# Patient Record
Sex: MALE | Race: WHITE | NOT HISPANIC OR LATINO | Employment: OTHER | URBAN - METROPOLITAN AREA
[De-identification: names, ages, dates, MRNs, and addresses within clinical notes are randomized per-mention and may not be internally consistent; named-entity substitution may affect disease eponyms.]

---

## 2017-01-04 ENCOUNTER — OFFICE VISIT (OUTPATIENT)
Dept: FAMILY MEDICINE | Facility: CLINIC | Age: 72
End: 2017-01-04
Payer: COMMERCIAL

## 2017-01-04 VITALS
OXYGEN SATURATION: 96 % | SYSTOLIC BLOOD PRESSURE: 150 MMHG | HEIGHT: 70 IN | WEIGHT: 232 LBS | HEART RATE: 96 BPM | BODY MASS INDEX: 33.21 KG/M2 | DIASTOLIC BLOOD PRESSURE: 70 MMHG | TEMPERATURE: 97.2 F

## 2017-01-04 DIAGNOSIS — Z12.11 SCREEN FOR COLON CANCER: ICD-10-CM

## 2017-01-04 DIAGNOSIS — Z00.01 ENCOUNTER FOR ROUTINE ADULT HEALTH EXAMINATION WITH ABNORMAL FINDINGS: Primary | ICD-10-CM

## 2017-01-04 DIAGNOSIS — D62 ANEMIA DUE TO BLOOD LOSS, ACUTE: ICD-10-CM

## 2017-01-04 DIAGNOSIS — C61 ADENOCARCINOMA OF PROSTATE (H): ICD-10-CM

## 2017-01-04 DIAGNOSIS — I10 HYPERTENSION GOAL BP (BLOOD PRESSURE) < 140/90: ICD-10-CM

## 2017-01-04 DIAGNOSIS — E78.5 HYPERLIPIDEMIA LDL GOAL <130: ICD-10-CM

## 2017-01-04 DIAGNOSIS — E66.09 OBESITY DUE TO EXCESS CALORIES, UNSPECIFIED OBESITY SEVERITY: ICD-10-CM

## 2017-01-04 LAB — HGB BLD-MCNC: 12.4 G/DL (ref 13.3–17.7)

## 2017-01-04 PROCEDURE — 82043 UR ALBUMIN QUANTITATIVE: CPT | Performed by: FAMILY MEDICINE

## 2017-01-04 PROCEDURE — 82728 ASSAY OF FERRITIN: CPT | Performed by: FAMILY MEDICINE

## 2017-01-04 PROCEDURE — 84153 ASSAY OF PSA TOTAL: CPT | Performed by: FAMILY MEDICINE

## 2017-01-04 PROCEDURE — 80061 LIPID PANEL: CPT | Performed by: FAMILY MEDICINE

## 2017-01-04 PROCEDURE — 85018 HEMOGLOBIN: CPT | Performed by: FAMILY MEDICINE

## 2017-01-04 PROCEDURE — 80048 BASIC METABOLIC PNL TOTAL CA: CPT | Performed by: FAMILY MEDICINE

## 2017-01-04 PROCEDURE — 99397 PER PM REEVAL EST PAT 65+ YR: CPT | Performed by: FAMILY MEDICINE

## 2017-01-04 PROCEDURE — 36415 COLL VENOUS BLD VENIPUNCTURE: CPT | Performed by: FAMILY MEDICINE

## 2017-01-04 RX ORDER — HYDROCHLOROTHIAZIDE 25 MG/1
25 TABLET ORAL EVERY MORNING
Qty: 90 TABLET | Status: SHIPPED | OUTPATIENT
Start: 2017-01-04 | End: 2018-01-30

## 2017-01-04 RX ORDER — LISINOPRIL 40 MG/1
40 TABLET ORAL DAILY
Qty: 90 TABLET | Status: SHIPPED | OUTPATIENT
Start: 2017-01-04 | End: 2018-01-30

## 2017-01-04 RX ORDER — SIMVASTATIN 20 MG
20 TABLET ORAL AT BEDTIME
Qty: 90 TABLET | Status: SHIPPED | OUTPATIENT
Start: 2017-01-04 | End: 2018-01-30

## 2017-01-04 NOTE — PROGRESS NOTES
SUBJECTIVE:                                                            Genaro Ortiz is a 71 year old male who presents for Preventive Visit.      Are you in the first 12 months of your Medicare Part B coverage?  No    Healthy Habits:    Do you get at least three servings of calcium containing foods daily (dairy, green leafy vegetables, etc.)? yes    Amount of exercise or daily activities, outside of work: 1 day(s) per week    Problems taking medications regularly No    Medication side effects: No    Have you had an eye exam in the past two years? yes    Do you see a dentist twice per year? yes    Do you have sleep apnea, excessive snoring or daytime drowsiness?no    COGNITIVE SCREEN  1) Repeat 3 items (Banana, Sunrise, Chair)    2) Clock draw: NORMAL  3) 3 item recall: Recalls 2 objects   Results: NORMAL clock, 1-2 items recalled: COGNITIVE IMPAIRMENT LESS LIKELY    Mini-CogTM Copyright S Micheal. Licensed by the author for use in [facility]; reprinted with permission (soob@[email or web address]). All rights reserved.            Discuss weight gain issues     All Histories reviewed and updated in EPIC as appropriate.  Social History   Substance Use Topics     Smoking status: Never Smoker      Smokeless tobacco: Never Used     Alcohol Use: Yes      Comment: occasional       The patient does not drink >3 drinks per day nor >7 drinks per week.    Today's PHQ-2 Score:   PHQ-2 ( 1999 [employer]) 5/6/2014 10/29/2013   Q1: Little interest or pleasure in doing things 0 0   Q2: Feeling down, depressed or hopeless 0 0   PHQ-2 Score 0 0       Do you feel safe in your environment - Yes    Do you have a Health Care Directive?: Yes: Advance Directive has been received and scanned.    Current providers sharing in care for this patient include:   Patient Care Team:  Berhane Oquendo MD as PCP - General (Family Practice)      Hearing impairment: No    Ability to successfully perform activities of daily living: Yes, no assistance  needed     Fall risk:  Fallen 2 or more times in the past year?: No  Any fall with injury in the past year?: No    Home safety:  none identified      The following health maintenance items are reviewed in Epic and correct as of today:  Health Maintenance   Topic Date Due     AORTIC ANEURYSM SCREENING (SYSTEM ASSIGNED)  06/29/2012     COLONOSCOPY Q10 YEARS NO INBASKET MESSAGE  04/19/2016     INFLUENZA VACCINE (SYSTEM ASSIGNED)  09/01/2016     FALL RISK ASSESSMENT  11/17/2016     TETANUS IMMUNIZATION (SYSTEM ASSIGNED)  05/27/2018     LIPID SCREEN Q5 YR MALE (SYSTEM ASSIGNED)  11/17/2020     ADVANCE DIRECTIVE PLANNING Q5 YRS (NO INBASKET)  03/04/2021     PNEUMOCOCCAL  Completed     HEPATITIS C SCREENING  Completed              ROS: He is thinking about retiring completely, works 4 days a week right now and doesn't have much energy for exercise. He is on his feet the whole time working at [employer]. We talked about his prostate cancer and has been 10 years so if the PSA keeps being undetectable it's likely a cure. He is due for colonoscopy. Blood pressure initially a little high, came down a bit, but he checks it out of the world and he always gets much better readings so I think it's adequate. Meds were refilled.  C: NEGATIVE for fever, chills, change in weight  I: NEGATIVE for worrisome rashes, moles or lesions  E: NEGATIVE for vision changes or irritation  E/M: NEGATIVE for ear, mouth and throat problems  R: NEGATIVE for significant cough or SOB  B: NEGATIVE for masses, tenderness or discharge  CV: NEGATIVE for chest pain, palpitations or peripheral edema  GI: NEGATIVE for nausea, abdominal pain, heartburn, or change in bowel habits  : NEGATIVE for frequency, dysuria, or hematuria  M: NEGATIVE for significant arthralgias or myalgia  N: NEGATIVE for weakness, dizziness or paresthesias  E: NEGATIVE for temperature intolerance, skin/hair changes  H: NEGATIVE for bleeding problems  P: NEGATIVE for changes in mood or  "affect    Problem list, Medication list, Allergies, and Medical/Social/Surgical histories reviewed in [facility] and updated as appropriate.  OBJECTIVE:                                                            There were no vitals taken for this visit. Estimated body mass index is 31.85 kg/(m^2) as calculated from the following:    Height as of 11/17/15: 5' 10\" (1.778 m).    Weight as of 11/17/15: 222 lb (100.699 kg).  EXAM:   GENERAL: healthy, alert and no distress  EYES: Eyes grossly normal to inspection, PERRL and conjunctivae and sclerae normal  HENT: ear canals and TM's normal, nose and mouth without ulcers or lesions  NECK: no adenopathy, no asymmetry, masses, or scars and thyroid normal to palpation  RESP: lungs clear to auscultation - no rales, rhonchi or wheezes  CV: regular rate and rhythm, normal S1 S2, no S3 or S4, no murmur, click or rub, no peripheral edema and peripheral pulses strong  ABDOMEN: soft, nontender, no hepatosplenomegaly, no masses and bowel sounds normal   (male): normal male genitalia without lesions or urethral discharge, no hernia  RECTAL: normal sphincter tone, no rectal masses, prostate normal size, smooth, nontender without nodules or masses  MS: no gross musculoskeletal defects noted, no edema  SKIN: no suspicious lesions or rashes  NEURO: Normal strength and tone, mentation intact and speech normal  PSYCH: mentation appears normal, affect normal/bright    ASSESSMENT / PLAN:                                                            1. Encounter for routine adult health examination with abnormal findings      2. Screen for colon cancer    - GASTROENTEROLOGY ADULT REFERRAL +/- PROCEDURE    3. Hyperlipidemia LDL goal <130    - Lipid Profile with reflex to direct LDL  - simvastatin (ZOCOR) 20 MG tablet; Take 1 tablet (20 mg) by mouth At Bedtime at bedtime.  Dispense: 90 tablet; Refill: prn    4. Hypertension goal BP (blood pressure) < 140/90    - Basic metabolic panel  - Albumin Random " "Urine Quantitative  - lisinopril (PRINIVIL/ZESTRIL) 40 MG tablet; Take 1 tablet (40 mg) by mouth daily  Dispense: 90 tablet; Refill: prn  - hydrochlorothiazide (HYDRODIURIL) 25 MG tablet; Take 1 tablet (25 mg) by mouth every morning  Dispense: 90 tablet; Refill: prn    5. Adenocarcinoma of prostate (H)    - PSA, tumor marker    6. Anemia due to blood loss, acute    - Hemoglobin  - Ferritin    7. Obesity due to excess calories, unspecified obesity severity        End of Life Planning:  Patient currently has an advanced directive: No.  I have verified the patient's ablity to prepare an advanced directive/make health care decisions.  Literature was provided to assist patient in preparing an advanced directive.    COUNSELING:  Reviewed preventive health counseling, as reflected in patient instructions       Regular exercise       Healthy diet/nutrition        Estimated body mass index is 31.85 kg/(m^2) as calculated from the following:    Height as of 11/17/15: 5' 10\" (1.778 m).    Weight as of 11/17/15: 222 lb (100.699 kg).  Weight management plan: Discussed healthy diet and exercise guidelines and patient will follow up in 12 months in clinic to re-evaluate.   reports that he has never smoked. He has never used smokeless tobacco.      Appropriate preventive services were discussed with this patient, including applicable screening as appropriate for cardiovascular disease, diabetes, osteopenia/osteoporosis, and glaucoma.  As appropriate for age/gender, discussed screening for colorectal cancer, prostate cancer, breast cancer, and cervical cancer. Checklist reviewing preventive services available has been given to the patient.    Reviewed patients plan of care and provided an AVS. The Basic Care Plan (routine screening as documented in Health Maintenance) for Genaro meets the Care Plan requirement. This Care Plan has been established and reviewed with the Patient.    Counseling Resources:  ATP IV Guidelines  Pooled " Cohorts Equation Calculator  Breast Cancer Risk Calculator  FRAX Risk Assessment  ICSI Preventive Guidelines  Dietary Guidelines for Americans, 2010  [employer]'s MyPlate  ASA Prophylaxis  Lung CA Screening    Berhane Oquendo MD  [facility]

## 2017-01-04 NOTE — NURSING NOTE
"Chief Complaint   Patient presents with     Physical     fasting     /92 mmHg  Pulse 96  Temp(Src) 97.2  F (36.2  C) (Oral)  Ht 5' 10\" (1.778 m)  Wt 232 lb (105.235 kg)  BMI 33.29 kg/m2  SpO2 96% Estimated body mass index is 33.29 kg/(m^2) as calculated from the following:    Height as of this encounter: 5' 10\" (1.778 m).    Weight as of this encounter: 232 lb (105.235 kg).  BP completed using cuff size: large       Health Maintenance due pending provider review:  Colonoscopy/FIT and BP was high, used pink card, recheck manually    COLON--Gave pt phone number, and pended order for Mammo and/or Colonoscopy      Cecily Cárdenas CMA  "

## 2017-01-05 LAB
ANION GAP SERPL CALCULATED.3IONS-SCNC: 9 MMOL/L (ref 3–14)
BUN SERPL-MCNC: 18 MG/DL (ref 7–30)
CALCIUM SERPL-MCNC: 9.4 MG/DL (ref 8.5–10.1)
CHLORIDE SERPL-SCNC: 108 MMOL/L (ref 94–109)
CHOLEST SERPL-MCNC: 182 MG/DL
CO2 SERPL-SCNC: 22 MMOL/L (ref 20–32)
CREAT SERPL-MCNC: 0.92 MG/DL (ref 0.66–1.25)
FERRITIN SERPL-MCNC: 244 NG/ML (ref 26–388)
GFR SERPL CREATININE-BSD FRML MDRD: 81 ML/MIN/1.7M2
GLUCOSE SERPL-MCNC: 105 MG/DL (ref 70–99)
HDLC SERPL-MCNC: 46 MG/DL
LDLC SERPL CALC-MCNC: 109 MG/DL
NONHDLC SERPL-MCNC: 136 MG/DL
POTASSIUM SERPL-SCNC: 3.7 MMOL/L (ref 3.4–5.3)
PSA SERPL-MCNC: NORMAL UG/L (ref 0–4)
SODIUM SERPL-SCNC: 139 MMOL/L (ref 133–144)
TRIGL SERPL-MCNC: 135 MG/DL

## 2017-01-06 LAB
CREAT UR-MCNC: 115 MG/DL
MICROALBUMIN UR-MCNC: 15 MG/L
MICROALBUMIN/CREAT UR: 13.3 MG/G CR (ref 0–17)

## 2017-01-06 NOTE — PROGRESS NOTES
Quick Note:    All in all good. No detectible PSA. The hemoglobin stays ust slightly low but not for lack of iron. I suspect it is normal for you,but let's check annually  ______

## 2017-01-24 ENCOUNTER — OFFICE VISIT (OUTPATIENT)
Dept: OPHTHALMOLOGY | Facility: CLINIC | Age: 72
End: 2017-01-24
Attending: OPHTHALMOLOGY
Payer: COMMERCIAL

## 2017-01-24 DIAGNOSIS — H43.813 POSTERIOR VITREOUS DETACHMENT OF BOTH EYES: ICD-10-CM

## 2017-01-24 DIAGNOSIS — H35.371 ERM OD (EPIRETINAL MEMBRANE, RIGHT EYE): Primary | ICD-10-CM

## 2017-01-24 DIAGNOSIS — Z96.1 PSEUDOPHAKIA OF BOTH EYES: ICD-10-CM

## 2017-01-24 PROCEDURE — 92015 DETERMINE REFRACTIVE STATE: CPT | Mod: ZF

## 2017-01-24 PROCEDURE — 99214 OFFICE O/P EST MOD 30 MIN: CPT | Mod: 25,ZF

## 2017-01-24 ASSESSMENT — REFRACTION_MANIFEST
OD_ADD: +2.50
OS_ADD: +2.50
OS_AXIS: 100
OS_SPHERE: -1.25
OD_CYLINDER: +0.50
OD_SPHERE: -0.50
OD_AXIS: 095
OS_CYLINDER: +0.75

## 2017-01-24 ASSESSMENT — REFRACTION_WEARINGRX
OD_ADD: +2.50
OS_CYLINDER: +1.00
OS_SPHERE: -1.00
OD_CYLINDER: +0.75
SPECS_TYPE: PAL
OD_SPHERE: PLANO
OD_AXIS: 101
OS_ADD: +2.50
OS_AXIS: 114

## 2017-01-24 ASSESSMENT — TONOMETRY
OS_IOP_MMHG: 12
IOP_METHOD: TONOPEN
OD_IOP_MMHG: 12

## 2017-01-24 ASSESSMENT — CONF VISUAL FIELD
OD_NORMAL: 1
OS_NORMAL: 1

## 2017-01-24 ASSESSMENT — CUP TO DISC RATIO
OS_RATIO: 0.1
OD_RATIO: 0.1

## 2017-01-24 ASSESSMENT — VISUAL ACUITY
OD_CC: 20/30
CORRECTION_TYPE: GLASSES
METHOD: SNELLEN - LINEAR
OS_CC+: -2
OS_CC: J1+
OS_CC: 20/20
OD_CC: J1+

## 2017-01-24 ASSESSMENT — SLIT LAMP EXAM - LIDS
COMMENTS: NORMAL
COMMENTS: NORMAL

## 2017-01-24 NOTE — PROGRESS NOTES
CC: Comprehensive Eye Exam    HPI: Genaro Ortiz is a 70 year old male who presents for comprehensive eye exam. Reports vision is quite good. Denies any eye irritation.     POHx:  CE/ IOL, Both Eyes (2/22/12, 3/28/12); left eye complicated by retained lens fragment requiring return to OR    Current Medications:   None    Assessment & Plan   Genaro Ortiz is a 71 year old male with the following diagnoses:     1. Posterior Vitreous Detachment, complete left eye, complete right eye   2. Epiretinal membrane, right eye    - mild Vitreomacular traction right eye, asymptomatic, monitor   - with vitreous syneresis and history of high myopia   - counseled on signs of retinal detachment and when to seek evaluation   - monitor     3. Pseudophakia, Both Eyes   - PCO left > right   - consider YAG capsulotomy in the future    4. Dry Eye Syndrome   - continue artificial tears as needed    Berhane Webster MD  Ophthalmology, PGY-2     Attending Physician Attestation:  I have seen and examined this patient.  I have confirmed and edited as necessary the chief complaint(s), history of present illness, review of systems, relevant history, and examination findings as documented by others.  I have personally reviewed the relevant tests, images, and reports as documented above.  I have confirmed and edited as necessary the assessment and plan and agree with this note.  - Eric Abel MD 11:17 AM 1/24/2017

## 2017-01-24 NOTE — MR AVS SNAPSHOT
After Visit Summary   1/24/2017    Genaro Ortiz    MRN: [id number]           Patient Information     Date Of Birth          1945        Visit Information        Provider Department      1/24/2017 10:15 AM Eric Abel MD [facility]        Today's Diagnoses     ERM OD (epiretinal membrane, right eye)    -  1     Posterior vitreous detachment of both eyes         Pseudophakia of both eyes            Follow-ups after your visit        Your next 10 appointments already scheduled     Jan 31, 2017   Procedure with Pete Siu MD   [facility] ([facility])    [address]  [place] [address]   [phone]           [facility] is located at [address] [place]            Jan 23, 2018 10:15 AM   RETURN GENERAL with Eric Abel MD   [facility] Clinic ([facility])    [address]  [address] [facility] [address] [facility]  [address]  [facility] [address] [phone]              Who to contact     Please call your clinic at [phone] to:    Ask questions about your health    Make or cancel appointments    Discuss your medicines    Learn about your test results    Speak to your doctor   If you have compliments or concerns about an experience at your clinic, or if you wish to file a complaint, please contact [facility] Patient Relations at [phone] or email us at [email or web address]         Additional Information About Your Visit        MyChart Information     [email or web address] gives you secure access to your electronic health record. If you see a primary care provider, you can also send messages to your care team and make appointments. If you have questions, please call your primary care clinic.  If you do not have a primary care provider, please call [phone] and they will assist you.      Kaymu is an electronic gateway that provides easy, online access to your medical records. With  Christian, you can request a clinic appointment, read your test results, renew a prescription or communicate with your care team.     To access your existing account, please contact your [facility] or call [phone] for assistance.        Care EveryWhere ID     This is your Care EveryWhere ID. This could be used by other organizations to access your [place] medical records  [id number]         Blood Pressure from Last 3 Encounters:   01/04/17 150/70   11/17/15 130/80   11/11/14 137/86    Weight from Last 3 Encounters:   01/04/17 105.235 kg (232 lb)   11/17/15 100.699 kg (222 lb)   11/11/14 100.109 kg (220 lb 11.2 oz)              Today, you had the following     No orders found for display       Primary Care Provider Office Phone # Fax #    Berhane Oquendo [phone]       [facility] [address] [address]        Thank you!     Thank you for choosing EYE CLINIC  for your care. Our goal is always to provide you with excellent care. Hearing back from our patients is one way we can continue to improve our services. Please take a few minutes to complete the written survey that you may receive in the mail after your visit with us. Thank you!             Your Updated Medication List - Protect others around you: Learn how to safely use, store and throw away your medicines at www.disposemymeds.org.          This list is accurate as of: 1/24/17 11:16 AM.  Always use your most recent med list.                   Brand Name Dispense Instructions for use    aspirin 81 MG tablet      1 TABLET DAILY       cholecalciferol 1000 UNIT tablet    vitamin D    100 tablet    Take 1 tablet by mouth daily.       fish oil-omega-3 fatty acids 1000 MG capsule     120 capsule    Take 2 capsules by mouth daily.       hydrochlorothiazide 25 MG tablet    HYDRODIURIL    90 tablet    Take 1 tablet (25 mg) by mouth every morning       lisinopril 40 MG tablet     PRINIVIL/ZESTRIL    90 tablet    Take 1 tablet (40 mg) by mouth daily       simvastatin 20 MG tablet    ZOCOR    90 tablet    Take 1 tablet (20 mg) by mouth At Bedtime at bedtime.

## 2017-01-24 NOTE — NURSING NOTE
Chief Complaints and History of Present Illnesses   Patient presents with     COMPREHENSIVE EYE EXAM     ERM OD (epiretinal membrane, right eye)      HPI    Affected eye(s):  Both   Symptoms:     No blurred vision   No decreased vision   No floaters   No flashes   No halos   No starbursts   No Dryness   No itching      Duration:  12 months   Frequency:  Constant       Do you have eye pain now?:  No      Comments:  States va is the same since last visit.   David Greenwood  9:59 AM January 24, 2017

## 2017-01-31 ENCOUNTER — HOSPITAL ENCOUNTER (OUTPATIENT)
Facility: CLINIC | Age: 72
Discharge: HOME | End: 2017-01-31
Attending: SPECIALIST | Admitting: SPECIALIST
Payer: COMMERCIAL

## 2017-01-31 ENCOUNTER — SURGERY (OUTPATIENT)
Age: 72
End: 2017-01-31

## 2017-01-31 VITALS
OXYGEN SATURATION: 96 % | RESPIRATION RATE: 18 BRPM | DIASTOLIC BLOOD PRESSURE: 95 MMHG | SYSTOLIC BLOOD PRESSURE: 132 MMHG

## 2017-01-31 LAB — COLONOSCOPY: NORMAL

## 2017-01-31 PROCEDURE — 25000125 ZZHC RX 250: Performed by: SPECIALIST

## 2017-01-31 PROCEDURE — G0500 MOD SEDAT ENDO SERVICE >5YRS: HCPCS | Performed by: SPECIALIST

## 2017-01-31 PROCEDURE — 45385 COLONOSCOPY W/LESION REMOVAL: CPT | Performed by: SPECIALIST

## 2017-01-31 PROCEDURE — 25000128 H RX IP 250 OP 636: Performed by: SPECIALIST

## 2017-01-31 PROCEDURE — 88305 TISSUE EXAM BY PATHOLOGIST: CPT | Performed by: SPECIALIST

## 2017-01-31 PROCEDURE — 88305 TISSUE EXAM BY PATHOLOGIST: CPT | Mod: 26 | Performed by: SPECIALIST

## 2017-01-31 PROCEDURE — 40000257 ZZH STATISTIC CONSULT NO CHARGE VASC ACCESS

## 2017-01-31 RX ORDER — FENTANYL CITRATE 50 UG/ML
INJECTION, SOLUTION INTRAMUSCULAR; INTRAVENOUS PRN
Status: DISCONTINUED | OUTPATIENT
Start: 2017-01-31 | End: 2017-01-31 | Stop reason: HOSPADM

## 2017-01-31 RX ORDER — LIDOCAINE 40 MG/G
CREAM TOPICAL
Status: DISCONTINUED | OUTPATIENT
Start: 2017-01-31 | End: 2017-01-31 | Stop reason: HOSPADM

## 2017-01-31 RX ORDER — ONDANSETRON 2 MG/ML
4 INJECTION INTRAMUSCULAR; INTRAVENOUS
Status: DISCONTINUED | OUTPATIENT
Start: 2017-01-31 | End: 2017-01-31 | Stop reason: HOSPADM

## 2017-01-31 RX ADMIN — FENTANYL CITRATE 50 MCG: 50 INJECTION, SOLUTION INTRAMUSCULAR; INTRAVENOUS at 11:27

## 2017-01-31 RX ADMIN — FENTANYL CITRATE 25 MCG: 50 INJECTION, SOLUTION INTRAMUSCULAR; INTRAVENOUS at 11:38

## 2017-01-31 RX ADMIN — MIDAZOLAM HYDROCHLORIDE 0.5 MG: 1 INJECTION, SOLUTION INTRAMUSCULAR; INTRAVENOUS at 11:32

## 2017-01-31 RX ADMIN — FENTANYL CITRATE 25 MCG: 50 INJECTION, SOLUTION INTRAMUSCULAR; INTRAVENOUS at 11:32

## 2017-01-31 RX ADMIN — MIDAZOLAM HYDROCHLORIDE 0.5 MG: 1 INJECTION, SOLUTION INTRAMUSCULAR; INTRAVENOUS at 11:38

## 2017-01-31 RX ADMIN — MIDAZOLAM HYDROCHLORIDE 1 MG: 1 INJECTION, SOLUTION INTRAMUSCULAR; INTRAVENOUS at 11:28

## 2017-01-31 NOTE — BRIEF OP NOTE
[facility] Brief Operative Note    Pre-operative diagnosis: SCREENING   Post-operative diagnosis small ascending colon polyp, diverticulosis     Procedure: Procedure(s):  COLONOSCOPY - Wound Class: II-Clean Contaminated   Surgeon(s): Surgeon(s) and Role:     * Pete Siu MD - Primary   Estimated blood loss: * No values recorded between 1/31/2017 12:00 AM and 1/31/2017 11:57 AM *    Specimens:   ID Type Source Tests Collected by Time Destination   A :  Polyp Large Intestine, Left/Descending SURGICAL PATHOLOGY EXAM Pete Siu MD 1/31/2017 11:46 AM       Findings: Please see ProVation procedure note in Chart Review

## 2017-01-31 NOTE — H&P
Pre-Endoscopy History and Physical     Genaro Ortiz MRN# [id number]   YOB: 1945 Age: 71 year old     Date of Procedure: 1/31/2017  Primary care provider: Berhane Oquendo  Type of Endoscopy: Colonoscopy with possible biopsy, possible polypectomy  Reason for Procedure: screening  Type of Anesthesia Anticipated: Conscious Sedation    HPI:    Genaro is a 71 year old male who will be undergoing the above procedure.      A history and physical has been performed. The patient's medications and allergies have been reviewed. The risks and benefits of the procedure and the sedation options and risks were discussed with the patient.  All questions were answered and informed consent was obtained.      He denies a personal or family history of anesthesia complications or bleeding disorders.     Patient Active Problem List   Diagnosis     Adenocarcinoma of prostate (H)     HYPERLIPIDEMIA LDL GOAL <130     Hypertension goal BP (blood pressure) < 140/90     Advance Care Planning     Anemia due to blood loss, acute     Health Care Home     Obesity due to excess calories, unspecified obesity severity        Past Medical History   Diagnosis Date     Unspecified essential hypertension      Pure hypercholesterolemia      Esophageal reflux      Adenocarcinoma of prostate (H) 5/27/2008        Past Surgical History   Procedure Laterality Date     Appendectomy  1952     Prostate surgery  2007     Herniorrhaphy inguinal  5/15/2014     Procedure: HERNIORRHAPHY INGUINAL;  Surgeon: Evens Jefferson MD;  Location: [facility] OR     Cataract iol, rt/lt Bilateral 03/12       Social History   Substance Use Topics     Smoking status: Never Smoker      Smokeless tobacco: Never Used     Alcohol Use: 0.0 oz/week     0 Standard drinks or equivalent per week      Comment: occasional       Family History   Problem Relation Age of Onset     DIABETES Mother      GASTROINTESTINAL DISEASE Mother      pancreas removed     Glaucoma Other       "Macular Degeneration No family hx of        Prior to Admission medications    Medication Sig Start Date End Date Taking? Authorizing Provider   simvastatin (ZOCOR) 20 MG tablet Take 1 tablet (20 mg) by mouth At Bedtime at bedtime. 1/4/17  Yes Berhane Oquendo MD   lisinopril (PRINIVIL/ZESTRIL) 40 MG tablet Take 1 tablet (40 mg) by mouth daily 1/4/17  Yes Berhane Oquendo MD   hydrochlorothiazide (HYDRODIURIL) 25 MG tablet Take 1 tablet (25 mg) by mouth every morning 1/4/17  Yes Berhane Oquendo MD   fish oil-omega-3 fatty acids (FISH OIL) 1000 MG capsule Take 2 capsules by mouth daily. 8/21/12  Yes Berhane Oquendo MD   cholecalciferol (VITAMIN D) 1000 UNIT tablet Take 1 tablet by mouth daily. 8/21/12  Yes Berhane Oquendo MD   ASPIRIN 81 MG OR TABS 1 TABLET DAILY    Reported, Patient       Allergies   Allergen Reactions     Penicillins Other (See Comments)     blotches        REVIEW OF SYSTEMS:   5 point ROS negative except as noted above in HPI, including Gen., Resp., CV, GI &  system review.    PHYSICAL EXAM:   /85 mmHg  SpO2 100% Estimated body mass index is 33.29 kg/(m^2) as calculated from the following:    Height as of 1/4/17: 1.778 m (5' 10\").    Weight as of 1/4/17: 105.235 kg (232 lb).   GENERAL APPEARANCE: alert, and oriented  MENTAL STATUS: alert  AIRWAY EXAM: Mallampatti Class II (visualization of the soft palate, fauces, and uvula)  RESP: lungs clear to auscultation - no rales, rhonchi or wheezes  CV: regular rates and rhythm  DIAGNOSTICS:    Not indicated    IMPRESSION   ASA Class 2 - Mild systemic disease    PLAN:   Plan for Colonoscopy with possible biopsy, possible polypectomy. We discussed the risks, benefits and alternatives and the patient wished to proceed.    The above has been forwarded to the consulting provider.      Signed Electronically by: Pete Siu  January 31, 2017            "

## 2017-02-01 LAB — COPATH REPORT: NORMAL

## 2017-09-26 ENCOUNTER — TRANSFERRED RECORDS (OUTPATIENT)
Dept: HEALTH INFORMATION MANAGEMENT | Facility: CLINIC | Age: 72
End: 2017-09-26

## 2018-01-23 ENCOUNTER — OFFICE VISIT (OUTPATIENT)
Dept: OPHTHALMOLOGY | Facility: CLINIC | Age: 73
End: 2018-01-23
Attending: OPHTHALMOLOGY
Payer: COMMERCIAL

## 2018-01-23 DIAGNOSIS — H43.813 POSTERIOR VITREOUS DETACHMENT OF BOTH EYES: ICD-10-CM

## 2018-01-23 DIAGNOSIS — H35.371 EPIRETINAL MEMBRANE (ERM) OF RIGHT EYE: Primary | ICD-10-CM

## 2018-01-23 DIAGNOSIS — H04.123 DRY EYES, BILATERAL: ICD-10-CM

## 2018-01-23 DIAGNOSIS — Z96.1 PSEUDOPHAKIA OF BOTH EYES: ICD-10-CM

## 2018-01-23 PROCEDURE — 92015 DETERMINE REFRACTIVE STATE: CPT | Mod: ZF

## 2018-01-23 PROCEDURE — G0463 HOSPITAL OUTPT CLINIC VISIT: HCPCS | Mod: 25,ZF

## 2018-01-23 ASSESSMENT — REFRACTION_WEARINGRX
OS_SPHERE: -1.00
OD_AXIS: 101
OS_CYLINDER: +1.00
OS_AXIS: 114
SPECS_TYPE: PAL
OS_SPHERE: -1.00
SPECS_TYPE: PAL
OD_SPHERE: PLANO
OD_CYLINDER: +0.75
OD_ADD: +2.50
OS_CYLINDER: +1.00
OD_CYLINDER: +0.75
OS_ADD: +2.50
OS_AXIS: 114
OD_AXIS: 101
OS_ADD: +2.50
OD_SPHERE: PLANO
OD_ADD: +2.50

## 2018-01-23 ASSESSMENT — CONF VISUAL FIELD
OS_NORMAL: 1
OD_NORMAL: 1
METHOD: COUNTING FINGERS

## 2018-01-23 ASSESSMENT — VISUAL ACUITY
OS_CC: 20/20
OD_PH_CC: 20/20-1
METHOD: SNELLEN - LINEAR
OD_CC+: -3
OD_CC: 20/25

## 2018-01-23 ASSESSMENT — TONOMETRY
OD_IOP_MMHG: 19
IOP_METHOD: TONOPEN
OS_IOP_MMHG: 18

## 2018-01-23 ASSESSMENT — REFRACTION_MANIFEST
OD_AXIS: 095
OS_SPHERE: -1.25
OS_CYLINDER: +0.25
OD_CYLINDER: +0.50
OD_ADD: +2.50
OS_ADD: +2.50
OS_AXIS: 160
OD_SPHERE: -0.75

## 2018-01-23 ASSESSMENT — CUP TO DISC RATIO
OS_RATIO: 0.1
OD_RATIO: 0.1

## 2018-01-23 ASSESSMENT — SLIT LAMP EXAM - LIDS
COMMENTS: NORMAL
COMMENTS: NORMAL

## 2018-01-23 NOTE — NURSING NOTE
Chief Complaints and History of Present Illnesses   Patient presents with     Follow Up For     ERM OD (epiretinal membrane, right eye)       HPI    Last Eye Exam:  1/24/17   Affected eye(s):  Right   Symptoms:        Unknown duration    Frequency:  Constant       Do you have eye pain now?:  No      Comments:  Genaro is here today for a one year follow up of ERM OD (epiretinal membrane, right eye)   He says he had cataract surgery several years ago and he says his vision is very good.   He says his right is fine, and he denies flashes but he does see floaters, and has for several years.    Beni Leyva COT 10:19 AM January 23, 2018

## 2018-01-23 NOTE — PROGRESS NOTES
Genaro Ortiz is a 72 year old male who presents today with the following diagnosis:    Assessment & Plan      Genaro Ortiz is a 72 year old male with the following diagnoses:   1. Epiretinal membrane (ERM) of right eye    2. Posterior vitreous detachment of both eyes    3. Pseudophakia of both eyes    4. Dry eyes, bilateral       EPIRETINAL MEMBRANE not visually significant, refracts to 20/20, can continue to monitor  Posterior capsular opacity (PCO) left eye > right eye, can consider YAG in the future if visually significant    Continue artificial tears as needed  Refracted today and new prescription dispensed, option to fill    Patient disposition:   Return in about 1 year (around 1/23/2019) for Annual Visit.        Abhinav Puga MD  PGY-2 Ophthalmology  [phone]    Attending Physician Attestation:  Complete documentation of historical and exam elements from today's encounter can be found in the full encounter summary report (not reduplicated in this progress note).  I personally obtained the chief complaint(s) and history of present illness.  I confirmed and edited as necessary the review of systems, past medical/surgical history, family history, social history, and examination findings as documented by others; and I examined the patient myself.  I personally reviewed the relevant tests, images, and reports as documented above.  I formulated and edited as necessary the assessment and plan and discussed the findings and management plan with the patient and family. . - Eric Abel MD

## 2018-01-23 NOTE — MR AVS SNAPSHOT
After Visit Summary   1/23/2018    Genaro Ortiz    MRN: [id number]           Patient Information     Date Of Birth          1945        Visit Information        Provider Department      1/23/2018 10:15 AM [facility] MD [facility]        Today's Diagnoses     Epiretinal membrane (ERM) of right eye    -  1    Posterior vitreous detachment of both eyes        Pseudophakia of both eyes        Dry eyes, bilateral           Follow-ups after your visit        Follow-up notes from your care team     Return in about 1 year (around 1/23/2019) for Annual Visit.      Your next 10 appointments already scheduled     Jan 30, 2018 10:00 AM CST   PHYSICAL with Francisco Sneed MD   [facility] ([facility])    [address] [address] [phone]              Who to contact     Please call your clinic at [phone] to:    Ask questions about your health    Make or cancel appointments    Discuss your medicines    Learn about your test results    Speak to your doctor   If you have compliments or concerns about an experience at your clinic, or if you wish to file a complaint, please contact [facility] Patient Relations at [phone] or email us at [email or web address]         Additional Information About Your Visit        MyChart Information     [employer] gives you secure access to your electronic health record. If you see a primary care provider, you can also send messages to your care team and make appointments. If you have questions, please call your primary care clinic.  If you do not have a primary care provider, please call [phone] and they will assist you.      [employer] is an electronic gateway that provides easy, online access to your medical records. With [employer], you can request a clinic appointment, read your test results, renew a prescription or communicate with your care team.     To  access your existing account, please contact your [facility] or call [phone] for assistance.        Care EveryWhere ID     This is your Care EveryWhere ID. This could be used by other organizations to access your [place] medical records  [id number]         Blood Pressure from Last 3 Encounters:   01/31/17 (!) 132/95   01/04/17 150/70   11/17/15 130/80    Weight from Last 3 Encounters:   01/04/17 105.2 kg (232 lb)   11/17/15 100.7 kg (222 lb)   11/11/14 100.1 kg (220 lb 11.2 oz)              Today, you had the following     No orders found for display       Primary Care Provider Office Phone # Fax #    Berhane Oquendo [phone] [address] [address]        Equal Access to Services     VISH [facility] : Hadii nabeel davis hadasho Soporfirio, waaxda luqadaha, qaybta kaalmada adepatriciayada, jose payne . So [facility] [phone].    ATENCIÓN: Si habla español, tiene a richards disposición servicios gratuitos de asistencia lingüística. Llame al [phone].    We comply with applicable federal civil rights laws and [place] laws. We do not discriminate on the basis of race, color, national origin, age, disability, sex, sexual orientation, or gender identity.            Thank you!     Thank you for choosing E[facility] CLINIC  for your care. Our goal is always to provide you with excellent care. Hearing back from our patients is one way we can continue to improve our services. Please take a few minutes to complete the written survey that you may receive in the mail after your visit with us. Thank you!             Your Updated Medication List - Protect others around you: Learn how to safely use, store and throw away your medicines at www.disposemymeds.org.          This list is accurate as of: 1/23/18 11:51 AM.  Always use your most recent med list.                   Brand Name Dispense Instructions for use Diagnosis    aspirin 81 MG  tablet      1 TABLET DAILY    Routine medical exam       cholecalciferol 1000 UNIT tablet    vitamin D3    100 tablet    Take 1 tablet by mouth daily.    Routine general medical examination at a health care facility       fish oil-omega-3 fatty acids 1000 MG capsule     120 capsule    Take 2 capsules by mouth daily.    Hyperlipidemia LDL goal <130       hydrochlorothiazide 25 MG tablet    HYDRODIURIL    90 tablet    Take 1 tablet (25 mg) by mouth every morning    Hypertension goal BP (blood pressure) < 140/90       lisinopril 40 MG tablet    PRINIVIL/ZESTRIL    90 tablet    Take 1 tablet (40 mg) by mouth daily    Hypertension goal BP (blood pressure) < 140/90       simvastatin 20 MG tablet    ZOCOR    90 tablet    Take 1 tablet (20 mg) by mouth At Bedtime at bedtime.    Hyperlipidemia LDL goal <130

## 2018-01-28 ASSESSMENT — ACTIVITIES OF DAILY LIVING (ADL)
CURRENT_FUNCTION: NO ASSISTANCE NEEDED
I_NEED_ASSISTANCE_FOR_THE_FOLLOWING_DAILY_ACTIVITIES:: NO ASSISTANCE IS NEEDED

## 2018-01-30 ENCOUNTER — RADIANT APPOINTMENT (OUTPATIENT)
Dept: GENERAL RADIOLOGY | Facility: CLINIC | Age: 73
End: 2018-01-30
Attending: FAMILY MEDICINE
Payer: COMMERCIAL

## 2018-01-30 ENCOUNTER — OFFICE VISIT (OUTPATIENT)
Dept: FAMILY MEDICINE | Facility: CLINIC | Age: 73
End: 2018-01-30
Payer: COMMERCIAL

## 2018-01-30 VITALS
SYSTOLIC BLOOD PRESSURE: 130 MMHG | RESPIRATION RATE: 14 BRPM | DIASTOLIC BLOOD PRESSURE: 80 MMHG | HEIGHT: 70 IN | BODY MASS INDEX: 33.07 KG/M2 | HEART RATE: 90 BPM | TEMPERATURE: 96.1 F | WEIGHT: 231 LBS | OXYGEN SATURATION: 98 %

## 2018-01-30 DIAGNOSIS — Z00.00 ROUTINE HISTORY AND PHYSICAL EXAMINATION OF ADULT: Primary | ICD-10-CM

## 2018-01-30 DIAGNOSIS — M79.652 PAIN OF LEFT THIGH: ICD-10-CM

## 2018-01-30 DIAGNOSIS — C61 ADENOCARCINOMA OF PROSTATE (H): ICD-10-CM

## 2018-01-30 DIAGNOSIS — E78.5 HYPERLIPIDEMIA LDL GOAL <130: ICD-10-CM

## 2018-01-30 DIAGNOSIS — I10 HYPERTENSION GOAL BP (BLOOD PRESSURE) < 140/90: ICD-10-CM

## 2018-01-30 DIAGNOSIS — M16.10 ARTHRITIS OF HIP: ICD-10-CM

## 2018-01-30 LAB
BASOPHILS # BLD AUTO: 0 10E9/L (ref 0–0.2)
BASOPHILS NFR BLD AUTO: 0.4 %
DIFFERENTIAL METHOD BLD: ABNORMAL
EOSINOPHIL # BLD AUTO: 0.1 10E9/L (ref 0–0.7)
EOSINOPHIL NFR BLD AUTO: 0.7 %
ERYTHROCYTE [DISTWIDTH] IN BLOOD BY AUTOMATED COUNT: 12.9 % (ref 10–15)
HCT VFR BLD AUTO: 38.2 % (ref 40–53)
HGB BLD-MCNC: 13.2 G/DL (ref 13.3–17.7)
LYMPHOCYTES # BLD AUTO: 1.8 10E9/L (ref 0.8–5.3)
LYMPHOCYTES NFR BLD AUTO: 24.4 %
MCH RBC QN AUTO: 31 PG (ref 26.5–33)
MCHC RBC AUTO-ENTMCNC: 34.6 G/DL (ref 31.5–36.5)
MCV RBC AUTO: 90 FL (ref 78–100)
MONOCYTES # BLD AUTO: 0.6 10E9/L (ref 0–1.3)
MONOCYTES NFR BLD AUTO: 7.7 %
NEUTROPHILS # BLD AUTO: 4.9 10E9/L (ref 1.6–8.3)
NEUTROPHILS NFR BLD AUTO: 66.8 %
PLATELET # BLD AUTO: 236 10E9/L (ref 150–450)
RBC # BLD AUTO: 4.26 10E12/L (ref 4.4–5.9)
WBC # BLD AUTO: 7.4 10E9/L (ref 4–11)

## 2018-01-30 PROCEDURE — 36415 COLL VENOUS BLD VENIPUNCTURE: CPT | Performed by: FAMILY MEDICINE

## 2018-01-30 PROCEDURE — 73502 X-RAY EXAM HIP UNI 2-3 VIEWS: CPT | Mod: FY

## 2018-01-30 PROCEDURE — G0438 PPPS, INITIAL VISIT: HCPCS | Performed by: FAMILY MEDICINE

## 2018-01-30 PROCEDURE — 99213 OFFICE O/P EST LOW 20 MIN: CPT | Mod: 25 | Performed by: FAMILY MEDICINE

## 2018-01-30 PROCEDURE — 84153 ASSAY OF PSA TOTAL: CPT | Performed by: FAMILY MEDICINE

## 2018-01-30 PROCEDURE — 80053 COMPREHEN METABOLIC PANEL: CPT | Performed by: FAMILY MEDICINE

## 2018-01-30 PROCEDURE — 85025 COMPLETE CBC W/AUTO DIFF WBC: CPT | Performed by: FAMILY MEDICINE

## 2018-01-30 RX ORDER — SIMVASTATIN 20 MG
20 TABLET ORAL AT BEDTIME
Qty: 90 TABLET | Status: SHIPPED | OUTPATIENT
Start: 2018-01-30 | End: 2019-02-07

## 2018-01-30 RX ORDER — LISINOPRIL 40 MG/1
40 TABLET ORAL DAILY
Qty: 90 TABLET | Status: SHIPPED | OUTPATIENT
Start: 2018-01-30 | End: 2019-02-07

## 2018-01-30 RX ORDER — HYDROCHLOROTHIAZIDE 25 MG/1
25 TABLET ORAL EVERY MORNING
Qty: 90 TABLET | Status: SHIPPED | OUTPATIENT
Start: 2018-01-30 | End: 2019-02-07

## 2018-01-30 NOTE — PROGRESS NOTES
SUBJECTIVE:   Genaro Ortiz is a 72 year old male who presents for Preventive Visit.      Are you in the first 12 months of your Medicare Part B coverage?      Healthy Habits:    Do you get at least three servings of calcium containing foods daily (dairy, green leafy vegetables, etc.)? yes    Amount of exercise or daily activities, outside of work: 2-3 but very active job day(s) per week    Problems taking medications regularly No    Medication side effects: No    Have you had an eye exam in the past two years? yes    Do you see a dentist twice per year? no    Do you have sleep apnea, excessive snoring or daytime drowsiness?no      Ability to successfully perform activities of daily living: Yes, no assistance needed    Home safety:  none identified     Hearing impairment: Yes, thinks it's less than it used to be. Worse in right ear    Fall risk:  Fallen 2 or more times in the past year?: No  Any fall with injury in the past year?: No        COGNITIVE SCREEN  1) Repeat 3 items (Banana, Sunrise, Chair)    2) Clock draw: NORMAL  3) 3 item recall: Recalls 3 objects  Results: 3 items recalled: COGNITIVE IMPAIRMENT LESS LIKELY    Mini-CogTM Copyright S Micheal. Licensed by the author for use in [facility]; reprinted with permission ([email or web address]). All rights reserved.          in addition to health maintanance patient would like to discuss the following problem:    Arthritis and pains in legs in left groin area, worse on left side, radiates down leg, comes and goes  Mechanism of injury: increased activity, walking, working etc  .  Immediate symptoms: insidious onset of pain.  Symptoms have been intermittent since that time.  Prior history of related problems: no prior problems with this area in the past.  Treatment: rest helps    Work related: exacerbates    Problem pertinent review of systems  Skin: no rash or infection  Neuro: no significant weakness, numbness, tingling.    Pertinent EXAM  Normal R & L  lower extremity joints for ROM symmetry & tone/ no tenderness/ no effusions/ no crepitus or deformities, except:  Hip exam: Pain is localized to the groin area with radiation down to the left knee  Decreased range of motion of the left leg and pain with internal rotation  Neurological exam reveals normal without focal findings. DTR's, motor strength and sensation normal.    X-ray: shows DJD changes, likely chronic    A/Plan:   Symptoms suggest that his pain is likely due to hip osteoarthritis  His dad had a history of osteoarthritis and ended up needing a hip replacement    This is not bothering him enough yet to decide to seek orthopedic consultation but he will consider it if it gets worse  We reviewed use of over-the-counter medications        Reviewed and updated as needed this visit by clinical staff         Reviewed and updated as needed this visit by Provider        Social History   Substance Use Topics     Smoking status: Never Smoker     Smokeless tobacco: Never Used     Alcohol use 0.0 oz/week     0 Standard drinks or equivalent per week      Comment: occasional       If you drink alcohol do you typically have >3 drinks per day or >7 drinks per week? No                        Today's PHQ-2 Score:   PHQ-2 ( 1999 Pfizer) 1/28/2018 1/4/2017   Q1: Little interest or pleasure in doing things 0 0   Q2: Feeling down, depressed or hopeless 0 0   PHQ-2 Score 0 0   Q1: Little interest or pleasure in doing things Not at all -   Q2: Feeling down, depressed or hopeless Not at all -   PHQ-2 Score 0 -       Do you feel safe in your environment - Yes    Do you have a Health Care Directive?: Yes: Advance Directive has been received and scanned.    Current providers sharing in care for this patient include:   Patient Care Team:  Berhane Oquendo MD as PCP - General (Family Practice)    The following health maintenance items are reviewed in Epic and correct as of today:  Health Maintenance   Topic Date Due     AORTIC  ANEURYSM SCREENING (SYSTEM ASSIGNED)  06/29/2012     INFLUENZA VACCINE (SYSTEM ASSIGNED)  09/01/2017     TETANUS IMMUNIZATION (SYSTEM ASSIGNED)  05/27/2018     FALL RISK ASSESSMENT  01/23/2019     ADVANCE DIRECTIVE PLANNING Q5 YRS  03/04/2021     LIPID SCREEN Q5 YR MALE (SYSTEM ASSIGNED)  01/04/2022     COLONOSCOPY Q5 YR  01/31/2022     PNEUMOCOCCAL  Completed     HEPATITIS C SCREENING  Completed     Labs reviewed in EPIC  BP Readings from Last 3 Encounters:   01/30/18 130/80   01/31/17 (!) 132/95   01/04/17 150/70    Wt Readings from Last 3 Encounters:   01/30/18 231 lb (104.8 kg)   01/04/17 232 lb (105.2 kg)   11/17/15 222 lb (100.7 kg)                  Patient Active Problem List   Diagnosis     Adenocarcinoma of prostate (H)     HYPERLIPIDEMIA LDL GOAL <130     Hypertension goal BP (blood pressure) < 140/90     Advance Care Planning     Anemia due to blood loss, acute     Health Care Home     Obesity due to excess calories, unspecified obesity severity     Past Surgical History:   Procedure Laterality Date     APPENDECTOMY  1952     CATARACT IOL, RT/LT Bilateral 03/12     HERNIORRHAPHY INGUINAL  5/15/2014    Procedure: HERNIORRHAPHY INGUINAL;  Surgeon: Evens Jefferson MD;  Location: [facility] OR     PROSTATE SURGERY  2007       Social History   Substance Use Topics     Smoking status: Never Smoker     Smokeless tobacco: Never Used     Alcohol use 0.0 oz/week     0 Standard drinks or equivalent per week      Comment: occasional     Family History   Problem Relation Age of Onset     Glaucoma Other      DIABETES Mother      GASTROINTESTINAL DISEASE Mother      pancreas removed     Macular Degeneration No family hx of          Current Outpatient Prescriptions   Medication Sig Dispense Refill     simvastatin (ZOCOR) 20 MG tablet Take 1 tablet (20 mg) by mouth At Bedtime at bedtime. 90 tablet prn     lisinopril (PRINIVIL/ZESTRIL) 40 MG tablet Take 1 tablet (40 mg) by mouth daily 90 tablet prn     hydrochlorothiazide  (HYDRODIURIL) 25 MG tablet Take 1 tablet (25 mg) by mouth every morning 90 tablet prn     fish oil-omega-3 fatty acids (FISH OIL) 1000 MG capsule Take 2 capsules by mouth daily. 120 capsule 11     cholecalciferol (VITAMIN D) 1000 UNIT tablet Take 1 tablet by mouth daily. 100 tablet 12     ASPIRIN 81 MG OR TABS 1 TABLET DAILY       [DISCONTINUED] simvastatin (ZOCOR) 20 MG tablet Take 1 tablet (20 mg) by mouth At Bedtime at bedtime. 90 tablet prn     [DISCONTINUED] lisinopril (PRINIVIL/ZESTRIL) 40 MG tablet Take 1 tablet (40 mg) by mouth daily 90 tablet prn     [DISCONTINUED] hydrochlorothiazide (HYDRODIURIL) 25 MG tablet Take 1 tablet (25 mg) by mouth every morning 90 tablet prn     Allergies   Allergen Reactions     Penicillins Other (See Comments)     blotches       Pneumonia Vaccine:  Immunization History   Administered Date(s) Administered     Influenza (H1N1) 01/07/2010     Influenza (High Dose) 3 valent vaccine 10/29/2013, 08/31/2016, 11/01/2017     Influenza (IIV3) PF 12/02/2003, 10/15/2007, 10/31/2008, 10/02/2009, 09/23/2010, 10/07/2011, 10/02/2012     Pneumo Conj 13-V (2010&after) 11/17/2015     Pneumococcal 23 valent 08/21/2012     TDAP Vaccine (Adacel) 05/27/2008     Zoster vaccine, live 08/21/2012       ROS:    Constitutional: no recent illness, no fevers/sweats/chills  Eyes: No vision changes or eye irritation  Ears/Nose/Throat: No runny nose, sore throat or ear pain  CV: no palpitations, no chest pain, no lower extremity swelling.  Resp: no shortness of breath, wheezing, or cough.  GI: no nausea/vomiting/diarrhea, no black or bloody stools  : no burning or urgency with urination  Skin: no rash  Musculoskeletal: no joint pain, muscle pain, weakness  Psych: no depression, no concerns about anxiety  Neuro: no new headaches, dizziness, numbness, or tingling      OBJECTIVE:   There were no vitals taken for this visit. Estimated body mass index is 33.29 kg/(m^2) as calculated from the following:    Height  "as of 1/4/17: 5' 10\" (1.778 m).    Weight as of 1/4/17: 232 lb (105.2 kg).  EXAM:     General Appearance: Pleasant, alert, in no acute respiratory distress.  Head Exam: Normal. Normocephalic, atraumatic. No sinus tenderness.  Eye Exam: Normal external eye, conjunctiva, lids. GERALDINE.  Ear Exam: Normal auditory canals and external ears. Non-tender.  Left TM-normal. Right TM-normal.  OroPharynx Exam: Dental hygiene adequate. Normal buccal mucosa. Normal pharynx.  Neck Exam: Supple, no masses or enlarged, tender nodes.  Thyroid Exam: No nodules or enlargement or tenderness.  Chest/Respiratory Exam: Normal, comfortable, easy respirations. Chest wall normal. Lungs are clear to auscultation. No wheezing, crackles, or rhonchi.  Cardiovascular Exam: Regular rate and rhythm. No murmur, gallop, or rubs. No pedal edema.  Gastrointestinal Exam: Soft, non-tender, no masses or organomegaly.  Musculoskeletal Exam: Back is non-tender, full ROM of upper and lower extremities.  Except as noted above  Skin: no rash, warm and dry.  Neurologic Exam: Nonfocal, no tremor. Normal gait.  Psychiatric Exam: Alert - appropriate, normal affect      ASSESSMENT / PLAN:       ICD-10-CM    1. Routine history and physical examination of adult Z00.00    2. Hyperlipidemia LDL goal <130 E78.5 simvastatin (ZOCOR) 20 MG tablet     Comprehensive metabolic panel     CBC with platelets differential   3. Hypertension goal BP (blood pressure) < 140/90 I10 lisinopril (PRINIVIL/ZESTRIL) 40 MG tablet     hydrochlorothiazide (HYDRODIURIL) 25 MG tablet     Comprehensive metabolic panel     CBC with platelets differential   4. Pain of left thigh M79.652 XR Pelvis w Hip Left 1 View   5. Adenocarcinoma of prostate (H) C61 Comprehensive metabolic panel     PSA, tumor marker     CBC with platelets differential   6. Arthritis of hip M16.10        End of Life Planning:  Patient currently has an advanced directive: Yes.  Practitioner is supportive of " "decision.    COUNSELING:  Reviewed preventive health counseling, as reflected in patient instructions       Consider AAA screening for ages 65-75 and smoking history       Regular exercise       Healthy diet/nutrition       Aspirin Prophylaxsis       Colon cancer screening          History of prostate cancer rechecking PSA        Estimated body mass index is 33.29 kg/(m^2) as calculated from the following:    Height as of 1/4/17: 5' 10\" (1.778 m).    Weight as of 1/4/17: 232 lb (105.2 kg).  Weight management plan: Discussed healthy diet and exercise guidelines and patient will follow up in 12 months in clinic to re-evaluate.     reports that he has never smoked. He has never used smokeless tobacco.      Appropriate preventive services were discussed with this patient, including applicable screening as appropriate for cardiovascular disease, diabetes, osteopenia/osteoporosis, and glaucoma.  As appropriate for age/gender, discussed screening for colorectal cancer, prostate cancer, breast cancer, and cervical cancer. Checklist reviewing preventive services available has been given to the patient.    Reviewed patients plan of care and provided an AVS. The Intermediate Care Plan ( asthma action plan, low back pain action plan, and migraine action plan) for Genaro meets the Care Plan requirement. This Care Plan has been established and reviewed with the Patient.    Counseling Resources:  ATP IV Guidelines  Pooled Cohorts Equation Calculator  Breast Cancer Risk Calculator  FRAX Risk Assessment  ICSI Preventive Guidelines  Dietary Guidelines for Americans, 2010  USDA's MyPlate  ASA Prophylaxis  Lung CA Screening    Francisco Sneed MD  [facility]  "

## 2018-01-30 NOTE — MR AVS SNAPSHOT
After Visit Summary   1/30/2018    Genaro Ortiz    MRN: [id number]           Patient Information     Date Of Birth          1945        Visit Information        Provider Department      1/30/2018 10:00 AM Francisco Sneed MD [facility]        Today's Diagnoses     Routine history and physical examination of adult    -  1    Hyperlipidemia LDL goal <130        Hypertension goal BP (blood pressure) < 140/90        Pain of left thigh        Adenocarcinoma of prostate (H)        Arthritis of hip          Care Instructions      Preventive Health Recommendations:       Male Ages 65 and over    Yearly exam:             See your health care provider every year in order to  o   Review health changes.   o   Discuss preventive care.    o   Review your medicines if your doctor has prescribed any.    Talk with your health care provider about whether you should have a test to screen for prostate cancer (PSA).    Every 3 years, have a diabetes test (fasting glucose). If you are at risk for diabetes, you should have this test more often.    Every 5 years, have a cholesterol test. Have this test more often if you are at risk for high cholesterol or heart disease.     Every 10 years, have a colonoscopy. Or, have a yearly FIT test (stool test). These exams will check for colon cancer.    Talk to with your health care provider about screening for Abdominal Aortic Aneurysm if you have a family history of AAA or have a history of smoking.  Shots:     Get a flu shot each year.     Get a tetanus shot every 10 years.     Talk to your doctor about your pneumonia vaccines. There are now two you should receive - Pneumovax (PPSV 23) and Prevnar (PCV 13).    Talk to your doctor about a shingles vaccine.     Talk to your doctor about the hepatitis B vaccine.  Nutrition:     Eat at least 5 servings of fruits and vegetables each day.     Eat whole-grain bread, whole-wheat pasta and brown rice instead of  white grains and rice.     Talk to your doctor about Calcium and Vitamin D.   Lifestyle    Exercise for at least 150 minutes a week (30 minutes a day, 5 days a week). This will help you control your weight and prevent disease.     Limit alcohol to one drink per day.     No smoking.     Wear sunscreen to prevent skin cancer.     See your dentist every six months for an exam and cleaning.     See your eye doctor every 1 to 2 years to screen for conditions such as glaucoma, macular degeneration and cataracts.          Follow-ups after your visit        Your next 10 appointments already scheduled     Jan 24, 2019  9:15 AM CST   RETURN GENERAL with Eric Abel MD   Eye Clinic ([facility])    [address]  [address]  [address]  [place] [address] [phone]              Who to contact     If you have questions or need follow up information about today's clinic visit or your schedule please contact [facility] directly at [phone].  Normal or non-critical lab and imaging results will be communicated to you by [employer], letter or phone within 4 business days after the clinic has received the results. If you do not hear from us within 7 days, please contact the clinic through [employer] or phone. If you have a critical or abnormal lab result, we will notify you by phone as soon as possible.  Submit refill requests through [employer] or call your pharmacy and they will forward the refill request to us. Please allow 3 business days for your refill to be completed.          Additional Information About Your Visit        [employer] Information     [employer] gives you secure access to your electronic health record. If you see a primary care provider, you can also send messages to your care team and make appointments. If you have questions, please call your primary care clinic.  If you do not have a primary care provider, please call [phone] and they will assist you.       "  Care EveryWhere ID     This is your Care EveryWhere ID. This could be used by other organizations to access your [place] medical records  [id number]        Your Vitals Were     Pulse Temperature Respirations Height Pulse Oximetry BMI (Body Mass Index)    90 96.1  F (35.6  C) (Oral) 14 5' 10\" (1.778 m) 98% 33.15 kg/m2       Blood Pressure from Last 3 Encounters:   01/30/18 130/80   01/31/17 (!) 132/95   01/04/17 150/70    Weight from Last 3 Encounters:   01/30/18 231 lb (104.8 kg)   01/04/17 232 lb (105.2 kg)   11/17/15 222 lb (100.7 kg)              We Performed the Following     CBC with platelets differential     Comprehensive metabolic panel     OFFICE/OUTPT VISIT,EST,LEVL III     PSA, tumor marker          Where to get your medicines      These medications were sent to [place] [address] [place] [address] [address] [facility]  [address] [address]     Phone:  [phone]     hydrochlorothiazide 25 MG tablet    lisinopril 40 MG tablet    simvastatin 20 MG tablet          Primary Care Provider Office Phone # Fax #    Berhane Oquendo [phone] [address] [address]        Equal Access to Services     VISH MONTERROSO : Hadii nabeel ku hadasho Soomaali, waaxda luqadaha, qaybta kaalmada adeegyada, waxgeorgia valdez haygiulia marrero. So [facility] [phone].    ATENCIÓN: Si habla español, tiene a richards disposición servicios gratuitos de asistencia lingüística. Llame al [phone].    We comply with applicable federal civil rights laws and [place] laws. We do not discriminate on the basis of race, color, national origin, age, disability, sex, sexual orientation, or gender identity.            Thank you!     Thank you for choosing [facility]  for your care. Our goal is always to provide you with excellent care. Hearing back from our patients is one way we can continue to improve our services. Please take a few minutes to complete the " written survey that you may receive in the mail after your visit with us. Thank you!             Your Updated Medication List - Protect others around you: Learn how to safely use, store and throw away your medicines at www.disposemymeds.org.          This list is accurate as of 1/30/18  3:09 PM.  Always use your most recent med list.                   Brand Name Dispense Instructions for use Diagnosis    aspirin 81 MG tablet      1 TABLET DAILY    Routine medical exam       cholecalciferol 1000 UNIT tablet    vitamin D3    100 tablet    Take 1 tablet by mouth daily.    Routine general medical examination at a health care facility       fish oil-omega-3 fatty acids 1000 MG capsule     120 capsule    Take 2 capsules by mouth daily.    Hyperlipidemia LDL goal <130       hydrochlorothiazide 25 MG tablet    HYDRODIURIL    90 tablet    Take 1 tablet (25 mg) by mouth every morning    Hypertension goal BP (blood pressure) < 140/90       lisinopril 40 MG tablet    PRINIVIL/ZESTRIL    90 tablet    Take 1 tablet (40 mg) by mouth daily    Hypertension goal BP (blood pressure) < 140/90       simvastatin 20 MG tablet    ZOCOR    90 tablet    Take 1 tablet (20 mg) by mouth At Bedtime at bedtime.    Hyperlipidemia LDL goal <130

## 2018-01-31 LAB
ALBUMIN SERPL-MCNC: 4 G/DL (ref 3.4–5)
ALP SERPL-CCNC: 97 U/L (ref 40–150)
ALT SERPL W P-5'-P-CCNC: 31 U/L (ref 0–70)
ANION GAP SERPL CALCULATED.3IONS-SCNC: 9 MMOL/L (ref 3–14)
AST SERPL W P-5'-P-CCNC: 23 U/L (ref 0–45)
BILIRUB SERPL-MCNC: 0.5 MG/DL (ref 0.2–1.3)
BUN SERPL-MCNC: 25 MG/DL (ref 7–30)
CALCIUM SERPL-MCNC: 9.3 MG/DL (ref 8.5–10.1)
CHLORIDE SERPL-SCNC: 104 MMOL/L (ref 94–109)
CO2 SERPL-SCNC: 24 MMOL/L (ref 20–32)
CREAT SERPL-MCNC: 1.03 MG/DL (ref 0.66–1.25)
GFR SERPL CREATININE-BSD FRML MDRD: 71 ML/MIN/1.7M2
GLUCOSE SERPL-MCNC: 99 MG/DL (ref 70–99)
POTASSIUM SERPL-SCNC: 3.9 MMOL/L (ref 3.4–5.3)
PROT SERPL-MCNC: 8.3 G/DL (ref 6.8–8.8)
PSA SERPL-MCNC: <0.01 UG/L (ref 0–4)
SODIUM SERPL-SCNC: 137 MMOL/L (ref 133–144)

## 2018-03-17 DIAGNOSIS — E78.5 HYPERLIPIDEMIA LDL GOAL <130: ICD-10-CM

## 2018-03-17 DIAGNOSIS — I10 HYPERTENSION GOAL BP (BLOOD PRESSURE) < 140/90: ICD-10-CM

## 2018-03-19 RX ORDER — LISINOPRIL 40 MG/1
TABLET ORAL
Start: 2018-03-19

## 2018-03-19 RX ORDER — SIMVASTATIN 20 MG
TABLET ORAL
Start: 2018-03-19

## 2018-03-19 RX ORDER — HYDROCHLOROTHIAZIDE 25 MG/1
TABLET ORAL
Start: 2018-03-19

## 2018-03-19 NOTE — TELEPHONE ENCOUNTER
"Too soon.  Rx sent for all three 1/30/18 for #90 with prn refills.  Sara Caro RN    Requested Prescriptions   Refused Prescriptions Disp Refills     lisinopril (PRINIVIL/ZESTRIL) 40 MG tablet [Pharmacy Med Name: LISINOPRIL 40 MG TABLET]       Sig: TAKE ONE TABLET BY MOUTH ONE TIME DAILY    ACE Inhibitors (Including Combos) Protocol Passed    3/17/2018  1:35 AM       Passed - Blood pressure under 140/90 in past 12 months    BP Readings from Last 3 Encounters:   01/30/18 130/80   01/31/17 (!) 132/95   01/04/17 150/70                Passed - Recent (12 mo) or future (30 days) visit within the authorizing provider's specialty    Patient had office visit in the last 12 months or has a visit in the next 30 days with authorizing provider or within the authorizing provider's specialty.  See \"Patient Info\" tab in inbasket, or \"Choose Columns\" in Meds & Orders section of the refill encounter.           Passed - Patient is age 18 or older       Passed - Normal serum creatinine on file in past 12 months    Recent Labs   Lab Test  01/30/18   1109   CR  1.03            Passed - Normal serum potassium on file in past 12 months    Recent Labs   Lab Test  01/30/18   1109   POTASSIUM  3.9             simvastatin (ZOCOR) 20 MG tablet [Pharmacy Med Name: SIMVASTATIN 20 MG TABLET]       Sig: TAKE 1 TABLET BY MOUTH AT BEDTIME.    Statins Protocol Failed    3/17/2018  1:35 AM       Failed - LDL on file in past 12 months    Recent Labs   Lab Test  01/04/17   1102   LDL  109*            Passed - No abnormal creatine kinase in past 12 months    No lab results found.            Passed - Recent (12 mo) or future (30 days) visit within the authorizing provider's specialty    Patient had office visit in the last 12 months or has a visit in the next 30 days with authorizing provider or within the authorizing provider's specialty.  See \"Patient Info\" tab in inbasket, or \"Choose Columns\" in Meds & Orders section of the refill encounter.        " "   Passed - Patient is age 18 or older        hydrochlorothiazide (HYDRODIURIL) 25 MG tablet [Pharmacy Med Name: HYDROCHLOROTHIAZIDE 25 MG TAB]       Sig: TAKE ONE TABLET BY MOUTH EVERY MORNING    Diuretics (Including Combos) Protocol Passed    3/17/2018  1:35 AM       Passed - Blood pressure under 140/90 in past 12 months    BP Readings from Last 3 Encounters:   01/30/18 130/80   01/31/17 (!) 132/95   01/04/17 150/70                Passed - Recent (12 mo) or future (30 days) visit within the authorizing provider's specialty    Patient had office visit in the last 12 months or has a visit in the next 30 days with authorizing provider or within the authorizing provider's specialty.  See \"Patient Info\" tab in inbasket, or \"Choose Columns\" in Meds & Orders section of the refill encounter.           Passed - Patient is age 18 or older       Passed - Normal serum creatinine on file in past 12 months    Recent Labs   Lab Test  01/30/18   1109   CR  1.03             Passed - Normal serum potassium on file in past 12 months    Recent Labs   Lab Test  01/30/18   1109   POTASSIUM  3.9                   Passed - Normal serum sodium on file in past 12 months    Recent Labs   Lab Test  01/30/18   1109   NA  137                "

## 2018-09-21 ENCOUNTER — ALLIED HEALTH/NURSE VISIT (OUTPATIENT)
Dept: NURSING | Facility: CLINIC | Age: 73
End: 2018-09-21
Payer: COMMERCIAL

## 2018-09-21 DIAGNOSIS — Z23 NEED FOR PROPHYLACTIC VACCINATION AND INOCULATION AGAINST INFLUENZA: Primary | ICD-10-CM

## 2018-09-21 PROCEDURE — G0008 ADMIN INFLUENZA VIRUS VAC: HCPCS

## 2018-09-21 PROCEDURE — 99207 ZZC NO CHARGE NURSE ONLY: CPT

## 2018-09-21 PROCEDURE — 90662 IIV NO PRSV INCREASED AG IM: CPT

## 2018-09-21 NOTE — MR AVS SNAPSHOT
After Visit Summary   9/21/2018    Genaro Ortiz    MRN: [id number]           Patient Information     Date Of Birth          1945        Visit Information        Provider Department      9/21/2018 1:00 PM [facility] NURSE [facility]        Today's Diagnoses     Need for prophylactic vaccination and inoculation against influenza    -  1       Follow-ups after your visit        Your next 10 appointments already scheduled     Jan 24, 2019  9:15 AM CST   RETURN GENERAL with Eric Abel MD   Eye Clinic ([facility])    [place] [address]  [address] [address] [phone]              Who to contact     If you have questions or need follow up information about today's clinic visit or your schedule please contact [facility] directly at [phone].  Normal or non-critical lab and imaging results will be communicated to you by MyChart, letter or phone within 4 business days after the clinic has received the results. If you do not hear from us within 7 days, please contact the clinic through MyChart or phone. If you have a critical or abnormal lab result, we will notify you by phone as soon as possible.  Submit refill requests through [employer] or call your pharmacy and they will forward the refill request to us. Please allow 3 business days for your refill to be completed.          Additional Information About Your Visit        MyChart Information     [employer] gives you secure access to your electronic health record. If you see a primary care provider, you can also send messages to your care team and make appointments. If you have questions, please call your primary care clinic.  If you do not have a primary care provider, please call [phone] and they will assist you.        Care EveryWhere ID     This is your Care EveryWhere ID. This could be used by other organizations to access your [facility]  records  [id number]         Blood Pressure from Last 3 Encounters:   01/30/18 130/80   01/31/17 (!) 132/95   01/04/17 150/70    Weight from Last 3 Encounters:   01/30/18 231 lb (104.8 kg)   01/04/17 232 lb (105.2 kg)   11/17/15 222 lb (100.7 kg)              We Performed the Following     FLU VACCINE, INCREASED ANTIGEN, PRESV FREE, AGE 65+ [69880]     Vaccine Administration, Initial [97135]        Primary Care Provider Office Phone # Fax #    Francisco Joseph Sneed [phone] [address] [facility] [facility] [address]        Equal Access to Services     VISH MONTERROSO : Hadii nabeel roseo Darlene, waaxda luqadaha, qaybta kaalmada sierra, jose payne . So [facility] [phone].    ATENCIÓN: Si habla español, tiene a richards disposición servicios gratuitos de asistencia lingüística. Llame al [phone].    We comply with applicable federal civil rights laws and [place] laws. We do not discriminate on the basis of race, color, national origin, age, disability, sex, sexual orientation, or gender identity.            Thank you!     Thank you for choosing [facility] NURSE  for your care. Our goal is always to provide you with excellent care. Hearing back from our patients is one way we can continue to improve our services. Please take a few minutes to complete the written survey that you may receive in the mail after your visit with us. Thank you!             Your Updated Medication List - Protect others around you: Learn how to safely use, store and throw away your medicines at www.disposemymeds.org.          This list is accurate as of 9/21/18  2:06 PM.  Always use your most recent med list.                   Brand Name Dispense Instructions for use Diagnosis    aspirin 81 MG tablet      1 TABLET DAILY    Routine medical exam       cholecalciferol 1000 UNIT tablet    vitamin D3    100 tablet    Take 1 tablet by mouth daily.    Routine general medical examination  at a health care facility       fish oil-omega-3 fatty acids 1000 MG capsule     120 capsule    Take 2 capsules by mouth daily.    Hyperlipidemia LDL goal <130       hydrochlorothiazide 25 MG tablet    HYDRODIURIL    90 tablet    Take 1 tablet (25 mg) by mouth every morning    Hypertension goal BP (blood pressure) < 140/90       lisinopril 40 MG tablet    PRINIVIL/ZESTRIL    90 tablet    Take 1 tablet (40 mg) by mouth daily    Hypertension goal BP (blood pressure) < 140/90       simvastatin 20 MG tablet    ZOCOR    90 tablet    Take 1 tablet (20 mg) by mouth At Bedtime at bedtime.    Hyperlipidemia LDL goal <130

## 2019-01-29 ENCOUNTER — OFFICE VISIT (OUTPATIENT)
Dept: OPHTHALMOLOGY | Facility: CLINIC | Age: 74
End: 2019-01-29
Attending: OPHTHALMOLOGY
Payer: COMMERCIAL

## 2019-01-29 DIAGNOSIS — H44.23 PATHOLOGIC MYOPIA, BILATERAL: Primary | ICD-10-CM

## 2019-01-29 DIAGNOSIS — H43.813 POSTERIOR VITREOUS DETACHMENT OF BOTH EYES: ICD-10-CM

## 2019-01-29 DIAGNOSIS — H35.371 EPIRETINAL MEMBRANE (ERM) OF RIGHT EYE: ICD-10-CM

## 2019-01-29 DIAGNOSIS — Z96.1 PSEUDOPHAKIA OF BOTH EYES: ICD-10-CM

## 2019-01-29 PROCEDURE — G0463 HOSPITAL OUTPT CLINIC VISIT: HCPCS | Mod: ZF

## 2019-01-29 PROCEDURE — 92015 DETERMINE REFRACTIVE STATE: CPT | Mod: ZF

## 2019-01-29 ASSESSMENT — REFRACTION_MANIFEST
OD_ADD: +2.50
OS_SPHERE: -1.50
OD_CYLINDER: +0.75
OS_CYLINDER: +1.00
OS_AXIS: 105
OD_SPHERE: -0.25
OS_ADD: +2.50
OD_AXIS: 075

## 2019-01-29 ASSESSMENT — REFRACTION_WEARINGRX
OD_CYLINDER: +0.75
OD_ADD: +2.50
OS_ADD: +2.50
OD_AXIS: 101
SPECS_TYPE: PAL
OS_SPHERE: -1.00
OS_CYLINDER: +1.00
OS_AXIS: 114
OD_SPHERE: PLANO

## 2019-01-29 ASSESSMENT — VISUAL ACUITY
OD_CC+: +2
OD_PH_CC+: -2
OD_CC: 20/30
OD_PH_CC: 20/25
OS_CC+: -3
OS_CC: 20/20
METHOD: SNELLEN - LINEAR
CORRECTION_TYPE: GLASSES

## 2019-01-29 ASSESSMENT — TONOMETRY
OD_IOP_MMHG: 16
OS_IOP_MMHG: 15
IOP_METHOD: TONOPEN

## 2019-01-29 ASSESSMENT — CONF VISUAL FIELD
OS_NORMAL: 1
OD_NORMAL: 1
METHOD: COUNTING FINGERS

## 2019-01-29 ASSESSMENT — CUP TO DISC RATIO
OS_RATIO: 0.1
OD_RATIO: 0.1

## 2019-01-29 ASSESSMENT — SLIT LAMP EXAM - LIDS
COMMENTS: NORMAL
COMMENTS: NORMAL

## 2019-01-29 NOTE — PROGRESS NOTES
Chief Complaint(s) and History of Present Illness(es)     Follow Up     Associated symptoms: Negative for floaters, flashes, redness and eye pain      Comments: 1 year follow up Epiretinal membrane (ERM) of right eye               Comments     Pt states vision is the same as last visit. Dryness in both eyes, pt not   using any drops.    Shirlene Thornton ELISHA January 29, 2019 12:26 PM              Review of systems for the eyes was negative other than the pertinent positives/negatives listed in the HPI.      Assessment & Plan      Genaro Ortiz is a 73 year old male with the following diagnoses:   1. Pathologic myopia, bilateral    2. Posterior vitreous detachment of both eyes    3. Epiretinal membrane (ERM) of right eye    4. Pseudophakia of both eyes         Stable dilated fundus exam  Epiretinal membrane minimally visually significant, monitor  Amsler grid, OCT next year  Refractive options reviewed  Refraction given  Discussed symptoms of retinal tear/detachment and the need to be seen urgently should they occur        Patient disposition:   Return in about 1 year (around 1/29/2020) for DFE, OCT Macula (ok for optom or gen clinic).          Attending Physician Attestation:  Complete documentation of historical and exam elements from today's encounter can be found in the full encounter summary report (not reduplicated in this progress note).  I personally obtained the chief complaint(s) and history of present illness.  I confirmed and edited as necessary the review of systems, past medical/surgical history, family history, social history, and examination findings as documented by others; and I examined the patient myself.  I personally reviewed the relevant tests, images, and reports as documented above.  I formulated and edited as necessary the assessment and plan and discussed the findings and management plan with the patient and family. . - Eric Abel MD

## 2019-01-29 NOTE — NURSING NOTE
Chief Complaints and History of Present Illnesses   Patient presents with     Follow Up     1 year follow up Epiretinal membrane (ERM) of right eye      Chief Complaint(s) and History of Present Illness(es)     Follow Up     Associated symptoms: Negative for floaters, flashes, redness and eye pain    Comments: 1 year follow up Epiretinal membrane (ERM) of right eye               Comments     Pt states vision is the same as last visit. Dryness in both eyes, pt not using any drops.    Shirlene TELLO January 29, 2019 12:26 PM

## 2019-02-07 DIAGNOSIS — E78.5 HYPERLIPIDEMIA LDL GOAL <130: ICD-10-CM

## 2019-02-07 DIAGNOSIS — I10 HYPERTENSION GOAL BP (BLOOD PRESSURE) < 140/90: ICD-10-CM

## 2019-02-07 NOTE — LETTER
[facility]  [address]  [facility] [address]  Phone: [phone]  February 8, 2019      Genaro Ortiz  [address]   [facility] [address]      Dear Genaro,    We care about your health and have reviewed your health plan including your medical conditions, medications, and lab results.  Based on this review we have noticed it has been over a year since you were last seen and are due for a   -Wellness (Physical) Visit     We recommend you take the following action(s):  -schedule a WELLNESS (Physical) APPOINTMENT.  We will perform the following labs: A1c, Lipids (fasting cholesterol - nothing to eat except water and/or meds for 8-10 hours), CMP(complete metabolic panel) and CBC (complete blood cell counts).     Please call us at [phone] (or use [employer]) to address the above recommendations.     Thank you for trusting [facility] and we appreciate the opportunity to serve you.  We look forward to supporting your healthcare needs in the future.    Healthy Regards,    Your Health Care Team  [facility]

## 2019-02-07 NOTE — TELEPHONE ENCOUNTER
"Requested Prescriptions   Pending Prescriptions Disp Refills     lisinopril (PRINIVIL/ZESTRIL) 40 MG tablet [Pharmacy Med Name: LISINOPRIL 40 MG TABLET] 90 tablet 3     Sig: TAKE 1 TABLET BY MOUTH EVERY DAY    ACE Inhibitors (Including Combos) Protocol Failed - 2/7/2019  1:56 AM       Failed - Blood pressure under 140/90 in past 12 months    BP Readings from Last 3 Encounters:   01/30/18 130/80   01/31/17 (!) 132/95   01/04/17 150/70                Failed - Recent (12 mo) or future (30 days) visit within the authorizing provider's specialty    Patient had office visit in the last 12 months or has a visit in the next 30 days with authorizing provider or within the authorizing provider's specialty.  See \"Patient Info\" tab in inbasket, or \"Choose Columns\" in Meds & Orders section of the refill encounter.             Failed - Normal serum creatinine on file in past 12 months    Recent Labs   Lab Test 01/30/18  1109   CR 1.03            Failed - Normal serum potassium on file in past 12 months    Recent Labs   Lab Test 01/30/18  1109   POTASSIUM 3.9            Passed - Medication is active on med list       Passed - Patient is age 18 or older        simvastatin (ZOCOR) 20 MG tablet [Pharmacy Med Name: SIMVASTATIN 20 MG TABLET] 90 tablet 3     Sig: TAKE 1 TABLET BY MOUTH AT BEDTIME    Statins Protocol Failed - 2/7/2019  1:56 AM       Failed - LDL on file in past 12 months    Recent Labs   Lab Test 01/04/17  1102   *            Failed - Recent (12 mo) or future (30 days) visit within the authorizing provider's specialty    Patient had office visit in the last 12 months or has a visit in the next 30 days with authorizing provider or within the authorizing provider's specialty.  See \"Patient Info\" tab in inbasket, or \"Choose Columns\" in Meds & Orders section of the refill encounter.             Passed - No abnormal creatine kinase in past 12 months    No lab results found.            Passed - Medication is active on " "med list       Passed - Patient is age 18 or older        hydrochlorothiazide (HYDRODIURIL) 25 MG tablet [Pharmacy Med Name: HYDROCHLOROTHIAZIDE 25 MG TAB] 90 tablet 3     Sig: TAKE 1 TABLET BY MOUTH IN THE MORNING    Diuretics (Including Combos) Protocol Failed - 2/7/2019  1:56 AM       Failed - Blood pressure under 140/90 in past 12 months    BP Readings from Last 3 Encounters:   01/30/18 130/80   01/31/17 (!) 132/95   01/04/17 150/70                Failed - Recent (12 mo) or future (30 days) visit within the authorizing provider's specialty    Patient had office visit in the last 12 months or has a visit in the next 30 days with authorizing provider or within the authorizing provider's specialty.  See \"Patient Info\" tab in inbasket, or \"Choose Columns\" in Meds & Orders section of the refill encounter.             Failed - Normal serum creatinine on file in past 12 months    Recent Labs   Lab Test 01/30/18  1109   CR 1.03             Failed - Normal serum potassium on file in past 12 months    Recent Labs   Lab Test 01/30/18  1109   POTASSIUM 3.9                   Failed - Normal serum sodium on file in past 12 months    Recent Labs   Lab Test 01/30/18  1109                Passed - Medication is active on med list       Passed - Patient is age 18 or older        "

## 2019-02-08 RX ORDER — SIMVASTATIN 20 MG
TABLET ORAL
Qty: 30 TABLET | Refills: 0 | Status: SHIPPED | OUTPATIENT
Start: 2019-02-08 | End: 2019-02-26

## 2019-02-08 RX ORDER — HYDROCHLOROTHIAZIDE 25 MG/1
TABLET ORAL
Qty: 30 TABLET | Refills: 0 | Status: SHIPPED | OUTPATIENT
Start: 2019-02-08 | End: 2019-02-26

## 2019-02-08 RX ORDER — LISINOPRIL 40 MG/1
TABLET ORAL
Qty: 30 TABLET | Refills: 0 | Status: SHIPPED | OUTPATIENT
Start: 2019-02-08 | End: 2019-02-26

## 2019-02-26 ENCOUNTER — OFFICE VISIT (OUTPATIENT)
Dept: FAMILY MEDICINE | Facility: CLINIC | Age: 74
End: 2019-02-26
Payer: COMMERCIAL

## 2019-02-26 VITALS
WEIGHT: 234.8 LBS | HEIGHT: 70 IN | DIASTOLIC BLOOD PRESSURE: 88 MMHG | HEART RATE: 100 BPM | TEMPERATURE: 97.4 F | SYSTOLIC BLOOD PRESSURE: 134 MMHG | BODY MASS INDEX: 33.61 KG/M2 | OXYGEN SATURATION: 96 %

## 2019-02-26 DIAGNOSIS — E78.5 HYPERLIPIDEMIA LDL GOAL <130: ICD-10-CM

## 2019-02-26 DIAGNOSIS — C61 ADENOCARCINOMA OF PROSTATE (H): ICD-10-CM

## 2019-02-26 DIAGNOSIS — Z00.00 ROUTINE HISTORY AND PHYSICAL EXAMINATION OF ADULT: Primary | ICD-10-CM

## 2019-02-26 DIAGNOSIS — I10 HYPERTENSION GOAL BP (BLOOD PRESSURE) < 140/90: ICD-10-CM

## 2019-02-26 DIAGNOSIS — Z23 NEED FOR TDAP VACCINATION: ICD-10-CM

## 2019-02-26 LAB
BASOPHILS # BLD AUTO: 0 10E9/L (ref 0–0.2)
BASOPHILS NFR BLD AUTO: 0.3 %
DIFFERENTIAL METHOD BLD: ABNORMAL
EOSINOPHIL # BLD AUTO: 0.1 10E9/L (ref 0–0.7)
EOSINOPHIL NFR BLD AUTO: 1 %
ERYTHROCYTE [DISTWIDTH] IN BLOOD BY AUTOMATED COUNT: 13.3 % (ref 10–15)
HCT VFR BLD AUTO: 38.6 % (ref 40–53)
HGB BLD-MCNC: 13.3 G/DL (ref 13.3–17.7)
LYMPHOCYTES # BLD AUTO: 2 10E9/L (ref 0.8–5.3)
LYMPHOCYTES NFR BLD AUTO: 32.9 %
MCH RBC QN AUTO: 31.1 PG (ref 26.5–33)
MCHC RBC AUTO-ENTMCNC: 34.5 G/DL (ref 31.5–36.5)
MCV RBC AUTO: 90 FL (ref 78–100)
MONOCYTES # BLD AUTO: 0.5 10E9/L (ref 0–1.3)
MONOCYTES NFR BLD AUTO: 9.1 %
NEUTROPHILS # BLD AUTO: 3.4 10E9/L (ref 1.6–8.3)
NEUTROPHILS NFR BLD AUTO: 56.7 %
PLATELET # BLD AUTO: 234 10E9/L (ref 150–450)
RBC # BLD AUTO: 4.28 10E12/L (ref 4.4–5.9)
WBC # BLD AUTO: 5.9 10E9/L (ref 4–11)

## 2019-02-26 PROCEDURE — 80048 BASIC METABOLIC PNL TOTAL CA: CPT | Performed by: FAMILY MEDICINE

## 2019-02-26 PROCEDURE — 84153 ASSAY OF PSA TOTAL: CPT | Performed by: FAMILY MEDICINE

## 2019-02-26 PROCEDURE — 85025 COMPLETE CBC W/AUTO DIFF WBC: CPT | Performed by: FAMILY MEDICINE

## 2019-02-26 PROCEDURE — 80061 LIPID PANEL: CPT | Performed by: FAMILY MEDICINE

## 2019-02-26 PROCEDURE — 36415 COLL VENOUS BLD VENIPUNCTURE: CPT | Performed by: FAMILY MEDICINE

## 2019-02-26 PROCEDURE — 90471 IMMUNIZATION ADMIN: CPT | Performed by: FAMILY MEDICINE

## 2019-02-26 PROCEDURE — 90715 TDAP VACCINE 7 YRS/> IM: CPT | Performed by: FAMILY MEDICINE

## 2019-02-26 PROCEDURE — G0439 PPPS, SUBSEQ VISIT: HCPCS | Performed by: FAMILY MEDICINE

## 2019-02-26 RX ORDER — HYDROCHLOROTHIAZIDE 25 MG/1
25 TABLET ORAL DAILY
Qty: 90 TABLET | Refills: 3 | Status: SHIPPED | OUTPATIENT
Start: 2019-02-26 | End: 2020-04-13

## 2019-02-26 RX ORDER — LISINOPRIL 40 MG/1
40 TABLET ORAL DAILY
Qty: 90 TABLET | Refills: 3 | Status: SHIPPED | OUTPATIENT
Start: 2019-02-26 | End: 2020-04-13

## 2019-02-26 RX ORDER — SIMVASTATIN 20 MG
20 TABLET ORAL AT BEDTIME
Qty: 90 TABLET | Refills: 3 | Status: SHIPPED | OUTPATIENT
Start: 2019-02-26 | End: 2020-04-13

## 2019-02-26 ASSESSMENT — ACTIVITIES OF DAILY LIVING (ADL): CURRENT_FUNCTION: NO ASSISTANCE NEEDED

## 2019-02-26 ASSESSMENT — MIFFLIN-ST. JEOR: SCORE: 1826.3

## 2019-02-26 NOTE — PROGRESS NOTES
"SUBJECTIVE:   Genaro Ortiz is a 73 year old male who presents for Preventive Visit.    Are you in the first 12 months of your Medicare coverage?  No    Annual Wellness Visit     In general, how would you rate your overall health?  Good    Frequency of exercise:  2-3 days/week    Do you usually eat at least 4 servings of fruit and vegetables a day, include whole grains    & fiber and avoid regularly eating high fat or \"junk\" foods?  Yes    Taking medications regularly:  Yes    Ability to successfully perform activities of daily living:  No assistance needed    Home Safety:  No safety concerns identified    Hearing Impairment:  Find that men's voices are easier to understand than woman's    In the past 6 months, have you been bothered by leaking of urine?  No    In general, how would you rate your overall mental or emotional health?  Excellent    PHQ-2 Total Score: 0    Additional concerns today:  No    Do you feel safe in your environment? Yes    Do you have a Health Care Directive? Yes: Advance Directive has been received and scanned.      Fall risk  Fallen 2 or more times in the past year?: No  Any fall with injury in the past year?: No    Cognitive Screening   1) Repeat 3 items (Leader, Season, Table)    2) Clock draw: NORMAL  3) 3 item recall: Recalls 3 objects  Results: 3 items recalled: COGNITIVE IMPAIRMENT LESS LIKELY    Mini-CogTM Copyright S Micheal. Licensed by the author for use in [facility]; reprinted with permission ([email or web address]@.[email or web address]). All rights reserved.      Do you have sleep apnea, excessive snoring or daytime drowsiness?: no    Reviewed and updated as needed this visit by clinical staff         Reviewed and updated as needed this visit by Provider        Social History     Tobacco Use     Smoking status: Never Smoker     Smokeless tobacco: Never Used   Substance Use Topics     Alcohol use: Yes     Alcohol/week: 0.0 oz     Comment: occasional       Alcohol Use 2/26/2019   If you drink " alcohol do you typically have greater than 3 drinks per day OR greater than 7 drinks per week? No   No flowsheet data found.          Patient has stable chronic conditions as noted in A/P including  Hyperlipidemia LDL goal <130, Hypertension goal BP (blood pressure) < 140/90, Adenocarcinoma of prostate (H),      These diagnoses were each reviewed for stability and medications were reviewed and refilled, labs ordered and updated.    Current providers sharing in care for this patient include:   Patient Care Team:  Francisco Sneed MD as PCP - General (Family Practice)  Francisco Sneed MD as PCP - Assigned PCP    The following health maintenance items are reviewed in Epic and correct as of today:  Health Maintenance   Topic Date Due     AORTIC ANEURYSM SCREENING (SYSTEM ASSIGNED)  06/29/2012     ZOSTER IMMUNIZATION (2 of 3) 10/16/2012     DTAP/TDAP/TD IMMUNIZATION (2 - Td) 05/27/2018     PHQ-2 Q1 YR  01/30/2019     MEDICARE ANNUAL WELLNESS VISIT  01/30/2019     FALL RISK ASSESSMENT  01/29/2020     ADVANCE DIRECTIVE PLANNING Q5 YRS  03/04/2021     LIPID SCREEN Q5 YR MALE (SYSTEM ASSIGNED)  01/04/2022     COLONOSCOPY Q5 YR  01/31/2022     INFLUENZA VACCINE  Completed     HEPATITIS C SCREENING  Completed     IPV IMMUNIZATION  Aged Out     MENINGITIS IMMUNIZATION  Aged Out     Labs reviewed in EPIC  BP Readings from Last 3 Encounters:   02/26/19 134/88   01/30/18 130/80   01/31/17 (!) 132/95    Wt Readings from Last 3 Encounters:   02/26/19 106.5 kg (234 lb 12.8 oz)   01/30/18 104.8 kg (231 lb)   01/04/17 105.2 kg (232 lb)                  Patient Active Problem List   Diagnosis     Adenocarcinoma of prostate (H)     HYPERLIPIDEMIA LDL GOAL <130     Hypertension goal BP (blood pressure) < 140/90     Advance Care Planning     Anemia due to blood loss, acute     Health Care Home     Obesity due to excess calories, unspecified obesity severity     Past Surgical History:   Procedure Laterality Date      APPENDECTOMY  1952     CATARACT IOL, RT/LT Bilateral 03/12     HERNIORRHAPHY INGUINAL  5/15/2014    Procedure: HERNIORRHAPHY INGUINAL;  Surgeon: Evens Jefferson MD;  Location: [facility] OR     PROSTATE SURGERY  2007       Social History     Tobacco Use     Smoking status: Never Smoker     Smokeless tobacco: Never Used   Substance Use Topics     Alcohol use: Yes     Alcohol/week: 0.0 oz     Comment: occasional     Family History   Problem Relation Age of Onset     Glaucoma Other      Diabetes Mother      Gastrointestinal Disease Mother         pancreas removed     Macular Degeneration No family hx of          Current Outpatient Medications   Medication Sig Dispense Refill     ASPIRIN 81 MG OR TABS 1 TABLET DAILY       cholecalciferol (VITAMIN D) 1000 UNIT tablet Take 1 tablet by mouth daily. 100 tablet 12     fish oil-omega-3 fatty acids (FISH OIL) 1000 MG capsule Take 2,000 mg by mouth daily 120 capsule 11     hydrochlorothiazide (HYDRODIURIL) 25 MG tablet Take 1 tablet (25 mg) by mouth daily 90 tablet 3     lisinopril (PRINIVIL/ZESTRIL) 40 MG tablet Take 1 tablet (40 mg) by mouth daily 90 tablet 3     simvastatin (ZOCOR) 20 MG tablet Take 1 tablet (20 mg) by mouth At Bedtime 90 tablet 3     Allergies   Allergen Reactions     Penicillins Other (See Comments)     blotches     Immunization History   Administered Date(s) Administered     Influenza (H1N1) 01/07/2010     Influenza (High Dose) 3 valent vaccine 10/29/2013, 08/31/2016, 11/01/2017, 09/21/2018     Influenza (IIV3) PF 12/02/2003, 10/15/2007, 10/31/2008, 10/02/2009, 09/23/2010, 10/07/2011, 10/02/2012     Pneumo Conj 13-V (2010&after) 11/17/2015     Pneumococcal 23 valent 08/21/2012     TDAP Vaccine (Adacel) 05/27/2008, 02/26/2019     Zoster vaccine, live 08/21/2012         Review of Systems    Constitutional: no recent illness, no fevers/sweats/chills  Eyes: No vision changes or eye irritation  Ears/Nose/Throat: No runny nose, sore throat or ear pain  CV: no  "palpitations, no chest pain, no lower extremity swelling.  Resp: no shortness of breath, wheezing, or cough.  GI: no nausea/vomiting/diarrhea, no black or bloody stools  : no burning or urgency with urination  Skin: no rash  Musculoskeletal: no joint pain, muscle pain, weakness  Psych: no depression, no concerns about anxiety  Neuro: no new headaches, dizziness, numbness, or tingling      OBJECTIVE:   There were no vitals taken for this visit. Estimated body mass index is 33.15 kg/m  as calculated from the following:    Height as of 1/30/18: 1.778 m (5' 10\").    Weight as of 1/30/18: 104.8 kg (231 lb).  Physical Exam    General Appearance: Pleasant, alert, in no acute respiratory distress.  Head Exam: Normal. Normocephalic, atraumatic. No sinus tenderness.  Eye Exam: Normal external eye, conjunctiva, lids. GERALDINE.  Ear Exam: Normal auditory canals and external ears. Non-tender.  Left TM-normal. Right TM-normal.  OroPharynx Exam: Dental hygiene adequate. Normal buccal mucosa. Normal pharynx.  Neck Exam: Supple, no masses or enlarged, tender nodes.  Thyroid Exam: No nodules or enlargement or tenderness.  Chest/Respiratory Exam: Normal, comfortable, easy respirations. Chest wall normal. Lungs are clear to auscultation. No wheezing, crackles, or rhonchi.  Cardiovascular Exam: Regular rate and rhythm. No murmur, gallop, or rubs. No pedal edema.  Gastrointestinal Exam: Soft, non-tender, no masses or organomegaly.  Musculoskeletal Exam: Back is non-tender, full ROM of upper and lower extremities.  Skin: no rash, warm and dry.    Neurologic Exam: Nonfocal, no tremor. Normal gait.  Psychiatric Exam: Alert - appropriate, normal affect      ASSESSMENT / PLAN:       ICD-10-CM    1. Routine history and physical examination of adult Z00.00    2. Hyperlipidemia LDL goal <130 E78.5 PAF COMPLETED     simvastatin (ZOCOR) 20 MG tablet     hydrochlorothiazide (HYDRODIURIL) 25 MG tablet     Lipid panel reflex to direct LDL Fasting   3. " "Hypertension goal BP (blood pressure) < 140/90 I10 PAF COMPLETED     hydrochlorothiazide (HYDRODIURIL) 25 MG tablet     lisinopril (PRINIVIL/ZESTRIL) 40 MG tablet     Basic metabolic panel     CBC with platelets differential   4. Adenocarcinoma of prostate (H) C61 PAF COMPLETED     PSA, tumor marker   5. Need for Tdap vaccination Z23 TDAP VACCINE (ADACEL)     ADMIN 1st VACCINE       End of Life Planning:  Patient currently has an advanced directive: Yes.  Practitioner is supportive of decision.    COUNSELING:  Reviewed preventive health counseling, as reflected in patient instructions       Regular exercise       Healthy diet/nutrition       Vision screening       Hearing screening       Dental care       Bladder control       Colon cancer screening    BP Readings from Last 1 Encounters:   01/30/18 130/80     Estimated body mass index is 33.15 kg/m  as calculated from the following:    Height as of 1/30/18: 1.778 m (5' 10\").    Weight as of 1/30/18: 104.8 kg (231 lb).      Weight management plan: Discussed healthy diet and exercise guidelines     reports that  has never smoked. he has never used smokeless tobacco.      Appropriate preventive services were discussed with this patient, including applicable screening as appropriate for cardiovascular disease, diabetes, osteopenia/osteoporosis, and glaucoma.  As appropriate for age/gender, discussed screening for colorectal cancer, prostate cancer, breast cancer, and cervical cancer. Checklist reviewing preventive services available has been given to the patient.    Reviewed patients plan of care and provided an AVS. The Basic Care Plan (routine screening as documented in Health Maintenance) for Genaro meets the Care Plan requirement. This Care Plan has been established and reviewed with the Patient.    Counseling Resources:  ATP IV Guidelines  Pooled Cohorts Equation Calculator  Breast Cancer Risk Calculator  FRAX Risk Assessment  ICSI Preventive Guidelines  Dietary " Guidelines for Americans, 2010  USDA's MyPlate  ASA Prophylaxis  Lung CA Screening    Francisco Joseph Sneed MD  [facility]

## 2019-02-26 NOTE — PATIENT INSTRUCTIONS
Preventive Health Recommendations:     See your health care provider every year to    Review health changes.     Discuss preventive care.      Review your medicines if your doctor has prescribed any.      Talk with your health care provider about whether you should have a test to screen for prostate cancer (PSA).    Every 3 years, have a diabetes test (fasting glucose). If you are at risk for diabetes, you should have this test more often.    Every 5 years, have a cholesterol test. Have this test more often if you are at risk for high cholesterol or heart disease.     Every 10 years, have a colonoscopy. Or, have a yearly FIT test (stool test). These exams will check for colon cancer.    Talk to with your health care provider about screening for Abdominal Aortic Aneurysm if you have a family history of AAA or have a history of smoking.    Shots:     Get a flu shot each year.     Get a tetanus shot every 10 years.     Talk to your doctor about your pneumonia vaccines. There are now two you should receive - Pneumovax (PPSV 23) and Prevnar (PCV 13).     Talk to your pharmacist about a shingles vaccine.     Talk to your doctor about the hepatitis B vaccine.  Nutrition:     Eat at least 5 servings of fruits and vegetables each day.     Eat whole-grain bread, whole-wheat pasta and brown rice instead of white grains and rice.     Get adequate Calcium and Vitamin D.   Lifestyle    Exercise for at least 150 minutes a week (30 minutes a day, 5 days a week). This will help you control your weight and prevent disease.     Limit alcohol to one drink per day.     No smoking.     Wear sunscreen to prevent skin cancer.    See your dentist every six months for an exam and cleaning.    See your eye doctor every 1 to 2 years to screen for conditions such as glaucoma, macular degeneration, cataracts, etc.    Personalized Prevention Plan  You are due for the preventive services outlined below.  Your care team is available to assist you  in scheduling these services.  If you have already completed any of these items, please share that information with your care team to update in your medical record.  Health Maintenance Due   Topic Date Due     AORTIC ANEURYSM SCREENING (SYSTEM ASSIGNED)  06/29/2012     Zoster (Shingles) Vaccine (2 of 3) 10/16/2012     Diptheria Tetanus Pertussis (DTAP/TDAP/TD) Vaccine (2 - Td) 05/27/2018     Depression Assessment 2 - yearly  01/30/2019     Annual Wellness Visit  01/30/2019

## 2019-02-26 NOTE — NURSING NOTE
"Chief Complaint   Patient presents with     Physical     /88   Pulse 100   Temp 97.4  F (36.3  C) (Oral)   Ht 1.778 m (5' 10\")   Wt 106.5 kg (234 lb 12.8 oz)   SpO2 96%   BMI 33.69 kg/m   Estimated body mass index is 33.69 kg/m  as calculated from the following:    Height as of this encounter: 1.778 m (5' 10\").    Weight as of this encounter: 106.5 kg (234 lb 12.8 oz).  Medication Reconciliation: complete      Health Maintenance that is potentially due pending provider review:  NONE    n/a    TAYE Renae  "

## 2019-02-27 LAB
ANION GAP SERPL CALCULATED.3IONS-SCNC: 11 MMOL/L (ref 3–14)
BUN SERPL-MCNC: 38 MG/DL (ref 7–30)
CALCIUM SERPL-MCNC: 9.3 MG/DL (ref 8.5–10.1)
CHLORIDE SERPL-SCNC: 103 MMOL/L (ref 94–109)
CHOLEST SERPL-MCNC: 203 MG/DL
CO2 SERPL-SCNC: 21 MMOL/L (ref 20–32)
CREAT SERPL-MCNC: 1.11 MG/DL (ref 0.66–1.25)
GFR SERPL CREATININE-BSD FRML MDRD: 66 ML/MIN/{1.73_M2}
GLUCOSE SERPL-MCNC: 111 MG/DL (ref 70–99)
HDLC SERPL-MCNC: 36 MG/DL
LDLC SERPL CALC-MCNC: 122 MG/DL
NONHDLC SERPL-MCNC: 167 MG/DL
POTASSIUM SERPL-SCNC: 3.7 MMOL/L (ref 3.4–5.3)
PSA SERPL-MCNC: <0.01 UG/L (ref 0–4)
SODIUM SERPL-SCNC: 135 MMOL/L (ref 133–144)
TRIGL SERPL-MCNC: 226 MG/DL

## 2019-11-05 ENCOUNTER — HEALTH MAINTENANCE LETTER (OUTPATIENT)
Age: 74
End: 2019-11-05

## 2019-11-19 ENCOUNTER — TELEPHONE (OUTPATIENT)
Dept: OPHTHALMOLOGY | Facility: CLINIC | Age: 74
End: 2019-11-19

## 2020-03-03 ENCOUNTER — OFFICE VISIT (OUTPATIENT)
Dept: OPHTHALMOLOGY | Facility: CLINIC | Age: 75
End: 2020-03-03
Attending: OPHTHALMOLOGY
Payer: COMMERCIAL

## 2020-03-03 DIAGNOSIS — H35.81 RETINAL EDEMA: Primary | ICD-10-CM

## 2020-03-03 DIAGNOSIS — H35.051 CHOROID NEOVASCULARIZATION, RIGHT: ICD-10-CM

## 2020-03-03 DIAGNOSIS — H26.491 PCO (POSTERIOR CAPSULAR OPACIFICATION), RIGHT: ICD-10-CM

## 2020-03-03 DIAGNOSIS — H35.371 EPIRETINAL MEMBRANE (ERM) OF RIGHT EYE: ICD-10-CM

## 2020-03-03 DIAGNOSIS — H35.371 EPIRETINAL MEMBRANE (ERM) OF RIGHT EYE: Primary | ICD-10-CM

## 2020-03-03 PROCEDURE — 92250 FUNDUS PHOTOGRAPHY W/I&R: CPT | Mod: 59 | Performed by: OPHTHALMOLOGY

## 2020-03-03 PROCEDURE — 92134 CPTRZ OPH DX IMG PST SGM RTA: CPT | Mod: ZF | Performed by: OPHTHALMOLOGY

## 2020-03-03 PROCEDURE — G0463 HOSPITAL OUTPT CLINIC VISIT: HCPCS | Mod: ZF

## 2020-03-03 ASSESSMENT — VISUAL ACUITY
CORRECTION_TYPE: GLASSES
OS_CC: 20/20
OD_CC: 20/20
METHOD: SNELLEN - LINEAR
OD_CC+: -3
OS_CC+: -1

## 2020-03-03 ASSESSMENT — REFRACTION_WEARINGRX
OD_SPHERE: PLANO
OS_AXIS: 114
OD_CYLINDER: +0.75
OD_AXIS: 101
OD_ADD: +2.50
OS_SPHERE: -1.00
OS_CYLINDER: +1.00
OS_ADD: +2.50
SPECS_TYPE: PAL

## 2020-03-03 ASSESSMENT — CONF VISUAL FIELD
OD_NORMAL: 1
METHOD: COUNTING FINGERS
OS_NORMAL: 1

## 2020-03-03 ASSESSMENT — SLIT LAMP EXAM - LIDS
COMMENTS: NORMAL
COMMENTS: NORMAL

## 2020-03-03 ASSESSMENT — CUP TO DISC RATIO
OD_RATIO: 0.1
OS_RATIO: 0.1

## 2020-03-03 ASSESSMENT — TONOMETRY
OS_IOP_MMHG: 14
OD_IOP_MMHG: 17
IOP_METHOD: TONOPEN

## 2020-03-03 NOTE — NURSING NOTE
Chief Complaints and History of Present Illnesses   Patient presents with     Follow Up     Pathologic myopia, bilateral      Chief Complaint(s) and History of Present Illness(es)     Follow Up     Laterality: both eyes    Course: stable    Associated symptoms: Negative for dryness, eye pain, redness and tearing    Pain scale: 0/10    Comments: Pathologic myopia, bilateral               Comments     He states that his vision has seemed stable in both eyes, since his last eye exam.  He has been very pleased with his vision post cataract surgery, 8 years ago.  Patient denies having any eye discomfort.     Odalis Earl, COT 9:37 AM  March 3, 2020

## 2020-03-03 NOTE — PATIENT INSTRUCTIONS
Patient Education     Using the Amsler Grid  If you are at risk for vision loss, you may be told to check your eyesight regularly using the Amsler grid. Below is the grid and instructions for using it.  How to use the Amsler grid  1. Use the grid in a well-lighted area.  2. Wear glasses or contact lenses if you often wear them. If you are wearing bifocals, look through the part of the lens for near vision.  3. Hold the grid at your normal reading distance (about 16 inches).  4. Cover your left eye.  5. With your right eye, look at the dot in the middle of the grid.  6. While looking at the dot, notice if:  ? Any lines look wavy  ? Any lines disappear  ? The boxes change shape  7. Keep a log with the dates you used the grid. Write down any vision changes from the last time you used it.  8. Now do this again. This time cover your right eye.  9. Call your healthcare provider right away if you notice any vision changes.    How often should I check my vision?  Use the Amsler grid as often as your eye care provider advises. Keep the grid and the log where you ll remember to use them. Call your eye care provider right away if you notice any changes with your eyesight. This includes if your vision gets better.  Date Last Reviewed: 6/1/2016 2000-2019 [employer]. [address] [place] [address], [place] [id number]. All rights reserved. This information is not intended as a substitute for professional medical care. Always follow your healthcare professional's instructions.    AMSLER GRID  1. While wearing your reading glasses, hold the grid at a normal reading distance.  2. With one eye covered, look at the black dot in the center of grid.  3. Note any lines that are wavy, broken or missing.  4. Repeat for the other eye and report any changes to your doctor.

## 2020-03-31 ENCOUNTER — OFFICE VISIT (OUTPATIENT)
Dept: OPHTHALMOLOGY | Facility: CLINIC | Age: 75
End: 2020-03-31
Attending: OPHTHALMOLOGY
Payer: COMMERCIAL

## 2020-03-31 DIAGNOSIS — H26.491 PCO (POSTERIOR CAPSULAR OPACIFICATION), RIGHT: ICD-10-CM

## 2020-03-31 DIAGNOSIS — H35.051 CHOROIDAL NEOVASCULAR MEMBRANE, RIGHT: Primary | ICD-10-CM

## 2020-03-31 DIAGNOSIS — H44.2A1 RIGHT EYE AFFECTED BY DEGENERATIVE MYOPIA WITH CHOROIDAL NEOVASCULARIZATION: ICD-10-CM

## 2020-03-31 PROCEDURE — G0463 HOSPITAL OUTPT CLINIC VISIT: HCPCS | Mod: ZF

## 2020-03-31 PROCEDURE — 92134 CPTRZ OPH DX IMG PST SGM RTA: CPT | Mod: ZF | Performed by: OPHTHALMOLOGY

## 2020-03-31 ASSESSMENT — REFRACTION_WEARINGRX
OS_AXIS: 114
OD_CYLINDER: +0.75
OD_AXIS: 101
OS_SPHERE: -1.00
OS_ADD: +2.50
OD_SPHERE: PLANO
SPECS_TYPE: PAL
OD_ADD: +2.50
OS_CYLINDER: +1.00

## 2020-03-31 ASSESSMENT — CONF VISUAL FIELD
OS_NORMAL: 1
METHOD: COUNTING FINGERS
OD_NORMAL: 1

## 2020-03-31 ASSESSMENT — SLIT LAMP EXAM - LIDS
COMMENTS: NORMAL
COMMENTS: NORMAL

## 2020-03-31 ASSESSMENT — TONOMETRY
IOP_METHOD: TONOPEN
OD_IOP_MMHG: 15
OS_IOP_MMHG: 15

## 2020-03-31 ASSESSMENT — VISUAL ACUITY
OS_CC: 20/20
OD_CC: 20/25
OD_CC+: -2
METHOD: SNELLEN - LINEAR

## 2020-03-31 NOTE — PROGRESS NOTES
Chief Complaint(s) and History of Present Illness(es)     Follow Up     Laterality: right eye    Associated symptoms: Negative for eye pain, floaters, flashes, itching   and discharge    Pain scale: 0/10              Comments     Genaro is here for a follow up of Epiretinal membrane (ERM) of right eye.    He says vision is about the same as last visit, and he sees no change   while looking at his Amsler grid at home.     Beni Leyva COT 12:39 PM March 31, 2020                     Review of systems for the eyes was negative other than the pertinent positives/negatives listed in the HPI.      Assessment & Plan      Genaro Ortiz is a 74 year old male with the following diagnoses:   1. Choroidal neovascular membrane, right    2. 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 PCO (posterior capsular opacification), right         New choroidal neovascular membrane with peripapillary IRF identified at last visit  Vision and Amsler stable per patient  OCT mac today shows interval improvement  Discussed, monitor closely for now    Continue Amsler grid  Return precautions reviewed     PCO in right eye > left eye   Will hold off treatment while monitoring the CNV in right eye  VISUALLY SIGNIFICANT and will benefit from treatment soon        Patient disposition:   Return in about 3 months (around 6/30/2020) for DFE, OCT Macula.  YAG cap if stable, right eye            Attending Physician Attestation:  Complete documentation of historical and exam elements from today's encounter can be found in the full encounter summary report (not reduplicated in this progress note).  I personally obtained the chief complaint(s) and history of present illness.  I confirmed and edited as necessary the review of systems, past medical/surgical history, family history, social history, and examination findings as documented by others; and I examined the patient myself.  I personally reviewed the relevant tests,  images, and reports as documented above.  I formulated and edited as necessary the assessment and plan and discussed the findings and management plan with the patient and family. . - Eric Abel MD

## 2020-03-31 NOTE — NURSING NOTE
Chief Complaints and History of Present Illnesses   Patient presents with     Follow Up     Chief Complaint(s) and History of Present Illness(es)     Follow Up     Laterality: right eye    Associated symptoms: Negative for eye pain, floaters, flashes, itching and discharge    Pain scale: 0/10              Comments     Genaro is here for a follow up of Epiretinal membrane (ERM) of right eye.  He says vision is about the same as last visit, and he sees no change while looking at his Amsler grid at home.     Beni Leyva COT 12:39 PM March 31, 2020

## 2020-04-11 DIAGNOSIS — I10 HYPERTENSION GOAL BP (BLOOD PRESSURE) < 140/90: ICD-10-CM

## 2020-04-11 DIAGNOSIS — E78.5 HYPERLIPIDEMIA LDL GOAL <130: ICD-10-CM

## 2020-04-13 RX ORDER — SIMVASTATIN 20 MG
TABLET ORAL
Qty: 90 TABLET | Refills: 0 | Status: SHIPPED | OUTPATIENT
Start: 2020-04-13 | End: 2020-06-18

## 2020-04-13 RX ORDER — HYDROCHLOROTHIAZIDE 25 MG/1
TABLET ORAL
Qty: 90 TABLET | Refills: 0 | Status: SHIPPED | OUTPATIENT
Start: 2020-04-13 | End: 2020-06-18

## 2020-04-13 RX ORDER — LISINOPRIL 40 MG/1
TABLET ORAL
Qty: 90 TABLET | Refills: 0 | Status: SHIPPED | OUTPATIENT
Start: 2020-04-13 | End: 2020-06-18

## 2020-04-13 NOTE — TELEPHONE ENCOUNTER
"Last Written Prescription Date:  2/26/2019 - all 3  Last Fill Quantity: 90,  # refills: 3   Last office visit: 2/26/2019 with prescribing provider:  yes   Future Office Visit:      Medication is being filled for 1 time refill only due to:  Patient needs to be seen because it has been more than one year since last visit.due for physical. Last 2/26/19    Sara Caro RN    Requested Prescriptions   Signed Prescriptions Disp Refills    simvastatin (ZOCOR) 20 MG tablet 90 tablet 0     Sig: TAKE 1 TABLET BY MOUTH EVERYDAY AT BEDTIME       Statins Protocol Failed - 4/11/2020 12:18 AM        Failed - LDL on file in past 12 months     Recent Labs   Lab Test 02/26/19  1028   *             Failed - Recent (12 mo) or future (30 days) visit within the authorizing provider's specialty     Patient has had an office visit with the authorizing provider or a provider within the authorizing providers department within the previous 12 mos or has a future within next 30 days. See \"Patient Info\" tab in [email or web address], or \"Choose Columns\" in Meds & Orders section of the refill encounter.              Passed - No abnormal creatine kinase in past 12 months     No lab results found.             Passed - Medication is active on med list        Passed - Patient is age 18 or older          lisinopril (ZESTRIL) 40 MG tablet 90 tablet 0     Sig: TAKE 1 TABLET BY MOUTH EVERY DAY       ACE Inhibitors (Including Combos) Protocol Failed - 4/11/2020 12:18 AM        Failed - Blood pressure under 140/90 in past 12 months     BP Readings from Last 3 Encounters:   02/26/19 134/88   01/30/18 130/80   01/31/17 (!) 132/95                 Failed - Recent (12 mo) or future (30 days) visit within the authorizing provider's specialty     Patient has had an office visit with the authorizing provider or a provider within the authorizing providers department within the previous 12 mos or has a future within next 30 days. See \"Patient Info\" tab in inbasket, or " "\"Choose Columns\" in Meds & Orders section of the refill encounter.              Failed - Normal serum creatinine on file in past 12 months     Recent Labs   Lab Test 02/26/19  1028   CR 1.11       Ok to refill medication if creatinine is low          Failed - Normal serum potassium on file in past 12 months     Recent Labs   Lab Test 02/26/19  1028   POTASSIUM 3.7             Passed - Medication is active on med list        Passed - Patient is age 18 or older          hydrochlorothiazide (HYDRODIURIL) 25 MG tablet 90 tablet 0     Sig: TAKE 1 TABLET BY MOUTH EVERY DAY       Diuretics (Including Combos) Protocol Failed - 4/11/2020 12:18 AM        Failed - Blood pressure under 140/90 in past 12 months     BP Readings from Last 3 Encounters:   02/26/19 134/88   01/30/18 130/80   01/31/17 (!) 132/95                 Failed - Recent (12 mo) or future (30 days) visit within the authorizing provider's specialty     Patient has had an office visit with the authorizing provider or a provider within the authorizing providers department within the previous 12 mos or has a future within next 30 days. See \"Patient Info\" tab in inbasket, or \"Choose Columns\" in Meds & Orders section of the refill encounter.              Failed - Normal serum creatinine on file in past 12 months     Recent Labs   Lab Test 02/26/19  1028   CR 1.11              Failed - Normal serum potassium on file in past 12 months     Recent Labs   Lab Test 02/26/19  1028   POTASSIUM 3.7                    Failed - Normal serum sodium on file in past 12 months     Recent Labs   Lab Test 02/26/19  1028                 Passed - Medication is active on med list        Passed - Patient is age 18 or older             "

## 2020-06-18 ENCOUNTER — VIRTUAL VISIT (OUTPATIENT)
Dept: FAMILY MEDICINE | Facility: CLINIC | Age: 75
End: 2020-06-18
Payer: COMMERCIAL

## 2020-06-18 DIAGNOSIS — E78.5 HYPERLIPIDEMIA LDL GOAL <130: ICD-10-CM

## 2020-06-18 DIAGNOSIS — I10 HYPERTENSION GOAL BP (BLOOD PRESSURE) < 140/90: ICD-10-CM

## 2020-06-18 PROCEDURE — 99214 OFFICE O/P EST MOD 30 MIN: CPT | Mod: 95 | Performed by: FAMILY MEDICINE

## 2020-06-18 RX ORDER — SIMVASTATIN 20 MG
20 TABLET ORAL AT BEDTIME
Qty: 90 TABLET | Refills: 3 | Status: SHIPPED | OUTPATIENT
Start: 2020-06-18 | End: 2021-06-04

## 2020-06-18 RX ORDER — HYDROCHLOROTHIAZIDE 25 MG/1
25 TABLET ORAL DAILY
Qty: 90 TABLET | Refills: 3 | Status: SHIPPED | OUTPATIENT
Start: 2020-06-18 | End: 2021-06-04

## 2020-06-18 RX ORDER — LISINOPRIL 40 MG/1
40 TABLET ORAL DAILY
Qty: 90 TABLET | Refills: 3 | Status: SHIPPED | OUTPATIENT
Start: 2020-06-18 | End: 2021-06-04

## 2020-06-18 NOTE — PROGRESS NOTES
"Genaro Ortiz is a 74 year old male who is being evaluated via a billable video visit.      The patient has been notified of following:     \"This video visit will be conducted via a call between you and your physician/provider. We have found that certain health care needs can be provided without the need for an in-person physical exam.  This service lets us provide the care you need with a video conversation.  If a prescription is necessary we can send it directly to your pharmacy.  If lab work is needed we can place an order for that and you can then stop by our lab to have the test done at a later time.    Video visits are billed at different rates depending on your insurance coverage.  Please reach out to your insurance provider with any questions.    If during the course of the call the physician/provider feels a video visit is not appropriate, you will not be charged for this service.\"    Patient has given verbal consent for Video visit? Yes    Will anyone else be joining your video visit? No    Subjective     Genaro Ortiz is a 74 year old male who presents today via video visit for the following health issues:    HPI  Hyperlipidemia Follow-Up      Are you regularly taking any medication or supplement to lower your cholesterol?   Yes- simvastatin    Are you having muscle aches or other side effects that you think could be caused by your cholesterol lowering medication?  Yes- had more aches in legs as of recently     Hypertension Follow-up       Do you check your blood pressure regularly outside of the clinic? Yes     Are you following a low salt diet? Yes    Are your blood pressures ever more than 140 on the top number (systolic) OR more   than 90 on the bottom number (diastolic), for example 140/90? No       How many servings of fruits and vegetables do you eat daily?  2-3    On average, how many sweetened beverages do you drink each day (Examples: soda, juice, sweet tea, etc.  Do NOT count diet or " artificially sweetened beverages)?   0    How many days per week do you exercise enough to make your heart beat faster? 3 or less    How many minutes a day do you exercise enough to make your heart beat faster? 9 or less    How many days per week do you miss taking your medication? 0       More ache in legs recently  particularly lower      Video Start Time: 2:35 PM        Patient Active Problem List   Diagnosis     Adenocarcinoma of prostate (H)     HYPERLIPIDEMIA LDL GOAL <130     Hypertension goal BP (blood pressure) < 140/90     Advance Care Planning     Anemia due to blood loss, acute     Health Care Home     Obesity due to excess calories, unspecified obesity severity     Past Surgical History:   Procedure Laterality Date     APPENDECTOMY  1952     CATARACT IOL, RT/LT Bilateral 03/12     HERNIORRHAPHY INGUINAL  5/15/2014    Procedure: HERNIORRHAPHY INGUINAL;  Surgeon: Evens Jefferson MD;  Location: [facility]     PROSTATE SURGERY  2007       Social History     Tobacco Use     Smoking status: Never Smoker     Smokeless tobacco: Never Used   Substance Use Topics     Alcohol use: Yes     Alcohol/week: 0.0 standard drinks     Comment: occasional     Family History   Problem Relation Age of Onset     Glaucoma Other      Diabetes Mother      Gastrointestinal Disease Mother         pancreas removed     Macular Degeneration No family hx of          Current Outpatient Medications   Medication Sig Dispense Refill     ASPIRIN 81 MG OR TABS 1 TABLET DAILY       cholecalciferol (VITAMIN D) 1000 UNIT tablet Take 1 tablet by mouth daily. 100 tablet 12     fish oil-omega-3 fatty acids (FISH OIL) 1000 MG capsule Take 2,000 mg by mouth daily 120 capsule 11     hydrochlorothiazide (HYDRODIURIL) 25 MG tablet Take 1 tablet (25 mg) by mouth daily 90 tablet 3     lisinopril (ZESTRIL) 40 MG tablet Take 1 tablet (40 mg) by mouth daily 90 tablet 3     simvastatin (ZOCOR) 20 MG tablet Take 1 tablet (20 mg) by mouth At Bedtime 90  "tablet 3     Allergies   Allergen Reactions     Penicillins Other (See Comments)     blotches       Reviewed and updated as needed this visit by Provider         Review of Systems   CONSTITUTIONAL: NEGATIVE for fever, chills, change in weight  ENT/MOUTH: NEGATIVE for ear, mouth and throat problems  RESP: NEGATIVE for significant cough or SOB  CV: NEGATIVE for chest pain, palpitations or peripheral edema      Objective    There were no vitals taken for this visit.  Estimated body mass index is 33.69 kg/m  as calculated from the following:    Height as of 2/26/19: 1.778 m (5' 10\").    Weight as of 2/26/19: 106.5 kg (234 lb 12.8 oz).  Physical Exam     GENERAL: Healthy, alert and no distress  EYES: Eyes grossly normal to inspection.  No discharge or erythema, or obvious scleral/conjunctival abnormalities.  RESP: No audible wheeze, cough, or visible cyanosis.  No visible retractions or increased work of breathing.    SKIN: Visible skin clear. No significant rash, abnormal pigmentation or lesions.  NEURO: Cranial nerves grossly intact.  Mentation and speech appropriate for age.  PSYCH: Mentation appears normal, affect normal/bright, judgement and insight intact, normal speech and appearance well-groomed.      Diagnostic Test Results:  Labs reviewed in Epic        Assessment & Plan       ICD-10-CM    1. Hypertension goal BP (blood pressure) < 140/90  I10 hydrochlorothiazide (HYDRODIURIL) 25 MG tablet     lisinopril (ZESTRIL) 40 MG tablet     Basic metabolic panel     Lipid panel reflex to direct LDL Fasting     CBC with platelets and differential     **Prostate spec antigen screen FUTURE anytime   2. Hyperlipidemia LDL goal <130  E78.5 hydrochlorothiazide (HYDRODIURIL) 25 MG tablet     simvastatin (ZOCOR) 20 MG tablet     Basic metabolic panel     Lipid panel reflex to direct LDL Fasting     CBC with platelets and differential     **Prostate spec antigen screen FUTURE anytime          MEDICATIONS:  Continue current " medications without change  FURTHER TESTING:       - Check labs soon when fasting  FUTURE LABS:       - Schedule a fasting blood draw     No follow-ups on file.    Francisco Sneed MD  [facility]      Video-Visit Details    Type of service:  Video Visit    Video End Time:2:47 PM    Originating Location (pt. Location): Home    Distant Location (provider location):  [facility]     Platform used for Video Visit: Doximity    No follow-ups on file.       Francisco Sneed MD

## 2020-06-23 DIAGNOSIS — I10 HYPERTENSION GOAL BP (BLOOD PRESSURE) < 140/90: ICD-10-CM

## 2020-06-23 DIAGNOSIS — E78.5 HYPERLIPIDEMIA LDL GOAL <130: ICD-10-CM

## 2020-06-23 LAB
ANION GAP SERPL CALCULATED.3IONS-SCNC: 7 MMOL/L (ref 3–14)
BASOPHILS # BLD AUTO: 0 10E9/L (ref 0–0.2)
BASOPHILS NFR BLD AUTO: 0.3 %
BUN SERPL-MCNC: 22 MG/DL (ref 7–30)
CALCIUM SERPL-MCNC: 9.1 MG/DL (ref 8.5–10.1)
CHLORIDE SERPL-SCNC: 106 MMOL/L (ref 94–109)
CHOLEST SERPL-MCNC: 141 MG/DL
CO2 SERPL-SCNC: 22 MMOL/L (ref 20–32)
CREAT SERPL-MCNC: 1.05 MG/DL (ref 0.66–1.25)
DIFFERENTIAL METHOD BLD: ABNORMAL
EOSINOPHIL # BLD AUTO: 0.1 10E9/L (ref 0–0.7)
EOSINOPHIL NFR BLD AUTO: 0.9 %
ERYTHROCYTE [DISTWIDTH] IN BLOOD BY AUTOMATED COUNT: 13.2 % (ref 10–15)
GFR SERPL CREATININE-BSD FRML MDRD: 70 ML/MIN/{1.73_M2}
GLUCOSE SERPL-MCNC: 99 MG/DL (ref 70–99)
HCT VFR BLD AUTO: 38.1 % (ref 40–53)
HDLC SERPL-MCNC: 37 MG/DL
HGB BLD-MCNC: 13.4 G/DL (ref 13.3–17.7)
LDLC SERPL CALC-MCNC: 72 MG/DL
LYMPHOCYTES # BLD AUTO: 2.6 10E9/L (ref 0.8–5.3)
LYMPHOCYTES NFR BLD AUTO: 39.3 %
MCH RBC QN AUTO: 31.5 PG (ref 26.5–33)
MCHC RBC AUTO-ENTMCNC: 35.2 G/DL (ref 31.5–36.5)
MCV RBC AUTO: 89 FL (ref 78–100)
MONOCYTES # BLD AUTO: 0.5 10E9/L (ref 0–1.3)
MONOCYTES NFR BLD AUTO: 8.1 %
NEUTROPHILS # BLD AUTO: 3.4 10E9/L (ref 1.6–8.3)
NEUTROPHILS NFR BLD AUTO: 51.4 %
NONHDLC SERPL-MCNC: 104 MG/DL
PLATELET # BLD AUTO: 206 10E9/L (ref 150–450)
POTASSIUM SERPL-SCNC: 3.9 MMOL/L (ref 3.4–5.3)
PSA SERPL-ACNC: <0.01 UG/L (ref 0–4)
RBC # BLD AUTO: 4.26 10E12/L (ref 4.4–5.9)
SODIUM SERPL-SCNC: 135 MMOL/L (ref 133–144)
TRIGL SERPL-MCNC: 159 MG/DL
WBC # BLD AUTO: 6.6 10E9/L (ref 4–11)

## 2020-06-23 PROCEDURE — G0103 PSA SCREENING: HCPCS | Performed by: FAMILY MEDICINE

## 2020-06-23 PROCEDURE — 80061 LIPID PANEL: CPT | Performed by: FAMILY MEDICINE

## 2020-06-23 PROCEDURE — 85025 COMPLETE CBC W/AUTO DIFF WBC: CPT | Performed by: FAMILY MEDICINE

## 2020-06-23 PROCEDURE — 36415 COLL VENOUS BLD VENIPUNCTURE: CPT | Performed by: FAMILY MEDICINE

## 2020-06-23 PROCEDURE — 80048 BASIC METABOLIC PNL TOTAL CA: CPT | Performed by: FAMILY MEDICINE

## 2020-07-16 ENCOUNTER — OFFICE VISIT (OUTPATIENT)
Dept: OPHTHALMOLOGY | Facility: CLINIC | Age: 75
End: 2020-07-16
Attending: OPHTHALMOLOGY
Payer: COMMERCIAL

## 2020-07-16 DIAGNOSIS — H26.491 PCO (POSTERIOR CAPSULAR OPACIFICATION), RIGHT: ICD-10-CM

## 2020-07-16 DIAGNOSIS — H35.051 CHOROIDAL NEOVASCULAR MEMBRANE, RIGHT: Primary | ICD-10-CM

## 2020-07-16 DIAGNOSIS — H44.2A1 RIGHT EYE AFFECTED BY DEGENERATIVE MYOPIA WITH CHOROIDAL NEOVASCULARIZATION: ICD-10-CM

## 2020-07-16 DIAGNOSIS — H35.371 EPIRETINAL MEMBRANE (ERM) OF RIGHT EYE: ICD-10-CM

## 2020-07-16 DIAGNOSIS — Z96.1 PSEUDOPHAKIA OF BOTH EYES: ICD-10-CM

## 2020-07-16 PROCEDURE — G0463 HOSPITAL OUTPT CLINIC VISIT: HCPCS | Mod: ZF

## 2020-07-16 PROCEDURE — 66821 AFTER CATARACT LASER SURGERY: CPT | Mod: RT,ZF | Performed by: OPHTHALMOLOGY

## 2020-07-16 PROCEDURE — 92134 CPTRZ OPH DX IMG PST SGM RTA: CPT | Mod: ZF | Performed by: OPHTHALMOLOGY

## 2020-07-16 ASSESSMENT — TONOMETRY
OS_IOP_MMHG: 17
OD_IOP_MMHG: 17
IOP_METHOD: TONOPEN

## 2020-07-16 ASSESSMENT — VISUAL ACUITY
CORRECTION_TYPE: GLASSES
OD_CC+: -1
METHOD: SNELLEN - LINEAR
OS_CC: 20/20
OD_CC: 20/30

## 2020-07-16 ASSESSMENT — CONF VISUAL FIELD
OD_NORMAL: 1
OS_NORMAL: 1
METHOD: COUNTING FINGERS

## 2020-07-16 ASSESSMENT — REFRACTION_WEARINGRX
OD_AXIS: 101
OS_AXIS: 114
OS_SPHERE: -1.00
OD_SPHERE: PLANO
OS_ADD: +2.50
OS_CYLINDER: +1.00
OD_CYLINDER: +0.75
SPECS_TYPE: PAL
OD_ADD: +2.50

## 2020-07-16 ASSESSMENT — CUP TO DISC RATIO
OS_RATIO: 0.1
OD_RATIO: 0.1

## 2020-07-16 ASSESSMENT — SLIT LAMP EXAM - LIDS
COMMENTS: NORMAL
COMMENTS: NORMAL

## 2020-07-16 NOTE — NURSING NOTE
Chief Complaints and History of Present Illnesses   Patient presents with     Choroidal Neovascular Membrane Follow Up     3.5 month follow up CNVM, right eye     Chief Complaint(s) and History of Present Illness(es)     Choroidal Neovascular Membrane Follow Up     Laterality: both eyes    Course: stable    Associated symptoms: Negative for eye pain, dryness, tearing, discharge, flashes and floaters    Treatments tried: no treatments    Pain scale: 0/10    Comments: 3.5 month follow up CNVM, right eye              Comments     Pt denies any significant vision changes in either eye since last visit.  Complains of RE feeling sore yesterday - no pain today.  Denies any flashes, floaters, pressure, irritation, discharge, and tearing.  Ocular Meds: None    Grace Henriquez OT 9:27 AM July 16, 2020

## 2020-07-16 NOTE — PROGRESS NOTES
Chief Complaint(s) and History of Present Illness(es)     Choroidal Neovascular Membrane Follow Up     Laterality: both eyes    Course: stable    Associated symptoms: Negative for eye pain, dryness, tearing, discharge,   flashes and floaters    Treatments tried: no treatments    Pain scale: 0/10    Comments: 3.5 month follow up CNVM, right eye              Comments     Pt denies any significant vision changes in either eye since last visit.  Complains of RE feeling sore yesterday - no pain today.  Denies any flashes, floaters, pressure, irritation, discharge, and   tearing.  Ocular Meds: None    Gracemarcellus Henriquez OT 9:27 AM July 16, 2020              Review of systems for the eyes was negative other than the pertinent positives/negatives listed in the HPI.      Assessment & Plan      Genaro Ortiz is a 75 year old male with the following diagnoses:   1. Choroidal neovascular membrane, right    2. Epiretinal membrane (ERM) of right eye    3. PCO (posterior capsular opacification), right    4. 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 Pseudophakia of both eyes         Stable choroidal neovascular membrane right eye.  not visually significant   Continue amsler monitoring     posterior capsular opacity (PCO) visually significant right eye   RBA to YAG capsulotomy discussed, consent obtained, will proceed today.   Return precautions reviewed     Patient disposition:   Return in about 6 weeks (around 8/27/2020) for VT only, OCT Macula.    Attending Physician Attestation:  Complete documentation of historical and exam elements from today's encounter can be found in the full encounter summary report (not reduplicated in this progress note).  I personally obtained the chief complaint(s) and history of present illness.  I confirmed and edited as necessary the review of systems, past medical/surgical history, family history, social history, and examination findings as documented by  others; and I examined the patient myself.  I personally reviewed the relevant tests, images, and reports as documented above.  I formulated and edited as necessary the assessment and plan and discussed the findings and management plan with the patient and family. . - Eric Abel MD

## 2020-09-01 ENCOUNTER — OFFICE VISIT (OUTPATIENT)
Dept: OPHTHALMOLOGY | Facility: CLINIC | Age: 75
End: 2020-09-01
Attending: OPHTHALMOLOGY
Payer: COMMERCIAL

## 2020-09-01 DIAGNOSIS — H35.051 CHOROID NEOVASCULARIZATION, RIGHT: ICD-10-CM

## 2020-09-01 DIAGNOSIS — H35.371 EPIRETINAL MEMBRANE (ERM) OF RIGHT EYE: Primary | ICD-10-CM

## 2020-09-01 PROCEDURE — 92134 CPTRZ OPH DX IMG PST SGM RTA: CPT | Mod: ZF | Performed by: OPHTHALMOLOGY

## 2020-09-01 PROCEDURE — G0463 HOSPITAL OUTPT CLINIC VISIT: HCPCS | Mod: ZF

## 2020-09-01 PROCEDURE — 92015 DETERMINE REFRACTIVE STATE: CPT | Mod: ZF

## 2020-09-01 ASSESSMENT — REFRACTION_MANIFEST
OS_SPHERE: -1.50
OD_AXIS: 085
OD_CYLINDER: +0.50
OS_ADD: +2.25
OD_ADD: +2.50
OS_AXIS: 110
OD_SPHERE: -0.50
OS_CYLINDER: +1.00

## 2020-09-01 ASSESSMENT — VISUAL ACUITY
OD_CC: 20/30
OS_CC+: -1
CORRECTION_TYPE: GLASSES
OD_CC: J1 -
OD_CC: 20/25
OD_CC+: +2
OS_CC+: -1
METHOD: SNELLEN - LINEAR
CORRECTION_TYPE: GLASSES
OS_CC: J1+
METHOD: SNELLEN - LINEAR
OS_CC: 20/20
OD_CC+: -1+1
OS_CC: 20/20

## 2020-09-01 ASSESSMENT — REFRACTION_WEARINGRX
OD_ADD: +2.50
SPECS_TYPE: PAL
OS_CYLINDER: +1.00
OS_ADD: +2.50
OD_AXIS: 085
OD_SPHERE: -0.50
OS_SPHERE: -1.50
OD_CYLINDER: +0.50
OS_AXIS: 110

## 2020-09-01 ASSESSMENT — TONOMETRY
IOP_METHOD: TONOPEN
OS_IOP_MMHG: 14
OD_IOP_MMHG: 17

## 2020-09-01 ASSESSMENT — CONF VISUAL FIELD
OS_NORMAL: 1
OD_NORMAL: 1
METHOD: COUNTING FINGERS

## 2020-09-01 ASSESSMENT — SLIT LAMP EXAM - LIDS
COMMENTS: NORMAL
COMMENTS: NORMAL

## 2020-09-01 NOTE — NURSING NOTE
Chief Complaints and History of Present Illnesses   Patient presents with     Choroidal Neovascular Membrane Follow Up     7 week follow up Choroidal neovascular membrane, right eye     Chief Complaint(s) and History of Present Illness(es)     Choroidal Neovascular Membrane Follow Up     Course: stable    Associated symptoms: Negative for eye pain, flashes, floaters, dryness, tearing and discharge    Pain scale: 0/10    Comments: 7 week follow up Choroidal neovascular membrane, right eye              Comments     Pt denies any significant vision changes in either eye since last visit.  Denies any flashes, floaters, pain, pressure, irritation, discharge, and tearing.  Ocular Meds: none    Grace Henriquez OT 9:40 AM September 1, 2020

## 2020-09-01 NOTE — PROGRESS NOTES
Chief Complaint(s) and History of Present Illness(es)     Choroidal Neovascular Membrane Follow Up     Course: stable    Associated symptoms: Negative for eye pain, flashes, floaters, dryness,   tearing and discharge    Pain scale: 0/10    Comments: 7 week follow up Choroidal neovascular membrane, right eye              Comments     Pt denies any significant vision changes in either eye since last visit.  Denies any flashes, floaters, pain, pressure, irritation, discharge, and   tearing.  Ocular Meds: none    Grace Henriquez OT 9:40 AM September 1, 2020               Review of systems for the eyes was negative other than the pertinent positives/negatives listed in the HPI.      Assessment & Plan      Genaro Ortiz is a 75 year old male with the following diagnoses:   1. Epiretinal membrane (ERM) of right eye    2. Choroid neovascularization, right         Doing well, no change in vision or Amsler since last exam  PC open with clear posterior view today   Will get refraction to see if best corrected visual acuity can be improved  Return precautions reviewed     Genaro used to work with [employer] and is related to Elio's family (tristen from Psychiatric)      Patient disposition:   Return in about 6 months (around 3/1/2021) for VT only, OCT Macula.           Attending Physician Attestation:  Complete documentation of historical and exam elements from today's encounter can be found in the full encounter summary report (not reduplicated in this progress note).  I personally obtained the chief complaint(s) and history of present illness.  I confirmed and edited as necessary the review of systems, past medical/surgical history, family history, social history, and examination findings as documented by others; and I examined the patient myself.  I personally reviewed the relevant tests, images, and reports as documented above.  I formulated and edited as necessary the assessment and plan and discussed the findings  and management plan with the patient and family. . - Eric Abel MD

## 2020-10-30 ENCOUNTER — OFFICE VISIT (OUTPATIENT)
Dept: FAMILY MEDICINE | Facility: CLINIC | Age: 75
End: 2020-10-30
Payer: COMMERCIAL

## 2020-10-30 VITALS
HEIGHT: 70 IN | DIASTOLIC BLOOD PRESSURE: 87 MMHG | HEART RATE: 94 BPM | BODY MASS INDEX: 31.92 KG/M2 | TEMPERATURE: 98.8 F | SYSTOLIC BLOOD PRESSURE: 136 MMHG | OXYGEN SATURATION: 99 % | WEIGHT: 223 LBS | RESPIRATION RATE: 16 BRPM

## 2020-10-30 DIAGNOSIS — C61 ADENOCARCINOMA OF PROSTATE (H): ICD-10-CM

## 2020-10-30 DIAGNOSIS — M62.81 PROXIMAL LIMB MUSCLE WEAKNESS: Primary | ICD-10-CM

## 2020-10-30 LAB
BASOPHILS # BLD AUTO: 0 10E9/L (ref 0–0.2)
BASOPHILS NFR BLD AUTO: 0.4 %
CRP SERPL-MCNC: <2.9 MG/L (ref 0–8)
DIFFERENTIAL METHOD BLD: ABNORMAL
EOSINOPHIL # BLD AUTO: 0.1 10E9/L (ref 0–0.7)
EOSINOPHIL NFR BLD AUTO: 0.9 %
ERYTHROCYTE [DISTWIDTH] IN BLOOD BY AUTOMATED COUNT: 12.8 % (ref 10–15)
ERYTHROCYTE [SEDIMENTATION RATE] IN BLOOD BY WESTERGREN METHOD: 21 MM/H (ref 0–20)
HCT VFR BLD AUTO: 39.3 % (ref 40–53)
HGB BLD-MCNC: 13.6 G/DL (ref 13.3–17.7)
LYMPHOCYTES # BLD AUTO: 2.8 10E9/L (ref 0.8–5.3)
LYMPHOCYTES NFR BLD AUTO: 35.6 %
MCH RBC QN AUTO: 31.2 PG (ref 26.5–33)
MCHC RBC AUTO-ENTMCNC: 34.6 G/DL (ref 31.5–36.5)
MCV RBC AUTO: 90 FL (ref 78–100)
MONOCYTES # BLD AUTO: 0.6 10E9/L (ref 0–1.3)
MONOCYTES NFR BLD AUTO: 7.5 %
NEUTROPHILS # BLD AUTO: 4.4 10E9/L (ref 1.6–8.3)
NEUTROPHILS NFR BLD AUTO: 55.6 %
PLATELET # BLD AUTO: 246 10E9/L (ref 150–450)
RBC # BLD AUTO: 4.36 10E12/L (ref 4.4–5.9)
WBC # BLD AUTO: 7.9 10E9/L (ref 4–11)

## 2020-10-30 PROCEDURE — 85652 RBC SED RATE AUTOMATED: CPT | Performed by: FAMILY MEDICINE

## 2020-10-30 PROCEDURE — 85025 COMPLETE CBC W/AUTO DIFF WBC: CPT | Performed by: FAMILY MEDICINE

## 2020-10-30 PROCEDURE — 86431 RHEUMATOID FACTOR QUANT: CPT | Performed by: FAMILY MEDICINE

## 2020-10-30 PROCEDURE — 36415 COLL VENOUS BLD VENIPUNCTURE: CPT | Performed by: FAMILY MEDICINE

## 2020-10-30 PROCEDURE — 86140 C-REACTIVE PROTEIN: CPT | Performed by: FAMILY MEDICINE

## 2020-10-30 PROCEDURE — 99214 OFFICE O/P EST MOD 30 MIN: CPT | Performed by: FAMILY MEDICINE

## 2020-10-30 ASSESSMENT — MIFFLIN-ST. JEOR: SCORE: 1762.77

## 2020-10-30 NOTE — PROGRESS NOTES
"Subjective     Genaro Ortiz is a 75 year old male who presents to clinic today for the following health issues:    HPI           Joint pain \"pretty much all the time\"  Hard to get up out of a chair  Hard to put on socks and undderware  Was going to the gym through march but unable to do that anymore  Has been more sedentary  Hurts in knees and thighs, somewhat worse on the R  Laying on back at night ok, but twisting hurts  Shoulders are fine fortunately    Current Outpatient Medications   Medication     ASPIRIN 81 MG OR TABS     cholecalciferol (VITAMIN D) 1000 UNIT tablet     fish oil-omega-3 fatty acids (FISH OIL) 1000 MG capsule     hydrochlorothiazide (HYDRODIURIL) 25 MG tablet     lisinopril (ZESTRIL) 40 MG tablet     simvastatin (ZOCOR) 20 MG tablet     Current Facility-Administered Medications   Medication     bevacizumab (AVASTIN) intravitreal inj 1.25 mg     I have reviewed the patient's medical history; there are no changes to the history as noted in EpicCare.  Social History     Socioeconomic History     Marital status: Single     Spouse name: None     Number of children: None     Years of education: None     Highest education level: None   Occupational History     None   Social Needs     Financial resource strain: None     Food insecurity     Worry: None     Inability: None     Transportation needs     Medical: None     Non-medical: None   Tobacco Use     Smoking status: Never Smoker     Smokeless tobacco: Never Used   Substance and Sexual Activity     Alcohol use: Yes     Alcohol/week: 0.0 standard drinks     Comment: occasional     Drug use: No     Sexual activity: Never   Lifestyle     Physical activity     Days per week: None     Minutes per session: None     Stress: None   Relationships     Social connections     Talks on phone: None     Gets together: None     Attends [employer] service: None     Active member of club or organization: None     Attends meetings of clubs or organizations: None     " Relationship status: None     Intimate partner violence     Fear of current or ex partner: None     Emotionally abused: None     Physically abused: None     Forced sexual activity: None   Other Topics Concern     Parent/sibling w/ CABG, MI or angioplasty before 65F 55M? Not Asked   Social History Narrative    Social Documentation:        Balanced Diet: YES    Calcium intake: 1-2 per day    Caffeine: 2 per day    Exercise:  type of activity gym and work is physical;  2 times per week    Sunscreen: Yes, when needed    Seatbelts:  Yes    Self Breast Exam:  na    Self Testicular Exam: Yes    Physical/Emotional/Sexual Abuse: No    Do you feel safe in your environment? Yes        Cholesterol screen up to date:  6/5/2009                                                                                                             Latest Ref Rng                              CHOLESTEROL                                                 0 - 200 mg/dL                177                     TRIGLYCERIDES                                               0 - 150 mg/dL                177 (H)                 HDL                                                         40 - 110 mg/dL               39 (L)                  LDL CHOLESTEROL CALCULATED                                  0 - 129 mg/dL                102                     VLDL-CHOLESTEROL                                            0 - 30 mg/dL                 35 (H)                  CHOLESTEROL/HDL RATIO                                       0.0 - 5.0                    4.5                         Eye Exam up to date: Yes    Dental Exam up to date: Yes    Pap smear up to date: Does Not Apply    Mammogram up to date: Does Not Apply    Dexa Scan up to date: Does Not Apply    Colonoscopy up to date: Yes    Immunizations up to date: Yes    Glucose screen if over 40:  Component                        Latest Ref Rng               10/31/2008              6/5/2009                GLUCOSE     "                      60 - 99 mg/dL                106 (H)                 111 (H)                                                        ROS: As per HPI.  Skin: no changing lesions, no other concerns  Heme: no abnormal bruising or bleeding problems  Neuro: no significant weakness, numbness, tingling.    EXAM  /87 (BP Location: Left arm, Cuff Size: Adult Large)   Pulse 94   Temp 98.8  F (37.1  C) (Tympanic)   Resp 16   Ht 1.778 m (5' 10\")   Wt 101.2 kg (223 lb)   SpO2 99%   BMI 32.00 kg/m    Gen: Healthy appearing male in no apparent distress  CV: Peripheral pulses are palpable.  GI: No abdominal tenderness, mass or organomegaly.  Neurological exam reveals weakness of the rle > l, proximal > distal        ASSESSMENT/PLAN:    ICD-10-CM    1. Proximal limb muscle weakness  M62.89 CBC with platelets and differential     ESR: Erythrocyte sedimentation rate     CRP, inflammation     Rheumatoid factor     Proximal weakness vs stiffness/ d dx inc polymyalgia rheumatica, on another inflammatory condition  Lumbar radiculopathy, or spinal stenosis (though no back pain)    Start with these labs, will need additional workup/imaging      Francisco Sneed MD, MPH...................         "

## 2020-10-30 NOTE — NURSING NOTE
"Chief Complaint   Patient presents with     Arthritis     initial /87 (BP Location: Left arm, Cuff Size: Adult Large)   Pulse 94   Temp 98.8  F (37.1  C) (Tympanic)   Resp 16   Ht 1.778 m (5' 10\")   Wt 101.2 kg (223 lb)   SpO2 99%   BMI 32.00 kg/m   Estimated body mass index is 32 kg/m  as calculated from the following:    Height as of this encounter: 1.778 m (5' 10\").    Weight as of this encounter: 101.2 kg (223 lb).  BP completed using cuff size: large.  L  arm      Health Maintenance that is potentially due pending provider review:  NONE    n/a    Gabe Bose ma  "

## 2020-11-02 LAB — RHEUMATOID FACT SER NEPH-ACNC: 14 IU/ML (ref 0–20)

## 2020-11-12 ENCOUNTER — HOSPITAL ENCOUNTER (OUTPATIENT)
Dept: MRI IMAGING | Facility: CLINIC | Age: 75
Discharge: HOME | End: 2020-11-12
Attending: FAMILY MEDICINE | Admitting: FAMILY MEDICINE
Payer: COMMERCIAL

## 2020-11-12 DIAGNOSIS — M62.81 PROXIMAL LIMB MUSCLE WEAKNESS: ICD-10-CM

## 2020-11-12 PROCEDURE — 72148 MRI LUMBAR SPINE W/O DYE: CPT

## 2020-11-18 ENCOUNTER — TRANSFERRED RECORDS (OUTPATIENT)
Dept: HEALTH INFORMATION MANAGEMENT | Facility: CLINIC | Age: 75
End: 2020-11-18

## 2020-11-19 ENCOUNTER — OFFICE VISIT (OUTPATIENT)
Dept: FAMILY MEDICINE | Facility: CLINIC | Age: 75
End: 2020-11-19
Payer: COMMERCIAL

## 2020-11-19 VITALS
SYSTOLIC BLOOD PRESSURE: 125 MMHG | DIASTOLIC BLOOD PRESSURE: 78 MMHG | HEIGHT: 70 IN | HEART RATE: 80 BPM | OXYGEN SATURATION: 97 % | TEMPERATURE: 98.3 F | BODY MASS INDEX: 32.33 KG/M2 | WEIGHT: 225.8 LBS

## 2020-11-19 DIAGNOSIS — M62.81 PROXIMAL LIMB MUSCLE WEAKNESS: Primary | ICD-10-CM

## 2020-11-19 PROCEDURE — 82550 ASSAY OF CK (CPK): CPT | Performed by: FAMILY MEDICINE

## 2020-11-19 PROCEDURE — 99213 OFFICE O/P EST LOW 20 MIN: CPT | Performed by: FAMILY MEDICINE

## 2020-11-19 PROCEDURE — 36415 COLL VENOUS BLD VENIPUNCTURE: CPT | Performed by: FAMILY MEDICINE

## 2020-11-19 ASSESSMENT — MIFFLIN-ST. JEOR: SCORE: 1775.47

## 2020-11-19 NOTE — PROGRESS NOTES
"Subjective     Genaro Ortiz is a 75 year old male who presents to clinic today for the following health issues:    HPI         Hyperlipidemia Follow-Up      Are you regularly taking any medication or supplement to lower your cholesterol?   Yes- simvasttin    Are you having muscle aches or other side effects that you think could be caused by your cholesterol lowering medication?  Yes- 6 months of leg pain      How many servings of fruits and vegetables do you eat daily?  2-3    On average, how many sweetened beverages do you drink each day (Examples: soda, juice, sweet tea, etc.  Do NOT count diet or artificially sweetened beverages)?   0    How many days per week do you exercise enough to make your heart beat faster? 3 or less    How many minutes a day do you exercise enough to make your heart beat faster? 9 or less    How many days per week do you miss taking your medication? 0    Joint pain \"pretty much all the time\"  Hard to get up out of a chair  Hard to put on socks and undderware  Was going to the gym through march but unable to do that anymore  Has been more sedentary  Hurts in knees and thighs, somewhat worse on the R  Laying on back at night ok, but twisting hurts  Shoulders are fine fortunately    MRI showed some DJD but no spinal stenosis  Saw ortho and they suggested checking CK, possible statin myopathy?      Review of Systems   Constitutional, HEENT, cardiovascular, pulmonary, GI, , musculoskeletal, neuro, skin, endocrine and psych systems are negative, except as otherwise noted.      Objective    There were no vitals taken for this visit.  There is no height or weight on file to calculate BMI.  Physical Exam   GENERAL: healthy, alert and no distress  MS/neuro: profound proximal hip weakness, difficulty with getting out of a chair and with ambulation          Assessment & Plan     Proximal limb muscle weakness    Checking CK but I doubt this is the problem    rec seeing neurology, I expected to " find spinal stenosis but not present  Strange weakness    - CK total  - NEUROLOGY ADULT REFERRAL           Counselled on medication choice, use, side effects of medications, nonprescription adjunct treatment and appropriate follow up. Couns time: 15 minutes of 15 minutes total face to face time.      No follow-ups on file.    Francisco Sneed MD  [facility]

## 2020-11-20 LAB — CK SERPL-CCNC: 124 U/L (ref 30–300)

## 2020-11-22 ENCOUNTER — HEALTH MAINTENANCE LETTER (OUTPATIENT)
Age: 75
End: 2020-11-22

## 2020-11-22 ENCOUNTER — MYC MEDICAL ADVICE (OUTPATIENT)
Dept: FAMILY MEDICINE | Facility: CLINIC | Age: 75
End: 2020-11-22

## 2020-11-24 ENCOUNTER — TRANSFERRED RECORDS (OUTPATIENT)
Dept: HEALTH INFORMATION MANAGEMENT | Facility: CLINIC | Age: 75
End: 2020-11-24

## 2020-11-27 ENCOUNTER — PRE VISIT (OUTPATIENT)
Dept: NEUROLOGY | Facility: CLINIC | Age: 75
End: 2020-11-27

## 2020-11-27 NOTE — TELEPHONE ENCOUNTER
FUTURE VISIT INFORMATION      FUTURE VISIT INFORMATION:    Date: 11/30/2020    Time: 9am    Location: [facility] [place]  REFERRAL INFORMATION:    Referring provider:  Dr. Sneed     Referring providers clinic:  [facility]     Reason for visit/diagnosis  Proximal Muscle Weakness     RECORDS REQUESTED FROM:       Clinic name Comments Records Status Imaging Status   Internal Dr. Fernandez-11/19/2020, 10/30/2020, 6/18/2020    MR Lumbar Spine -11/12/2020 Epic PACS

## 2020-11-30 ENCOUNTER — OFFICE VISIT (OUTPATIENT)
Dept: NEUROLOGY | Facility: CLINIC | Age: 75
End: 2020-11-30
Attending: FAMILY MEDICINE
Payer: COMMERCIAL

## 2020-11-30 VITALS
OXYGEN SATURATION: 97 % | DIASTOLIC BLOOD PRESSURE: 90 MMHG | RESPIRATION RATE: 16 BRPM | HEART RATE: 87 BPM | SYSTOLIC BLOOD PRESSURE: 131 MMHG

## 2020-11-30 DIAGNOSIS — G62.9 NEUROPATHY: Primary | ICD-10-CM

## 2020-11-30 DIAGNOSIS — G62.9 NEUROPATHY: ICD-10-CM

## 2020-11-30 DIAGNOSIS — M62.81 GENERALIZED MUSCLE WEAKNESS: ICD-10-CM

## 2020-11-30 LAB
DEPRECATED CALCIDIOL+CALCIFEROL SERPL-MC: 33 UG/L (ref 20–75)
HBA1C MFR BLD: 5.8 % (ref 0–5.6)

## 2020-11-30 PROCEDURE — 83036 HEMOGLOBIN GLYCOSYLATED A1C: CPT | Performed by: PATHOLOGY

## 2020-11-30 PROCEDURE — 36415 COLL VENOUS BLD VENIPUNCTURE: CPT | Performed by: PATHOLOGY

## 2020-11-30 PROCEDURE — 84443 ASSAY THYROID STIM HORMONE: CPT | Performed by: PATHOLOGY

## 2020-11-30 PROCEDURE — 86255 FLUORESCENT ANTIBODY SCREEN: CPT | Mod: 90 | Performed by: PATHOLOGY

## 2020-11-30 PROCEDURE — 84446 ASSAY OF VITAMIN E: CPT | Mod: 90 | Performed by: PATHOLOGY

## 2020-11-30 PROCEDURE — 84165 PROTEIN E-PHORESIS SERUM: CPT | Mod: 90 | Performed by: PATHOLOGY

## 2020-11-30 PROCEDURE — 82607 VITAMIN B-12: CPT | Performed by: PATHOLOGY

## 2020-11-30 PROCEDURE — 82784 ASSAY IGA/IGD/IGG/IGM EACH: CPT | Mod: 90 | Performed by: PATHOLOGY

## 2020-11-30 PROCEDURE — 82306 VITAMIN D 25 HYDROXY: CPT | Mod: 90 | Performed by: PATHOLOGY

## 2020-11-30 PROCEDURE — 86038 ANTINUCLEAR ANTIBODIES: CPT | Mod: 90 | Performed by: PATHOLOGY

## 2020-11-30 PROCEDURE — 82525 ASSAY OF COPPER: CPT | Mod: 90 | Performed by: PATHOLOGY

## 2020-11-30 PROCEDURE — 84630 ASSAY OF ZINC: CPT | Mod: 90 | Performed by: PATHOLOGY

## 2020-11-30 PROCEDURE — 86334 IMMUNOFIX E-PHORESIS SERUM: CPT | Mod: 90 | Performed by: PATHOLOGY

## 2020-11-30 PROCEDURE — 99205 OFFICE O/P NEW HI 60 MIN: CPT | Performed by: PSYCHIATRY & NEUROLOGY

## 2020-11-30 ASSESSMENT — PAIN SCALES - GENERAL: PAINLEVEL: NO PAIN (0)

## 2020-11-30 NOTE — PATIENT INSTRUCTIONS
Serum testing for neuropathy (numbness in toes, decreased sensation in toes, possibly related to weakness in thighs and hips).  - To be done today.      EMG of the legs: to be done in the future.  Will look for where there may be an issues in the nerves or muscles.

## 2020-11-30 NOTE — LETTER
11/30/2020       RE: Genrao Ortiz  [address]  [facility] [address]     Dear Colleague,    Thank you for referring your patient, Genaro Ortiz, to the [facility] [place] at [facility]. Please see a copy of my visit note below.    [facility] Neurology Consultation    Genaro Ortiz MRN# [id number]   Age: 75 year old YOB: 1945     Requesting physician: Francisco Swanson     Reason for Consultation: proximal weakness      History of Presenting Symptoms:   Genaro Ortiz is a 75 year old male who presents today for evaluation of proximal muscle weakness.  The patient reported some difficulty with certain tasks (getting out of chair, putting socks on) to his PCP on 10/30/2020 visit with other symptoms of ongoing joint pain, fatigue with sedentary life-style, and pain in his back as well as knees.  MRI was done of the lumbar spine, and showed degenerative disc and facet disease most advanced at L3-L4 where there is mild to moderate central canal and mild bilateral neural foraminal stenosis, as well as moderate right-sided foraminal stenosis at L2-L3.  CK was normal.  He was referred to neurology for proximal weakness in a subacute setting.    A few years ago, the patient reported having some arthritic type pain in his legs while working at [employer].  This pain would come on 3 hours after his shift started.  This pain would be in his knees.  This went away after he stopped working as a .    This spring, 03/2020, the patient stopped going to the gym and had decreased physical exercise.  Around 05/2020, the patient started to notice an ache in his legs and some difficulty getting up and out of a chair.  The ache was in his proximal thighs (anterior) which would be waxing/waning in severity daily.  Tylenol was helpful to relieve some of this pain/ache.  The pain didn't radiate from his back into  his legs, or have associated weakness that was noticeable at first.  He has noticed that he can trigger some of the ache with knee extension, and when he has to sit in a car for a prolonged period of time.      The patient has noticed that he has to walk slowly, and with his legs widened.  He has had falls, most notably while trying to increase speed during ambulation with his friend.  While in the shower, the patient does fear falling and needs to hold onto something for balance.      The patient also notices some stiffness with his legs, leading to limitations in elevating his foot/thigh, or adducting his legs.  This stiffness doesn't worsen or improve with any position,anddoesn't go away with rubbing of legs or massage.  Activity doesn't necessarily help or hinder his stiffness.  He notices the stiffness more after long periods of sitting.  There is no atypical movement of the legs or tremor in the arms or legs.  In the AM when waking, he notices some numbness in his toes/soles.  This goes away with motion (walking).    The patient has no major issues with his upper body (no  weakness, no upper arm weakness, no shoulder or neck pain, no pain in the hands).  There are no speech, swallowing, or breathing difficulties.  No diplopia is noted.   There are no reports of atypical rashes.  He has no reported tick bites, or major infections recently.         Past Medical History:     Patient Active Problem List   Diagnosis     Adenocarcinoma of prostate (H)     HYPERLIPIDEMIA LDL GOAL <130     Hypertension goal BP (blood pressure) < 140/90     Advance Care Planning     Anemia due to blood loss, acute     Health Care Home     Obesity due to excess calories, unspecified obesity severity     Past Medical History:   Diagnosis Date     Adenocarcinoma of prostate (H) 5/27/2008     Arthritis      Esophageal reflux      Pure hypercholesterolemia      Unspecified essential hypertension       Past Surgical History:     Past  Surgical History:   Procedure Laterality Date     ABDOMEN SURGERY  1952    Appendectomy     APPENDECTOMY  1952     CATARACT IOL, RT/LT Bilateral 03/12     COLONOSCOPY  2017     GENITOURINARY SURGERY       HERNIORRHAPHY INGUINAL  5/15/2014    Procedure: HERNIORRHAPHY INGUINAL;  Surgeon: Evens Jefferson MD;  Location: [facility] OR     PROSTATE SURGERY  2007      Social History:   Worked as manager at [employer], 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  No major drinking history reported. No smoking history reported.  Tobacco Use     Smoking status: Never Smoker     Smokeless tobacco: Never Used   Substance Use Topics     Alcohol use: Not Currently     Alcohol/week: 0.0 standard drinks     Comment: occasional     Drug use: No      Family History:     Family History   Problem Relation Age of Onset     Glaucoma Other      Diabetes Mother      Gastrointestinal Disease Mother         pancreas removed     Breast Cancer Mother      Osteoporosis Father      Obesity Father      Macular Degeneration No family hx of    Mother- dementia (unspecified, likely vascular)  Maternal sister - dementia, early onset  Maternal - multiple cousins with early onset Alzehimer's     Medications:     Current Outpatient Medications   Medication Sig     ASPIRIN 81 MG OR TABS 1 TABLET DAILY     cholecalciferol (VITAMIN D) 1000 UNIT tablet Take 1 tablet by mouth daily.     fish oil-omega-3 fatty acids (FISH OIL) 1000 MG capsule Take 2,000 mg by mouth daily     hydrochlorothiazide (HYDRODIURIL) 25 MG tablet Take 1 tablet (25 mg) by mouth daily     lisinopril (ZESTRIL) 40 MG tablet Take 1 tablet (40 mg) by mouth daily     simvastatin (ZOCOR) 20 MG tablet Take 1 tablet (20 mg) by mouth At Bedtime      Allergies:     Allergies   Allergen Reactions     Penicillins Other (See Comments)     blotches      Review of Systems:   A comprehensive 10 point review of systems (constitutional, ENT, cardiac, peripheral vascular, lymphatic, respiratory, GI, ,  Musculoskeletal, skin, Neurological) was performed and found to be negative except as described in this note.      Physical Exam:   Vitals: BP (!) 131/90   Pulse 87   Resp 16   SpO2 97%    General: Seated comfortably in no acute distress.  HEENT: Neck supple with normal range of motion. No paracervical muscle tenderness or tightness.    Heart: Regular rate  Lungs: breathing comfortably  GI: Non tender  Extremities: no edema  Skin: No rashes  Neurologic:     Mental Status: Fully alert, attentive and oriented. Speech clear and fluent, no paraphasic errors. MINICOG 5/5.     Cranial Nerves: Visual fields intact. PERRL. EOMI with normal smooth pursuit. Facial sensation intact/symmetric. Facial movements symmetric. Hearing not formally tested but intact to conversation. Palate elevation symmetric, uvula midline. No dysarthria. Shoulder shrug strong bilaterally. Tongue protrusion midline.     Motor: No tremors or other abnormal movements observed. Muscle tone increased through the thighs, hips, and adductors. No pronator drift. Normal/symmetric rapid finger tapping. Strength 5/5 throughout upper extremities. HF b/l 3/5 with limited motion by himself (I raised the patients leg for hip flexion and he was able to hold/continue the body position), HE 4-/5 b/l, Thigh adduction 3/5 b/l, thigh abduction 4+/5 b/l, no major deficits with KE/KF/DF/PF/F-inversion/eversion/Toe flexion.     Deep Tendon Reflexes: 2+/symmetric throughout upper extremities, but 1+ b/l patellar, absent achilles b/l. No clonus. Toes downgoing bilaterally.     Sensory: Intact/symmetric to light touch, pinprick, temperature in arms and legs b/l.  Vibration was absent at toes and ankles, but 5-7 seconds at mid-leg and knee, 12+ seconds at hips b/l. Proprioceptive defects with great toe movement, but not ankle. Positive Romberg.     Coordination: Finger-nose-finger without dysmetria. Rapid alternating movements intact/symmetric with normal speed and  rhythm.     Gait: Wide base. Rocking motion needed for stride b/l as leg elevation is minimal, no specific toe scraping during stride. Shortened strides. Unable to put legs together without help for narrowed stance and heel toe walking. Heel toe walking incomplete (steps to varying directions, stumbles when trying to stand narrowly).         Data: Pertinent prior to visit   Imaging:  MRI lumbar spine: 11/12/2020  IMPRESSION: Multilevel degenerative disc and facet disease most advanced at L3-L4 where there is mild to moderate central canal and mild bilateral neural foraminal stenosis. There is also moderate right-sided foraminal stenosis at L2-L3.    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  Given the lack of elevated CK, I do not suspect primary myopathy for the patient's weakness in hip flexors, but consideration towards IBO could be made pending EMG.  Other neuropathic etiologies that could link both neuropathies mentioned above are more common for the patient's age, such as DMII, MGUS or a gammopathy, rheumatological/inflammatory, and certain vitamin deficiencies.  It would be difficult to indicate that multifocal compressive neuropathies or radiculopathies would lead to both conditions (exam findings), but a b/l neural-foraminal stenosis may lead to his proximal related stiffness with hip flexion/adduction weakness.  Overall, EMG will be helpful to look for both suspected b/l symmetric distal polyneuropathy (sensory predominant) as well as a proximal neuropathy versus lumbosacral  radiculopathy versus atypical myositis (unlikely given CK).  I will order this test, as well as serum testing for common causes of neuropathy in this patient's age range.  Given the patient's exam today, I do feel he will likely need PT for gait and imbalance, and pending results I will likely order this in the near future/follow up.      Plan:  EMG lower extremities b/l  SPEP, Immunofixation,B12, Copper, Zinc, Vitamin D, Vitamin E, TSH w/T4, A1c, FARHAN, ANCA    Follow up in Neurology clinic in 4-8 weeks or earlier as needed should new concerns arise.    CATY Quesada D.O.   of Neurology    Greater than 50% of the total time (70 min) in this patient encounter was spent on counseling and/or coordination of care. We reviewed diagnostic results, impressions, and discussed other possible tests if symptoms do not improve. We discussed the implications of the diagnosis, as well as risks and benefits of management options. We reviewed treatment instructions and our scheduled follow-up as specified in the discharge plan. We also discussed the importance of compliance with the chosen course of treatment. The patient is in agreement with this plan and has no further questions.

## 2020-11-30 NOTE — PROGRESS NOTES
[facility] Neurology Consultation    Genaro Ortiz MRN# [id number]   Age: 75 year old YOB: 1945     Requesting physician: Francisco Swanson     Reason for Consultation: proximal weakness      History of Presenting Symptoms:   Genaro Ortiz is a 75 year old male who presents today for evaluation of proximal muscle weakness.  The patient reported some difficulty with certain tasks (getting out of chair, putting socks on) to his PCP on 10/30/2020 visit with other symptoms of ongoing joint pain, fatigue with sedentary life-style, and pain in his back as well as knees.  MRI was done of the lumbar spine, and showed degenerative disc and facet disease most advanced at L3-L4 where there is mild to moderate central canal and mild bilateral neural foraminal stenosis, as well as moderate right-sided foraminal stenosis at L2-L3.  CK was normal.  He was referred to neurology for proximal weakness in a subacute setting.    A few years ago, the patient reported having some arthritic type pain in his legs while working at [employer].  This pain would come on 3 hours after his shift started.  This pain would be in his knees.  This went away after he stopped working as a .    This spring, 03/2020, the patient stopped going to the gym and had decreased physical exercise.  Around 05/2020, the patient started to notice an ache in his legs and some difficulty getting up and out of a chair.  The ache was in his proximal thighs (anterior) which would be waxing/waning in severity daily.  Tylenol was helpful to relieve some of this pain/ache.  The pain didn't radiate from his back into his legs, or have associated weakness that was noticeable at first.  He has noticed that he can trigger some of the ache with knee extension, and when he has to sit in a car for a prolonged period of time.      The patient has noticed that he has to walk slowly, and with his legs widened.  He has had falls, most  notably while trying to increase speed during ambulation with his friend.  While in the shower, the patient does fear falling and needs to hold onto something for balance.      The patient also notices some stiffness with his legs, leading to limitations in elevating his foot/thigh, or adducting his legs.  This stiffness doesn't worsen or improve with any position,anddoesn't go away with rubbing of legs or massage.  Activity doesn't necessarily help or hinder his stiffness.  He notices the stiffness more after long periods of sitting.  There is no atypical movement of the legs or tremor in the arms or legs.  In the AM when waking, he notices some numbness in his toes/soles.  This goes away with motion (walking).    The patient has no major issues with his upper body (no  weakness, no upper arm weakness, no shoulder or neck pain, no pain in the hands).  There are no speech, swallowing, or breathing difficulties.  No diplopia is noted.   There are no reports of atypical rashes.  He has no reported tick bites, or major infections recently.         Past Medical History:     Patient Active Problem List   Diagnosis     Adenocarcinoma of prostate (H)     HYPERLIPIDEMIA LDL GOAL <130     Hypertension goal BP (blood pressure) < 140/90     Advance Care Planning     Anemia due to blood loss, acute     Health Care Home     Obesity due to excess calories, unspecified obesity severity     Past Medical History:   Diagnosis Date     Adenocarcinoma of prostate (H) 5/27/2008     Arthritis      Esophageal reflux      Pure hypercholesterolemia      Unspecified essential hypertension       Past Surgical History:     Past Surgical History:   Procedure Laterality Date     ABDOMEN SURGERY  1952    Appendectomy     APPENDECTOMY  1952     CATARACT IOL, RT/LT Bilateral 03/12     COLONOSCOPY  2017     GENITOURINARY SURGERY       HERNIORRHAPHY INGUINAL  5/15/2014    Procedure: HERNIORRHAPHY INGUINAL;  Surgeon: Evens Jefferson MD;   Location: [facility] OR     PROSTATE SURGERY  2007      Social History:   Worked as manager at [employer], 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  No major drinking history reported. No smoking history reported.  Tobacco Use     Smoking status: Never Smoker     Smokeless tobacco: Never Used   Substance Use Topics     Alcohol use: Not Currently     Alcohol/week: 0.0 standard drinks     Comment: occasional     Drug use: No      Family History:     Family History   Problem Relation Age of Onset     Glaucoma Other      Diabetes Mother      Gastrointestinal Disease Mother         pancreas removed     Breast Cancer Mother      Osteoporosis Father      Obesity Father      Macular Degeneration No family hx of    Mother- dementia (unspecified, likely vascular)  Maternal sister - dementia, early onset  Maternal - multiple cousins with early onset Alzehimer's     Medications:     Current Outpatient Medications   Medication Sig     ASPIRIN 81 MG OR TABS 1 TABLET DAILY     cholecalciferol (VITAMIN D) 1000 UNIT tablet Take 1 tablet by mouth daily.     fish oil-omega-3 fatty acids (FISH OIL) 1000 MG capsule Take 2,000 mg by mouth daily     hydrochlorothiazide (HYDRODIURIL) 25 MG tablet Take 1 tablet (25 mg) by mouth daily     lisinopril (ZESTRIL) 40 MG tablet Take 1 tablet (40 mg) by mouth daily     simvastatin (ZOCOR) 20 MG tablet Take 1 tablet (20 mg) by mouth At Bedtime      Allergies:     Allergies   Allergen Reactions     Penicillins Other (See Comments)     blotches      Review of Systems:   A comprehensive 10 point review of systems (constitutional, ENT, cardiac, peripheral vascular, lymphatic, respiratory, GI, , Musculoskeletal, skin, Neurological) was performed and found to be negative except as described in this note.      Physical Exam:   Vitals: BP (!) 131/90   Pulse 87   Resp 16   SpO2 97%    General: Seated comfortably in no acute distress.  HEENT: Neck supple with normal range of motion. No paracervical muscle tenderness  or tightness.    Heart: Regular rate  Lungs: breathing comfortably  GI: Non tender  Extremities: no edema  Skin: No rashes  Neurologic:     Mental Status: Fully alert, attentive and oriented. Speech clear and fluent, no paraphasic errors. MINICOG 5/5.     Cranial Nerves: Visual fields intact. PERRL. EOMI with normal smooth pursuit. Facial sensation intact/symmetric. Facial movements symmetric. Hearing not formally tested but intact to conversation. Palate elevation symmetric, uvula midline. No dysarthria. Shoulder shrug strong bilaterally. Tongue protrusion midline.     Motor: No tremors or other abnormal movements observed. Muscle tone increased through the thighs, hips, and adductors. No pronator drift. Normal/symmetric rapid finger tapping. Strength 5/5 throughout upper extremities. HF b/l 3/5 with limited motion by himself (I raised the patients leg for hip flexion and he was able to hold/continue the body position), HE 4-/5 b/l, Thigh adduction 3/5 b/l, thigh abduction 4+/5 b/l, no major deficits with KE/KF/DF/PF/F-inversion/eversion/Toe flexion.     Deep Tendon Reflexes: 2+/symmetric throughout upper extremities, but 1+ b/l patellar, absent achilles b/l. No clonus. Toes downgoing bilaterally.     Sensory: Intact/symmetric to light touch, pinprick, temperature in arms and legs b/l.  Vibration was absent at toes and ankles, but 5-7 seconds at mid-leg and knee, 12+ seconds at hips b/l. Proprioceptive defects with great toe movement, but not ankle. Positive Romberg.     Coordination: Finger-nose-finger without dysmetria. Rapid alternating movements intact/symmetric with normal speed and rhythm.     Gait: Wide base. Rocking motion needed for stride b/l as leg elevation is minimal, no specific toe scraping during stride. Shortened strides. Unable to put legs together without help for narrowed stance and heel toe walking. Heel toe walking incomplete (steps to varying directions, stumbles when trying to stand  narrowly).         Data: Pertinent prior to visit   Imaging:  MRI lumbar spine: 11/12/2020  IMPRESSION: Multilevel degenerative disc and facet disease most advanced at L3-L4 where there is mild to moderate central canal and mild bilateral neural foraminal stenosis. There is also moderate right-sided foraminal stenosis at L2-L3.    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  Given the lack of elevated CK, I do not suspect primary myopathy for the patient's weakness in hip flexors, but consideration towards IBO could be made pending EMG.  Other neuropathic etiologies that could link both neuropathies mentioned above are more common for the patient's age, such as DMII, MGUS or a gammopathy, rheumatological/inflammatory, and certain vitamin deficiencies.  It would be difficult to indicate that multifocal compressive neuropathies or radiculopathies would lead to both conditions (exam findings), but a b/l neural-foraminal stenosis may lead to his proximal related stiffness with hip flexion/adduction weakness.  Overall, EMG will be helpful to look for both suspected b/l symmetric distal polyneuropathy (sensory predominant) as well as a proximal neuropathy versus lumbosacral radiculopathy versus atypical myositis (unlikely given CK).  I will order this test, as well as serum testing for common causes of neuropathy in this patient's age range.  Given the patient's exam today, I do feel he will likely need PT for gait and imbalance, and pending results I will likely order this in the near future/follow  up.      Plan:  EMG lower extremities b/l  SPEP, Immunofixation,B12, Copper, Zinc, Vitamin D, Vitamin E, TSH w/T4, A1c, FARHAN, ANCA    Follow up in Neurology clinic in 4-8 weeks or earlier as needed should new concerns arise.    CATY Quesada D.O.   of Neurology      Greater than 50% of the total time (70 min) in this patient encounter was spent on counseling and/or coordination of care. We reviewed diagnostic results, impressions, and discussed other possible tests if symptoms do not improve. We discussed the implications of the diagnosis, as well as risks and benefits of management options. We reviewed treatment instructions and our scheduled follow-up as specified in the discharge plan. We also discussed the importance of compliance with the chosen course of treatment. The patient is in agreement with this plan and has no further questions.

## 2020-12-01 LAB
ALBUMIN SERPL ELPH-MCNC: 4.7 G/DL (ref 3.7–5.1)
ALPHA1 GLOB SERPL ELPH-MCNC: 0.3 G/DL (ref 0.2–0.4)
ALPHA2 GLOB SERPL ELPH-MCNC: 0.9 G/DL (ref 0.5–0.9)
ANA SER QL IF: NEGATIVE
ANCA AB PATTERN SER IF-IMP: NORMAL
B-GLOBULIN SERPL ELPH-MCNC: 0.8 G/DL (ref 0.6–1)
C-ANCA TITR SER IF: NORMAL {TITER}
GAMMA GLOB SERPL ELPH-MCNC: 1.6 G/DL (ref 0.7–1.6)
IGA SERPL-MCNC: 38 MG/DL (ref 84–499)
IGG SERPL-MCNC: 1854 MG/DL (ref 610–1616)
IGM SERPL-MCNC: 83 MG/DL (ref 35–242)
M PROTEIN SERPL ELPH-MCNC: 1 G/DL
PROT PATTERN SERPL ELPH-IMP: ABNORMAL
PROT PATTERN SERPL IFE-IMP: ABNORMAL
TSH SERPL DL<=0.005 MIU/L-ACNC: 1.33 MU/L (ref 0.4–4)
VIT B12 SERPL-MCNC: 150 PG/ML (ref 193–986)

## 2020-12-02 LAB
A-TOCOPHEROL VIT E SERPL-MCNC: 10.4 MG/L (ref 5.5–18)
BETA+GAMMA TOCOPHEROL SERPL-MCNC: 2 MG/L (ref 0–6)
COPPER SERPL-MCNC: 110.5 UG/DL (ref 70–140)
ZINC SERPL-MCNC: 56.5 UG/DL (ref 60–120)

## 2020-12-10 ENCOUNTER — OFFICE VISIT (OUTPATIENT)
Dept: NEUROLOGY | Facility: CLINIC | Age: 75
End: 2020-12-10
Attending: PSYCHIATRY & NEUROLOGY
Payer: COMMERCIAL

## 2020-12-10 DIAGNOSIS — R94.131 MYOPATHIC CHANGES PRESENT ON ELECTROMYOGRAM (EMG): Primary | ICD-10-CM

## 2020-12-10 DIAGNOSIS — M62.81 GENERALIZED MUSCLE WEAKNESS: ICD-10-CM

## 2020-12-10 DIAGNOSIS — G62.9 NEUROPATHY: ICD-10-CM

## 2020-12-10 PROCEDURE — 95886 MUSC TEST DONE W/N TEST COMP: CPT | Performed by: PSYCHIATRY & NEUROLOGY

## 2020-12-10 PROCEDURE — 95887 MUSC TST DONE W/N TST NONEXT: CPT | Performed by: PSYCHIATRY & NEUROLOGY

## 2020-12-10 PROCEDURE — 95910 NRV CNDJ TEST 7-8 STUDIES: CPT | Mod: GC | Performed by: PSYCHIATRY & NEUROLOGY

## 2020-12-10 NOTE — PROGRESS NOTES
[facility]  Electrodiagnostic Laboratory    Nerve Conduction & EMG Report    Patient:       Genaro Ortiz  Patient ID:    [id number]  Gender:        Male  YOB: 1945  Age:           75 Years 5 Months      Referring provider: Dr. Quesada    History & Examination:    Mr. Ortiz is a 75-year-old man who is presenting with proximal weakness in his lower limbs since 05/2020 described as difficulty getting up from a seated position.  No symptoms appreciated in the upper limbs.  EMG/NCS was requested to evaluate for myopathy, lumbosacral radiculopathy, or other neuromuscular cause of proximal leg weakness.     Techniques: Sensory and motor conduction studies were done with surface recording electrodes. EMG was done with a concentric needle electrode.     Results:    Right radial antidromic sensory nerve conduction study was normal. Right sural antidromic sensory nerve conduction study was normal.  Right superficial peroneal and left sural antidromic sensory nerve conduction studies were NR.  Bilateral peroneal motor nerve conduction studies recorded from EDB showed low normal CMAP amplitudes, normal distal latencies, and normal conduction velocities on the right. Bilateral tibial motor nerve conduction studies recorded from AH were normal.  Right tibial F wave study was normal.  EMG was performed in proximal and distal muscles of the right lower limb along with thoracic paraspinal muscle.  There was no evidence of abnormal insertional or spontaneous activity.  MUAPs were of normal amplitude, duration, morphology, and recruitment patterns, except for the right gluteus medius which showed mildly decreased amplitude, duration, and increased polyphasia with normal recruitment patterns.    Interpretation:    This is an abnormal study. 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 The cause of the asymmetry noted in the sural sensory responses is  not clear. Mild non-irritative myopathic changes were noted in only one muscle, namely the right gluteus medius. This finding is insufficient to diagnose a generalized myopathy and requires clinical correlation.     EMG Physician:     Brandon Hughes MD  Neuromuscular Fellow    ATTENDING ADDENDUM: I was present during the entire study and directly supervised Fellow Dr Hughes, who performed it. I agree with the analysis of results and interpretation as above, which I personally reviewed and edited. Abraham Banuelos MD      Sensory NCS      Nerve / Sites Rec. Site Onset Peak NP Amp Ref. PP Amp Dist Nahum Ref. Temp     ms ms  V  V  V cm m/s m/s  C   R RADIAL - Snuff      Forearm Snuff 1.77 2.34 17.2 15.0 10.3 10 56.5 48.0 32.4   R SURAL      Calf Ankle 3.13 4.22 5.2 5.0 6.4 14 44.8 38.0 31.1   L SURAL - Lat Mall 60      Calf Ankle NR NR NR 5.0 NR 14 NR 38.0 31   R SUP PERONEAL      Lat Leg Cope NR NR NR  NR 12.5 NR 38.0 31       Motor NCS      Nerve / Sites Rec. Site Lat Ref. Amp Ref. Rel Amp Dist Nahum Ref. Dur. Area Temp.     ms ms mV mV % cm m/s m/s ms %  C   R DEEP PERONEAL - EDB 60      Ankle EDB 4.43 6.00 2.0 2.0 100 8   7.03 100 31      FibHead EDB 12.19  1.8  90.5 30.5 39.3 38.0 8.70 92.4 31      Pop Fos EDB 14.38  1.1  56 9 41.1 38.0 7.03 49.7 31   L DEEP PERONEAL - EDB 60      Ankle EDB 5.31 6.00 2.3 2.0 100 8   5.47 100 31   R TIBIAL - AH     Ankle AH 3.02 6.00 7.1 4.0 100 8   7.03 100 31      Pop Fos AH 13.91  1.2  17 42 38.6 38.0 9.27 21.1 31   L TIBIAL - AH      Ankle AH 4.64 6.00 4.8 4.0 100 8   5.16 100 31       F  Wave      Nerve Min F Lat Max F Lat Mean FLat Temp.    ms ms ms  C   R TIBIAL 49.84 55.94 52.28 31       EMG Summary Table     Spontaneous Recruitment MUAP    IA Fib PSW Fasc H.F. Pattern Amp Dur. PPP   R. TIB ANTERIOR N None None None None N N N N   R. GASTROCN (MED) N None None None None N N N N   R. VAST LATERALIS N None None None None N N N N   R. GLUTEUS MED  None None None None N 1-  1- 2+   R. ILIOPSOAS N None None None None N N N N   R. THOR PSP (M) N None None None None N N N N

## 2020-12-10 NOTE — LETTER
12/10/2020       RE: Genaro Ortiz  [address]  [facility] [address]     Dear Colleague,    Thank you for referring your patient, Genaro Ortiz, to the [facility] EMG CLINIC [place] at [facility]. Please see a copy of my visit note below.        [facility]  Electrodiagnostic Laboratory    Nerve Conduction & EMG Report    Patient:       Genaro Ortiz  Patient ID:    [id number]  Gender:        Male  YOB: 1945  Age:           75 Years 5 Months      Referring provider: Dr. Quesada    History & Examination:    Mr. Ortiz is a 75-year-old man who is presenting with proximal weakness in his lower limbs since 05/2020 described as difficulty getting up from a seated position.  No symptoms appreciated in the upper limbs.  EMG/NCS was requested to evaluate for myopathy, lumbosacral radiculopathy, or other neuromuscular cause of proximal leg weakness.     Techniques: Sensory and motor conduction studies were done with surface recording electrodes. EMG was done with a concentric needle electrode.     Results:    Right radial antidromic sensory nerve conduction study was normal. Right sural antidromic sensory nerve conduction study was normal.  Right superficial peroneal and left sural antidromic sensory nerve conduction studies were NR.  Bilateral peroneal motor nerve conduction studies recorded from EDB showed low normal CMAP amplitudes, normal distal latencies, and normal conduction velocities on the right. Bilateral tibial motor nerve conduction studies recorded from AH were normal.  Right tibial F wave study was normal.  EMG was performed in proximal and distal muscles of the right lower limb along with thoracic paraspinal muscle.  There was no evidence of abnormal insertional or spontaneous activity.  MUAPs were of normal amplitude, duration, morphology, and recruitment patterns, except for the right gluteus medius which  showed mildly decreased amplitude, duration, and increased polyphasia with normal recruitment patterns.    Interpretation:    This is an abnormal study. 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 The cause of the asymmetry noted in the sural sensory responses is not clear. Mild non-irritative myopathic changes were noted in only one muscle, namely the right gluteus medius. This finding is insufficient to diagnose a generalized myopathy and requires clinical correlation.     EMG Physician:     Brandon Hughes MD  Neuromuscular Fellow    ATTENDING ADDENDUM: I was present during the entire study and directly supervised Fellow Dr Hughes, who performed it. I agree with the analysis of results and interpretation as above, which I personally reviewed and edited. Abraham Banuelos MD      Sensory NCS      Nerve / Sites Rec. Site Onset Peak NP Amp Ref. PP Amp Dist Nahum Ref. Temp     ms ms  V  V  V cm m/s m/s  C   R RADIAL - Snuff      Forearm Snuff 1.77 2.34 17.2 15.0 10.3 10 56.5 48.0 32.4   R SURAL      Calf Ankle 3.13 4.22 5.2 5.0 6.4 14 44.8 38.0 31.1   L SURAL - Lat Mall 60      Calf Ankle NR NR NR 5.0 NR 14 NR 38.0 31   R SUP PERONEAL      Lat Leg Cope NR NR NR  NR 12.5 NR 38.0 31       Motor NCS      Nerve / Sites Rec. Site Lat Ref. Amp Ref. Rel Amp Dist Nahum Ref. Dur. Area Temp.     ms ms mV mV % cm m/s m/s ms %  C   R DEEP PERONEAL - EDB 60      Ankle EDB 4.43 6.00 2.0 2.0 100 8   7.03 100 31      FibHead EDB 12.19  1.8  90.5 30.5 39.3 38.0 8.70 92.4 31      Pop Fos EDB 14.38  1.1  56 9 41.1 38.0 7.03 49.7 31   L DEEP PERONEAL - EDB 60      Ankle EDB 5.31 6.00 2.3 2.0 100 8   5.47 100 31   R TIBIAL - AH     Ankle AH 3.02 6.00 7.1 4.0 100 8   7.03 100 31      Pop Fos AH 13.91  1.2  17 42 38.6 38.0 9.27 21.1 31   L TIBIAL - AH      Ankle AH 4.64 6.00 4.8 4.0 100 8   5.16 100 31       F  Wave      Nerve Min F Lat Max F Lat Mean FLat Temp.    ms ms ms  C   R TIBIAL  49.84 55.94 52.28 31       EMG Summary Table     Spontaneous Recruitment MUAP    IA Fib PSW Fasc H.F. Pattern Amp Dur. PPP   R. TIB ANTERIOR N None None None None N N N N   R. GASTROCN (MED) N None None None None N N N N   R. VAST LATERALIS N None None None None N N N N   R. GLUTEUS MED  None None None None N 1- 1- 2+   R. ILIOPSOAS N None None None None N N N N   R. THOR PSP (M) N None None None None N N N N                          Again, thank you for allowing me to participate in the care of your patient.      Sincerely,    Abraham Banuelos MD

## 2020-12-11 DIAGNOSIS — G62.9 NEUROPATHY: Primary | ICD-10-CM

## 2020-12-15 ENCOUNTER — TELEPHONE (OUTPATIENT)
Dept: NEUROLOGY | Facility: CLINIC | Age: 75
End: 2020-12-15

## 2020-12-15 DIAGNOSIS — E53.8 VITAMIN B12 DEFICIENCY (NON ANEMIC): ICD-10-CM

## 2020-12-15 DIAGNOSIS — G62.9 NEUROPATHY: Primary | ICD-10-CM

## 2020-12-15 DIAGNOSIS — D47.2 MONOCLONAL GAMMOPATHY: ICD-10-CM

## 2020-12-15 RX ORDER — CYANOCOBALAMIN 1000 UG/ML
1 INJECTION, SOLUTION INTRAMUSCULAR; SUBCUTANEOUS
Qty: 1 ML | Refills: 0 | Status: SHIPPED | OUTPATIENT
Start: 2020-12-15 | End: 2022-04-14

## 2020-12-15 NOTE — TELEPHONE ENCOUNTER
I spoke with Genaro about his low B12 and monoclonal gammopathy possibility.  He is understanding that both of these results could cause neuropathy and both need further intervention.  I will write for B12 injection, with plans for his PCP to follow up with repeat testing in 6 months.  I will plan to refer to oncology while having the patient come in for further serum and urine analysis to differentiate his SPEP and immunofixation findings.    - Hematology/oncolgy referral  - B12 injection, follow up with PCP  - light chains, urine fixation    CATY Quesada D.O.  [facility] Neurology

## 2020-12-16 ENCOUNTER — PRE VISIT (OUTPATIENT)
Dept: ONCOLOGY | Facility: CLINIC | Age: 75
End: 2020-12-16

## 2020-12-16 NOTE — TELEPHONE ENCOUNTER
ONCOLOGY INTAKE: Records Information      APPT INFORMATION:  Referring provider:  Do Hahn  Referring provider s clinic:  [facility]  Reason for visit/diagnosis:  Monoclonal gammopathy  Has patient been notified of appointment date and time?: yes    RECORDS INFORMATION:  Were the records received with the referral (via [employer])? no    Has patient been seen for any external appt for this diagnosis? no    If yes, where? n/a    Has patient had any imaging or procedures outside of [facility] for this condition? no      If Yes, where? n/a    ADDITIONAL INFORMATION:  none

## 2020-12-17 NOTE — TELEPHONE ENCOUNTER
RECORDS STATUS - ALL OTHER DIAGNOSIS      RECORDS RECEIVED FROM: [facility] - Internal Referral   DATE RECEIVED: 12/17/20   NOTES STATUS DETAILS   OFFICE NOTE from referring provider Valdez Beal DO in [facility] [place] [place] NEUROLOGY: 11/30/20   OFFICE NOTE from medical oncologist     DISCHARGE SUMMARY from hospital     DISCHARGE REPORT from the ER     OPERATIVE REPORT     MEDICATION LIST [facility] 12/15/20   CLINICAL TRIAL TREATMENTS TO DATE     LABS     PATHOLOGY REPORTS     ANYTHING RELATED TO DIAGNOSIS Epic 12/15/20   GENONOMIC TESTING     TYPE:     IMAGING (NEED IMAGES & REPORT)     CT SCANS     MRI PACS 11/12/20: [facility]   MAMMO     ULTRASOUND     PET

## 2020-12-23 ENCOUNTER — HOSPITAL ENCOUNTER (OUTPATIENT)
Dept: PHYSICAL THERAPY | Facility: CLINIC | Age: 75
Setting detail: THERAPIES SERIES
End: 2020-12-23
Attending: PSYCHIATRY & NEUROLOGY
Payer: COMMERCIAL

## 2020-12-23 DIAGNOSIS — G62.9 NEUROPATHY: ICD-10-CM

## 2020-12-23 PROCEDURE — 97110 THERAPEUTIC EXERCISES: CPT | Mod: GP | Performed by: PHYSICAL THERAPIST

## 2020-12-23 PROCEDURE — 97162 PT EVAL MOD COMPLEX 30 MIN: CPT | Mod: GP | Performed by: PHYSICAL THERAPIST

## 2020-12-23 ASSESSMENT — 6 MINUTE WALK TEST (6MWT): TOTAL DISTANCE WALKED (FT): 740

## 2020-12-23 NOTE — PROGRESS NOTES
12/23/20 0900   Quick Adds   Type of Visit Initial OP PT Evaluation   General Information   Start of Care Date 12/23/20   Referring Physician Valdez Quesada, DO    Orders Evaluate and Treat as Indicated   Order Date 12/11/20   Medical Diagnosis Neuropathy   Onset of illness/injury or Date of Surgery 12/11/20   Surgical/Medical history reviewed Yes   Pertinent history of current problem (include personal factors and/or comorbidities that impact the POC) Genaro presents with LE weakness, instability and falls. Was exercising until March of 2020 but now has been rather sedentary. Reports more difficulty with walking at faster speeds, completing transfers, and gait in the community. Genaro reports generalized stiffness and aching throughout LEs. MRI was done of the lumbar spine, and showed degenerative disc and facet disease most advanced at L3-L4 where there is mild to moderate central canal and mild bilateral neural foraminal stenosis, as well as moderate right-sided foraminal stenosis at L2-L3. Reports that he has a little N/T in feet but this improves throughout the day. Also reports a lot of anterior leg pain and slight numbness in the morning. Has had 1 fall while walking outside. Reports that he had an EMG that was negative.    Prior level of function comment Used to go to the gym to lift weights and use ellyptical    Patient role/Employment history Retired   Living environment Apartment/condo   Home/Community Accessibility Comments elevator access   Patient/Family Goals Statement Improve balance, walking and ability to stand from a chair   Fall Risk Screen   Fall screen completed by PT   Have you fallen 2 or more times in the past year? No   Have you fallen and had an injury in the past year? No   Timed Up and Go score (seconds) 35   Is patient a fall risk? Yes;Department fall risk interventions implemented   Pain   Pain comments Has some hip and LE pain with MMT   Cognitive Status Examination  "  Orientation orientation to person, place and time   Integumentary   Integumentary No deficits were identified   Posture   Posture Forward head position   Range of Motion (ROM)   ROM Comment Tightness noted in knee flexion/ extension, hip flexion/extension, ankle dorsiflexion, and through trunk   Strength   Strength Comments Hip flexion 4-/5, knee extension 4+/5, dorsiflexion 4+/5, L hip abduction 4/5, R hip abduction 4/5   Bed Mobility   Bed Mobility Comments slow, pt noting increased pain, LE stiffness   Transfer Skills   Transfer Comments Requires significant use of UEs to push to stand, very slow, needs to stand and straighten knees completely prior to walking   Gait   Gait Comments Ambulates with very wide GENNY, minor heel strike, arms in low guard position, looks to ground, limited hip/knee flexion/extension   Gait Special Tests   Gait Special Tests DYNAMIC GAIT INDEX;SIX MINUTE WALK TEST   Gait Special Tests Dynamic Gait Index   Score out of 24 11   Gait Special Tests Six Minute Walk Test   Feet 740 Feet   Balance Special Tests   Balance Special Tests Modified CTSIB Conditions   Balance Special Tests Modified CTSIB Conditions   Modified CTSIB Comments Unable to get feet closer than 3\" apart. At 3\" apart able to hold for 30 seconds   Sensory Examination   Sensory Perception Comments mild N/T in feet in morning, and then occasionally through anterior thigh   Coordination   Coordination Comments able to complete rapid toe taps, L foot may have slight delay   Muscle Tone   Muscle Tone Comments Increased muscle stiffness with flexion/extension of knees, hips   Planned Therapy Interventions   Planned Therapy Interventions balance training;gait training;neuromuscular re-education;ROM;strengthening;stretching;transfer training;manual therapy;bed mobility training   Clinical Impression   Criteria for Skilled Therapeutic Interventions Met yes, treatment indicated   PT Diagnosis Impaired mobility   Influenced by the " following impairments weakness, pain, decreased ROM and flexibility, neuropathy   Functional limitations due to impairments difficulty with bed mobility, transfers, gait in home and community, increased fall risk   Clinical Presentation Evolving/Changing   Clinical Presentation Rationale worsening balance, strength and flexibility   Clinical Decision Making (Complexity) Moderate complexity   Therapy Frequency 1 time/week   Predicted Duration of Therapy Intervention (days/wks) 60 days   Risk & Benefits of therapy have been explained Yes   Patient, Family & other staff in agreement with plan of care Yes   GOALS   PT Eval Goals 1;2;3;4   Goal 1   Goal Identifier DGI   Goal Description The pt will improve score on DGI to >15/24 due to improvmeents in dynamic balance   Target Date 02/21/21   Goal 2   Goal Identifier Romberg   Goal Description The pt will achieve NBOS standing and be able to maintain stability for 30 seconds with his eyes closed, reducing fall risk   Target Date 02/21/21   Goal 3   Goal Identifier 6MWT   Goal Description The pt will increase gait distance on 6MWT to >900' due to improvements in walking tolerance and gait pattern, improving ability to ambulate in the community   Target Date 02/21/21   Goal 4   Goal Identifier TUG   Goal Description The pt will improve TUG time to <25 seconds due to improvements in balance and ability to stand from a chair   Target Date 02/21/21   Total Evaluation Time   PT Eval, Moderate Complexity Minutes (27756) 35

## 2020-12-24 ENCOUNTER — TELEPHONE (OUTPATIENT)
Dept: FAMILY MEDICINE | Facility: CLINIC | Age: 75
End: 2020-12-24

## 2020-12-24 DIAGNOSIS — E53.8 VITAMIN B12 DEFICIENCY (NON ANEMIC): Primary | ICD-10-CM

## 2020-12-24 RX ORDER — CYANOCOBALAMIN 1000 UG/ML
1000 INJECTION, SOLUTION INTRAMUSCULAR; SUBCUTANEOUS
Status: DISCONTINUED | OUTPATIENT
Start: 2020-12-28 | End: 2023-09-20

## 2020-12-24 NOTE — TELEPHONE ENCOUNTER
JOSE  Pt is coming in on Monday 12/28 for B12 injection and teaching.  Neuro ordered this and referred him back to PCP for education  Looks like there is no order in the MAR for the B12.  Would you be able to put that order in or needs to be neuro?    Thank you,  Chreyl Mccormick, RN

## 2020-12-28 ENCOUNTER — ALLIED HEALTH/NURSE VISIT (OUTPATIENT)
Dept: NURSING | Facility: CLINIC | Age: 75
End: 2020-12-28
Payer: COMMERCIAL

## 2020-12-28 DIAGNOSIS — D62 ANEMIA DUE TO BLOOD LOSS, ACUTE: Primary | ICD-10-CM

## 2020-12-28 PROCEDURE — 99207 PR NO CHARGE NURSE ONLY: CPT

## 2020-12-28 PROCEDURE — 96372 THER/PROPH/DIAG INJ SC/IM: CPT

## 2020-12-28 RX ADMIN — CYANOCOBALAMIN 1000 MCG: 1000 INJECTION, SOLUTION INTRAMUSCULAR; SUBCUTANEOUS at 10:14

## 2020-12-28 NOTE — TELEPHONE ENCOUNTER
Patient came in for B12 teaching  Brought supply from pharmacy   Told pt we cannot use his supply (cannot confirm not tampered with, etc)   Told him we'd use clinic supply instead  Pt states he's confused also on why he has to give himself the shot  Doesn't have anyone to give it to him   Prefers to come in monthly for B12 injection so nurse can just give  Had MA give injection   Told pt we'd go with that plan  If wants to be taught in the future to let us know  Ramandeep ROSS RN

## 2020-12-31 ENCOUNTER — HOSPITAL ENCOUNTER (OUTPATIENT)
Dept: PHYSICAL THERAPY | Facility: CLINIC | Age: 75
Setting detail: THERAPIES SERIES
End: 2020-12-31
Attending: PSYCHIATRY & NEUROLOGY
Payer: COMMERCIAL

## 2020-12-31 PROCEDURE — 97110 THERAPEUTIC EXERCISES: CPT | Mod: GP | Performed by: PHYSICAL THERAPIST

## 2021-01-07 ENCOUNTER — HOSPITAL ENCOUNTER (OUTPATIENT)
Dept: PHYSICAL THERAPY | Facility: CLINIC | Age: 76
Setting detail: THERAPIES SERIES
End: 2021-01-07
Attending: PSYCHIATRY & NEUROLOGY
Payer: COMMERCIAL

## 2021-01-07 PROCEDURE — 97110 THERAPEUTIC EXERCISES: CPT | Mod: GP | Performed by: PHYSICAL THERAPIST

## 2021-01-14 ENCOUNTER — HOSPITAL ENCOUNTER (OUTPATIENT)
Dept: PHYSICAL THERAPY | Facility: CLINIC | Age: 76
Setting detail: THERAPIES SERIES
End: 2021-01-14
Attending: PSYCHIATRY & NEUROLOGY
Payer: COMMERCIAL

## 2021-01-14 PROCEDURE — 97110 THERAPEUTIC EXERCISES: CPT | Mod: GP | Performed by: PHYSICAL THERAPIST

## 2021-01-14 PROCEDURE — 97116 GAIT TRAINING THERAPY: CPT | Mod: GP | Performed by: PHYSICAL THERAPIST

## 2021-01-21 ENCOUNTER — HOSPITAL ENCOUNTER (OUTPATIENT)
Dept: PHYSICAL THERAPY | Facility: CLINIC | Age: 76
Setting detail: THERAPIES SERIES
End: 2021-01-21
Attending: PSYCHIATRY & NEUROLOGY
Payer: COMMERCIAL

## 2021-01-21 PROCEDURE — 97110 THERAPEUTIC EXERCISES: CPT | Mod: GP | Performed by: PHYSICAL THERAPIST

## 2021-01-25 ENCOUNTER — VIRTUAL VISIT (OUTPATIENT)
Dept: NEUROLOGY | Facility: CLINIC | Age: 76
End: 2021-01-25
Payer: COMMERCIAL

## 2021-01-25 DIAGNOSIS — E53.8 VITAMIN B12 DEFICIENCY (NON ANEMIC): ICD-10-CM

## 2021-01-25 DIAGNOSIS — G62.9 NEUROPATHY: Primary | ICD-10-CM

## 2021-01-25 DIAGNOSIS — D47.2 MONOCLONAL GAMMOPATHY: ICD-10-CM

## 2021-01-25 PROCEDURE — 99442 PR PHYSICIAN TELEPHONE EVALUATION 11-20 MIN: CPT | Mod: 95 | Performed by: PSYCHIATRY & NEUROLOGY

## 2021-01-25 NOTE — LETTER
1/25/2021       RE: Genaro Ortiz  [address]  [place] [address]     Dear Colleague,    Thank you for referring your patient, Genaro Ortiz, to the [facility] NEUROLOGY CLINIC [place] at [facility]. Please see a copy of my visit note below.    [facility] Neurology Follow Up Visit    Genaro Ortiz MRN# [id number]   Age: 75 year old YOB: 1945     Brief history of symptoms: The patient was initially seen in neurologic consultation on 11/30/2020 for evaluation of proximal leg weakness. Please see the comprehensive neurologic consultation note from that date in the Epic records for details. 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  The patient had no elevated CK, exam was fluctuating (b/l hip weakness at varying intervals) as well as vibratory loss in tos b/l.  Gait was short strides with wide base, and tandem walking led to imbalance and falls.  The patient was to have an EMG, and obtain multiple serum studies for peripheral neuropathy and w/up regarding weakness.    Interval history: Labs for SPEP, Immunofixation,B12, Copper, Zinc, Vitamin D, Vitamin E, TSH w/T4, A1c, FARHAN, ANCA were revealing for monoclonal peak in the gamma fraction, and it was discussed with the patient to obtain follow up testing with kappa and lambda light chains as well as immunofixation with urine (12/15/2020).  He also had lowered B12 levels, and was to take injections for replacement with planned follow up with his PCP.  I did refer the patient to hematology/oncology with plans he would obtain follow up testing prior.  EMG 12/10/2020 did show length dependent sensory predominant polyneuropathy, but findings were insufficient to diagnose a generalized myopathy.    Today, the patient reports feeling better over the holidays.  He is feeling like physical therapy has been extremely  beneficial, and he has less pain and increased strength in his legs.  He still feels that standing up from a chair is difficult, but improving.  Compared to last summer, he is greatly improved.  His B12 shot was also started recently, and he feels that this has also improved some of his neuropathy. There is no new symptoms to report all together.        Family History:     Family History   Problem Relation Age of Onset     Glaucoma Other      Diabetes Mother      Gastrointestinal Disease Mother         pancreas removed     Breast Cancer Mother      Osteoporosis Father      Obesity Father      Macular Degeneration No family hx of       Medications:     Current Outpatient Medications   Medication Sig     ASPIRIN 81 MG OR TABS 1 TABLET DAILY     cholecalciferol (VITAMIN D) 1000 UNIT tablet Take 1 tablet by mouth daily.     cyanocobalamin (CYANOCOBALAMIN) 1000 MCG/ML injection Inject 1 mL (1,000 mcg) into the muscle every 30 days     fish oil-omega-3 fatty acids (FISH OIL) 1000 MG capsule Take 2,000 mg by mouth daily     hydrochlorothiazide (HYDRODIURIL) 25 MG tablet Take 1 tablet (25 mg) by mouth daily     lisinopril (ZESTRIL) 40 MG tablet Take 1 tablet (40 mg) by mouth daily     simvastatin (ZOCOR) 20 MG tablet Take 1 tablet (20 mg) by mouth At Bedtime      Review of Systems:   A comprehensive 10 point review of systems (constitutional, ENT, cardiac, peripheral vascular, lymphatic, respiratory, GI, , Musculoskeletal, skin, Neurological) was performed and found to be negative except as described in this note.      Physical Exam:   Telephone         Assessment and Plan:   Assessment:  Length dependent sensorimotor neuropathy  Deconditioning  Hypovitaminosis B12  Gammopathy, undifferentiated     The patient has made improvements in strength as well as pain management with PT, as well as B12 injections.  It is unlikely he had some proximal myopathy present given his EMG, but because of his SPEP and Immunofixation the  patient would benefit from hematology/oncology visit (scheduled for next week) given his EMG findings.  I asked that Genaro try to get his recommended labs from 12/15/2020 done prior to his visit with hematology/oncology and he indicated he would do them today or tomorrow.  Otherwise, I recommended that the patient continue with PT and B12 injections.     Plan:  Kappa and lambda light chain  Protein immunofixation urine    Follow up in Neurology clinic in 6-8 weeks or earlier as needed should new concerns arise.    CATY Quesada D.O.   of Neurology      Total time (15 min) in this patient encounter was spent on  Pre-charting, counseling and/or coordination of care.       Genaro is a 75 year old who is being evaluated via a billable telephone visit.      What phone number would you like to be contacted at? [phone]  How would you like to obtain your AVS? [facility]  Phone call duration: 15 minutes    Yancy Charles, EMT        Again, thank you for allowing me to participate in the care of your patient.      Sincerely,    Valdez Quesada, DO

## 2021-01-25 NOTE — PROGRESS NOTES
[facility] Neurology Follow Up Visit    Genaro Ortiz MRN# [id number]   Age: 75 year old YOB: 1945     Brief history of symptoms: The patient was initially seen in neurologic consultation on 11/30/2020 for evaluation of proximal leg weakness. Please see the comprehensive neurologic consultation note from that date in the Epic records for details. 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  The patient had no elevated CK, exam was fluctuating (b/l hip weakness at varying intervals) as well as vibratory loss in tos b/l.  Gait was short strides with wide base, and tandem walking led to imbalance and falls.  The patient was to have an EMG, and obtain multiple serum studies for peripheral neuropathy and w/up regarding weakness.    Interval history: Labs for SPEP, Immunofixation,B12, Copper, Zinc, Vitamin D, Vitamin E, TSH w/T4, A1c, FARHAN, ANCA were revealing for monoclonal peak in the gamma fraction, and it was discussed with the patient to obtain follow up testing with kappa and lambda light chains as well as immunofixation with urine (12/15/2020).  He also had lowered B12 levels, and was to take injections for replacement with planned follow up with his PCP.  I did refer the patient to hematology/oncology with plans he would obtain follow up testing prior.  EMG 12/10/2020 did show length dependent sensory predominant polyneuropathy, but findings were insufficient to diagnose a generalized myopathy.    Today, the patient reports feeling better over the holidays.  He is feeling like physical therapy has been extremely beneficial, and he has less pain and increased strength in his legs.  He still feels that standing up from a chair is difficult, but improving.  Compared to last summer, he is greatly improved.  His B12 shot was also started recently, and he feels that this has also improved some of his neuropathy. There is no new symptoms  to report all together.        Family History:     Family History   Problem Relation Age of Onset     Glaucoma Other      Diabetes Mother      Gastrointestinal Disease Mother         pancreas removed     Breast Cancer Mother      Osteoporosis Father      Obesity Father      Macular Degeneration No family hx of       Medications:     Current Outpatient Medications   Medication Sig     ASPIRIN 81 MG OR TABS 1 TABLET DAILY     cholecalciferol (VITAMIN D) 1000 UNIT tablet Take 1 tablet by mouth daily.     cyanocobalamin (CYANOCOBALAMIN) 1000 MCG/ML injection Inject 1 mL (1,000 mcg) into the muscle every 30 days     fish oil-omega-3 fatty acids (FISH OIL) 1000 MG capsule Take 2,000 mg by mouth daily     hydrochlorothiazide (HYDRODIURIL) 25 MG tablet Take 1 tablet (25 mg) by mouth daily     lisinopril (ZESTRIL) 40 MG tablet Take 1 tablet (40 mg) by mouth daily     simvastatin (ZOCOR) 20 MG tablet Take 1 tablet (20 mg) by mouth At Bedtime      Review of Systems:   A comprehensive 10 point review of systems (constitutional, ENT, cardiac, peripheral vascular, lymphatic, respiratory, GI, , Musculoskeletal, skin, Neurological) was performed and found to be negative except as described in this note.      Physical Exam:   Telephone         Assessment and Plan:   Assessment:  Length dependent sensorimotor neuropathy  Deconditioning  Hypovitaminosis B12  Gammopathy, undifferentiated     The patient has made improvements in strength as well as pain management with PT, as well as B12 injections.  It is unlikely he had some proximal myopathy present given his EMG, but because of his SPEP and Immunofixation the patient would benefit from hematology/oncology visit (scheduled for next week) given his EMG findings.  I asked that Genaro try to get his recommended labs from 12/15/2020 done prior to his visit with hematology/oncology and he indicated he would do them today or tomorrow.  Otherwise, I recommended that the patient continue  with PT and B12 injections.     Plan:  Kappa and lambda light chain  Protein immunofixation urine    Follow up in Neurology clinic in 6-8 weeks or earlier as needed should new concerns arise.    CATY Quesada D.O.   of Neurology      Total time (15 min) in this patient encounter was spent on  Pre-charting, counseling and/or coordination of care.

## 2021-01-25 NOTE — PROGRESS NOTES
Quinton is a 75 year old who is being evaluated via a billable telephone visit.      What phone number would you like to be contacted at? [phone]  How would you like to obtain your AVS? Mychart  Phone call duration: 15 minutes    Yancy Charles, EMT

## 2021-01-27 ENCOUNTER — ALLIED HEALTH/NURSE VISIT (OUTPATIENT)
Dept: NURSING | Facility: CLINIC | Age: 76
End: 2021-01-27
Payer: COMMERCIAL

## 2021-01-27 VITALS — DIASTOLIC BLOOD PRESSURE: 76 MMHG | TEMPERATURE: 97.6 F | SYSTOLIC BLOOD PRESSURE: 120 MMHG

## 2021-01-27 DIAGNOSIS — G62.9 NEUROPATHY: ICD-10-CM

## 2021-01-27 DIAGNOSIS — D47.2 MONOCLONAL GAMMOPATHY: ICD-10-CM

## 2021-01-27 DIAGNOSIS — D62 ANEMIA DUE TO BLOOD LOSS, ACUTE: Primary | ICD-10-CM

## 2021-01-27 PROCEDURE — 83883 ASSAY NEPHELOMETRY NOT SPEC: CPT | Performed by: PSYCHIATRY & NEUROLOGY

## 2021-01-27 PROCEDURE — 96372 THER/PROPH/DIAG INJ SC/IM: CPT

## 2021-01-27 PROCEDURE — 36415 COLL VENOUS BLD VENIPUNCTURE: CPT | Performed by: PSYCHIATRY & NEUROLOGY

## 2021-01-27 PROCEDURE — 99207 PR NO CHARGE NURSE ONLY: CPT

## 2021-01-27 RX ADMIN — CYANOCOBALAMIN 1000 MCG: 1000 INJECTION, SOLUTION INTRAMUSCULAR; SUBCUTANEOUS at 11:24

## 2021-01-27 NOTE — PROGRESS NOTES
Clinic Administered Medication Documentation      Injectable Medication Documentation    Patient was given Cyanocobalamin (B-12). Prior to medication administration, verified patients identity using patient s name and date of birth. Please see MAR and medication order for additional information. Patient instructed to remain in clinic for 15 minutes.      Was entire vial of medication used? Yes  Vial/Syringe: Single dose vial  Expiration Date:  08/1/2021  Was this medication supplied by the patient? No     Luna Urbina CNP (Lori)

## 2021-01-28 ENCOUNTER — HOSPITAL ENCOUNTER (OUTPATIENT)
Dept: PHYSICAL THERAPY | Facility: CLINIC | Age: 76
Setting detail: THERAPIES SERIES
End: 2021-01-28
Attending: PSYCHIATRY & NEUROLOGY
Payer: COMMERCIAL

## 2021-01-28 LAB
KAPPA LC UR-MCNC: 1.46 MG/DL (ref 0.33–1.94)
KAPPA LC/LAMBDA SER: 0.89 {RATIO} (ref 0.26–1.65)
LAMBDA LC SERPL-MCNC: 1.64 MG/DL (ref 0.57–2.63)

## 2021-01-28 PROCEDURE — 97110 THERAPEUTIC EXERCISES: CPT | Mod: GP | Performed by: PHYSICAL THERAPIST

## 2021-01-28 PROCEDURE — 86335 IMMUNFIX E-PHORSIS/URINE/CSF: CPT | Performed by: PATHOLOGY

## 2021-01-29 DIAGNOSIS — D47.2 MONOCLONAL GAMMOPATHY: ICD-10-CM

## 2021-01-29 DIAGNOSIS — G62.9 NEUROPATHY: ICD-10-CM

## 2021-02-01 LAB — PROT ELPH PNL UR ELPH: NORMAL

## 2021-02-02 ENCOUNTER — OFFICE VISIT (OUTPATIENT)
Dept: OPHTHALMOLOGY | Facility: CLINIC | Age: 76
End: 2021-02-02
Attending: OPHTHALMOLOGY
Payer: COMMERCIAL

## 2021-02-02 DIAGNOSIS — C61 ADENOCARCINOMA OF PROSTATE (H): ICD-10-CM

## 2021-02-02 DIAGNOSIS — H35.371 EPIRETINAL MEMBRANE (ERM) OF RIGHT EYE: Primary | ICD-10-CM

## 2021-02-02 DIAGNOSIS — Z96.1 PSEUDOPHAKIA OF BOTH EYES: ICD-10-CM

## 2021-02-02 DIAGNOSIS — H35.051 CHOROID NEOVASCULARIZATION, RIGHT: ICD-10-CM

## 2021-02-02 DIAGNOSIS — G60.9 IDIOPATHIC NEUROPATHY: ICD-10-CM

## 2021-02-02 PROCEDURE — 92134 CPTRZ OPH DX IMG PST SGM RTA: CPT | Performed by: OPHTHALMOLOGY

## 2021-02-02 PROCEDURE — G0463 HOSPITAL OUTPT CLINIC VISIT: HCPCS

## 2021-02-02 PROCEDURE — 99213 OFFICE O/P EST LOW 20 MIN: CPT | Performed by: OPHTHALMOLOGY

## 2021-02-02 ASSESSMENT — TONOMETRY
OD_IOP_MMHG: 16
OS_IOP_MMHG: 15
IOP_METHOD: TONOPEN

## 2021-02-02 ASSESSMENT — REFRACTION_WEARINGRX
OS_AXIS: 110
OS_CYLINDER: +1.00
OS_ADD: +2.50
OD_SPHERE: -0.50
OD_ADD: +2.50
OD_CYLINDER: +0.50
OD_AXIS: 085
SPECS_TYPE: PAL
OS_SPHERE: -1.50

## 2021-02-02 ASSESSMENT — VISUAL ACUITY
OS_CC: 20/20
OD_CC: 20/25
CORRECTION_TYPE: GLASSES
METHOD: SNELLEN - LINEAR

## 2021-02-02 ASSESSMENT — SLIT LAMP EXAM - LIDS
COMMENTS: NORMAL
COMMENTS: NORMAL

## 2021-02-02 ASSESSMENT — CONF VISUAL FIELD
OS_NORMAL: 1
METHOD: COUNTING FINGERS
OD_NORMAL: 1

## 2021-02-02 ASSESSMENT — CUP TO DISC RATIO
OD_RATIO: 0.1
OS_RATIO: 0.1

## 2021-02-02 NOTE — PROGRESS NOTES
Chief Complaint(s) and History of Present Illness(es)     Follow Up     Laterality: right eye    Course: stable    Associated symptoms: Negative for dryness, eye pain, redness and tearing    Treatments tried: no treatments    Pain scale: 0/10    Comments: Epiretinal membrane (ERM) of right eye              Comments     He states that his vision has seemed stable in both eyes, since his last   eye exam.  Patient denies having any eye discomfort.    Odalis Earl, COT 7:57 AM  February 2, 2021               Review of systems for the eyes was negative other than the pertinent positives/negatives listed in the HPI.      Assessment & Plan      Genaro Ortiz is a 75 year old male with the following diagnoses:   1. Epiretinal membrane (ERM) of right eye    2. Choroid neovascularization, right    3. Pseudophakia of both eyes    4. Adenocarcinoma of prostate (H)    5. Idiopathic neuropathy       Returns for recheck of Epiretinal membrane with associated peripapillary choroidal neovascular membrane right eye   Recently bothered by upper LE neuropathy and weakness  Reviewed neurology notes.  Planning to see oncology to assess if related to paraneoplasm  Ordered OCT today    Stable Epiretinal membrane with mild Intraretinal fluid right eye   Vision stable  Doing well, no change in vision or Amsler since last exam  Return precautions reviewed       Patient disposition:   Return in about 6 months (around 8/2/2021) for DFE, OCT Macula.           Attending Physician Attestation:  Complete documentation of historical and exam elements from today's encounter can be found in the full encounter summary report (not reduplicated in this progress note).  I personally obtained the chief complaint(s) and history of present illness.  I confirmed and edited as necessary the review of systems, past medical/surgical history, family history, social history, and examination findings as documented by others; and I examined the patient myself.  I  personally reviewed the relevant tests, images, and reports as documented above.  I formulated and edited as necessary the assessment and plan and discussed the findings and management plan with the patient and family. . - Eric Abel MD

## 2021-02-02 NOTE — NURSING NOTE
Chief Complaints and History of Present Illnesses   Patient presents with     Follow Up     Epiretinal membrane (ERM) of right eye     Chief Complaint(s) and History of Present Illness(es)     Follow Up     Laterality: right eye    Course: stable    Associated symptoms: Negative for dryness, eye pain, redness and tearing    Treatments tried: no treatments    Pain scale: 0/10    Comments: Epiretinal membrane (ERM) of right eye              Comments     He states that his vision has seemed stable in both eyes, since his last eye exam.  Patient denies having any eye discomfort.    TARAN Red 7:57 AM  February 2, 2021

## 2021-02-03 ENCOUNTER — VIRTUAL VISIT (OUTPATIENT)
Dept: ONCOLOGY | Facility: CLINIC | Age: 76
End: 2021-02-03
Attending: PSYCHIATRY & NEUROLOGY
Payer: COMMERCIAL

## 2021-02-03 DIAGNOSIS — G62.9 NEUROPATHY: ICD-10-CM

## 2021-02-03 DIAGNOSIS — D47.2 MONOCLONAL GAMMOPATHY: ICD-10-CM

## 2021-02-03 PROCEDURE — 999N001193 HC VIDEO/TELEPHONE VISIT; NO CHARGE

## 2021-02-03 PROCEDURE — 99204 OFFICE O/P NEW MOD 45 MIN: CPT | Mod: 95 | Performed by: INTERNAL MEDICINE

## 2021-02-03 NOTE — PROGRESS NOTES
"Quinton is a 75 year old who is being evaluated via a billable video visit.      How would you like to obtain your AVS? MyChart  If the video visit is dropped, the invitation should be resent by: Text to cell phone: [phone]  Will anyone else be joining your video visit? No      I have reviewed and updated the patient's allergies and medication list.    Concerns: No new concerns.   Refills: None needed.        Vitals - Patient Reported  Weight (Patient Reported): 97.5 kg (215 lb)  Height (Patient Reported): 175.3 cm (5' 9\")  BMI (Based on Pt Reported Ht/Wt): 31.75  Pain Score: No Pain (0)    Ilana Goddard CMA      Video Start Time: 10:12 am  Video-Visit Details    Type of service:  Video Visit    Video End Time:10:33 am    Originating Location (pt. Location): Home    Distant Location (provider location):  [facility]     Platform used for Video Visit: [facility]     Hematology Clinic consult note    Reasons for Consult: -MGUS IgG lambda    History of Present Illness: Mr. Ortiz is a 75-year-old man who was evaluated by neurology on 11/30/2020 for leg pain and weakness that has been going on for about 6 months.  He says he was feeling a bit wobbly with his walking.  As part of that evaluation, he had serum protein electrophoresis and immunofixation done and was found to have a 1.0 g/dL IgG lambda monoclonal protein, and a smaller IgG lambda monoclonal protein.  Kappa and lambda light chains were normal.  Urine immunofixation showed a small amount of IgG, with no lambda.  He was also found to be B12 deficient.  He has started on B12 injections, and has received 2 injections, and says his legs are beginning to feel much better. 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    Past Medical History:  Hypertension  Hypercholesterolemia  History of prostate cancer 2007, status post resection.  Neuropathy  GERD    Medications:  -Reviewed    Family History: " mother- of breast cancer, father- at a young age, probably from ruptured appendix.  No siblings-she says that an aunt and uncle have had pernicious anemia.    Social History: smoking-never, alcohol-no, retired    Review of systems:  As in HPI.  The rest of the > 10 point review of systems was negative.      Physical exam:    General appearance:  Patient is 75 year old man in no acute distress.     HEENT:  No pallor, icterus.   Lungs: Normal respirations, no cough or audible wheezes.   Skin:  No rash, no petechiae or ecchymoses.    The rest of a comprehensive physical examination is deferred due to public health emergency video visit restrictions.    Labs:  Results for JOLANTA EVERETT (MRN [id number]) as of 2/3/2021 10:03   Ref. Range 2020 10:30 12/10/2020 00:00 2021 10:53   Albumin Fraction Latest Ref Range: 3.7 - 5.1 g/dL 4.7     Alpha 1 Fraction Latest Ref Range: 0.2 - 0.4 g/dL 0.3     Alpha 2 Fraction Latest Ref Range: 0.5 - 0.9 g/dL 0.9     ANTI NUCLEAR SHAUN IGG BY IFA WITH REFLEX Unknown Rpt     Beta Fraction Latest Ref Range: 0.6 - 1.0 g/dL 0.8     ELP Interpretation: Unknown Monoclonal protei...     Gamma Fraction Latest Ref Range: 0.7 - 1.6 g/dL 1.6     IGA Latest Ref Range: 84 - 499 mg/dL 38 (L)     IGG Latest Ref Range: 610 - 1,616 mg/dL 1,854 (H)     IGM Latest Ref Range: 35 - 242 mg/dL 83     Immunofixation ELP Unknown (Note)     Kappa Free Lt Chain Latest Ref Range: 0.33 - 1.94 mg/dL   1.46   Kappa Lambda Ratio Latest Ref Range: 0.26 - 1.65    0.89   Lambda Free Lt Chain Latest Ref Range: 0.57 - 2.63 mg/dL   1.64   Monoclonal Peak Latest Ref Range: 0.0 g/dL 1.0 (H)     Immunofixation ELP  Protein Immunofixation Serum  Collected: 20 1030   Result status: Final   Resulting lab: [facility]   Value: (Note)   Comment: One large and one very small monoclonal IgG immunoglobulin of lambda   light chain type. Pathological significance requires clinical    correlation.   PARTH Ramirez M.D., Ph.D.([phone])      Immunofix ELP Urine  Protein immunofixation urine  Collected: 01/28/21 0500   Result status: Final   Resulting lab: [facility]   Value: (Note)   Comment: Small monoclonal IgG immunoglobulin with no visible associated light   chain. Pathological significance requires clinical correlation. PARTH Ramirez M.D., Ph.D.([phone])      Results for JOLANTA EVERETT (MRN [id number]) as of 2/3/2021 10:03   Ref. Range 11/19/2020 14:20 11/30/2020 10:30   CK Total Latest Ref Range: 30 - 300 U/L 124    Copper Latest Ref Range: 70.0 - 140.0 ug/dL  110.5   TSH Latest Ref Range: 0.40 - 4.00 mU/L  1.33   Vitamin B12 Latest Ref Range: 193 - 986 pg/mL  150 (L)   Vitamin D Deficiency screening Latest Ref Range: 20 - 75 ug/L  33   Vitamin E Latest Ref Range: 5.5 - 18.0 mg/L  10.4   Vitamin E Gamma Latest Ref Range: 0.0 - 6.0 mg/L  2.0   Zinc Latest Ref Range: 60.0 - 120.0 ug/dL  56.5 (L)     Imaging:  MR LUMBAR SPINE WITHOUT CONTRAST November 12, 2020 9:30 AM      HISTORY: Radiculopathy greater than six weeks, conservative therapy,  persistent symptoms. Back pain, risk factors (osteoporosis or chronic  steroid use or elderly). Proximal limb muscle weakness.     TECHNIQUE: Multiplanar multisequence images were obtained through the  cervical spine without contrast.     COMPARISON: None.     FINDINGS: Five lumbar type vertebral bodies are presumed. Posterior  alignment is normal. Vertebral body heights are maintained. Bone  marrow signal intensity is normal. The conus medullaris is normal in  appearance with its tip at the L2 level.     T12-L1: Normal.     L1-L2: Minimal disc bulge and facet hypertrophy. There is some  borderline mild left-sided foraminal stenosis but no central canal  stenosis.     L2-L3: Broad-based disc bulging and mild-to-moderate facet and  ligamentum flavum hypertrophy is present causing mild central canal  stenosis,  moderate right-sided foraminal stenosis and mild left-sided  foraminal stenosis.     L3-L4: Broad-based disc bulging and moderate facet and ligamentum  flavum hypertrophy is present causing mild to moderate central canal  stenosis and mild bilateral neural foraminal stenosis.     L4-L5: Broad-based disc bulging and moderate left greater than right  facet hypertrophy is present which is resulting in some mild central  canal stenosis and mild bilateral neural foraminal stenosis.     L5-S1: Minimal disc bulge and facet hypertrophy is present. There is  some mild left-sided foraminal stenosis but no central canal stenosis.                                                                      IMPRESSION: Multilevel degenerative disc and facet disease most  advanced at L3-L4 where there is mild to moderate central canal and  mild bilateral neural foraminal stenosis. There is also moderate  right-sided foraminal stenosis at L2-L3.     ASUNCION SANCHEZ MD    Assessment and Recommendation: -This is a 75-year-old man with neuropathy, B12 deficiency, and IgG lambda biclonal gammopathy.  I believe that the neuropathy is due to the B12 deficiency and the monoclonal gammopathy is an incidental finding.      1.  B12 deficiency-he is receiving B12 injections-the should continue at least monthly for the next 6 months.  After that if he is improved, consideration could be given to switching to B12 1000 mcg oral daily.    2.  Monoclonal gammopathy-I suspect this is an MGUS that will be of no clinical consequence.  The fact that he has a normal CBC, creatinine, calcium and that it is a biclonal, IgG, less than 1 g/dL, and normal kappa and lambda light chains indicates that this is a low risk MGUS.  He has had a lumbar spine MRI that did not show any lytic bone lesions or abnormal bone marrow signal. I reassured him that my suspicion for multiple myeloma is low.  However, it is still worth getting a skeletal survey looking for lytic bone  lesions.  I will be in touch with him regarding these results.  If there are no lytic bone lesions, then I recommend that he have repeat protein electrophoresis, kappa lambda light chains in 6 months and a follow-up with me.  If there are lytic bone lesions, then he would need a bone marrow biopsy and aspirate.    I will be in touch regarding these results  Labs in 6 months, with a video visit with me a week later.  I spent 50 minutes reviewing the chart, having the video visit, and arranging tests and follow-up.    Smiley Lyn MD  Hematology

## 2021-02-03 NOTE — LETTER
"    2/3/2021         RE: Genaro Ortiz  [address]  [facility] [address]        Dear Colleague,    Thank you for referring your patient, Genaro Ortiz, to the [facility]. Please see a copy of my visit note below.    Genaro is a 75 year old who is being evaluated via a billable video visit.      How would you like to obtain your AVS? MyChart  If the video visit is dropped, the invitation should be resent by: Text to cell phone: [phone]  Will anyone else be joining your video visit? No      I have reviewed and updated the patient's allergies and medication list.    Concerns: No new concerns.   Refills: None needed.        Vitals - Patient Reported  Weight (Patient Reported): 97.5 kg (215 lb)  Height (Patient Reported): 175.3 cm (5' 9\")  BMI (Based on Pt Reported Ht/Wt): 31.75  Pain Score: No Pain (0)    Ilana Goddard CMA      Video Start Time: 10:12 am  Video-Visit Details    Type of service:  Video Visit    Video End Time:10:33 am    Originating Location (pt. Location): Home    Distant Location (provider location):  [facility]     Platform used for Video Visit: [facility]     Hematology Clinic consult note    Reasons for Consult: -MGUS IgG lambda    History of Present Illness: Mr. Ortiz is a 75-year-old man who was evaluated by neurology on 11/30/2020 for leg pain and weakness that has been going on for about 6 months.  He says he was feeling a bit wobbly with his walking.  As part of that evaluation, he had serum protein electrophoresis and immunofixation done and was found to have a 1.0 g/dL IgG lambda monoclonal protein, and a smaller IgG lambda monoclonal protein.  Kappa and lambda light chains were normal.  Urine immunofixation showed a small amount of IgG, with no lambda.  He was also found to be B12 deficient.  He has started on B12 injections, and has received 2 injections, and says his legs are beginning to feel much better. 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    Past Medical History:  Hypertension  Hypercholesterolemia  History of prostate cancer , status post resection.  Neuropathy  GERD    Medications:  -Reviewed    Family History: mother- of breast cancer, father- at a young age, probably from ruptured appendix.  No siblings-she says that an aunt and uncle have had pernicious anemia.    Social History: smoking-never, alcohol-no, retired    Review of systems:  As in HPI.  The rest of the > 10 point review of systems was negative.      Physical exam:    General appearance:  Patient is 75 year old man in no acute distress.     HEENT:  No pallor, icterus.   Lungs: Normal respirations, no cough or audible wheezes.   Skin:  No rash, no petechiae or ecchymoses.    The rest of a comprehensive physical examination is deferred due to public health emergency video visit restrictions.    Labs:  Results for JOLANTA EVERETT (MRN [id number]) as of 2/3/2021 10:03   Ref. Range 2020 10:30 12/10/2020 00:00 2021 10:53   Albumin Fraction Latest Ref Range: 3.7 - 5.1 g/dL 4.7     Alpha 1 Fraction Latest Ref Range: 0.2 - 0.4 g/dL 0.3     Alpha 2 Fraction Latest Ref Range: 0.5 - 0.9 g/dL 0.9     ANTI NUCLEAR SHAUN IGG BY IFA WITH REFLEX Unknown Rpt     Beta Fraction Latest Ref Range: 0.6 - 1.0 g/dL 0.8     ELP Interpretation: Unknown Monoclonal protei...     Gamma Fraction Latest Ref Range: 0.7 - 1.6 g/dL 1.6     IGA Latest Ref Range: 84 - 499 mg/dL 38 (L)     IGG Latest Ref Range: 610 - 1,616 mg/dL 1,854 (H)     IGM Latest Ref Range: 35 - 242 mg/dL 83     Immunofixation ELP Unknown (Note)     Kappa Free Lt Chain Latest Ref Range: 0.33 - 1.94 mg/dL   1.46   Kappa Lambda Ratio Latest Ref Range: 0.26 - 1.65    0.89   Lambda Free Lt Chain Latest Ref Range: 0.57 - 2.63 mg/dL   1.64   Monoclonal Peak Latest Ref Range: 0.0 g/dL 1.0 (H)     Immunofixation ELP  Protein Immunofixation Serum  Collected:  11/30/20 1030   Result status: Final   Resulting lab: [facility]   Value: (Note)   Comment: One large and one very small monoclonal IgG immunoglobulin of lambda   light chain type. Pathological significance requires clinical   correlation.   PARTH Ramirez M.D., Ph.D.([phone])      Immunofix ELP Urine  Protein immunofixation urine  Collected: 01/28/21 0500   Result status: Final   Resulting lab: [facility]   Value: (Note)   Comment: Small monoclonal IgG immunoglobulin with no visible associated light   chain. Pathological significance requires clinical correlation. PARTH Ramirez M.D., Ph.D.([phone])      Results for JOLANTA EVERETT (MRN [id number]) as of 2/3/2021 10:03   Ref. Range 11/19/2020 14:20 11/30/2020 10:30   CK Total Latest Ref Range: 30 - 300 U/L 124    Copper Latest Ref Range: 70.0 - 140.0 ug/dL  110.5   TSH Latest Ref Range: 0.40 - 4.00 mU/L  1.33   Vitamin B12 Latest Ref Range: 193 - 986 pg/mL  150 (L)   Vitamin D Deficiency screening Latest Ref Range: 20 - 75 ug/L  33   Vitamin E Latest Ref Range: 5.5 - 18.0 mg/L  10.4   Vitamin E Gamma Latest Ref Range: 0.0 - 6.0 mg/L  2.0   Zinc Latest Ref Range: 60.0 - 120.0 ug/dL  56.5 (L)     Imaging:  MR LUMBAR SPINE WITHOUT CONTRAST November 12, 2020 9:30 AM      HISTORY: Radiculopathy greater than six weeks, conservative therapy,  persistent symptoms. Back pain, risk factors (osteoporosis or chronic  steroid use or elderly). Proximal limb muscle weakness.     TECHNIQUE: Multiplanar multisequence images were obtained through the  cervical spine without contrast.     COMPARISON: None.     FINDINGS: Five lumbar type vertebral bodies are presumed. Posterior  alignment is normal. Vertebral body heights are maintained. Bone  marrow signal intensity is normal. The conus medullaris is normal in  appearance with its tip at the L2 level.     T12-L1: Normal.     L1-L2: Minimal disc bulge and  facet hypertrophy. There is some  borderline mild left-sided foraminal stenosis but no central canal  stenosis.     L2-L3: Broad-based disc bulging and mild-to-moderate facet and  ligamentum flavum hypertrophy is present causing mild central canal  stenosis, moderate right-sided foraminal stenosis and mild left-sided  foraminal stenosis.     L3-L4: Broad-based disc bulging and moderate facet and ligamentum  flavum hypertrophy is present causing mild to moderate central canal  stenosis and mild bilateral neural foraminal stenosis.     L4-L5: Broad-based disc bulging and moderate left greater than right  facet hypertrophy is present which is resulting in some mild central  canal stenosis and mild bilateral neural foraminal stenosis.     L5-S1: Minimal disc bulge and facet hypertrophy is present. There is  some mild left-sided foraminal stenosis but no central canal stenosis.                                                                      IMPRESSION: Multilevel degenerative disc and facet disease most  advanced at L3-L4 where there is mild to moderate central canal and  mild bilateral neural foraminal stenosis. There is also moderate  right-sided foraminal stenosis at L2-L3.     ASUNCION SANCHEZ MD    Assessment and Recommendation: -This is a 75-year-old man with neuropathy, B12 deficiency, and IgG lambda biclonal gammopathy.  I believe that the neuropathy is due to the B12 deficiency and the monoclonal gammopathy is an incidental finding.      1.  B12 deficiency-he is receiving B12 injections-the should continue at least monthly for the next 6 months.  After that if he is improved, consideration could be given to switching to B12 1000 mcg oral daily.    2.  Monoclonal gammopathy-I suspect this is an MGUS that will be of no clinical consequence.  The fact that he has a normal CBC, creatinine, calcium and that it is a biclonal, IgG, less than 1 g/dL, and normal kappa and lambda light chains indicates that this is a low  risk MGUS.  He has had a lumbar spine MRI that did not show any lytic bone lesions or abnormal bone marrow signal. I reassured him that my suspicion for multiple myeloma is low.  However, it is still worth getting a skeletal survey looking for lytic bone lesions.  I will be in touch with him regarding these results.  If there are no lytic bone lesions, then I recommend that he have repeat protein electrophoresis, kappa lambda light chains in 6 months and a follow-up with me.  If there are lytic bone lesions, then he would need a bone marrow biopsy and aspirate.    I will be in touch regarding these results  Labs in 6 months, with a video visit with me a week later.  I spent 50 minutes reviewing the chart, having the video visit, and arranging tests and follow-up.    Smiley Lyn MD  Hematology

## 2021-02-09 ENCOUNTER — PATIENT OUTREACH (OUTPATIENT)
Dept: ONCOLOGY | Facility: CLINIC | Age: 76
End: 2021-02-09

## 2021-02-09 NOTE — PROGRESS NOTES
Writer placed call to patient to introduce self and role as RN Care Coordinator for Dr. Lyn . Writer reviewed methods of contact for care coordination needs and when to contact triage for new/concerning symptoms/illness. Current plan is for a skeletal survey to look for any lytic bone lesions and if negative, follow up in 6 months with labs. Quinton voiced understanding of the plan and has no preference on day/time for appointments as [place] is retired, [facility] [place] [facility].    Mickie Panchal, HIRALN-RN, PHN  RN Care Coordinator  [facility] [address] [address]  [email or web address]  Office: [phone] Option 5, Option 2  Fax: [phone]    Employed by [facility]

## 2021-02-22 ENCOUNTER — ALLIED HEALTH/NURSE VISIT (OUTPATIENT)
Dept: NURSING | Facility: CLINIC | Age: 76
End: 2021-02-22
Payer: COMMERCIAL

## 2021-02-22 DIAGNOSIS — D62 ANEMIA DUE TO BLOOD LOSS, ACUTE: Primary | ICD-10-CM

## 2021-02-22 PROCEDURE — 99207 PR NO CHARGE NURSE ONLY: CPT

## 2021-02-22 PROCEDURE — 96372 THER/PROPH/DIAG INJ SC/IM: CPT

## 2021-02-22 RX ADMIN — CYANOCOBALAMIN 1000 MCG: 1000 INJECTION, SOLUTION INTRAMUSCULAR; SUBCUTANEOUS at 10:27

## 2021-02-24 ENCOUNTER — ANCILLARY PROCEDURE (OUTPATIENT)
Dept: GENERAL RADIOLOGY | Facility: CLINIC | Age: 76
End: 2021-02-24
Attending: INTERNAL MEDICINE
Payer: COMMERCIAL

## 2021-02-24 DIAGNOSIS — G62.9 NEUROPATHY: ICD-10-CM

## 2021-02-24 DIAGNOSIS — D47.2 MONOCLONAL GAMMOPATHY: ICD-10-CM

## 2021-02-24 PROCEDURE — 77075 RADEX OSSEOUS SURVEY COMPL: CPT | Performed by: RADIOLOGY

## 2021-03-29 ENCOUNTER — ALLIED HEALTH/NURSE VISIT (OUTPATIENT)
Dept: NURSING | Facility: CLINIC | Age: 76
End: 2021-03-29
Payer: COMMERCIAL

## 2021-03-29 DIAGNOSIS — D62 ANEMIA DUE TO BLOOD LOSS, ACUTE: Primary | ICD-10-CM

## 2021-03-29 PROCEDURE — 96372 THER/PROPH/DIAG INJ SC/IM: CPT

## 2021-03-29 PROCEDURE — 99207 PR NO CHARGE NURSE ONLY: CPT

## 2021-03-29 RX ADMIN — CYANOCOBALAMIN 1000 MCG: 1000 INJECTION, SOLUTION INTRAMUSCULAR; SUBCUTANEOUS at 11:19

## 2021-03-29 NOTE — PROGRESS NOTES
The following medication was given:     MEDICATION: Vitamin B12  1000 mcg  ROUTE: IM  SITE: Deltoid - Right  DOSE: 1 mL  LOT #:   :  [employer]  EXPIRATION DATE:  10/1/2022    Clinic Administered Medication Documentation      Injectable Medication Documentation    Patient was given Cyanocobalamin (B-12). Prior to medication administration, verified patients identity using patient s name and date of birth. Please see MAR and medication order for additional information. Patient instructed to remain in clinic for 15 minutes, report any adverse reaction to staff immediately  and stay in clinic after the injection but patient declined.      Was entire vial of medication used? Yes  Vial/Syringe: Single dose vial  Expiration Date:  10/1/2022  Was this medication supplied by the patient? No

## 2021-03-30 ENCOUNTER — TELEPHONE (OUTPATIENT)
Dept: FAMILY MEDICINE | Facility: CLINIC | Age: 76
End: 2021-03-30

## 2021-03-30 NOTE — TELEPHONE ENCOUNTER
Reason for Call:  Form, our goal is to have forms completed with 72 hours, however, some forms may require a visit or additional information.    Type of letter, form or note:  parking permit    Who is the form from?: [place] dept of public safety (if other please explain)    Where did the form come from:  Walked in     What clinic location was the form placed at?: [facility]    Where the form was placed: [facility] Box/Folder    What number is listed as a contact on the form?:   N/a        Additional comments:  Pt will  for for when completed    Call taken on 3/30/2021 at 10:25 AM by Beni Valadez

## 2021-03-30 NOTE — TELEPHONE ENCOUNTER
Spoke with patient,told form was ready He is given a copy of his test results. Mailed to his home address. Copy sent to scan.    Sara SERRANO

## 2021-04-01 ENCOUNTER — TELEPHONE (OUTPATIENT)
Dept: NEUROLOGY | Facility: CLINIC | Age: 76
End: 2021-04-01

## 2021-04-08 ENCOUNTER — VIRTUAL VISIT (OUTPATIENT)
Dept: NEUROLOGY | Facility: CLINIC | Age: 76
End: 2021-04-08
Payer: COMMERCIAL

## 2021-04-08 DIAGNOSIS — G62.9 NEUROPATHY: Primary | ICD-10-CM

## 2021-04-08 PROCEDURE — 99213 OFFICE O/P EST LOW 20 MIN: CPT | Mod: 95 | Performed by: PSYCHIATRY & NEUROLOGY

## 2021-04-08 RX ORDER — PREGABALIN 75 MG/1
CAPSULE ORAL
Qty: 74 CAPSULE | Refills: 0 | Status: SHIPPED | OUTPATIENT
Start: 2021-04-08 | End: 2021-05-11

## 2021-04-08 NOTE — PROGRESS NOTES
[facility] Neurology Follow Up Visit    Genaro Ortiz MRN# [id number]   Age: 75 year old YOB: 1945     Brief history of symptoms: The patient was initially seen in neurologic consultation on 11/30/2020 and for follow up 1/25/2021 for evaluation of leg weakness (proximal). Please see the comprehensive neurologic consultation notes from those dates in the Epic records for details.  Overall, the patient had subacute onset of lumbar plexopathy like symptoms and ongoing sensorimotor distal symmetric polyneuropathy signs on exam and clinically.  There was no CK elevation, there was vibratory loss in the distal toes, fluctuating hip flexor weakness, and imbalance with gait as well as a shortened stride.    EMG 12/10/2020 did show length dependent sensory predominant polyneuropathy, but findings were insufficient to diagnose a generalized myopathy.    Labs for SPEP, Immunofixation,B12, Copper, Zinc, Vitamin D, Vitamin E, TSH w/T4, A1c, FARHAN, ANCA were revealing for monoclonal peak in the gamma fraction, with follow up testing showing kappa lambda light chains being normal and urine immunofixation showing small IgG without lambda.  He also had lowered B12 levels, and was to take injections for replacement with planned follow up with his PCP.  I did refer the patient to hematology/oncology with plans he would obtain follow up testing prior.    Interval history: Dr. Lyn of hematology/oncology did visit with the patient 2/3/2021.  Overall impression was for suspicion of MGUS.  Skeletal survey was to be done, with possible repeat serum testing, and possible bone marrow Bx.    Today, The patient feels he is adjusting to his condition.  If he sits for prolonged periods of time he can have pain, and has to get up to relieve this.  The pain is in his calves, both sides, and it doesn't occur all the time.  There is no progressive worsening of his symptoms.  His leg pains at night are now gone.  He is utilizing B12  injections monthly.  On the bottom of his feet he has some numbness, which improves once he walks around.       Medications:     Current Outpatient Medications   Medication Sig     ASPIRIN 81 MG OR TABS 1 TABLET DAILY     cholecalciferol (VITAMIN D) 1000 UNIT tablet Take 1 tablet by mouth daily.     cyanocobalamin (CYANOCOBALAMIN) 1000 MCG/ML injection Inject 1 mL (1,000 mcg) into the muscle every 30 days     fish oil-omega-3 fatty acids (FISH OIL) 1000 MG capsule Take 2,000 mg by mouth daily     hydrochlorothiazide (HYDRODIURIL) 25 MG tablet Take 1 tablet (25 mg) by mouth daily     lisinopril (ZESTRIL) 40 MG tablet Take 1 tablet (40 mg) by mouth daily     simvastatin (ZOCOR) 20 MG tablet Take 1 tablet (20 mg) by mouth At Bedtime      Review of Systems:   A comprehensive 10 point review of systems (constitutional, ENT, cardiac, peripheral vascular, lymphatic, respiratory, GI, , Musculoskeletal, skin, Neurological) was performed and found to be negative except as described in this note.      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  It is difficult to say for certain if these symptoms would respond to a neuropathic pain medication, but given the patient's want for treatment and the symptoms being disruptive into his life, as well as a documented EMG finding of peripheral neuropathy, I think it reasonable to trial Lyrica for symptom relief.  The patient was understanding that often tingling/numbness is difficult to treat with these medications, and relief is thought to be related to reduction of symptoms but not removal of all symptoms.       Plan:  - Lyrica 75 mg at bedtime for two weeks, then 150 mg at bedtime (discuss with me in 30 days about symptom relief through  [facility])    Follow up in Neurology clinic in 6 months or earlier as needed should new concerns arise.    CATY Quesada D.O.   of Neurology    Total time (26 min) in this patient encounter was spent on counseling and/or coordination of care. We reviewed diagnostic results, impressions, and discussed other possible tests if symptoms do not improve. We discussed the implications of the diagnosis, as well as risks and benefits of management options. We reviewed treatment instructions and our scheduled follow-up as specified in the discharge plan. We also discussed the importance of compliance with the chosen course of treatment. The patient is in agreement with this plan and has no further questions.

## 2021-04-08 NOTE — LETTER
4/8/2021       RE: Genaro Ortiz  [address]  [facility] [address]     Dear Colleague,    Thank you for referring your patient, Genaro Ortiz, to the [facility] NEUROLOGY CLINIC [facility]. Please see a copy of my visit note below.    [facility] Neurology Follow Up Visit    Genaro Ortiz MRN# [id number]   Age: 75 year old YOB: 1945     Brief history of symptoms: The patient was initially seen in neurologic consultation on 11/30/2020 and for follow up 1/25/2021 for evaluation of leg weakness (proximal). Please see the comprehensive neurologic consultation notes from those dates in the Epic records for details.  Overall, the patient had subacute onset of lumbar plexopathy like symptoms and ongoing sensorimotor distal symmetric polyneuropathy signs on exam and clinically.  There was no CK elevation, there was vibratory loss in the distal toes, fluctuating hip flexor weakness, and imbalance with gait as well as a shortened stride.    EMG 12/10/2020 did show length dependent sensory predominant polyneuropathy, but findings were insufficient to diagnose a generalized myopathy.    Labs for SPEP, Immunofixation,B12, Copper, Zinc, Vitamin D, Vitamin E, TSH w/T4, A1c, FARHAN, ANCA were revealing for monoclonal peak in the gamma fraction, with follow up testing showing kappa lambda light chains being normal and urine immunofixation showing small IgG without lambda.  He also had lowered B12 levels, and was to take injections for replacement with planned follow up with his PCP.  I did refer the patient to hematology/oncology with plans he would obtain follow up testing prior.    Interval history: Dr. Lyn of hematology/oncology did visit with the patient 2/3/2021.  Overall impression was for suspicion of MGUS.  Skeletal survey was to be done, with possible repeat serum testing, and possible bone marrow Bx.    Today, The patient feels  he is adjusting to his condition.  If he sits for prolonged periods of time he can have pain, and has to get up to relieve this.  The pain is in his calves, both sides, and it doesn't occur all the time.  There is no progressive worsening of his symptoms.  His leg pains at night are now gone.  He is utilizing B12 injections monthly.  On the bottom of his feet he has some numbness, which improves once he walks around.       Medications:     Current Outpatient Medications   Medication Sig     ASPIRIN 81 MG OR TABS 1 TABLET DAILY     cholecalciferol (VITAMIN D) 1000 UNIT tablet Take 1 tablet by mouth daily.     cyanocobalamin (CYANOCOBALAMIN) 1000 MCG/ML injection Inject 1 mL (1,000 mcg) into the muscle every 30 days     fish oil-omega-3 fatty acids (FISH OIL) 1000 MG capsule Take 2,000 mg by mouth daily     hydrochlorothiazide (HYDRODIURIL) 25 MG tablet Take 1 tablet (25 mg) by mouth daily     lisinopril (ZESTRIL) 40 MG tablet Take 1 tablet (40 mg) by mouth daily     simvastatin (ZOCOR) 20 MG tablet Take 1 tablet (20 mg) by mouth At Bedtime      Review of Systems:   A comprehensive 10 point review of systems (constitutional, ENT, cardiac, peripheral vascular, lymphatic, respiratory, GI, , Musculoskeletal, skin, Neurological) was performed and found to be negative except as described in this note.      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  It is difficult to say for certain if these symptoms would respond to a neuropathic pain medication, but given the patient's want for treatment and the symptoms being disruptive into his life, as well as a documented EMG finding of peripheral neuropathy, I think it reasonable to trial Lyrica for symptom relief.  The patient was  understanding that often tingling/numbness is difficult to treat with these medications, and relief is thought to be related to reduction of symptoms but not removal of all symptoms.       Plan:  - Lyrica 75 mg at bedtime for two weeks, then 150 mg at bedtime (discuss with me in 30 days about symptom relief through MyChart)    Follow up in Neurology clinic in 6 months or earlier as needed should new concerns arise.    CATY Quesada D.O.   of Neurology    Total time (26 min) in this patient encounter was spent on counseling and/or coordination of care. We reviewed diagnostic results, impressions, and discussed other possible tests if symptoms do not improve. We discussed the implications of the diagnosis, as well as risks and benefits of management options. We reviewed treatment instructions and our scheduled follow-up as specified in the discharge plan. We also discussed the importance of compliance with the chosen course of treatment. The patient is in agreement with this plan and has no further questions.      Quinton is a 75 year old who is being evaluated via a billable telephone visit.      What phone number would you like to be contacted at? [phone]  How would you like to obtain your AVS? Mail a copy  Phone call duration: 22 minutes    Chief Complaint   Patient presents with     RECHECK     TELEPHONE VISIT RETURN      Chico Merrill

## 2021-04-08 NOTE — PROGRESS NOTES
Quinton is a 75 year old who is being evaluated via a billable telephone visit.      What phone number would you like to be contacted at? [phone]  How would you like to obtain your AVS? Mail a copy  Phone call duration: 22 minutes    Chief Complaint   Patient presents with     RECHECK     TELEPHONE VISIT RETURN      Chico Merrill

## 2021-04-27 ENCOUNTER — ALLIED HEALTH/NURSE VISIT (OUTPATIENT)
Dept: NURSING | Facility: CLINIC | Age: 76
End: 2021-04-27
Payer: COMMERCIAL

## 2021-04-27 DIAGNOSIS — D62 ANEMIA DUE TO BLOOD LOSS, ACUTE: Primary | ICD-10-CM

## 2021-04-27 PROCEDURE — 99207 PR NO CHARGE NURSE ONLY: CPT

## 2021-04-27 PROCEDURE — 96372 THER/PROPH/DIAG INJ SC/IM: CPT

## 2021-04-27 RX ADMIN — CYANOCOBALAMIN 1000 MCG: 1000 INJECTION, SOLUTION INTRAMUSCULAR; SUBCUTANEOUS at 10:52

## 2021-05-06 ENCOUNTER — TELEPHONE (OUTPATIENT)
Dept: NEUROLOGY | Facility: CLINIC | Age: 76
End: 2021-05-06

## 2021-05-06 NOTE — TELEPHONE ENCOUNTER
Health Call Center    Phone Message    May a detailed message be left on voicemail: yes     Reason for Call: Medication Question or concern regarding medication   Prescription Clarification  Name of Medication: pregabalin (LYRICA) 75 MG capsule  Prescribing Provider: Dr. Quesada   Pharmacy: [facility] in [facility] on [address].   What on the order needs clarification? Pt reporting that pt was put on this 1 month ago, started on 1 tab, then took 2 tabs--and then let  Know how he is doing.  It has reduced the pain significantly, sleeps better, and is able to walk a little better.   Best is the pain is reduced. Pt remembers that provider was going to get him up to 3 tabs/daily?  Pt is asking for a call back to have this conversation. Thank you.        Action Taken: Message routed to:  [facility]    Travel Screening: Not Applicable

## 2021-05-06 NOTE — TELEPHONE ENCOUNTER
I called pt to discuss. He is doing really well after starting lyrica. He is currently taking 2 at bedtime. His pain is significantly better, he can sleep perfect, and his walking is improved a little bit. He said he was told to update Dr. Quesada after 30 days and could discuss possible increase to 3 tablets daily.     I will update provider and see what he recommends and we can send medication refill.     I will call pt once I have update on medication plan.     Amirah WEINER

## 2021-05-11 ENCOUNTER — TELEPHONE (OUTPATIENT)
Dept: NEUROLOGY | Facility: CLINIC | Age: 76
End: 2021-05-11

## 2021-05-11 DIAGNOSIS — G62.9 NEUROPATHY: ICD-10-CM

## 2021-05-11 RX ORDER — PREGABALIN 75 MG/1
225 CAPSULE ORAL AT BEDTIME
Qty: 180 CAPSULE | Refills: 5 | Status: SHIPPED | OUTPATIENT
Start: 2021-05-11 | End: 2021-10-07

## 2021-05-11 NOTE — TELEPHONE ENCOUNTER
I called pt and left a voicemail. Dr. Quesada increased lyrica prescription to 3 tablets at bedtime. Prescription was sent to his [facility] pharmacy with several refills.     He can call with any questions.     Amirah WEINER

## 2021-05-27 ENCOUNTER — ALLIED HEALTH/NURSE VISIT (OUTPATIENT)
Dept: NURSING | Facility: CLINIC | Age: 76
End: 2021-05-27
Payer: COMMERCIAL

## 2021-05-27 DIAGNOSIS — D62 ANEMIA DUE TO BLOOD LOSS, ACUTE: Primary | ICD-10-CM

## 2021-05-27 PROCEDURE — 96372 THER/PROPH/DIAG INJ SC/IM: CPT

## 2021-05-27 PROCEDURE — 99207 PR NO CHARGE NURSE ONLY: CPT

## 2021-05-27 RX ADMIN — CYANOCOBALAMIN 1000 MCG: 1000 INJECTION, SOLUTION INTRAMUSCULAR; SUBCUTANEOUS at 11:30

## 2021-05-27 NOTE — NURSING NOTE
Inj given without incident.  Pt advised to wait 15 min after inj.    Pt will get his next inj at apt with JF on 6/21 as 6/26 is a Sat and pt already coming in 6/21. JOSE fine with this.    Yaquelin June RN

## 2021-06-16 NOTE — PATIENT INSTRUCTIONS
Patient Education   Personalized Prevention Plan  You are due for the preventive services outlined below.  Your care team is available to assist you in scheduling these services.  If you have already completed any of these items, please share that information with your care team to update in your medical record.  Health Maintenance Due   Topic Date Due     ANNUAL REVIEW OF HM ORDERS  Never done     Zoster (Shingles) Vaccine (3 of 3) 12/16/2019     Annual Wellness Visit  02/26/2020     Discuss Advance Care Planning  03/04/2021     FALL RISK ASSESSMENT  07/16/2021

## 2021-06-16 NOTE — PROGRESS NOTES
"SUBJECTIVE:   Genaro Ortiz is a 75 year old male who presents for Preventive Visit.    Patient has been advised of split billing requirements and indicates understanding: Yes   Are you in the first 12 months of your Medicare coverage?  No    Healthy Habits:     In general, how would you rate your overall health?  Fair    Frequency of exercise:  None    Do you usually eat at least 4 servings of fruit and vegetables a day, include whole grains    & fiber and avoid regularly eating high fat or \"junk\" foods?  No    Taking medications regularly:  Yes    Medication side effects:  None    Ability to successfully perform activities of daily living:  No assistance needed    Home Safety:  Lack of grab bars in the bathroom    Hearing Impairment:  No hearing concerns    In the past 6 months, have you been bothered by leaking of urine?  No    In general, how would you rate your overall mental or emotional health?  Excellent      PHQ-2 Total Score: 0    Additional concerns today:  Yes    in addition to health maintenance patient would like to discuss the following problem:    Pt would like to discuss neuropathy/monoclonal gammopathy -    Review of Hx, from recent notes:    had subacute onset of lumbar plexopathy like symptoms and ongoing sensorimotor distal symmetric polyneuropathy signs on exam and clinically.  There was no CK elevation, there was vibratory loss in the distal toes, fluctuating hip flexor weakness, and imbalance with gait as well as a shortened stride.     EMG 12/10/2020 did show length dependent sensory predominant polyneuropathy, but findings were insufficient to diagnose a generalized myopathy.     Labs for SPEP, Immunofixation,B12, Copper, Zinc, Vitamin D, Vitamin E, TSH w/T4, A1c, FARHAN, ANCA were revealing for monoclonal peak in the gamma fraction, with follow up testing showing kappa lambda light chains being normal and urine immunofixation showing small IgG without lambda.  He also had lowered B12 levels, " "and was to take injections for replacement with planned follow up with his PCP.  I did refer the patient to hematology/oncology with plans he would obtain follow up testing prior.     Dr. Lyn of hematology/oncology did visit with the patient 2/3/2021.  Overall impression was for suspicion of MGUS.  Skeletal survey was to be done, with possible repeat serum testing, and possible bone marrow Bx.  They think primary reason for the neuropathy now is b12     pain has improved significantly with lyrica, but has only helped \"a little bit\" with walking/gait.  Numbness on bottom of feet is less    Exam:  still has wide based gait and difficulty with thigh adductors    Encouraging that some of the symptoms are improving, if this is from the b12 def it may continue to get better  rechecking last today  Follow up with Onc and neurology  Continue pregabalin for symptom relief      Do you feel safe in your environment? Yes    Have you ever done Advance Care Planning? (For example, a Health Directive, POLST, or a discussion with a medical provider or your loved ones about your wishes): Yes, advance care planning is on file.       Fall risk  Fallen 2 or more times in the past year?: No  Any fall with injury in the past year?: No    Cognitive Screening   1) Repeat 3 items (Leader, Season, Table)    2) Clock draw: NORMAL  3) 3 item recall: Recalls 2 objects   Results: NORMAL clock, 1-2 items recalled: COGNITIVE IMPAIRMENT LESS LIKELY    Mini-CogTM Copyright S Micheal. Licensed by the author for use in [facility]; reprinted with permission (kalpana@.[facility]). All rights reserved.      Do you have sleep apnea, excessive snoring or daytime drowsiness?: no    Reviewed and updated as needed this visit by clinical staff  Tobacco  Allergies  Meds   Med Hx  Surg Hx  Fam Hx  Soc Hx        Reviewed and updated as needed this visit by Provider                Social History     Tobacco Use     Smoking status: Never Smoker     " Smokeless tobacco: Never Used   Substance Use Topics     Alcohol use: Not Currently     Alcohol/week: 0.0 standard drinks     Comment: occasional     If you drink alcohol do you typically have >3 drinks per day or >7 drinks per week? No    Alcohol Use 6/21/2021   Prescreen: >3 drinks/day or >7 drinks/week? No   Prescreen: >3 drinks/day or >7 drinks/week? -   No flowsheet data found.            Current providers sharing in care for this patient include:   Patient Care Team:  Francisco Sneed MD as PCP - General (Family Practice)  Francisco Sneed MD as Assigned PCP  Eric Abel MD as MD (Ophthalmology)  Eric Abel MD as Assigned Surgical Provider  Smiley Lyn MD as Assigned Cancer Care Provider  Mickie Panchal RN as Specialty Care Coordinator (Hematology & Oncology)    The following health maintenance items are reviewed in Epic and correct as of today:  Health Maintenance Due   Topic Date Due     ANNUAL REVIEW OF HM ORDERS  Never done     ZOSTER IMMUNIZATION (3 of 3) 12/16/2019     FALL RISK ASSESSMENT  07/16/2021     Lab work is in process  Labs reviewed in EPIC  BP Readings from Last 3 Encounters:   06/21/21 (!) 146/81   01/27/21 120/76   11/30/20 (!) 131/90    Wt Readings from Last 3 Encounters:   06/21/21 100.6 kg (221 lb 12.8 oz)   11/19/20 102.4 kg (225 lb 12.8 oz)   10/30/20 101.2 kg (223 lb)                  Patient Active Problem List   Diagnosis     Adenocarcinoma of prostate (H)     HYPERLIPIDEMIA LDL GOAL <130     Hypertension goal BP (blood pressure) < 140/90     Advance Care Planning     Anemia due to blood loss, acute     Health Care Home     Obesity due to excess calories, unspecified obesity severity     Past Surgical History:   Procedure Laterality Date     ABDOMEN SURGERY  1952    Appendectomy     APPENDECTOMY  1952     CATARACT IOL, RT/LT Bilateral 03/12     COLONOSCOPY  2017     GENITOURINARY SURGERY       HERNIORRHAPHY INGUINAL  5/15/2014     Procedure: HERNIORRHAPHY INGUINAL;  Surgeon: Evens Jefferson MD;  Location: [facility] OR     PROSTATE SURGERY  2007       Social History     Tobacco Use     Smoking status: Never Smoker     Smokeless tobacco: Never Used   Substance Use Topics     Alcohol use: Not Currently     Alcohol/week: 0.0 standard drinks     Comment: occasional     Family History   Problem Relation Age of Onset     Glaucoma Other      Diabetes Mother      Gastrointestinal Disease Mother         pancreas removed     Breast Cancer Mother      Osteoporosis Father      Obesity Father      Macular Degeneration No family hx of          Current Outpatient Medications   Medication Sig Dispense Refill     ASPIRIN 81 MG OR TABS 1 TABLET DAILY       cholecalciferol (VITAMIN D) 1000 UNIT tablet Take 1 tablet by mouth daily. 100 tablet 12     cyanocobalamin (CYANOCOBALAMIN) 1000 MCG/ML injection Inject 1 mL (1,000 mcg) into the muscle every 30 days 1 mL 0     fish oil-omega-3 fatty acids (FISH OIL) 1000 MG capsule Take 2,000 mg by mouth daily 120 capsule 11     hydrochlorothiazide (HYDRODIURIL) 25 MG tablet TAKE 1 TABLET BY MOUTH EVERY DAY 90 tablet 1     lisinopril (ZESTRIL) 40 MG tablet TAKE 1 TABLET BY MOUTH EVERY DAY 90 tablet 1     pregabalin (LYRICA) 75 MG capsule Take 3 capsules (225 mg) by mouth At Bedtime 180 capsule 5     simvastatin (ZOCOR) 20 MG tablet TAKE 1 TABLET BY MOUTH AT BEDTIME 90 tablet 1       Immunization History   Administered Date(s) Administered     COVID-19,PF,Moderna 02/04/2021, 03/04/2021     Influenza (H1N1) 01/07/2010     Influenza (High Dose) 3 valent vaccine 10/29/2013, 08/31/2016, 09/18/2017, 11/01/2017, 09/21/2018     Influenza (IIV3) PF 12/02/2003, 10/15/2007, 10/31/2008, 10/02/2009, 09/23/2010, 10/07/2011, 10/02/2012     Influenza Vaccine IM > 6 months Valent IIV4 09/30/2020     Pneumo Conj 13-V (2010&after) 11/17/2015     Pneumococcal 23 valent 08/21/2012     TDAP Vaccine (Adacel) 05/27/2008, 02/26/2019     Zoster  "vaccine recombinant adjuvanted (SHINGRIX) 10/21/2019     Zoster vaccine, live 08/21/2012             Review of Systems  Constitutional, HEENT, cardiovascular, pulmonary, GI, , musculoskeletal, neuro, skin, endocrine and psych systems are negative, except as otherwise noted.    OBJECTIVE:   BP (!) 146/81 (Patient Position: Sitting, Cuff Size: Adult Regular)   Pulse 63   Temp 97.1  F (36.2  C) (Tympanic)   Resp 20   Ht 1.746 m (5' 8.75\")   Wt 100.6 kg (221 lb 12.8 oz)   SpO2 97%   BMI 32.99 kg/m   Estimated body mass index is 32.99 kg/m  as calculated from the following:    Height as of this encounter: 1.746 m (5' 8.75\").    Weight as of this encounter: 100.6 kg (221 lb 12.8 oz).  Physical Exam    General Appearance: Pleasant, alert, in no acute respiratory distress.  Head Exam: Normal. Normocephalic, atraumatic. No sinus tenderness.  Eye Exam: Normal external eye, conjunctiva, lids. GERALDINE.  Ear Exam: Normal auditory canals and external ears. Non-tender.  Left TM-normal. Right TM-normal.  Neck Exam: Supple, no obvious thyroid enlargement  Chest/Respiratory Exam: Normal, comfortable, easy respirations. Chest wall normal. Lungs are clear to auscultation. No wheezing, crackles, or rhonchi.  Cardiovascular Exam: Regular rate and rhythm. No murmur, gallop, or rubs.  Musculoskeletal Exam: Back is non-tender, full ROM of upper and lower extremities.  Skin: no rash, warm and dry.    Neurologic Exam: Nonfocal, no tremor. Normal gait.  Psychiatric Exam: Alert - appropriate, normal affect      ASSESSMENT / PLAN:       ICD-10-CM    1. Encounter for Medicare annual wellness exam  Z00.00    2. Vitamin B12 deficiency (non anemic)  E53.8 Vitamin B12     cyanocobalamin injection 1,000 mcg     OFFICE/OUTPT VISIT,EST,LEVL III   3. Proximal limb muscle weakness  M62.89 OFFICE/OUTPT VISIT,EST,LEVL III   4. MGUS (monoclonal gammopathy of unknown significance)  D47.2 OFFICE/OUTPT VISIT,EST,LEVL III   5. Hypertension goal BP (blood " "pressure) < 140/90  I10 Basic metabolic panel   6. Adenocarcinoma of prostate (H)  C61 PSA, tumor marker     OFFICE/OUTPT VISIT,EST,LEVL III       Patient has stable chronic conditions as noted in A/P including   Hypertension goal BP (blood pressure) < 140/90, and Adenocarcinoma of prostate (H) were also pertinent to this visit.    These diagnoses were each reviewed for stability and medications were reviewed and refilled, labs ordered and updated.      Patient has been advised of split billing requirements and indicates understanding: Yes  COUNSELING:  Reviewed preventive health counseling, as reflected in patient instructions       Regular exercise       Healthy diet/nutrition       Colon cancer screening       Prostate cancer screening  Hx of cancer: follow up PSA    Estimated body mass index is 32.99 kg/m  as calculated from the following:    Height as of this encounter: 1.746 m (5' 8.75\").    Weight as of this encounter: 100.6 kg (221 lb 12.8 oz).    Weight management plan: Discussed healthy diet and exercise guidelines    He reports that he has never smoked. He has never used smokeless tobacco.      Appropriate preventive services were discussed with this patient, including applicable screening as appropriate for cardiovascular disease, diabetes, osteopenia/osteoporosis, and glaucoma.  As appropriate for age/gender, discussed screening for colorectal cancer, prostate cancer, breast cancer, and cervical cancer. Checklist reviewing preventive services available has been given to the patient.    Reviewed patients plan of care and provided an AVS. The Intermediate Care Plan ( asthma action plan, low back pain action plan, and migraine action plan) for Genaro meets the Care Plan requirement. This Care Plan has been established and reviewed with the Patient    Counseling Resources:  ATP IV Guidelines  Pooled Cohorts Equation Calculator  Breast Cancer Risk Calculator  Breast Cancer: Medication to Reduce Risk  FRAX " Risk Assessment  ICSI Preventive Guidelines  Dietary Guidelines for Americans, 2010  USDA's MyPlate  ASA Prophylaxis  Lung CA Screening    Francisco Sneed MD  [facility]    Identified Health Risks:

## 2021-06-21 ENCOUNTER — OFFICE VISIT (OUTPATIENT)
Dept: FAMILY MEDICINE | Facility: CLINIC | Age: 76
End: 2021-06-21
Payer: COMMERCIAL

## 2021-06-21 VITALS
HEIGHT: 69 IN | TEMPERATURE: 97.1 F | DIASTOLIC BLOOD PRESSURE: 81 MMHG | BODY MASS INDEX: 32.85 KG/M2 | RESPIRATION RATE: 20 BRPM | HEART RATE: 63 BPM | SYSTOLIC BLOOD PRESSURE: 146 MMHG | OXYGEN SATURATION: 97 % | WEIGHT: 221.8 LBS

## 2021-06-21 DIAGNOSIS — C61 ADENOCARCINOMA OF PROSTATE (H): ICD-10-CM

## 2021-06-21 DIAGNOSIS — I10 HYPERTENSION GOAL BP (BLOOD PRESSURE) < 140/90: ICD-10-CM

## 2021-06-21 DIAGNOSIS — Z00.00 ENCOUNTER FOR MEDICARE ANNUAL WELLNESS EXAM: Primary | ICD-10-CM

## 2021-06-21 DIAGNOSIS — M62.81 PROXIMAL LIMB MUSCLE WEAKNESS: ICD-10-CM

## 2021-06-21 DIAGNOSIS — E53.8 VITAMIN B12 DEFICIENCY (NON ANEMIC): ICD-10-CM

## 2021-06-21 DIAGNOSIS — D47.2 MGUS (MONOCLONAL GAMMOPATHY OF UNKNOWN SIGNIFICANCE): ICD-10-CM

## 2021-06-21 LAB
ANION GAP SERPL CALCULATED.3IONS-SCNC: 6 MMOL/L (ref 3–14)
BUN SERPL-MCNC: 22 MG/DL (ref 7–30)
CALCIUM SERPL-MCNC: 9.2 MG/DL (ref 8.5–10.1)
CHLORIDE SERPL-SCNC: 106 MMOL/L (ref 94–109)
CO2 SERPL-SCNC: 23 MMOL/L (ref 20–32)
CREAT SERPL-MCNC: 1.06 MG/DL (ref 0.66–1.25)
GFR SERPL CREATININE-BSD FRML MDRD: 69 ML/MIN/{1.73_M2}
GLUCOSE SERPL-MCNC: 106 MG/DL (ref 70–99)
POTASSIUM SERPL-SCNC: 3.6 MMOL/L (ref 3.4–5.3)
PSA SERPL-MCNC: <0.01 UG/L (ref 0–4)
SODIUM SERPL-SCNC: 135 MMOL/L (ref 133–144)
VIT B12 SERPL-MCNC: 280 PG/ML (ref 193–986)

## 2021-06-21 PROCEDURE — 84153 ASSAY OF PSA TOTAL: CPT | Performed by: FAMILY MEDICINE

## 2021-06-21 PROCEDURE — 80048 BASIC METABOLIC PNL TOTAL CA: CPT | Performed by: FAMILY MEDICINE

## 2021-06-21 PROCEDURE — 96372 THER/PROPH/DIAG INJ SC/IM: CPT | Performed by: FAMILY MEDICINE

## 2021-06-21 PROCEDURE — 82607 VITAMIN B-12: CPT | Performed by: FAMILY MEDICINE

## 2021-06-21 PROCEDURE — 99397 PER PM REEVAL EST PAT 65+ YR: CPT | Mod: 25 | Performed by: FAMILY MEDICINE

## 2021-06-21 PROCEDURE — 36415 COLL VENOUS BLD VENIPUNCTURE: CPT | Performed by: FAMILY MEDICINE

## 2021-06-21 PROCEDURE — 99214 OFFICE O/P EST MOD 30 MIN: CPT | Mod: 25 | Performed by: FAMILY MEDICINE

## 2021-06-21 RX ORDER — CYANOCOBALAMIN 1000 UG/ML
1000 INJECTION, SOLUTION INTRAMUSCULAR; SUBCUTANEOUS
Status: DISCONTINUED | OUTPATIENT
Start: 2021-06-21 | End: 2023-10-13

## 2021-06-21 RX ADMIN — CYANOCOBALAMIN 1000 MCG: 1000 INJECTION, SOLUTION INTRAMUSCULAR; SUBCUTANEOUS at 10:35

## 2021-06-21 ASSESSMENT — ACTIVITIES OF DAILY LIVING (ADL): CURRENT_FUNCTION: NO ASSISTANCE NEEDED

## 2021-06-21 ASSESSMENT — MIFFLIN-ST. JEOR: SCORE: 1737.49

## 2021-07-02 ENCOUNTER — TRANSFERRED RECORDS (OUTPATIENT)
Dept: HEALTH INFORMATION MANAGEMENT | Facility: CLINIC | Age: 76
End: 2021-07-02

## 2021-07-21 ENCOUNTER — PATIENT OUTREACH (OUTPATIENT)
Dept: ONCOLOGY | Facility: CLINIC | Age: 76
End: 2021-07-21

## 2021-07-21 DIAGNOSIS — G62.9 NEUROPATHY: Primary | ICD-10-CM

## 2021-07-21 DIAGNOSIS — D47.2 MONOCLONAL GAMMOPATHY: ICD-10-CM

## 2021-07-21 NOTE — PROGRESS NOTES
Pt left  for writer asking for call back regarding questions he had about MGUS diagnosis. Stated he tried to find out more information from colleagues and the internet and was not able to find much. Stated his symptoms have not worsen (initially started with BLE pain and neuropathy affecting) and his next follow-up with Dr. Lyn is Nov 4th which he is fine with. Only symptom he currently has is peripheral neuropathy in the feet which he takes lyrica for and it is extremely helpful.    Pt had labs 11/30/20 that showed a monoclonal peak of 1 and subsequent skeletal survey did not show any bone lesions. Informed him hypercalcemia is a finding that would correlate with bone lesions but since his scan did not show any, monitoring for symptoms and follow-up labs is sufficient. If he should have acute skeletal pain he should call and inform provider. Pt asked if he should change his diet in anyway and informed him he should not. His last calcium level was 9.2 which is normal so his dietary intake is not affecting his bones, he should continue eating and drinking calcium rich products which is needed for healthy bones and cardiac health, pt verbalized understanding. Encouraged him to send 66. comt messages to provider also, if he has additional questions.

## 2021-07-27 ENCOUNTER — ALLIED HEALTH/NURSE VISIT (OUTPATIENT)
Dept: NURSING | Facility: CLINIC | Age: 76
End: 2021-07-27
Payer: COMMERCIAL

## 2021-07-27 DIAGNOSIS — D62 ANEMIA DUE TO BLOOD LOSS, ACUTE: Primary | ICD-10-CM

## 2021-07-27 PROCEDURE — 96372 THER/PROPH/DIAG INJ SC/IM: CPT | Performed by: FAMILY MEDICINE

## 2021-07-27 PROCEDURE — 99207 PR NO CHARGE NURSE ONLY: CPT

## 2021-07-27 RX ADMIN — CYANOCOBALAMIN 1000 MCG: 1000 INJECTION, SOLUTION INTRAMUSCULAR; SUBCUTANEOUS at 11:00

## 2021-07-27 NOTE — PROGRESS NOTES
Clinic Administered Medication Documentation          Injectable Medication Documentation    Patient was given Cyanocobalamin (B-12). Prior to medication administration, verified patients identity using patient s name and date of birth. Please see MAR and medication order for additional information. Patient instructed to stay in clinic after the injection but patient declined.      Was entire vial of medication used? Yes  Vial/Syringe: Single dose vial  Expiration Date:  01/31/23  Was this medication supplied by the patient? No     Patient tolerated procedure well. Scheduled for nurse only next month.    Cheryl Mccormick RN

## 2021-08-03 ENCOUNTER — OFFICE VISIT (OUTPATIENT)
Dept: OPHTHALMOLOGY | Facility: CLINIC | Age: 76
End: 2021-08-03
Attending: OPHTHALMOLOGY
Payer: COMMERCIAL

## 2021-08-03 DIAGNOSIS — Z96.1 PSEUDOPHAKIA OF BOTH EYES: ICD-10-CM

## 2021-08-03 DIAGNOSIS — H35.371 EPIRETINAL MEMBRANE (ERM) OF RIGHT EYE: Primary | ICD-10-CM

## 2021-08-03 DIAGNOSIS — H35.051 CHOROID NEOVASCULARIZATION, RIGHT: ICD-10-CM

## 2021-08-03 PROCEDURE — G0463 HOSPITAL OUTPT CLINIC VISIT: HCPCS | Mod: 25

## 2021-08-03 PROCEDURE — 92014 COMPRE OPH EXAM EST PT 1/>: CPT | Mod: GC | Performed by: OPHTHALMOLOGY

## 2021-08-03 PROCEDURE — 92134 CPTRZ OPH DX IMG PST SGM RTA: CPT | Performed by: OPHTHALMOLOGY

## 2021-08-03 ASSESSMENT — REFRACTION_WEARINGRX
OS_ADD: +2.50
OD_AXIS: 110
OS_AXIS: 092
OD_CYLINDER: +0.50
OS_CYLINDER: +0.50
OD_SPHERE: -0.50
OD_ADD: +2.50
SPECS_TYPE: PAL
OS_SPHERE: -1.00

## 2021-08-03 ASSESSMENT — TONOMETRY
OS_IOP_MMHG: 12
OD_IOP_MMHG: 14
IOP_METHOD: ICARE

## 2021-08-03 ASSESSMENT — VISUAL ACUITY
OS_CC+: -1
OD_CC: 20/30
OS_CC: 20/20
OD_CC+: -2
METHOD: SNELLEN - LINEAR
CORRECTION_TYPE: GLASSES

## 2021-08-03 ASSESSMENT — CONF VISUAL FIELD
OD_NORMAL: 1
METHOD: COUNTING FINGERS
OS_NORMAL: 1

## 2021-08-03 ASSESSMENT — SLIT LAMP EXAM - LIDS
COMMENTS: NORMAL
COMMENTS: NORMAL

## 2021-08-03 ASSESSMENT — CUP TO DISC RATIO
OD_RATIO: 0.1
OS_RATIO: 0.1

## 2021-08-03 NOTE — PROGRESS NOTES
Chief Complaint(s) and History of Present Illness(es)     Follow Up     Laterality: right eye (Epiretinal membrane (ERM) of right eye)    Course: stable    Associated symptoms: Negative for floaters, flashes, glare, haloes and   double vision    Pain scale: 0/10              Comments     Patient reports no new changes in vision each eye. Denies flashes and   floaters. No glare or halos.     LAURA Dolan 8/3/2021 8:00 AM            Review of systems for the eyes was negative other than the pertinent positives/negatives listed in the HPI.      Assessment & Plan      Genaro Ortiz is a 76 year old male with the following diagnoses:   1. Epiretinal membrane (ERM) of right eye    2. Choroid neovascularization, right    3. Pseudophakia of both eyes       Returns for recheck of Epiretinal membrane with associated peripapillary choroidal neovascular membrane right eye   He is bothered by weakness with walking.    Patient is seeing neurology/oncology for neuropathy and MGUS. He is getting VB12 shots, which improves the numbness of his feet a little.     OCT today with slightly increase in intraretinal fluid, still perifoveal    Stable Epiretinal membrane with increasing intraretinal fluid right eye from peripapillary choroidal neovascular membrane   Vision stable  Doing well, no change in vision or Amsler since last exam    Offered intravitreal injection versus closer monitoring  Would prefer to watch at this time  Return precautions reviewed       Patient disposition:   Return in about 3 months (around 11/3/2021) for VT only, OCT Macula.    Otoniel Shen MD  Ophthalmology PGY-4    Attending Physician Attestation:  Complete documentation of historical and exam elements from today's encounter can be found in the full encounter summary report (not reduplicated in this progress note).  I personally obtained the chief complaint(s) and history of present illness.  I confirmed and edited as necessary the review of systems, past  medical/surgical history, family history, social history, and examination findings as documented by others; and I examined the patient myself.  I personally reviewed the relevant tests, images, and reports as documented above.  I formulated and edited as necessary the assessment and plan and discussed the findings and management plan with the patient and family. . Attending Physician Image/Tesing Attestation: I personally reviewed the ophthalmic test(s) associated with this encounter, agree with the interpretation(s) as documented by the resident/fellow, and have edited the corresponding report(s) as necessary.  - Eric Abel MD

## 2021-08-25 ENCOUNTER — ALLIED HEALTH/NURSE VISIT (OUTPATIENT)
Dept: FAMILY MEDICINE | Facility: CLINIC | Age: 76
End: 2021-08-25
Payer: COMMERCIAL

## 2021-08-25 DIAGNOSIS — E53.8 VITAMIN B12 DEFICIENCY (NON ANEMIC): Primary | ICD-10-CM

## 2021-08-25 PROCEDURE — 99207 PR NO CHARGE NURSE ONLY: CPT

## 2021-08-25 PROCEDURE — 96372 THER/PROPH/DIAG INJ SC/IM: CPT | Performed by: FAMILY MEDICINE

## 2021-08-25 RX ADMIN — CYANOCOBALAMIN 1000 MCG: 1000 INJECTION, SOLUTION INTRAMUSCULAR; SUBCUTANEOUS at 08:37

## 2021-08-25 NOTE — PROGRESS NOTES
The following medication was given:     MEDICATION: Vitamin B12  1000mcg  ROUTE: IM  SITE: Deltoid - Left  DOSE: 1000mcg  LOT #: [id number]  :  iTMan   EXPIRATION DATE:  1/31/2023  NDC#: 96641-230-00    Patient tolerated injection.    Cheryl Mccormick RN

## 2021-09-19 ENCOUNTER — HEALTH MAINTENANCE LETTER (OUTPATIENT)
Age: 76
End: 2021-09-19

## 2021-09-27 ENCOUNTER — ALLIED HEALTH/NURSE VISIT (OUTPATIENT)
Dept: FAMILY MEDICINE | Facility: CLINIC | Age: 76
End: 2021-09-27
Payer: COMMERCIAL

## 2021-09-27 DIAGNOSIS — E53.8 VITAMIN B12 DEFICIENCY (NON ANEMIC): Primary | ICD-10-CM

## 2021-09-27 PROCEDURE — 96372 THER/PROPH/DIAG INJ SC/IM: CPT | Performed by: FAMILY MEDICINE

## 2021-09-27 PROCEDURE — 99207 PR NO CHARGE NURSE ONLY: CPT

## 2021-09-27 RX ADMIN — CYANOCOBALAMIN 1000 MCG: 1000 INJECTION, SOLUTION INTRAMUSCULAR; SUBCUTANEOUS at 12:56

## 2021-10-07 ENCOUNTER — VIRTUAL VISIT (OUTPATIENT)
Dept: NEUROLOGY | Facility: CLINIC | Age: 76
End: 2021-10-07
Payer: COMMERCIAL

## 2021-10-07 DIAGNOSIS — G62.9 NEUROPATHY: Primary | ICD-10-CM

## 2021-10-07 PROCEDURE — 99443 PR PHYSICIAN TELEPHONE EVALUATION 21-30 MIN: CPT | Mod: 95 | Performed by: PSYCHIATRY & NEUROLOGY

## 2021-10-07 RX ORDER — PREGABALIN 75 MG/1
225 CAPSULE ORAL AT BEDTIME
Qty: 180 CAPSULE | Refills: 5 | Status: SHIPPED | OUTPATIENT
Start: 2021-10-07 | End: 2022-07-20

## 2021-10-07 RX ORDER — TRIAMCINOLONE ACETONIDE 1 MG/G
1 OINTMENT TOPICAL 2 TIMES DAILY
COMMUNITY
Start: 2021-09-29 | End: 2022-04-14

## 2021-10-07 RX ORDER — FLUOROURACIL 50 MG/G
1 CREAM TOPICAL 2 TIMES DAILY
COMMUNITY
Start: 2021-07-02 | End: 2022-04-14

## 2021-10-07 NOTE — LETTER
10/7/2021       RE: Genaro Ortiz  [address]  [place] [address]     Dear Colleague,    Thank you for referring your patient, Genaro Ortiz, to the [facility]. Please see a copy of my visit note below.    [facility] Neurology Follow Up Visit    Genaro Ortiz MRN# [id number]   Age: 76 year old YOB: 1945     Brief history of symptoms: The patient was initially seen in neurologic consultation on 11/30/2020 and most recently 4/8/2021 for evaluation of proximal leg weakness in the setting of neuropathy. Please see the comprehensive neurologic consultation notes from those dates in the Epic records for details.     EMG 12/10/2020 did show length dependent sensory predominant polyneuropathy, but findings were insufficient to diagnose a generalized myopathy.    Labs for SPEP, Immunofixation,B12, Copper, Zinc, Vitamin D, Vitamin E, TSH w/T4, A1c, FARHAN, ANCA were revealing for monoclonal peak in the gamma fraction, with follow up testing showing kappa lambda light chains being normal and urine immunofixation showing small IgG without lambda.  He also had lowered B12 levels, and was to take injections for replacement with planned follow up with his PCP. The patient followed with Hematology (2/3/2021) for further evaluation of MGUS.    At our last visit, he was to trial Lyrica (titrate to 150 mg at bedtime, then contact me to increase dose if needed) for his ongoing distal sensory paresthesias an dysesthesias.    Interval history: The patient is reporting a great deal of help with his sleep while taking Lyrica, and it is helping reduce his body pain by 90%.  He isn't having any major side-effects with Lyrica.  He feels refreshed in the AM.   The patient hasn't had an improvement in his walking, and he still feels unstable.  He has attended physical therapy for his gait, but this isn't necessarily  helping.           Assessment and Plan:   Assessment:  Distal symmetric polyneuropathy with gait impairment    The patient has had improved dysesthesias with use a Lyrica and should continue on this medication at the current dose. Otherwise he may benefit from continued PT for his sensorimotor ataxia, especially to reduce fall risk.       Plan:  OT for cane or walker evaluation  Lyrica 225 mg at bedtime  Continue with B12 supplementation through PCP    Follow up in Neurology clinic in one year or earlier as needed should new concerns arise.    CATY Quesada D.O.   of Neurology    Total time today (22min) in this patient encounter was spent on pre-charting, counseling and/or coordination of care.         Again, thank you for allowing me to participate in the care of your patient.      Sincerely,    Valdez Quesada, DO

## 2021-10-07 NOTE — PROGRESS NOTES
[facility] Neurology Follow Up Visit    Genaro Ortiz MRN# [id number]   Age: 76 year old YOB: 1945     Brief history of symptoms: The patient was initially seen in neurologic consultation on 11/30/2020 and most recently 4/8/2021 for evaluation of proximal leg weakness in the setting of neuropathy. Please see the comprehensive neurologic consultation notes from those dates in the Epic records for details.     EMG 12/10/2020 did show length dependent sensory predominant polyneuropathy, but findings were insufficient to diagnose a generalized myopathy.    Labs for SPEP, Immunofixation,B12, Copper, Zinc, Vitamin D, Vitamin E, TSH w/T4, A1c, FARHAN, ANCA were revealing for monoclonal peak in the gamma fraction, with follow up testing showing kappa lambda light chains being normal and urine immunofixation showing small IgG without lambda.  He also had lowered B12 levels, and was to take injections for replacement with planned follow up with his PCP. The patient followed with Hematology (2/3/2021) for further evaluation of MGUS.    At our last visit, he was to trial Lyrica (titrate to 150 mg at bedtime, then contact me to increase dose if needed) for his ongoing distal sensory paresthesias an dysesthesias.    Interval history: The patient is reporting a great deal of help with his sleep while taking Lyrica, and it is helping reduce his body pain by 90%.  He isn't having any major side-effects with Lyrica.  He feels refreshed in the AM.   The patient hasn't had an improvement in his walking, and he still feels unstable.  He has attended physical therapy for his gait, but this isn't necessarily helping.           Assessment and Plan:   Assessment:  Distal symmetric polyneuropathy with gait impairment    The patient has had improved dysesthesias with use a Lyrica and should continue on this medication at the current dose. Otherwise he may benefit from continued PT for his sensorimotor ataxia, especially to reduce fall  risk.       Plan:  OT for cane or walker evaluation  Lyrica 225 mg at bedtime  Continue with B12 supplementation through PCP    Follow up in Neurology clinic in one year or earlier as needed should new concerns arise.    CATY Quesada D.O.   of Neurology    Total time today (22min) in this patient encounter was spent on pre-charting, counseling and/or coordination of care.

## 2021-10-07 NOTE — PROGRESS NOTES
Quinton is a 76 year old who is being evaluated via a billable telephone visit.      What phone number would you like to be contacted at? [phone]  How would you like to obtain your AVS? Deniz  Phone call duration: 22 minutes

## 2021-10-08 NOTE — PROGRESS NOTES
[facility]        OUTPATIENT PHYSICAL THERAPY FUNCTIONAL EVALUATION  PLAN OF TREATMENT FOR OUTPATIENT REHABILITATION  (COMPLETE FOR INITIAL CLAIMS ONLY)  Patient's Last Name, First Name, M.I.  YOB: 1945  Genaro Ortiz     Provider's Name   [facility]   Medical Record No.  [id number]     Start of Care Date:  12/23/20   Onset Date:  12/11/20   Type:     _X__PT   ____OT  ____SLP Medical Diagnosis:  Neuropathy     PT Diagnosis:  Impaired mobility Visits from SOC:  1                              __________________________________________________________________________________  Plan of Treatment/Functional Goals:  balance training, gait training, neuromuscular re-education, ROM, strengthening, stretching, transfer training, manual therapy, bed mobility training           GOALS  DGI  The pt will improve score on DGI to >15/24 due to improvmeents in dynamic balance  02/21/21    Romberg  The pt will achieve NBOS standing and be able to maintain stability for 30 seconds with his eyes closed, reducing fall risk  02/21/21    6MWT  The pt will increase gait distance on 6MWT to >900' due to improvements in walking tolerance and gait pattern, improving ability to ambulate in the community  02/21/21    TUG  The pt will improve TUG time to <25 seconds due to improvements in balance and ability to stand from a chair  02/21/21                                                Therapy Frequency:  1 time/week   Predicted Duration of Therapy Intervention:  60 days    Maricruz Cortez, PT                                    I CERTIFY THE NEED FOR THESE SERVICES FURNISHED UNDER        THIS PLAN OF TREATMENT AND WHILE UNDER MY CARE     (Physician co-signature of this document indicates review and certification of the therapy plan).                Certification Date From:  12/23/20    Certification Date To:  02/21/21    Referring Provider:  Valdez Quesada, DO     Initial Assessment  See Epic Evaluation- Start of Care Date: 12/23/20

## 2021-10-14 ENCOUNTER — MYC MEDICAL ADVICE (OUTPATIENT)
Dept: FAMILY MEDICINE | Facility: CLINIC | Age: 76
End: 2021-10-14

## 2021-10-27 ENCOUNTER — ALLIED HEALTH/NURSE VISIT (OUTPATIENT)
Dept: FAMILY MEDICINE | Facility: CLINIC | Age: 76
End: 2021-10-27
Payer: COMMERCIAL

## 2021-10-27 DIAGNOSIS — E53.8 VITAMIN B12 DEFICIENCY (NON ANEMIC): Primary | ICD-10-CM

## 2021-10-27 PROCEDURE — 96372 THER/PROPH/DIAG INJ SC/IM: CPT | Performed by: FAMILY MEDICINE

## 2021-10-27 PROCEDURE — 99207 PR NO CHARGE NURSE ONLY: CPT

## 2021-10-27 RX ADMIN — CYANOCOBALAMIN 1000 MCG: 1000 INJECTION, SOLUTION INTRAMUSCULAR; SUBCUTANEOUS at 13:13

## 2021-11-03 ENCOUNTER — LAB (OUTPATIENT)
Dept: LAB | Facility: CLINIC | Age: 76
End: 2021-11-03
Attending: INTERNAL MEDICINE
Payer: COMMERCIAL

## 2021-11-03 DIAGNOSIS — D47.2 MONOCLONAL GAMMOPATHY: ICD-10-CM

## 2021-11-03 DIAGNOSIS — G62.9 NEUROPATHY: ICD-10-CM

## 2021-11-03 LAB
ANION GAP SERPL CALCULATED.3IONS-SCNC: 5 MMOL/L (ref 3–14)
BASOPHILS # BLD AUTO: 0.1 10E3/UL (ref 0–0.2)
BASOPHILS NFR BLD AUTO: 1 %
BUN SERPL-MCNC: 23 MG/DL (ref 7–30)
CALCIUM SERPL-MCNC: 9.4 MG/DL (ref 8.5–10.1)
CHLORIDE BLD-SCNC: 107 MMOL/L (ref 94–109)
CO2 SERPL-SCNC: 26 MMOL/L (ref 20–32)
CREAT SERPL-MCNC: 0.91 MG/DL (ref 0.66–1.25)
EOSINOPHIL # BLD AUTO: 0.1 10E3/UL (ref 0–0.7)
EOSINOPHIL NFR BLD AUTO: 2 %
ERYTHROCYTE [DISTWIDTH] IN BLOOD BY AUTOMATED COUNT: 13.5 % (ref 10–15)
GFR SERPL CREATININE-BSD FRML MDRD: 83 ML/MIN/1.73M2
GLUCOSE BLD-MCNC: 108 MG/DL (ref 70–99)
HCT VFR BLD AUTO: 40.4 % (ref 40–53)
HGB BLD-MCNC: 13.7 G/DL (ref 13.3–17.7)
IMM GRANULOCYTES # BLD: 0 10E3/UL
IMM GRANULOCYTES NFR BLD: 0 %
LYMPHOCYTES # BLD AUTO: 2.4 10E3/UL (ref 0.8–5.3)
LYMPHOCYTES NFR BLD AUTO: 38 %
MCH RBC QN AUTO: 30.3 PG (ref 26.5–33)
MCHC RBC AUTO-ENTMCNC: 33.9 G/DL (ref 31.5–36.5)
MCV RBC AUTO: 89 FL (ref 78–100)
MONOCYTES # BLD AUTO: 0.5 10E3/UL (ref 0–1.3)
MONOCYTES NFR BLD AUTO: 8 %
NEUTROPHILS # BLD AUTO: 3.2 10E3/UL (ref 1.6–8.3)
NEUTROPHILS NFR BLD AUTO: 51 %
NRBC # BLD AUTO: 0 10E3/UL
NRBC BLD AUTO-RTO: 0 /100
PLATELET # BLD AUTO: 231 10E3/UL (ref 150–450)
POTASSIUM BLD-SCNC: 3.6 MMOL/L (ref 3.4–5.3)
RBC # BLD AUTO: 4.52 10E6/UL (ref 4.4–5.9)
SODIUM SERPL-SCNC: 138 MMOL/L (ref 133–144)
TOTAL PROTEIN SERUM FOR ELP: 8.2 G/DL (ref 6.8–8.8)
WBC # BLD AUTO: 6.2 10E3/UL (ref 4–11)

## 2021-11-03 PROCEDURE — 83883 ASSAY NEPHELOMETRY NOT SPEC: CPT | Mod: 90 | Performed by: PATHOLOGY

## 2021-11-03 PROCEDURE — 36415 COLL VENOUS BLD VENIPUNCTURE: CPT | Performed by: PATHOLOGY

## 2021-11-03 PROCEDURE — 84155 ASSAY OF PROTEIN SERUM: CPT | Mod: 90 | Performed by: PATHOLOGY

## 2021-11-03 PROCEDURE — 84165 PROTEIN E-PHORESIS SERUM: CPT | Mod: 26 | Performed by: PATHOLOGY

## 2021-11-03 PROCEDURE — 80048 BASIC METABOLIC PNL TOTAL CA: CPT | Performed by: PATHOLOGY

## 2021-11-03 PROCEDURE — 99000 SPECIMEN HANDLING OFFICE-LAB: CPT | Performed by: PATHOLOGY

## 2021-11-03 PROCEDURE — 85025 COMPLETE CBC W/AUTO DIFF WBC: CPT | Performed by: PATHOLOGY

## 2021-11-04 ENCOUNTER — VIRTUAL VISIT (OUTPATIENT)
Dept: ONCOLOGY | Facility: CLINIC | Age: 76
End: 2021-11-04
Attending: INTERNAL MEDICINE
Payer: COMMERCIAL

## 2021-11-04 DIAGNOSIS — D47.2 MONOCLONAL GAMMOPATHY: Primary | ICD-10-CM

## 2021-11-04 LAB
ALBUMIN SERPL ELPH-MCNC: 4.5 G/DL (ref 3.7–5.1)
ALPHA1 GLOB SERPL ELPH-MCNC: 0.4 G/DL (ref 0.2–0.4)
ALPHA2 GLOB SERPL ELPH-MCNC: 0.9 G/DL (ref 0.5–0.9)
B-GLOBULIN SERPL ELPH-MCNC: 0.8 G/DL (ref 0.6–1)
GAMMA GLOB SERPL ELPH-MCNC: 1.6 G/DL (ref 0.7–1.6)
KAPPA LC FREE SER-MCNC: 1.58 MG/DL (ref 0.33–1.94)
KAPPA LC FREE/LAMBDA FREE SER NEPH: 0.84 {RATIO} (ref 0.26–1.65)
LAMBDA LC FREE SERPL-MCNC: 1.88 MG/DL (ref 0.57–2.63)
M PROTEIN SERPL ELPH-MCNC: 1.1 G/DL
PROT PATTERN SERPL ELPH-IMP: ABNORMAL

## 2021-11-04 PROCEDURE — 999N001193 HC VIDEO/TELEPHONE VISIT; NO CHARGE

## 2021-11-04 PROCEDURE — 99442 PR PHYSICIAN TELEPHONE EVALUATION 11-20 MIN: CPT | Mod: 95 | Performed by: INTERNAL MEDICINE

## 2021-11-04 NOTE — PROGRESS NOTES
Hematology Clinic consult note    Reasons for Consult: -MGUS IgG lambda    History of Present Illness: Mr. Ortiz is a 75-year-old man who was evaluated by neurology on 11/30/2020 for leg pain and weakness that has been going on for about 6 months.  He says he was feeling a bit wobbly with his walking.  As part of that evaluation, he had serum protein electrophoresis and immunofixation done and was found to have a 1.0 g/dL IgG lambda monoclonal protein, and a smaller IgG lambda monoclonal protein.  Kappa and lambda light chains were normal.  Urine immunofixation showed a small amount of IgG, with no lambda.  He was also found to be B12 deficient.  He has started on B12 injections, and has received 2 injections, and says his legs are beginning to feel much better. 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  Skeletal survey in 2/2021 showed a circumscribed lucency in the humeral lateral condyle left but no definitive lytic lesions.    Interval history  Mr. Ortiz was interviewed via telephone visit today.  He reports overall doing okay.  He reports that the neuropathy has been doing much better since he started on pregabalin.  He no longer has any numbness tingling or pain and his sleep is much better.  His main issue however is arthritis in his back and the resultant weakness in his legs from that, and gait instability.  He specifically does not have pain in his back but he has been told that the muscle weakness is from his arthritis.  He has worked with physical therapy and learned exercises which he does every day 0, however he has to be very cautious and aware while he is walking.  He has not had any falls.  He denies any new bone pains.  He denies fevers or chills.  He denies nausea or vomiting.  He denies any changes in his appetite or bowel habits..    Past Medical History:  Hypertension  Hypercholesterolemia  History of prostate cancer 2007, status post  resection.  Neuropathy  GERD    Medications:  -Reviewed    Family History: mother- of breast cancer, father- at a young age, probably from ruptured appendix.  No siblings-she says that an aunt and uncle have had pernicious anemia.    Social History: smoking-never, alcohol-no, retired    Review of systems:  As in HPI.  The rest of the > 10 point review of systems was negative.      Physical exam:  Physical exam not done as this was a telephone visit    Labs:Results for JOLANTA EVERETT (MRN [id number]) as of 2021 12:20   Ref. Range 11/3/2021 10:02   WBC Latest Ref Range: 4.0 - 11.0 10e3/uL 6.2   Hemoglobin Latest Ref Range: 13.3 - 17.7 g/dL 13.7   Hematocrit Latest Ref Range: 40.0 - 53.0 % 40.4   Platelet Count Latest Ref Range: 150 - 450 10e3/uL 231   Results for JOLANTA EVERETT (MRN [id number]) as of 2021 12:20   Ref. Range 11/3/2021 10:02   Sodium Latest Ref Range: 133 - 144 mmol/L 138   Potassium Latest Ref Range: 3.4 - 5.3 mmol/L 3.6   Chloride Latest Ref Range: 94 - 109 mmol/L 107   Carbon Dioxide Latest Ref Range: 20 - 32 mmol/L 26   Urea Nitrogen Latest Ref Range: 7 - 30 mg/dL 23   Creatinine Latest Ref Range: 0.66 - 1.25 mg/dL 0.91   GFR Estimate Latest Ref Range: >60 mL/min/1.73m2 83   Calcium Latest Ref Range: 8.5 - 10.1 mg/dL 9.4   Anion Gap Latest Ref Range: 3 - 14 mmol/L 5     B12 280 2021    XR Skeletal Survey  Impression:   Bone survey has limited sensitivity.  1. Multilevel moderate compression deformity of thoracic vertebral  bodies.  2. Circumscribed lucency in the left distal humeral lateral condyle,  nonspecific.     Assessment and Recommendation: -This is a 76-year-old man with neuropathy, B12 deficiency, and IgG lambda biclonal gammopathy.  I believe that the neuropathy is due to the B12 deficiency and the monoclonal gammopathy is an incidental finding.      1.  B12 deficiency-he is receiving monthly B12 injections and has received this for about 11 months now.  We  discussed with him that if he wished, he has the option to switch over to a daily pill (1000 mcg) of B12 a day instead of the monthly shot.  He can discuss with his PCP.    2.  Monoclonal gammopathy, IgG Lambda-  His CBC, creatinine, calcium are normal from yesterday.  His skeletal survey earlier this year did not identify any definitive lytic lesions.  We do not have his SPEP and free light chains back from yesterday but previously they had shown a biclonal, IgG, less than 1 g/dL, and normal kappa and lambda light chains.  All this indicates that this is a low risk MGUS.    We will call him regarding the results of the pending lab tests.  We will decide on follow-up once we have those results.    Patient interviewed and discussed with Dr. Eboni Michel MD  PGY6 Fellow. Hematology/Oncology/Transplantation    Attending Note:  I have reviewed the patient chart, and interviewed and examined the patient.   Repeat labs including SPEP and kappa/lambda light chains so no significant change.  Therefore he has low risk MGUS with risk of progression to myeloma at only 5% over the next 20 years.  There is no need to routinely check SPEP or kappa lambda.  He should follow-up with his primary care physician and can be referred back if there is any indication of progression to myeloma, such as unexplained renal failure, hypercalcemia, unexplained anemia or lytic bone lesions.    He does not need routine follow-up in hematology clinic.    Telephone visit: Total telephone time 15 minutes.      Smiley Lyn MD  Hematology

## 2021-11-04 NOTE — PROGRESS NOTES
Quinton is a 76 year old who is being evaluated via a billable video visit.      How would you like to obtain your AVS? [employer]  If the video visit is dropped, the invitation should be resent by: Text to cell phone: [phone]  Will anyone else be joining your video visit? No      Video Start Time:   Video-Visit Details    Type of service:  Telephone visit-switch due to technical difficulties with video.

## 2021-11-04 NOTE — LETTER
11/4/2021         RE: Genaro Ortiz  [address]  [facility] [address]        Dear Colleague,    Thank you for referring your patient, Genaro Ortiz, to the [facility]. Please see a copy of my visit note below.    Hematology Clinic consult note    Reasons for Consult: -MGUS IgG lambda    History of Present Illness: Mr. Ortiz is a 75-year-old man who was evaluated by neurology on 11/30/2020 for leg pain and weakness that has been going on for about 6 months.  He says he was feeling a bit wobbly with his walking.  As part of that evaluation, he had serum protein electrophoresis and immunofixation done and was found to have a 1.0 g/dL IgG lambda monoclonal protein, and a smaller IgG lambda monoclonal protein.  Kappa and lambda light chains were normal.  Urine immunofixation showed a small amount of IgG, with no lambda.  He was also found to be B12 deficient.  He has started on B12 injections, and has received 2 injections, and says his legs are beginning to feel much better. 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  Skeletal survey in 2/2021 showed a circumscribed lucency in the humeral lateral condyle left but no definitive lytic lesions.    Interval history  Mr. Ortiz was interviewed via telephone visit today.  He reports overall doing okay.  He reports that the neuropathy has been doing much better since he started on pregabalin.  He no longer has any numbness tingling or pain and his sleep is much better.  His main issue however is arthritis in his back and the resultant weakness in his legs from that, and gait instability.  He specifically does not have pain in his back but he has been told that the muscle weakness is from his arthritis.  He has worked with physical therapy and learned exercises which he does every day 0, however he has to be very cautious and aware while he is walking.  He has not had any falls.  He  denies any new bone pains.  He denies fevers or chills.  He denies nausea or vomiting.  He denies any changes in his appetite or bowel habits..    Past Medical History:  Hypertension  Hypercholesterolemia  History of prostate cancer , status post resection.  Neuropathy  GERD    Medications:  -Reviewed    Family History: mother- of breast cancer, father- at a young age, probably from ruptured appendix.  No siblings-she says that an aunt and uncle have had pernicious anemia.    Social History: smoking-never, alcohol-no, retired    Review of systems:  As in HPI.  The rest of the > 10 point review of systems was negative.      Physical exam:  Physical exam not done as this was a telephone visit    Labs:Results for JOLANTA EVERETT (MRN [id number]) as of 2021 12:20   Ref. Range 11/3/2021 10:02   WBC Latest Ref Range: 4.0 - 11.0 10e3/uL 6.2   Hemoglobin Latest Ref Range: 13.3 - 17.7 g/dL 13.7   Hematocrit Latest Ref Range: 40.0 - 53.0 % 40.4   Platelet Count Latest Ref Range: 150 - 450 10e3/uL 231   Results for JOLANTA EVERETT (MRN [id number]) as of 2021 12:20   Ref. Range 11/3/2021 10:02   Sodium Latest Ref Range: 133 - 144 mmol/L 138   Potassium Latest Ref Range: 3.4 - 5.3 mmol/L 3.6   Chloride Latest Ref Range: 94 - 109 mmol/L 107   Carbon Dioxide Latest Ref Range: 20 - 32 mmol/L 26   Urea Nitrogen Latest Ref Range: 7 - 30 mg/dL 23   Creatinine Latest Ref Range: 0.66 - 1.25 mg/dL 0.91   GFR Estimate Latest Ref Range: >60 mL/min/1.73m2 83   Calcium Latest Ref Range: 8.5 - 10.1 mg/dL 9.4   Anion Gap Latest Ref Range: 3 - 14 mmol/L 5     B12 280 2021    XR Skeletal Survey  Impression:   Bone survey has limited sensitivity.  1. Multilevel moderate compression deformity of thoracic vertebral  bodies.  2. Circumscribed lucency in the left distal humeral lateral condyle,  nonspecific.     Assessment and Recommendation: -This is a 76-year-old man with neuropathy, B12 deficiency, and IgG lambda  biclonal gammopathy.  I believe that the neuropathy is due to the B12 deficiency and the monoclonal gammopathy is an incidental finding.      1.  B12 deficiency-he is receiving monthly B12 injections and has received this for about 11 months now.  We discussed with him that if he wished, he has the option to switch over to a daily pill (1000 mcg) of B12 a day instead of the monthly shot.  He can discuss with his PCP.    2.  Monoclonal gammopathy, IgG Lambda-  His CBC, creatinine, calcium are normal from yesterday.  His skeletal survey earlier this year did not identify any definitive lytic lesions.  We do not have his SPEP and free light chains back from yesterday but previously they had shown a biclonal, IgG, less than 1 g/dL, and normal kappa and lambda light chains.  All this indicates that this is a low risk MGUS.    We will call him regarding the results of the pending lab tests.  We will decide on follow-up once we have those results.    Patient interviewed and discussed with Dr. Eboni Michel MD  PGY6 Fellow. Hematology/Oncology/Transplantation    Attending Note:  I have reviewed the patient chart, and interviewed and examined the patient.   Repeat labs including SPEP and kappa/lambda light chains so no significant change.  Therefore he has low risk MGUS with risk of progression to myeloma at only 5% over the next 20 years.  There is no need to routinely check SPEP or kappa lambda.  He should follow-up with his primary care physician and can be referred back if there is any indication of progression to myeloma, such as unexplained renal failure, hypercalcemia, unexplained anemia or lytic bone lesions.    He does not need routine follow-up in hematology clinic.    Telephone visit: Total telephone time 15 minutes.      Smiley Lyn MD  Hematology

## 2021-11-09 ENCOUNTER — OFFICE VISIT (OUTPATIENT)
Dept: OPHTHALMOLOGY | Facility: CLINIC | Age: 76
End: 2021-11-09
Attending: OPHTHALMOLOGY
Payer: COMMERCIAL

## 2021-11-09 ENCOUNTER — MYC MEDICAL ADVICE (OUTPATIENT)
Dept: FAMILY MEDICINE | Facility: CLINIC | Age: 76
End: 2021-11-09

## 2021-11-09 DIAGNOSIS — H35.371 EPIRETINAL MEMBRANE (ERM) OF RIGHT EYE: Primary | ICD-10-CM

## 2021-11-09 DIAGNOSIS — Z96.1 PSEUDOPHAKIA OF BOTH EYES: ICD-10-CM

## 2021-11-09 DIAGNOSIS — H35.051 CHOROID NEOVASCULARIZATION, RIGHT: ICD-10-CM

## 2021-11-09 PROCEDURE — G0463 HOSPITAL OUTPT CLINIC VISIT: HCPCS

## 2021-11-09 PROCEDURE — 92134 CPTRZ OPH DX IMG PST SGM RTA: CPT | Performed by: OPHTHALMOLOGY

## 2021-11-09 PROCEDURE — 99213 OFFICE O/P EST LOW 20 MIN: CPT | Mod: GC | Performed by: OPHTHALMOLOGY

## 2021-11-09 ASSESSMENT — REFRACTION_WEARINGRX
OS_AXIS: 092
OS_ADD: +2.50
OD_AXIS: 110
OD_CYLINDER: +0.50
OD_ADD: +2.50
OD_SPHERE: -0.50
OS_CYLINDER: +0.50
SPECS_TYPE: PAL
OS_SPHERE: -1.00

## 2021-11-09 ASSESSMENT — TONOMETRY
OS_IOP_MMHG: 10
OD_IOP_MMHG: 10
IOP_METHOD: ICARE

## 2021-11-09 ASSESSMENT — VISUAL ACUITY
CORRECTION_TYPE: GLASSES
OD_CC: 20/25
OS_CC: 20/25
OS_CC+: +2
METHOD: SNELLEN - LINEAR
OD_CC+: -2

## 2021-11-09 ASSESSMENT — CONF VISUAL FIELD
OS_NORMAL: 1
OD_NORMAL: 1
METHOD: COUNTING FINGERS

## 2021-11-09 ASSESSMENT — SLIT LAMP EXAM - LIDS
COMMENTS: NORMAL
COMMENTS: NORMAL

## 2021-11-09 NOTE — PROGRESS NOTES
Chief Complaint(s) and History of Present Illness(es)     Follow Up     Laterality: right eye    Onset: gradual    Course: stable    Associated symptoms: floaters.  Negative for eye pain, dryness, tearing,   flashes and photophobia    Treatments tried: no treatments    Pain scale: 0/10    Comments: Epiretinal membrane (ERM) of right eye               Comments     Pt states vision is stable since last visit.  No change to floaters.  No ocular medications.    TARAN Bhatt November 9, 2021 9:35 AM                Review of systems for the eyes was negative other than the pertinent positives/negatives listed in the HPI.      Assessment & Plan      Genaro Ortiz is a 76 year old male with the following diagnoses:   1. Epiretinal membrane (ERM) of right eye    2. Choroid neovascularization, right    3. Pseudophakia of both eyes         Returns for recheck of Epiretinal membrane with associated peripapillary choroidal neovascular membrane right eye     Stable Epiretinal membrane with slightly improved intraretinal fluid right eye from peripapillary choroidal neovascular membrane     Vision stable  Doing well, no change in vision or Amsler since last exam  Continue to monitor   Return precautions reviewed     Patient disposition:   Return in about 5 months (around 4/9/2022) for VT only, OCT Macula.    Fabio Andre MD  Ophthalmology Resident, PGY-4       Attending Physician Attestation:  Complete documentation of historical and exam elements from today's encounter can be found in the full encounter summary report (not reduplicated in this progress note).  I personally obtained the chief complaint(s) and history of present illness.  I confirmed and edited as necessary the review of systems, past medical/surgical history, family history, social history, and examination findings as documented by others; and I examined the patient myself.  I personally reviewed the relevant tests, images, and reports as documented above.   I formulated and edited as necessary the assessment and plan and discussed the findings and management plan with the patient and family. . - Attending Physician Image/Tesing Attestation: I personally reviewed the ophthalmic test(s) associated with this encounter, agree with the interpretation(s) as documented by the resident/fellow, and have edited the corresponding report(s) as necessary.  Eric Abel MD

## 2021-11-09 NOTE — NURSING NOTE
Chief Complaints and History of Present Illnesses   Patient presents with     Follow Up     Epiretinal membrane (ERM) of right eye      Chief Complaint(s) and History of Present Illness(es)     Follow Up     Laterality: right eye    Onset: gradual    Course: stable    Associated symptoms: floaters.  Negative for eye pain, dryness, tearing, flashes and photophobia    Treatments tried: no treatments    Pain scale: 0/10    Comments: Epiretinal membrane (ERM) of right eye               Comments     Pt states vision is stable since last visit.  No change to floaters.  No ocular medications.    TARAN Bhatt November 9, 2021 9:35 AM

## 2021-12-09 ENCOUNTER — TRANSFERRED RECORDS (OUTPATIENT)
Dept: HEALTH INFORMATION MANAGEMENT | Facility: CLINIC | Age: 76
End: 2021-12-09
Payer: COMMERCIAL

## 2022-03-02 ENCOUNTER — HOSPITAL ENCOUNTER (OUTPATIENT)
Dept: OCCUPATIONAL THERAPY | Facility: CLINIC | Age: 77
End: 2022-03-02
Attending: FAMILY MEDICINE
Payer: COMMERCIAL

## 2022-03-02 DIAGNOSIS — D47.2 MGUS (MONOCLONAL GAMMOPATHY OF UNKNOWN SIGNIFICANCE): Primary | ICD-10-CM

## 2022-03-02 DIAGNOSIS — M62.81 PROXIMAL LIMB MUSCLE WEAKNESS: ICD-10-CM

## 2022-03-02 DIAGNOSIS — Z78.9 DECREASED ACTIVITIES OF DAILY LIVING (ADL): ICD-10-CM

## 2022-03-02 PROCEDURE — 97535 SELF CARE MNGMENT TRAINING: CPT | Mod: GO | Performed by: OCCUPATIONAL THERAPIST

## 2022-03-02 PROCEDURE — 97166 OT EVAL MOD COMPLEX 45 MIN: CPT | Mod: GO | Performed by: OCCUPATIONAL THERAPIST

## 2022-03-02 ASSESSMENT — ACTIVITIES OF DAILY LIVING (ADL): IADL_QUICK_ADDS: COMMUNITY MOBILITY

## 2022-03-03 NOTE — PROGRESS NOTES
[facility]          OUTPATIENT OCCUPATIONAL THERAPY  EVALUATION  PLAN OF TREATMENT FOR OUTPATIENT REHABILITATION  (COMPLETE FOR INITIAL CLAIMS ONLY)  Patient's Last Name, First Name, M.I.  YOB: 1945  Genaro Ortiz                        Provider's Name  [facility] Medical Record No.  [id number]                               Onset Date:     02/08/22 (order date)   Start of Care Date:     03/02/22   Type:     ___PT   _X_OT   ___SLP Medical Diagnosis:     Proximal limb muscle weakness, MGUS (monoclonal gammopathy of unkonwn significance)                          OT Diagnosis:     decreased safety with ADL/IADL Visits from SOC:  1   _________________________________________________________________________________  Plan of Treatment/Functional Goals:  ADL training, Community/work reintegration, Self care/Home management, Therapeutic activities, Strengthening        Goals  Goal Identifier: 1-Tub/shower transfer  Goal Description: Patient will complete tub/shower transfer with modified independence using DME as needed to improve ability to complete safely at home.  Target Date: 05/01/22     Goal Identifier: 2-LE dressing  Goal Description: Patient will complete LE dressing with modified independence using AE and adapted strategies.  Target Date: 05/01/22     Goal Identifier: 3-HEP  Goal Description: Patient will demonstrate independence with UE HEP to maintain strength for functional transfers while dealing with LE weakness.  Target Date: 05/01/22     Goal Identifier: 4-Functional reaching  Goal Description: Patient will demonstrate safety and modified independence with carrying/transporting items with no LOB to resume independence and safety to for meal prep, laundry, IADL tasks, etc.  Target Date: 05/01/22         Therapy Frequency: 1x/week      Predicted Duration of  Therapy Intervention (days/wks): 4  Megan Snider OTR/L          I CERTIFY THE NEED FOR THESE SERVICES FURNISHED UNDER        THIS PLAN OF TREATMENT AND WHILE UNDER MY CARE     (Physician co-signature of this document indicates review and certification of the therapy plan).                Certification date from: 03/02/22, Certification date to: 05/01/22               Referring Physician: Francisco Sneed MD     Initial Assessment        See Epic Evaluation      Start Of Care Date: 03/02/22

## 2022-03-03 NOTE — PROGRESS NOTES
03/02/22 1400   Quick Adds   Quick Adds Certification   Type of Visit Initial Outpatient Occupational Therapy Evaluation   General Information   Start Of Care Date 03/02/22   Referring Physician Francisco Sneed MD   Orders Evaluate and treat as indicated   Orders Date 02/08/22   Medical Diagnosis Proximal limb muscle weakness, MGUS (monoclonal gammopathy of unkonwn significance)   Onset of Illness/Injury or Date of Surgery 02/08/22  (order date)   Precautions/Limitations Fall precautions   Special Instructions Mobility related issues   Surgical/Medical History Reviewed Yes   Additional Occupational Profile Info/Pertinent History of Current Problem Per chart: Mr. Ortiz is a 75-year-old man who was evaluated by neurology on 11/30/2020 for leg pain and weakness that has been going on for about 6 months.  He says he was feeling a bit wobbly with his walking.  Multiple tests over the past year and was diagnosed with MGUS (Monoclonal gammopathy of undetermined significance) and proximal limb muscle weakness.  He has worked with physical therapy and learned exercises which he does not feel are helping much.   He has arthritis affecting ROM through hips/LE's, walks with very wide GENNY.  He purchased a 4WW on his own a few months ago.   Role/Living Environment   Patient role/Employment history Retired   Community/Avocational Activities Takes a [place] language class and reading, goes to [employer], Tuesday programs at [employer] ([place]), used to walk and go to the gym    Current Living Environment Apartment/condo  (alone)   Number of Stairs to Enter Home 0   Number of Stairs Within Home 0   Primary Bathroom Set Up/Equipment Raised toilet seat;Tub/Shower combo  (uses vanity to push up from toilet)   Home/Community Accessibility Comments Laundry on each floor-carries laundry.  He uses 4WW only in the community.     Prior Level - Transfers Independent   Prior Level - Ambulation Assistive equipment   Prior Level - ADLS  Independent   Prior Responsibilities - IADL Meal Preparation;Housekeeping;Laundry;Shopping;Medication management;Finances;Driving   Prior Level Comments Looking in to a cleaning lady.     Current Assistive Devices - Mobility   (4WW, recently bought over [place])   Current Assistive Devices - ADL Sock aide   Patient/family Goals Statement To decrease risk for falls, improve independence   Pain   Patient currently in pain No   Fall Risk Screen   Have you fallen 2 or more times in the past year? Yes   Have you fallen and had an injury in the past year? No   Fall screen comments Patient fell in his shower a few months ago, denies hitting his head.  Recommend PT but patient declining as he doesn't feel how they will help him.    Abuse Screen (yes response referral indicated)   Feels Unsafe at Home or Work/School no   Feels Threatened by Someone no   Does Anyone Try to Keep You From Having Contact with Others or Doing Things Outside Your Home? no   Physical Signs of Abuse Present no   Cognitive Status Examination   Orientation Orientation to person, place and time   Level of Consciousness Alert   Follows Commands and Answers Questions 100% of the time;Able to follow multistep instructions   Personal Safety and Judgment Intact   Memory Intact   Visual Perception   Visual Perception No deficits were identified   Sensation   Sensation Comments Neuropathy in LE's, denies any numbness or tingling in his hands.    Range of Motion (ROM)   ROM Quick Adds No deficits identified   Strength   Strength No deficits were identified   Strength Comments 5/5 MMT throughout shoulders and elbows.   Hand Strength   Hand Dominance Right   Hand Strength Comments WNLs   Coordination   Upper Extremity Coordination No deficits were identified   Balance   Balance Comments Recommend PT.   Functional Mobility   Functional Mobility Comments Very wide GENNY when walking, reports very fearful with stepping up onto a curb.   Mobility   Bed Mobility  "Comments Not assessed this date   Transfer Skills   Transfer Comments Increased effort for sit to stand, using mostly arms to stand due to LE weakness.  Had difficulty getting up from low surfaces.   Toilet Transfer   Toilet Transfer Comments Reports no issues and the raised toilet seat has helped significantly.   Tub/Shower Transfer   Tub/Shower Transfer Comments Recently started sponge bathing due to recent fall in his shower.     Bathing   Level of Crisp - Bathing independent   Bathing Comments sponge bathes   Upper Body Dressing   Level of Crisp: Dress Upper Body independent   Lower Body Dressing   Level of Crisp: Dress Lower Body Independent (but increased effort and reports struggling)   Assistive Device sock-aid   Lower Body Dressing Comments slip on shoes (threw all shoes away with ties), lays in bed and rolls around to get clothes over his feet    Toileting   Level of Crisp: Toilet independent   Grooming   Level of Crisp: Grooming independent   Eating/Self-Feeding   Level of Crisp: Eating independent   Instrumental Activities of Daily Living Assessment   IADL Assessment/Observations Patient is independent with all IADLs.  He has modified his kitchen layout with moving frequently used items within easy reach.  Patient folds and lifts his walker in/out of the backseat of his car without issues.  \"I have adapted in many ways.\"   IADL Quick Adds Community Mobility   Community Mobility Drives daily, small 2 door sedan, reports no difficulty moving RLE to gas/brake pedal.  Has handicapped parking.   Planned Therapy Interventions   Planned Therapy Interventions ADL training;Community/work reintegration;Self care/Home management;Therapeutic activities;Strengthening   Adult OT Eval Goals   OT Eval Goals (Adult) 1;2;3;4    OT Goal 1   Goal Identifier 1-Tub/shower transfer   Goal Description Patient will complete tub/shower transfer with modified independence using DME as " needed to improve ability to complete safely at home.   Target Date 05/01/22    OT Goal 2   Goal Identifier 2-LE dressing   Goal Description Patient will complete LE dressing with modified independence using AE and adapted strategies.   Target Date 05/01/22    OT Goal 3   Goal Identifier 3-HEP   Goal Description Patient will demonstrate independence with UE HEP to maintain strength for functional transfers while dealing with LE weakness.   Target Date 05/01/22   OT Goal 4   Goal Identifier 4-Functional reaching   Goal Description Patient will demonstrate safety and modified independence with carrying/transporting items with no LOB to resume independence and safety to for meal prep, laundry, IADL tasks, etc.   Target Date 05/01/22   Clinical Impression   Criteria for Skilled Therapeutic Interventions Met Yes, treatment indicated   OT Diagnosis decreased safety with ADL/IADL   Influenced by the following impairments decreased functional mobility, decreased LE ROM and weakness   Assessment of Occupational Performance 3-5 Performance Deficits   Identified Performance Deficits dressing, bathing, functional mobility, community mobility   Clinical Decision Making (Complexity) Moderate complexity   Therapy Frequency 1x/week    Predicted Duration of Therapy Intervention (days/wks) 4   Risks and Benefits of Treatment have been explained. Yes   Patient, Family & other staff in agreement with plan of care Yes   Education Assessment   Barriers To Learning Physical   Preferred Learning Style Listening;Reading;Demonstration;Pictures/video   Therapy Certification   Certification date from 03/02/22   Certification date to 05/01/22   Total Evaluation Time   OT Eval, Moderate Complexity Minutes ([id number]) 30

## 2022-03-09 ENCOUNTER — HOSPITAL ENCOUNTER (OUTPATIENT)
Dept: OCCUPATIONAL THERAPY | Facility: CLINIC | Age: 77
End: 2022-03-09
Payer: COMMERCIAL

## 2022-03-09 DIAGNOSIS — Z78.9 DECREASED ACTIVITIES OF DAILY LIVING (ADL): ICD-10-CM

## 2022-03-09 DIAGNOSIS — D47.2 MGUS (MONOCLONAL GAMMOPATHY OF UNKNOWN SIGNIFICANCE): Primary | ICD-10-CM

## 2022-03-09 PROCEDURE — 97535 SELF CARE MNGMENT TRAINING: CPT | Mod: GO | Performed by: OCCUPATIONAL THERAPIST

## 2022-03-14 ENCOUNTER — HOSPITAL ENCOUNTER (OUTPATIENT)
Dept: OCCUPATIONAL THERAPY | Facility: CLINIC | Age: 77
Discharge: HOME | End: 2022-03-14
Payer: COMMERCIAL

## 2022-03-14 DIAGNOSIS — M62.81 PROXIMAL LIMB MUSCLE WEAKNESS: ICD-10-CM

## 2022-03-14 DIAGNOSIS — D47.2 MGUS (MONOCLONAL GAMMOPATHY OF UNKNOWN SIGNIFICANCE): Primary | ICD-10-CM

## 2022-03-14 DIAGNOSIS — Z78.9 DECREASED ACTIVITIES OF DAILY LIVING (ADL): ICD-10-CM

## 2022-03-14 PROCEDURE — 97110 THERAPEUTIC EXERCISES: CPT | Mod: GO | Performed by: OCCUPATIONAL THERAPIST

## 2022-03-14 PROCEDURE — 97535 SELF CARE MNGMENT TRAINING: CPT | Mod: GO | Performed by: OCCUPATIONAL THERAPIST

## 2022-03-24 ENCOUNTER — HOSPITAL ENCOUNTER (OUTPATIENT)
Dept: OCCUPATIONAL THERAPY | Facility: CLINIC | Age: 77
Discharge: HOME | End: 2022-03-24
Payer: COMMERCIAL

## 2022-03-24 DIAGNOSIS — M62.81 PROXIMAL LIMB MUSCLE WEAKNESS: ICD-10-CM

## 2022-03-24 DIAGNOSIS — D47.2 MGUS (MONOCLONAL GAMMOPATHY OF UNKNOWN SIGNIFICANCE): Primary | ICD-10-CM

## 2022-03-24 DIAGNOSIS — Z78.9 DECREASED ACTIVITIES OF DAILY LIVING (ADL): ICD-10-CM

## 2022-03-24 PROCEDURE — 97535 SELF CARE MNGMENT TRAINING: CPT | Mod: GO | Performed by: OCCUPATIONAL THERAPIST

## 2022-03-30 ENCOUNTER — HOSPITAL ENCOUNTER (OUTPATIENT)
Dept: OCCUPATIONAL THERAPY | Facility: CLINIC | Age: 77
Discharge: HOME | End: 2022-03-30
Payer: COMMERCIAL

## 2022-03-30 DIAGNOSIS — D47.2 MGUS (MONOCLONAL GAMMOPATHY OF UNKNOWN SIGNIFICANCE): Primary | ICD-10-CM

## 2022-03-30 DIAGNOSIS — Z78.9 DECREASED ACTIVITIES OF DAILY LIVING (ADL): ICD-10-CM

## 2022-03-30 PROCEDURE — 97535 SELF CARE MNGMENT TRAINING: CPT | Mod: GO | Performed by: OCCUPATIONAL THERAPIST

## 2022-03-30 PROCEDURE — 97110 THERAPEUTIC EXERCISES: CPT | Mod: GO | Performed by: OCCUPATIONAL THERAPIST

## 2022-04-04 NOTE — PROGRESS NOTES
[facility]    Outpatient Occupational Therapy Discharge Note  Patient: Genaro Ortiz  : 1945    Beginning/End Dates of Reporting Period:  3/2/2022 to 3/30/2022    Referring Provider: Francisco Sneed MD    Therapy Diagnosis: Proximal limb muscle weakness, MGUS (monoclonal gammopathy of unkonwn significance)    Client Self Report: Patient bought an extended tub bench and put it together and also had maintence installed the hand held shower head.  He is using the reacher, long sponge and raised toilet seat and reports that they are helping significantly.     Goals:     Goal Identifier 1-Tub/shower transfer   Goal Description Patient will complete tub/shower transfer with modified independence using DME as needed to improve ability to complete safely at home.   Target Date 22   Date Met  22   Progress (detail required for progress note): Patient able to complete tub/shower transfer using extended tub bench with modified independence.  Patient purchased one for home and had apartment put in an hand held shower head.     Goal Identifier 2-LE dressing   Goal Description Patient will complete LE dressing with modified independence using AE and adapted strategies.   Target Date 22   Date Met  22   Progress (detail required for progress note): Goal met.  Patient educated on compensatory techniques Using the reacher and dressing stick and reports modified independence.     Goal Identifier 3-HEP   Goal Description Patient will demonstrate independence with UE HEP to maintain strength for functional transfers while dealing with LE weakness.   Target Date 22   Date Met      Progress (detail required for progress note): Partially met.  Patient instructed in theraband HEP and needing min cues to complete correctly following pictures.     Goal Identifier 4-Functional reaching    Goal Description Patient will demonstrate safety and modified independence with carrying/transporting items with no LOB to resume independence and safety to for meal prep, laundry, IADL tasks, etc.   Target Date 05/01/22   Date Met  03/30/22   Progress (detail required for progress note):       Plan:  Discharge from therapy. Patient has been seen for 5 OP OT sessions including initial evaluation for education and practice using DME/AE to increase safety and independence with tub/shower transfer, LE dressing and ADLs/functional reaching.  Patient purchased an extended tub bench, hand held shower head, non-skid bath mat, reacher and dressing stick.  He continues to use his 4WW, walks with very wide GENNY, and demonstrating difficulty getting in/out of his sedan car.  Discussed using a handy bar as well as alternate transportation options.      Discharge:    Reason for Discharge: Patient has met all goals.    Discharge Plan: Patient to continue home program.

## 2022-04-13 ENCOUNTER — OFFICE VISIT (OUTPATIENT)
Dept: FAMILY MEDICINE | Facility: CLINIC | Age: 77
End: 2022-04-13
Payer: COMMERCIAL

## 2022-04-13 VITALS
HEART RATE: 64 BPM | OXYGEN SATURATION: 98 % | DIASTOLIC BLOOD PRESSURE: 67 MMHG | TEMPERATURE: 97.1 F | SYSTOLIC BLOOD PRESSURE: 111 MMHG

## 2022-04-13 DIAGNOSIS — L60.0 INGROWN TOENAIL: Primary | ICD-10-CM

## 2022-04-13 PROCEDURE — 99214 OFFICE O/P EST MOD 30 MIN: CPT | Performed by: FAMILY MEDICINE

## 2022-04-13 RX ORDER — MUPIROCIN 20 MG/G
OINTMENT TOPICAL 3 TIMES DAILY
Qty: 30 G | Refills: 0 | Status: SHIPPED | OUTPATIENT
Start: 2022-04-13 | End: 2023-05-04

## 2022-04-13 NOTE — PROGRESS NOTES
"  Assessment & Plan     Ingrown toenail  We discussed applying topical bactroban up to three times a day , can soak in lukewarm water with betadine in it , if no improvement , will schedule with podiatry for the ingrown nail removal   If the infection gets worse , he will let me know and I can send a Rx for oral Abx   - Orthopedic  Referral; Future  - mupirocin (BACTROBAN) 2 % external ointment; Apply topically 3 times daily Apply topically three x a day on the toe         BMI:   Estimated body mass index is 32.99 kg/m  as calculated from the following:    Height as of 6/21/21: 1.746 m (5' 8.75\").    Weight as of 6/21/21: 100.6 kg (221 lb 12.8 oz).       RTC if no improving or worsening.  Pt is aware  and comfortable with the current plan.      Sherron Munoz MD  [facility] is a 76 year old who presents for the following health issues     History of Present Illness       Reason for visit:  Sore toe ,large toe on right  Symptom onset:  1-2 weeks ago  Symptoms include:  Sore right toe  Symptom intensity:  Mild  Symptom progression:  Staying the same  Had these symptoms before:  Yes  Has tried/received treatment for these symptoms:  No  What makes it worse:  Not exactly  What makes it better:  Maybe wearing socks    He eats 2-3 servings of fruits and vegetables daily.He consumes 1 sweetened beverage(s) daily.He exercises with enough effort to increase his heart rate 20 to 29 minutes per day.  He exercises with enough effort to increase his heart rate 4 days per week.   He is taking medications regularly.     Trouble walking and bumped this toe a couple of yrs ago but has had pain in that toe on and off for two yrs   Now recently got more sore and today he went to get his toenails clipped and was told to have the toe checked and he walked in here today         Review of Systems   Constitutional, HEENT, cardiovascular, pulmonary, GI, , musculoskeletal, neuro, skin, " endocrine and psych systems are negative, except as otherwise noted.      Objective    There were no vitals taken for this visit.  There is no height or weight on file to calculate BMI.  Physical Exam   GENERAL: healthy, alert and no distress  EYES: Eyes grossly normal to inspection, PERRL and conjunctivae and sclerae normal  NECK: no adenopathy, no asymmetry, masses, or scars and thyroid normal to palpation  RESP: lungs clear to auscultation - no rales, rhonchi or wheezes  MS: no gross musculoskeletal defects noted, no edema EXCEPT there is ingrown toenail -right big toen medially with some drainage there but no erythema , some tenderness with deep palpation on the top of the nail     No results found for any visits on 04/13/22.

## 2022-04-19 ENCOUNTER — OFFICE VISIT (OUTPATIENT)
Dept: OPHTHALMOLOGY | Facility: CLINIC | Age: 77
End: 2022-04-19
Attending: OPHTHALMOLOGY
Payer: COMMERCIAL

## 2022-04-19 DIAGNOSIS — G60.9 IDIOPATHIC NEUROPATHY: ICD-10-CM

## 2022-04-19 DIAGNOSIS — Z96.1 PSEUDOPHAKIA OF BOTH EYES: ICD-10-CM

## 2022-04-19 DIAGNOSIS — H35.371 EPIRETINAL MEMBRANE (ERM) OF RIGHT EYE: Primary | ICD-10-CM

## 2022-04-19 DIAGNOSIS — H35.051 CHOROID NEOVASCULARIZATION, RIGHT: ICD-10-CM

## 2022-04-19 PROCEDURE — G0463 HOSPITAL OUTPT CLINIC VISIT: HCPCS

## 2022-04-19 PROCEDURE — 92014 COMPRE OPH EXAM EST PT 1/>: CPT | Performed by: OPHTHALMOLOGY

## 2022-04-19 PROCEDURE — 92134 CPTRZ OPH DX IMG PST SGM RTA: CPT | Performed by: OPHTHALMOLOGY

## 2022-04-19 ASSESSMENT — VISUAL ACUITY
METHOD: SNELLEN - LINEAR
OS_CC: 20/20
CORRECTION_TYPE: GLASSES
OD_CC: 20/20
OD_CC+: -3

## 2022-04-19 ASSESSMENT — CONF VISUAL FIELD
OS_NORMAL: 1
OD_NORMAL: 1
METHOD: COUNTING FINGERS

## 2022-04-19 ASSESSMENT — TONOMETRY
IOP_METHOD: TONOPEN
OD_IOP_MMHG: 16
OS_IOP_MMHG: 14

## 2022-04-19 ASSESSMENT — CUP TO DISC RATIO
OS_RATIO: 0.1
OD_RATIO: 0.1

## 2022-04-19 ASSESSMENT — SLIT LAMP EXAM - LIDS
COMMENTS: NORMAL
COMMENTS: NORMAL

## 2022-04-19 NOTE — NURSING NOTE
Chief Complaints and History of Present Illnesses   Patient presents with     Follow Up     Chief Complaint(s) and History of Present Illness(es)     Follow Up     Laterality: right eye    Frequency: constantly    Timing: throughout the day    Associated symptoms: Negative for eye pain, flashes and floaters    Pain scale: 0/10              Comments     Follow up for ERM of RE / Pt states VA seems unchanged and states BE comfortable and has not needed art tears. Denies new floaters/flashes.  Giuliana Mishra, COT COT 9:00 AM 04/19/2022

## 2022-04-19 NOTE — PROGRESS NOTES
Chief Complaint(s) and History of Present Illness(es)     Follow Up     Laterality: right eye    Frequency: constantly    Timing: throughout the day    Associated symptoms: Negative for eye pain, flashes and floaters    Pain scale: 0/10              Comments     Follow up for ERM of RE / Pt states VA seems unchanged and states BE   comfortable and has not needed art tears. Denies new floaters/flashes.  Giuliana Mishra, COT COT 9:00 AM 04/19/2022                Review of systems for the eyes was negative other than the pertinent positives/negatives listed in the HPI.      Assessment & Plan      Genaro Ortiz is a 76 year old male with the following diagnoses:   1. Epiretinal membrane (ERM) of right eye    2. Choroid neovascularization, right    3. Pseudophakia of both eyes    4. Idiopathic neuropathy         Dilated fundus exam today.  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         Attending Physician Attestation:  Complete documentation of historical and exam elements from today's encounter can be found in the full encounter summary report (not reduplicated in this progress note).  I personally obtained the chief complaint(s) and history of present illness.  I confirmed and edited as necessary the review of systems, past medical/surgical history, family history, social history, and examination findings as documented by others; and I examined the patient myself.  I personally reviewed the relevant tests, images, and reports as documented above.  I formulated and edited as necessary the assessment and plan and discussed the findings and management plan with the patient and family. . - Eric Abel MD

## 2022-05-13 ENCOUNTER — OFFICE VISIT (OUTPATIENT)
Dept: PODIATRY | Facility: CLINIC | Age: 77
End: 2022-05-13
Attending: FAMILY MEDICINE
Payer: COMMERCIAL

## 2022-05-13 VITALS — SYSTOLIC BLOOD PRESSURE: 112 MMHG | HEIGHT: 69 IN | DIASTOLIC BLOOD PRESSURE: 72 MMHG | BODY MASS INDEX: 32.99 KG/M2

## 2022-05-13 DIAGNOSIS — L60.0 ONYCHOCRYPTOSIS: Primary | ICD-10-CM

## 2022-05-13 PROCEDURE — 99202 OFFICE O/P NEW SF 15 MIN: CPT | Performed by: PODIATRIST

## 2022-05-13 NOTE — PATIENT INSTRUCTIONS
PATIENT INSTRUCTIONS - Podiatry / Foot & Ankle Surgery    Continue Bactroban and nail trimming by nurses

## 2022-05-13 NOTE — PROGRESS NOTES
Assessment:      ICD-10-CM    1. Onychocryptosis  L60.0           Plan:  No orders of the defined types were placed in this encounter.      Discussed the etiology and treatment of the condition with the patient.  Discussed treatment options.    Due to trouble reaching feet, don't recommend removal    Continue foot care nurses      Return:  No follow-ups on file.    Rae Wiseman DPM                Chief Complaint:     No chief complaint on file.     right ingrown toenail    HPI:  Genaro Ortiz is a 76 year old year old male who presents for evaluation of ingrown toenail.    Hard time reaching feet  Walker    Past Medical & Surgical History:  Past Medical History:   Diagnosis Date     Adenocarcinoma of prostate (H) 5/27/2008     Arthritis      Esophageal reflux      Neuropathy      Pure hypercholesterolemia      Unspecified essential hypertension       Past Surgical History:   Procedure Laterality Date     ABDOMEN SURGERY  1952    Appendectomy     APPENDECTOMY  1952     CATARACT IOL, RT/LT Bilateral 03/12     COLONOSCOPY  2017     GENITOURINARY SURGERY       HERNIORRHAPHY INGUINAL  5/15/2014    Procedure: HERNIORRHAPHY INGUINAL;  Surgeon: Evens Jefferson MD;  Location: [facility] OR     PROSTATE SURGERY  2007      Family History   Problem Relation Age of Onset     Glaucoma Other      Diabetes Mother      Gastrointestinal Disease Mother         pancreas removed     Breast Cancer Mother      Osteoporosis Father      Obesity Father      Macular Degeneration No family hx of      Hypertension No family hx of         Social History:  ?  History   Smoking Status     Never Smoker   Smokeless Tobacco     Never Used     History   Drug Use No     Social History    Substance and Sexual Activity      Alcohol use: Not Currently        Alcohol/week: 0.0 standard drinks        Comment: occasional      Allergies:  ?   Allergies   Allergen Reactions     Penicillins Other (See Comments)     blotches        Medications:     Current Outpatient Medications   Medication     ASPIRIN 81 MG OR TABS     cholecalciferol (VITAMIN D) 1000 UNIT tablet     fish oil-omega-3 fatty acids (FISH OIL) 1000 MG capsule     hydrochlorothiazide (HYDRODIURIL) 25 MG tablet     lisinopril (ZESTRIL) 40 MG tablet     mupirocin (BACTROBAN) 2 % external ointment     pregabalin (LYRICA) 75 MG capsule     simvastatin (ZOCOR) 20 MG tablet     Current Facility-Administered Medications   Medication     bevacizumab (AVASTIN) intravitreal inj 1.25 mg     cyanocobalamin injection 1,000 mcg     cyanocobalamin injection 1,000 mcg       Physical Exam:  ?  Vitals:  There were no vitals taken for this visit.   General:  WD/WN, in NAD.  A&O x3.  Dermatologic:    Onychocryptosis present b/l border(s) of hallux right.  Paronychia present.  Skin texture, turgor is normal.  Vascular:  Generalized edema- 1+ bilateral.  Focal edema- none to affected digit(s).  Neurologic:    Gross sensation normal.  Gait and balance abnormal, walker.  Musculoskeletal:  Maximal pain to palpation of affected nail border(s).  Gross deformity absent.

## 2022-05-22 DIAGNOSIS — I10 HYPERTENSION GOAL BP (BLOOD PRESSURE) < 140/90: ICD-10-CM

## 2022-05-22 DIAGNOSIS — E78.5 HYPERLIPIDEMIA LDL GOAL <130: ICD-10-CM

## 2022-05-23 RX ORDER — LISINOPRIL 40 MG/1
TABLET ORAL
Qty: 90 TABLET | Refills: 0 | Status: SHIPPED | OUTPATIENT
Start: 2022-05-23 | End: 2022-08-22

## 2022-05-23 RX ORDER — SIMVASTATIN 20 MG
TABLET ORAL
Qty: 90 TABLET | Refills: 0 | Status: SHIPPED | OUTPATIENT
Start: 2022-05-23 | End: 2022-08-22

## 2022-05-23 RX ORDER — HYDROCHLOROTHIAZIDE 25 MG/1
25 TABLET ORAL DAILY
Qty: 90 TABLET | Refills: 0 | Status: SHIPPED | OUTPATIENT
Start: 2022-05-23 | End: 2022-08-22

## 2022-05-23 NOTE — TELEPHONE ENCOUNTER
Routing refill request to provider for review/approval because:  Labs not current:    LDL Cholesterol Calculated   Date Value Ref Range Status   06/23/2020 72 <100 mg/dL Final     Comment:     Desirable:       <100 mg/dl     Poonam Abel RN, BSN, PHN  [facility]: [place]

## 2022-07-05 NOTE — PROGRESS NOTES
The following medication was given:     MEDICATION: Vitamin B12  1000 mcg  ROUTE: IM  SITE: Arm - Right  DOSE: 1 mL  LOT #: [id number]  :  [employer] [place]  EXPIRATION DATE:  8/1/2021  NDC: 7955-0590-98    Clinic Administered Medication Documentation      Injectable Medication Documentation    Patient was given Cyanocobalamin (B-12). Prior to medication administration, verified patients identity using patient s name and date of birth. Please see MAR and medication order for additional information. Patient instructed to remain in clinic for 15 minutes, report any adverse reaction to staff immediately  and stay in clinic after the injection but patient declined.      Was entire vial of medication used? Yes  Vial/Syringe: Single dose vial  Expiration Date:  8/1/2021  Was this medication supplied by the patient? No      
(V4) confused

## 2022-07-20 ENCOUNTER — LAB (OUTPATIENT)
Dept: LAB | Facility: CLINIC | Age: 77
End: 2022-07-20
Payer: COMMERCIAL

## 2022-07-20 ENCOUNTER — OFFICE VISIT (OUTPATIENT)
Dept: NEUROLOGY | Facility: CLINIC | Age: 77
End: 2022-07-20
Payer: COMMERCIAL

## 2022-07-20 VITALS — HEART RATE: 67 BPM | SYSTOLIC BLOOD PRESSURE: 118 MMHG | OXYGEN SATURATION: 96 % | DIASTOLIC BLOOD PRESSURE: 75 MMHG

## 2022-07-20 DIAGNOSIS — E53.8 VITAMIN B12 DEFICIENCY (NON ANEMIC): ICD-10-CM

## 2022-07-20 DIAGNOSIS — R74.8 ABNORMAL LEVELS OF OTHER SERUM ENZYMES: ICD-10-CM

## 2022-07-20 DIAGNOSIS — R63.30 FEEDING DIFFICULTIES, UNSPECIFIED: ICD-10-CM

## 2022-07-20 DIAGNOSIS — G62.9 NEUROPATHY: ICD-10-CM

## 2022-07-20 DIAGNOSIS — D47.2 MONOCLONAL GAMMOPATHY: Primary | ICD-10-CM

## 2022-07-20 DIAGNOSIS — D47.2 MONOCLONAL GAMMOPATHY: ICD-10-CM

## 2022-07-20 LAB
HCYS SERPL-SCNC: 15.5 UMOL/L (ref 4–12)
VIT B12 SERPL-MCNC: 894 PG/ML (ref 193–986)

## 2022-07-20 PROCEDURE — 84630 ASSAY OF ZINC: CPT | Mod: 90 | Performed by: PATHOLOGY

## 2022-07-20 PROCEDURE — 82607 VITAMIN B-12: CPT | Performed by: PATHOLOGY

## 2022-07-20 PROCEDURE — 82525 ASSAY OF COPPER: CPT | Mod: 90 | Performed by: PATHOLOGY

## 2022-07-20 PROCEDURE — 99000 SPECIMEN HANDLING OFFICE-LAB: CPT | Performed by: PATHOLOGY

## 2022-07-20 PROCEDURE — 86341 ISLET CELL ANTIBODY: CPT | Mod: 90 | Performed by: PATHOLOGY

## 2022-07-20 PROCEDURE — 36415 COLL VENOUS BLD VENIPUNCTURE: CPT | Performed by: PATHOLOGY

## 2022-07-20 PROCEDURE — 83921 ORGANIC ACID SINGLE QUANT: CPT | Mod: 90 | Performed by: PATHOLOGY

## 2022-07-20 PROCEDURE — 99215 OFFICE O/P EST HI 40 MIN: CPT | Performed by: PSYCHIATRY & NEUROLOGY

## 2022-07-20 PROCEDURE — 83090 ASSAY OF HOMOCYSTEINE: CPT | Mod: 90 | Performed by: PATHOLOGY

## 2022-07-20 RX ORDER — PREGABALIN 75 MG/1
225 CAPSULE ORAL 2 TIMES DAILY
Qty: 180 CAPSULE | Refills: 5 | Status: SHIPPED | OUTPATIENT
Start: 2022-07-20 | End: 2022-08-30

## 2022-07-20 NOTE — LETTER
7/20/2022       RE: Genaro Ortiz  [address]  [facility] [address]     Dear Colleague,    Thank you for referring your patient, Genaro Ortiz, to the [facility]. Please see a copy of my visit note below.    [facility] Neurology Follow Up Visit    Genaro Ortiz MRN# [id number]   Age: 77 year old YOB: 1945     Brief history of symptoms: The patient was initially seen in neurologic consultation on 11/30/2020 and most recently 10/7/2021 for evaluation of neuropathy. Please see the comprehensive neurologic consultation notes from those dates in the Epic records for details.     Prior EMG in 2020 showed length dependent polyneuropathy, but not a myopathy. He had lower B12 levels, and did have findings on serum/urine testing concerning for IgG w/out lambda spike for which he was seen with hematology 2/3/2021 for.   Lyrica was quite helpful for his symptoms of body pain and poor sleep. He was to continue on Lyrca 225 mg at bedtime, and use a walker/cane for balance concerns.     Interval history: The patient has noted that in the later afternoon he has neuropathic pain, often with getting up and down in his joints.  He is using a walker for his instability.       Physical Exam:   Vitals: /75   Pulse 67   SpO2 96%    General: Seated comfortably in no acute distress.  HEENT: Neck supple with normal range of motion.   Skin: No rashes  Neurologic:     Mental Status: Fully alert, attentive and oriented. Speech clear and fluent, no paraphasic errors.     Cranial Nerves: EOMI with normal smooth pursuit. Facial movements symmetric. Hearing not formally tested but intact to conversation.  No dysarthria.     Motor: No tremors or other abnormal movements observed. Severe tightness in legs, specifically the adductors of the thighs and hip flexors.  Not necessarily spastic or rigid, and can be overcome  with slow external pressure (cannot do it himself).  Motions are restricted with hip flexion and extension but with limited motion (20-30 degrees) he can give full resistance.  Can passively move hips to a 90 degree flexed position, and he can be antigravity b/l but not resist external force (4-).  Thighs adduction and abduction are of a similar nature. No specific weakness with knee flexion/extension or PF/DF noted.     DTR: Upper extremities with 2+ and symmetric findings throughout.  Patellar on right 2+, Left is 1+. Absent achilles b/l. No clonus, toes are mute.       Sensory: cannot test Romberg due to restrictions in leg movement.  Loss of vibration and proprioception in toes and ankles.  Loss of vibration sensation up to knees, and mid-legs.        Coordination: Finger-nose-finger without dysmetria.     Gait: severely abnormal.  The patient's stance is extremely wide (over 2.5 feet from heel to heel if not a bit further). He has          Assessment and Plan:   Assessment:  Severe sensorimotor distal symmetric polyneuropathy    The patient's exam is extremely different regarding gait and muscle tone/weakness compared to 2020, and I am unsure if this is in response musculoskeletally to his proprioceptive deficits from his neuropathy or from some other process.  The tightness in the legs and gait could be compensatory, and this is my highest suspicion given the lack of other findings suggestive for myelopathy and lack of symptoms of the upper body.  However, his severe neuropathy could also hide some myelopathic findings.  I think starting his work up with repeat serum studies and EMG is a reasonable first step, followed by possible imaging of the thoracic or lumbar spine pending results.       Plan:  - MITESH, B12, MMA, Homocysteine, Copper, Zinc  - EMG b/l lower    Lyrica:   - Titrate to 225 mg BID by adding on AM dosing at 4 day intervals (increasing by 75 mg)    Follow up in Neurology clinic in 2-3 months (with  me, in-person) or earlier as needed should new concerns arise.      CATY Quesada D.O.   of Neurology    Total time today (46 min) in this patient encounter was spent on pre-charting, counseling and/or coordination of care.

## 2022-07-20 NOTE — PATIENT INSTRUCTIONS
I still think you have a neuropathy leading to your loss of sensation in the legs, which is severe. However, the way you have compensated with walking is concerning that another process could be ongoing.  I want you to recheck your leg weakness and neuropathy with an EMG, as well as serum studies.  I will use the information to determine if repeat MRI thoracic spine and lumbar spine need imaging.      For symptom management:  Lyrica: 75 mg tablets:  - Take 1 tab in the morning and 3 at night for 4 days, then   - take 2 tabs in the morning and 3 at night for 4 days, then   - take 3 tabs twice daily.

## 2022-07-20 NOTE — PROGRESS NOTES
[facility] Neurology Follow Up Visit    Genaro Ortiz MRN# [id number]   Age: 77 year old YOB: 1945     Brief history of symptoms: The patient was initially seen in neurologic consultation on 11/30/2020 and most recently 10/7/2021 for evaluation of neuropathy. Please see the comprehensive neurologic consultation notes from those dates in the Epic records for details.     Prior EMG in 2020 showed length dependent polyneuropathy, but not a myopathy. He had lower B12 levels, and did have findings on serum/urine testing concerning for IgG w/out lambda spike for which he was seen with hematology 2/3/2021 for.   Lyrica was quite helpful for his symptoms of body pain and poor sleep. He was to continue on Lyrca 225 mg at bedtime, and use a walker/cane for balance concerns.     Interval history: The patient has noted that in the later afternoon he has neuropathic pain, often with getting up and down in his joints.  He is using a walker for his instability.       Physical Exam:   Vitals: /75   Pulse 67   SpO2 96%    General: Seated comfortably in no acute distress.  HEENT: Neck supple with normal range of motion.   Skin: No rashes  Neurologic:     Mental Status: Fully alert, attentive and oriented. Speech clear and fluent, no paraphasic errors.     Cranial Nerves: EOMI with normal smooth pursuit. Facial movements symmetric. Hearing not formally tested but intact to conversation.  No dysarthria.     Motor: No tremors or other abnormal movements observed. Severe tightness in legs, specifically the adductors of the thighs and hip flexors.  Not necessarily spastic or rigid, and can be overcome with slow external pressure (cannot do it himself).  Motions are restricted with hip flexion and extension but with limited motion (20-30 degrees) he can give full resistance.  Can passively move hips to a 90 degree flexed position, and he can be antigravity b/l but not resist external force (4-).  Thighs adduction and  abduction are of a similar nature. No specific weakness with knee flexion/extension or PF/DF noted.     DTR: Upper extremities with 2+ and symmetric findings throughout.  Patellar on right 2+, Left is 1+. Absent achilles b/l. No clonus, toes are mute.       Sensory: cannot test Romberg due to restrictions in leg movement.  Loss of vibration and proprioception in toes and ankles.  Loss of vibration sensation up to knees, and mid-legs.        Coordination: Finger-nose-finger without dysmetria.     Gait: severely abnormal.  The patient's stance is extremely wide (over 2.5 feet from heel to heel if not a bit further). He has          Assessment and Plan:   Assessment:  Severe sensorimotor distal symmetric polyneuropathy    The patient's exam is extremely different regarding gait and muscle tone/weakness compared to 2020, and I am unsure if this is in response musculoskeletally to his proprioceptive deficits from his neuropathy or from some other process.  The tightness in the legs and gait could be compensatory, and this is my highest suspicion given the lack of other findings suggestive for myelopathy and lack of symptoms of the upper body.  However, his severe neuropathy could also hide some myelopathic findings.  I think starting his work up with repeat serum studies and EMG is a reasonable first step, followed by possible imaging of the thoracic or lumbar spine pending results.       Plan:  - MITESH, B12, MMA, Homocysteine, Copper, Zinc  - EMG b/l lower    Lyrica:   - Titrate to 225 mg BID by adding on AM dosing at 4 day intervals (increasing by 75 mg)    Follow up in Neurology clinic in 2-3 months (with me, in-person) or earlier as needed should new concerns arise.    CATY Quesada D.O.   of Neurology    Total time today (46 min) in this patient encounter was spent on pre-charting, counseling and/or coordination of care.

## 2022-07-24 LAB
COPPER SERPL-MCNC: 121 UG/DL
ZINC SERPL-MCNC: 79.5 UG/DL

## 2022-07-26 LAB
GAD65 AB SER IA-ACNC: <5 IU/ML
METHYLMALONATE SERPL-SCNC: 0.17 UMOL/L (ref 0–0.4)

## 2022-08-19 DIAGNOSIS — I10 HYPERTENSION GOAL BP (BLOOD PRESSURE) < 140/90: ICD-10-CM

## 2022-08-19 DIAGNOSIS — E78.5 HYPERLIPIDEMIA LDL GOAL <130: ICD-10-CM

## 2022-08-20 ENCOUNTER — HEALTH MAINTENANCE LETTER (OUTPATIENT)
Age: 77
End: 2022-08-20

## 2022-08-22 RX ORDER — LISINOPRIL 40 MG/1
TABLET ORAL
Qty: 90 TABLET | Refills: 0 | Status: SHIPPED | OUTPATIENT
Start: 2022-08-22 | End: 2022-08-30

## 2022-08-22 RX ORDER — SIMVASTATIN 20 MG
TABLET ORAL
Qty: 90 TABLET | Refills: 0 | Status: SHIPPED | OUTPATIENT
Start: 2022-08-22 | End: 2022-08-30

## 2022-08-22 RX ORDER — HYDROCHLOROTHIAZIDE 25 MG/1
TABLET ORAL
Qty: 90 TABLET | Refills: 0 | Status: SHIPPED | OUTPATIENT
Start: 2022-08-22 | End: 2022-08-30

## 2022-08-22 NOTE — TELEPHONE ENCOUNTER
Refill,  Former Dr. Sneed patient.   Never seen Dr. Wegener.  Will need Nedra to address refill until care established with Wegener on 8/30/22.  Jessica DUARTE RN

## 2022-08-22 NOTE — TELEPHONE ENCOUNTER
Routing refill request to provider for review/approval because:  Failed protocol.      Megan Garrett RN

## 2022-08-23 ASSESSMENT — ACTIVITIES OF DAILY LIVING (ADL)
CURRENT_FUNCTION: PREPARING MEALS REQUIRES ASSISTANCE
CURRENT_FUNCTION: TRANSPORTATION REQUIRES ASSISTANCE

## 2022-08-23 ASSESSMENT — ENCOUNTER SYMPTOMS
ABDOMINAL PAIN: 0
DIZZINESS: 0
SORE THROAT: 0
HEMATURIA: 0
FEVER: 0
NAUSEA: 0
SHORTNESS OF BREATH: 0
CONSTIPATION: 0
JOINT SWELLING: 0
COUGH: 0
NERVOUS/ANXIOUS: 0
EYE PAIN: 0
DIARRHEA: 0
HEMATOCHEZIA: 0
HEARTBURN: 0
MYALGIAS: 1
CHILLS: 0
FREQUENCY: 0
WEAKNESS: 1
DYSURIA: 0
HEADACHES: 0
ARTHRALGIAS: 1
PALPITATIONS: 0
PARESTHESIAS: 0

## 2022-08-30 ENCOUNTER — OFFICE VISIT (OUTPATIENT)
Dept: FAMILY MEDICINE | Facility: CLINIC | Age: 77
End: 2022-08-30
Payer: COMMERCIAL

## 2022-08-30 VITALS
TEMPERATURE: 96.3 F | OXYGEN SATURATION: 98 % | SYSTOLIC BLOOD PRESSURE: 138 MMHG | DIASTOLIC BLOOD PRESSURE: 78 MMHG | HEART RATE: 63 BPM | RESPIRATION RATE: 16 BRPM

## 2022-08-30 DIAGNOSIS — G62.9 NEUROPATHY: ICD-10-CM

## 2022-08-30 DIAGNOSIS — I10 HYPERTENSION GOAL BP (BLOOD PRESSURE) < 140/90: ICD-10-CM

## 2022-08-30 DIAGNOSIS — Z12.5 SCREENING FOR PROSTATE CANCER: ICD-10-CM

## 2022-08-30 DIAGNOSIS — Z12.11 SCREEN FOR COLON CANCER: ICD-10-CM

## 2022-08-30 DIAGNOSIS — Z85.46 HISTORY OF PROSTATE CANCER: ICD-10-CM

## 2022-08-30 DIAGNOSIS — Z51.81 ENCOUNTER FOR MONITORING OF CHRONIC ASPIRIN THERAPY: ICD-10-CM

## 2022-08-30 DIAGNOSIS — E78.5 HYPERLIPIDEMIA LDL GOAL <130: ICD-10-CM

## 2022-08-30 DIAGNOSIS — E53.8 VITAMIN B12 DEFICIENCY (NON ANEMIC): ICD-10-CM

## 2022-08-30 DIAGNOSIS — Z79.82 ENCOUNTER FOR MONITORING OF CHRONIC ASPIRIN THERAPY: ICD-10-CM

## 2022-08-30 DIAGNOSIS — M62.81 PROXIMAL LIMB MUSCLE WEAKNESS: ICD-10-CM

## 2022-08-30 DIAGNOSIS — C61 ADENOCARCINOMA OF PROSTATE (H): ICD-10-CM

## 2022-08-30 DIAGNOSIS — Z00.00 ENCOUNTER FOR MEDICARE ANNUAL WELLNESS EXAM: Primary | ICD-10-CM

## 2022-08-30 DIAGNOSIS — Z90.79 HISTORY OF PROSTATECTOMY: ICD-10-CM

## 2022-08-30 DIAGNOSIS — E55.9 HYPOVITAMINOSIS D: ICD-10-CM

## 2022-08-30 DIAGNOSIS — D47.2 MGUS (MONOCLONAL GAMMOPATHY OF UNKNOWN SIGNIFICANCE): ICD-10-CM

## 2022-08-30 LAB
DEPRECATED CALCIDIOL+CALCIFEROL SERPL-MC: 39 UG/L (ref 20–75)
ERYTHROCYTE [DISTWIDTH] IN BLOOD BY AUTOMATED COUNT: 13.7 % (ref 10–15)
FOLATE SERPL-MCNC: 12.9 NG/ML (ref 4.6–34.8)
HCT VFR BLD AUTO: 41.9 % (ref 40–53)
HGB BLD-MCNC: 13.9 G/DL (ref 13.3–17.7)
MCH RBC QN AUTO: 31 PG (ref 26.5–33)
MCHC RBC AUTO-ENTMCNC: 33.2 G/DL (ref 31.5–36.5)
MCV RBC AUTO: 94 FL (ref 78–100)
PLATELET # BLD AUTO: 168 10E3/UL (ref 150–450)
RBC # BLD AUTO: 4.48 10E6/UL (ref 4.4–5.9)
VIT B12 SERPL-MCNC: 916 PG/ML (ref 232–1245)
WBC # BLD AUTO: 4.7 10E3/UL (ref 4–11)

## 2022-08-30 PROCEDURE — 82607 VITAMIN B-12: CPT | Performed by: FAMILY MEDICINE

## 2022-08-30 PROCEDURE — 82306 VITAMIN D 25 HYDROXY: CPT | Performed by: FAMILY MEDICINE

## 2022-08-30 PROCEDURE — 85027 COMPLETE CBC AUTOMATED: CPT | Performed by: FAMILY MEDICINE

## 2022-08-30 PROCEDURE — 80053 COMPREHEN METABOLIC PANEL: CPT | Performed by: FAMILY MEDICINE

## 2022-08-30 PROCEDURE — 84153 ASSAY OF PSA TOTAL: CPT | Performed by: FAMILY MEDICINE

## 2022-08-30 PROCEDURE — 80061 LIPID PANEL: CPT | Performed by: FAMILY MEDICINE

## 2022-08-30 PROCEDURE — 82746 ASSAY OF FOLIC ACID SERUM: CPT | Performed by: FAMILY MEDICINE

## 2022-08-30 PROCEDURE — G0439 PPPS, SUBSEQ VISIT: HCPCS | Performed by: FAMILY MEDICINE

## 2022-08-30 PROCEDURE — 36415 COLL VENOUS BLD VENIPUNCTURE: CPT | Performed by: FAMILY MEDICINE

## 2022-08-30 RX ORDER — PREGABALIN 75 MG/1
225 CAPSULE ORAL 2 TIMES DAILY
Qty: 180 CAPSULE | Refills: 5 | Status: SHIPPED | OUTPATIENT
Start: 2022-08-30 | End: 2023-06-07

## 2022-08-30 RX ORDER — HYDROCHLOROTHIAZIDE 25 MG/1
TABLET ORAL
Qty: 90 TABLET | Refills: 3 | Status: ON HOLD | OUTPATIENT
Start: 2022-08-30 | End: 2023-07-18

## 2022-08-30 RX ORDER — SIMVASTATIN 20 MG
TABLET ORAL
Qty: 90 TABLET | Refills: 3 | Status: ON HOLD | OUTPATIENT
Start: 2022-08-30 | End: 2023-10-03

## 2022-08-30 RX ORDER — LISINOPRIL 40 MG/1
40 TABLET ORAL DAILY
Qty: 90 TABLET | Refills: 3 | Status: ON HOLD | OUTPATIENT
Start: 2022-08-30 | End: 2023-07-18

## 2022-08-30 ASSESSMENT — ENCOUNTER SYMPTOMS
HEADACHES: 0
NERVOUS/ANXIOUS: 0
WEAKNESS: 1
COUGH: 0
DYSURIA: 0
NAUSEA: 0
HEARTBURN: 0
PARESTHESIAS: 0
FEVER: 0
SORE THROAT: 0
CONSTIPATION: 0
DIARRHEA: 0
HEMATOCHEZIA: 0
EYE PAIN: 0
ARTHRALGIAS: 1
DIZZINESS: 0
HEMATURIA: 0
PALPITATIONS: 0
MYALGIAS: 1
CHILLS: 0
ABDOMINAL PAIN: 0
SHORTNESS OF BREATH: 0
FREQUENCY: 0
JOINT SWELLING: 0

## 2022-08-30 ASSESSMENT — ACTIVITIES OF DAILY LIVING (ADL)
CURRENT_FUNCTION: TRANSPORTATION REQUIRES ASSISTANCE
CURRENT_FUNCTION: PREPARING MEALS REQUIRES ASSISTANCE

## 2022-08-30 ASSESSMENT — PAIN SCALES - GENERAL: PAINLEVEL: NO PAIN (0)

## 2022-08-30 NOTE — PROGRESS NOTES
The patient was provided with suggestions to help him develop a healthy physical lifestyle.  The patient reports that he has difficulty with activities of daily living. I have asked that the patient make a follow up appointment in *** weeks where this issue will be further evaluated and addressed.  The patient was provided with written information regarding signs of hearing loss.  The patient was provided with suggestions to help him develop a healthy emotional lifestyle.

## 2022-08-30 NOTE — PROGRESS NOTES
"SUBJECTIVE:   Genaro Ortiz is a 77 year old male who presents for Preventive Visit.      Patient has been advised of split billing requirements and indicates understanding: Yes  Are you in the first 12 months of your Medicare coverage?  No    Healthy Habits:     In general, how would you rate your overall health?  Fair    Frequency of exercise:  2-3 days/week    Duration of exercise:  15-30 minutes    Do you usually eat at least 4 servings of fruit and vegetables a day, include whole grains    & fiber and avoid regularly eating high fat or \"junk\" foods?  Yes    Taking medications regularly:  Yes    Ability to successfully perform activities of daily living:  Transportation requires assistance and preparing meals requires assistance    Home Safety:  No safety concerns identified    Hearing Impairment:  Find that men's voices are easier to understand than woman's and difficulty understanding speech on the telephone    In the past 6 months, have you been bothered by leaking of urine?  No    In general, how would you rate your overall mental or emotional health?  Fair      PHQ-2 Total Score: 0    Additional concerns today:  Yes    Do you feel safe in your environment? Yes    Have you ever done Advance Care Planning? (For example, a Health Directive, POLST, or a discussion with a medical provider or your loved ones about your wishes): Yes, patient states has an Advance Care Planning document and will bring a copy to the clinic.       Fall risk  Fallen 2 or more times in the past year?: No  Any fall with injury in the past year?: No    Cognitive Screening   1) Repeat 3 items (Leader, Season, Table)    2) Clock draw: NORMAL  3) 3 item recall: Recalls 3 objects  Results: 3 items recalled: COGNITIVE IMPAIRMENT LESS LIKELY    Mini-CogTM Copyright CHAVEZ Burton. Licensed by the author for use in [facility]; reprinted with permission (kalpana@.[facility]). All rights reserved.      Do you have sleep apnea, excessive snoring " or daytime drowsiness?: no    Reviewed and updated as needed this visit by clinical staff   Tobacco  Allergies  Meds   Med Hx  Surg Hx  Fam Hx  Soc Hx          Reviewed and updated as needed this visit by Provider                   Social History     Tobacco Use     Smoking status: Never Smoker     Smokeless tobacco: Never Used     Tobacco comment: Never smoked.   Substance Use Topics     Alcohol use: Not Currently     Comment: occasional     If you drink alcohol do you typically have >3 drinks per day or >7 drinks per week? Not applicable    No flowsheet data found.        PROBLEMS TO ADD ON...  -------------------------------------  Overall physically and emotionally well except tragically had sudden on-set severe  Neuropathy thought secondary to mgus.     Working with neurology team.  Connected with pt/ot, home safety.  Still driving and living independently but concerned about future.  [place].     Very connected to [employer] as support.     Current providers sharing in care for this patient include:   Patient Care Team:  Wegener, Joel Daniel Irwin, MD as PCP - General (Family Medicine)  Francisco Sneed MD as Assigned PCP  Eric Abel MD as MD (Ophthalmology)  Eric Abel MD as Assigned Surgical Provider  Smiley Lyn MD as Assigned Cancer Care Provider    The following health maintenance items are reviewed in Epic and correct as of today:  Health Maintenance Due   Topic Date Due     ZOSTER IMMUNIZATION (3 of 3) 12/16/2019     COLORECTAL CANCER SCREENING  01/31/2022     ANNUAL REVIEW OF HM ORDERS  06/21/2022     INFLUENZA VACCINE (1) 09/01/2022               Review of Systems   Constitutional: Negative for chills and fever.   HENT: Negative for congestion, ear pain, hearing loss and sore throat.    Eyes: Negative for pain and visual disturbance.   Respiratory: Negative for cough and shortness of breath.    Cardiovascular: Negative for  "chest pain, palpitations and peripheral edema.   Gastrointestinal: Negative for abdominal pain, constipation, diarrhea, heartburn, hematochezia and nausea.   Genitourinary: Positive for impotence. Negative for dysuria, frequency, genital sores, hematuria, penile discharge and urgency.   Musculoskeletal: Positive for arthralgias and myalgias. Negative for joint swelling.   Skin: Negative for rash.   Neurological: Positive for weakness. Negative for dizziness, headaches and paresthesias.   Psychiatric/Behavioral: Negative for mood changes. The patient is not nervous/anxious.          OBJECTIVE:   BP (!) 146/77   Pulse 63   Temp (!) 96.3  F (35.7  C)   Resp 16   SpO2 98%  Estimated body mass index is 32.99 kg/m  as calculated from the following:    Height as of 5/13/22: 1.746 m (5' 8.75\").    Weight as of 6/21/21: 100.6 kg (221 lb 12.8 oz).  Physical Exam  GENERAL: healthy, alert and no distress  EYES: Eyes grossly normal to inspection, PERRL and conjunctivae and sclerae normal  HENT: ear canals and TM's normal, nose and mouth without ulcers or lesions  NECK: no adenopathy, no asymmetry, masses, or scars and thyroid normal to palpation  RESP: lungs clear to auscultation - no rales, rhonchi or wheezes  CV: regular rate and rhythm, normal S1 S2, no S3 or S4, no murmur, click or rub, no peripheral edema and peripheral pulses strong  ABDOMEN: soft, nontender, no hepatosplenomegaly, no masses and bowel sounds normal  MS: wide based gait, unable to get up on exam table.   SKIN: no suspicious lesions or rashes  NEURO: Normal strength and tone, mentation intact and speech normal  PSYCH: mentation appears normal, affect normal/bright        ASSESSMENT / PLAN:   ASSESSMENT AND PLAN  1. Encounter for Medicare annual wellness exam  Follow up one year. Recommend shingrix (shingles vaccine) from pharmacy to complete series.     Schedule colonoscopy (see scheduling info).     Continue to follow up with neurology team after next " "emg as planned.     Follow up with hematology team in fall (nov/dec) as recommended for monitoring.     I will refill medications for one year no changes.     Discussed aspirin.  Since no h/o bleeding problems or easy bruising will continue for now particularly since MGUS condition can increase risk of clotting to some degree.     Labs as below.     2. Hyperlipidemia LDL goal <130    - Lipid panel reflex to direct LDL Fasting; Future    3. Hypertension goal BP (blood pressure) < 140/90    - Comprehensive metabolic panel; Future    4. Vitamin B12 deficiency (non anemic)      5. Proximal limb muscle weakness      6. MGUS (monoclonal gammopathy of unknown significance)      7. Hypovitaminosis D    - 25- OH-Vitamin D; Future    8. History of prostate cancer    - PSA, screen; Future    9. History of prostatectomy    - PSA, screen; Future    10. Screen for colon cancer  Due for 5 year repeat.   - Colonoscopy Screening  Referral; Future    11. Screening for prostate cancer    - PSA, screen; Future    12. Encounter for monitoring of chronic aspirin therapy    - REVIEW OF HEALTH MAINTENANCE PROTOCOL ORDERS  - CBC with platelets; Future    13. Adenocarcinoma of prostate (H)          MYCHART SIGN UP: [email or web address] , [phone]    E-VISITS CAN BE DONE FOR CARE/PRESCRIPTIONS WHICH MAY NOT NEED AN IN-PERSON ASSESSMENT - click \"on-line care, then request e-visit\".      ONCARE VISIT/PRESCRIPTIONS: [email or web address]  - we treat nearly 50 common conditions with one hour response time     RADIOLOGY SCHEDULING  [facility]:  [phone]   [facility]: [phone]  [facility]: [phone]    Mammogram and Colonoscopy [phone]    Smoking Cessation: [email or web address], [phone] ([phone])    [facility] Scheduling:  [phone].    CONSUMER PRICE LINE for estimates of test costs:  [phone]       Patient Education   Personalized Prevention Plan  You are " due for the preventive services outlined below.  Your care team is available to assist you in scheduling these services.  If you have already completed any of these items, please share that information with your care team to update in your medical record.  Health Maintenance Due   Topic Date Due     Zoster (Shingles) Vaccine (3 of 3) 12/16/2019     Colorectal Cancer Screening  01/31/2022     ANNUAL REVIEW OF HM ORDERS  06/21/2022     Flu Vaccine (1) 09/01/2022     Your Health Risk Assessment indicates you feel you are not in good health    A healthy lifestyle helps keep the body fit and the mind alert. It helps protect you from disease, helps you fight disease, and helps prevent chronic disease (disease that doesn't go away) from getting worse. This is important as you get older and begin to notice twinges in muscles and joints and a decline in the strength and stamina you once took for granted. A healthy lifestyle includes good healthcare, good nutrition, weight control, recreation, and regular exercise. Avoid harmful substances and do what you can to keep safe. Another part of a healthy lifestyle is stay mentally active and socially involved.    Good healthcare     Have a wellness visit every year.     If you have new symptoms, let us know right away. Don't wait until the next checkup.     Take medicines exactly as prescribed and keep your medicines in a safe place. Tell us if your medicine causes problems.   Healthy diet and weight control     Eat 3 or 4 small, nutritious, low-fat, high-fiber meals a day. Include a variety of fruits, vegetables, and whole-grain foods.     Make sure you get enough calcium in your diet. Calcium, vitamin D, and exercise help prevent osteoporosis (bone thinning).     If you live alone, try eating with others when you can. That way you get a good meal and have company while you eat it.     Try to keep a healthy weight. If you eat more calories than your body uses for energy, it will  be stored as fat and you will gain weight.     Recreation   Recreation is not limited to sports and team events. It includes any activity that provides relaxation, interest, enjoyment, and exercise. Recreation provides an outlet for physical, mental, and social energy. It can give a sense of worth and achievement. It can help you stay healthy.    Mental Exercise and Social Involvement  Mental and emotional health is as important as physical health. Keep in touch with friends and family. Stay as active as possible. Continue to learn and challenge yourself.   Things you can do to stay mentally active are:    Learn something new, like a foreign language or musical instrument.     Play SCRABBLE or do crossword puzzles. If you cannot find people to play these games with you at home, you can play them with others on your computer through the Internet.     Join a games club--anything from card games to chess or checkers or lawn bowling.     Start a new hobby.     Go back to school.     Volunteer.     Read.   Keep up with world events.  Activities of Daily Living    Your Health Risk Assessment indicates you have difficulties with activities of daily living such as housework, bathing, preparing meals, taking medication, etc. Please make a follow up appointment for us to address this issue in more detail.    Signs of Hearing Loss      Hearing much better with one ear can be a sign of hearing loss.   Hearing loss is a problem shared by many people. In fact, it is one of the most common health problems, particularly as people age. Most people age 65 and older have some hearing loss. By age 80, almost everyone does. Hearing loss often occurs slowly over the years. So you may not realize your hearing has gotten worse.  Have your hearing checked  Call your healthcare provider if you:    Have to strain to hear normal conversation    Have to watch other people s faces very carefully to follow what they re saying    Need to ask  people to repeat what they ve said    Often misunderstand what people are saying    Turn the volume of the television or radio up so high that others complain    Feel that people are mumbling when they re talking to you    Find that the effort to hear leaves you feeling tired and irritated    Notice, when using the phone, that you hear better with one ear than the other  [employer] last reviewed this educational content on 1/1/2020 2000-2021 [employer]. All rights reserved. This information is not intended as a substitute for professional medical care. Always follow your healthcare professional's instructions.        Your Health Risk Assessment indicates you feel you are not in good emotional health.    Recreation   Recreation is not limited to sports and team events. It includes any activity that provides relaxation, interest, enjoyment, and exercise. Recreation provides an outlet for physical, mental, and social energy. It can give a sense of worth and achievement. It can help you stay healthy.    Mental Exercise and Social Involvement  Mental and emotional health is as important as physical health. Keep in touch with friends and family. Stay as active as possible. Continue to learn and challenge yourself.   Things you can do to stay mentally active are:    Learn something new, like a foreign language or musical instrument.     Play SCRABBLE or do crossword puzzles. If you cannot find people to play these games with you at home, you can play them with others on your computer through the Internet.     Join a games club--anything from card games to chess or checkers or lawn bowling.     Start a new hobby.     Go back to school.     Volunteer.     Read.   Keep up with world events.       Patient has been advised of split billing requirements and indicates understanding: Yes    COUNSELING:  Reviewed preventive health counseling, as reflected in patient instructions       Regular exercise       Healthy  "diet/nutrition       Colon cancer screening       Prostate cancer screening    Estimated body mass index is 32.99 kg/m  as calculated from the following:    Height as of 5/13/22: 1.746 m (5' 8.75\").    Weight as of 6/21/21: 100.6 kg (221 lb 12.8 oz).    Weight management plan: Discussed healthy diet and exercise guidelines    He reports that he has never smoked. He has never used smokeless tobacco.      Appropriate preventive services were discussed with this patient, including applicable screening as appropriate for cardiovascular disease, diabetes, osteopenia/osteoporosis, and glaucoma.  As appropriate for age/gender, discussed screening for colorectal cancer, prostate cancer, breast cancer, and cervical cancer. Checklist reviewing preventive services available has been given to the patient.    Reviewed patients plan of care and provided an AVS. The Basic Care Plan (routine screening as documented in Health Maintenance) for Genaro meets the Care Plan requirement. This Care Plan has been established and reviewed with the Patient.    Counseling Resources:  ATP IV Guidelines  Pooled Cohorts Equation Calculator  Breast Cancer Risk Calculator  Breast Cancer: Medication to Reduce Risk  FRAX Risk Assessment  ICSI Preventive Guidelines  Dietary Guidelines for Americans, 2010  [employer]'s MyPlate  ASA Prophylaxis  Lung CA Screening    Joel Daniel Wegener, MD  [facility]    Identified Health Risks:  "

## 2022-08-30 NOTE — PATIENT INSTRUCTIONS
"ASSESSMENT AND PLAN  1. Encounter for Medicare annual wellness exam  Follow up one year. Recommend shingrix (shingles vaccine) from pharmacy to complete series.     Schedule colonoscopy (see scheduling info).     Continue to follow up with neurology team after next emg as planned.     Follow up with hematology team in fall (nov/dec) as recommended for monitoring.     I will refill medications for one year no changes.     Discussed aspirin.  Since no h/o bleeding problems or easy bruising will continue for now particularly since MGUS condition can increase risk of clotting to some degree.     Labs as below.     2. Hyperlipidemia LDL goal <130    - Lipid panel reflex to direct LDL Fasting; Future    3. Hypertension goal BP (blood pressure) < 140/90    - Comprehensive metabolic panel; Future    4. Vitamin B12 deficiency (non anemic)      5. Proximal limb muscle weakness      6. MGUS (monoclonal gammopathy of unknown significance)      7. Hypovitaminosis D    - 25- OH-Vitamin D; Future    8. History of prostate cancer    - PSA, screen; Future    9. History of prostatectomy    - PSA, screen; Future    10. Screen for colon cancer  Due for 5 year repeat.   - Colonoscopy Screening  Referral; Future    11. Screening for prostate cancer    - PSA, screen; Future    12. Encounter for monitoring of chronic aspirin therapy    - REVIEW OF HEALTH MAINTENANCE PROTOCOL ORDERS  - CBC with platelets; Future    13. Adenocarcinoma of prostate (H)          MYCHART SIGN UP: [email or web address] , [phone]    E-VISITS CAN BE DONE FOR CARE/PRESCRIPTIONS WHICH MAY NOT NEED AN IN-PERSON ASSESSMENT - click \"on-line care, then request e-visit\".      ONCARE VISIT/PRESCRIPTIONS: [email or web address]  - we treat nearly 50 common conditions with one hour response time     RADIOLOGY SCHEDULING  [facility]:  [phone]   [facility]: [phone]  [facility]: [phone]    Mammogram and Colonoscopy Scheduling:  " [phone]    Smoking Cessation: [email or web address], [phone] ([phone])    [facility] Scheduling:  [phone].    CONSUMER PRICE LINE for estimates of test costs:  [phone]       Patient Education   Personalized Prevention Plan  You are due for the preventive services outlined below.  Your care team is available to assist you in scheduling these services.  If you have already completed any of these items, please share that information with your care team to update in your medical record.  Health Maintenance Due   Topic Date Due    Zoster (Shingles) Vaccine (3 of 3) 12/16/2019    Colorectal Cancer Screening  01/31/2022    ANNUAL REVIEW OF HM ORDERS  06/21/2022    Flu Vaccine (1) 09/01/2022     Your Health Risk Assessment indicates you feel you are not in good health    A healthy lifestyle helps keep the body fit and the mind alert. It helps protect you from disease, helps you fight disease, and helps prevent chronic disease (disease that doesn't go away) from getting worse. This is important as you get older and begin to notice twinges in muscles and joints and a decline in the strength and stamina you once took for granted. A healthy lifestyle includes good healthcare, good nutrition, weight control, recreation, and regular exercise. Avoid harmful substances and do what you can to keep safe. Another part of a healthy lifestyle is stay mentally active and socially involved.    Good healthcare   Have a wellness visit every year.   If you have new symptoms, let us know right away. Don't wait until the next checkup.   Take medicines exactly as prescribed and keep your medicines in a safe place. Tell us if your medicine causes problems.   Healthy diet and weight control   Eat 3 or 4 small, nutritious, low-fat, high-fiber meals a day. Include a variety of fruits, vegetables, and whole-grain foods.   Make sure you get enough calcium in your diet. Calcium, vitamin D, and exercise help prevent  osteoporosis (bone thinning).   If you live alone, try eating with others when you can. That way you get a good meal and have company while you eat it.   Try to keep a healthy weight. If you eat more calories than your body uses for energy, it will be stored as fat and you will gain weight.     Recreation   Recreation is not limited to sports and team events. It includes any activity that provides relaxation, interest, enjoyment, and exercise. Recreation provides an outlet for physical, mental, and social energy. It can give a sense of worth and achievement. It can help you stay healthy.    Mental Exercise and Social Involvement  Mental and emotional health is as important as physical health. Keep in touch with friends and family. Stay as active as possible. Continue to learn and challenge yourself.   Things you can do to stay mentally active are:  Learn something new, like a foreign language or musical instrument.   Play SCRABBLE or do crossword puzzles. If you cannot find people to play these games with you at home, you can play them with others on your computer through the Internet.   Join a games club--anything from card games to chess or checkers or lawn bowling.   Start a new hobby.   Go back to school.   Volunteer.   Read.   Keep up with world events.  Activities of Daily Living    Your Health Risk Assessment indicates you have difficulties with activities of daily living such as housework, bathing, preparing meals, taking medication, etc. Please make a follow up appointment for us to address this issue in more detail.    Signs of Hearing Loss      Hearing much better with one ear can be a sign of hearing loss.   Hearing loss is a problem shared by many people. In fact, it is one of the most common health problems, particularly as people age. Most people age 65 and older have some hearing loss. By age 80, almost everyone does. Hearing loss often occurs slowly over the years. So you may not realize your  hearing has gotten worse.  Have your hearing checked  Call your healthcare provider if you:  Have to strain to hear normal conversation  Have to watch other people s faces very carefully to follow what they re saying  Need to ask people to repeat what they ve said  Often misunderstand what people are saying  Turn the volume of the television or radio up so high that others complain  Feel that people are mumbling when they re talking to you  Find that the effort to hear leaves you feeling tired and irritated  Notice, when using the phone, that you hear better with one ear than the other  [employer] last reviewed this educational content on 1/1/2020 2000-2021 [employer]. All rights reserved. This information is not intended as a substitute for professional medical care. Always follow your healthcare professional's instructions.        Your Health Risk Assessment indicates you feel you are not in good emotional health.    Recreation   Recreation is not limited to sports and team events. It includes any activity that provides relaxation, interest, enjoyment, and exercise. Recreation provides an outlet for physical, mental, and social energy. It can give a sense of worth and achievement. It can help you stay healthy.    Mental Exercise and Social Involvement  Mental and emotional health is as important as physical health. Keep in touch with friends and family. Stay as active as possible. Continue to learn and challenge yourself.   Things you can do to stay mentally active are:  Learn something new, like a foreign language or musical instrument.   Play SCRABBLE or do crossword puzzles. If you cannot find people to play these games with you at home, you can play them with others on your computer through the Internet.   Join a games club--anything from card games to chess or checkers or lawn bowling.   Start a new hobby.   Go back to school.   Volunteer.   Read.   Keep up with world events.

## 2022-08-31 LAB
ALBUMIN SERPL-MCNC: 3.9 G/DL (ref 3.4–5)
ALP SERPL-CCNC: 84 U/L (ref 40–150)
ALT SERPL W P-5'-P-CCNC: 23 U/L (ref 0–70)
ANION GAP SERPL CALCULATED.3IONS-SCNC: 13 MMOL/L (ref 3–14)
AST SERPL W P-5'-P-CCNC: 24 U/L (ref 0–45)
BILIRUB SERPL-MCNC: 0.6 MG/DL (ref 0.2–1.3)
BUN SERPL-MCNC: 22 MG/DL (ref 7–30)
CALCIUM SERPL-MCNC: 9.3 MG/DL (ref 8.5–10.1)
CHLORIDE BLD-SCNC: 107 MMOL/L (ref 94–109)
CHOLEST SERPL-MCNC: 154 MG/DL
CO2 SERPL-SCNC: 18 MMOL/L (ref 20–32)
CREAT SERPL-MCNC: 0.86 MG/DL (ref 0.66–1.25)
FASTING STATUS PATIENT QL REPORTED: YES
GFR SERPL CREATININE-BSD FRML MDRD: 90 ML/MIN/1.73M2
GLUCOSE BLD-MCNC: 96 MG/DL (ref 70–99)
HDLC SERPL-MCNC: 36 MG/DL
LDLC SERPL CALC-MCNC: 83 MG/DL
NONHDLC SERPL-MCNC: 118 MG/DL
POTASSIUM BLD-SCNC: 3.9 MMOL/L (ref 3.4–5.3)
PROT SERPL-MCNC: 8.1 G/DL (ref 6.8–8.8)
PSA SERPL-MCNC: <0.01 UG/L (ref 0–4)
SODIUM SERPL-SCNC: 138 MMOL/L (ref 133–144)
TRIGL SERPL-MCNC: 175 MG/DL

## 2022-09-02 ENCOUNTER — HOSPITAL ENCOUNTER (EMERGENCY)
Facility: CLINIC | Age: 77
Discharge: HOME | End: 2022-09-02
Attending: EMERGENCY MEDICINE | Admitting: EMERGENCY MEDICINE
Payer: COMMERCIAL

## 2022-09-02 VITALS
TEMPERATURE: 98.4 F | RESPIRATION RATE: 18 BRPM | DIASTOLIC BLOOD PRESSURE: 61 MMHG | HEART RATE: 86 BPM | OXYGEN SATURATION: 98 % | SYSTOLIC BLOOD PRESSURE: 100 MMHG

## 2022-09-02 DIAGNOSIS — G62.9 NEUROPATHY: ICD-10-CM

## 2022-09-02 PROCEDURE — 99282 EMERGENCY DEPT VISIT SF MDM: CPT

## 2022-09-02 PROCEDURE — 99282 EMERGENCY DEPT VISIT SF MDM: CPT | Performed by: EMERGENCY MEDICINE

## 2022-09-02 ASSESSMENT — ACTIVITIES OF DAILY LIVING (ADL): ADLS_ACUITY_SCORE: 35

## 2022-09-02 NOTE — ED TRIAGE NOTES
Pt reports having neuropathy for 2 years that has caused abnormal gait and difficulty ambulating. Pt follows with neuro and has appointment Wednesday. Pt reports this morning having a more difficulty with ambulating and needing to shuffle more no falling. Pt alert/oriented. BLE weakness otherwise neuros intact.      Triage Assessment     Row Name 09/02/22 0816       Triage Assessment (Adult)    Airway WDL WDL       Respiratory WDL    Respiratory WDL WDL       Skin Circulation/Temperature WDL    Skin Circulation/Temperature WDL WDL       Cardiac WDL    Cardiac WDL WDL       Peripheral/Neurovascular WDL    Peripheral Neurovascular WDL neurovascular assessment lower       LLE Neurovascular Assessment    Sensation LLE numbness present;tingling present;tenderness present       RLE Neurovascular Assessment    Sensation RLE numbness present;tenderness present;tingling present       Cognitive/Neuro/Behavioral WDL    Cognitive/Neuro/Behavioral WDL motor response       Motor Response    LLE Motor Response other (see comments)  weakness for 2 years    RLE Motor Response other (see comments)  weakness for 2 years

## 2022-09-02 NOTE — DISCHARGE INSTRUCTIONS
Please follow-up with Dr. Quesada of neurology this coming week.  You can always message him through [employer].  Dr. Zeng of neurology will let him know that you presented to the emergency department today for

## 2022-09-02 NOTE — ED PROVIDER NOTES
ED Provider Note  [facility]      History     Chief Complaint   Patient presents with     NEUROPATHY     HPI  Genaro Ortiz is a 77 year old male with a history of neuropathy who presents with worsening neuropathy today.  Apparently this morning he got out of bed and felt like he could not walk at all.  He had to shuffle.  He did make it downstairs and then just sat.  He tells me his walking is terrible at baseline due to his history of neuropathy.  The neuropathy started about 2 years ago.  He is followed by Dr. Quesada of neurology at the [facility].  He has an EMG to this Wednesday.  He generally walks with a walker.  He denies recent trauma, or fall.  He denies fevers, chest pain, shortness of breath, abdominal pain, nausea or vomiting.  He had a physical examination this week that he states was fine apart from his walking.    Past Medical History  Past Medical History:   Diagnosis Date     Adenocarcinoma of prostate (H) 5/27/2008     Arthritis      Esophageal reflux      Neuropathy      Pure hypercholesterolemia      Unspecified essential hypertension      Past Surgical History:   Procedure Laterality Date     ABDOMEN SURGERY  1952    Appendectomy     APPENDECTOMY  1952     CATARACT IOL, RT/LT Bilateral 03/12     COLONOSCOPY  2017     GENITOURINARY SURGERY       HERNIORRHAPHY INGUINAL  5/15/2014    Procedure: HERNIORRHAPHY INGUINAL;  Surgeon: Evens Jefferson MD;  Location: [facility] OR     PROSTATE SURGERY  2007     ASPIRIN 81 MG OR TABS  cholecalciferol (VITAMIN D) 1000 UNIT tablet  hydrochlorothiazide (HYDRODIURIL) 25 MG tablet  lisinopril (ZESTRIL) 40 MG tablet  pregabalin (LYRICA) 75 MG capsule  simvastatin (ZOCOR) 20 MG tablet  fish oil-omega-3 fatty acids 1000 MG capsule  mupirocin (BACTROBAN) 2 % external ointment      Allergies   Allergen Reactions     Penicillins Other (See Comments)     blotches     Family History  Family History   Problem Relation Age of Onset      Glaucoma Other      Diabetes Mother      Gastrointestinal Disease Mother         pancreas removed     Breast Cancer Mother      Osteoporosis Father      Obesity Father      Macular Degeneration No family hx of      Hypertension No family hx of      Social History   Social History     Tobacco Use     Smoking status: Never Smoker     Smokeless tobacco: Never Used     Tobacco comment: Never smoked.   Substance Use Topics     Alcohol use: Not Currently     Comment: occasional     Drug use: No      Past medical history, past surgical history, medications, allergies, family history, and social history were reviewed with the patient. No additional pertinent items.       Review of Systems  A complete review of systems was performed with pertinent positives and negatives noted in the HPI, and all other systems negative.    Physical Exam   BP: 112/74  Pulse: 102  Temp: 98.4  F (36.9  C)  Resp: 18  SpO2: 97 %  Physical Exam  Physical Exam   Constitutional:   well nourished, well developed, resting comfortably   HENT:   Head: Normocephalic and atraumatic.   Eyes: Conjunctivae are normal. Pupils are equal, round, and reactive to light.   Cardiovascular: regular rate and rhythm without murmurs or gallops  Pulmonary/Chest: Clear to auscultation bilaterally, with no wheezes or retractions. No respiratory distress.  GI: Soft with good bowel sounds.  Non-tender, non-distended  Musculoskeletal:  no edema  Skin: Skin is warm and dry. No rash noted.   Neurological: alert and oriented to person, place, and time. Nonfocal exam, walks with an antalgic gait using a walker  Psychiatric:  normal mood and affect.   ED Course      Procedures       The medical record was reviewed and interpreted.                Latest Reference Range & Units 08/30/22 08:32   Sodium 133 - 144 mmol/L 138   Potassium 3.4 - 5.3 mmol/L 3.9   Chloride 94 - 109 mmol/L 107   Carbon Dioxide 20 - 32 mmol/L 18 (L)   Urea Nitrogen 7 - 30 mg/dL 22   Creatinine 0.66 - 1.25  mg/dL 0.86   GFR Estimate >60 mL/min/1.73m2 90   Calcium 8.5 - 10.1 mg/dL 9.3   Anion Gap 3 - 14 mmol/L 13   Albumin 3.4 - 5.0 g/dL 3.9   Protein Total 6.8 - 8.8 g/dL 8.1   Alkaline Phosphatase 40 - 150 U/L 84   ALT 0 - 70 U/L 23   AST 0 - 45 U/L 24   Bilirubin Total 0.2 - 1.3 mg/dL 0.6   Cholesterol <200 mg/dL 154   Patient Fasting > 8hrs?  Yes   Folate 4.6 - 34.8 ng/mL 12.9   HDL Cholesterol >=40 mg/dL 36 (L)   LDL Cholesterol Calculated <=100 mg/dL 83   Non HDL Cholesterol <130 mg/dL 118   PSA 0.00 - 4.00 ug/L <0.01   Triglycerides <150 mg/dL 175 (H)   Vitamin B12 232 - 1,245 pg/mL 916   Vitamin D Deficiency screening 20 - 75 ug/L 39   Glucose 70 - 99 mg/dL 96   WBC 4.0 - 11.0 10e3/uL 4.7   Hemoglobin 13.3 - 17.7 g/dL 13.9   Hematocrit 40.0 - 53.0 % 41.9   Platelet Count 150 - 450 10e3/uL 168   RBC Count 4.40 - 5.90 10e6/uL 4.48   MCV 78 - 100 fL 94   MCH 26.5 - 33.0 pg 31.0   MCHC 31.5 - 36.5 g/dL 33.2   RDW 10.0 - 15.0 % 13.7   (L): Data is abnormally low  (H): Data is abnormally high  No results found for any visits on 09/02/22.  Medications - No data to display     Assessments & Plan (with Medical Decision Making)       I have reviewed the nursing notes.  Emergency Department course:  The patient was seen and examined at 0837 am in [place] [address].  Chart review shows that the patient had an office visit with Dr. Quesada on July 20,2022.  The assessment at that time showed   Assessment:  Severe sensorimotor distal symmetric polyneuropathy    The patient's exam is extremely different regarding gait and muscle tone/weakness compared to 2020, and I am unsure if this is in response musculoskeletally to his proprioceptive deficits from his neuropathy or from some other process.  The tightness in the legs and gait could be compensatory, and this is my highest suspicion given the lack of other findings suggestive for myelopathy and lack of symptoms of the upper body.  However, his severe neuropathy could also hide  some myelopathic findings.  I think starting his work up with repeat serum studies and EMG is a reasonable first step, followed by possible imaging of the thoracic or lumbar spine pending results.      Laboratory studies done on 8/30/2022, 3 days ago are noted above.  They are essentially unremarkable apart from a low HDL cholesterol,an elevated triglyceride level and a low carbon dioxide of 18.  I also note that the patient is on Lyrica.  He receives cyanocobalamin injections and Avastin injections at the clinic.    I consulted neurology and spoke with Dr. Zeng.  She will send Dr. Quesada a message that the patient came to the ED today with worsening symptoms.  There is no particular treatment from an emergency department standpoint.    Genaro Ortiz is a 77 year old male with a history of neuropathy who presents with an episode where he was having increasing difficulty walking after getting out of bed today.  He now feels back to baseline.  He has had no recent trauma or falls and no recent illness.  He has a history of neuropathy and is due for an EMG study this coming Wednesday.  He will be discharged from the emergency department.  I recommend he message Dr. Quesada through [employer] for questions.  He should keep his EMG study appointment.  .     I have reviewed the findings, diagnosis, plan and need for follow up with the patient.    New Prescriptions    No medications on file       Final diagnoses:   Neuropathy       --This note was created in part by the use of Dragon voice recognition dictation system. Inadvertent grammatical errors and typographical errors may still exist.  MD Conchis Ferugson  [facility] EMERGENCY DEPARTMENT  9/2/2022     Conchis Batista MD  09/02/22 1116

## 2022-09-07 ENCOUNTER — OFFICE VISIT (OUTPATIENT)
Dept: NEUROLOGY | Facility: CLINIC | Age: 77
End: 2022-09-07
Payer: COMMERCIAL

## 2022-09-07 DIAGNOSIS — G62.9 NEUROPATHY: ICD-10-CM

## 2022-09-07 PROCEDURE — 95885 MUSC TST DONE W/NERV TST LIM: CPT | Performed by: PSYCHIATRY & NEUROLOGY

## 2022-09-07 PROCEDURE — 95912 NRV CNDJ TEST 11-12 STUDIES: CPT | Performed by: PSYCHIATRY & NEUROLOGY

## 2022-09-07 NOTE — PROGRESS NOTES
[facility]  Electrodiagnostic Laboratory                 Department of Neurology                                                                                                         Test Date:  2022    Patient: Genaro Ortiz : 1945 Physician: Jc Marshall MD   Sex: Male AGE: 77 year Ref Phys:    ID#: [id number]   Technician: Eddie Rey     History and Examination:  Genaro Ortiz is a 77 year old man with a suspected sensory polyneuropathy and gait disturbance. A prior electrodiagnostic study demonstrated mild and asymmetric abnormalities; he is referred for a followup study.    Techniques:  Motor conduction studies were done with surface recording electrodes. Sensory conduction studies were performed with surface electrodes, unless indicated otherwise by (n), designating the use of subdermal recording electrodes. Temperature was monitored and recorded throughout the study. Upper extremities were maintained at a temperature of 32 degrees Centigrade or higher.  EMG was done with a concentric needle electrode.     Results:  Sural sensory nerve action potentials were absent bilaterally. Right median, ulnar, and radial sensory conduction studies were normal. Bilateral fibular and tibial compound muscle action potentials were moderately attenuated. Right median and ulnar motor conduction studies were normal. Screening electromyography of the right lower limb was normal.     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 There has been moderate progression of abnormalities since the prior study of December 10, 2020.    _____________________________  cJ Marshall MD  Board Certified in Clinical Neurophysiology and Neuromuscular Medicine        Nerve Conduction Studies  Motor Sites      Latency Amplitude Neg. Amp Diff Segment Distance Velocity Neg. Dur Neg Area Diff  Temperature Comment   Site (ms) Norm (mV) Norm %  cm m/s Norm ms %  C    Left Fibular (EDB) Motor   Ankle 5.2  < 6.0 0.90  > 2.0  Ankle-EDB 8   4.4  30.7    Bel Fib Head 12.8 - 0.98 - 8.9 Bel Fib Head-Ankle 34.4 45  > 38 5.3 19.0 30.5    Pop Fossa 14.2 - 0.93 - -5.1 Pop Fossa-Bel Fib Head 6 43  > 38 5.3 -4.0 30.4    Right Fibular (EDB) Motor   Ankle 3.9  < 6.0 0.96  > 2.0  Ankle-EDB 8   5.0  31.8    Bel Fib Head 11.4 - 0.73 - -24.0 Bel Fib Head-Ankle 33.3 44  > 38 5.8 0 31.9    Pop Fossa 13.1 - 0.69 - -5.5 Pop Fossa-Bel Fib Head 7.2 42  > 38 6.0 -3.7 32    Right Median (APB) Motor   Wrist 3.7  < 4.4 5.1  > 5.0  Wrist-APB 8   5.2  32.4    Elbow 8.3 - 4.4 - -13.7 Elbow-Wrist 23.1 50  > 48 5.8 1.72 32.2    Left Tibial (AHB) Motor   Ankle 4.1  < 6.5 2.6  > 4.4  Ankle-AHB 8   5.3  31.9    Right Tibial (AHB) Motor   Ankle 3.8  < 6.5 3.0  > 4.4  Ankle-AHB 8   5.5  31    Knee 13.8 - 2.5 - -16.7 Knee-Ankle 41 41  > 38 6.4 -17.6 27.4    Right Ulnar (ADM) Motor   Wrist 3.0  < 3.5 6.8  > 5.0  Wrist-ADM 8   5.8  32.6    Bel Elbow 7.0 - 5.8 - -14.7 Bel Elbow-Wrist 19.7 49  > 48 6.2 -5.0 32.4    Abv Elbow 9.1 - 5.5 - -5.2 Abv Elbow-Bel Elbow 10.8 51  > 48 6.7 -1.58 32.3    Right Ulnar (FDI) Motor   Wrist 4.1 - 7.6 -      4.3  32.2    Bel Elbow 8.2 - 6.9 - -9.2 Bel Elbow-Wrist 20.3 50  > 48 4.9 -0.61 32.2    Abv Elbow 10.4 - 6.3 - -8.7 Abv Elbow-Bel Elbow 10.8 49  > 48 5.0 -3.1 32.1      Sensory Sites      Onset Lat Peak Lat Amp (O-P) Amp (P-P) Segment Distance Velocity Temperature   Site ms ms  V Norm  V  cm m/s Norm  C   Right Median Sensory   Wrist-Dig II 2.4 3.1 15  > 10 17 Wrist-Dig II 14 58  > 48 34.9   Right Radial Sensory   Forearm-Wrist 1.85 2.4 16  > 15 9 Forearm-Wrist 10 54 - 32   Left Sural Sensory   Calf-Lat Mall NR NR NR  > 5 NR Calf-Lat Mall 14 NR  > 38 31.7   Right Sural Sensory   Calf-Lat Mall NR NR NR  > 5 NR Calf-Lat Mall 14 NR  > 38 32.6   Right Ulnar Sensory   Wrist-Dig V 2.6 3.3 9  > 8 8 Wrist-Dig V 12.5 48  >  48 32.3       Electromyography     Side Muscle Ins Act Fibs/PSW Fasc HF Amp Dur Poly Recrt Int Pat   Right Tib Anterior Nml None Nml 0 Nml Nml 0 Nml Nml   Right Gastroc MH Nml None Nml 0 Nml Nml 0 Nml Nml   Right Vastus Lat Nml None Nml 0 Nml Nml 0 Nml Nml         NCS Waveforms:    Motor                         Sensory

## 2022-09-07 NOTE — LETTER
2022     RE: Genaro Ortiz  [address]  [place] [address]     Dear Colleague,    Thank you for referring your patient, Genaro Ortiz, to the [facility] EMG CLINIC [place] at [facility]. Please see a copy of my visit note below.                        [facility]  Electrodiagnostic Laboratory                 Department of Neurology                                                                                                         Test Date:  2022    Patient: Genaro Ortiz : 1945 Physician: Jc Marshall MD   Sex: Male AGE: 77 year Ref Phys:    ID#: [id number]   Technician: Eddie Rey     History and Examination:  Genaro Ortiz is a 77 year old man with a suspected sensory polyneuropathy and gait disturbance. A prior electrodiagnostic study demonstrated mild and asymmetric abnormalities; he is referred for a followup study.    Techniques:  Motor conduction studies were done with surface recording electrodes. Sensory conduction studies were performed with surface electrodes, unless indicated otherwise by (n), designating the use of subdermal recording electrodes. Temperature was monitored and recorded throughout the study. Upper extremities were maintained at a temperature of 32 degrees Centigrade or higher.  EMG was done with a concentric needle electrode.     Results:  Sural sensory nerve action potentials were absent bilaterally. Right median, ulnar, and radial sensory conduction studies were normal. Bilateral fibular and tibial compound muscle action potentials were moderately attenuated. Right median and ulnar motor conduction studies were normal. Screening electromyography of the right lower limb was normal.     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 There has been moderate  progression of abnormalities since the prior study of December 10, 2020.    _____________________________  Jc Marshall MD  Board Certified in Clinical Neurophysiology and Neuromuscular Medicine        Nerve Conduction Studies  Motor Sites      Latency Amplitude Neg. Amp Diff Segment Distance Velocity Neg. Dur Neg Area Diff Temperature Comment   Site (ms) Norm (mV) Norm %  cm m/s Norm ms %  C    Left Fibular (EDB) Motor   Ankle 5.2  < 6.0 0.90  > 2.0  Ankle-EDB 8   4.4  30.7    Bel Fib Head 12.8 - 0.98 - 8.9 Bel Fib Head-Ankle 34.4 45  > 38 5.3 19.0 30.5    Pop Fossa 14.2 - 0.93 - -5.1 Pop Fossa-Bel Fib Head 6 43  > 38 5.3 -4.0 30.4    Right Fibular (EDB) Motor   Ankle 3.9  < 6.0 0.96  > 2.0  Ankle-EDB 8   5.0  31.8    Bel Fib Head 11.4 - 0.73 - -24.0 Bel Fib Head-Ankle 33.3 44  > 38 5.8 0 31.9    Pop Fossa 13.1 - 0.69 - -5.5 Pop Fossa-Bel Fib Head 7.2 42  > 38 6.0 -3.7 32    Right Median (APB) Motor   Wrist 3.7  < 4.4 5.1  > 5.0  Wrist-APB 8   5.2  32.4    Elbow 8.3 - 4.4 - -13.7 Elbow-Wrist 23.1 50  > 48 5.8 1.72 32.2    Left Tibial (AHB) Motor   Ankle 4.1  < 6.5 2.6  > 4.4  Ankle-AHB 8   5.3  31.9    Right Tibial (AHB) Motor   Ankle 3.8  < 6.5 3.0  > 4.4  Ankle-AHB 8   5.5  31    Knee 13.8 - 2.5 - -16.7 Knee-Ankle 41 41  > 38 6.4 -17.6 27.4    Right Ulnar (ADM) Motor   Wrist 3.0  < 3.5 6.8  > 5.0  Wrist-ADM 8   5.8  32.6    Bel Elbow 7.0 - 5.8 - -14.7 Bel Elbow-Wrist 19.7 49  > 48 6.2 -5.0 32.4    Abv Elbow 9.1 - 5.5 - -5.2 Abv Elbow-Bel Elbow 10.8 51  > 48 6.7 -1.58 32.3    Right Ulnar (FDI) Motor   Wrist 4.1 - 7.6 -      4.3  32.2    Bel Elbow 8.2 - 6.9 - -9.2 Bel Elbow-Wrist 20.3 50  > 48 4.9 -0.61 32.2    Abv Elbow 10.4 - 6.3 - -8.7 Abv Elbow-Bel Elbow 10.8 49  > 48 5.0 -3.1 32.1      Sensory Sites      Onset Lat Peak Lat Amp (O-P) Amp (P-P) Segment Distance Velocity Temperature   Site ms ms  V Norm  V  cm m/s Norm  C   Right Median Sensory   Wrist-Dig II 2.4 3.1 15  > 10 17 Wrist-Dig II 14 58  > 48 34.9    Right Radial Sensory   Forearm-Wrist 1.85 2.4 16  > 15 9 Forearm-Wrist 10 54 - 32   Left Sural Sensory   Calf-Lat Mall NR NR NR  > 5 NR Calf-Lat Mall 14 NR  > 38 31.7   Right Sural Sensory   Calf-Lat Mall NR NR NR  > 5 NR Calf-Lat Mall 14 NR  > 38 32.6   Right Ulnar Sensory   Wrist-Dig V 2.6 3.3 9  > 8 8 Wrist-Dig V 12.5 48  > 48 32.3       Electromyography     Side Muscle Ins Act Fibs/PSW Fasc HF Amp Dur Poly Recrt Int Pat   Right Tib Anterior Nml None Nml 0 Nml Nml 0 Nml Nml   Right Gastroc MH Nml None Nml 0 Nml Nml 0 Nml Nml   Right Vastus Lat Nml None Nml 0 Nml Nml 0 Nml Nml         NCS Waveforms:    Motor                         Sensory                  Again, thank you for allowing me to participate in the care of your patient.      Sincerely,    Jc Marshall MD

## 2022-09-15 ENCOUNTER — TELEPHONE (OUTPATIENT)
Dept: NEUROLOGY | Facility: CLINIC | Age: 77
End: 2022-09-15

## 2022-09-15 NOTE — TELEPHONE ENCOUNTER
[facility] Call Center    Phone Message    May a detailed message be left on voicemail: yes     Reason for Call: Other: Pt is calling because he would like to see Dr. Quesada. Pt says his walking is continuously deteriorating. He says he sent a BONESUPPORTt message to Dr. Quesada and he agreed pt should follow up with him. Pt scheduled an appt but it isn't until 01/20/23. Pt feels because his walkiing isn't getting any better, he's wondering if he can get in to see Dr. Quesada sooner. Please advise.     Action Taken: Message routed to:  [facility]OLOGY    Travel Screening: Not Applicable

## 2022-09-15 NOTE — TELEPHONE ENCOUNTER
I called pt to discuss sooner appointment availability.     We could see him on 9/20 at 11am for a virtual visit (phone or video) with Dr. Quesada to review his questions.     Soonest in person visit open at this time is on 12/12 at 9am with Dr. Quesada at the [facility]. I have held both of these visit times. I asked pt to call back if these days work and we can add him to the schedule.     Amirah WEINER

## 2022-09-27 ENCOUNTER — MYC MEDICAL ADVICE (OUTPATIENT)
Dept: FAMILY MEDICINE | Facility: CLINIC | Age: 77
End: 2022-09-27

## 2022-10-20 ENCOUNTER — OFFICE VISIT (OUTPATIENT)
Dept: OPHTHALMOLOGY | Facility: CLINIC | Age: 77
End: 2022-10-20
Attending: OPHTHALMOLOGY
Payer: COMMERCIAL

## 2022-10-20 DIAGNOSIS — H35.051 CHOROID NEOVASCULARIZATION, RIGHT: Primary | ICD-10-CM

## 2022-10-20 DIAGNOSIS — H35.371 EPIRETINAL MEMBRANE (ERM) OF RIGHT EYE: ICD-10-CM

## 2022-10-20 PROCEDURE — 99213 OFFICE O/P EST LOW 20 MIN: CPT | Performed by: OPHTHALMOLOGY

## 2022-10-20 PROCEDURE — 92015 DETERMINE REFRACTIVE STATE: CPT

## 2022-10-20 PROCEDURE — G0463 HOSPITAL OUTPT CLINIC VISIT: HCPCS | Mod: 25

## 2022-10-20 PROCEDURE — 92134 CPTRZ OPH DX IMG PST SGM RTA: CPT | Performed by: OPHTHALMOLOGY

## 2022-10-20 ASSESSMENT — REFRACTION_WEARINGRX
OS_SPHERE: -1.00
OD_CYLINDER: +0.50
OD_SPHERE: -0.50
OS_CYLINDER: +0.50
OD_AXIS: 110
OS_AXIS: 092
SPECS_TYPE: PAL
OS_ADD: +2.50
OD_ADD: +2.50

## 2022-10-20 ASSESSMENT — VISUAL ACUITY
OS_CC+: +2
OD_PH_CC+: +2
OD_PH_CC: 20/30
OD_CC+: +2
CORRECTION_TYPE: GLASSES
OD_CC: 20/50
OS_PH_CC: 20/25
OS_CC: 20/30
OS_PH_CC+: +2
METHOD: SNELLEN - LINEAR

## 2022-10-20 ASSESSMENT — CONF VISUAL FIELD
METHOD: COUNTING FINGERS
OS_SUPERIOR_TEMPORAL_RESTRICTION: 0
OS_SUPERIOR_NASAL_RESTRICTION: 0
OD_SUPERIOR_TEMPORAL_RESTRICTION: 0
OD_NORMAL: 1
OD_SUPERIOR_NASAL_RESTRICTION: 0
OS_INFERIOR_TEMPORAL_RESTRICTION: 0
OS_INFERIOR_NASAL_RESTRICTION: 0
OD_INFERIOR_TEMPORAL_RESTRICTION: 0
OD_INFERIOR_NASAL_RESTRICTION: 0
OS_NORMAL: 1

## 2022-10-20 ASSESSMENT — REFRACTION_MANIFEST
OD_ADD: +2.50
OS_ADD: +2.50
OD_AXIS: 087
OD_CYLINDER: +0.75
OS_CYLINDER: +1.00
OD_SPHERE: PLANO
OS_AXIS: 110
OS_SPHERE: -1.00

## 2022-10-20 ASSESSMENT — SLIT LAMP EXAM - LIDS
COMMENTS: NORMAL
COMMENTS: NORMAL

## 2022-10-20 ASSESSMENT — CUP TO DISC RATIO
OD_RATIO: 0.1
OS_RATIO: 0.1

## 2022-10-20 ASSESSMENT — TONOMETRY
IOP_METHOD: TONOPEN
OD_IOP_MMHG: 14
OS_IOP_MMHG: 13

## 2022-10-20 NOTE — PROGRESS NOTES
Chief Complaint(s) and History of Present Illness(es)     Epiretinal Membrane Follow Up            Laterality: right eye    Comments: Epiretinal membrane (ERM) of right eye          Comments    Vision has been stabel since last visit.   Pt would like a refraction done today if possible.  No flashes or floaters in either eye.  No pain in either eye.  No red, dry or teary eyes.  Pt does not use drops in his eyes.    GEOVANNA QUEVEDO October 20, 2022 9:24 AM                  Review of systems for the eyes was negative other than the pertinent positives/negatives listed in the HPI.      Assessment & Plan      Genaro Ortiz is a 77 year old male with the following diagnoses:   1. Choroid neovascularization, right    2. 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           Attending Physician Attestation:  Complete documentation of historical and exam elements from today's encounter can be found in the full encounter summary report (not reduplicated in this progress note).  I personally obtained the chief complaint(s) and history of present illness.  I confirmed and edited as necessary the review of systems, past medical/surgical history, family history, social history, and examination findings as documented by others; and I examined the patient myself.  I personally reviewed the relevant tests, images, and reports as documented above.  I formulated and edited as necessary the assessment and plan and discussed the findings and management plan with the patient and family. . Graham REILLY  MD [place]

## 2022-10-20 NOTE — NURSING NOTE
Chief Complaints and History of Present Illnesses   Patient presents with     Epiretinal Membrane Follow Up     Epiretinal membrane (ERM) of right eye     Chief Complaint(s) and History of Present Illness(es)     Epiretinal Membrane Follow Up            Laterality: right eye    Comments: Epiretinal membrane (ERM) of right eye          Comments    Vision has been stabel since last visit.   Pt would like a refraction done today if possible.  No flashes or floaters in either eye.  No pain in either eye.  No red, dry or teary eyes.  Pt does not use drops in his eyes.    GEOVANNA BRAGA COA October 20, 2022 9:24 AM

## 2022-11-14 ENCOUNTER — TELEPHONE (OUTPATIENT)
Dept: NEUROLOGY | Facility: CLINIC | Age: 77
End: 2022-11-14

## 2022-11-14 NOTE — TELEPHONE ENCOUNTER
[facility] Call Center    Phone Message    May a detailed message be left on voicemail: yes     Reason for Call: Other: Genaro calling to return Maira's missed call. Genaro stated that he was out picking up his prescriptions and is available for a call back. Writer unsure how to get a hold of Maira, called priority line and reached a voicemail.    Action Taken: Message routed to:  [facility]    Travel Screening: Not Applicable

## 2022-11-14 NOTE — TELEPHONE ENCOUNTER
[facility] Call Center    Phone Message    May a detailed message be left on voicemail: yes     Reason for Call: Symptoms or Concerns     If patient has red-flag symptoms, warm transfer to triage line    Current symptom or concern:  Patient trouble walking has become very difficult.  Pain has increased.  Patient said trouble walking is getting gradually worse.  He has to use a walker and still having trouble walking.  Nigh time pain is getting worse.  Sometime he cannot walk at all.  Please call patient to discuss symptoms.    Patient will be home afternoon.  Symptoms have been present for:  1 month(s)    Has patient previously been seen for this? Yes    By Sangita:     Date: ASAP    Are there any new or worsening symptoms?Yes getting worse.    Action Taken: Message routed to:  [facility]    Travel Screening: Not Applicable

## 2022-11-14 NOTE — TELEPHONE ENCOUNTER
I agree with your comments.  I would want to further discuss his issues (either video or in-person is fine), and am happy to see him as scheduled in 01/2023.     CATY Quesada D.O.   [facility] Neurology

## 2022-11-14 NOTE — TELEPHONE ENCOUNTER
Called pt and he stated his legs throb; however, taking pregabalin 225mg BID relieves the pain and he is able to sleep well at night. Has to use his walker all of the time for stability and no recent falls. He stated certain movements cause pain especially getting in and out of a car. Saturday did too much walking and legs were throbbing and felt like he did a 20 mile hike. He stated pain/throbbing in legs can vary and his gait is wide can't bring his legs together. Pt has several questions regarding EMG result as he stated he never fully understood the results, wanted to know if his symptoms will improve or will they get worse.  Informed pt great questions and he should write them down to go over at a clinic visit. Offered pt virtual visit, and he stated he wants an in person clinic visit only. Informed pt we will put him on the wait list and will call him if an in person appt becomes available and he verbally understood.

## 2022-11-14 NOTE — TELEPHONE ENCOUNTER
[facility] Call Center    Phone Message    May a detailed message be left on voicemail: yes     Reason for Call: Symptoms or Concerns     If patient has red-flag symptoms, warm transfer to triage line    Current symptom or concern: Patient trouble walking has become very difficult.  Pain has increased.  Patient said trouble walking is getting gradually worse.  He has to use a walker and still having trouble walking.  Nigh time pain is getting worse.  Sometime he cannot walk at all.  Please call patient to discuss symptoms.    Patient will be home afternoon. Maira the Rn has reached out and Pt missed the call, Please call Pt back [phone]    Symptoms have been present for:  1 month(s)    Has patient previously been seen for this? Yes    By :Dr. Quesada      Are there any new or worsening symptoms? Yes:       Action Taken: Message routed to:  [facility] Neurology    Travel Screening: Not Applicable

## 2022-11-15 ENCOUNTER — TELEPHONE (OUTPATIENT)
Dept: NEUROLOGY | Facility: CLINIC | Age: 77
End: 2022-11-15

## 2022-11-15 NOTE — TELEPHONE ENCOUNTER
Called pt back and left message informing pt I will send him a my chart message and asked that he respond back to me ASAP.

## 2022-11-15 NOTE — TELEPHONE ENCOUNTER
Called pt and Left message I am trying to reach him regarding a sooner appt with Dr. Quesada.  I will try him again this afternoon or he can call me back. Clinic # given.

## 2022-11-18 ENCOUNTER — OFFICE VISIT (OUTPATIENT)
Dept: NEUROLOGY | Facility: CLINIC | Age: 77
End: 2022-11-18
Payer: COMMERCIAL

## 2022-11-18 VITALS
RESPIRATION RATE: 16 BRPM | DIASTOLIC BLOOD PRESSURE: 83 MMHG | SYSTOLIC BLOOD PRESSURE: 126 MMHG | HEART RATE: 64 BPM | OXYGEN SATURATION: 98 %

## 2022-11-18 DIAGNOSIS — R26.9 ABNORMAL GAIT: Primary | ICD-10-CM

## 2022-11-18 PROCEDURE — 99215 OFFICE O/P EST HI 40 MIN: CPT | Performed by: PSYCHIATRY & NEUROLOGY

## 2022-11-18 ASSESSMENT — PAIN SCALES - GENERAL: PAINLEVEL: SEVERE PAIN (7)

## 2022-11-18 NOTE — NURSING NOTE
Chief Complaint   Patient presents with     RECHECK     RETURN     Christiano Rodriguez PT. Reports balance issues when walking.

## 2022-11-18 NOTE — PATIENT INSTRUCTIONS
Your gait is extremely abnormal, and I am concerned that perhaps the same issue that likely caused your neuropathy (B12 deficiency) also led to spinal cord injury. I would recommend imaging at this time to help define other reasons beyond neuropathy as a cause for you gait changes.    MRI brain w/wout contrast  MRI cervical spine w/wout contrast  MRI thoracic spine w/wout contrast

## 2022-11-18 NOTE — LETTER
11/18/2022       RE: Genaro Ortiz  [address]  [facility] [address]     Dear Colleague,    Thank you for referring your patient, Genaro Ortiz, to the [facility] NEUROLOGY [facility]. Please see a copy of my visit note below.    [facility] Neurology Follow Up Visit    Genaro Ortiz MRN# [id number]   Age: 77 year old YOB: 1945     Brief history of symptoms: The patient was initially seen in neurologic consultation on 11/30/2020 and most recently 7/20/2022 for evaluation of neuropathy. Please see the comprehensive neurologic consultation notes from those dates in the Epic records for details.     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  Pregabalin at a dose of 225 mg BID was helping reduce his symptoms.    Today, the patient feels like his neuropathy symptoms continue to progress.  When walking for short distances his legs can become stiff and fatigued afterwards.  He feels off balanced when walking, as if he could topple over when standing or walking.  He has been using a walker for balance, and utilizing OT recommendations for balance improvement.     Physical Exam:   Vitals: /83   Pulse 64   Resp 16   SpO2 98%   General: Seated comfortably in no acute distress.  HEENT: Neck supple with normal range of motion.   Skin: No rashes  Neurologic:     Mental Status: Fully alert, attentive and oriented. Speech clear and fluent, no paraphasic errors.     Cranial Nerves: EOMI with normal smooth pursuit. Facial movements symmetric. Hearing not formally tested but intact to conversation.  No dysarthria.     Motor: No tremors or other abnormal movements observed. Thighs and hips are stiff, but hip abductor are rigid b/l. B/l hip flexion, hip abduction and adduction weakness (4-/5)     Sensory: vibration absent in toes  and at ankles.  Positive Romberg.     DTR: 2+ symmetric in upper extremities, but 1+ b/l patellar and absent at achilles.     Gait: again, extremely wide based stance and gait.  Short shuffeled steps. Feet on lateral boarders when stadning with severe inversion.           Assessment and Plan:   Assessment:  Severe gait deficit related to sensory loss that is distal and symmetric, as well as rigid musculature of the hip abductors    The patient does have a neuropathy, but I do not think it explains his severe gait abnormality and ongoing weakness and stiffness w/in the proximal lower extremities.  I think expanding his imaging to his brain and spine is needed to look for issues like myelopathy of the thoracic or cervical cord, or possibly dorsal column tract injury.      Plan:  MRI brain w/wout contrast  MRI cervical spine w/wout contrast  MRI thoracic spine w/wout contrast    Follow up in Neurology clinic in 2-3 months (video), or earlier as needed should new concerns arise.      CATY Quesada D.O.   of Neurology    Total time today (48 min) in this patient encounter was spent on pre-charting, counseling and/or coordination of care.

## 2022-11-18 NOTE — PROGRESS NOTES
[facility] Neurology Follow Up Visit    Genaro Ortiz MRN# [id number]   Age: 77 year old YOB: 1945     Brief history of symptoms: The patient was initially seen in neurologic consultation on 11/30/2020 and most recently 7/20/2022 for evaluation of neuropathy. Please see the comprehensive neurologic consultation notes from those dates in the Epic records for details.     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  Pregabalin at a dose of 225 mg BID was helping reduce his symptoms.    Today, the patient feels like his neuropathy symptoms continue to progress.  When walking for short distances his legs can become stiff and fatigued afterwards.  He feels off balanced when walking, as if he could topple over when standing or walking.  He has been using a walker for balance, and utilizing OT recommendations for balance improvement.     Physical Exam:   Vitals: /83   Pulse 64   Resp 16   SpO2 98%   General: Seated comfortably in no acute distress.  HEENT: Neck supple with normal range of motion.   Skin: No rashes  Neurologic:     Mental Status: Fully alert, attentive and oriented. Speech clear and fluent, no paraphasic errors.     Cranial Nerves: EOMI with normal smooth pursuit. Facial movements symmetric. Hearing not formally tested but intact to conversation.  No dysarthria.     Motor: No tremors or other abnormal movements observed. Thighs and hips are stiff, but hip abductor are rigid b/l. B/l hip flexion, hip abduction and adduction weakness (4-/5)     Sensory: vibration absent in toes and at ankles.  Positive Romberg.     DTR: 2+ symmetric in upper extremities, but 1+ b/l patellar and absent at achilles.     Gait: again, extremely wide based stance and gait.  Short shuffeled steps. Feet on lateral boarders when stadning with severe inversion.           Assessment and Plan:   Assessment:  Severe gait deficit  related to sensory loss that is distal and symmetric, as well as rigid musculature of the hip abductors    The patient does have a neuropathy, but I do not think it explains his severe gait abnormality and ongoing weakness and stiffness w/in the proximal lower extremities.  I think expanding his imaging to his brain and spine is needed to look for issues like myelopathy of the thoracic or cervical cord, or possibly dorsal column tract injury.      Plan:  MRI brain w/wout contrast  MRI cervical spine w/wout contrast  MRI thoracic spine w/wout contrast    Follow up in Neurology clinic in 2-3 months (video), or earlier as needed should new concerns arise.    CATY Quesada D.O.   of Neurology    Total time today (48 min) in this patient encounter was spent on pre-charting, counseling and/or coordination of care.

## 2022-12-09 ENCOUNTER — TRANSFERRED RECORDS (OUTPATIENT)
Dept: HEALTH INFORMATION MANAGEMENT | Facility: CLINIC | Age: 77
End: 2022-12-09

## 2023-01-29 ENCOUNTER — ANCILLARY PROCEDURE (OUTPATIENT)
Dept: MRI IMAGING | Facility: CLINIC | Age: 78
End: 2023-01-29
Attending: PSYCHIATRY & NEUROLOGY
Payer: COMMERCIAL

## 2023-01-29 DIAGNOSIS — R26.9 ABNORMAL GAIT: ICD-10-CM

## 2023-01-29 PROCEDURE — 72156 MRI NECK SPINE W/O & W/DYE: CPT | Mod: GC | Performed by: RADIOLOGY

## 2023-01-29 PROCEDURE — 70553 MRI BRAIN STEM W/O & W/DYE: CPT | Mod: GC | Performed by: RADIOLOGY

## 2023-01-29 PROCEDURE — 72157 MRI CHEST SPINE W/O & W/DYE: CPT | Mod: GC | Performed by: RADIOLOGY

## 2023-01-29 PROCEDURE — A9585 GADOBUTROL INJECTION: HCPCS | Performed by: RADIOLOGY

## 2023-01-29 RX ORDER — GADOBUTROL 604.72 MG/ML
10 INJECTION INTRAVENOUS ONCE
Status: COMPLETED | OUTPATIENT
Start: 2023-01-29 | End: 2023-01-29

## 2023-01-29 RX ADMIN — GADOBUTROL 10 ML: 604.72 INJECTION INTRAVENOUS at 10:04

## 2023-01-29 NOTE — DISCHARGE INSTRUCTIONS
MRI Contrast Discharge Instructions    The IV contrast you received today will pass out of your body in your  urine. This will happen in the next 24 hours. You will not feel this process.  Your urine will not change color.    Drink at least 4 extra glasses of water or juice today (unless your doctor  has restricted your fluids). This reduces the stress on your kidneys.  You may take your regular medicines.    If you are on dialysis: It is best to have dialysis today.    If you have a reaction: Most reactions happen right away. If you have  any new symptoms after leaving the hospital (such as hives or swelling),  call your hospital at the correct number below. Or call your family doctor.  If you have breathing distress or wheezing, call 911.    Special instructions: ***    I have read and understand the above information.    Signature:______________________________________ Date:___________    Staff:__________________________________________ Date:___________     Time:__________    [place] Radiology Departments:    ___[place]: [phone]  ___[facility]: [phone]  ___[place]: [phone] ___[facility]: [phone]  ___[facility]: [phone]  ___[facility]: [phone]  ___[phone]  ___[facility]: [phone]  ___[phone]

## 2023-01-29 NOTE — DISCHARGE INSTRUCTIONS
MRI Contrast Discharge Instructions    The IV contrast you received today will pass out of your body in your  urine. This will happen in the next 24 hours. You will not feel this process.  Your urine will not change color.    Drink at least 4 extra glasses of water or juice today (unless your doctor  has restricted your fluids). This reduces the stress on your kidneys.  You may take your regular medicines.    If you are on dialysis: It is best to have dialysis today.    If you have a reaction: Most reactions happen right away. If you have  any new symptoms after leaving the hospital (such as hives or swelling),  call your hospital at the correct number below. Or call your family doctor.  If you have breathing distress or wheezing, call 911.    Special instructions: ***    I have read and understand the above information.    Signature:______________________________________ Date:___________    Staff:__________________________________________ Date:___________     Time:__________    [place] Radiology Departments:    ___[place]: [phone]  ___[facility]: [phone]  ___[place]: [phone] ___[facility]: [phone]  ___[facility]: [phone]  ___[facility]: [phone]  ___[phone]  ___[facility]: [phone]  ___[phone]

## 2023-02-09 DIAGNOSIS — R26.9 ABNORMAL GAIT: Primary | ICD-10-CM

## 2023-02-10 ENCOUNTER — TELEPHONE (OUTPATIENT)
Dept: NEUROLOGY | Facility: CLINIC | Age: 78
End: 2023-02-10

## 2023-02-10 DIAGNOSIS — R26.9 ABNORMAL GAIT: Primary | ICD-10-CM

## 2023-02-10 NOTE — TELEPHONE ENCOUNTER
[facility] Call Center    Phone Message    May a detailed message be left on voicemail: yes     Reason for Call: Appointment Intake    Referring Provider Name: Dr. Quesada  Diagnosis and/or Symptoms: extreme gait abnormality with proximal weakness of the lower extremities and mild distal symmetric neuropathy    Patient is confused. He thinks this should be a physical therapy appointment.    Please call Genaro to discuss.    Action Taken: Message routed to:  [facility] Neurology    Travel Screening: Not Applicable

## 2023-02-10 NOTE — TELEPHONE ENCOUNTER
Called pt and informed him I see that he has a PT appt, then an appt with a movement provider and then he comes back in June to see Dr. Quesada. Informed pt to follow through with appts  and pt verbally understood.

## 2023-02-13 NOTE — TELEPHONE ENCOUNTER
Health Call Center    Phone Message    May a detailed message be left on voicemail: yes     Reason for Call: Other: Pt is calling stating he received a message form Dr. Quesada stating he actually has a neuromuscular diagnosis, not a movement disorder. Pt wants to know if he needs to change his appt on 3/9 to a different provider/to be seen for a different diagnosis. Please call back to discuss.    Action Taken: Message routed to:  [facility] Neurology    Travel Screening: Not Applicable

## 2023-02-13 NOTE — TELEPHONE ENCOUNTER
RECORDS RECEIVED FROM: internal   REASON FOR VISIT: new neuromuscular for extreme gait abnormality with proximal weakness of the lower extremities and mild distal symmetric neuropathy   Date of Appt: 4/10/23   NOTES (FOR ALL VISITS) STATUS DETAILS   OFFICE NOTE from referring provider Internal Dr Quesada @ [facility] Neurology:  2/10/23 mychart encounter  2/8/23 mychart encounter  11/18/22  7/20/22  10/7/21  (additional encounters)   DISCHARGE REPORT from the ER Internal [facility]:  9/2/22   MEDICATION LIST Internal    IMAGING  (FOR ALL VISITS)     EMG Internal [facility]:  9/7/22  12/10/20   MRI (HEAD, NECK, SPINE) Internal [facility]:  MRI Thoracic Spine 1/29/23  MRI Cervical Spine 1/29/23  MRI Brain 1/29/23  MRI Lumbar Spine 11/12/20

## 2023-03-02 ENCOUNTER — TELEPHONE (OUTPATIENT)
Dept: NEUROLOGY | Facility: CLINIC | Age: 78
End: 2023-03-02

## 2023-03-02 ENCOUNTER — TELEPHONE (OUTPATIENT)
Dept: GASTROENTEROLOGY | Facility: CLINIC | Age: 78
End: 2023-03-02

## 2023-03-02 NOTE — TELEPHONE ENCOUNTER
Health Call Center    Phone Message    May a detailed message be left on voicemail: yes     Reason for Call: Other: Patient is requesting a call back to see if he should keep the appt with Dr. Quesada on 6/7/23. Patient is scheduled with Dr. Banuelos on 8/7/23 and is wondering if the appt on 6/7 is necessary.     Please call pt back at # [phone]    Action Taken: Message routed to:  [facility] neurology     Travel Screening: Not Applicable

## 2023-03-02 NOTE — TELEPHONE ENCOUNTER
Screening Questions  BLUE  KIND OF PREP RED  LOCATION [review exclusion criteria] GREEN  SEDATION TYPE        Y Are you active on mychart?       Wegener, Joel Daniel Irwin Ordering/Referring Provider?        [facility]/MEDICARE What type of coverage do you have?      N Have you had a positive covid test in the last 14 days?     33.0 1. BMI  [BMI 40+ - review exclusion criteria]    Y  2. Are you able to give consent for your medical care? [IF NO,RN REVIEW]          N  3. Are you taking any prescription pain medications on a routine schedule   (ex narcotics: oxycodone, roxicodone, oxycontin,  and percocet)? [RN Review]        N  3a. EXTENDED PREP What kind of prescription?     N 4. Do you have any chemical dependencies such as alcohol, street drugs, or methadone?        **If yes 3- 5 , please schedule with MAC sedation.**          IF YES TO ANY 6 - 10 - HOSPITAL SETTING ONLY.     Y USES A WALKER 6.   Do you need assistance transferring?     N 7.   Have you had a heart or lung transplant?    N 8.   Are you currently on dialysis?   N 9.   Do you use daily home oxygen?   N 10. Do you take nitroglycerin?   10a. N If yes, how often?     11. [FEMALES]  N Are you currently pregnant?    11a. N If yes, how many weeks? [ Greater than 12 weeks, OR NEEDED]    N 12. Do you have Pulmonary Hypertension? *NEED PAC APPT AT [facility] w/ MAC*     N 13. [review exclusion criteria]  Do you have any implantable devices in your body (pacemaker, defib, LVAD)?    N 14. In the past 6 months, have you had any heart related issues including cardiomyopathy or heart attack?     14a.  If yes, did it require cardiac stenting if so when?     N 15. Have you had a stroke or Transient ischemic attack (TIA - aka  mini stroke ) within 6 months?      N 16. Do you have mod to severe Obstructive Sleep Apnea?  [Hospital only]    N 17. Do you have SEVERE AND UNCONTROLLED asthma? *NEED PAC APPT AT [facility] w/MAC*     18. Are you currently taking any blood thinners?     " 18a. No. Continue to 19.   18b. Yes/no Blood Thinner: No [CONTINUE TO #19]    N19. Do you take the medication Phentermine?    19a. If yes, \"Hold for 7 days before procedure.  Please consult your prescribing provider if you have questions about holding this medication.\"     N  20. Do you have chronic kidney disease?      N  21. Do you have a diagnosis of diabetes?     N  22. On a regular basis do you go 3-5 days between bowel movements?     N 23. Preferred LOCAL Pharmacy for Pre Prescription    [ LIST ONLY ONE PHARMACY]     [facility] [id number] [place] [facility] [place], [place] - [address]        - CLOSING REMINDERS -    Informed patient they will need an adult    Cannot take any type of public or medical transportation alone    Conscious Sedation- Needs  for 6 hours after the procedure       MAC/General-Needs  for 24 hours after procedure    Pre-Procedure Covid test to be completed [[facility] PCR Testing Required]    Confirmed Nurse will call to complete assessment       - SCHEDULING DETAILS -  Y Hospital Setting Required? If yes, what is the exclusion?: NEEDS HELP TRANSFERRING   [facility]  Surgeon    5/18/23  Date of Procedure  Lower Endoscopy [Colonoscopy]  Type of Procedure Scheduled  [facility]   STANDARD GOLYTELY-If you answer yes to questions #8, #20, #21Which Colonoscopy Prep was Sent?     MODERATE Sedation Type     N PAC / Pre-op Required                 "

## 2023-03-03 NOTE — TELEPHONE ENCOUNTER
Informed pt Per Dr. Quesada,  He would like to see him to see if there is any reason to obtain a physiatry consultation.   I misspoke and told pt psychiatry and then recanted and told him I meant physiatry- rehab medicine. Physiatry is a branch of medicine that is dedicated to the dx , prevention, and treatment of all types of disabilities related to the brain, nerves, bones and muscles. Apologized to pt that I misspoke and again physiatry and confirmed pt is to follow up in person as scheduled and he verbally understood.

## 2023-03-27 ENCOUNTER — HOSPITAL ENCOUNTER (OUTPATIENT)
Dept: PHYSICAL THERAPY | Facility: CLINIC | Age: 78
Discharge: HOME | End: 2023-03-27
Attending: PSYCHIATRY & NEUROLOGY
Payer: COMMERCIAL

## 2023-03-27 DIAGNOSIS — Z74.09 IMPAIRED MOBILITY: ICD-10-CM

## 2023-03-27 DIAGNOSIS — R26.9 ABNORMAL GAIT: Primary | ICD-10-CM

## 2023-03-27 PROCEDURE — 97162 PT EVAL MOD COMPLEX 30 MIN: CPT | Mod: GP

## 2023-03-27 NOTE — PROGRESS NOTES
Outpatient Physical Therapy Evaluation  PLAN OF TREATMENT FOR OUTPATIENT REHABILITATION  (COMPLETE FOR INITIAL CLAIMS ONLY)  Patient's Last Name, First Name, M.I.  YOB: 1945  Genaro Ortiz                        Provider's Name  Emma Lima, PT Medical Record No.  [id number]                               Onset Date:  2/9/23 (order date)   Start of Care Date: 3/27/23     Type: Physical Therapy Medical Diagnosis: Abnormal gait                        Therapy Diagnosis:  Force production deficit   Visits from [facility]:  1   _________________________________________________________________________________  Plan of Treatment:      Frequency/Duration: 2x/week, up to 13 weeks decreasing frequency prn    Goals:   Goal 1   Goal Identifier Gait   Goal Description Pt will ambulate with reduced Joleen at a speed of at least 0.5 m/s w/ 4ww in order to improve independence with community ambulation   Goal Progress eval:0.35 m/s w/ 4ww w/ Joleen wider than walker   Target Date 06/24/23   Goal 2   Goal Identifier TUG   Goal Description Pt will perform TUG at least 1.88 sec faster w/ 4ww than initial performance in order to improve safety of mobility at home   Goal Progress eval: not tested   Target Date 06/24/23   Goal 3   Goal Identifier Standing balance   Goal Description Pt will be able to stand w/ reduced Joleen for at least 60 sec w/o UE support in order to improve safety and independence with ADL's   Goal Progress eval: unable to narrow Joleen without compensation of inversion of ankles   Target Date 06/24/23   Goal 4   Goal Identifier Transfers   Goal Description Pt will be able to perform STS with reduced Joleen and w/o UE from a 24 inch height in order to improve independence with transfers at home   Goal Progress eval: unable to stand from EOM w/o UE assist no matter the height of mat   Target Date 06/24/23   Goal 5   Goal Identifier HEP    Goal Description Pt will be indpendent with HEP in order to improve self management of impairments   Target Date 06/24/23       _________________________________________________________________________________    I CERTIFY THE NEED FOR THESE SERVICES FURNISHED UNDER        THIS PLAN OF TREATMENT AND WHILE UNDER MY CARE     (Physician co-signature of this document indicates review and certification of the therapy plan).                Certification date from: 3/27/23  Certification date to: 6/24/23    Referring Provider: MARIELY HERNANDEZ

## 2023-03-27 NOTE — PROGRESS NOTES
03/27/23 1000   Quick Adds   Quick Adds Certification   Type of Visit Initial OP PT Evaluation   General Information   Start of Care Date 03/27/23   Referring Physician Valdez Quesada, DO   Orders Evaluate and Treat as Indicated   Order Date 02/09/23   Medical Diagnosis Abnormal gait   balance issues.   Onset of illness/injury or Date of Surgery 02/09/23  (order date)   Precautions/Limitations fall precautions   Special Instructions needs help with stretching his legs (extremely wide with walking, leading to other issues).   Pertinent history of current problem (include personal factors and/or comorbidities that impact the POC) Pt is 78 y/o male referred to OP PT d/t worsening of neurological symptoms of unknown cause affecting his gait. Pt states this all started ~3 years ago all of a sudden when he started experiencing pain and occasional N-T in legs as well as weakness in B LE. Pt has been working with neurologist but has yet to find a cause of his impairments. He has been found to have cervical and thoracic stenosis on MRI but no hyperintensities of the spinal cord or brain. Pt did work with PT at this clinic previously in 2020 and states he had some improvements however not a lot. He has worked with OT to address improving independence from toilet as well as dressing - he now has raised toilet seat which is helpful but states dressing is still hard cause he has to  squeeze  his legs into his pants. Pt states he has always  not walked well  and even before all these symptoms arose his L leg was  weird . Pt showed picture of himself to PT from 2018 and could see a slightly widened Joleen as well as valgus of the L knee. Pt does he feel he is worse now from when he was last seen in PT. Pts largest complaints today are his ability to get up and down from a chair w/o use of UE, getting in and out of a car, walking with extremely wide Joleen, rolling onto side in bed. Even in sitting or laying down cannot  move his legs closer together as it is very painful. Pain in legs described as sharp and shooting all over and rated 6-7/10 pain.   Prior level of function comment Still doing SLR, LTR. Before this loved to go to the gym - bike, weights   Current Community Support Meals on wheels;Transportation service;Housekeeping service   Patient role/Employment history Retired   Living environment Apartment/condo   Home/Community Accessibility Comments elevator access   Current Assistive Devices Four Wheeled Walker   ADL Devices Raised Toilet Seat  with Arms   Patient/Family Goals Statement walking, in and out of car, STS, putting on clothes, driving the car   Fall Risk Screen   Fall screen completed by PT   Have you fallen 2 or more times in the past year? No   Have you fallen and had an injury in the past year? No   Timed Up and Go score (seconds) not tested   Is patient a fall risk? Yes;Department fall risk interventions implemented   Fall screen comments per observational gait analysis   Pain   Pain comments pt states that the pain in his legs in sharp and shooting t/o his whole legs rated 6-7/10 when performing STS, trying to adduct legs, or rolling onto side   Cognitive Status Examination   Orientation orientation to person, place and time   Level of Consciousness alert   Follows Commands and Answers Questions 100% of the time   Personal Safety and Judgment intact   Memory intact   Integumentary   Integumentary Comments noted very dry skin on LE, flaky skin. Per reports, LE fall asleep easliy and become cold   Posture   Posture Forward head position;Protracted shoulders;Kyphosis   Posture Comments extreme kyphosis or thoracic and flat back of lumbar spine   Range of Motion (ROM)   ROM Comment limited in LE especially into adduction   Strength   Strength Comments unable to lift hip flex against gravity also painful against resistance (all over), quads 5/5 on both sides (lacking ~10 deg from ext), hamstring 5/5 on both sides  (slightly weaker on L), Df 3+/5 on R, 4/5 on L. unable to adduct   Transfer Skills   Transfer Comments unable to stand w/o UE support - stands with full support from UE and very wide Joleen   Gait   Gait Comments ambulates with extremely wide Joleen with waddling, shuffling gait, moving 4ww side to side in weaving pattern, very slow   Gait Special Tests   Gait Special Tests 25 FOOT TIMED WALK   Gait Special Tests 25 Foot Timed Walk   Comments 10mwt: 17.28 sec = 0.35 m/s w/ 4ww   Balance   Balance Comments unable to  narrowed Joleen without noted extremem inversion of B feet occurring and more pain   Balance Special Tests   Balance Special Tests Timed up and go;Sit to stand reps   Balance Special Tests Timed Up and Go   Comments not tested d/t time   Balance Special Tests Sit to Stand Reps in 30 Seconds   Comments 5xSTS: 0   Sensory Examination   Sensory Perception Comments pain in legs, off and on N-T, feet fall asleep easily   Muscle Tone   Muscle Tone Comments likely contracted hip abductors - but unable to further assess today d/t time   Planned Therapy Interventions   Planned Therapy Interventions ADL retraining;bed mobility training;balance training;gait training;neuromuscular re-education;orthotic fitting/training;ROM;strengthening;stretching;transfer training;manual therapy   Clinical Impression   Criteria for Skilled Therapeutic Interventions Met yes, treatment indicated   PT Diagnosis Force production deficit   Influenced by the following impairments 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, pain in B LE, cervical and thoracic stenosis, poor posture, wide Joleen in standing and ambulating positions, functional weakness, limited ROM, impaired balance   Functional limitations due to impairments impaired bed mobility, impaired transfers, impaired gait, impaired ADL's   Clinical Presentation Evolving/Changing   Clinical Presentation Rationale idiopathic cause theat appears progressive in nature, has previously experienced  some improvement with PT   Clinical Decision Making (Complexity) Moderate complexity   Therapy Frequency 2 times/Week   Predicted Duration of Therapy Intervention (days/wks) 13 weeks   Risk & Benefits of therapy have been explained Yes   Patient, Family & other staff in agreement with plan of care Yes   Clinical Impression Comments Pt presents with complicated 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 Pt presetns with severe limitations in bed mobility, transfers, and ambulation d/t his functional weakness in B LE and extremem wide Joleen in standing and seated positons. Pt has benefited from PT in the past and would beenfit from skilled PT again to attempt to improve impairments in order to improve indpeendence.   Education Assessment   Preferred Learning Style Listening   Barriers to Learning No barriers   GOALS   PT Eval Goals 1;2;3;4;5   Goal 1   Goal Identifier Gait   Goal Description Pt will ambulate with reduced Joleen at a speed of at least 0.5 m/s w/ 4ww in order to improve independence with community ambulation   Goal Progress eval:0.35 m/s w/ 4ww w/ Joleen wider than walker   Target Date 06/24/23   Goal 2   Goal Identifier TUG   Goal Description Pt will perform TUG at least 1.88 sec faster w/ 4ww than initial performance in order to improve safety of mobility at home   Goal Progress eval: not tested   Target Date 06/24/23   Goal 3   Goal Identifier Standing balance   Goal Description Pt will be able to stand w/ reduced Joleen for at least 60 sec w/o UE support in order to improve safety and independence with ADL's   Goal Progress eval: unable to narrow Joleen without compensation of inversion of ankles   Target Date 06/24/23   Goal 4   Goal Identifier Transfers   Goal Description Pt will be able to perform STS with reduced Joleen and w/o UE from a 24 inch height in order to  improve independence with transfers at home   Goal Progress eval: unable to stand from EOM w/o UE assist no matter the height of mat   Target Date 06/24/23   Goal 5   Goal Identifier HEP   Goal Description Pt will be indpendent with HEP in order to improve self management of impairments   Target Date 06/24/23   Total Evaluation Time   PT Eval, Moderate Complexity Minutes (73298) 45   Therapy Certification   Certification date from 03/27/23   Certification date to 06/24/23   Medical Diagnosis Abnormal gait   Certification I certify the need for these services furnished under this plan of treatment and while under my care.  (Physician co-signature of this document indicates review and certification of the therapy plan).     Bekah Lima, PT, DPT    Physical Therapist  [email or web address]

## 2023-04-03 ENCOUNTER — HOSPITAL ENCOUNTER (OUTPATIENT)
Dept: PHYSICAL THERAPY | Facility: CLINIC | Age: 78
Discharge: HOME | End: 2023-04-03
Attending: PSYCHIATRY & NEUROLOGY
Payer: COMMERCIAL

## 2023-04-03 DIAGNOSIS — R26.9 ABNORMAL GAIT: Primary | ICD-10-CM

## 2023-04-03 DIAGNOSIS — Z74.09 IMPAIRED MOBILITY: ICD-10-CM

## 2023-04-03 PROCEDURE — 97110 THERAPEUTIC EXERCISES: CPT | Mod: GP

## 2023-04-10 ENCOUNTER — PRE VISIT (OUTPATIENT)
Dept: NEUROLOGY | Facility: CLINIC | Age: 78
End: 2023-04-10

## 2023-04-12 ENCOUNTER — HOSPITAL ENCOUNTER (OUTPATIENT)
Dept: PHYSICAL THERAPY | Facility: CLINIC | Age: 78
Discharge: HOME | End: 2023-04-12
Attending: PSYCHIATRY & NEUROLOGY
Payer: COMMERCIAL

## 2023-04-12 DIAGNOSIS — Z74.09 IMPAIRED MOBILITY: ICD-10-CM

## 2023-04-12 DIAGNOSIS — R26.9 ABNORMAL GAIT: Primary | ICD-10-CM

## 2023-04-12 PROCEDURE — 97110 THERAPEUTIC EXERCISES: CPT | Mod: GP

## 2023-04-12 NOTE — PROGRESS NOTES
I left a message with Quinton indicating I would like him to have an X-ray of his hips to see if there is some extreme signs of arthritis or anatomical deficit leading to his gait issues.      - XR hips    CATY Quesada D.O.  [facility]

## 2023-04-12 NOTE — PROGRESS NOTES
[facility] Call Center    Phone Message    May a detailed message be left on voicemail: yes     Reason for Call: Other: Patient is calling to inform provider that he agrees to do imaging for hips.     Action Taken: Message routed to:  [facility] Nuer[facility]    Travel Screening: Not Applicable

## 2023-04-17 ENCOUNTER — HOSPITAL ENCOUNTER (OUTPATIENT)
Dept: PHYSICAL THERAPY | Facility: CLINIC | Age: 78
Discharge: HOME | End: 2023-04-17
Payer: COMMERCIAL

## 2023-04-17 DIAGNOSIS — R26.9 ABNORMAL GAIT: Primary | ICD-10-CM

## 2023-04-17 DIAGNOSIS — M62.81 GENERALIZED MUSCLE WEAKNESS: ICD-10-CM

## 2023-04-17 PROCEDURE — 97110 THERAPEUTIC EXERCISES: CPT | Mod: GP | Performed by: PHYSICAL THERAPIST

## 2023-04-19 ENCOUNTER — HOSPITAL ENCOUNTER (OUTPATIENT)
Dept: PHYSICAL THERAPY | Facility: CLINIC | Age: 78
Discharge: HOME | End: 2023-04-19
Payer: COMMERCIAL

## 2023-04-19 DIAGNOSIS — R26.9 ABNORMAL GAIT: Primary | ICD-10-CM

## 2023-04-19 DIAGNOSIS — M62.81 GENERALIZED MUSCLE WEAKNESS: ICD-10-CM

## 2023-04-19 PROCEDURE — 97110 THERAPEUTIC EXERCISES: CPT | Mod: GP

## 2023-04-20 ENCOUNTER — ANCILLARY PROCEDURE (OUTPATIENT)
Dept: GENERAL RADIOLOGY | Facility: CLINIC | Age: 78
End: 2023-04-20
Attending: PSYCHIATRY & NEUROLOGY
Payer: COMMERCIAL

## 2023-04-20 DIAGNOSIS — R26.9 ABNORMAL GAIT: Primary | ICD-10-CM

## 2023-04-20 DIAGNOSIS — M16.0 OSTEOARTHRITIS OF BOTH HIPS, UNSPECIFIED OSTEOARTHRITIS TYPE: ICD-10-CM

## 2023-04-20 DIAGNOSIS — R26.9 ABNORMAL GAIT: ICD-10-CM

## 2023-04-20 PROCEDURE — 73522 X-RAY EXAM HIPS BI 3-4 VIEWS: CPT | Mod: GC | Performed by: RADIOLOGY

## 2023-04-21 NOTE — TELEPHONE ENCOUNTER
DIAGNOSIS:   Abnormal gait [R26.9]  - Primary       Osteoarthritis of both hips, unspecified osteoarthritis type [M16.0]          APPOINTMENT DATE: 05/01/2023   NOTES STATUS DETAILS   OFFICE NOTE from referring provider Internal 11/18/2022 - Valdez Quesada DO - [facility]   OFFICE NOTE from other specialist Internal [facility] Physical Therapy    09/07/2022- Jc Marshall MD - [facility]   DISCHARGE REPORT from the ER Internal 09/02/2022 - [facility] ED   MEDICATION LIST Internal    EMG (for Spine) Internal 09/07/2022   LABS     MRI PACS Internal:  11/12/2020 - L Spine   XRAYS (IMAGES & REPORTS) PACS Internal:  04/20/2023 - Bilateral Hip  02/24/2021 - Bone Survey  01/30/2018 - Hip/Pelvis

## 2023-04-24 ENCOUNTER — HOSPITAL ENCOUNTER (OUTPATIENT)
Dept: PHYSICAL THERAPY | Facility: CLINIC | Age: 78
Discharge: HOME | End: 2023-04-24
Payer: COMMERCIAL

## 2023-04-24 DIAGNOSIS — M62.81 GENERALIZED MUSCLE WEAKNESS: ICD-10-CM

## 2023-04-24 DIAGNOSIS — R26.9 ABNORMAL GAIT: Primary | ICD-10-CM

## 2023-04-24 PROCEDURE — 97110 THERAPEUTIC EXERCISES: CPT | Mod: GP

## 2023-04-26 ENCOUNTER — HOSPITAL ENCOUNTER (OUTPATIENT)
Dept: PHYSICAL THERAPY | Facility: CLINIC | Age: 78
Discharge: HOME | End: 2023-04-26
Payer: COMMERCIAL

## 2023-04-26 DIAGNOSIS — R26.9 ABNORMAL GAIT: Primary | ICD-10-CM

## 2023-04-26 DIAGNOSIS — M62.81 GENERALIZED MUSCLE WEAKNESS: ICD-10-CM

## 2023-04-26 PROCEDURE — 97110 THERAPEUTIC EXERCISES: CPT | Mod: GP

## 2023-05-01 ENCOUNTER — OFFICE VISIT (OUTPATIENT)
Dept: ORTHOPEDICS | Facility: CLINIC | Age: 78
End: 2023-05-01
Payer: COMMERCIAL

## 2023-05-01 ENCOUNTER — ANCILLARY PROCEDURE (OUTPATIENT)
Dept: CT IMAGING | Facility: CLINIC | Age: 78
End: 2023-05-01
Attending: ORTHOPAEDIC SURGERY
Payer: COMMERCIAL

## 2023-05-01 ENCOUNTER — PRE VISIT (OUTPATIENT)
Dept: ORTHOPEDICS | Facility: CLINIC | Age: 78
End: 2023-05-01

## 2023-05-01 ENCOUNTER — LAB (OUTPATIENT)
Dept: LAB | Facility: CLINIC | Age: 78
End: 2023-05-01
Payer: COMMERCIAL

## 2023-05-01 VITALS — BODY MASS INDEX: 33.33 KG/M2 | WEIGHT: 225 LBS | HEIGHT: 69 IN

## 2023-05-01 DIAGNOSIS — M16.0 OSTEOARTHRITIS OF BOTH HIPS, UNSPECIFIED OSTEOARTHRITIS TYPE: ICD-10-CM

## 2023-05-01 DIAGNOSIS — R26.9 ABNORMAL GAIT: ICD-10-CM

## 2023-05-01 PROCEDURE — 99000 SPECIMEN HANDLING OFFICE-LAB: CPT | Performed by: PATHOLOGY

## 2023-05-01 PROCEDURE — 36415 COLL VENOUS BLD VENIPUNCTURE: CPT | Performed by: PATHOLOGY

## 2023-05-01 PROCEDURE — 86431 RHEUMATOID FACTOR QUANT: CPT | Mod: 90 | Performed by: PATHOLOGY

## 2023-05-01 PROCEDURE — 72192 CT PELVIS W/O DYE: CPT | Performed by: RADIOLOGY

## 2023-05-01 PROCEDURE — 99204 OFFICE O/P NEW MOD 45 MIN: CPT | Performed by: ORTHOPAEDIC SURGERY

## 2023-05-01 PROCEDURE — 81374 HLA I TYPING 1 ANTIGEN LR: CPT | Performed by: PATHOLOGY

## 2023-05-01 PROCEDURE — 86038 ANTINUCLEAR ANTIBODIES: CPT | Mod: 90 | Performed by: PATHOLOGY

## 2023-05-01 ASSESSMENT — ENCOUNTER SYMPTOMS
PARALYSIS: 0
STIFFNESS: 1
DISTURBANCES IN COORDINATION: 1
ARTHRALGIAS: 1
SPEECH CHANGE: 0
NUMBNESS: 1
JOINT SWELLING: 0
MUSCLE CRAMPS: 1
WEAKNESS: 1
DIZZINESS: 1
NECK PAIN: 0
LOSS OF CONSCIOUSNESS: 0
SEIZURES: 0
TREMORS: 0
HEADACHES: 0
TINGLING: 0
BACK PAIN: 0
MUSCLE WEAKNESS: 1
MEMORY LOSS: 0
MYALGIAS: 1

## 2023-05-01 ASSESSMENT — HOOS S4: HOW SEVERE IS YOUR HIP JOINT STIFFNESS AFTER FIRST WAKENING IN THE MORNING?: MODERATE

## 2023-05-01 ASSESSMENT — ACTIVITIES OF DAILY LIVING (ADL)
ADL_SUBSCALE_SCORE: 30.88
ADL_SUM: 47
ADL_MEAN: 2.76

## 2023-05-01 NOTE — NURSING NOTE
Pre-Op Teaching was done in person at the clinic.    Teaching Flowsheet   Relevant Diagnosis: Pre-Op Teaching  Teaching Topic: bilateral JOHNNY in staged manner 1 week apart beginning with right JOHNNY followed by left JOHNNY with hospitalization or TCU stay in the interim.      Person(s) involved in teaching:   Patient     Motivation Level:  Asks Questions: Yes  Eager to Learn: Yes  Cooperative: Yes  Receptive (willing/able to accept information): Yes  Any cultural factors/Mu-ism beliefs that may influence understanding or compliance? No     Patient demonstrates understanding of the following:  Reason for the appointment, diagnosis and treatment plan: Yes  Knowledge of proper use of medications and conditions for which they are ordered (with special attention to potential side effects or drug interactions): Yes  Which situations necessitate calling provider and whom to contact: Yes- discussed the stoplight tool to help assist with this.      Teaching Concerns Addressed:      Proper use of surgical scrub explain: Yes    Nutritional needs and diet plan: Yes  Pain management techniques: Yes  Wound Care: Yes  How and/when to access community resources: Yes     Instructional Materials Used/Given:  2 bottle of chlorhexidine, a surgery packet and a joint booklet given to patient in clinic.     - Important contact info/ phone numbers: emphasizing clinic number and after hours number  - Map/ location of surgery  - Medications to avoid  - Showering instructions  - Stop light tool  - Your Guide to Joint Replacement Booklet   - Antibiotic post op before every dentist appointment, procedure, or surgery for the rest of their life.     Additionally the following was discussed with patient:  - Friend, Fred, will be driving the patient to surgery and able to pick the patient up after discharge.  - Need to schedule a dentist appointment and get done any recommended dental work prior to surgery.   - Online joint replacement call and the  use of [facility] [employer] to send educational messages. Asked patient to sign up for join class during visit and patient declined to sign up at this time and stated will sign up later. Sheet with sign up instruction given to patient.   - Told patient to check in with insurance to check about coverage.     Discussed that TCU placement post-operatively cannot be guaranteed. We cannot arrange placement before. Eligibly is evaluated post-operatively. Bed availability is limited. I told patient if they are eligible and if you have insurance coverage, finding a bed/placement is not guaranteed. The social working at the hospital to work with TCUs to determine bed availability at time of discharge.      Dr. Escamilla is aware of patient's social situation and would still like to proceed with surgery.     -Next step: Schedule a surgery date and schedule a Pre-Op appointment with PCP     Time spent with patient: 15 minutes.

## 2023-05-01 NOTE — PROGRESS NOTES
[facility]  Orthopaedic Surgery, Joint Replacement Consultation  by David Escamilla M.D.    Genaro Ortiz MRN# [id number]    YOB: 1945     Requesting physician: Valdez Quesada DO, [facility] [[email or web address]] neurology  Wegener, Joel Daniel Irwin            Assessment and Plan:   Assessment:  1. 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  Etiology likely degenerative, however, will need to rule out ankylosing spondylitis or other connective tissue diseases.  He has limited thoracic excursion but no other radiographic or clinical evidence of ankylosing spondylitis or autoimmune disease.  No evidence of heterotopic bone formation or soft tissue ossification based on current imaging.    2. 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 CT pelvis  2. HLA B-27, RF, FARHAN  3. Anticipate performing bilateral JOHNNY in staged manner 1 week apart beginning with right JOHNNY followed by left JOHNNY with hospitalization or TCU stay in the interim.  He will likely need TCU stay afterwards as [facility] [place].  Bilateral hip replacements will result in markedly improved range of motion as well as functional ability for him and this should increase his quality of life significantly.  4. Discontinue physical therapy as his rigid arthrosis will prevent any improvement in range of motion until joint arthroplasty is completed.  5. He will need preparation including dental evaluation, preoperative H&P or  PACS clinic visit, joint placement class the patient education.    Addendum 5/2/2023, 10am: I was notified by radiologist re the CT findings of diverticulitis and I have sent communication to him via [employer] as he was unavailable via phone today.    For additional information and frequently asked questions regarding joint replacements, scan the QR code image below on your phone camera:     or go to:  "  [email or web address]    MD Chetan Pelayo Family Professor  Oncology and Adult Reconstructive Surgery  Dept Orthopaedic Surgery, [facility]  [phone] office, [phone] pager  [email or web address]            History of Present Illness:   77 year old male  chief complaint    This patient has a history of polyneuropathy bilaterally and has had difficulty walking with a wide-based gait.  He underwent evaluation for central causes with MRI examination of his head and entire spine without any significant findings to explain the difficulty with his gait.  Radiographs of the hips were obtained demonstrating severe arthrosis.  He is seen today for evaluation.    Patient reports that [facility] independently [facility] [facility].  He is retired.  He lives alone.  He is able to shop at [employer] and drive and get in and out of his car.  There are no stairs in his building.  He obtains his meals through assistive program \"Meals on Wheels\".  He would like to continue to be more independent and walk better.  He is using a walker and hobbles around.  He has some assistive devices in the bathroom including elevated toilet seat and a sliding chair for his bathtub.    Current symptoms:  Problem: Abnormal gait and osteoarthritis of both hips.  Onset and duration: Patient states they have had a problem with both their hips for 3 years, but it has been getting worse.  Awakens from sleep due to sx's:  No  Precipitating Injury:  No    Other joints or sites painful:  No           Physical Exam:     EXAMINATION pertinent findings:   PSYCH: Pleasant, healthy-appearing, alert, oriented x3, cooperative. Normal mood and affect.  VITAL SIGNS: There were no vitals taken for this visit..  Reviewed nursing intake notes.   There is no height or weight on file to calculate BMI.  RESP: non labored breathing .  Limited thoracic excursion approximately 1 cm.  ABD: benign, soft, " non-tender, no acute peritoneal findings  SKIN: grossly normal   LYMPHATIC: grossly normal, no adenopathy, no extremity edema  NEURO: grossly normal , no motor deficits  VASCULAR: satisfactory perfusion of all extremities   MUSCULOSKELETAL:     Gait is markedly abnormal with bilateral severe abduction contractures of both hip joints.    Range of motion of both hips:  Right hip fixed 30 degrees of abduction, no rotation, no flexion or extension.  Left hip similarly fixed in 30 degrees of abduction, no rotation and no flexion or extension.  Both knees have full extension with further flexion to approximately 80 degrees.  Further testing not possible due to his rigid hip arthrosis.    Upper extremities unremarkable.                  Data:   All laboratory data reviewed  All imaging studies reviewed by me           MRI 10/2020:  IMPRESSION: Multilevel degenerative disc and facet disease most  advanced at L3-L4 where there is mild to moderate central canal and  mild bilateral neural foraminal stenosis. There is also moderate  right-sided foraminal stenosis at L2-L3.        DATA for DOCUMENTATION:         Past Medical History:     Patient Active Problem List   Diagnosis     HYPERLIPIDEMIA LDL GOAL <130     Hypertension goal BP (blood pressure) < 140/90     Advance Care Planning     Anemia due to blood loss, acute     Health Care Home     Obesity due to excess calories, unspecified obesity severity     MGUS (monoclonal gammopathy of unknown significance)     Proximal limb muscle weakness     Vitamin B12 deficiency (non anemic)     Screen for colon cancer     Encounter for Medicare annual wellness exam     Hypovitaminosis D     History of prostate cancer     Encounter for monitoring of chronic aspirin therapy     Screening for prostate cancer     History of prostatectomy     Past Medical History:   Diagnosis Date     Adenocarcinoma of prostate (H) 5/27/2008     Arthritis      Esophageal reflux      Neuropathy      Pure  hypercholesterolemia      Unspecified essential hypertension        Also see scanned health assessment forms.       Past Surgical History:     Past Surgical History:   Procedure Laterality Date     ABDOMEN SURGERY  1952    Appendectomy     APPENDECTOMY  1952     CATARACT IOL, RT/LT Bilateral 03/12     COLONOSCOPY  2017     GENITOURINARY SURGERY       HERNIORRHAPHY INGUINAL  5/15/2014    Procedure: HERNIORRHAPHY INGUINAL;  Surgeon: Evens Jefferson MD;  Location: [facility] OR     PROSTATE SURGERY  2007            Social History:     Social History     Socioeconomic History     Marital status: Single     Spouse name: Not on file     Number of children: Not on file     Years of education: Not on file     Highest education level: Not on file   Occupational History     Not on file   Tobacco Use     Smoking status: Never     Smokeless tobacco: Never     Tobacco comments:     Never smoked.   Vaping Use     Vaping status: Not on file   Substance and Sexual Activity     Alcohol use: Not Currently     Comment: occasional     Drug use: No     Sexual activity: Never   Other Topics Concern     Parent/sibling w/ CABG, MI or angioplasty before 65F 55M? No   Social History Narrative    Social Documentation:        Balanced Diet: YES    Calcium intake: 1-2 per day    Caffeine: 2 per day    Exercise:  type of activity gym and work is physical;  2 times per week    Sunscreen: Yes, when needed    Seatbelts:  Yes    Self Breast Exam:  na    Self Testicular Exam: Yes    Physical/Emotional/Sexual Abuse: No    Do you feel safe in your environment? Yes        Cholesterol screen up to date:  6/5/2009                                                                                                             Latest Ref Rng                              CHOLESTEROL                                                 0 - 200 mg/dL                177                     TRIGLYCERIDES                                               0 - 150 mg/dL                 177 (H)                 HDL                                                         40 - 110 mg/dL               39 (L)                  LDL CHOLESTEROL CALCULATED                                  0 - 129 mg/dL                102                     VLDL-CHOLESTEROL                                            0 - 30 mg/dL                 35 (H)                  CHOLESTEROL/HDL RATIO                                       0.0 - 5.0                    4.5                         Eye Exam up to date: Yes    Dental Exam up to date: Yes    Pap smear up to date: Does Not Apply    Mammogram up to date: Does Not Apply    Dexa Scan up to date: Does Not Apply    Colonoscopy up to date: Yes    Immunizations up to date: Yes    Glucose screen if over 40:  Component                        Latest Ref Rng               10/31/2008              6/5/2009                GLUCOSE                          60 - 99 mg/dL                106 (H)                 111 (H)                                                      Social Determinants of Health     Financial Resource Strain: Not on file   Food Insecurity: Not on file   Transportation Needs: Not on file   Physical Activity: Not on file   Stress: Not on file   Social Connections: Not on file   Intimate Partner Violence: Not on file   Housing Stability: Not on file            Family History:       Family History   Problem Relation Age of Onset     Glaucoma Other      Diabetes Mother      Gastrointestinal Disease Mother         pancreas removed     Breast Cancer Mother      Osteoporosis Father      Obesity Father      Macular Degeneration No family hx of      Hypertension No family hx of             Medications:     Current Outpatient Medications   Medication Sig     ASPIRIN 81 MG OR TABS 1 TABLET DAILY     cholecalciferol (VITAMIN D) 1000 UNIT tablet Take 1 tablet by mouth daily.     Cyanocobalamin (B-12) 1000 MCG TBCR      fish oil-omega-3 fatty acids 1000 MG capsule Take 2,000 mg by  mouth daily     hydrochlorothiazide (HYDRODIURIL) 25 MG tablet TAKE 1 TABLET BY MOUTH DAILY     lisinopril (ZESTRIL) 40 MG tablet Take 1 tablet (40 mg) by mouth daily     mupirocin (BACTROBAN) 2 % external ointment Apply topically 3 times daily Apply topically three x a day on the toe     pregabalin (LYRICA) 75 MG capsule Take 3 capsules (225 mg) by mouth 2 times daily     simvastatin (ZOCOR) 20 MG tablet TAKE 1 TABLET BY MOUTH EVERYDAY AT BEDTIME     Current Facility-Administered Medications   Medication     bevacizumab (AVASTIN) intravitreal inj 1.25 mg     cyanocobalamin injection 1,000 mcg     cyanocobalamin injection 1,000 mcg              Review of Systems:   A comprehensive 10 point review of systems (constitutional, ENT, cardiac, peripheral vascular, lymphatic, respiratory, GI, , Musculoskeletal, skin, Neurological) was performed and found to be negative except as described in this note.       HOOS Hip Dysfunction & Osteoarthritis Outcome Questionnaire         View : No data to display.                       [unfilled]    KOOS Knee Survey Assessment         View : No data to display.                       Promis 10 Assessment         View : No data to display.                       Ortho Oxford Knee Questionnaire         View : No data to display.                         See intake form completed by patient    Answers for HPI/ROS submitted by the patient on 5/1/2023  General Symptoms: No  Skin Symptoms: No  HENT Symptoms: No  EYE SYMPTOMS: No  HEART SYMPTOMS: No  LUNG SYMPTOMS: No  INTESTINAL SYMPTOMS: No  URINARY SYMPTOMS: No  REPRODUCTIVE SYMPTOMS: No  SKELETAL SYMPTOMS: Yes  BLOOD SYMPTOMS: No  NERVOUS SYSTEM SYMPTOMS: Yes  MENTAL HEALTH SYMPTOMS: No  Back pain: No  Muscle aches: Yes  Neck pain: No  Swollen joints: No  Joint pain: Yes  Bone pain: Yes  Muscle cramps: Yes  Muscle weakness: Yes  Joint stiffness: Yes  Bone fracture: No  Trouble with coordination: Yes  Dizziness or trouble with balance:  Yes  Fainting or black-out spells: No  Memory loss: No  Headache: No  Seizures: No  Speech problems: No  Tingling: No  Tremor: No  Weakness: Yes  Difficulty walking: Yes  Paralysis: No  Numbness: Yes

## 2023-05-01 NOTE — NURSING NOTE
"Chief Complaint   Patient presents with     Consult     Consult for abnormal gait. Osteoarthritis of both hips.        77 year old  1945    Ht 1.753 m (5' 9\")   Wt 102.1 kg (225 lb)   BMI 33.23 kg/m      Past Surgical History:   Procedure Laterality Date     ABDOMEN SURGERY  1952    Appendectomy     APPENDECTOMY  1952     CATARACT IOL, RT/LT Bilateral 03/12     COLONOSCOPY  2017     GENITOURINARY SURGERY       HERNIORRHAPHY INGUINAL  5/15/2014    Procedure: HERNIORRHAPHY INGUINAL;  Surgeon: Evens Jefferson MD;  Location: [facility] OR     PROSTATE SURGERY  2007           Pain Assessment  Patient Currently in Pain: Denies                           [id number] [employer] - [place], [place] - [facility] [address]        Allergies   Allergen Reactions     Penicillins Other (See Comments)     blotches           Current Outpatient Medications   Medication     ASPIRIN 81 MG OR TABS     cholecalciferol (VITAMIN D) 1000 UNIT tablet     Cyanocobalamin (B-12) 1000 MCG TBCR     fish oil-omega-3 fatty acids 1000 MG capsule     hydrochlorothiazide (HYDRODIURIL) 25 MG tablet     lisinopril (ZESTRIL) 40 MG tablet     pregabalin (LYRICA) 75 MG capsule     simvastatin (ZOCOR) 20 MG tablet     mupirocin (BACTROBAN) 2 % external ointment     Current Facility-Administered Medications   Medication     bevacizumab (AVASTIN) intravitreal inj 1.25 mg     cyanocobalamin injection 1,000 mcg     cyanocobalamin injection 1,000 mcg             Questionnaires:    HOOS Hip Dysfunction & Osteoarthritis Outcome Questionnaire         View : No data to display.                       KOOS Knee Survey Assessment         View : No data to display.                       Promis 10 Assessment         View : No data to display.                       Ortho Oxford Knee Questionnaire         View : No data to display.                             "

## 2023-05-01 NOTE — LETTER
5/1/2023         RE: Genaro Ortiz  [address]  [place] [facility] [address]        Dear Colleague,    Thank you for referring your patient, Genaro Ortiz, to the [facility] [place]. Please see a copy of my visit note below.        [facility]  Orthopaedic Surgery, Joint Replacement Consultation  by David Escamilla M.D.    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  Etiology likely degenerative, however, will need to rule out ankylosing spondylitis or other connective tissue diseases.  He has limited thoracic excursion but no other radiographic or clinical evidence of ankylosing spondylitis or autoimmune disease.  No evidence of heterotopic bone formation or soft tissue ossification based on current imaging.    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  He will likely need TCU stay afterwards as he lives alone independently in a [facility] in [facility].  Bilateral hip replacements will result in markedly improved range of motion as well as functional ability for him and this should increase his quality of life significantly.  Discontinue physical therapy as his rigid arthrosis will prevent any improvement in range of motion until joint arthroplasty is completed.  He will need preparation including dental evaluation, preoperative H&P or  PACS clinic visit, joint placement class the patient education.      For additional information and frequently asked questions regarding joint replacements, scan the QR code image  "below on your phone camera:     or go to:   [email or web address]    MD Chetan Pelayo Family Professor  Oncology and Adult Reconstructive Surgery  Dept Orthopaedic Surgery, [facility]  [phone] office, [phone] pager  [email or web address]            History of Present Illness:   77 year old male  chief complaint    This patient has a history of polyneuropathy bilaterally and has had difficulty walking with a wide-based gait.  He underwent evaluation for central causes with MRI examination of his head and entire spine without any significant findings to explain the difficulty with his gait.  Radiographs of the hips were obtained demonstrating severe arthrosis.  He is seen today for evaluation.    Patient reports that he lives independently in a [facility] in [place].  He is retired.  He lives alone.  He is able to shop at [employer] and drive and get in and out of his car.  There are no stairs in his building.  He obtains his meals through assistive program \"Meals on Wheels\".  He would like to continue to be more independent and walk better.  He is using a walker and hobbles around.  He has some assistive devices in the bathroom including elevated toilet seat and a sliding chair for his bathtub.    Current symptoms:  Problem: Abnormal gait and osteoarthritis of both hips.  Onset and duration: Patient states they have had a problem with both their hips for 3 years, but it has been getting worse.  Awakens from sleep due to sx's:  No  Precipitating Injury:  No    Other joints or sites painful:  No           Physical Exam:     EXAMINATION pertinent findings:   PSYCH: Pleasant, healthy-appearing, alert, oriented x3, cooperative. Normal mood and affect.  VITAL SIGNS: There were no vitals taken for this visit..  Reviewed nursing intake notes.   There is no height or weight on file to calculate BMI.  RESP: non labored breathing .  Limited thoracic excursion " approximately 1 cm.  ABD: benign, soft, non-tender, no acute peritoneal findings  SKIN: grossly normal   LYMPHATIC: grossly normal, no adenopathy, no extremity edema  NEURO: grossly normal , no motor deficits  VASCULAR: satisfactory perfusion of all extremities   MUSCULOSKELETAL:     Gait is markedly abnormal with bilateral severe abduction contractures of both hip joints.    Range of motion of both hips:  Right hip fixed 30 degrees of abduction, no rotation, no flexion or extension.  Left hip similarly fixed in 30 degrees of abduction, no rotation and no flexion or extension.  Both knees have full extension with further flexion to approximately 80 degrees.  Further testing not possible due to his rigid hip arthrosis.    Upper extremities unremarkable.                  Data:   All laboratory data reviewed  All imaging studies reviewed by me           MRI 10/2020:  IMPRESSION: Multilevel degenerative disc and facet disease most  advanced at L3-L4 where there is mild to moderate central canal and  mild bilateral neural foraminal stenosis. There is also moderate  right-sided foraminal stenosis at L2-L3.        DATA for DOCUMENTATION:         Past Medical History:     Patient Active Problem List   Diagnosis    HYPERLIPIDEMIA LDL GOAL <130    Hypertension goal BP (blood pressure) < 140/90    Advance Care Planning    Anemia due to blood loss, acute    Health Care Home    Obesity due to excess calories, unspecified obesity severity    MGUS (monoclonal gammopathy of unknown significance)    Proximal limb muscle weakness    Vitamin B12 deficiency (non anemic)    Screen for colon cancer    Encounter for Medicare annual wellness exam    Hypovitaminosis D    History of prostate cancer    Encounter for monitoring of chronic aspirin therapy    Screening for prostate cancer    History of prostatectomy     Past Medical History:   Diagnosis Date    Adenocarcinoma of prostate (H) 5/27/2008    Arthritis     Esophageal reflux      Neuropathy     Pure hypercholesterolemia     Unspecified essential hypertension        Also see scanned health assessment forms.       Past Surgical History:     Past Surgical History:   Procedure Laterality Date    ABDOMEN SURGERY  1952    Appendectomy    APPENDECTOMY  1952    CATARACT IOL, RT/LT Bilateral 03/12    COLONOSCOPY  2017    GENITOURINARY SURGERY      HERNIORRHAPHY INGUINAL  5/15/2014    Procedure: HERNIORRHAPHY INGUINAL;  Surgeon: Evens Jefferson MD;  Location: [facility] OR    PROSTATE SURGERY  2007            Social History:     Social History     Socioeconomic History    Marital status: Single     Spouse name: Not on file    Number of children: Not on file    Years of education: Not on file    Highest education level: Not on file   Occupational History    Not on file   Tobacco Use    Smoking status: Never    Smokeless tobacco: Never    Tobacco comments:     Never smoked.   Vaping Use    Vaping status: Not on file   Substance and Sexual Activity    Alcohol use: Not Currently     Comment: occasional    Drug use: No    Sexual activity: Never   Other Topics Concern    Parent/sibling w/ CABG, MI or angioplasty before 65F 55M? No   Social History Narrative    Social Documentation:        Balanced Diet: YES    Calcium intake: 1-2 per day    Caffeine: 2 per day    Exercise:  type of activity gym and work is physical;  2 times per week    Sunscreen: Yes, when needed    Seatbelts:  Yes    Self Breast Exam:  na    Self Testicular Exam: Yes    Physical/Emotional/Sexual Abuse: No    Do you feel safe in your environment? Yes        Cholesterol screen up to date:  6/5/2009                                                                                                             Latest Ref Rng                              CHOLESTEROL                                                 0 - 200 mg/dL                177                     TRIGLYCERIDES                                               0 - 150 mg/dL                 177 (H)                 HDL                                                         40 - 110 mg/dL               39 (L)                  LDL CHOLESTEROL CALCULATED                                  0 - 129 mg/dL                102                     VLDL-CHOLESTEROL                                            0 - 30 mg/dL                 35 (H)                  CHOLESTEROL/HDL RATIO                                       0.0 - 5.0                    4.5                         Eye Exam up to date: Yes    Dental Exam up to date: Yes    Pap smear up to date: Does Not Apply    Mammogram up to date: Does Not Apply    Dexa Scan up to date: Does Not Apply    Colonoscopy up to date: Yes    Immunizations up to date: Yes    Glucose screen if over 40:  Component                        Latest Ref Rng               10/31/2008              6/5/2009                GLUCOSE                          60 - 99 mg/dL                106 (H)                 111 (H)                                                      Social Determinants of Health     Financial Resource Strain: Not on file   Food Insecurity: Not on file   Transportation Needs: Not on file   Physical Activity: Not on file   Stress: Not on file   Social Connections: Not on file   Intimate Partner Violence: Not on file   Housing Stability: Not on file            Family History:       Family History   Problem Relation Age of Onset    Glaucoma Other     Diabetes Mother     Gastrointestinal Disease Mother         pancreas removed    Breast Cancer Mother     Osteoporosis Father     Obesity Father     Macular Degeneration No family hx of     Hypertension No family hx of             Medications:     Current Outpatient Medications   Medication Sig    ASPIRIN 81 MG OR TABS 1 TABLET DAILY    cholecalciferol (VITAMIN D) 1000 UNIT tablet Take 1 tablet by mouth daily.    Cyanocobalamin (B-12) 1000 MCG TBCR     fish oil-omega-3 fatty acids 1000 MG capsule Take 2,000 mg by mouth daily     hydrochlorothiazide (HYDRODIURIL) 25 MG tablet TAKE 1 TABLET BY MOUTH DAILY    lisinopril (ZESTRIL) 40 MG tablet Take 1 tablet (40 mg) by mouth daily    mupirocin (BACTROBAN) 2 % external ointment Apply topically 3 times daily Apply topically three x a day on the toe    pregabalin (LYRICA) 75 MG capsule Take 3 capsules (225 mg) by mouth 2 times daily    simvastatin (ZOCOR) 20 MG tablet TAKE 1 TABLET BY MOUTH EVERYDAY AT BEDTIME     Current Facility-Administered Medications   Medication    bevacizumab (AVASTIN) intravitreal inj 1.25 mg    cyanocobalamin injection 1,000 mcg    cyanocobalamin injection 1,000 mcg              Review of Systems:   A comprehensive 10 point review of systems (constitutional, ENT, cardiac, peripheral vascular, lymphatic, respiratory, GI, , Musculoskeletal, skin, Neurological) was performed and found to be negative except as described in this note.       HOOS Hip Dysfunction & Osteoarthritis Outcome Questionnaire         View : No data to display.                       [unfilled]    KOOS Knee Survey Assessment         View : No data to display.                       Promis 10 Assessment         View : No data to display.                       Ortho Oxford Knee Questionnaire         View : No data to display.                         See intake form completed by patient    Answers for HPI/ROS submitted by the patient on 5/1/2023  General Symptoms: No  Skin Symptoms: No  HENT Symptoms: No  EYE SYMPTOMS: No  HEART SYMPTOMS: No  LUNG SYMPTOMS: No  INTESTINAL SYMPTOMS: No  URINARY SYMPTOMS: No  REPRODUCTIVE SYMPTOMS: No  SKELETAL SYMPTOMS: Yes  BLOOD SYMPTOMS: No  NERVOUS SYSTEM SYMPTOMS: Yes  MENTAL HEALTH SYMPTOMS: No  Back pain: No  Muscle aches: Yes  Neck pain: No  Swollen joints: No  Joint pain: Yes  Bone pain: Yes  Muscle cramps: Yes  Muscle weakness: Yes  Joint stiffness: Yes  Bone fracture: No  Trouble with coordination: Yes  Dizziness or trouble with balance: Yes  Fainting or black-out  spells: No  Memory loss: No  Headache: No  Seizures: No  Speech problems: No  Tingling: No  Tremor: No  Weakness: Yes  Difficulty walking: Yes  Paralysis: No  Numbness: Yes

## 2023-05-02 ENCOUNTER — PREP FOR PROCEDURE (OUTPATIENT)
Dept: ORTHOPEDICS | Facility: CLINIC | Age: 78
End: 2023-05-02

## 2023-05-02 DIAGNOSIS — M16.0 OSTEOARTHRITIS OF BOTH HIPS, UNSPECIFIED OSTEOARTHRITIS TYPE: Primary | ICD-10-CM

## 2023-05-02 LAB
ANA SER QL IF: NEGATIVE
RADIOLOGIST FLAGS: ABNORMAL
RHEUMATOID FACT SER NEPH-ACNC: 18 IU/ML

## 2023-05-02 RX ORDER — CEFAZOLIN SODIUM 2 G/50ML
2 SOLUTION INTRAVENOUS
Status: CANCELLED | OUTPATIENT
Start: 2023-05-02

## 2023-05-02 RX ORDER — ACETAMINOPHEN 325 MG/1
975 TABLET ORAL ONCE
Status: CANCELLED | OUTPATIENT
Start: 2023-05-02 | End: 2023-05-02

## 2023-05-02 RX ORDER — CELECOXIB 100 MG/1
400 CAPSULE ORAL ONCE
Status: CANCELLED | OUTPATIENT
Start: 2023-05-02 | End: 2023-05-02

## 2023-05-02 RX ORDER — CEFAZOLIN SODIUM 2 G/50ML
2 SOLUTION INTRAVENOUS SEE ADMIN INSTRUCTIONS
Status: CANCELLED | OUTPATIENT
Start: 2023-05-02

## 2023-05-02 RX ORDER — TRANEXAMIC ACID 650 MG/1
1950 TABLET ORAL ONCE
Status: CANCELLED | OUTPATIENT
Start: 2023-05-02 | End: 2023-05-02

## 2023-05-02 NOTE — RESULT ENCOUNTER NOTE
Dear Genaro Ortiz:    I tried to phone you about your test results this AM.  The CT confirms the arthritis of your hips, however, also noted were some changes concerning for diverticulitis.  I would advise that you consult with your primary physician about this finding and its significance.  If you have any gastrointestinal symptoms of abdominal pain, cramping, fever, diarrhea, constipation, etc, your should see your doctor sooner, ie ASAP.   It would be important to evaluate for this condition before any hip replacement is performed as well.     Best regards,    MD Chetan Pelayo Family Professor  Oncology and Adult Reconstructive Surgery  Dept Orthopaedic Surgery, [facility]  [phone] office  [email or web address]

## 2023-05-02 NOTE — PROGRESS NOTES
SURGERY PLAN (PRE-OP PLAN)     Patient Position (indicated by x):  X  Lateral decubitus, Wixson hip positioner   x  Regular OR table     Manzo catheter   X  Revision JOHNNY drape with plastic side bags for leg   X  Blue U drape x2   Blue and white stockinet   Coban   Ioban      JOHNNY Requests (indicated by x):    Echo BiMetric stems   x  Biomet TAPERLOC ingrowth stem      Biomet Integral cemented stem     Biomet RB cup, 32 heads   x  G7 cup      Biomet Bipolar cup      Specimens and cultures (indicated by x):      Tissue cultures, aerobic and anaerobic without gram stain      Frozen section      pathology specimens - fresh      pathology specimens - formalin      Jody Acosta MD  Adult Reconstructive Surgery Fellow  Department of Orthopaedic Surgery, [facility]

## 2023-05-04 ENCOUNTER — OFFICE VISIT (OUTPATIENT)
Dept: OPHTHALMOLOGY | Facility: CLINIC | Age: 78
End: 2023-05-04
Attending: OPHTHALMOLOGY
Payer: COMMERCIAL

## 2023-05-04 DIAGNOSIS — Z96.1 PSEUDOPHAKIA OF BOTH EYES: ICD-10-CM

## 2023-05-04 DIAGNOSIS — H35.051 CHOROID NEOVASCULARIZATION, RIGHT: Primary | ICD-10-CM

## 2023-05-04 DIAGNOSIS — H35.371 EPIRETINAL MEMBRANE (ERM) OF RIGHT EYE: ICD-10-CM

## 2023-05-04 PROCEDURE — 92134 CPTRZ OPH DX IMG PST SGM RTA: CPT | Performed by: OPHTHALMOLOGY

## 2023-05-04 PROCEDURE — G0463 HOSPITAL OUTPT CLINIC VISIT: HCPCS | Mod: 25 | Performed by: OPHTHALMOLOGY

## 2023-05-04 PROCEDURE — 92014 COMPRE OPH EXAM EST PT 1/>: CPT | Performed by: OPHTHALMOLOGY

## 2023-05-04 ASSESSMENT — SLIT LAMP EXAM - LIDS
COMMENTS: NORMAL
COMMENTS: NORMAL

## 2023-05-04 ASSESSMENT — VISUAL ACUITY
CORRECTION_TYPE: GLASSES
OD_CC: 20/25
METHOD: SNELLEN - LINEAR
OS_CC: 20/20
OS_CC+: -1

## 2023-05-04 ASSESSMENT — TONOMETRY
OD_IOP_MMHG: 19
OS_IOP_MMHG: 16
IOP_METHOD: TONOPEN

## 2023-05-04 ASSESSMENT — CUP TO DISC RATIO
OD_RATIO: 0.1
OS_RATIO: 0.1

## 2023-05-04 ASSESSMENT — CONF VISUAL FIELD
OS_NORMAL: 1
OS_SUPERIOR_TEMPORAL_RESTRICTION: 0
OS_SUPERIOR_NASAL_RESTRICTION: 0
OD_INFERIOR_NASAL_RESTRICTION: 0
OD_SUPERIOR_TEMPORAL_RESTRICTION: 0
OD_NORMAL: 1
OS_INFERIOR_NASAL_RESTRICTION: 0
METHOD: COUNTING FINGERS
OS_INFERIOR_TEMPORAL_RESTRICTION: 0
OD_INFERIOR_TEMPORAL_RESTRICTION: 0
OD_SUPERIOR_NASAL_RESTRICTION: 0

## 2023-05-04 ASSESSMENT — REFRACTION_WEARINGRX
OD_AXIS: 084
OS_AXIS: 112
OS_CYLINDER: +1.00
OS_ADD: +2.50
OD_ADD: +2.50
OS_SPHERE: -0.75
OD_SPHERE: +0.25
SPECS_TYPE: PAL
OD_CYLINDER: +0.75

## 2023-05-04 NOTE — NURSING NOTE
Chief Complaints and History of Present Illnesses   Patient presents with     Follow Up     Chief Complaint(s) and History of Present Illness(es)     Follow Up            Laterality: both eyes    Course: stable    Associated symptoms: Negative for eye pain, flashes and floaters    Treatments tried: no treatments          Comments    Here for follow up. Vision is the same - new glasses are working well. No eye pain. No flashes or floaters.     Neil Barragan COT 9:00 AM May 4, 2023

## 2023-05-04 NOTE — PROGRESS NOTES
Chief Complaint(s) and History of Present Illness(es)     Follow Up            Laterality: both eyes    Course: stable    Associated symptoms: Negative for eye pain, flashes and floaters    Treatments tried: no treatments          Comments    Here for follow up. Vision is the same - new glasses are working well. No   eye pain. No flashes or floaters.     Neil Barragan COT 9:00 AM May 4, 2023           Review of systems for the eyes was negative other than the pertinent positives/negatives listed in the HPI.      Assessment & Plan      Genaro Ortiz is a 77 year old male with the following diagnoses:   1. Choroid neovascularization, right    2. Epiretinal membrane (ERM) of right eye    3. 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           Attending Physician Attestation:  Complete documentation of historical and exam elements from today's encounter can be found in the full encounter summary report (not reduplicated in this progress note).  I personally obtained the chief complaint(s) and history of present illness.  I confirmed and edited as necessary the review of systems, past medical/surgical history, family history, social history, and examination findings as documented by others; and I examined the patient myself.  I personally reviewed the relevant tests, images, and reports as documented above.  I formulated and edited as necessary the assessment and plan and discussed the findings and management plan with the patient and family. . - Eric Abel MD

## 2023-05-05 LAB
B LOCUS: NORMAL
B27TEST METHOD: NORMAL

## 2023-05-11 ENCOUNTER — TELEPHONE (OUTPATIENT)
Dept: ORTHOPEDICS | Facility: CLINIC | Age: 78
End: 2023-05-11

## 2023-05-11 RX ORDER — BISACODYL 5 MG/1
TABLET, DELAYED RELEASE ORAL
Qty: 4 TABLET | Refills: 0 | Status: SHIPPED | OUTPATIENT
Start: 2023-05-11 | End: 2023-07-12

## 2023-05-11 NOTE — TELEPHONE ENCOUNTER
Phoned patient to schedule his surgeries with Dr Escamilla. I left him my direct number to call back when he is able. [phone]

## 2023-05-12 ENCOUNTER — TELEPHONE (OUTPATIENT)
Dept: ORTHOPEDICS | Facility: CLINIC | Age: 78
End: 2023-05-12

## 2023-05-12 ENCOUNTER — HOSPITAL ENCOUNTER (OUTPATIENT)
Facility: CLINIC | Age: 78
End: 2023-05-12
Attending: ORTHOPAEDIC SURGERY | Admitting: ORTHOPAEDIC SURGERY
Payer: COMMERCIAL

## 2023-05-12 NOTE — TELEPHONE ENCOUNTER
Patient is scheduled for surgery with Dr. Escamilla    Spoke with: Patient    Date of Surgery: Right hip: 7/14/23                              Left hip: 7/21/23    Location: [place]    Post op: 4 weeks    Pre op with Provider: Complete    H&P: Scheduled with PCP    Additional imaging/appointments: N/A    Surgery packet: Received in clinic     Additional comments: N/A        Bridget Church MA on 5/12/2023 at 12:18 PM

## 2023-05-18 ENCOUNTER — HOSPITAL ENCOUNTER (OUTPATIENT)
Facility: CLINIC | Age: 78
Discharge: HOME | End: 2023-05-18
Attending: COLON & RECTAL SURGERY | Admitting: COLON & RECTAL SURGERY
Payer: COMMERCIAL

## 2023-05-18 VITALS
RESPIRATION RATE: 14 BRPM | OXYGEN SATURATION: 97 % | DIASTOLIC BLOOD PRESSURE: 61 MMHG | SYSTOLIC BLOOD PRESSURE: 97 MMHG | HEART RATE: 72 BPM

## 2023-05-18 DIAGNOSIS — Z12.11 SPECIAL SCREENING FOR MALIGNANT NEOPLASMS, COLON: Primary | ICD-10-CM

## 2023-05-18 LAB — COLONOSCOPY: NORMAL

## 2023-05-18 PROCEDURE — G0121 COLON CA SCRN NOT HI RSK IND: HCPCS | Performed by: COLON & RECTAL SURGERY

## 2023-05-18 PROCEDURE — 45378 DIAGNOSTIC COLONOSCOPY: CPT | Performed by: COLON & RECTAL SURGERY

## 2023-05-18 PROCEDURE — 250N000011 HC RX IP 250 OP 636: Performed by: COLON & RECTAL SURGERY

## 2023-05-18 PROCEDURE — 99153 MOD SED SAME PHYS/QHP EA: CPT | Performed by: COLON & RECTAL SURGERY

## 2023-05-18 PROCEDURE — G0500 MOD SEDAT ENDO SERVICE >5YRS: HCPCS | Performed by: COLON & RECTAL SURGERY

## 2023-05-18 RX ORDER — FENTANYL CITRATE 50 UG/ML
INJECTION, SOLUTION INTRAMUSCULAR; INTRAVENOUS PRN
Status: DISCONTINUED | OUTPATIENT
Start: 2023-05-18 | End: 2023-05-18 | Stop reason: HOSPADM

## 2023-05-18 RX ORDER — LIDOCAINE 40 MG/G
CREAM TOPICAL
Status: DISCONTINUED | OUTPATIENT
Start: 2023-05-18 | End: 2023-05-18 | Stop reason: HOSPADM

## 2023-05-18 RX ORDER — ONDANSETRON 2 MG/ML
4 INJECTION INTRAMUSCULAR; INTRAVENOUS
Status: DISCONTINUED | OUTPATIENT
Start: 2023-05-18 | End: 2023-05-18 | Stop reason: HOSPADM

## 2023-05-18 ASSESSMENT — ACTIVITIES OF DAILY LIVING (ADL): ADLS_ACUITY_SCORE: 35

## 2023-05-18 NOTE — H&P
Colon & Rectal Surgery History and Physical  Pre-Endoscopy Procedure Note    History of Present Illness   I have been asked by Dr. Wegener to evaluate this 77 year old male for colorectal polyp surveillance. He currently denies any abdominal pain, weight loss, bleeding per rectum, or recent change in bowel habits.    Past Medical History  Diagnosis Date     Adenocarcinoma of prostate 5/27/2008     Arthritis      Esophageal reflux      Neuropathy      Pure hypercholesterolemia      Unspecified essential hypertension        Past Surgical History  Procedure Laterality Date     APPENDECTOMY  1952     CATARACT IOL, RT/LT Bilateral 03/12     GENITOURINARY SURGERY       HERNIORRHAPHY INGUINAL  5/15/2014    Procedure: HERNIORRHAPHY INGUINAL;  Surgeon: Evens Jefferson MD;  Location: [facility] OR     PROSTATE SURGERY  2007        Medications  Medication Sig     ASPIRIN 81 MG OR TABS 1 TABLET DAILY     cholecalciferol (VITAMIN D) 1000 UNIT tablet Take 1 tablet by mouth daily.     Cyanocobalamin (B-12) 1000 MCG TBCR      fish oil-omega-3 fatty acids 1000 MG capsule Take 2,000 mg by mouth daily     hydrochlorothiazide (HYDRODIURIL) 25 MG tablet TAKE 1 TABLET BY MOUTH DAILY     lisinopril (ZESTRIL) 40 MG tablet Take 1 tablet (40 mg) by mouth daily     pregabalin (LYRICA) 75 MG capsule Take 3 capsules (225 mg) by mouth 2 times daily     simvastatin (ZOCOR) 20 MG tablet TAKE 1 TABLET BY MOUTH EVERYDAY AT BEDTIME       Allergies  Allergen Reactions     Penicillins      rash        Family History   Family history includes Breast Cancer in his mother; Diabetes in his mother; Gastrointestinal Disease in his mother; Glaucoma in an other family member; Obesity in his father; Osteoporosis in his father.     Social History   He reports that he has never smoked. He has never used smokeless tobacco. He reports that he does not currently use alcohol. He reports that he does not use drugs.    Review of Systems   Constitutional:  No fever,  weight change or fatigue.    Eyes:     No dry eyes or vision changes.   Ears/Nose/Throat/Neck:  No oral ulcers, sore throat or voice change.    Cardiovascular:   No palpitations, syncope, angina or edema.   Respiratory:    No chest pain, excessive sleepiness, shortness of breath or hemoptysis.    Gastrointestinal:   No abdominal pain, nausea, vomiting, diarrhea or heartburn.    Genitourinary:   No dysuria, hematuria, urinary retention or urinary frequency.   Musculoskeletal:  No joint swelling or arthralgias.    Dermatologic:  No skin rash or other skin changes.   Neurologic:    No focal weakness or numbness. No neuropathy.   Psychiatric:    No depression, anxiety, suicidal ideation, or paranoid ideation.   Endocrine:   No cold or heat intolerance, polydipsia, hirsutism, change in libido, or flushing.   Hematology/Lymphatic:  No bleeding or lymphadenopathy.    Allergy/Immunology:  No rhinitis or hives.     Physical Exam   Vitals:  Blood pressure 121/81, pulse 99, resp. rate 14, SpO2 97 %.    General:  Alert and oriented to person, place and time   Airway: Normal oropharyngeal airway and neck mobility   Lungs:  Clear bilaterally   Heart:  Regular rate and rhythm   Abdomen: Soft, NT, ND, no masses   Extremities: Warm, good capillary refill    ASA Grade: II (mild systemic disease)    Impression: Cleared for use of conscious sedation for colorectal polyp surveillance    Plan: Proceed with colonoscopy     Ayse Sears MD  [facility]  [phone]

## 2023-05-23 ENCOUNTER — ANCILLARY PROCEDURE (OUTPATIENT)
Dept: GENERAL RADIOLOGY | Facility: CLINIC | Age: 78
End: 2023-05-23
Attending: FAMILY MEDICINE
Payer: COMMERCIAL

## 2023-05-23 ENCOUNTER — OFFICE VISIT (OUTPATIENT)
Dept: FAMILY MEDICINE | Facility: CLINIC | Age: 78
End: 2023-05-23
Payer: COMMERCIAL

## 2023-05-23 VITALS
RESPIRATION RATE: 16 BRPM | TEMPERATURE: 97.2 F | DIASTOLIC BLOOD PRESSURE: 64 MMHG | HEART RATE: 68 BPM | SYSTOLIC BLOOD PRESSURE: 110 MMHG | OXYGEN SATURATION: 96 %

## 2023-05-23 DIAGNOSIS — K59.00 CONSTIPATION, UNSPECIFIED CONSTIPATION TYPE: ICD-10-CM

## 2023-05-23 DIAGNOSIS — K59.00 CONSTIPATION, UNSPECIFIED CONSTIPATION TYPE: Primary | ICD-10-CM

## 2023-05-23 PROCEDURE — 74019 RADEX ABDOMEN 2 VIEWS: CPT | Mod: TC | Performed by: RADIOLOGY

## 2023-05-23 PROCEDURE — 99214 OFFICE O/P EST MOD 30 MIN: CPT | Performed by: FAMILY MEDICINE

## 2023-05-23 ASSESSMENT — PAIN SCALES - GENERAL: PAINLEVEL: NO PAIN (0)

## 2023-05-23 NOTE — PROGRESS NOTES
ASSESSMENT AND PLAN  1. Constipation, unspecified constipation type  And/or ileus  after colonoscopy. No signs/symptoms of bowel obstruction.     Recommend miralax three times daily and senna twice daily for 2-3 days which should produce bowel movement and get back onto  normal cycle.    Discussed signs and symptoms of diverticulitis to watch for.   - XR Abdomen 2 Views; Future      Subjective   Quinton is a 77 year old, presenting for the following health issues:  Constipation        5/23/2023    10:17 AM   Additional Questions   Roomed by Clarisa BREWER     Constipation  Onset/Duration: a while with dx 3 years  Description:  Frequency of bowel movements: has not had BM for 5 days   Consistency of stool: soft   Progression of Symptoms: worsening after colonoscopy   Accompanying signs and symptoms:    Abdominal pain: No   Rectal pain: No   Blood in stool: No   Nausea/Vomiting: No   Weight loss or gain: YES  History:   Similar problems in past: YES  History of abdominal surgery: No  Chronic laxative use: No  New medications: No  Precipitating or alleviating factors: none  Therapies tried and outcome: laxative senoket did not help yesterday     No abdominal pain, fevers, bloating, nausea.  Just concerned no bm for 5 days since colonoscopy.       Review of Systems         Objective    /64 (BP Location: Left arm, Patient Position: Sitting, Cuff Size: Adult Regular)   Pulse 68   Temp 97.2  F (36.2  C) (Temporal)   Resp 16   SpO2 96%   There is no height or weight on file to calculate BMI.  Physical Exam   Appears well.   Abdomen not distended, tense and no pain to palpation. Bowel sounds present.     abd xray without signs of bowel obstruction, normal gas pattern but does have stool present.

## 2023-05-23 NOTE — PATIENT INSTRUCTIONS
ASSESSMENT AND PLAN  1. Constipation, unspecified constipation type  And/or ileus  after colonoscopy. No signs/symptoms of bowel obstruction.     Recommend miralax three times daily and senna twice daily for 2-3 days which should produce bowel movement and get back onto  normal cycle.    Discussed signs and symptoms of diverticulitis to watch for.   - XR Abdomen 2 Views; Future      [facility] Scheduling:  [phone].    CONSUMER PRICE LINE for estimates of test costs:  [phone]

## 2023-06-07 ENCOUNTER — OFFICE VISIT (OUTPATIENT)
Dept: NEUROLOGY | Facility: CLINIC | Age: 78
End: 2023-06-07
Payer: COMMERCIAL

## 2023-06-07 VITALS — SYSTOLIC BLOOD PRESSURE: 111 MMHG | HEART RATE: 60 BPM | DIASTOLIC BLOOD PRESSURE: 74 MMHG | OXYGEN SATURATION: 97 %

## 2023-06-07 DIAGNOSIS — G62.9 NEUROPATHY: ICD-10-CM

## 2023-06-07 PROCEDURE — 99214 OFFICE O/P EST MOD 30 MIN: CPT | Performed by: PSYCHIATRY & NEUROLOGY

## 2023-06-07 RX ORDER — PREGABALIN 75 MG/1
225 CAPSULE ORAL 2 TIMES DAILY
Qty: 180 CAPSULE | Refills: 5 | Status: ON HOLD | OUTPATIENT
Start: 2023-06-07 | End: 2023-07-25

## 2023-06-07 ASSESSMENT — PAIN SCALES - GENERAL: PAINLEVEL: NO PAIN (0)

## 2023-06-07 NOTE — PROGRESS NOTES
[facility] Neurology Follow Up Visit    Genaro Ortiz MRN# [id number]   Age: 77 year old YOB: 1945     Brief history of symptoms: The patient was initially seen in neurologic consultation on 11/30/2020 and most recently 11/18/2022 for evaluation of neuropathy and gait impairment. Please see the comprehensive neurologic consultation notes from those dates in the Epic records for details.     The patient's neuropathy was not thought to be the primary etiology for his extreme gait impairment leading to dramatic hip abduction and knee extension with stance and walking.  Imaging was unrevealing for a neurological etiology (MRI brain, cervical spine, thoracic spine 1/29/2023).  After working with PT, it was thought his gait was more-so related to severe b/l hip degenerative changes which was confirmed with XR Hip and CT pelvis.    He feels the Lyrica is helpful for reducing his neuropathic pain, especially at night. He does feel it makes him slightly drowsy in the daytime, but he manages it due to the pain reducing he gets.       Physical Exam:   Vitals: /74 (BP Location: Right arm, Patient Position: Sitting, Cuff Size: Adult Regular)   Pulse 60   SpO2 97%    General: Seated comfortably in no acute distress.  Neurologic:     Mental Status: Fully alert, attentive and oriented. Speech clear and fluent, no paraphasic errors.     Cranial Nerves: EOMI with normal smooth pursuit. Facial movements symmetric. Hearing not formally tested but intact to conversation.  No dysarthria.     Motor: No tremors or other abnormal movements observed.      Gait: stands with wide base. Walks in waddled fashion with wide base and inches long stride while knees are extended and hips abducted.         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  We talked about having a follow up some months after his surgery to get an idea of how much his gait improves.      We spent some time discussing his relatively normal imaging and that while he does have a neuropathy, he doesn't have any findings suggestive for a central deficit leading to his gait impairment.  The patient was reassured from these findings.    Plan:  Lyrica 225 mg BID  Agree with hip surgery as scheduled    Follow up in Neurology clinic in 6 months, or earlier as needed should new concerns arise.    CATY Quesada D.O.   of Neurology    Total time today (30 min) in this patient encounter was spent on pre-charting, counseling and/or coordination of care.

## 2023-06-07 NOTE — LETTER
6/7/2023       RE: Genaro Ortiz  [address]  [facility] [address]     Dear Colleague,    Thank you for referring your patient, Genaro Ortiz, to the [facility] NEUROLOGY [facility]. Please see a copy of my visit note below.    [facility] Neurology Follow Up Visit    Genaro Ortiz MRN# [id number]   Age: 77 year old YOB: 1945     Brief history of symptoms: The patient was initially seen in neurologic consultation on 11/30/2020 and most recently 11/18/2022 for evaluation of neuropathy and gait impairment. Please see the comprehensive neurologic consultation notes from those dates in the Epic records for details.     The patient's neuropathy was not thought to be the primary etiology for his extreme gait impairment leading to dramatic hip abduction and knee extension with stance and walking.  Imaging was unrevealing for a neurological etiology (MRI brain, cervical spine, thoracic spine 1/29/2023).  After working with PT, it was thought his gait was more-so related to severe b/l hip degenerative changes which was confirmed with XR Hip and CT pelvis.    He feels the Lyrica is helpful for reducing his neuropathic pain, especially at night. He does feel it makes him slightly drowsy in the daytime, but he manages it due to the pain reducing he gets.       Physical Exam:   Vitals: /74 (BP Location: Right arm, Patient Position: Sitting, Cuff Size: Adult Regular)   Pulse 60   SpO2 97%    General: Seated comfortably in no acute distress.  Neurologic:     Mental Status: Fully alert, attentive and oriented. Speech clear and fluent, no paraphasic errors.     Cranial Nerves: EOMI with normal smooth pursuit. Facial movements symmetric. Hearing not formally tested but intact to conversation.  No dysarthria.     Motor: No tremors or other abnormal movements observed.      Gait: stands with wide base. Walks in waddled  fashion with wide base and inches long stride while knees are extended and hips abducted.         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  We talked about having a follow up some months after his surgery to get an idea of how much his gait improves.      We spent some time discussing his relatively normal imaging and that while he does have a neuropathy, he doesn't have any findings suggestive for a central deficit leading to his gait impairment.  The patient was reassured from these findings.    Plan:  Lyrica 225 mg BID  Agree with hip surgery as scheduled    Follow up in Neurology clinic in 6 months, or earlier as needed should new concerns arise.    CATY Quesada D.O.   of Neurology    Total time today (30 min) in this patient encounter was spent on pre-charting, counseling and/or coordination of care.

## 2023-06-07 NOTE — PATIENT INSTRUCTIONS
Stay on Lyrica as you are for neuropathy.  I will see you following your surgery and hopefully after some rehab to see if your gait has markedly improved.

## 2023-06-27 ENCOUNTER — OFFICE VISIT (OUTPATIENT)
Dept: FAMILY MEDICINE | Facility: CLINIC | Age: 78
End: 2023-06-27
Payer: COMMERCIAL

## 2023-06-27 VITALS
DIASTOLIC BLOOD PRESSURE: 64 MMHG | OXYGEN SATURATION: 98 % | HEART RATE: 62 BPM | SYSTOLIC BLOOD PRESSURE: 102 MMHG | TEMPERATURE: 97.2 F | RESPIRATION RATE: 16 BRPM

## 2023-06-27 DIAGNOSIS — I10 HYPERTENSION GOAL BP (BLOOD PRESSURE) < 140/90: ICD-10-CM

## 2023-06-27 DIAGNOSIS — E66.09 OBESITY DUE TO EXCESS CALORIES, UNSPECIFIED OBESITY SEVERITY: ICD-10-CM

## 2023-06-27 DIAGNOSIS — Z01.818 PREOP GENERAL PHYSICAL EXAM: Primary | ICD-10-CM

## 2023-06-27 DIAGNOSIS — R73.03 PRE-DIABETES: ICD-10-CM

## 2023-06-27 DIAGNOSIS — D47.2 MGUS (MONOCLONAL GAMMOPATHY OF UNKNOWN SIGNIFICANCE): ICD-10-CM

## 2023-06-27 LAB
ALBUMIN SERPL BCG-MCNC: 4.5 G/DL (ref 3.5–5.2)
ALP SERPL-CCNC: 86 U/L (ref 40–129)
ALT SERPL W P-5'-P-CCNC: 13 U/L (ref 0–70)
ANION GAP SERPL CALCULATED.3IONS-SCNC: 12 MMOL/L (ref 7–15)
AST SERPL W P-5'-P-CCNC: 22 U/L (ref 0–45)
BILIRUB SERPL-MCNC: 0.4 MG/DL
BUN SERPL-MCNC: 27.1 MG/DL (ref 8–23)
CALCIUM SERPL-MCNC: 9.6 MG/DL (ref 8.8–10.2)
CHLORIDE SERPL-SCNC: 103 MMOL/L (ref 98–107)
CREAT SERPL-MCNC: 1.05 MG/DL (ref 0.67–1.17)
DEPRECATED HCO3 PLAS-SCNC: 22 MMOL/L (ref 22–29)
ERYTHROCYTE [DISTWIDTH] IN BLOOD BY AUTOMATED COUNT: 13 % (ref 10–15)
GFR SERPL CREATININE-BSD FRML MDRD: 74 ML/MIN/1.73M2
GLUCOSE SERPL-MCNC: 90 MG/DL (ref 70–99)
HBA1C MFR BLD: 6.3 % (ref 0–5.6)
HCT VFR BLD AUTO: 37.3 % (ref 40–53)
HGB BLD-MCNC: 12.9 G/DL (ref 13.3–17.7)
MCH RBC QN AUTO: 30 PG (ref 26.5–33)
MCHC RBC AUTO-ENTMCNC: 34.6 G/DL (ref 31.5–36.5)
MCV RBC AUTO: 87 FL (ref 78–100)
PLATELET # BLD AUTO: 165 10E3/UL (ref 150–450)
POTASSIUM SERPL-SCNC: 4.3 MMOL/L (ref 3.4–5.3)
PROT SERPL-MCNC: 8.1 G/DL (ref 6.4–8.3)
RBC # BLD AUTO: 4.3 10E6/UL (ref 4.4–5.9)
SODIUM SERPL-SCNC: 137 MMOL/L (ref 136–145)
WBC # BLD AUTO: 4.9 10E3/UL (ref 4–11)

## 2023-06-27 PROCEDURE — 83036 HEMOGLOBIN GLYCOSYLATED A1C: CPT | Performed by: FAMILY MEDICINE

## 2023-06-27 PROCEDURE — 80053 COMPREHEN METABOLIC PANEL: CPT | Performed by: FAMILY MEDICINE

## 2023-06-27 PROCEDURE — 85027 COMPLETE CBC AUTOMATED: CPT | Performed by: FAMILY MEDICINE

## 2023-06-27 PROCEDURE — 36415 COLL VENOUS BLD VENIPUNCTURE: CPT | Performed by: FAMILY MEDICINE

## 2023-06-27 PROCEDURE — 99214 OFFICE O/P EST MOD 30 MIN: CPT | Performed by: FAMILY MEDICINE

## 2023-06-27 ASSESSMENT — PAIN SCALES - GENERAL: PAINLEVEL: SEVERE PAIN (7)

## 2023-06-27 NOTE — H&P (VIEW-ONLY)
[facility]  [address], [address]  [facility] [address]  Phone: [phone]  Primary Provider: Wegener, Joel Daniel Irwin  Pre-op Performing Provider: WEGENER, JOEL DANIEL IRWIN      PREOPERATIVE EVALUATION:  Today's date: 6/27/2023    Genaro Ortiz is a 77 year old male who presents for a preoperative evaluation.      5/23/2023    10:17 AM   Additional Questions   Roomed by Clarisa JUDGE     Surgical Information:  Surgery/Procedure: ARTHROPLASTY, HIP, TOTAL RIGHT and Total LEFT  Surgery Location: [facility] OR  Surgeon: David Escamilla MD  Surgery Date: 07/14/2023 and 07/21/2023  Time of Surgery: 07:30am  Where patient plans to recover: Other: UNSURE  Fax number for surgical facility: Note does not need to be faxed, will be available electronically in Epic.    Assessment & Plan     The proposed surgical procedure is considered INTERMEDIATE risk.    Preop general physical exam:    Cleared for procedure.     No nsaids or aspirin for 10 days prior to the procedure.  If need over the counter pain medicine can use acetaminophen/tylenol.     Skip all morning medications on the morning of the procedure.     Follow up six months for annual wellness.   - CBC with platelets  - CBC with platelets    Pre-diabetes -  Hemoglobin A1C   Date Value Ref Range Status   06/27/2023 6.3 (H) 0.0 - 5.6 % Final     Comment:     Normal <5.7%   Prediabetes 5.7-6.4%    Diabetes 6.5% or higher     Note: Adopted from ADA consensus guidelines.   11/30/2020 5.8 (H) 0 - 5.6 % Final     Comment:     Normal <5.7% Prediabetes 5.7-6.4%  Diabetes 6.5% or higher - adopted from ADA   consensus guidelines.       a1c adequately low for surgery although any diabetes/prediabetes may increase post op infection risk.     Obesity due to excess calories, unspecified obesity severity  With increased pulmonary complication risk with anesthesia.     Hypertension goal BP (blood pressure) < 140/90  At goal, ok to proceed with surgery.   -  Comprehensive metabolic panel  - Comprehensive metabolic panel    MGUS (monoclonal gammopathy of unknown significance)  - can theoretically increase thrombosis/DVT risk.  As usual encourage early ambulation and routine DVT prevention measures per hip replacement protocol.       Primary prevention MI/Stroke:  Discussed benefits/risks of aspirin prophylaxis.  Current guidelines would not ercommend starting aspirin in 78 y/o unless h/o heart attack/stroke already.      Considering already on aspirin and proven track record of no bleeding evens it is reasonable to continue daily aspirin as he desires/is doing.                RECOMMENDATION:  APPROVAL GIVEN to proceed with proposed procedure, without further diagnostic evaluation.            Subjective       HPI related to upcoming procedure: very difficult time walking due to hip arthritis.  Significant worsening past 2-3 years.  Used to walk a lot now none.  Hard to get in and out of a car.           6/27/2023    10:09 AM   Preop Questions   1. Have you ever had a heart attack or stroke? No   2. Have you ever had surgery on your heart or blood vessels, such as a stent placement, a coronary artery bypass, or surgery on an artery in your head, neck, heart, or legs? No   3. Do you have chest pain with activity? No   4. Do you have a history of  heart failure? No   5. Do you currently have a cold, bronchitis or symptoms of other infection? No   6. Do you have a cough, shortness of breath, or wheezing? No   7. Do you or anyone in your family have previous history of blood clots? No   8. Do you or does anyone in your family have a serious bleeding problem such as prolonged bleeding following surgeries or cuts? No   9. Have you ever had problems with anemia or been told to take iron pills? YES -    10. Have you had any abnormal blood loss such as black, tarry or bloody stools? No   11. Have you ever had a blood transfusion? No   12. Are you willing to have a blood transfusion  if it is medically needed before, during, or after your surgery? Yes   13. Have you or any of your relatives ever had problems with anesthesia? No   14. Do you have sleep apnea, excessive snoring or daytime drowsiness? No   15. Do you have any artifical heart valves or other implanted medical devices like a pacemaker, defibrillator, or continuous glucose monitor? No   16. Do you have artificial joints? No   17. Are you allergic to latex? No     Health Care Directive:  Patient has a Health Care Directive on file      Preoperative Review of :   reviewed - pregabalin/lyrica 180/month          Review of Systems  CONSTITUTIONAL: NEGATIVE for fever, chills, change in weight  ENT/MOUTH: NEGATIVE for ear, mouth and throat problems  RESP: NEGATIVE for significant cough or SOB  CV: NEGATIVE for chest pain, palpitations or peripheral edema    Patient Active Problem List    Diagnosis Date Noted     Screen for colon cancer 08/30/2022     Priority: Medium     Encounter for Medicare annual wellness exam 08/30/2022     Priority: Medium     Hypovitaminosis D 08/30/2022     Priority: Medium     History of prostate cancer 08/30/2022     Priority: Medium     Encounter for monitoring of chronic aspirin therapy 08/30/2022     Priority: Medium     Screening for prostate cancer 08/30/2022     Priority: Medium     History of prostatectomy 08/30/2022     Priority: Medium     MGUS (monoclonal gammopathy of unknown significance) 06/21/2021     Priority: Medium     Proximal limb muscle weakness 06/21/2021     Priority: Medium     Vitamin B12 deficiency (non anemic) 06/21/2021     Priority: Medium     Obesity due to excess calories, unspecified obesity severity 11/17/2015     Priority: Medium     Health Care Home 08/16/2013     Priority: Medium     Benita Combs RN-BSN, [facility]  [phone].  FPA / [facility]                    Advance Care Planning 08/21/2012     Priority: Medium     Advance Care Planning  1/14/2016: Receipt of ACP document:  Received: Health Care Directive which was witnessed or notarized on 1/5/2016.  Document not previously scanned.  Validation form completed and sent with document to be scanned.  Code Status needs to be updated to reflect choices in most recent ACP document. Notification sent to Dr. Oquendo for followup.  Confirmed/documented designated decision maker(s).  Added by aSdaf Caro RN  Advance Care Planning 8/21/12 Discussed advance care planning with patient; however, patient declined at this time. Julianne Lockwood CMA             Anemia due to blood loss, acute 08/21/2012     Priority: Medium     Runs a slightly low hgb for years-stable at around 12.9       Hypertension goal BP (blood pressure) < 140/90 06/28/2011     Priority: Medium     HYPERLIPIDEMIA LDL GOAL <130 10/31/2010     Priority: Medium      Past Medical History:   Diagnosis Date     Adenocarcinoma of prostate (H) 5/27/2008     Arthritis      Esophageal reflux      Neuropathy      Pure hypercholesterolemia      Unspecified essential hypertension      Past Surgical History:   Procedure Laterality Date     ABDOMEN SURGERY  1952    Appendectomy     APPENDECTOMY  1952     CATARACT IOL, RT/LT Bilateral 03/12     COLONOSCOPY  2017     COLONOSCOPY N/A 5/18/2023    Procedure: colonoscopy;  Surgeon: Ayse Sears MD;  Location:  GI     GENITOURINARY SURGERY       HERNIORRHAPHY INGUINAL  5/15/2014    Procedure: HERNIORRHAPHY INGUINAL;  Surgeon: Evens Jefferson MD;  Location: [facility] OR     PROSTATE SURGERY  2007     Current Outpatient Medications   Medication Sig Dispense Refill     ASPIRIN 81 MG OR TABS 1 TABLET DAILY       cholecalciferol (VITAMIN D) 1000 UNIT tablet Take 1 tablet by mouth daily. 100 tablet 12     Cyanocobalamin (B-12) 1000 MCG TBCR        fish oil-omega-3 fatty acids 1000 MG capsule Take 2,000 mg by mouth daily 120 capsule 11     hydrochlorothiazide (HYDRODIURIL) 25 MG tablet TAKE 1 TABLET BY  MOUTH DAILY 90 tablet 3     lisinopril (ZESTRIL) 40 MG tablet Take 1 tablet (40 mg) by mouth daily 90 tablet 3     pregabalin (LYRICA) 75 MG capsule Take 3 capsules (225 mg) by mouth 2 times daily 180 capsule 5     simvastatin (ZOCOR) 20 MG tablet TAKE 1 TABLET BY MOUTH EVERYDAY AT BEDTIME 90 tablet 3     bisacodyl (DULCOLAX) 5 MG EC tablet Take 2 tablets at 3 pm the day before your procedure. If your procedure is before 11 am, take 2 additional tablets at 11 pm. If your procedure is after 11 am, take 2 additional tablets at 6 am. For additional instructions refer to your colonoscopy prep instructions. (Patient not taking: Reported on 6/7/2023) 4 tablet 0     polyethylene glycol (GOLYTELY) 236 g suspension The night before the exam at 6 pm drink an 8-ounce glass every 15 minutes until the jug is half empty. If you arrive before 11 AM: Drink the other half of the Golytely jug at 11 PM night before procedure. If you arrive after 11 AM: Drink the other half of the Golytely jug at 6 AM day of procedure. For additional instructions refer to your colonoscopy prep instructions. (Patient not taking: Reported on 6/7/2023) 4000 mL 0       Allergies   Allergen Reactions     Penicillins Other (See Comments)     blotches        Social History     Tobacco Use     Smoking status: Never     Passive exposure: Never     Smokeless tobacco: Never     Tobacco comments:     Never smoked.   Substance Use Topics     Alcohol use: Not Currently     Comment: occasional     Family History   Problem Relation Age of Onset     Glaucoma Other      Diabetes Mother      Gastrointestinal Disease Mother         pancreas removed     Breast Cancer Mother      Osteoporosis Father      Obesity Father      Macular Degeneration No family hx of      Hypertension No family hx of      History   Drug Use No         Objective     /64 (BP Location: Left arm, Patient Position: Sitting, Cuff Size: Adult Regular)   Pulse 62   Temp 97.2  F (36.2  C)  (Temporal)   Resp 16   SpO2 98%     Physical Exam  GENERAL APPEARANCE: healthy, alert and no distress  HENT: ear canals and TM's normal and nose and mouth without ulcers or lesions  RESP: lungs clear to auscultation - no rales, rhonchi or wheezes  CV: regular rate and rhythm, normal S1 S2, no S3 or S4 and no murmur, click or rub   ABDOMEN: soft, nontender, no HSM or masses and bowel sounds normal  NEURO: Normal strength and tone, sensory exam grossly normal, mentation intact and speech normal    Recent Labs   Lab Test 08/30/22  0832 11/03/21  1002   HGB 13.9 13.7    231    138   POTASSIUM 3.9 3.6   CR 0.86 0.91        Diagnostics:  Recent Results (from the past 24 hour(s))   CBC with platelets    Collection Time: 06/27/23  9:57 AM   Result Value Ref Range    WBC Count 4.9 4.0 - 11.0 10e3/uL    RBC Count 4.30 (L) 4.40 - 5.90 10e6/uL    Hemoglobin 12.9 (L) 13.3 - 17.7 g/dL    Hematocrit 37.3 (L) 40.0 - 53.0 %    MCV 87 78 - 100 fL    MCH 30.0 26.5 - 33.0 pg    MCHC 34.6 31.5 - 36.5 g/dL    RDW 13.0 10.0 - 15.0 %    Platelet Count 165 150 - 450 10e3/uL   Hemoglobin A1c    Collection Time: 06/27/23  9:57 AM   Result Value Ref Range    Hemoglobin A1C 6.3 (H) 0.0 - 5.6 %      No EKG required for low risk surgery (cataract, skin procedure, breast biopsy, etc).    Revised Cardiac Risk Index (RCRI):  The patient has the following serious cardiovascular risks for perioperative complications:   - No serious cardiac risks = 0 points     RCRI Interpretation: 1 point: Class II (low risk - 0.9% complication rate)           Signed Electronically by: Joel Daniel Wegener, MD  Copy of this evaluation report is provided to requesting physician.

## 2023-06-27 NOTE — PROGRESS NOTES
[facility]  [address], [address]  [facility] [address]  Phone: [phone]  Primary Provider: Wegener, Joel Daniel Irwin  Pre-op Performing Provider: WEGENER, JOEL DANIEL IRWIN      PREOPERATIVE EVALUATION:  Today's date: 6/27/2023    Genaro Ortiz is a 77 year old male who presents for a preoperative evaluation.      5/23/2023    10:17 AM   Additional Questions   Roomed by Clarisa JUDGE     Surgical Information:  Surgery/Procedure: ARTHROPLASTY, HIP, TOTAL RIGHT and Total LEFT  Surgery Location: [facility]  Surgeon: David Escamilla MD  Surgery Date: 07/14/2023 and 07/21/2023  Time of Surgery: 07:30am  Where patient plans to recover: Other: UNSURE  Fax number for surgical facility: Note does not need to be faxed, will be available electronically in Epic.    Assessment & Plan     The proposed surgical procedure is considered INTERMEDIATE risk.    Preop general physical exam:    Cleared for procedure.     No nsaids or aspirin for 10 days prior to the procedure.  If need over the counter pain medicine can use acetaminophen/tylenol.     Skip all morning medications on the morning of the procedure.     Follow up six months for annual wellness.   - CBC with platelets  - CBC with platelets    Pre-diabetes -  Hemoglobin A1C   Date Value Ref Range Status   06/27/2023 6.3 (H) 0.0 - 5.6 % Final     Comment:     Normal <5.7%   Prediabetes 5.7-6.4%    Diabetes 6.5% or higher     Note: Adopted from ADA consensus guidelines.   11/30/2020 5.8 (H) 0 - 5.6 % Final     Comment:     Normal <5.7% Prediabetes 5.7-6.4%  Diabetes 6.5% or higher - adopted from ADA   consensus guidelines.       a1c adequately low for surgery although any diabetes/prediabetes may increase post op infection risk.     Obesity due to excess calories, unspecified obesity severity  With increased pulmonary complication risk with anesthesia.     Hypertension goal BP (blood pressure) < 140/90  At goal, ok to proceed with surgery.   -  Comprehensive metabolic panel  - Comprehensive metabolic panel    MGUS (monoclonal gammopathy of unknown significance)  - can theoretically increase thrombosis/DVT risk.  As usual encourage early ambulation and routine DVT prevention measures per hip replacement protocol.       Primary prevention MI/Stroke:  Discussed benefits/risks of aspirin prophylaxis.  Current guidelines would not ercommend starting aspirin in 76 y/o unless h/o heart attack/stroke already.      Considering already on aspirin and proven track record of no bleeding evens it is reasonable to continue daily aspirin as he desires/is doing.                RECOMMENDATION:  APPROVAL GIVEN to proceed with proposed procedure, without further diagnostic evaluation.            Subjective       HPI related to upcoming procedure: very difficult time walking due to hip arthritis.  Significant worsening past 2-3 years.  Used to walk a lot now none.  Hard to get in and out of a car.           6/27/2023    10:09 AM   Preop Questions   1. Have you ever had a heart attack or stroke? No   2. Have you ever had surgery on your heart or blood vessels, such as a stent placement, a coronary artery bypass, or surgery on an artery in your head, neck, heart, or legs? No   3. Do you have chest pain with activity? No   4. Do you have a history of  heart failure? No   5. Do you currently have a cold, bronchitis or symptoms of other infection? No   6. Do you have a cough, shortness of breath, or wheezing? No   7. Do you or anyone in your family have previous history of blood clots? No   8. Do you or does anyone in your family have a serious bleeding problem such as prolonged bleeding following surgeries or cuts? No   9. Have you ever had problems with anemia or been told to take iron pills? YES -    10. Have you had any abnormal blood loss such as black, tarry or bloody stools? No   11. Have you ever had a blood transfusion? No   12. Are you willing to have a blood transfusion  if it is medically needed before, during, or after your surgery? Yes   13. Have you or any of your relatives ever had problems with anesthesia? No   14. Do you have sleep apnea, excessive snoring or daytime drowsiness? No   15. Do you have any artifical heart valves or other implanted medical devices like a pacemaker, defibrillator, or continuous glucose monitor? No   16. Do you have artificial joints? No   17. Are you allergic to latex? No     Health Care Directive:  Patient has a Health Care Directive on file      Preoperative Review of :   reviewed - pregabalin/lyrica 180/month          Review of Systems  CONSTITUTIONAL: NEGATIVE for fever, chills, change in weight  ENT/MOUTH: NEGATIVE for ear, mouth and throat problems  RESP: NEGATIVE for significant cough or SOB  CV: NEGATIVE for chest pain, palpitations or peripheral edema    Patient Active Problem List    Diagnosis Date Noted     Screen for colon cancer 08/30/2022     Priority: Medium     Encounter for Medicare annual wellness exam 08/30/2022     Priority: Medium     Hypovitaminosis D 08/30/2022     Priority: Medium     History of prostate cancer 08/30/2022     Priority: Medium     Encounter for monitoring of chronic aspirin therapy 08/30/2022     Priority: Medium     Screening for prostate cancer 08/30/2022     Priority: Medium     History of prostatectomy 08/30/2022     Priority: Medium     MGUS (monoclonal gammopathy of unknown significance) 06/21/2021     Priority: Medium     Proximal limb muscle weakness 06/21/2021     Priority: Medium     Vitamin B12 deficiency (non anemic) 06/21/2021     Priority: Medium     Obesity due to excess calories, unspecified obesity severity 11/17/2015     Priority: Medium     Health Care Home 08/16/2013     Priority: Medium     Benita Combs RN-BSN, [facility]  [phone].  FPA / [facility]                    Advance Care Planning 08/21/2012     Priority: Medium     Advance Care Planning  1/14/2016: Receipt of ACP document:  Received: Health Care Directive which was witnessed or notarized on 1/5/2016.  Document not previously scanned.  Validation form completed and sent with document to be scanned.  Code Status needs to be updated to reflect choices in most recent ACP document. Notification sent to Dr. Oquendo for followup.  Confirmed/documented designated decision maker(s).  Added by Sadaf Caro RN  Advance Care Planning 8/21/12 Discussed advance care planning with patient; however, patient declined at this time. Julianne Lockwood CMA             Anemia due to blood loss, acute 08/21/2012     Priority: Medium     Runs a slightly low hgb for years-stable at around 12.9       Hypertension goal BP (blood pressure) < 140/90 06/28/2011     Priority: Medium     HYPERLIPIDEMIA LDL GOAL <130 10/31/2010     Priority: Medium      Past Medical History:   Diagnosis Date     Adenocarcinoma of prostate (H) 5/27/2008     Arthritis      Esophageal reflux      Neuropathy      Pure hypercholesterolemia      Unspecified essential hypertension      Past Surgical History:   Procedure Laterality Date     ABDOMEN SURGERY  1952    Appendectomy     APPENDECTOMY  1952     CATARACT IOL, RT/LT Bilateral 03/12     COLONOSCOPY  2017     COLONOSCOPY N/A 5/18/2023    Procedure: colonoscopy;  Surgeon: Ayse Sears MD;  Location:  GI     GENITOURINARY SURGERY       HERNIORRHAPHY INGUINAL  5/15/2014    Procedure: HERNIORRHAPHY INGUINAL;  Surgeon: Evens Jefferson MD;  Location: [facility] OR     PROSTATE SURGERY  2007     Current Outpatient Medications   Medication Sig Dispense Refill     ASPIRIN 81 MG OR TABS 1 TABLET DAILY       cholecalciferol (VITAMIN D) 1000 UNIT tablet Take 1 tablet by mouth daily. 100 tablet 12     Cyanocobalamin (B-12) 1000 MCG TBCR        fish oil-omega-3 fatty acids 1000 MG capsule Take 2,000 mg by mouth daily 120 capsule 11     hydrochlorothiazide (HYDRODIURIL) 25 MG tablet TAKE 1 TABLET BY  MOUTH DAILY 90 tablet 3     lisinopril (ZESTRIL) 40 MG tablet Take 1 tablet (40 mg) by mouth daily 90 tablet 3     pregabalin (LYRICA) 75 MG capsule Take 3 capsules (225 mg) by mouth 2 times daily 180 capsule 5     simvastatin (ZOCOR) 20 MG tablet TAKE 1 TABLET BY MOUTH EVERYDAY AT BEDTIME 90 tablet 3     bisacodyl (DULCOLAX) 5 MG EC tablet Take 2 tablets at 3 pm the day before your procedure. If your procedure is before 11 am, take 2 additional tablets at 11 pm. If your procedure is after 11 am, take 2 additional tablets at 6 am. For additional instructions refer to your colonoscopy prep instructions. (Patient not taking: Reported on 6/7/2023) 4 tablet 0     polyethylene glycol (GOLYTELY) 236 g suspension The night before the exam at 6 pm drink an 8-ounce glass every 15 minutes until the jug is half empty. If you arrive before 11 AM: Drink the other half of the Golytely jug at 11 PM night before procedure. If you arrive after 11 AM: Drink the other half of the Golytely jug at 6 AM day of procedure. For additional instructions refer to your colonoscopy prep instructions. (Patient not taking: Reported on 6/7/2023) 4000 mL 0       Allergies   Allergen Reactions     Penicillins Other (See Comments)     blotches        Social History     Tobacco Use     Smoking status: Never     Passive exposure: Never     Smokeless tobacco: Never     Tobacco comments:     Never smoked.   Substance Use Topics     Alcohol use: Not Currently     Comment: occasional     Family History   Problem Relation Age of Onset     Glaucoma Other      Diabetes Mother      Gastrointestinal Disease Mother         pancreas removed     Breast Cancer Mother      Osteoporosis Father      Obesity Father      Macular Degeneration No family hx of      Hypertension No family hx of      History   Drug Use No         Objective     /64 (BP Location: Left arm, Patient Position: Sitting, Cuff Size: Adult Regular)   Pulse 62   Temp 97.2  F (36.2  C)  (Temporal)   Resp 16   SpO2 98%     Physical Exam  GENERAL APPEARANCE: healthy, alert and no distress  HENT: ear canals and TM's normal and nose and mouth without ulcers or lesions  RESP: lungs clear to auscultation - no rales, rhonchi or wheezes  CV: regular rate and rhythm, normal S1 S2, no S3 or S4 and no murmur, click or rub   ABDOMEN: soft, nontender, no HSM or masses and bowel sounds normal  NEURO: Normal strength and tone, sensory exam grossly normal, mentation intact and speech normal    Recent Labs   Lab Test 08/30/22  0832 11/03/21  1002   HGB 13.9 13.7    231    138   POTASSIUM 3.9 3.6   CR 0.86 0.91        Diagnostics:  Recent Results (from the past 24 hour(s))   CBC with platelets    Collection Time: 06/27/23  9:57 AM   Result Value Ref Range    WBC Count 4.9 4.0 - 11.0 10e3/uL    RBC Count 4.30 (L) 4.40 - 5.90 10e6/uL    Hemoglobin 12.9 (L) 13.3 - 17.7 g/dL    Hematocrit 37.3 (L) 40.0 - 53.0 %    MCV 87 78 - 100 fL    MCH 30.0 26.5 - 33.0 pg    MCHC 34.6 31.5 - 36.5 g/dL    RDW 13.0 10.0 - 15.0 %    Platelet Count 165 150 - 450 10e3/uL   Hemoglobin A1c    Collection Time: 06/27/23  9:57 AM   Result Value Ref Range    Hemoglobin A1C 6.3 (H) 0.0 - 5.6 %      No EKG required for low risk surgery (cataract, skin procedure, breast biopsy, etc).    Revised Cardiac Risk Index (RCRI):  The patient has the following serious cardiovascular risks for perioperative complications:   - No serious cardiac risks = 0 points     RCRI Interpretation: 1 point: Class II (low risk - 0.9% complication rate)           Signed Electronically by: Joel Daniel Wegener, MD  Copy of this evaluation report is provided to requesting physician.

## 2023-06-27 NOTE — PATIENT INSTRUCTIONS
Cleared for procedure.     No nsaids or aspirin for 10 days prior to the procedure.  If need over the counter pain medicine can use acetaminophen/tylenol.     Skip all morning medications on the morning of the procedure.     Follow up six months for annual wellness.   For informational purposes only. Not to replace the advice of your health care provider. Copyright   ,  [facility]. All rights reserved. Clinically reviewed by Shefali Camarena MD. [facility] [id number] - REV .  Preparing for Your Surgery  Getting started  A nurse will call you to review your health history and instructions. They will give you an arrival time based on your scheduled surgery time. Please be ready to share:  Your doctor's clinic name and phone number  Your medical, surgical, and anesthesia history  A list of allergies and sensitivities  A list of medicines, including herbal treatments and over-the-counter drugs  Whether the patient has a legal guardian (ask how to send us the papers in advance)  Please tell us if you're pregnant--or if there's any chance you might be pregnant. Some surgeries may injure a fetus (unborn baby), so they require a pregnancy test. Surgeries that are safe for a fetus don't always need a test, and you can choose whether to have one.   If you have a child who's having surgery, please ask for a copy of Preparing for Your Child's Surgery.    Preparing for surgery  Within 10 to 30 days of surgery: Have a pre-op exam (sometimes called an H&P, or History and Physical). This can be done at a clinic or pre-operative center.  If you're having a , you may not need this exam. Talk to your care team.  At your pre-op exam, talk to your care team about all medicines you take. If you need to stop any medicines before surgery, ask when to start taking them again.  We do this for your safety. Many medicines can make you bleed too much during surgery. Some change how well surgery (anesthesia) drugs  work.  Call your insurance company to let them know you're having surgery. (If you don't have insurance, call [phone].)  Call your clinic if there's any change in your health. This includes signs of a cold or flu (sore throat, runny nose, cough, rash, fever). It also includes a scrape or scratch near the surgery site.  If you have questions on the day of surgery, call your hospital or surgery center.  Eating and drinking guidelines  For your safety: Unless your surgeon tells you otherwise, follow the guidelines below.  Eat and drink as usual until 8 hours before you arrive for surgery. After that, no food or milk.  Drink clear liquids until 2 hours before you arrive. These are liquids you can see through, like water, Gatorade, and Propel Water. They also include plain black coffee and tea (no cream or milk), candy, and breath mints. You can spit out gum when you arrive.  If you drink alcohol: Stop drinking it the night before surgery.  If your care team tells you to take medicine on the morning of surgery, it's okay to take it with a sip of water.  Preventing infection  Shower or bathe the night before and morning of your surgery. Follow the instructions your clinic gave you. (If no instructions, use regular soap.)  Don't shave or clip hair near your surgery site. We'll remove the hair if needed.  Don't smoke or vape the morning of surgery. You may chew nicotine gum up to 2 hours before surgery. A nicotine patch is okay.  Note: Some surgeries require you to completely quit smoking and nicotine. Check with your surgeon.  Your care team will make every effort to keep you safe from infection. We will:  Clean our hands often with soap and water (or an alcohol-based hand rub).  Clean the skin at your surgery site with a special soap that kills germs.  Give you a special gown to keep you warm. (Cold raises the risk of infection.)  Wear special hair covers, masks, gowns and gloves during surgery.  Give antibiotic  medicine, if prescribed. Not all surgeries need antibiotics.  What to bring on the day of surgery  Photo ID and insurance card  Copy of your health care directive, if you have one  Glasses and hearing aids (bring cases)  You can't wear contacts during surgery  Inhaler and eye drops, if you use them (tell us about these when you arrive)  CPAP machine or breathing device, if you use them  A few personal items, if spending the night  If you have . . .  A pacemaker, ICD (cardiac defibrillator) or other implant: Bring the ID card.  An implanted stimulator: Bring the remote control.  A legal guardian: Bring a copy of the certified (court-stamped) guardianship papers.  Please remove any jewelry, including body piercings. Leave jewelry and other valuables at home.  If you're going home the day of surgery  You must have a responsible adult drive you home. They should stay with you overnight as well.  If you don't have someone to stay with you, and you aren't safe to go home alone, we may keep you overnight. Insurance often won't pay for this.  After surgery  If it's hard to control your pain or you need more pain medicine, please call your surgeon's office.  Questions?   If you have any questions for your care team, list them here: _________________________________________________________________________________________________________________________________________________________________________ ____________________________________ ____________________________________ ____________________________________    How to Take Your Medication Before Surgery

## 2023-07-06 NOTE — PROVIDER NOTIFICATION
Ortho Navigator Note      Pre-op Date 6/27/23     Medical Clearance  Cleared     Labs WNR     COVID Test Date No longer indicated      Skin  Intact      Activity: Ambulates independently with walker. Unable to walk long distances or climb stairs.      Equipment Need Patient will bring a walker for discharge. Defer to PT/OT for recs.       Meds to Hold Held all supplements 14 days prior to surgery  Hold ASA 7 days prior to surgery   * Medication recommendations are not intended to be exhaustive; they are limited to common medications that are potentially dangerous if incorrectly managed   NPO Instructions  Defer to PAN RN     Pre-op Joint Education Complete? Complete   Discharge Plan Patient has plan to discharge to home vs TCU per conversation with Dr. Escamilla. Genaro lives alone and has no . He has no family and his only friend is elderly and disabled. His friend is able to transport him to and from the hospital but is unable to physically care for him or stay in his apartment overnight. Discussed that TCU placement post-operatively cannot be guaranteed. We cannot arrange placement before. Eligibly is evaluated post-operatively. Bed availability is limited. I told patient if they are eligible and have insurance coverage, finding a bed/placement is not guaranteed.     Dr. Escamilla is aware of patient's social situation and would still like to proceed with surgery per his RNCC, Suzi.      /Transportation No       Barriers to Discharge Discharge planning needed.      Additional Info/   Special Needs : Patient had no unanswered questions or concerns.            07/06/23 1040   Discharge Planning   Patient/Family Anticipates Transition to home   Living Arrangements   People in Home alone   Is your private residence a single family home or apartment? Apartment   Number of Stairs, Within Home, Primary none   Once home, are you able to live on one level? Yes   Bathroom Shower/Tub Tub/Shower unit  (Has slide on  seat)   Equipment Currently Used at Home walker, rolling   Support System   Support Systems None  (No family and no frinds that can care for him.)   Do you have someone available to stay with you one or two nights once you are home? No   Medical Clearance   Date of Physical 06/27/23   Clinic Name [facility]   It is recommended that you call and check with any specialty providers before surgery to see if you need surgical clearance.  Do you see any specialty providers outside of your primary care provider? No   Blood   Known Bleeding Disorder or Coagulopathy No   Does the patient have any Oriental orthodox/cultural preferences related to blood products? No   Education   Has the patient scheduled or completed pre-op total joint education, either in class or online, in the last 12 months? No   Patient attended total joint pre-op class/received pre-op teaching  online

## 2023-07-12 ENCOUNTER — PREP FOR PROCEDURE (OUTPATIENT)
Dept: OTHER | Facility: CLINIC | Age: 78
End: 2023-07-12

## 2023-07-13 ENCOUNTER — ANESTHESIA EVENT (OUTPATIENT)
Dept: SURGERY | Facility: CLINIC | Age: 78
DRG: 462 | End: 2023-07-13
Payer: COMMERCIAL

## 2023-07-13 NOTE — PROGRESS NOTES
SURGERY PLAN (PRE-OP PLAN)     Patient Position (indicated by x):  X  Lateral decubitus, Wixson hip positioner   x  Regular OR table      Manzo catheter   X  Revision JOHNNY drape with plastic side bags for leg   X  Blue U drape x2   Blue and white stockinet   Coban   Ioban      JOHNNY Requests (indicated by x):     Echo BiMetric stems   x  Biomet TAPERLOC ingrowth stem    x  Biomet advocate cemented stem      Biomet RB cup, 32 heads   x  G7 cup      Biomet Bipolar cup      Specimens and cultures (indicated by x):      Tissue cultures, aerobic and anaerobic without gram stain      Frozen section      pathology specimens - fresh      pathology specimens - formalin      Jody Acosta MD  Adult Reconstructive Surgery Fellow  Department of Orthopaedic Surgery, [facility]

## 2023-07-13 NOTE — ANESTHESIA PREPROCEDURE EVALUATION
Anesthesia Pre-Procedure Evaluation    Patient: Genaro Ortiz   MRN: [id number] : 1945        Procedure : Procedure(s):  ARTHROPLASTY, HIP, TOTAL RIGHT          Past Medical History:   Diagnosis Date     Adenocarcinoma of prostate (H) 2008     Arthritis      Esophageal reflux      Neuropathy      Pure hypercholesterolemia      Unspecified essential hypertension       Past Surgical History:   Procedure Laterality Date     ABDOMEN SURGERY      Appendectomy     APPENDECTOMY       CATARACT IOL, RT/LT Bilateral      COLONOSCOPY       COLONOSCOPY N/A 2023    Procedure: colonoscopy;  Surgeon: Ayse Sears MD;  Location:  GI     GENITOURINARY SURGERY       HERNIORRHAPHY INGUINAL  5/15/2014    Procedure: HERNIORRHAPHY INGUINAL;  Surgeon: Evens Jefferson MD;  Location: [facility] OR     PROSTATE SURGERY        Allergies   Allergen Reactions     Penicillins Other (See Comments)     blotches      Social History     Tobacco Use     Smoking status: Never     Passive exposure: Never     Smokeless tobacco: Never     Tobacco comments:     Never smoked.   Substance Use Topics     Alcohol use: Not Currently     Comment: occasional      Wt Readings from Last 1 Encounters:   23 102.1 kg (225 lb)        Anesthesia Evaluation   Pt has had prior anesthetic. Type: MAC.        ROS/MED HX  ENT/Pulmonary:  - neg pulmonary ROS     Neurologic:  - neg neurologic ROS     Cardiovascular:     (+) Dyslipidemia hypertension-----    METS/Exercise Tolerance:     Hematologic: Comments: MGUS    (+) anemia,     Musculoskeletal:   (+) arthritis,     GI/Hepatic:     (+) GERD,     Renal/Genitourinary:  - neg Renal ROS     Endo:     (+) Obesity,     Psychiatric/Substance Use:  - neg psychiatric ROS     Infectious Disease:  - neg infectious disease ROS     Malignancy: Comment: Prostate cancer      Other:               OUTSIDE LABS:  CBC:   Lab Results   Component Value Date    WBC 4.9 2023    WBC 4.7  08/30/2022    HGB 12.9 (L) 06/27/2023    HGB 13.9 08/30/2022    HCT 37.3 (L) 06/27/2023    HCT 41.9 08/30/2022     06/27/2023     08/30/2022     BMP:   Lab Results   Component Value Date     06/27/2023     08/30/2022    POTASSIUM 4.3 06/27/2023    POTASSIUM 3.9 08/30/2022    CHLORIDE 103 06/27/2023    CHLORIDE 107 08/30/2022    CO2 22 06/27/2023    CO2 18 (L) 08/30/2022    BUN 27.1 (H) 06/27/2023    BUN 22 08/30/2022    CR 1.05 06/27/2023    CR 0.86 08/30/2022    GLC 90 06/27/2023    GLC 96 08/30/2022     COAGS: No results found for: PTT, INR, FIBR  POC: No results found for: BGM, HCG, HCGS  HEPATIC:   Lab Results   Component Value Date    ALBUMIN 4.5 06/27/2023    PROTTOTAL 8.1 06/27/2023    ALT 13 06/27/2023    AST 22 06/27/2023    ALKPHOS 86 06/27/2023    BILITOTAL 0.4 06/27/2023     OTHER:   Lab Results   Component Value Date    A1C 6.3 (H) 06/27/2023    MAX 9.6 06/27/2023    TSH 1.33 11/30/2020    CRP <2.9 10/30/2020    SED 21 (H) 10/30/2020       Anesthesia Plan    ASA Status:  3      Anesthesia Type: Spinal.   Induction: Intravenous.      Techniques and Equipment:     - Lines/Monitors: 2nd IV     Consents    Anesthesia Plan(s) and associated risks, benefits, and realistic alternatives discussed. Questions answered and patient/representative(s) expressed understanding.     - Discussed: Risks, Benefits and Alternatives for BOTH SEDATION and the PROCEDURE were discussed     - Discussed with:  Patient    Use of blood products discussed: Yes.     - Discussed with: Patient.     Postoperative Care    Pain management: IV analgesics, Oral pain medications, Multi-modal analgesia.   PONV prophylaxis: Ondansetron (or other 5HT-3)     Comments:                Angi Simmons MD

## 2023-07-14 ENCOUNTER — ANESTHESIA (OUTPATIENT)
Dept: SURGERY | Facility: CLINIC | Age: 78
DRG: 462 | End: 2023-07-14
Payer: COMMERCIAL

## 2023-07-14 ENCOUNTER — HOSPITAL ENCOUNTER (INPATIENT)
Facility: CLINIC | Age: 78
LOS: 11 days | Discharge: NURSING, REHAB OR LONG-TERM CARE | DRG: 462 | End: 2023-07-25
Attending: ORTHOPAEDIC SURGERY | Admitting: ORTHOPAEDIC SURGERY
Payer: COMMERCIAL

## 2023-07-14 ENCOUNTER — APPOINTMENT (OUTPATIENT)
Dept: GENERAL RADIOLOGY | Facility: CLINIC | Age: 78
DRG: 462 | End: 2023-07-14
Attending: PHYSICIAN ASSISTANT
Payer: COMMERCIAL

## 2023-07-14 ENCOUNTER — APPOINTMENT (OUTPATIENT)
Dept: PHYSICAL THERAPY | Facility: CLINIC | Age: 78
DRG: 462 | End: 2023-07-14
Attending: ORTHOPAEDIC SURGERY
Payer: COMMERCIAL

## 2023-07-14 ENCOUNTER — APPOINTMENT (OUTPATIENT)
Dept: GENERAL RADIOLOGY | Facility: CLINIC | Age: 78
DRG: 462 | End: 2023-07-14
Attending: ORTHOPAEDIC SURGERY
Payer: COMMERCIAL

## 2023-07-14 DIAGNOSIS — H35.81 RETINAL EDEMA: ICD-10-CM

## 2023-07-14 DIAGNOSIS — M16.0 OSTEOARTHRITIS OF BOTH HIPS, UNSPECIFIED OSTEOARTHRITIS TYPE: ICD-10-CM

## 2023-07-14 DIAGNOSIS — Z96.641 STATUS POST TOTAL REPLACEMENT OF RIGHT HIP: Primary | ICD-10-CM

## 2023-07-14 DIAGNOSIS — H35.051 CHOROID NEOVASCULARIZATION, RIGHT: ICD-10-CM

## 2023-07-14 DIAGNOSIS — G89.4 CHRONIC PAIN SYNDROME: ICD-10-CM

## 2023-07-14 LAB
ABO/RH(D): NORMAL
ANTIBODY SCREEN: NEGATIVE
GLUCOSE BLDC GLUCOMTR-MCNC: 107 MG/DL (ref 70–99)
SPECIMEN EXPIRATION DATE: NORMAL

## 2023-07-14 PROCEDURE — 258N000003 HC RX IP 258 OP 636: Performed by: STUDENT IN AN ORGANIZED HEALTH CARE EDUCATION/TRAINING PROGRAM

## 2023-07-14 PROCEDURE — 86901 BLOOD TYPING SEROLOGIC RH(D): CPT | Performed by: STUDENT IN AN ORGANIZED HEALTH CARE EDUCATION/TRAINING PROGRAM

## 2023-07-14 PROCEDURE — 0SR902A REPLACEMENT OF RIGHT HIP JOINT WITH METAL ON POLYETHYLENE SYNTHETIC SUBSTITUTE, UNCEMENTED, OPEN APPROACH: ICD-10-PCS | Performed by: ORTHOPAEDIC SURGERY

## 2023-07-14 PROCEDURE — 250N000013 HC RX MED GY IP 250 OP 250 PS 637: Performed by: PHYSICIAN ASSISTANT

## 2023-07-14 PROCEDURE — 999N000065 XR PELVIS AND HIP PORTABLE RIGHT 1 VIEW

## 2023-07-14 PROCEDURE — 82962 GLUCOSE BLOOD TEST: CPT

## 2023-07-14 PROCEDURE — C1776 JOINT DEVICE (IMPLANTABLE): HCPCS | Performed by: ORTHOPAEDIC SURGERY

## 2023-07-14 PROCEDURE — 120N000002 HC R&B MED SURG/OB UMMC

## 2023-07-14 PROCEDURE — 370N000017 HC ANESTHESIA TECHNICAL FEE, PER MIN: Performed by: ORTHOPAEDIC SURGERY

## 2023-07-14 PROCEDURE — 250N000011 HC RX IP 250 OP 636: Performed by: STUDENT IN AN ORGANIZED HEALTH CARE EDUCATION/TRAINING PROGRAM

## 2023-07-14 PROCEDURE — 250N000011 HC RX IP 250 OP 636: Performed by: ANESTHESIOLOGY

## 2023-07-14 PROCEDURE — 3E043XZ INTRODUCTION OF VASOPRESSOR INTO CENTRAL VEIN, PERCUTANEOUS APPROACH: ICD-10-PCS | Performed by: ANESTHESIOLOGY

## 2023-07-14 PROCEDURE — 97530 THERAPEUTIC ACTIVITIES: CPT | Mod: GP

## 2023-07-14 PROCEDURE — 250N000009 HC RX 250: Performed by: STUDENT IN AN ORGANIZED HEALTH CARE EDUCATION/TRAINING PROGRAM

## 2023-07-14 PROCEDURE — 36415 COLL VENOUS BLD VENIPUNCTURE: CPT | Performed by: STUDENT IN AN ORGANIZED HEALTH CARE EDUCATION/TRAINING PROGRAM

## 2023-07-14 PROCEDURE — 360N000077 HC SURGERY LEVEL 4, PER MIN: Performed by: ORTHOPAEDIC SURGERY

## 2023-07-14 PROCEDURE — 272N000001 HC OR GENERAL SUPPLY STERILE: Performed by: ORTHOPAEDIC SURGERY

## 2023-07-14 PROCEDURE — 250N000009 HC RX 250: Performed by: ORTHOPAEDIC SURGERY

## 2023-07-14 PROCEDURE — 710N000009 HC RECOVERY PHASE 1, LEVEL 1, PER MIN: Performed by: ORTHOPAEDIC SURGERY

## 2023-07-14 PROCEDURE — 250N000011 HC RX IP 250 OP 636: Performed by: PHYSICIAN ASSISTANT

## 2023-07-14 PROCEDURE — 258N000003 HC RX IP 258 OP 636: Performed by: PHYSICIAN ASSISTANT

## 2023-07-14 PROCEDURE — 999N000141 HC STATISTIC PRE-PROCEDURE NURSING ASSESSMENT: Performed by: ORTHOPAEDIC SURGERY

## 2023-07-14 PROCEDURE — 86850 RBC ANTIBODY SCREEN: CPT | Performed by: STUDENT IN AN ORGANIZED HEALTH CARE EDUCATION/TRAINING PROGRAM

## 2023-07-14 PROCEDURE — 999N000063 XR PELVIS PORT 1/2 VIEWS

## 2023-07-14 PROCEDURE — C1713 ANCHOR/SCREW BN/BN,TIS/BN: HCPCS | Performed by: ORTHOPAEDIC SURGERY

## 2023-07-14 PROCEDURE — 250N000011 HC RX IP 250 OP 636: Mod: JZ | Performed by: PHYSICIAN ASSISTANT

## 2023-07-14 PROCEDURE — 97161 PT EVAL LOW COMPLEX 20 MIN: CPT | Mod: GP

## 2023-07-14 PROCEDURE — 99222 1ST HOSP IP/OBS MODERATE 55: CPT | Performed by: INTERNAL MEDICINE

## 2023-07-14 PROCEDURE — 97110 THERAPEUTIC EXERCISES: CPT | Mod: GP

## 2023-07-14 DEVICE — IMPLANTABLE DEVICE
Type: IMPLANTABLE DEVICE | Site: HIP | Status: FUNCTIONAL
Brand: ECHO® BI-METRIC® HIP SYSTEM

## 2023-07-14 DEVICE — IMPLANTABLE DEVICE
Type: IMPLANTABLE DEVICE | Site: HIP | Status: FUNCTIONAL
Brand: G7® ACETABULAR SYSTEM

## 2023-07-14 DEVICE — IMPLANTABLE DEVICE
Type: IMPLANTABLE DEVICE | Site: HIP | Status: FUNCTIONAL
Brand: BIOLOX® DELTA MODULAR CERAMIC HEAD

## 2023-07-14 RX ORDER — LIDOCAINE 40 MG/G
CREAM TOPICAL
Status: DISCONTINUED | OUTPATIENT
Start: 2023-07-14 | End: 2023-07-25 | Stop reason: HOSPADM

## 2023-07-14 RX ORDER — FENTANYL CITRATE 50 UG/ML
50 INJECTION, SOLUTION INTRAMUSCULAR; INTRAVENOUS EVERY 5 MIN PRN
Status: DISCONTINUED | OUTPATIENT
Start: 2023-07-14 | End: 2023-07-14 | Stop reason: HOSPADM

## 2023-07-14 RX ORDER — GLYCOPYRROLATE 0.2 MG/ML
INJECTION, SOLUTION INTRAMUSCULAR; INTRAVENOUS PRN
Status: DISCONTINUED | OUTPATIENT
Start: 2023-07-14 | End: 2023-07-14

## 2023-07-14 RX ORDER — NALOXONE HYDROCHLORIDE 0.4 MG/ML
0.2 INJECTION, SOLUTION INTRAMUSCULAR; INTRAVENOUS; SUBCUTANEOUS
Status: DISCONTINUED | OUTPATIENT
Start: 2023-07-14 | End: 2023-07-25 | Stop reason: HOSPADM

## 2023-07-14 RX ORDER — NALOXONE HYDROCHLORIDE 0.4 MG/ML
0.4 INJECTION, SOLUTION INTRAMUSCULAR; INTRAVENOUS; SUBCUTANEOUS
Status: DISCONTINUED | OUTPATIENT
Start: 2023-07-14 | End: 2023-07-25 | Stop reason: HOSPADM

## 2023-07-14 RX ORDER — ACETAMINOPHEN 325 MG/1
650 TABLET ORAL EVERY 4 HOURS PRN
Status: DISCONTINUED | OUTPATIENT
Start: 2023-07-17 | End: 2023-07-21

## 2023-07-14 RX ORDER — SODIUM CHLORIDE, SODIUM LACTATE, POTASSIUM CHLORIDE, CALCIUM CHLORIDE 600; 310; 30; 20 MG/100ML; MG/100ML; MG/100ML; MG/100ML
INJECTION, SOLUTION INTRAVENOUS CONTINUOUS
Status: DISCONTINUED | OUTPATIENT
Start: 2023-07-14 | End: 2023-07-25 | Stop reason: HOSPADM

## 2023-07-14 RX ORDER — OXYCODONE HYDROCHLORIDE 5 MG/1
5-10 TABLET ORAL EVERY 4 HOURS PRN
Qty: 26 TABLET | Refills: 0 | Status: SHIPPED | OUTPATIENT
Start: 2023-07-14 | End: 2023-07-25

## 2023-07-14 RX ORDER — ACETAMINOPHEN 325 MG/1
975 TABLET ORAL EVERY 8 HOURS
Status: COMPLETED | OUTPATIENT
Start: 2023-07-14 | End: 2023-07-17

## 2023-07-14 RX ORDER — HYDROMORPHONE HYDROCHLORIDE 1 MG/ML
0.4 INJECTION, SOLUTION INTRAMUSCULAR; INTRAVENOUS; SUBCUTANEOUS EVERY 5 MIN PRN
Status: DISCONTINUED | OUTPATIENT
Start: 2023-07-14 | End: 2023-07-14 | Stop reason: HOSPADM

## 2023-07-14 RX ORDER — ACETAMINOPHEN 325 MG/1
975 TABLET ORAL ONCE
Status: COMPLETED | OUTPATIENT
Start: 2023-07-14 | End: 2023-07-14

## 2023-07-14 RX ORDER — GABAPENTIN 100 MG/1
100 CAPSULE ORAL
Status: DISCONTINUED | OUTPATIENT
Start: 2023-07-14 | End: 2023-07-14 | Stop reason: HOSPADM

## 2023-07-14 RX ORDER — HYDROMORPHONE HYDROCHLORIDE 1 MG/ML
0.2 INJECTION, SOLUTION INTRAMUSCULAR; INTRAVENOUS; SUBCUTANEOUS EVERY 5 MIN PRN
Status: DISCONTINUED | OUTPATIENT
Start: 2023-07-14 | End: 2023-07-14 | Stop reason: HOSPADM

## 2023-07-14 RX ORDER — ONDANSETRON 2 MG/ML
4 INJECTION INTRAMUSCULAR; INTRAVENOUS EVERY 6 HOURS PRN
Status: DISCONTINUED | OUTPATIENT
Start: 2023-07-14 | End: 2023-07-21

## 2023-07-14 RX ORDER — OXYCODONE HYDROCHLORIDE 5 MG/1
5 TABLET ORAL EVERY 4 HOURS PRN
Status: DISCONTINUED | OUTPATIENT
Start: 2023-07-14 | End: 2023-07-21

## 2023-07-14 RX ORDER — ASPIRIN 81 MG/1
162 TABLET ORAL DAILY
Status: COMPLETED | OUTPATIENT
Start: 2023-07-15 | End: 2023-07-18

## 2023-07-14 RX ORDER — AMOXICILLIN 250 MG
1-2 CAPSULE ORAL 2 TIMES DAILY
Qty: 30 TABLET | Refills: 0 | Status: SHIPPED | OUTPATIENT
Start: 2023-07-14 | End: 2023-07-25

## 2023-07-14 RX ORDER — HYDROMORPHONE HYDROCHLORIDE 1 MG/ML
0.2 INJECTION, SOLUTION INTRAMUSCULAR; INTRAVENOUS; SUBCUTANEOUS
Status: DISCONTINUED | OUTPATIENT
Start: 2023-07-14 | End: 2023-07-21

## 2023-07-14 RX ORDER — LIDOCAINE 40 MG/G
CREAM TOPICAL
Status: DISCONTINUED | OUTPATIENT
Start: 2023-07-14 | End: 2023-07-14 | Stop reason: HOSPADM

## 2023-07-14 RX ORDER — POLYETHYLENE GLYCOL 3350 17 G/17G
1 POWDER, FOR SOLUTION ORAL DAILY
Qty: 24 PACKET | Refills: 0 | Status: SHIPPED | OUTPATIENT
Start: 2023-07-14 | End: 2023-09-16

## 2023-07-14 RX ORDER — CEFAZOLIN SODIUM/WATER 2 G/20 ML
2 SYRINGE (ML) INTRAVENOUS SEE ADMIN INSTRUCTIONS
Status: DISCONTINUED | OUTPATIENT
Start: 2023-07-14 | End: 2023-07-14 | Stop reason: HOSPADM

## 2023-07-14 RX ORDER — ACETAMINOPHEN 325 MG/1
975 TABLET ORAL ONCE
Status: DISCONTINUED | OUTPATIENT
Start: 2023-07-14 | End: 2023-07-14

## 2023-07-14 RX ORDER — EPHEDRINE SULFATE 50 MG/ML
INJECTION, SOLUTION INTRAMUSCULAR; INTRAVENOUS; SUBCUTANEOUS PRN
Status: DISCONTINUED | OUTPATIENT
Start: 2023-07-14 | End: 2023-07-14

## 2023-07-14 RX ORDER — DEXMEDETOMIDINE HYDROCHLORIDE 4 UG/ML
INJECTION, SOLUTION INTRAVENOUS PRN
Status: DISCONTINUED | OUTPATIENT
Start: 2023-07-14 | End: 2023-07-14

## 2023-07-14 RX ORDER — SODIUM CHLORIDE, SODIUM LACTATE, POTASSIUM CHLORIDE, CALCIUM CHLORIDE 600; 310; 30; 20 MG/100ML; MG/100ML; MG/100ML; MG/100ML
INJECTION, SOLUTION INTRAVENOUS CONTINUOUS
Status: DISCONTINUED | OUTPATIENT
Start: 2023-07-14 | End: 2023-07-14 | Stop reason: HOSPADM

## 2023-07-14 RX ORDER — PROPOFOL 10 MG/ML
INJECTION, EMULSION INTRAVENOUS CONTINUOUS PRN
Status: DISCONTINUED | OUTPATIENT
Start: 2023-07-14 | End: 2023-07-14

## 2023-07-14 RX ORDER — ACETAMINOPHEN 325 MG/1
650 TABLET ORAL EVERY 4 HOURS PRN
Qty: 100 TABLET | Refills: 0 | Status: SHIPPED | OUTPATIENT
Start: 2023-07-14 | End: 2023-07-25

## 2023-07-14 RX ORDER — POLYETHYLENE GLYCOL 3350 17 G/17G
17 POWDER, FOR SOLUTION ORAL DAILY
Status: DISCONTINUED | OUTPATIENT
Start: 2023-07-15 | End: 2023-07-21

## 2023-07-14 RX ORDER — ONDANSETRON 4 MG/1
4 TABLET, ORALLY DISINTEGRATING ORAL EVERY 6 HOURS PRN
Status: DISCONTINUED | OUTPATIENT
Start: 2023-07-14 | End: 2023-07-21

## 2023-07-14 RX ORDER — METHOCARBAMOL 500 MG/1
500 TABLET, FILM COATED ORAL EVERY 6 HOURS PRN
Status: DISCONTINUED | OUTPATIENT
Start: 2023-07-14 | End: 2023-07-21

## 2023-07-14 RX ORDER — AMOXICILLIN 250 MG
1 CAPSULE ORAL 2 TIMES DAILY
Status: DISCONTINUED | OUTPATIENT
Start: 2023-07-14 | End: 2023-07-21

## 2023-07-14 RX ORDER — MAGNESIUM HYDROXIDE 1200 MG/15ML
LIQUID ORAL PRN
Status: DISCONTINUED | OUTPATIENT
Start: 2023-07-14 | End: 2023-07-14 | Stop reason: HOSPADM

## 2023-07-14 RX ORDER — CELECOXIB 200 MG/1
400 CAPSULE ORAL ONCE
Status: COMPLETED | OUTPATIENT
Start: 2023-07-14 | End: 2023-07-14

## 2023-07-14 RX ORDER — HYDROMORPHONE HYDROCHLORIDE 1 MG/ML
0.4 INJECTION, SOLUTION INTRAMUSCULAR; INTRAVENOUS; SUBCUTANEOUS
Status: DISCONTINUED | OUTPATIENT
Start: 2023-07-14 | End: 2023-07-21

## 2023-07-14 RX ORDER — ONDANSETRON 4 MG/1
4 TABLET, ORALLY DISINTEGRATING ORAL EVERY 30 MIN PRN
Status: DISCONTINUED | OUTPATIENT
Start: 2023-07-14 | End: 2023-07-14 | Stop reason: HOSPADM

## 2023-07-14 RX ORDER — CEFAZOLIN SODIUM 2 G/100ML
2 INJECTION, SOLUTION INTRAVENOUS EVERY 8 HOURS
Status: COMPLETED | OUTPATIENT
Start: 2023-07-14 | End: 2023-07-15

## 2023-07-14 RX ORDER — ONDANSETRON 2 MG/ML
INJECTION INTRAMUSCULAR; INTRAVENOUS PRN
Status: DISCONTINUED | OUTPATIENT
Start: 2023-07-14 | End: 2023-07-14

## 2023-07-14 RX ORDER — TRANEXAMIC ACID 650 MG/1
1950 TABLET ORAL ONCE
Status: COMPLETED | OUTPATIENT
Start: 2023-07-14 | End: 2023-07-14

## 2023-07-14 RX ORDER — OXYCODONE HYDROCHLORIDE 10 MG/1
10 TABLET ORAL EVERY 4 HOURS PRN
Status: DISCONTINUED | OUTPATIENT
Start: 2023-07-14 | End: 2023-07-21

## 2023-07-14 RX ORDER — BUPIVACAINE HYDROCHLORIDE 7.5 MG/ML
INJECTION, SOLUTION INTRASPINAL
Status: COMPLETED | OUTPATIENT
Start: 2023-07-14 | End: 2023-07-14

## 2023-07-14 RX ORDER — CEFAZOLIN SODIUM/WATER 2 G/20 ML
2 SYRINGE (ML) INTRAVENOUS
Status: DISCONTINUED | OUTPATIENT
Start: 2023-07-14 | End: 2023-07-14 | Stop reason: HOSPADM

## 2023-07-14 RX ORDER — FENTANYL CITRATE 50 UG/ML
25 INJECTION, SOLUTION INTRAMUSCULAR; INTRAVENOUS EVERY 5 MIN PRN
Status: DISCONTINUED | OUTPATIENT
Start: 2023-07-14 | End: 2023-07-14 | Stop reason: HOSPADM

## 2023-07-14 RX ORDER — ASPIRIN 81 MG/1
162 TABLET ORAL DAILY
Qty: 60 TABLET | Refills: 0 | Status: SHIPPED | OUTPATIENT
Start: 2023-07-14 | End: 2023-07-25

## 2023-07-14 RX ORDER — ONDANSETRON 2 MG/ML
4 INJECTION INTRAMUSCULAR; INTRAVENOUS EVERY 30 MIN PRN
Status: DISCONTINUED | OUTPATIENT
Start: 2023-07-14 | End: 2023-07-14 | Stop reason: HOSPADM

## 2023-07-14 RX ORDER — BISACODYL 10 MG
10 SUPPOSITORY, RECTAL RECTAL DAILY PRN
Status: DISCONTINUED | OUTPATIENT
Start: 2023-07-14 | End: 2023-07-21

## 2023-07-14 RX ORDER — FENTANYL CITRATE 50 UG/ML
INJECTION, SOLUTION INTRAMUSCULAR; INTRAVENOUS PRN
Status: DISCONTINUED | OUTPATIENT
Start: 2023-07-14 | End: 2023-07-14

## 2023-07-14 RX ADMIN — CEFAZOLIN SODIUM 2 G: 2 INJECTION, SOLUTION INTRAVENOUS at 16:55

## 2023-07-14 RX ADMIN — FENTANYL CITRATE 50 MCG: 50 INJECTION, SOLUTION INTRAMUSCULAR; INTRAVENOUS at 08:06

## 2023-07-14 RX ADMIN — DEXMEDETOMIDINE 4 MCG: 100 INJECTION, SOLUTION, CONCENTRATE INTRAVENOUS at 09:37

## 2023-07-14 RX ADMIN — PHENYLEPHRINE HYDROCHLORIDE 200 MCG: 10 INJECTION INTRAVENOUS at 08:39

## 2023-07-14 RX ADMIN — ACETAMINOPHEN 975 MG: 325 TABLET, FILM COATED ORAL at 13:52

## 2023-07-14 RX ADMIN — Medication 2 G: at 08:17

## 2023-07-14 RX ADMIN — PREGABALIN 225 MG: 100 CAPSULE ORAL at 19:33

## 2023-07-14 RX ADMIN — GLYCOPYRROLATE 0.2 MG: 0.2 INJECTION, SOLUTION INTRAMUSCULAR; INTRAVENOUS at 08:28

## 2023-07-14 RX ADMIN — SENNOSIDES AND DOCUSATE SODIUM 1 TABLET: 50; 8.6 TABLET ORAL at 19:33

## 2023-07-14 RX ADMIN — MIDAZOLAM 0.25 MG: 1 INJECTION INTRAMUSCULAR; INTRAVENOUS at 07:58

## 2023-07-14 RX ADMIN — MIDAZOLAM 0.25 MG: 1 INJECTION INTRAMUSCULAR; INTRAVENOUS at 07:53

## 2023-07-14 RX ADMIN — MIDAZOLAM 0.25 MG: 1 INJECTION INTRAMUSCULAR; INTRAVENOUS at 09:38

## 2023-07-14 RX ADMIN — ONDANSETRON 4 MG: 2 INJECTION INTRAMUSCULAR; INTRAVENOUS at 09:31

## 2023-07-14 RX ADMIN — PROPOFOL 50 MCG/KG/MIN: 10 INJECTION, EMULSION INTRAVENOUS at 08:08

## 2023-07-14 RX ADMIN — ACETAMINOPHEN 975 MG: 325 TABLET, FILM COATED ORAL at 22:12

## 2023-07-14 RX ADMIN — DEXMEDETOMIDINE 4 MCG: 100 INJECTION, SOLUTION, CONCENTRATE INTRAVENOUS at 08:24

## 2023-07-14 RX ADMIN — BUPIVACAINE HYDROCHLORIDE IN DEXTROSE 1.4 ML: 7.5 INJECTION, SOLUTION SUBARACHNOID at 07:45

## 2023-07-14 RX ADMIN — ACETAMINOPHEN 975 MG: 325 TABLET, FILM COATED ORAL at 06:31

## 2023-07-14 RX ADMIN — HYDROMORPHONE HYDROCHLORIDE 0.2 MG: 1 INJECTION, SOLUTION INTRAMUSCULAR; INTRAVENOUS; SUBCUTANEOUS at 11:22

## 2023-07-14 RX ADMIN — PHENYLEPHRINE HYDROCHLORIDE 100 MCG: 10 INJECTION INTRAVENOUS at 08:30

## 2023-07-14 RX ADMIN — TRANEXAMIC ACID 1950 MG: 650 TABLET ORAL at 06:31

## 2023-07-14 RX ADMIN — NOREPINEPHRINE BITARTRATE 6.4 MCG: 1 INJECTION, SOLUTION, CONCENTRATE INTRAVENOUS at 08:45

## 2023-07-14 RX ADMIN — SODIUM CHLORIDE, POTASSIUM CHLORIDE, SODIUM LACTATE AND CALCIUM CHLORIDE: 600; 310; 30; 20 INJECTION, SOLUTION INTRAVENOUS at 07:30

## 2023-07-14 RX ADMIN — CELECOXIB 400 MG: 200 CAPSULE ORAL at 06:31

## 2023-07-14 RX ADMIN — PHENYLEPHRINE HYDROCHLORIDE 0.2 MCG/KG/MIN: 10 INJECTION INTRAVENOUS at 08:38

## 2023-07-14 RX ADMIN — Medication 10 MG: at 08:30

## 2023-07-14 RX ADMIN — MIDAZOLAM 0.25 MG: 1 INJECTION INTRAMUSCULAR; INTRAVENOUS at 08:05

## 2023-07-14 RX ADMIN — SODIUM CHLORIDE, POTASSIUM CHLORIDE, SODIUM LACTATE AND CALCIUM CHLORIDE: 600; 310; 30; 20 INJECTION, SOLUTION INTRAVENOUS at 13:47

## 2023-07-14 RX ADMIN — SODIUM CHLORIDE, POTASSIUM CHLORIDE, SODIUM LACTATE AND CALCIUM CHLORIDE: 600; 310; 30; 20 INJECTION, SOLUTION INTRAVENOUS at 11:58

## 2023-07-14 ASSESSMENT — ACTIVITIES OF DAILY LIVING (ADL)
ADLS_ACUITY_SCORE: 31

## 2023-07-14 NOTE — ANESTHESIA POSTPROCEDURE EVALUATION
Patient: Genaro Ortiz    Procedure: Procedure(s):  ARTHROPLASTY, HIP, TOTAL RIGHT       Anesthesia Type:  Spinal    Note:  Disposition: Inpatient   Postop Pain Control: Uneventful            Sign Out: Well controlled pain   PONV: No   Neuro/Psych: Uneventful            Sign Out: Acceptable/Baseline neuro status   Airway/Respiratory: Uneventful            Sign Out: Acceptable/Baseline resp. status   CV/Hemodynamics: Uneventful            Sign Out: Acceptable CV status; No obvious hypovolemia; No obvious fluid overload   Other NRE:    DID A NON-ROUTINE EVENT OCCUR?            Last vitals:  Vitals Value Taken Time   /63 07/14/23 1300   Temp 36.4  C (97.5  F) 07/14/23 1300   Pulse 68 07/14/23 1312   Resp 8 07/14/23 1312   SpO2 98 % 07/14/23 1313   Vitals shown include unvalidated device data.    Electronically Signed By: Pato Shearer MD  July 14, 2023  1:29 PM

## 2023-07-14 NOTE — ANESTHESIA PROCEDURE NOTES
"Intrathecal injection Procedure Note    Pre-Procedure   Staff -        Anesthesiologist:  Leonid Caballero MD       Resident/Fellow: Angi Simmons MD       Performed By: resident       Location: OR       Pre-Anesthestic Checklist: patient identified, IV checked, risks and benefits discussed, informed consent, monitors and equipment checked, pre-op evaluation, at physician/surgeon's request and post-op pain management  Timeout:       Correct Patient: Yes        Correct Procedure: Yes        Correct Site: Yes        Correct Position: Yes   Procedure Documentation  Procedure: intrathecal injection       Diagnosis: analgesia       Patient Position: sitting       Patient Prep/Sterile Barriers: sterile gloves, mask, patient draped       Skin prep: Chloraprep       Insertion Site: L2-3. (midline approach).       Needle Gauge: 27.        Needle Length (Inches): 3.5        Spinal Needle Type: Quincke       Introducer used       Introducer: 20 G       # of attempts: 1 and  # of redirects:  0    Assessment/Narrative         Paresthesias: No.       CSF fluid: clear.    Medication(s) Administered   0.75% Hyperbaric Bupivacaine (Intrathecal) - Intrathecal   1.4 mL - 7/14/2023 7:45:00 AM    FOR [facility] (Psychiatric/[facility]) ONLY:   Pain Team Contact information: please page the Pain Team Via [employer]. Search \"Pain\". During daytime hours, please page the attending first. At night please page the resident first.      "

## 2023-07-14 NOTE — BRIEF OP NOTE
Orthopaedic Surgery Brief Op-Note      Patient: Genaro Ortiz  : 1945  Date of Service: 2023 11:49 AM    Pre-operative Diagnosis: Osteoarthritis of both hips, unspecified osteoarthritis type [M16.0]  Post-operative Diagnosis: same    Procedure(s) Performed: Procedure(s):  ARTHROPLASTY, HIP, TOTAL RIGHT    Staff: Dr. Andi Martinez: Dr. Jody Aocsta  Assistants:   Dulce Mcbride PA-C    Anesthesia: General  EBL: 100 cc  UOP: see anesthesia record    Implants:   Implant Name Type Inv. Item Serial No.  Lot No. LRB No. Used Action   IMP SHELL BIOM G7 ACETAB PPS DOWD HOLE 56MM SZ F [id number] - [id number] Total Joint Component/Insert IMP SHELL BIOM G7 ACETAB PPS DOWD HOLE 56MM SZ F [id number]  [employer] [id number] Right 1 Implanted   36 mm Size F Polyethylene    [employer] [id number] Right 1 Implanted   IMP STEM FEM BIOM ECHO RED PROX STD OFFSET [id number] - [id number] Total Joint Component/Insert IMP STEM FEM BIOM ECHO RED PROX STD OFFSET [id number]  [employer] [id number] Right 1 Implanted   HEAD FEM 0MM OFST 36MM HIP ACTB MDLR TY 1 BLX D G7 - [id number] Total Joint Component/Insert HEAD FEM 0MM OFST 36MM HIP ACTB MDLR TY 1 BLX D G7  [employer] [id number] Right 1 Implanted     Drains: nonw  Intra-op Labs/Cxs/Specimens: * No specimens in log *  Complications: No apparent complications during procedure  Findings: Please see dictated operative note for details    Disposition: Stable to PACU, then admit to Orthopaedics     POST OPERATIVE PLAN    Assessment/Plan: Genaro Ortiz is a 78 year old male s/p Procedure(s):  ARTHROPLASTY, HIP, TOTAL RIGHT on 2023 with Dr. Escamilla.    Activity: Up with assist and assistive devices as needed until independent. Posterior hip precautions to operative hip x 3 months:  1) No hip flexion beyond 90 degrees  2) No adduction beyond midline  3) No internal rotation beyond neutral   Weight bearing status: WBAT  Antibiotics: Cefazolin x 24 hours  Diet: Begin  with clear fluids and progress diet as tolerated. Bowel regimen. Anti-emetics PRN.    DVT prophylaxis: Mechanical while in hospital with Aspirin 162mg daily x 4 weeks, starting morning of POD 1  Elevation: Elevate heels off of bed on pillows   Wound Care: Alginate, tegaderm to be changed PRN by ortho  Drains: none  Manzo: none  Pain management: Orals PRN, IV for breakthrough only  X-rays: AP Pelvis and lateral in PACU  Physical Therapy: Mobilization, ROM, ADL's, Posterior hip precautions  Occupational Therapy: ADL's  Labs: Trend Hgb on POD #1, 2  Consults: PT, OT. Hospitalist, appreciate assistance in caring for this patient throughout the perioperative period    Future Appointments   Date Time Provider Department Center   7/14/2023  3:30 PM Ilana Anne, PT [facility] [place]   7/15/2023  8:15 AM Ania Rodrigues, PT [facility] [place]   7/15/2023  9:00 AM Valerie Cm, JOSIE [facility] [place]   7/15/2023  1:00 PM Ania Rodrigues, PT [facility] [place]   8/28/2023 11:00 AM Merritt Toney PA-C [facility]   10/4/2023  9:00 AM Giovanni Shah MD [facility]   11/7/2023  1:30 PM Eric Abel MD [facility]   12/8/2023  1:15 PM Valdez Quesada DO [facility]       Disposition: Pending progress with therapies, pain control on orals, and medical stability, anticipate discharge to Home vs TCU on POD #1-2.  Follow up: Plan for follow up with Dr. Escamilla  Or Merritt Toney in 4 weeks.    I assisted with positioning, prepping and draping, and closure.    Dulce Mcbride PA-C  7/14/2023 11:49 AM  Physician Assistant   Oncology and Adult Reconstructive Surgery  Dept Orthopaedic Surgery, [facility]     Thank you for allowing me to participate in this patient's care. Please page me directly any questions/concerns.   Securely message with the Vocera Web Console (learn more here)  Text page via [email or web address] Paging/Directory    If there is no response, if it is a weekend, or if it is during evening hours,  please page the orthopaedic surgery resident on call via [facility] Paging/Directory

## 2023-07-14 NOTE — PROGRESS NOTES
"SPIRITUAL HEALTH SERVICES Progress Note  [facility] ([facility]) 5 Ortho    Follow-up  visit with pt and his friend Francisco, post-surgery. I also consulted afterwards with OT who was about to see pt for evaluation.  Pt feels his surgery went quite well; his understanding is that \"my next surgery will most likely be next Friday\". One of the Ortho attending physicians came in to consult with pt, was able to clarify with pt the plan of care for today and tomorrow: that pt would be thoroughly evaluated by OT and PT either today or tomorrow, then would be given a recommendation for plan of care going forward. Pt says he has a better understanding now of what to expect in the days to come. We had a good discussion about the merits of rehab therapies, helping pt understand that if any type of rehab stay is recommended that he would see this as a positive thing--an opportunity rather than a burden-- and not a failure or shortcoming on his part in any way.     If pt still here on Monday he would welcome a follow-up visit from the unit .     James Hidalgo M.Div. (Bill), [facility]  Staff   Pager [phone]      * [facility] remains available 24/7 for emergent requests/referrals, either by having the switchboard page the on-call  or by entering an ASAP/STAT consult in Epic (this will also page the on-call ). Routine Epic consults receive an initial response within 24 hours.*        "

## 2023-07-14 NOTE — PROGRESS NOTES
"   07/14/23 1600   Appointment Info   Signing Clinician's Name / Credentials (PT) Ilana Anne, DPMAT   Living Environment   People in Home alone   Current Living Arrangements condominium   Home Accessibility no concerns   Number of Stairs, Within Home, Primary none   Living Environment Comments tub/shower with bench, no stairs needed, does not have any support post op   Self-Care   Usual Activity Tolerance moderate   Current Activity Tolerance fair   Regular Exercise No   Equipment Currently Used at Home dressing device;raised toilet seat;tub bench;walker, rolling  (4WW)   Fall history within last six months no   Activity/Exercise/Self-Care Comment Raudel with 4WW at baseline, Raudel for ADLs (has tub bench). Pt quite sedentary at baseline, does walk short distance to grocery store, has meals on wheels   General Information   Onset of Illness/Injury or Date of Surgery 07/14/23   Referring Physician Dr. Escamilla   Pertinent History of Current Problem (include personal factors and/or comorbidities that impact the POC) per chart review, \"Genaro Ortiz is a 78 year old male admitted on 7/14/2023 s/p following procedure: R JOHNNY by Dr Escamilla. Doing well.\"   Existing Precautions/Restrictions fall;no hip IR;no hip ADD past midline;90 degree hip flexion   Weight-Bearing Status - RLE weight-bearing as tolerated   General Observations friend, Francisco in room   Cognition   Affect/Mental Status (Cognition) WNL   Orientation Status (Cognition) oriented x 4   Follows Commands (Cognition) WNL   Pain Assessment   Patient Currently in Pain No   Integumentary/Edema   Integumentary/Edema Comments consistent with JOHNNY   Posture    Posture Forward head position;Protracted shoulders   Range of Motion (ROM)   ROM Comment tolerates ~60 deg hip flexion PROM; otherwise WNL   Strength (Manual Muscle Testing)   Strength Comments good R quad set, able to perform LAQ WNL; R LE weakness with post op JOHNNY, pt has generalized weakness at baseline as requiers 4WW " use   Bed Mobility   Comment, (Bed Mobility) Louis supine>sit   Transfers   Comment, (Transfers) Louis STS to 4WW, wide GENNY, difficulty rising to full stand   Gait/Stairs (Locomotion)   Comment, (Gait/Stairs) NT 2/2 low BPs   Balance   Balance Comments pt with baseline balance defecits, requires 4WW for mobility   Sensory Examination   Sensory Perception patient reports no sensory changes   Clinical Impression   Criteria for Skilled Therapeutic Intervention Yes, treatment indicated   PT Diagnosis (PT) impaired functional mobility   Influenced by the following impairments reduced activity tolerance, R JOHNNY posterior precautions, post op pain anticipated, R LE weakness   Functional limitations due to impairments bed mobility, transfers, gait   Clinical Presentation (PT Evaluation Complexity) Evolving/Changing   Clinical Presentation Rationale PMH, clinician impression, anticipate increased post op pain and hypotension   Clinical Decision Making (Complexity) low complexity   Planned Therapy Interventions (PT) balance training;bed mobility training;gait training;home exercise program;patient/family education;ROM (range of motion);transfer training;progressive activity/exercise;risk factor education;home program guidelines   Anticipated Equipment Needs at Discharge (PT) walker, rolling   Risk & Benefits of therapy have been explained evaluation/treatment results reviewed;care plan/treatment goals reviewed;risks/benefits reviewed;current/potential barriers reviewed;participants voiced agreement with care plan;participants included;patient;friend   Clinical Impression Comments eval limited by hypotensino, BPs 80s/50s standing; pt lives alone with no support, given impaired baseline mobility anticipate TCU need at discharge, will be unable to care for himself at home without support   PT Total Evaluation Time   PT Eval, Low Complexity Minutes (26242) 8   Plan of Care Review   Plan of Care Reviewed With patient   Physical  Therapy Goals   PT Frequency Daily   PT Predicted Duration/Target Date for Goal Attainment 07/18/23   PT Goals Bed Mobility;Transfers;Gait   PT: Bed Mobility Modified independent;Supine to/from sit;Within precautions  (leg )   PT: Transfers Modified independent;Sit to/from stand;Bed to/from chair;Assistive device;Within precautions   PT: Gait Modified independent;Rolling walker;100 feet   Interventions   Interventions Quick Adds Therapeutic Activity;Therapeutic Procedure   Therapeutic Procedure/Exercise   Ther. Procedure: strength, endurance, ROM, flexibillity Minutes (75028) 10   Treatment Detail/Skilled Intervention PT: instructed through supien R JOHNNY exercises for strength and ROM- tolerates well, x10 each ankle pumps, quad set, glute set, HS, heel slide assisted, SAQ.   Therapeutic Activity   Therapeutic Activities: dynamic activities to improve functional performance Minutes (08759) 14   Symptoms Noted During/After Treatment Fatigue   Treatment Detail/Skilled Intervention PT: educated on posterior hip precautions and mobility implications. Pt with concerns about discharging home alone, has no support at all. Educated on collabortive process for discharge plan/rec between therapies and ortho team, but did validate that Dr. persadu appears to be aware pt may need TCU and told PT he spoke with him about it pre-op and again today right before surgery.  also stopped PT prior to sessino and voiced pt's concerns about this. Educated pt that primarly IP PT role is to help pt reach his needed mobility goals for home. BPs checked prior to mobility- 90s/50s but pt asymptomatic,a greeable to try to stand. Pt require vc for seated pivot with HOB slightly elevated, no rail use, modAx1 supine>sit to EOB. Once sitting EOB sits IND. Continues to deny dizziness and agreeable to try to stand. Set up with pt's personal 4WW but has no brakes so PT stablized to stand, vc for hand placement to rise- pt with extremely wide  GENNY outside wheels and rises with Louis, difficulty advancing hands to walker, generally unsteady. Cued back to sit as unsafe in current position. BP re-checked and 80s/40s so directed back to supine though was still asymptomatic. ModAx1 sit>supine back in bed. Educated on caution around low BPs, will have FWW brought to pt's room as his 4WW not suitable without brakes.   PT Discharge Planning   PT Plan progress stand, initiate gait with FWW   PT Discharge Recommendation (DC Rec)   (defer to orther)   PT Rationale for DC Rec concerns about pt returning home alone, has no support, currently requiring Ax1 for all mobility and anticipate would not be able to care for himself at home given baseline mobility defecits and new posterio hip precautions. Would benefit from ongoing skilled rehab to address neds   PT Brief overview of current status Ax1 with FWW- limited to stand only at al 2/2 low BPs   Total Session Time   Timed Code Treatment Minutes 24   Total Session Time (sum of timed and untimed services) 32         [facility]  OUTPATIENT PHYSICAL THERAPY EVALUATION  PLAN OF TREATMENT FOR OUTPATIENT REHABILITATION  (COMPLETE FOR INITIAL CLAIMS ONLY)  Patient's Last Name, First Name, M.I.  YOB: 1945  Genaro Ortiz                        Provider's Name  [facility] Medical Record No.  [id number]                             Onset Date:  07/14/23   Start of Care Date:      Type:     _X_PT   ___OT   ___SLP Medical Diagnosis:                 PT Diagnosis:  impaired functional mobility Visits from SOC:  1     Anticipated treatment dates 7/14/23 to 7/20/23 for R JOHNNY    See note for plan of treatment, functional goals and certification details    I CERTIFY THE NEED FOR THESE SERVICES FURNISHED UNDER        THIS PLAN OF TREATMENT AND WHILE UNDER MY CARE     (Physician co-signature of this document indicates review and certification of the therapy  plan).

## 2023-07-14 NOTE — LETTER
Health Information Management Services               Recipient:  [facility]          Sender:  TALYA Slater  [phone]          Date: July 25, 2023  Patient Name:  Genaro Ortiz  Patient YOB: 1945  Routing Message:  Discharge orders for LARISSA murillo to follow.          The documents accompanying this notice contain confidential information belonging to the sender.  This information is intended only for the use of the individual or entity named above.  The authorized recipient of this information is prohibited from disclosing this information to any other party and is required to destroy the information after its stated need has been fulfilled, unless otherwise required by state law.      If you are not the intended recipient, you are hereby notified that any disclosure, copy, distribution or action taken in reliance on the contents of these documents is strictly prohibited.  If you have received this document in error, please return it by fax to [phone] with a note on the cover sheet explaining why you are returning it (e.g. not your patient, not your provider, etc.).  If you need further assistance, please call [facility] Centralized Transcription at [phone].  Documents may also be returned by mail to [employer], , [facility] [address] [place], [place] [address].

## 2023-07-14 NOTE — PROGRESS NOTES
"SPIRITUAL HEALTH SERVICES Progress Note  [facility] ([facility]) 3A East    Saw pt Genaro Ortiz per routine admission request for  support before his surgery. Pt's friend Francisco De La Paz was present - Francisco is identified in pt's Advance Directive as being proxy decision maker for pt when pt not able to speak for himself.     Patient/Family Understanding of Illness and Goals of Care - Genaro's  surgeon Dr. Escamilla came in to speak with him to review what would be done in his surgery (hip replacement) and potential outcomes. Pt expressed good understanding of the surgery he was going to have today, but had significant questions about when his next surgery might be and \"what happens next after this surgery?\". Pt's friend Francisco also had questions about what pt could expect in the days and weeks post-surgery. Dr. Escamilla reminded pt that \"what happens next\" is dependent on his recovery process post-surgery. I spoke with pt's proxy Francisco De La Paz in surgery waiting area after pt went to OR. Francisco shared that Genaro was quite concerned about where he would go in the days after his surgery, being worried that he wouldn't be able to take care of himself on his own (pt lives alone).    Distress and Loss - Not Discussed, other than pt's clear concern about his plan of care post-surgery.     Strengths, Coping, and Resources  - pt has supportive friends and per his friend Francisco \"is very active in his [employer], the [facility] ([facility] in [facility]); when he was feeling better and more mobile he was going to the [employer] up to three times a week to attend Gn[employer]ist and help out with whatever needed to be done...\"     Meaning, Beliefs, and Spirituality - pt's [employer]oration[employer] lupe is very important to him - he mentioned that he attended [employer] \"for one year back in 1969\". Pt welcomed prayer before his surgery.    Plan of Care - I will follow up with pt post-surgery today.     James Jang) Archie Hidalgo M.Div., " [facility]  Staff   Pager [phone]    * [facility] remains available 24/7 for emergent requests/referrals, either by having the switchboard page the on-call  or by entering an ASAP/STAT consult in Epic (this will also page the on-call ). Routine Epic consults receive an initial response within 24 hours.*

## 2023-07-14 NOTE — PROGRESS NOTES
"Pt admitted on the unit at 1315 from PACU for Total R. Hip arthroplasty.     Incision - CDI    BP 91/55 (BP Location: Right arm)   Pulse 61   Temp 97.3  F (36.3  C) (Oral)   Resp 10   Ht 1.753 m (5' 9.02\")   Wt 101.8 kg (224 lb 6.9 oz)   SpO2 95%   BMI 33.13 kg/m       Pt is on CAPNO  Pt denied SOB and CP     Pt is AO4    Pt denied pain- Had spinal and is on scheduled tylenol. Pt also uses ice.     Last BM - 7/14/23  Pt has not voided yet- plans to use bedside urinal for now.     Pt has 2 PIVs. Left arm - Infusing LT @ 50ml/hr and Right arm - SL    Pt not OOB since admission, so no assessment for activity and ambulation. Per Ortho - WBAT    Diet- Begin with clear fluids and progress diet as tolerated.     POC- Continue with scheduled ABX.     Discharge plans- TBD- Complete therapies and pending medical stability- TCU anticipation. Per pt, lives alone, nobody to help take care of him, when he returns. Needs SW Consult.               "

## 2023-07-14 NOTE — ANESTHESIA CARE TRANSFER NOTE
Patient: Genaro Ortiz    Procedure: Procedure(s):  ARTHROPLASTY, HIP, TOTAL RIGHT       Diagnosis: Osteoarthritis of both hips, unspecified osteoarthritis type [M16.0]  Diagnosis Additional Information: No value filed.    Anesthesia Type:   Spinal     Note:    Oropharynx: oropharynx clear of all foreign objects and spontaneously breathing  Level of Consciousness: drowsy  Oxygen Supplementation: face mask  Level of Supplemental Oxygen (L/min / FiO2): 8  Independent Airway: airway patency satisfactory and stable  Dentition: dentition unchanged  Vital Signs Stable: post-procedure vital signs reviewed and stable  Report to RN Given: handoff report given  Patient transferred to: PACU    Handoff Report: Identifed the Patient, Identified the Reponsible Provider, Reviewed the pertinent medical history, Discussed the surgical course, Reviewed Intra-OP anesthesia mangement and issues during anesthesia, Set expectations for post-procedure period and Allowed opportunity for questions and acknowledgement of understanding      Vitals:  Vitals Value Taken Time   /63 07/14/23 1136   Temp     Pulse 75 07/14/23 1138   Resp 15 07/14/23 1138   SpO2 100 % 07/14/23 1138   Vitals shown include unvalidated device data.    Electronically Signed By: Angi Simmons MD  July 14, 2023  11:40 AM

## 2023-07-14 NOTE — OR NURSING
Called Dr. Shearer with concerns of low blood pressure, he relays to give patient 1 liter LR bolus

## 2023-07-14 NOTE — OR NURSING
PACU to Inpatient Nursing Handoff    Patient Genaro Ortiz is a 78 year old male who speaks English.   Procedure Procedure(s):  ARTHROPLASTY, HIP, TOTAL RIGHT   Surgeon(s) Primary: David Escamilla MD  Assisting: Dulce Mcbride PA-C  Resident - Assisting: Paradise Reyes MD  Fellow - Assisting: Jody Acosta MD     Allergies   Allergen Reactions     Penicillins Other (See Comments)     blotches       Isolation  None      Past Medical History   has a past medical history of Adenocarcinoma of prostate (H) (5/27/2008), Arthritis, Esophageal reflux, Neuropathy, Pure hypercholesterolemia, and Unspecified essential hypertension.    Anesthesia Spinal   Dermatome Level     Preop Meds Tylenol 975mg @0631; gabapentin 400mg @ 0631; TXA 1,950 mg 0631     Nerve block Spinal resolved   Intraop Meds midazolam 1 mg/mL (mg)  Total dose:  1 mg    Date/Time Rate/Dose/Volume Action Route Admin User Audit    07/14/23 0753 0.25 mg Given Intravenous Angi Simmons MD     0758 0.25 mg Given Intravenous Angi Simmons MD     0805 0.25 mg Given Intravenous Angi Simmons MD     0938 0.25 mg Given Intravenous Angi Simmons MD       fentaNYL 50 mcg/mL (mcg)  Total dose:  50 mcg    Date/Time Rate/Dose/Volume Action Route Admin User Audit    07/14/23 0806 50 mcg Given Intravenous Angi Simmons MD       propofol drip mcg/kg/min (mcg/kg/min)  Total dose:  820.51 mg Dosing weight:  101.8    Date/Time Rate/Dose/Volume Action Route Admin User Audit    07/14/23 0808 50 mcg/kg/min - 30.54 mL/hr New Bag Intravenous Angi Simmons MD     0815 75 mcg/kg/min - 45.81 mL/hr Rate Change Intravenous Angi Simmons MD     0822 50 mcg/kg/min - 30.54 mL/hr Rate Change Intravenous Angi Simmons MD     0824 35 mcg/kg/min - 21.378 mL/hr Rate Change Intravenous Angi Simmons MD     0844 25 mcg/kg/min - 15.27 mL/hr Rate Change Intravenous Angi Simmons MD     0913 20 mcg/kg/min - 12.216 mL/hr Rate Change Intravenous Angi Simmons MD     0932 25 mcg/kg/min -  15.27 mL/hr Rate Change Intravenous Angi Simmons MD     0940 100 mcg/kg/min - 61.08 mL/hr Rate Change Intravenous Angi Simmons MD     0951 75 mcg/kg/min - 45.81 mL/hr Rate Change Intravenous Angi Simmons MD     1011 50 mcg/kg/min - 30.54 mL/hr Rate Change Intravenous Angi Simmons MD     1024 65 mcg/kg/min - 39.702 mL/hr Rate Change Intravenous Angi Simmons MD     1030 60 mcg/kg/min - 36.648 mL/hr Rate Change Intravenous Angi Simmons MD     1045 45 mcg/kg/min - 27.486 mL/hr Rate Change Intravenous Angi Simmons MD     1057  Stopped Intravenous Angi Simmons MD       ondansetron 2 mg/mL (mg)  Total dose:  4 mg    Date/Time Rate/Dose/Volume Action Route Admin User Audit    07/14/23 0931 4 mg Given Intravenous Angi Simmons MD       HYDROmorphone 1 mg/mL (mg)  Total dose:  0.2 mg    Date/Time Rate/Dose/Volume Action Route Admin User Audit    07/14/23 1122 0.2 mg Given Intravenous Angi Simmons MD       ePHEDrine 5 mg/mL in NS (mg)  Total dose:  10 mg    Date/Time Rate/Dose/Volume Action Route Admin User Audit    07/14/23 0830 10 mg Given Intravenous Angi Simmons MD       phenylephrine (GABY-SYNEPHRINE) injection (mcg)  Total dose:  300 mcg    Date/Time Rate/Dose/Volume Action Route Admin User Audit    07/14/23 0830 100 mcg New Bag Intravenous Angi Simmons MD     0839 200 mcg Bolus Intravenous Angi Simmons MD       glycopyrrolate 0.2 mg/mL (mg)  Total dose:  0.2 mg    Date/Time Rate/Dose/Volume Action Route Admin User Audit    07/14/23 0828 0.2 mg Given Intravenous Angi Simmons MD       ceFAZolin Sodium (ANCEF) injection 2 g (g)  Total dose:  2 g Dosing weight:  102.1    Date/Time Rate/Dose/Volume Action Route Admin User Audit    07/14/23 0817 2 g Given Intravenous Angi Simmons MD       dexMEDetomidine (PRECEDEX) 4 mcg/ml in NaCl 25mL (mcg)  Total dose:  8 mcg    Date/Time Rate/Dose/Volume Action Route Admin User Audit    07/14/23 1167 4 mcg Given Intravenous Angi Simmons MD     3747 4 mcg  Given Intravenous Angi Simmons MD       phenylephrine 0.1 mg/mL infusion (mcg/kg/min) (mcg/kg/min)  Total dose:  5.18 mg Dosing weight:  101.8    Date/Time Rate/Dose/Volume Action Route Admin User Audit    07/14/23 0838 0.2 mcg/kg/min - 12.216 mL/hr New Bag Intravenous Angi Simmons MD     0850 0.5 mcg/kg/min - 30.54 mL/hr Rate Change Intravenous Angi Simmons MD     0905 0.3 mcg/kg/min - 18.324 mL/hr Rate Change Intravenous Angi Simmons MD     0915 0.35 mcg/kg/min - 21.378 mL/hr Rate Change Intravenous Angi Simmons MD     0918 0.25 mcg/kg/min - 15.27 mL/hr Rate Change Intravenous Angi Simmons MD     0952 0.35 mcg/kg/min - 21.378 mL/hr Rate Change Intravenous Angi Simmons MD     1033 0.3 mcg/kg/min - 18.324 mL/hr Rate Change Intravenous Angi Simmons MD     1120  Stopped Intravenous Angi Simmons MD       norepinephrine  bolus 6.4 mcg/mL (mcg)  Total dose:  6.4 mcg    Date/Time Rate/Dose/Volume Action Route Admin User Audit    07/14/23 0845 6.4 mcg New Bag Intravenous Angi Simmons MD       0.75% Hyperbaric Bupivacaine (Intrathecal) (mL)  Total volume:  1.4 mL    Date/Time Rate/Dose/Volume Action Route Admin User Audit    07/14/23 0745 1.4 mL Given Intrathecal Angi Simmons MD       lactated ringers infusion (mL)  Total volume:  1,200 mL Dosing weight:  102.1    Date/Time Rate/Dose/Volume Action Route Admin User Audit    07/14/23 0730  New Bag Intravenous Angi Simmons MD edited    0851 400 mL Anesthesia Volume Adjustment Intravenous Angi Simmons MD     0918 300 mL Anesthesia Volume Adjustment Intravenous Angi Simmons MD     1017 300 mL Anesthesia Volume Adjustment Intravenous Angi Simmons MD     1110 200 mL Anesthesia Volume Adjustment Intravenous Angi Simmons MD          Local Meds Yes - Local Cocktail (morphine, ropivacaine, epinephrine, Toradol)   Antibiotics cefazolin (Ancef) - last given at 0817     Pain Patient Currently in Pain: denies   PACU meds  Not applicable   PCA / epidural No    Capnography     Telemetry ECG Rhythm: Sinus rhythm   Inpatient Telemetry Monitor Ordered? No        Labs Glucose Lab Results   Component Value Date     07/14/2023    GLC 96 08/30/2022     06/21/2021       Hgb Lab Results   Component Value Date    HGB 12.9 06/27/2023    HGB 13.6 10/30/2020       INR No results found for: INR   PACU Imaging Completed     Wound/Incision Incision/Surgical Site 07/14/23 Right Hip (Active)   Number of days: 0       Incision/Surgical Site 07/14/23 Right Greater Trochanter (Active)   Incision Assessment UTV 07/14/23 1133   Closure JOSE C 07/14/23 1133   Dressing Intervention Clean, dry, intact 07/14/23 1133   Number of days: 0      CMS        Equipment ice pack   Other LDA       IV Access Peripheral IV 07/14/23 Right;Posterior Hand (Active)   Site Assessment [facility] 07/14/23 1133   Line Status Saline locked 07/14/23 1133   Dressing Transparent 07/14/23 1133   Dressing Status clean;dry;intact 07/14/23 1133   Dressing Intervention New dressing  07/14/23 0709   Phlebitis Scale 0-->no symptoms 07/14/23 1133   Infiltration? no 07/14/23 1133   Number of days: 0       Peripheral IV Left Hand (Active)   Site Assessment [facility] 07/14/23 1133   Line Status Infusing 07/14/23 1133   Dressing Transparent 07/14/23 1133   Dressing Status clean;dry;intact 07/14/23 1133   Phlebitis Scale 0-->no symptoms 07/14/23 1133   Infiltration? no 07/14/23 1133   Number of days:       Blood Products Not applicable    mL   Intake/Output Date 07/14/23 0700 - 07/15/23 0659   Shift 9332-1048 2942-3440 9730-3152 24 Hour Total   INTAKE   I.V. 1501   1501   Shift Total(mL/kg) 1501(14.74)   1501(14.74)   OUTPUT   Blood 300   300   Shift Total(mL/kg) 300(2.95)   300(2.95)   Weight (kg) 101.8 101.8 101.8 101.8      Drains / Manzo     Time of void PreOp Time of Void Prior to Procedure:  (unable to void preop) (07/14/23 0713)    PostOp      Diapered? No   Bladder Scan Bladder Scan Volume (mL): 305 ml (07/14/23 1214)    PO    crackers and water     Vitals    B/P: 103/65  T: 97.9  F (36.6  C)    Temp src: Axillary  P:  Pulse: 73 (07/14/23 1235)          R: 11  O2:  SpO2: 96 %    O2 Device: Nasal cannula (07/14/23 1235)    Oxygen Delivery: 1 LPM (07/14/23 1235)         Family/support present Friend - Francisco   Patient belongings     Patient transported on bed   DC meds/scripts (obs/outpt) Not applicable   Inpatient Pain Meds Released? Yes       Special needs/considerations None   Tasks needing completion None       Priyanka Lan, RN  [facility] [id number]

## 2023-07-14 NOTE — CONSULTS
HOSPITALIST CONSULTATION     REQUESTING PHYSICIAN: David Escamilla MD    REASON FOR CONSULTATION: Evaluation, Recommendations and Co-management of Medical Comorbidities.     ASSESSMENT & PLAN :     Genaro Ortiz is a 78 year old male admitted on 7/14/2023 s/p following procedure: R JOHNNY by Dr Escamilla. Doing well.   EBL: 100 CC    PMH, Pre Op eval reviewed.   Currently doing well.          # Orthopedic Surgery:   POD 0    Post Op Management per Primary team.   Hemodynamics: stable currently.   Continue on gentle IV fluids, until adequate PO.    Post op 24 hr capnography per protocol.   Pain control- per Primary team and/or Pain team.    Judicious use of narcotics.  Consider bowel regimen with narcotics.   Encourage Incentive spirometry to prevent atelectasis.  DVT prophylaxis- per Primary team.  Activity, antibiotics, wound and/or drain care - per Primary team.     # Anemia of acute blood loss:  Monitor hgb for anemia of acute blood loss. Transfuse for hgb <7.0 .     #HTN: stable  - hold pta hydrochlorothiazide for now. Soft bp  - hold pta lisinopril for soft bp  - Monitor, resume pta med as appropriate.     #HLD: PTA statin.     # MGUS:   - can theoretically increase thrombosis/DVT risk.  As usual encourage early ambulation and routine DVT prevention measures per hip replacement protocol.       Rest per primary.     Dw patient, RN.     Thank you for the consultation. Please page with any question. Hospitalist team to follow.      Juan C Mckeon MD   Hospitalist, Internal Medicine      CHIEF COMPLAINT: Post OP. Doing well.     HISTORY OF PRESENT ILLNESS: Obtained from the patient and chart review including Pre op evaluation, procedure note.    78 year old year old male  with above discussed medical problems s/p above procedure admitted on 7/14/2023  for post op care and monitoring  (for further details for indication of surgery and operative note, please refer to David Escamilla MD  note).   No documented hypotension, hypoxemia or other significant complications intra or post operative.   Medicine consulted to evaluate, recommend and/or co manage medical co morbidities.  Currently: Hemodynamics stable. Incisional Pain controlled. Denies any chest pain, cough, shortness of breath or LH or palpitations. Denies any nausea, vomiting or pain abdomen. No fever or chills.  No new focal neuro deficit. No other concern.   Medical issues as discussed above.     PAST MEDICAL HISTORY:   Past Medical History:   Diagnosis Date     Adenocarcinoma of prostate (H) 5/27/2008     Arthritis      Esophageal reflux      Neuropathy      Pure hypercholesterolemia      Unspecified essential hypertension        PAST SURGICAL HISTORY:   Past Surgical History:   Procedure Laterality Date     ABDOMEN SURGERY  1952    Appendectomy     APPENDECTOMY  1952     CATARACT IOL, RT/LT Bilateral 03/12     COLONOSCOPY  2017     COLONOSCOPY N/A 5/18/2023    Procedure: colonoscopy;  Surgeon: Ayse Sears MD;  Location:  GI     GENITOURINARY SURGERY       HERNIORRHAPHY INGUINAL  5/15/2014    Procedure: HERNIORRHAPHY INGUINAL;  Surgeon: Evens Jefferson MD;  Location: [facility] OR     PROSTATE SURGERY  2007       FH: reviewed.     Family History   Problem Relation Age of Onset     Glaucoma Other      Diabetes Mother      Gastrointestinal Disease Mother         pancreas removed     Breast Cancer Mother      Osteoporosis Father      Obesity Father      Macular Degeneration No family hx of      Hypertension No family hx of         SH: reviewed.     Social History     Socioeconomic History     Marital status: Single     Spouse name: None     Number of children: None     Years of education: None     Highest education level: None   Tobacco Use     Smoking status: Never     Passive exposure: Never     Smokeless tobacco: Never     Tobacco comments:     Never smoked.   Vaping Use     Vaping Use: Never used   Substance and Sexual  Activity     Alcohol use: Not Currently     Comment: occasional     Drug use: No     Sexual activity: Never   Other Topics Concern     Parent/sibling w/ CABG, MI or angioplasty before 65F 55M? No   Social History Narrative    Social Documentation:        Balanced Diet: YES    Calcium intake: 1-2 per day    Caffeine: 2 per day    Exercise:  type of activity gym and work is physical;  2 times per week    Sunscreen: Yes, when needed    Seatbelts:  Yes    Self Breast Exam:  na    Self Testicular Exam: Yes    Physical/Emotional/Sexual Abuse: No    Do you feel safe in your environment? Yes        Cholesterol screen up to date:  6/5/2009                                                                                                             Latest Ref Rng                              CHOLESTEROL                                                 0 - 200 mg/dL                177                     TRIGLYCERIDES                                               0 - 150 mg/dL                177 (H)                 HDL                                                         40 - 110 mg/dL               39 (L)                  LDL CHOLESTEROL CALCULATED                                  0 - 129 mg/dL                102                     VLDL-CHOLESTEROL                                            0 - 30 mg/dL                 35 (H)                  CHOLESTEROL/HDL RATIO                                       0.0 - 5.0                    4.5                         Eye Exam up to date: Yes    Dental Exam up to date: Yes    Pap smear up to date: Does Not Apply    Mammogram up to date: Does Not Apply    Dexa Scan up to date: Does Not Apply    Colonoscopy up to date: Yes    Immunizations up to date: Yes    Glucose screen if over 40:  Component                        Latest Ref Rng               10/31/2008              6/5/2009                GLUCOSE                          60 - 99 mg/dL                106 (H)                 111 (H)                                                         ALLERGIES:   Allergies   Allergen Reactions     Penicillins Other (See Comments)     [facility] [place]         HOME MEDICATIONS:     Prior to Admission medications    Medication Sig Start Date End Date Taking? Authorizing Provider   acetaminophen (TYLENOL) 325 MG tablet Take 2 tablets (650 mg) by mouth every 4 hours as needed for other (mild pain) 7/14/23  Yes Dulce Mcbride PA-C   aspirin 81 MG EC tablet Take 2 tablets (162 mg) by mouth daily 7/14/23  Yes Dulce Mcbride PA-C   ASPIRIN 81 MG OR TABS 1 TABLET DAILY   Yes Reported, Patient   cholecalciferol (VITAMIN D) 1000 UNIT tablet Take 1 tablet by mouth daily. 8/21/12  Yes Berhane Oquendo MD   Cyanocobalamin (B-12) 1000 MCG TBCR Take 1 tablet by mouth daily   Yes Reported, Patient   fish oil-omega-3 fatty acids 1000 MG capsule Take 2,000 mg by mouth daily 8/21/12  Yes Berhane Oquendo MD   hydrochlorothiazide (HYDRODIURIL) 25 MG tablet TAKE 1 TABLET BY MOUTH DAILY 8/30/22  Yes Wegener, Joel Daniel Irwin, MD   lisinopril (ZESTRIL) 40 MG tablet Take 1 tablet (40 mg) by mouth daily 8/30/22  Yes Wegener, Joel Daniel Irwin, MD   oxyCODONE (ROXICODONE) 5 MG tablet Take 1-2 tablets (5-10 mg) by mouth every 4 hours as needed for moderate to severe pain 7/14/23  Yes Dulce Mcbride PA-C   polyethylene glycol (MIRALAX) 17 g packet Take 17 g by mouth daily 7/14/23  Yes Dulce Mcbride PA-C   pregabalin (LYRICA) 75 MG capsule Take 3 capsules (225 mg) by mouth 2 times daily 6/7/23  Yes Valdez Quesada DO   senna-docusate (SENOKOT-S/PERICOLACE) 8.6-50 MG tablet Take 1-2 tablets by mouth 2 times daily Take while on oral narcotics to prevent or treat constipation. 7/14/23  Yes Dulce Mcbride PA-C   simvastatin (ZOCOR) 20 MG tablet TAKE 1 TABLET BY MOUTH EVERYDAY AT BEDTIME 8/30/22  Yes Wegener, Joel Daniel Irwin, MD       CURRENT MEDICATIONS:    Current Facility-Administered Medications   Medication      "[START ON 2023] acetaminophen (TYLENOL) tablet 650 mg     acetaminophen (TYLENOL) tablet 975 mg     [START ON 7/15/2023] aspirin EC tablet 162 mg     bisacodyl (DULCOLAX) suppository 10 mg     ceFAZolin (ANCEF) 2 g in 100 mL D5W intermittent infusion     HYDROmorphone (PF) (DILAUDID) injection 0.2 mg    Or     HYDROmorphone (PF) (DILAUDID) injection 0.4 mg     lactated ringers infusion     lidocaine (LMX4) cream     lidocaine 1 % 0.1-1 mL     magnesium hydroxide (MILK OF MAGNESIA) suspension 30 mL     methocarbamol (ROBAXIN) tablet 500 mg     naloxone (NARCAN) injection 0.2 mg    Or     naloxone (NARCAN) injection 0.4 mg    Or     naloxone (NARCAN) injection 0.2 mg    Or     naloxone (NARCAN) injection 0.4 mg     ondansetron (ZOFRAN ODT) ODT tab 4 mg    Or     ondansetron (ZOFRAN) injection 4 mg     oxyCODONE (ROXICODONE) tablet 5 mg    Or     oxyCODONE IR (ROXICODONE) tablet 10 mg     [START ON 7/15/2023] polyethylene glycol (MIRALAX) Packet 17 g     pregabalin (LYRICA) capsule 225 mg     senna-docusate (SENOKOT-S/PERICOLACE) 8.6-50 MG per tablet 1 tablet     sodium chloride (PF) 0.9% PF flush 3 mL     sodium chloride (PF) 0.9% PF flush 3 mL         ROS: 10 point ROS neg other than the symptoms noted above in the HPI.    PHYSICAL EXAMINATION:     BP 91/55 (BP Location: Right arm)   Pulse 61   Temp 97.3  F (36.3  C) (Oral)   Resp 10   Ht 1.753 m (5' 9.02\")   Wt 101.8 kg (224 lb 6.9 oz)   SpO2 95%   BMI 33.13 kg/m    Temp (24hrs), Av.8  F (36.6  C), Min:97.3  F (36.3  C), Max:98.6  F (37  C)      BMI= Body mass index is 33.13 kg/m .      Intake/Output Summary (Last 24 hours) at 2023 1351  Last data filed at 2023 1300  Gross per 24 hour   Intake 1626 ml   Output 300 ml   Net 1326 ml       General: Awake, interactive, NAD.   HEENT: AT/NC. No icterus. Moist MM.    Neck: Supple.    Heart/CVS: Normal S1 and S2. Regular.   Chest/Respi: Normal work of breathing. CTA BL.   Abdomen/GI: Soft, non " distended, non tender. No g/r.  Extremities/MSK: Distal pulses 2+, well perfused. Rest per ortho.   Neuro: Alert and oriented x4. Grossly non focal.   Skin:  No new rash over exposed areas.       LABORATORY DATA: reviewed.     Recent Results (from the past 24 hour(s))   Glucose by meter    Collection Time: 07/14/23  6:17 AM   Result Value Ref Range    GLUCOSE BY METER POCT 107 (H) 70 - 99 mg/dL   Adult Type and Screen    Collection Time: 07/14/23  7:03 AM   Result Value Ref Range    ABO/RH(D) A POS     Antibody Screen Negative Negative    SPECIMEN EXPIRATION DATE [id number]        Recent Results (from the past 24 hour(s))   XR Pelvis w Hip Port Right 1 View    Narrative    EXAM: XR PELVIS AND HIP PORTABLE RIGHT 1 VIEW  LOCATION: [facility]  DATE: 7/14/2023    INDICATION: Status post Hip surgery  COMPARISON: Intraoperative views from earlier in the day      Impression    IMPRESSION: Right JOHNNY. Postoperative air is present. Components are well seated. Surgical clips in the pelvis.           Juan C Mckeon MD        This note was in part completed with Dragon, a speech recognition software. Some grammatical and transcription errors may have occurred. If you have any concern, please contact me for clarification.

## 2023-07-15 ENCOUNTER — APPOINTMENT (OUTPATIENT)
Dept: PHYSICAL THERAPY | Facility: CLINIC | Age: 78
DRG: 462 | End: 2023-07-15
Attending: ORTHOPAEDIC SURGERY
Payer: COMMERCIAL

## 2023-07-15 ENCOUNTER — APPOINTMENT (OUTPATIENT)
Dept: OCCUPATIONAL THERAPY | Facility: CLINIC | Age: 78
DRG: 462 | End: 2023-07-15
Attending: ORTHOPAEDIC SURGERY
Payer: COMMERCIAL

## 2023-07-15 LAB — HGB BLD-MCNC: 10.8 G/DL (ref 13.3–17.7)

## 2023-07-15 PROCEDURE — 250N000013 HC RX MED GY IP 250 OP 250 PS 637: Performed by: PHYSICIAN ASSISTANT

## 2023-07-15 PROCEDURE — 97530 THERAPEUTIC ACTIVITIES: CPT | Mod: GO | Performed by: OCCUPATIONAL THERAPIST

## 2023-07-15 PROCEDURE — 36415 COLL VENOUS BLD VENIPUNCTURE: CPT | Performed by: PHYSICIAN ASSISTANT

## 2023-07-15 PROCEDURE — 36416 COLLJ CAPILLARY BLOOD SPEC: CPT | Performed by: PHYSICIAN ASSISTANT

## 2023-07-15 PROCEDURE — 120N000002 HC R&B MED SURG/OB UMMC

## 2023-07-15 PROCEDURE — 250N000013 HC RX MED GY IP 250 OP 250 PS 637: Performed by: INTERNAL MEDICINE

## 2023-07-15 PROCEDURE — 99232 SBSQ HOSP IP/OBS MODERATE 35: CPT | Performed by: INTERNAL MEDICINE

## 2023-07-15 PROCEDURE — 258N000003 HC RX IP 258 OP 636: Performed by: PHYSICIAN ASSISTANT

## 2023-07-15 PROCEDURE — 97530 THERAPEUTIC ACTIVITIES: CPT | Mod: GP | Performed by: PHYSICAL THERAPIST

## 2023-07-15 PROCEDURE — 85018 HEMOGLOBIN: CPT | Performed by: PHYSICIAN ASSISTANT

## 2023-07-15 PROCEDURE — 250N000011 HC RX IP 250 OP 636: Mod: JZ | Performed by: PHYSICIAN ASSISTANT

## 2023-07-15 PROCEDURE — 97535 SELF CARE MNGMENT TRAINING: CPT | Mod: GO | Performed by: OCCUPATIONAL THERAPIST

## 2023-07-15 PROCEDURE — 97166 OT EVAL MOD COMPLEX 45 MIN: CPT | Mod: GO | Performed by: OCCUPATIONAL THERAPIST

## 2023-07-15 RX ORDER — SODIUM CHLORIDE, SODIUM LACTATE, POTASSIUM CHLORIDE, CALCIUM CHLORIDE 600; 310; 30; 20 MG/100ML; MG/100ML; MG/100ML; MG/100ML
INJECTION, SOLUTION INTRAVENOUS
Status: COMPLETED
Start: 2023-07-15 | End: 2023-07-15

## 2023-07-15 RX ORDER — TAMSULOSIN HYDROCHLORIDE 0.4 MG/1
0.4 CAPSULE ORAL DAILY
Status: DISCONTINUED | OUTPATIENT
Start: 2023-07-15 | End: 2023-07-25 | Stop reason: HOSPADM

## 2023-07-15 RX ADMIN — OXYCODONE HYDROCHLORIDE 5 MG: 5 TABLET ORAL at 06:09

## 2023-07-15 RX ADMIN — TAMSULOSIN HYDROCHLORIDE 0.4 MG: 0.4 CAPSULE ORAL at 14:10

## 2023-07-15 RX ADMIN — ACETAMINOPHEN 975 MG: 325 TABLET, FILM COATED ORAL at 06:10

## 2023-07-15 RX ADMIN — PREGABALIN 225 MG: 100 CAPSULE ORAL at 08:20

## 2023-07-15 RX ADMIN — CEFAZOLIN SODIUM 2 G: 2 INJECTION, SOLUTION INTRAVENOUS at 00:08

## 2023-07-15 RX ADMIN — ASPIRIN 162 MG: 81 TABLET, COATED ORAL at 08:19

## 2023-07-15 RX ADMIN — POLYETHYLENE GLYCOL 3350 17 G: 17 POWDER, FOR SOLUTION ORAL at 08:20

## 2023-07-15 RX ADMIN — SENNOSIDES AND DOCUSATE SODIUM 1 TABLET: 50; 8.6 TABLET ORAL at 08:20

## 2023-07-15 RX ADMIN — ACETAMINOPHEN 975 MG: 325 TABLET, FILM COATED ORAL at 13:39

## 2023-07-15 RX ADMIN — PREGABALIN 225 MG: 100 CAPSULE ORAL at 20:17

## 2023-07-15 RX ADMIN — SENNOSIDES AND DOCUSATE SODIUM 1 TABLET: 50; 8.6 TABLET ORAL at 20:17

## 2023-07-15 RX ADMIN — SODIUM CHLORIDE, POTASSIUM CHLORIDE, SODIUM LACTATE AND CALCIUM CHLORIDE: 600; 310; 30; 20 INJECTION, SOLUTION INTRAVENOUS at 11:26

## 2023-07-15 RX ADMIN — OXYCODONE HYDROCHLORIDE 5 MG: 5 TABLET ORAL at 13:39

## 2023-07-15 RX ADMIN — OXYCODONE HYDROCHLORIDE 5 MG: 5 TABLET ORAL at 09:29

## 2023-07-15 RX ADMIN — ACETAMINOPHEN 975 MG: 325 TABLET, FILM COATED ORAL at 22:19

## 2023-07-15 ASSESSMENT — ACTIVITIES OF DAILY LIVING (ADL)
ADLS_ACUITY_SCORE: 31
ADLS_ACUITY_SCORE: 37
ADLS_ACUITY_SCORE: 31
ADLS_ACUITY_SCORE: 37
ADLS_ACUITY_SCORE: 37
ADLS_ACUITY_SCORE: 31
PREVIOUS_RESPONSIBILITIES: HOUSEKEEPING;LAUNDRY;SHOPPING;MEDICATION MANAGEMENT;FINANCES
ADLS_ACUITY_SCORE: 39
ADLS_ACUITY_SCORE: 37
ADLS_ACUITY_SCORE: 31
ADLS_ACUITY_SCORE: 31

## 2023-07-15 NOTE — OP NOTE
Date of Surgery:   7/14/2023     Preop Dx:   Osteoarthritis of Right hip.     Postop Dx:   Osteoarthritis of Right hip.      Procedure:  Right total hip arthroplasty.      Attending Surgeon:  David Escamilla MD     Assistants:  MD Paradise Mcgrath MD Camille Skarvan, PA-C     Anesthesia:   Spinal Anesthesia     Complications:   None     EBL:   200cc     IV fluids:   See anesthesia records     Components used:  Biomet G7 Acetabular Cup, Size 56  Echo BiMetric Stem, Size [id number], STD Offset  Lipped Polyethylene liner, Size 36mm   Femoral Head, size 36mm +0     Findings:  Osteoarthritis of the Right hip.   Severe ankylosis of the hip     Indications for procedure:  The patient presents with severe osteoarthritis of the Right hip. He has failed conservative treatments, such as activity modification, oral anti-inflammatory medication, and physical therapy.  We recommended surgical treatment in the form of a total hip arthroplasty.  The risks, benefits, and alternatives to surgery were explained at length with the patient.  The risks include but are not limited to pain, bleeding, infection, damage to surrounding structures, loosening, mechanical hardware failure, dislocation, leg-length discrepancy, blood clots, COVID-19, the need for future procedures, as well as the risks of anesthesia itself.  The patient understood these risks, agreed to have surgery, and voluntarily signed a written informed consent.     Description of Procedure:  The patient was identified in the preoperative holding area by Dr. Escamilla, and the Right lower extremity was then marked.  The patient was then greeted by the anesthesia team who brought the patient back to the operating theater.  The patient was then transferred onto the operating table.  Spinal anesthesia was performed without any complications.  The patient was then positioned lateral decubitus position with the Right hip pointing up. All of the bony prominences were well-padded.   The Right lower extremity was then prepped and draped in usual sterile fashion.  A timeout was performed confirming the patient name, date of birth, procedure to be performed, as well as laterality.  Perioperative antibiotics were given to the patient prior to commencement of surgery.    A minimal incision posterior lateral approach to the hip joint was then performed making a curvilinear incision over the greater trochanter and taking this down through the subcutaneous tissue. The gluteus edilberto was then split in line with its fibers. Due to the rigid hip, it was impossible to internally rotate the femur. The piriformis and obturator internus muscles were detached and the soft tissue capsule was divided from the posterior aspect of the femoral neck. The tendons were tagged with a suture. At this point, the capsule was incised superiorly and inferiorly as a rhomboid shaped trap-door. The hip joint was then dislocated. A femoral neck osteotomy was performed at the mid portion of the femoral neck. Head was removed.     The femur was then displaced anteriorly and the acetabulum was exposed after additional capsular release as needed. Large osteophytes were excised as needed. The acetabulum was now reamed by medializing to within 2 mm of the medial wall. Sequentially enlarging reaming was performed to a size 55. A size 56 G7 acetabular shell was selected as the optimal implant. It was impacted into a 45-degree abducted and 20-degree anteverted position. A real lipped liner was inserted with the lip at the 9 o'clock position.    Attention was now directed to the femur where it was exposed. Sequential broaching were performed to a size 20 using the taperloc stem. After trialing and using the broach, standard offset, and 36mm -6 head, the hip was found to be secure and stable in full extension and external rotation of > 45 degrees as well as flexion of 90 degrees combined with internal rotation > 45 degrees.  XR was taken  at this time. The leg length was longer than the contralateral side.     We removed the femoral broach. Due to the poor bone quality, we decided to use the echo bimetric stem. The canal was reamed and broached to a 16x160 broach. A standard offset neck and -3 head was trialed, and had excellent leg lengths and stability.     At this time, we removed the broach and inserted a real [id number] standard offset echo bimetric stem. We trialed with a +0 head this time, which had excellent stability and equal leg lengths. We decided to use the 36mm +0 as a read head, which was impacted on the neck after it had been cleansed and dried.     A thorough irrigation of the wound was now performed. Wound closure was accomplished by reapproximating the posterior capsular soft tissues as well as obturator internus and the piriformis muscles. The remainder of the wound was closed in layers with absorbable sutures using standard technique. Sterile dressing was applied.    The patient was then awoken from anesthesia without any complications.  The patient was then transferred onto the hospital bed and taken to the PACU without any complications.  Multiple sponge and needle counts were performed and were correct at the end of the case.  Dr. Escamilla was present and participated throughout the key portions of the procedure.    The -22 modifier is appended to the CPT coding for this surgery due to the following reasons:  1) The patient had severe ankylosis of this hip, which had almost no range of motion. This increased the level of difficulty of the procedure.   2) The surgery took 3 hours and 49 minutes, which is 50% greater time than normally associated with this procedure.        Postoperative plan:  WBAT with posterior hip precautions  Postop abx: ancef x24h  Postop XR  Pain control  DVT ppx: aspirin 162mg daily for 4 weeks  PT/OT, OOB  Appreciate medicine recs  Plan for L JOHNNY next Friday  Dispo planning    Future Appointments   Date Time  Provider Department [place]   7/15/2023  8:15 AM Ania Rodrigues, PT [facility] [place]   7/15/2023  9:00 AM Valerie Cm, OT UROT [place]   8/28/2023 11:00 AM Merritt Toney, BETZAIDA [facility]   10/4/2023  9:00 AM Giovanni Shah MD [facility]   11/7/2023  1:30 PM Eric Abel MD [facility]   12/8/2023  1:15 PM Valdez Quesada DO [facility]       Jody Acosta MD  Adult Reconstructive Surgery Fellow  Department of Orthopaedic Surgery, [facility]          .[email or web address]

## 2023-07-15 NOTE — PLAN OF CARE
VS:       Pt A/O X 4. Afebrile. VSS. Lung sound CTA,  Equal bilaterally  both anterior and posterior. IS encouraged and was able to reach 1500 level. Denies nausea, shortness of breath, and chest pain.     Output:       Bowel sound normoactive all four quadrants. Last BM was 7/13/2023. No void since last straight cath done yesterday. Bladder scan showed 756 ml retention, emptied 780 ml clear mariaa urine via straight cath done at 1208.     Offered urinal after 6 hours from last cath but still no void. Retained 203 ml per bladder scan done at 1800.   Activity:       Pt up twice with GB/Walker and stood at the side of the bed with therapy. Had couple of steps sideways with PT to maintain hip precaution. Gait is unstable per therapy. Denied dizziness and light-headedness during activity. BP- 106/66.     Skin:   Intact, no open area. Tenderness around R hip incision. Icepack applied to site.     Pain:     Denied pain when in bed but creeps up with movement and activity. Icepack in affected side and Oxycodone given twice for pain management.   CMS:       Has baseline neuropathy on BLE, otherwise CMS is intact.   Dressing:       Incisional dressing is c/d/i.     Diet:       Pt is on a reg diet and appetite was good, denied N/V.       LDA:       L PIV is patent with LR infusing at 50ml/hr.     Equipment:       Abduction pillow in pillow to maintain hip precaution.     Plan:     Pain Mgt, PT/OT, VS q8, monitor orthostatic hypotension,   Bladder scan if no void for 6 hours, if with > 500ml retention, notify provider and place granados catheter as ordered.        Additional Info:

## 2023-07-15 NOTE — PROGRESS NOTES
[facility]    Medicine Progress Note - Hospitalist Service, GOLD TEAM 16    Date of Admission:  7/14/2023    Assessment & Plan     78 year old male admitted on 7/14/2023 s/p following procedure: R JOHNNY by Dr Escamilla. Doing well.   EBL: 100 CC     Urinary rentention   - Patient was straight cath > 700 ml this morning. Continue monitoring bladder scan and PVR, I recommended to place granados cath if next bladder scan shows > 500 ml.   - Start Flomax.     # Orthopedic Surgery:   POD 1     Post Op Management per Primary team.   Hemodynamics: stable currently.   Continue on gentle IV fluids, until adequate PO.    Post op 24 hr capnography per protocol.   Pain control- per Primary team and/or Pain team.    Judicious use of narcotics.  Consider bowel regimen with narcotics.   Encourage Incentive spirometry to prevent atelectasis.  DVT prophylaxis- per Primary team.  Activity, antibiotics, wound and/or drain care - per Primary team.      # Anemia of acute blood loss:  Monitor hgb for anemia of acute blood loss. Transfuse for hgb <7.0 .      #HTN: stable  - hold pta hydrochlorothiazide for now. Soft bp  - hold pta lisinopril for soft bp  - Monitor, resume pta med as appropriate.      Hypotension - Patient had an episode of hypotension at the OR. This was resolved when he presented to the medical floor.     #HLD: PTA statin.      # MGUS:   - can theoretically increase thrombosis/DVT risk.  As usual encourage early ambulation and routine DVT prevention measures per hip replacement protocol.        Diet: Advance Diet as Tolerated: Regular Diet Adult  Discharge Instruction - Regular Diet Adult    DVT Prophylaxis: Defer to primary service  Granados Catheter: Not present  Lines: None     Cardiac Monitoring: None  Code Status: Full Code      Clinically Significant Risk Factors Present on Admission                # Drug Induced Platelet Defect: home medication list includes an antiplatelet  "medication   # Hypertension: Noted on problem list       # Obesity: Estimated body mass index is 33.13 kg/m  as calculated from the following:    Height as of this encounter: 1.753 m (5' 9.02\").    Weight as of this encounter: 101.8 kg (224 lb 6.9 oz).            Disposition Plan     Expected Discharge Date: 07/16/2023                  He Wisdom MD  Hospitalist Service, GOLD TEAM [address] [place] [address]  Securely message with [employer] (more info)  Text page via [email or web address] Paging/Directory   See signed in provider for up to date coverage information  ______________________________________________________________________    Interval History     Acute events as above. Urinary retention.   Other ROS negative.   He was pleasant.     Physical Exam   Vital Signs: Temp: 97.5  F (36.4  C) Temp src: Oral BP: 100/55 Pulse: 64   Resp: 16 SpO2: 95 % O2 Device: None (Room air) Oxygen Delivery: 1 LPM  Weight: 224 lbs 6.85 oz    General Appearance: Alert, room air, no acute distress.   Respiratory: Normal respiratory effort, clear lungs.   Cardiovascular: RRR.  GI: Abdomen soft, + BS, no tenderness to palpation.   Skin: No rash.   Other: Alert, oriented, pleasant. Moving all extremities.     Medical Decision Making       45 MINUTES SPENT BY ME on the date of service doing chart review, history, exam, documentation & further activities per the note.      Data         Imaging results reviewed over the past 24 hrs:   No results found for this or any previous visit (from the past 24 hour(s)).  "

## 2023-07-15 NOTE — PROGRESS NOTES
"   07/15/23 0932   Appointment Info   Signing Clinician's Name / Credentials (OT) Valerie Cm OTR/L   Living Environment   People in Home alone   Current Living Arrangements condominium   Home Accessibility no concerns   Number of Stairs, Within Home, Primary none   Living Environment Comments tub/shower with bench, no stairs needed, does not have any support post op   Self-Care   Usual Activity Tolerance moderate   Current Activity Tolerance fair   Regular Exercise No   Equipment Currently Used at Home dressing device;raised toilet seat;tub bench;walker, rolling  (4WW)   Fall history within last six months no   Activity/Exercise/Self-Care Comment Raudel with 4WW at baseline, Raudel for ADLs (has tub bench). Pt quite sedentary at baseline, does walk short distance to grocery store, has meals on wheels   Instrumental Activities of Daily Living (IADL)   Previous Responsibilities housekeeping;laundry;shopping;medication management;finances   IADL Comments has MOW delivered, will walk short distance to store, generally sedentary lifestyle   General Information   Onset of Illness/Injury or Date of Surgery 07/14/23   Referring Physician Dulce Mcbride PA-C   Additional Occupational Profile Info/Pertinent History of Current Problem per chart review, \"Genaro Ortiz is a 78 year old male admitted on 7/14/2023 s/p following procedure: R JOHNNY by Dr Escamilla. Doing well.\" L JOHNNY planned for 1 week.   Existing Precautions/Restrictions fall;no hip IR;no hip ADD past midline;90 degree hip flexion;no pivoting or twisting   Left Lower Extremity (Weight-bearing Status) full weight-bearing (FWB)   Right Lower Extremity (Weight-bearing Status) weight-bearing as tolerated (WBAT)   General Observations and Info activity order: ambulate with assist 3x daily   Cognitive Status Examination   Orientation Status orientation to person, place and time   Affect/Mental Status (Cognitive) WNL   Follows Commands follows one-step commands;over 90% " accuracy   Cognitive Status Comments pt needs repetition of 2-step commands   Visual Perception   Visual Impairment/Limitations corrective lenses full-time   Pain Assessment   Patient Currently in Pain Yes, see Vital Sign flowsheet   Posture   Posture forward head position;protracted shoulders   Range of Motion Comprehensive   Comment, General Range of Motion BUE WFL; RLE limited by surgical precautions   Strength Comprehensive (MMT)   Comment, General Manual Muscle Testing (MMT) Assessment BUE WFL; RLE limited by surgical precautions   Coordination   Upper Extremity Coordination No deficits were identified   Bed Mobility   Bed Mobility scooting/bridging;supine-sit;sit-supine   Scooting/Bridging [place] (Bed Mobility) contact guard;verbal cues   Supine-Sit [place] (Bed Mobility) minimum assist (75% patient effort);verbal cues   Sit-Supine [place] (Bed Mobility) contact guard;verbal cues   Assistive Device (Bed Mobility) bed rails   Transfers   Transfers sit-stand transfer;toilet transfer;shower transfer   Sit-Stand Transfer   Sit-Stand [place] (Transfers) moderate assist (50% patient effort)   Assistive Device (Sit-Stand Transfers) walker, 4-wheeled   Shower Transfer   [place] Level (Shower Transfer) maximum assist (25% patient effort)   Shower Transfer Comments per clinical judgement; tub/shower w/extended tub bench and handheld shower   Toilet Transfer   [place] Level (Toilet Transfer) moderate assist (50% patient effort)   Toilet Transfer Comments RTS w/vanity on side; per clinical judgement   Balance   Balance Comments good seated once feet on floor; unsteady standing, very wide GENNY which increased with each step   Activities of Daily Living   BADL Assessment/Intervention lower body dressing;grooming;toileting   Lower Body Dressing Assessment/Training   Comment, (Lower Body Dressing) pt familiar w/reacher and sock aide, has used in past   [place] Level (Lower Body Dressing)  moderate assist (50% patient effort)   Grooming Assessment/Training   [place] Level (Grooming) minimum assist (75% patient effort)   Comment, (Grooming) per clinical judgement   Toileting   Comment, (Toileting) per clinical judgement   [place] Level (Toileting) moderate assist (50% patient effort)   Clinical Impression   Criteria for Skilled Therapeutic Interventions Met (OT) Yes, treatment indicated   OT Diagnosis impaired ADLs   OT Problem List-Impairments impacting ADL problems related to;activity tolerance impaired;balance;mobility;range of motion (ROM);strength;pain;post-surgical precautions   Assessment of Occupational Performance 3-5 Performance Deficits   Identified Performance Deficits dressing, toilet transfer, toileting, tub/shower transfer, bathing, home chores   Planned Therapy Interventions (OT) ADL retraining;IADL retraining;bed mobility training;strengthening;transfer training;home program guidelines   Clinical Decision Making Complexity (OT) moderate complexity   Risk & Benefits of therapy have been explained evaluation/treatment results reviewed;patient;friend   OT Total Evaluation Time   OT Eval, Moderate Complexity Minutes (73122) 10   OT Goals   Therapy Frequency (OT) 5 times/wk   OT Predicted Duration/Target Date for Goal Attainment 07/29/23   OT Goals Hygiene/Grooming;Lower Body Dressing;Transfers;Toilet Transfer/Toileting;Home Management   OT: Hygiene/Grooming modified independent;within precautions   OT: Lower Body Dressing Modified independent;using adaptive equipment;within precautions;including set-up/clothing retrieval   OT: Transfer Modified independent;with assistive device;within precautions   OT: Toilet Transfer/Toileting Modified independent;toilet transfer;cleaning and garment management;using adaptive equipment;within precautions   OT: Home Management Supervision/stand-by assist;with light demand household tasks;within precautions   Interventions   Interventions Quick  Adds Self-Care/Home Management;Therapeutic Activity   OT Discharge Planning   OT Plan ADLs standing EOB or at sink as able, toilet transfer, tub/shower transfer using tub bench   OT Discharge Recommendation (DC Rec)   (defer to ortho)   OT Rationale for DC Rec Pt below baseline, limited by weakness, pain, surgical precautions, falls risk.  He would benefit from continued therapy to maximize safety and independence with I/ADLs. He lives alone with limited support.   OT Brief overview of current status Kylee supine > sit; ModA STS, pt very unsteady; ModA LE dressing w/AE   Total Session Time   Timed Code Treatment Minutes 33   Total Session Time (sum of timed and untimed services) 43

## 2023-07-15 NOTE — PHARMACY-ADMISSION MEDICATION HISTORY
Pharmacist Admission Medication History    Admission medication history is complete. The information provided in this note is only as accurate as the sources available at the time of the update.    Medication reconciliation/reorder completed by provider prior to medication history? Yes    Information Source(s): Patient via in-person    Pertinent Information: Patient did not the strength of vitamin d. He knew he was taking 1 tablet a day.  Patient confirmed he finished vitamin B12 IM injection and only on oral vitamin B12 now.     Changes made to PTA medication list:    Added: None    Deleted: None    Changed: None    Medication Affordability: N/A       Allergies reviewed with patient and updates made in EHR: yes    Medication History Completed By: Royal Oconnell RPH 7/15/2023 1:51 PM    Prior to Admission medications    Medication Sig Last Dose Taking? Auth Provider Long Term End Date   ASPIRIN 81 MG OR TABS Take 81 mg by mouth daily 7/2/2023 Yes Reported, Patient     cholecalciferol (VITAMIN D) 1000 UNIT tablet Take 1 tablet by mouth daily. 7/2/2023 Yes Berhane Oquendo MD     Cyanocobalamin (B-12) 1000 MCG TBCR Take 1 tablet by mouth daily 7/2/2023 Yes Reported, Patient     fish oil-omega-3 fatty acids 1000 MG capsule Take 2,000 mg by mouth daily 7/2/2023 Yes Berhane Oquendo MD     hydrochlorothiazide (HYDRODIURIL) 25 MG tablet TAKE 1 TABLET BY MOUTH DAILY 7/13/2023 at 0800 Yes Wegener, Joel Daniel Irwin, MD Yes    lisinopril (ZESTRIL) 40 MG tablet Take 1 tablet (40 mg) by mouth daily 7/13/2023 at 0800 Yes Wegener, Joel Daniel Irwin, MD Yes    pregabalin (LYRICA) 75 MG capsule Take 3 capsules (225 mg) by mouth 2 times daily 7/14/2023 at 0330 Yes Valdez Quesada DO Yes    simvastatin (ZOCOR) 20 MG tablet TAKE 1 TABLET BY MOUTH EVERYDAY AT BEDTIME 7/13/2023 at 2000 Yes Wegener, Joel Daniel Irwin, MD Yes

## 2023-07-15 NOTE — PLAN OF CARE
"VS: BP 92/46 (BP Location: Right arm)   Pulse 60   Temp 97.6  F (36.4  C) (Oral)   Resp 16   Ht 1.753 m (5' 9.02\")   Wt 101.8 kg (224 lb 6.9 oz)   SpO2 93%   BMI 33.13 kg/m      O2: O2 SATS >90% denies chest pain,  or SBO   Output: Pvr 100 pt has urge    Last BM: 7/13/23BS present all x4 quadrants    Activity: Up ad loretta, Assist 1 with walker and gait belt Weight bearing as tolerated  to RLE.    Up for meals? N/A   Skin: WDL, except R hip incision    Pain: Denies pain    CMS: AOX4 numbness and tingling to RLE    Dressing: CDI    Diet: Regular   LDA: R PIV infusing with LR 50ml/hr,   Equipment: IV Pole Gait Belt,, Walker Capno e abduction pillow     Plan: Continue with plan of care    Additional Info:                              "

## 2023-07-15 NOTE — PLAN OF CARE
VS: Temp: 97.3  F (36.3  C) Temp src: Oral BP: 107/64 Pulse: 62   Resp: 17 SpO2: 96 % O2 Device: None (Room air)      O2: Pt. O2 sats are above 90 on room air, pt. Denies shortness of breath, denies chest pain.    Output: Pt. Has not voided since he came from PACU at 1315. Bladder scanned 509mL at 1900, straight cathed 800mL at 1915. Hx of prostate surgery, pt. Experienced pain with straight catheterization.    Last BM: 07/13/2023   Activity: Weight bearing as tolerated RLE. Pt got up with PT and stood at side of bed. Assist of 1, walker, gait belt.    Skin: WDL, except R hip incision site, R hand PIV(SL), L hand PIV (infusing)   Pain: Pt. Had spinal, denies pain. Scheduled tylenol.    CMS: A&O x4, pt. States he has bilateral numbness/tingling in toes at baseline.    Dressing: R hip surgical incision site dressing is CDI. right and left PIVs both with transparent dressings, both CDI.    Diet: Regular diet, tolerating well. Pt. Denies nausea and vomiting.    LDA: PIV in R hand SL, PIV in L hand infusing fluids, R hip surgical incision site.    Equipment: IV pole, walker, gait belt, personal belongings.    Plan: TBD, continue with plan of care, pending medical stability, anticipate discharge to TCU.    Additional Info:

## 2023-07-16 ENCOUNTER — APPOINTMENT (OUTPATIENT)
Dept: PHYSICAL THERAPY | Facility: CLINIC | Age: 78
DRG: 462 | End: 2023-07-16
Attending: ORTHOPAEDIC SURGERY
Payer: COMMERCIAL

## 2023-07-16 ENCOUNTER — APPOINTMENT (OUTPATIENT)
Dept: OCCUPATIONAL THERAPY | Facility: CLINIC | Age: 78
DRG: 462 | End: 2023-07-16
Attending: ORTHOPAEDIC SURGERY
Payer: COMMERCIAL

## 2023-07-16 LAB
HGB BLD-MCNC: 8.6 G/DL (ref 13.3–17.7)
HOLD SPECIMEN: NORMAL

## 2023-07-16 PROCEDURE — 250N000013 HC RX MED GY IP 250 OP 250 PS 637: Performed by: INTERNAL MEDICINE

## 2023-07-16 PROCEDURE — 99232 SBSQ HOSP IP/OBS MODERATE 35: CPT | Performed by: INTERNAL MEDICINE

## 2023-07-16 PROCEDURE — 97110 THERAPEUTIC EXERCISES: CPT | Mod: GP | Performed by: PHYSICAL THERAPIST

## 2023-07-16 PROCEDURE — 97530 THERAPEUTIC ACTIVITIES: CPT | Mod: GO

## 2023-07-16 PROCEDURE — 85018 HEMOGLOBIN: CPT | Performed by: PHYSICIAN ASSISTANT

## 2023-07-16 PROCEDURE — 97530 THERAPEUTIC ACTIVITIES: CPT | Mod: GP | Performed by: PHYSICAL THERAPIST

## 2023-07-16 PROCEDURE — 97535 SELF CARE MNGMENT TRAINING: CPT | Mod: GO

## 2023-07-16 PROCEDURE — 97116 GAIT TRAINING THERAPY: CPT | Mod: GP | Performed by: PHYSICAL THERAPIST

## 2023-07-16 PROCEDURE — 250N000013 HC RX MED GY IP 250 OP 250 PS 637: Performed by: PHYSICIAN ASSISTANT

## 2023-07-16 PROCEDURE — 250N000009 HC RX 250: Performed by: INTERNAL MEDICINE

## 2023-07-16 PROCEDURE — 120N000002 HC R&B MED SURG/OB UMMC

## 2023-07-16 PROCEDURE — 36415 COLL VENOUS BLD VENIPUNCTURE: CPT | Performed by: PHYSICIAN ASSISTANT

## 2023-07-16 RX ORDER — LIDOCAINE HYDROCHLORIDE 20 MG/ML
JELLY TOPICAL ONCE
Status: COMPLETED | OUTPATIENT
Start: 2023-07-16 | End: 2023-07-16

## 2023-07-16 RX ADMIN — SENNOSIDES AND DOCUSATE SODIUM 1 TABLET: 50; 8.6 TABLET ORAL at 08:12

## 2023-07-16 RX ADMIN — SENNOSIDES AND DOCUSATE SODIUM 1 TABLET: 50; 8.6 TABLET ORAL at 19:26

## 2023-07-16 RX ADMIN — PREGABALIN 225 MG: 100 CAPSULE ORAL at 08:12

## 2023-07-16 RX ADMIN — ACETAMINOPHEN 975 MG: 325 TABLET, FILM COATED ORAL at 06:29

## 2023-07-16 RX ADMIN — POLYETHYLENE GLYCOL 3350 17 G: 17 POWDER, FOR SOLUTION ORAL at 08:12

## 2023-07-16 RX ADMIN — LIDOCAINE HYDROCHLORIDE: 20 JELLY TOPICAL at 02:23

## 2023-07-16 RX ADMIN — ACETAMINOPHEN 975 MG: 325 TABLET, FILM COATED ORAL at 13:41

## 2023-07-16 RX ADMIN — ASPIRIN 162 MG: 81 TABLET, COATED ORAL at 08:12

## 2023-07-16 RX ADMIN — TAMSULOSIN HYDROCHLORIDE 0.4 MG: 0.4 CAPSULE ORAL at 13:41

## 2023-07-16 RX ADMIN — PREGABALIN 225 MG: 100 CAPSULE ORAL at 19:25

## 2023-07-16 RX ADMIN — ACETAMINOPHEN 975 MG: 325 TABLET, FILM COATED ORAL at 21:39

## 2023-07-16 ASSESSMENT — ACTIVITIES OF DAILY LIVING (ADL)
ADLS_ACUITY_SCORE: 39

## 2023-07-16 NOTE — PROGRESS NOTES
Brief Cross Cover Note:     RN paged, no spontaneous void since surgery. PVR 536ml. Recommend granados placement at this time.     Maira Vergara MD

## 2023-07-16 NOTE — PLAN OF CARE
"Shift: 2300-0730  BP 91/50 (BP Location: Right arm, Patient Position: Semi-Ferguson's)   Pulse 69   Temp 98  F (36.7  C) (Oral)   Resp 16   Ht 1.753 m (5' 9.02\")   Wt 101.8 kg (224 lb 6.9 oz)   SpO2 96%   BMI 33.13 kg/m       AOx4. ORA. Denies CP, SOB, N/V, N/T.  R hip dressing CDI. Skin otherwise intact.     Pain: Denies pain. Sched Tylenol and ICE given.   Activity: Up A1-2 FWW GB, NOOB this shift.   GI/: LBM 7/13 +FL; unable to void since surgery, PVR 536mL. Manzo placed, output 1100mL.   Diet: Reg, tolerating PO intake.  LDA: R PIV infusing LR at 50mL/hr.    Plan: Possible placement, lives alone at home with no support.     Pt able to make needs known, call light within reach. Fall and hip precaut maintained. Care ongoing.      "

## 2023-07-16 NOTE — PROGRESS NOTES
Orthopaedic Surgery Progress Note 07/16/2023    S: Had not voided yet. Manzo placed last night.  Pain well controlled. Denies numbness or tingling. Denies chest pain, SOB, nausea/vomiting. Denies fevers/chills. Denies any other concerns. Has stood up with PT, but has not walked    O:  Temp: 98.8  F (37.1  C) Temp src: Oral BP: 96/56 Pulse: 73   Resp: 14 SpO2: 94 % O2 Device: None (Room air)      Exam:  Gen: No acute distress, resting comfortably in bed.  Resp: Non-labored breathing    RLE:  Dressings c/d/i   Abduction pillow in place  Fires GS/TS/FHL/EHL  SILT SP/DP/Saph/Sural/T  2+ DP/PT, WWP    Recent Labs   Lab 07/16/23  0522 07/15/23  1410   HGB 8.6* 10.8*       Assessment: Genaro Ortiz is a 78 year old male s/p R JOHNNY on 7/14. Doing well.    Plan:  WBAT with posterior hip precautions  Postop abx: ancef x24h  Pain control  DVT ppx: aspirin 162mg daily for 4 weeks  PT/OT, OOB  Appreciate medicine recs  Plan for L JOHNNY next Friday  Dispo planning    Future Appointments   Date Time Provider Department Center   7/15/2023  2:00 PM [facility]   8/28/2023 11:00 AM Merritt Toney, PA-C [facility]   10/4/2023  9:00 AM Giovanni Shah MD [facility]   11/7/2023  1:30 PM Eric Abel MD [facility]   12/8/2023  1:15 PM Valdez Quesada DO [facility]       Jody Acosta MD  Adult Reconstructive Surgery Fellow  Department of Orthopaedic Surgery, [facility]

## 2023-07-16 NOTE — PLAN OF CARE
Javier paged   L.J - 5 ORTHO  Pt unable to void since surgery, PVR 536mL. MD rec granados placement, thanks Diaz'ad #[phone]    Response: Granados placement ordered.

## 2023-07-16 NOTE — PLAN OF CARE
VS: Vital signs:  Temp: 98.8  F (37.1  C) Temp src: Oral BP: 96/56 Pulse: 73   Resp: 14 SpO2: 94 % O2 Device: None (Room air)    O2: 94 RA. Denies SOB/CP   Output: Manzo for retention    Last BM: 7/13/2023   Activity: A-1 with walker and gait belt    Up for meals? Yes    Skin: Visible skin intact    Pain: Denied pain    Neuro / CMS: A&Ox4. Able to make needed known   Dressing: R hip dressing CDI    Diet: Regular diet    LDA: R PIV saline locked    Plan: Continue with plan of care    Additional Info:

## 2023-07-16 NOTE — PLAN OF CARE
"  VS: /62 (BP Location: Right arm, Patient Position: Semi-Ferguson's, Cuff Size: Adult Regular)   Pulse 77   Temp 98.2  F (36.8  C) (Oral)   Resp 16   Ht 1.753 m (5' 9.02\")   Wt 101.8 kg (224 lb 6.9 oz)   SpO2 95%   BMI 33.13 kg/m      O2: Stable on room air >90%, denies SOB, incentive spirometer at bedside   Output: Manzo catheter placed for retention. 400ml of urine at around 4:15pm, 325ml at 9:33pm. Clear and yellow.   Last BM: 7/13/23. Senna given for constipation   Activity: Up with 1-2 assist, walker and gb   Skin: Intact, right hip incision    Pain: Denies pain. Scheduled tylenol for pain    CMS: Alert & Oriented x 4, denies chest pain, some Numbness in LE per pt report.    Dressing: CDI dressing on right hip. Clean, dry, and intact   Diet: Regular diet/ thin liquids    LDA: Right PIV-saline locked    Equipment: IV pole, personal belonging, cell phone, walker, gb    Plan: Will continue plan of care.    Additional Info: No N/V, fluids stopped d/t patient drinking enough fluids.                          "

## 2023-07-16 NOTE — CONSULTS
Care Management Initial Consult    General Information  Assessment completed with: Patient, VM-chart review, Patient  Type of CM/SW Visit: Initial Assessment    Primary Care Provider verified and updated as needed: Yes   Readmission within the last 30 days: no previous admission in last 30 days      Reason for Consult: discharge planning  Advance Care Planning: Advance Care Planning Reviewed: present on chart, no concerns identified          Communication Assessment  Patient's communication style: spoken language (English or Bilingual)             Cognitive  Cognitive/Neuro/Behavioral: WDL  Level of Consciousness: alert  Arousal Level: opens eyes spontaneously  Orientation: oriented x 4             Living Environment:   People in home: alone     Current living Arrangements: apartment      Able to return to prior arrangements: yes       Family/Social Support:  Care provided by: self  Provides care for: no one  Marital Status: Single  Neighbor          Description of Support System: Supportive    Support Assessment: Adequate social supports    Current Resources:   Patient receiving home care services: No     Community Resources: Housekeeping/[employer], Meals on Wheels  Equipment currently used at home: dressing device, raised toilet seat, tub bench, walker, rolling (4WW)  Supplies currently used at home: None    Employment/Financial:  Employment Status: retired        Financial Concerns: No concerns identified   Referral to Financial Worker: No       Does the patient's insurance plan have a 3 day qualifying hospital stay waiver?  Yes   Will the waiver be used for post-acute placement? Yes    Lifestyle & Psychosocial Needs:  Social Determinants of Health     Tobacco Use: Low Risk  (7/12/2023)    Patient History      Smoking Tobacco Use: Never      Smokeless Tobacco Use: Never      Passive Exposure: Never   Alcohol Use: Not on file   Financial Resource Strain: Not on file   Food Insecurity: Not on file    Transportation Needs: Not on file   Physical Activity: Not on file   Stress: Not on file   Social Connections: Not on file   Intimate Partner Violence: Not on file   Depression: Not at risk (5/1/2023)    PHQ-2      PHQ-2 Score: 0   Housing Stability: Not on file       Functional Status:  Prior to admission patient needed assistance:              Mental Health Status:  Mental Health Status: No Current Concerns       Chemical Dependency Status:  Chemical Dependency Status: No Current Concerns             Values/Beliefs:  Spiritual, Cultural Beliefs, Rastafari Practices, Values that affect care: no (Practicing Osmani)  Description of Beliefs that Will Affect Care: pre-op     Cultural/Rastafari Practices Patient Routinely Participates In: prayer       Additional Information:    SW met with pt following chart review for initial assessment and discharge planning. Pt reports living alone in accessible apt and is able to complete most ADLs/IADLs independently. Pt receives meals on wheels and has housekeeping monthly. Pt has support of friends and hopes to be able to return to driving following his next hip procedure.     SW and pt discussed possible discharge to TCU either prior or post next procedure this Friday. Pt understands the plan and is in agreement with recommendations of his bedside team. Pt has been able to review TCU list generated from medicare.gov and would like to be referred to both [facility] and  TCU. SW encouraged pt to choose a few more and would submit these referrals once discharge planning starts. [facility] contacted [facility], who will add pt on list to watch for medical stability.    WALESKA will continue to follow for discharge needs.    TALYA Campos    7/16/2023       Social Work and Care Management Department       SEARCHABLE in [employer] - search SOCIAL WORK       [facility] ([phone]) Saturday and Sunday     Units: 4A, 4C, & 4E Pager: [phone]     Units: 5A & 5B Pager:  [phone]     Units: 6A & 6B   Pager: [phone]     Units: 6C & 6D Pager: [phone]     Units: 7A &7B  Pager: [phone]     Units: 7C, 7D, & 5C Pager: [phone]     Unit: [place] [facility] Pager: [phone]      [facility] ([phone]) Saturday and Sunday      Units: 5 Ortho, 5 Med/Surg & WB ED  Pager:[phone]     Units: 6 Med/Surg, 8A, & 10A ICU  Pager: [phone]        After hours (1630 - 0000) Saturday & Sunday; ([phone]) Mon-Fri; ([phone]) [facility] Recognized Holidays     Units: ALL  Pager: [phone]     TALYA Campos

## 2023-07-16 NOTE — PLAN OF CARE
[id number]    VS: Temp: 98.4  F (36.9  C) Temp src: Oral BP: 94/57 Pulse: 90   Resp: 16 SpO2: 93 % O2 Device: None (Room air)       O2: 93% on room air, denies SOB and CP, no cough present. Lung sounds clear.   Output: Pt has been unable to void. Straight cathed on day shift and output >700ml. Pt bladder scanned at 2145 for 379ml. Pt on continuous mIVF and encouraged to increase PO fluid intake. Next bladder scan due at 0145. If >500ml, please page MD before straight cathing per request.   Last BM: 07/13/2023 per pt report, scheduled senna given   Activity: Assist x1-2 w/ gait belt & walker. Unsteady gait   Skin: R) hip surgical incision   Pain: Managed scheduled tylenol this shift. Pt does not c/o pain when laying in bed.   Neuro: A & O x4, reports baseline neuropathy in BLEs   Dressing: CDI   Diet: Regular   LDA: Loss of IV access to L) hand, RN flyer to come and obtain new IV access for mIVF   Equipment: Personal belongings, call light, walker, gait belt, IV pump/pole   Plan: Continue with plan of care   Additional Info:

## 2023-07-16 NOTE — PROGRESS NOTES
"[facility]    Medicine Progress Note - Hospitalist Service, GOLD TEAM 16    Date of Admission:  7/14/2023    Assessment & Plan     78 year old male admitted on 7/14/2023 s/p following procedure: R JOHNNY by Dr Escamilla. Doing well.   EBL: 100 CC     Urinary rentention   -Manzo catheter was placed last night.  Patient feeling better after catheter was placed. Voiding trial tomorrow.   -Continue on Flomax.     # Orthopedic Surgery  - Ortho is primary team.   - Pain control, DVT prophylaxis and activity orders per ortho.      # Anemia of acute blood loss: HGB 8.6. Acute blood loss anemia expected after surgery. Continue monitoring HGB.      #HTN: stable  - hold pta hydrochlorothiazide for now. BP continues with be low-normal. Patient feeling well, denies dizziness or lightheadedness. Soft BP most likely related to anesthesia. Continue monitoring BP.    - hold pta lisinopril for soft bp  - Monitor, resume pta med as appropriate.      #HLD: PTA statin.      # MGUS:   - can theoretically increase thrombosis/DVT risk.  As usual encourage early ambulation and routine DVT prevention measures per hip replacement protocol.        Diet: Advance Diet as Tolerated: Regular Diet Adult  Discharge Instruction - Regular Diet Adult    DVT Prophylaxis: Defer to primary service  Manzo Catheter: PRESENT, indication: Retention  Lines: None     Cardiac Monitoring: None  Code Status: Full Code      Clinically Significant Risk Factors                  # Hypertension: Noted on problem list        # Obesity: Estimated body mass index is 33.13 kg/m  as calculated from the following:    Height as of this encounter: 1.753 m (5' 9.02\").    Weight as of this encounter: 101.8 kg (224 lb 6.9 oz)., PRESENT ON ADMISSION          Disposition Plan     Expected Discharge Date: 07/16/2023                  He Wisdom MD  Hospitalist Service, GOLD TEAM 16  [facility]  Securely message with [employer] (more info)  Text page via [employer] Paging/Directory   See signed in provider for up to date coverage information  ______________________________________________________________________    Interval History     Acute events as above. Manzo cath placed.   Other ROS negative.   He was pleasant.     Physical Exam   Vital Signs: Temp: 98.8  F (37.1  C) Temp src: Oral BP: 96/56 Pulse: 73   Resp: 14 SpO2: 94 % O2 Device: None (Room air)    Weight: 224 lbs 6.85 oz    General Appearance: Alert, room air, no acute distress.   Respiratory: Normal respiratory effort, clear lungs.   Cardiovascular: RRR.  GI: Abdomen soft, + BS, no tenderness to palpation.   Other: Alert, oriented, pleasant. Moving all extremities.     Medical Decision Making       45 MINUTES SPENT BY ME on the date of service doing chart review, history, exam, documentation & further activities per the note.      Data     I have personally reviewed the following data over the past 24 hrs:    N/A  \   8.6 (L)   / N/A     N/A N/A N/A /  N/A   N/A N/A N/A \       Imaging results reviewed over the past 24 hrs:   No results found for this or any previous visit (from the past 24 hour(s)).

## 2023-07-17 ENCOUNTER — APPOINTMENT (OUTPATIENT)
Dept: PHYSICAL THERAPY | Facility: CLINIC | Age: 78
DRG: 462 | End: 2023-07-17
Attending: ORTHOPAEDIC SURGERY
Payer: COMMERCIAL

## 2023-07-17 ENCOUNTER — APPOINTMENT (OUTPATIENT)
Dept: OCCUPATIONAL THERAPY | Facility: CLINIC | Age: 78
DRG: 462 | End: 2023-07-17
Attending: ORTHOPAEDIC SURGERY
Payer: COMMERCIAL

## 2023-07-17 PROCEDURE — 97530 THERAPEUTIC ACTIVITIES: CPT | Mod: GP

## 2023-07-17 PROCEDURE — 97110 THERAPEUTIC EXERCISES: CPT | Mod: GP

## 2023-07-17 PROCEDURE — 250N000013 HC RX MED GY IP 250 OP 250 PS 637: Performed by: PHYSICIAN ASSISTANT

## 2023-07-17 PROCEDURE — 97535 SELF CARE MNGMENT TRAINING: CPT | Mod: GO

## 2023-07-17 PROCEDURE — 99232 SBSQ HOSP IP/OBS MODERATE 35: CPT | Performed by: INTERNAL MEDICINE

## 2023-07-17 PROCEDURE — 250N000013 HC RX MED GY IP 250 OP 250 PS 637: Performed by: INTERNAL MEDICINE

## 2023-07-17 PROCEDURE — 120N000002 HC R&B MED SURG/OB UMMC

## 2023-07-17 RX ADMIN — PREGABALIN 225 MG: 100 CAPSULE ORAL at 07:40

## 2023-07-17 RX ADMIN — SENNOSIDES AND DOCUSATE SODIUM 1 TABLET: 50; 8.6 TABLET ORAL at 20:07

## 2023-07-17 RX ADMIN — SENNOSIDES AND DOCUSATE SODIUM 1 TABLET: 50; 8.6 TABLET ORAL at 07:39

## 2023-07-17 RX ADMIN — POLYETHYLENE GLYCOL 3350 17 G: 17 POWDER, FOR SOLUTION ORAL at 07:40

## 2023-07-17 RX ADMIN — TAMSULOSIN HYDROCHLORIDE 0.4 MG: 0.4 CAPSULE ORAL at 15:31

## 2023-07-17 RX ADMIN — ACETAMINOPHEN 975 MG: 325 TABLET, FILM COATED ORAL at 05:56

## 2023-07-17 RX ADMIN — PREGABALIN 225 MG: 100 CAPSULE ORAL at 20:08

## 2023-07-17 RX ADMIN — BISACODYL 10 MG: 10 SUPPOSITORY RECTAL at 09:48

## 2023-07-17 RX ADMIN — ASPIRIN 162 MG: 81 TABLET, COATED ORAL at 07:39

## 2023-07-17 ASSESSMENT — ACTIVITIES OF DAILY LIVING (ADL)
ADLS_ACUITY_SCORE: 39
ADLS_ACUITY_SCORE: 38
ADLS_ACUITY_SCORE: 38
ADLS_ACUITY_SCORE: 39
ADLS_ACUITY_SCORE: 38

## 2023-07-17 NOTE — PROGRESS NOTES
"SPIRITUAL HEALTH SERVICES Progress Note  [facility] ([facility]) 5 Ortho    Saw pt Genaro REILLY Angel per staff  referral.    Patient/Family Understanding of Illness and Goals of Care - Pt explained that he is healing up following surgery last week. Pt discussed wanting prayers for his general health.     Distress and Loss - Not discussed at this time.     Strengths, Coping, and Resources  - Pt said he is a very active member of his [employer]. Pt said, \"Yarsanism has been a really great source of community for me.\" Pt said that he attends different events at his [employer] when he is well. Pt and I reflected on the community of Catholic and how Catholic has been changing over the years. Pt also reflected on his year at [employer] and his dream of working in [employer].     Meaning, Beliefs, and Spirituality - Pt finds comfort and community in his Catholic. Pt appreciates prayer and requested prayer for improved health.     Plan of Care - I prayed with pt regarding his anthony and guidance for his care team. Pt expressed his thankfulness for my visit and that he would like visits in the future. I will continue to follow pt and plan on visiting in the next few days. [facility] will remain available.     Ilana Johnson   Intern  Pager [phone]    * [facility] remains available 24/7 for emergent requests/referrals, either by having the switchboard page the on-call  or by entering an ASAP/STAT consult in Epic (this will also page the on-call ). Routine Epic consults receive an initial response within 24 hours.*    "

## 2023-07-17 NOTE — PROGRESS NOTES
Orthopaedic Surgery Progress Note 07/17/2023    S: AVSS, AF. Good weekend. Pain controlled. Assist of 1-2 with walker - ambulated to hallway and back to bed. Manzo placed for retention yesterday. Tolerating diet. Met with SW and discussed possible discharge to TCU before or after OR on Friday for L hip.  Patient would like to stay until OR and continue therapies. No new n/t in the RLE.    O:  Temp: 98.5  F (36.9  C) Temp src: Oral BP: 120/61 Pulse: 80   Resp: 18 SpO2: 96 % O2 Device: None (Room air)      Exam:  Gen: No acute distress, resting comfortably in bed.  Resp: Non-labored breathing    RLE:  Dressings c/d/i   Abduction pillow in place  Fires GS/TS/FHL/EHL  SILT SP/DP/Saph/Sural/T  2+ DP/PT, WWP    Recent Labs   Lab 07/16/23  0522 07/15/23  1410   HGB 8.6* 10.8*       Assessment: Genaro Ortiz is a 78 year old male s/p R JOHNNY on 7/14. Doing well.     Plan for L JOHNNY on Friday.    Manzo placed 7/16 for retention. Appreciate med recs    Plan:  WBAT with posterior hip precautions  Postop abx: ancef x24h, complete  Pain control  DVT ppx: aspirin 162mg daily for 4 weeks - will discuss holding with Dr. Escamilla today.  PT/OT  Appreciate medicine recs  Plan for L JOHNNY next Friday  Dispo planning - okay to discharge from an ortho perspective and follow up for OR Friday.     Future Appointments   Date Time Provider Department Center   7/15/2023  2:00 PM Eric Victor PT [facility]   8/28/2023 11:00 AM Merritt Toney, PA-C [facility]   10/4/2023  9:00 AM Giovanni Shah MD [facility]   11/7/2023  1:30 PM [facility]   12/8/2023  1:15 PM Valdez Quesada DO [facility]       Paradise Reyes MD, PGY-4  Orthopedics  Pager: [phone]

## 2023-07-17 NOTE — PLAN OF CARE
Patient A/Ox4, very pleasant. VSS. Denies CP, SOB, dizziness/LH. LSCTA. +fl/BS. Voiding through granados catheter, possible trial of void soon. CMS intact. Dressing to hip CDI. Tolerating regular diet without NV. IS encouraged. Activity level is fair, pt is assist of 2 with walker and gait belt. IV SL. Pain rated as comfortably managed throughout shift, only takes tylenol for pain. TCU plan for dispo, SW following. Patient has demonstrated ability to call appropriately. Patient is resting with call light within reach. Will continue to monitor.

## 2023-07-17 NOTE — PROGRESS NOTES
"[facility]    Medicine Progress Note - Hospitalist Service, GOLD TEAM 16    Date of Admission:  7/14/2023    Assessment & Plan     78 year old male admitted on 7/14/2023 s/p following procedure: R JOHNNY by Dr Escamilla. Doing well.   EBL: 100 CC      Urinary rentention   -Manzo catheter removed today. Continue monitoring bladder scan.   -Continue on Flomax.     # Orthopedic Surgery  - Ortho is primary team.   - Pain control, DVT prophylaxis and activity orders per ortho.      # Anemia of acute blood loss: HGB 8.6. Acute blood loss anemia expected after surgery. Continue monitoring HGB.      #HTN: stable  - hold pta hydrochlorothiazide for now. BP continues with be low-normal. Patient feeling well, denies dizziness or lightheadedness. Soft BP most likely related to anesthesia. Continue monitoring BP.    - hold pta lisinopril for soft bp  - Monitor, resume pta med as appropriate.      #HLD: PTA statin.      # MGUS:   - can theoretically increase thrombosis/DVT risk.  As usual encourage early ambulation and routine DVT prevention measures per hip replacement protocol.        Diet: Advance Diet as Tolerated: Regular Diet Adult  Discharge Instruction - Regular Diet Adult    DVT Prophylaxis: Defer to primary service  Manzo Catheter: Not present  Lines: None     Cardiac Monitoring: None  Code Status: Full Code      Clinically Significant Risk Factors                  # Hypertension: Noted on problem list        # Obesity: Estimated body mass index is 33.13 kg/m  as calculated from the following:    Height as of this encounter: 1.753 m (5' 9.02\").    Weight as of this encounter: 101.8 kg (224 lb 6.9 oz)., PRESENT ON ADMISSION          Disposition Plan      Expected Discharge Date: 07/17/2023,  3:00 PM    Destination: inpatient rehabilitation facility            He Wisdom MD  Hospitalist Service, GOLD TEAM 16  [facility]  Securely message with [employer] (more info)  Text page via [facility] Paging/Directory   See signed in provider for up to date coverage information  ______________________________________________________________________    Interval History     No acute events overnight.   Manzo removed without complications.   Ambulating with PT.   He is feeling well.    Other ROS negative.   He was pleasant.     Physical Exam   Vital Signs: Temp: 97.8  F (36.6  C) Temp src: Oral BP: 103/52 Pulse: 77   Resp: 16 SpO2: 95 % O2 Device: None (Room air)    Weight: 224 lbs 6.85 oz    General Appearance: Alert, room air, no acute distress.   Respiratory: Normal respiratory effort, clear lungs.   Cardiovascular: RRR.  GI: Abdomen soft, + BS, no tenderness to palpation.   Other: Alert, oriented, pleasant. Moving all extremities.     Medical Decision Making       45 MINUTES SPENT BY ME on the date of service doing chart review, history, exam, documentation & further activities per the note.      Data         Imaging results reviewed over the past 24 hrs:   No results found for this or any previous visit (from the past 24 hour(s)).

## 2023-07-17 NOTE — PROGRESS NOTES
"  VS: /52 (BP Location: Right arm)   Pulse 77   Temp 97.8  F (36.6  C) (Oral)   Resp 16   Ht 1.753 m (5' 9.02\")   Wt 101.8 kg (224 lb 6.9 oz)   SpO2 95%   BMI 33.13 kg/m     O2: Room air saturations 95%. Using IS to level of 2000. Pt is doing quite well with using IS. Pt seems to be quite motivated to complete IS   Output: Pt had Manzo catheter taken out this am. Already voided x 1 after having suppository. ( did not measure first void)   Last BM: This am after having suppository   Activity: Up with assist of 1 and walker and gait belt.   Skin: Right hip dressing is CDI   Pain: Pt is controlling pain with Tylenol and ice and movement.    CMS: Pt at baseline has numbness and tingling   Dressing: Right hip dressing is CDI   Diet: Regular. Tolerating well. Good appetite   LDA: IV SL   Equipment: Walker , gait belt, IS, PCD'S, and ice   Plan: Pt lives alone in his apartment. He was hoping to discharge to a rehab facility.    Additional Info: Will check with  about plan of cares. Pt seems to be resting comfortably between cares.        "

## 2023-07-17 NOTE — PROGRESS NOTES
Ortho Update    Discussed with Dr. Escamilla. Will hold aspirin 162 3 days prior to OR.    Last dose aspirin 162 tomorrow (7/18) in AM then will hold.    Paradise Reyes MD, PGY-4  Orthopedics  Pager: [phone]

## 2023-07-17 NOTE — PROGRESS NOTES
7/7/23    CHW spoke with pt about TCU placement after surgery. Pt stated that they wanted [facility] and CHW did let them know that they are being followed by them but that we still need more options as back up. Pt picked out the following facilities.    Pending:    [facility]  [address]  [place] [address]  7/17: Liaison is following pt    [facility] [address]  [place] [address]  PH: [phone]  7/17: Closed     [facility]  [address] [place] [address]  PH: [phone]  FAX: [phone]  7/17: Initial referral sent sent via [facility]  [address] [place] [address]  PH: [phone]  FAX: [phone]  7/17: Initial referral sent sent via [facility]  [address] [place] [address]  PH: [phone]  FAX: [phone]  7/17: Initial referral sent sent via [facility]  [address] [place] [address]  PH: [phone]  FAX: [phone]  7/17: Initial referral sent sent via Epic, rerefer after second procedure to discuss bed availability. We will keep on our radar.       Francie Agrawal  Inpatient CHW  [facility] 5 Ortho, 8 Med Surge, & ED  PH: [phone]

## 2023-07-18 ENCOUNTER — APPOINTMENT (OUTPATIENT)
Dept: PHYSICAL THERAPY | Facility: CLINIC | Age: 78
DRG: 462 | End: 2023-07-18
Attending: ORTHOPAEDIC SURGERY
Payer: COMMERCIAL

## 2023-07-18 PROCEDURE — 97110 THERAPEUTIC EXERCISES: CPT | Mod: GP

## 2023-07-18 PROCEDURE — 120N000002 HC R&B MED SURG/OB UMMC

## 2023-07-18 PROCEDURE — 250N000013 HC RX MED GY IP 250 OP 250 PS 637: Performed by: INTERNAL MEDICINE

## 2023-07-18 PROCEDURE — 97530 THERAPEUTIC ACTIVITIES: CPT | Mod: GP

## 2023-07-18 PROCEDURE — 250N000013 HC RX MED GY IP 250 OP 250 PS 637: Performed by: ORTHOPAEDIC SURGERY

## 2023-07-18 PROCEDURE — 99232 SBSQ HOSP IP/OBS MODERATE 35: CPT | Performed by: INTERNAL MEDICINE

## 2023-07-18 PROCEDURE — 250N000013 HC RX MED GY IP 250 OP 250 PS 637: Performed by: PHYSICIAN ASSISTANT

## 2023-07-18 RX ORDER — TAMSULOSIN HYDROCHLORIDE 0.4 MG/1
0.4 CAPSULE ORAL DAILY
Qty: 30 CAPSULE | Refills: 0 | Status: SHIPPED | OUTPATIENT
Start: 2023-07-18 | End: 2023-07-25

## 2023-07-18 RX ADMIN — PREGABALIN 225 MG: 100 CAPSULE ORAL at 20:11

## 2023-07-18 RX ADMIN — SENNOSIDES AND DOCUSATE SODIUM 1 TABLET: 50; 8.6 TABLET ORAL at 20:11

## 2023-07-18 RX ADMIN — POLYETHYLENE GLYCOL 3350 17 G: 17 POWDER, FOR SOLUTION ORAL at 08:43

## 2023-07-18 RX ADMIN — SENNOSIDES AND DOCUSATE SODIUM 1 TABLET: 50; 8.6 TABLET ORAL at 08:39

## 2023-07-18 RX ADMIN — ASPIRIN 162 MG: 81 TABLET, COATED ORAL at 08:39

## 2023-07-18 RX ADMIN — PREGABALIN 225 MG: 100 CAPSULE ORAL at 08:37

## 2023-07-18 RX ADMIN — METHOCARBAMOL 500 MG: 500 TABLET ORAL at 08:37

## 2023-07-18 RX ADMIN — TAMSULOSIN HYDROCHLORIDE 0.4 MG: 0.4 CAPSULE ORAL at 14:57

## 2023-07-18 ASSESSMENT — ACTIVITIES OF DAILY LIVING (ADL)
ADLS_ACUITY_SCORE: 39
ADLS_ACUITY_SCORE: 35
ADLS_ACUITY_SCORE: 35
WEAR_GLASSES_OR_BLIND: YES
ADLS_ACUITY_SCORE: 39
ADLS_ACUITY_SCORE: 35
DIFFICULTY_EATING/SWALLOWING: NO
VISION_MANAGEMENT: EYE GLASSES
ADLS_ACUITY_SCORE: 39
ADLS_ACUITY_SCORE: 33
DRESSING/BATHING_DIFFICULTY: NO
ADLS_ACUITY_SCORE: 39
ADLS_ACUITY_SCORE: 35
ADLS_ACUITY_SCORE: 33

## 2023-07-18 NOTE — PROGRESS NOTES
"[facility]    Medicine Progress Note - Hospitalist Service, GOLD TEAM 16    Date of Admission:  7/14/2023    Assessment & Plan     78 year old male admitted on 7/14/2023 s/p following procedure: R JOHNNY by Dr Escamilla. Doing well.   EBL: 100 CC      Urinary rentention   -Manzo catheter removed. Continue monitoring bladder scan. Encouraged ambulation.   -Continue on Flomax.     # Orthopedic Surgery  - Ortho is primary team.   - Pain control, DVT prophylaxis and activity orders per ortho.      # Anemia of acute blood loss: HGB 8.6. Acute blood loss anemia expected after surgery. Continue monitoring HGB.      #HTN: stable  - BP normal. No need for antihypertensives at this time, continue monitoring.   - Monitor, resume pta med as appropriate.      #HLD: PTA statin.      # MGUS:   - can theoretically increase thrombosis/DVT risk. As usual encourage early ambulation and routine DVT prevention measures per hip replacement protocol.        Diet: Advance Diet as Tolerated: Regular Diet Adult  Discharge Instruction - Regular Diet Adult    DVT Prophylaxis: Defer to primary service  Manzo Catheter: Not present  Lines: None     Cardiac Monitoring: None  Code Status: Full Code      Clinically Significant Risk Factors                  # Hypertension: Noted on problem list        # Obesity: Estimated body mass index is 33.13 kg/m  as calculated from the following:    Height as of this encounter: 1.753 m (5' 9.02\").    Weight as of this encounter: 101.8 kg (224 lb 6.9 oz)., PRESENT ON ADMISSION          Disposition Plan      Expected Discharge Date: 07/19/2023,  3:00 PM    Destination: inpatient rehabilitation facility            He Wisdom MD  Hospitalist Service, GOLD TEAM 16  [facility]  Securely message with Attributor (more info)  Text page via [facility] Paging/Directory   See signed in provider for up to date coverage " information  ______________________________________________________________________    Interval History     No acute events overnight.   He was pleasant.   ROS negative.   Waiting for his next surgery on Friday.     Physical Exam   Vital Signs: Temp: 97.5  F (36.4  C) Temp src: Oral BP: 107/60 Pulse: 72   Resp: 16 SpO2: 95 % O2 Device: None (Room air)    Weight: 224 lbs 6.85 oz    General Appearance: Alert, room air, no acute distress.   Respiratory: Normal respiratory effort, clear lungs.   Cardiovascular: RRR.  GI: Abdomen soft, + BS, no tenderness to palpation.   Other: Alert, oriented, pleasant. Moving all extremities.     Medical Decision Making       45 MINUTES SPENT BY ME on the date of service doing chart review, history, exam, documentation & further activities per the note.      Data         Imaging results reviewed over the past 24 hrs:   No results found for this or any previous visit (from the past 24 hour(s)).

## 2023-07-18 NOTE — PROGRESS NOTES
Care Management Follow Up    Length of Stay (days): 4    Expected Discharge Date: 07/24/23     Concerns to be Addressed: Discharge planning  Patient plan of care discussed at interdisciplinary rounds: Yes    Anticipated Discharge Disposition: Transitional Care     Anticipated Discharge Services:  Post acute therapies  Anticipated Discharge DME:  TBD    Patient/family educated on Medicare website which has current facility and service quality ratings:  Yes  Education Provided on the Discharge Plan:  Yes  Patient/Family in Agreement with the Plan: yes    Referrals Placed by CM/SW: Post acute therapies   Private pay costs discussed: Not at this time.    Additional Information:    Pt was initially accepted to [facility] today; however, Josseline in Admissions was unaware of pt's planned LTHA on Friday (07/21/23), and so requested that they consider pt after 2nd surgery (as long as facility can meet pt's needs).    [facility] [address]  [address] [place] [address]  PH: [phone]  Admissions: [phone] (Josseline)  Fax: [phone]      [facility] liaisons continue to follow pt, as it was discussed during IDT rounds yesterday that pt may qualify for ARU stay after 2nd hip replacement.    CHW reported to SW that, per conversation with pt and visitor today, pt may want a TCU in [facility] so that pt's friend can visit regularly.    New Initial SNF Referrals will not be able to be sent until new PT eval is documented post-LTHA. Pt may be here through the weekend d/t most TCU's not having weekend Admissions. CHW or Weekend SW to meet with pt with pt after Friday's surgery to get additional TCU preferences and send new referrals when appropriate.          HIRAL SlaterW, LSW  [facility]   PHONE: [phone]  Pager: [phone]

## 2023-07-18 NOTE — PLAN OF CARE
"  VS: /60 (BP Location: Right arm)   Pulse 72   Temp 97.5  F (36.4  C) (Oral)   Resp 16   Ht 1.753 m (5' 9.02\")   Wt 101.8 kg (224 lb 6.9 oz)   SpO2 95%   BMI 33.13 kg/m     O2: RA   Output: Voids spontaneously without difficulty continues to have urine retention. Straight cath at 1500 for 700 mL    Last BM: 7/17/23   Activity: Ax1 with walker with GB   Skin: CDI   Pain: Denies when at rest, increases with activity. Managed with PRN medications   CMS intact   Dressing: R hip CDI   Diet: Reg, take pills as whole   LDA: R PIV   Equipment: Walker,GB personal belongings   Plan: Back to OR Friday for L hip surgery   Additional Info: A&Ox4. Denies headache, chest pain, SOB, numbness or tingling. Uses call light appropriately and makes needs known.          "

## 2023-07-18 NOTE — PLAN OF CARE
"  VS: /54 (BP Location: Right arm)   Pulse 81   Temp 98.5  F (36.9  C) (Oral)   Resp 16   Ht 1.753 m (5' 9.02\")   Wt 101.8 kg (224 lb 6.9 oz)   SpO2 94%   BMI 33.13 kg/m      O2: Room air; Denies SOB/chest pain; Lung sounds clear   Output: Up to bathroom; Voids spontaneously without difficulty; Check for PVR's   Last BM: 07/17/2023   Activity: Assist x1; Walker and gait belt   Skin: R hip surgical incision   Pain: Managed with PRN Tylenol   Neuro: A/O x4   Dressing: Clean/dry/intact   Diet: Regular   LDA: PIV to R arm   Equipment: Walker, gaitbelt, abduction pillow   Plan: Possible discharge to [facility]   Additional Info:          "

## 2023-07-18 NOTE — PROGRESS NOTES
Orthopaedic Surgery Progress Note 07/18/2023    S: AVSS, AF. Pain controlled. Ao1 with walker. Manzo removed yesterday and voiding spontaneously. Tolerating diet. Dispo plan for TCU. Reviewed POC for OR Friday.     O:  Temp: 98.7  F (37.1  C) Temp src: Oral BP: 104/58 Pulse: 84   Resp: 16 SpO2: 94 % O2 Device: None (Room air)      Exam:  Gen: No acute distress, resting comfortably in bed.  Resp: Non-labored breathing    RLE:  Dressings c/d/i   Abduction pillow in place  Fires GS/TS/FHL/EHL  SILT SP/DP/Saph/Sural/T  2+ DP/PT, WWP    Recent Labs   Lab 07/16/23  0522 07/15/23  1410   HGB 8.6* 10.8*       Assessment: Genaro Ortiz is a 78 year old male s/p R JOHNNY on 7/14. Doing well.     Plan for L JOHNNY on Friday.    Manzo discontinued 7/17. Voiding now independently.    Plan:  WBAT with posterior hip precautions  Postop abx: ancef x24h, complete  Pain control  DVT ppx: aspirin 162mg daily through 7/18 then hold starting 7/19.  PT/OT  Appreciate medicine recs  Plan for L JOHNNY next Friday  Dispo planning - okay to discharge from an ortho perspective and follow up for OR Friday.     Future Appointments   Date Time Provider Department Center   7/15/2023  2:00 PM Eric Victor PT [facility]   8/28/2023 11:00 AM Merritt Toney, PA-C [facility]   10/4/2023  9:00 AM Giovanni Shah MD [facility]   11/7/2023  1:30 PM [facility]   12/8/2023  1:15 PM Valdez Quesada DO [facility]       Paradise Reyes MD, PGY-4  Orthopedics  Pager: [phone]

## 2023-07-18 NOTE — DISCHARGE SUMMARY
[facility]  Orthopedic Surgery Discharge Summary     Date of Admission: 7/14/2023  Date of Discharge: 7/25/2023  Disposition: Home  Staff Physician: David Escamilla MD  Primary Care Provider: Wegener, Joel Daniel Irwin    ADMISSION DIAGNOSIS:  Osteoarthritis of both hips, unspecified osteoarthritis type     DISCHARGE DIAGNOSIS:  Osteoarthritis of both hips, unspecified osteoarthritis type    PROCEDURES: Procedure(s):  ARTHROPLASTY, HIP, TOTAL RIGHT on 7/14/2023  ARTHROPLASTY, HIP, TOTAL LEFT on 7/21/2023    BRIEF HISTORY:  The patient presented with severe osteoarthritis of the bilateral hips. He failed conservative treatments, such as activity modification, oral anti-inflammatory medication, and physical therapy.  Surgical treatment was recommended in the form of a total hip arthroplasty.  The risks, benefits, and alternatives to surgery were explained at length with the patient.  The patient understood these risks, agreed to proceed with surgery.      HOSPITAL COURSE:  Patient initially underwent right total hip arthroplasty on 7/14/2023. The patient was admitted for pain control and rehabilitation.  He did well postoperatively.  He subsequently underwent left total hip arthroplasty in 7/21/2023. Medicine was consulted post operatively to aid in management of medical co-morbidities. The patient received routine nursing cares and at the time of discharge was medically stable. Vital signs were stable throughout admission.   The patient is tolerating a regular diet.  Postoperative course complicated by urinary retention.  Patient was started on Flomax and Manzo discontinued, despite this patient had persistent urinary retention.  Urology was consulted and recommended replacement of Manzo catheter and follow-up outpatient for further management and voiding trial.  Postop course also complicated by acute blood loss anemia, asymptomatic with hemoglobin stable in the 7 range.  Transfusion not  needed.  PT/OT recommended continued therapies with transitional care unit.  Pain is now controlled on oral medications which will be available on discharge. Stool softeners have been used while taking pain medications to help prevent constipation. Gnearo Ortiz is deemed medically safe to discharge to TCU.     Antibiotics:  Ancef given periop and 24 hours postop following both procedures.   DVT Prophylaxis:  Mechanical prophylaxis and Aspirin initiated after surgery. He receive aspiring from 7/15 - 7/19, then held and resumed on 7/22.   PT Progress:  PT/OT recommending TCU for continued therapies.   Pain Meds:  Weaned off all IV pain meds by discharge.  Inpatient Events: No significant events or complications.     PHYSICAL EXAM:  Gen: No acute distress, resting comfortably in bed.  Resp: Non-labored breathing  Manzo catheter w clear, yellow urine     RLE:  Dressings clean and dry  Fires GS/TS/FHL/EHL  SILT SP/DP/Saph/Sural/T  2+ DP, WWP     LLE:   Dressings c/d/I left hip  Fires GS/TS/FHL/EHL  SILT SP/DP/Saph/Sural/T  2+ DP, WWP    FOLLOWUP:  Follow up with Dr. Escamilla's team at 2 weeks postoperatively.    Future Appointments   Date Time Provider Department Center   7/18/2023 10:00 AM Brianna Abdi OT [facility] [place]   7/18/2023  2:00 PM Mahnaz Earl PT [facility] [place]   8/28/2023 11:00 AM Merritt Toney, PA-C [facility]   10/4/2023  9:00 AM Giovanni Shah MD [facility]   11/7/2023  1:30 PM [facility]   12/8/2023  1:15 PM Valdez Quesada DO [facility]     Orthopedic surgery appointments are at the [facility] ([address] [place] [address]). Call [phone] to schedule a follow-up appointment at this location with your provider.     PLANNED DISCHARGE ORDERS:     DVT Prophylaxis: Aspirin.     Activity: See below.     Wound Care: See below.      Current Discharge Medication List        START taking these medications     Details   acetaminophen (TYLENOL) 325 MG tablet Take 2 tablets (650 mg) by mouth every 4 hours as needed for other (mild pain)  Qty: 100 tablet, Refills: 0    Associated Diagnoses: Status post total replacement of right hip      aspirin 81 MG EC tablet Take 2 tablets (162 mg) by mouth daily  Qty: 60 tablet, Refills: 0    Associated Diagnoses: Status post total replacement of right hip      oxyCODONE (ROXICODONE) 5 MG tablet Take 1-2 tablets (5-10 mg) by mouth every 4 hours as needed for moderate to severe pain  Qty: 26 tablet, Refills: 0    Associated Diagnoses: Status post total replacement of right hip      polyethylene glycol (MIRALAX) 17 g packet Take 17 g by mouth daily  Qty: 24 packet, Refills: 0    Associated Diagnoses: Status post total replacement of right hip      senna-docusate (SENOKOT-S/PERICOLACE) 8.6-50 MG tablet Take 1-2 tablets by mouth 2 times daily Take while on oral narcotics to prevent or treat constipation.  Qty: 30 tablet, Refills: 0    Comments: While taking narcotics  Associated Diagnoses: Status post total replacement of right hip           CONTINUE these medications which have NOT CHANGED    Details   ASPIRIN 81 MG OR TABS Take 81 mg by mouth daily    Associated Diagnoses: Routine medical exam      cholecalciferol (VITAMIN D) 1000 UNIT tablet Take 1 tablet by mouth daily.  Qty: 100 tablet, Refills: 12    Associated Diagnoses: Routine general medical examination at a health care facility      Cyanocobalamin (B-12) 1000 MCG TBCR Take 1 tablet by mouth daily      fish oil-omega-3 fatty acids 1000 MG capsule Take 2,000 mg by mouth daily  Qty: 120 capsule, Refills: 11    Associated Diagnoses: Hyperlipidemia LDL goal <130      hydrochlorothiazide (HYDRODIURIL) 25 MG tablet TAKE 1 TABLET BY MOUTH DAILY  Qty: 90 tablet, Refills: 3    Comments: Profile Rx: patient will contact pharmacy when needed  Associated Diagnoses: Hyperlipidemia LDL goal <130; Hypertension goal BP (blood pressure) < 140/90     "  lisinopril (ZESTRIL) 40 MG tablet Take 1 tablet (40 mg) by mouth daily  Qty: 90 tablet, Refills: 3    Comments: Profile Rx: patient will contact pharmacy when needed  Associated Diagnoses: Hypertension goal BP (blood pressure) < 140/90      pregabalin (LYRICA) 75 MG capsule Take 3 capsules (225 mg) by mouth 2 times daily  Qty: 180 capsule, Refills: 5    Associated Diagnoses: Neuropathy      simvastatin (ZOCOR) 20 MG tablet TAKE 1 TABLET BY MOUTH EVERYDAY AT BEDTIME  Qty: 90 tablet, Refills: 3    Comments: Profile Rx: patient will contact pharmacy when needed  Associated Diagnoses: Hyperlipidemia LDL goal <130               Discharge Procedure Orders   Referral Order to Outpatient Physical Therapy   Standing Status: Future   Referral Priority: Routine Referral Type: Rehab Therapy Physical Therapy   Number of Visits Requested: 1     Reason for your hospital stay   Order Comments: Right total hip arthroplasty     When to call - Contact Surgeon Team   Order Comments: You may experience symptoms that require follow-up before your scheduled appointment. Refer to the \"Stoplight Tool\" in your discharge papers for instructions on when to contact Dr. Escamilla's office if you are concerned about pain control, blood clots, constipation, or if you are unable to urinate.  You may also contact Dr. Escamilla's team via [employer] messaging.    Regular business hours (Monday - Friday, 8am - 5pm):  [facility]: [phone]    After hours and weekends:  [facility] on call Orthopedic resident: [phone]     When to call - Reach out to Urgent Care   Order Comments: If you are not able to reach Dr. Escamilla's office and you need immediate care, go to the Orthopedic Urgent Care at your Surgeon's office.  Do NOT go to the Emergency Room unless you have shortness of breath, chest pain, or other signs of a medical emergency.     When to call - Reasons to Call 911   Order Comments: Call " 911 immediately if you experience sudden-onset chest pain, arm weakness/numbness, slurred speech, or shortness of breath     Discharge Instruction - Breathing exercises   Order Comments: Perform breathing exercises using your Incentive Spirometer 10 times per hour while awake for 2 weeks.     Symptoms - Fever Management   Order Comments: A low grade fever can be expected after surgery.  Use acetaminophen (TYLENOL) as needed for fever management.  Contact your Surgeon Team if you have a fever greater than 101.5 F, chills, and/or night sweats.     Symptoms - Constipation management   Order Comments: Constipation (hard, dry bowel movements) is expected after surgery due to the combination of being less active, the anesthetic, and the opioid pain medication.  You can do the following to help reduce constipation:  ~  FLUIDS:  Drink clear liquids (water or Gatorade), or juice (apple/prune).  ~  DIET:  Eat a fiber rich diet.    ~  ACTIVITY:  Get up and move around several times a day.  Increase your activity as you are able.  MEDICATIONS:  Reduce the risk of constipation by starting medications before you are constipated.  You can take Miralax   (1 packet as directed) and/or a stool softener (Senokot 1-2 tablets 1-2 times a day).  If you already have constipation and these medications are not working, you can get magnesium citrate and use as directed.  If you continue to have constipation you can try an over the counter suppository or enema.  Call your Surgeon Team if it has been greater than 3 days since your last bowel movement.     Symptoms - Reduced Urine Output   Order Comments: Changes in the amount of fluids you drank before and after surgery may result in problems urinating.  It is important to stay well-hydrated after surgery and drink plenty of water. If it has been greater than 8 hours since you have urinated despite drinking plenty of water, call your Surgeon Team.     Medication instructions -  Anticoagulation  - aspirin   Order Comments: Take the aspirin as prescribed for a total of four weeks after surgery.  This is given to help minimize your risk of blood clot.     Activity - Exercises to prevent blood clots   Order Comments: Unless otherwise directed by your Surgeon team, perform the following exercises at least three times per day for the first four weeks after surgery to prevent blood clots in your legs: 1) Point and flex your feet (Ankle Pumps), 2) Move your ankle around in big circles, 3) Wiggle your toes, 4) Walk, even for short distances, several times a day, will help decrease the risk of blood clots.     Order Specific Question Answer Comments   Is discharge order? Yes      Comfort and Pain Management - Pain after Surgery   Order Comments: Pain after surgery is normal and expected.  You will have some amount of pain for several weeks after surgery.  Your pain will improve with time.  There are several things you can do to help reduce your pain including: rest, ice, elevation, and using pain medications as needed. Contact your Surgeon Team if you have pain that persists or worsens after surgery despite rest, ice, elevation, and taking your medication(s) as prescribed. Contact your Surgeon Team if you have new numbness, tingling, or weakness in your operative extremity.     Comfort and Pain Management - Swelling after Surgery   Order Comments: Swelling and/or bruising of the surgical extremity is common and may persist for several months after surgery. In addition to frequent icing and elevation, gentle compressive support with an ACE wrap or tubigrip may help with swelling. Apply compression regularly, removing at least twice daily to perform skin checks. Contact your Surgeon Team if your swelling increases and is NOT associated with an increase in your activity level, or if your swelling increases and is associated with redness and pain.     Comfort and Pain Management - LOWER Extremity Elevation   Order  "Comments: Swelling is expected for several months after surgery. This type of swelling is usually associated with gravity and activity, and can be improved with elevation.   The best way to do this is to get your \"toes above your nose\" by laying down and placing several pillows lengthwise under your calf and heel. When elevating your leg keep your knee completely straight. Perform this elevation as often as possible especially for the first two weeks after surgery.     Comfort and Pain Management - Cold therapy   Order Comments: Ice can be used to control swelling and discomfort after surgery. Place a thin towel over your operative site and apply the ice pack overtop. Leave ice pack in place for 20 minutes, then remove for 20 minutes. Repeat this 20 minutes on/20 minutes off routine as often as tolerated.     Medication Instructions - Acetaminophen (TYLENOL) Instructions   Order Comments: You were discharged with acetaminophen (TYLENOL) for pain management after surgery. Acetaminophen most effectively manages pain symptoms when it is taken on a schedule without missing doses (every four, six, or eight hours). Your Provider will prescribe a safe daily dose between 3000 - 4000 mg.  Do NOT exceed this daily dose. Most patients use acetaminophen for pain control for the first four weeks after surgery.  You can wean from this medication as your pain decreases.     Medication Instructions - NSAID Instructions   Order Comments: You were discharged with an anti-inflammatory medication for pain management to use in combination with acetaminophen (TYLENOL) and the narcotic pain medication.  Take this medication exactly as directed.  You should only take one anti-inflammatory at a time.  Some common anti-inflammatories include: ibuprofen (ADVIL, MOTRIN), naproxen (ALEVE, NAPROSYN), celecoxib (CELEBREX), meloxicam (MOBIC), ketorolac (TORADOL).  Take this medication with food and water.     Medication Instructions - Opioids - " "Tapering Instructions   Order Comments: In the first three days following surgery, your symptoms may warrant use of the narcotic pain medication every four to six hours as prescribed. This is normal. As your pain symptoms improve, focus your efforts on decreasing (tapering) use of narcotic medications. The most successful tapering strategy is to first, decrease the number of tablets you take every 4-6 hours to the minimum prescribed. Then, increase the amount of time between doses.  For example:  First, taper to   or 1 tablet every 4-6 hours.  Then, taper to   or 1 tablet every 6-8 hours.  Then, taper to   or 1 tablet every 8-10 hours.  Then, taper to   or 1 tablet every 10-12 hours.  Then, taper to   or 1 tablet at bedtime.  The bedtime dose can help with comfort during sleep and is typically the last dose to be discontinued after surgery.     Follow Up Care   Order Comments: Follow up with Dr. Escamilla or Merritt Toney PA-C in 4 weeks.     [facility]  [address] [place] [id number]  [phone]     Activity - Total Hip Arthroplasty   Order Comments: Refer to the [facility] \"Your Guide to Total Joint Replacement\" for recommendations on activities and Exercises.     Order Specific Question Answer Comments   Is discharge order? Yes      Return to Driving   Order Comments: Return to driving - Driving is NOT permitted until directed by your provider. Under no circumstance are you permitted to drive while using narcotic pain medications.     Order Specific Question Answer Comments   Is discharge order? Yes      No flexion past 90 degrees   Order Comments: No bending at waist past 90 degrees.     Order Specific Question Answer Comments   Is discharge order? Yes      No internal rotation   Order Comments: No pivoting on your operative leg.     Order Specific Question Answer Comments   Is discharge order? Yes      No adduction past midline   Order Comments: " No crossing your operative leg.     Order Specific Question Answer Comments   Is discharge order? Yes      Weight bearing as tolerated   Order Comments: Weight bearing as tolerated on your operative extremity.     Order Specific Question Answer Comments   Is discharge order? Yes      Dressing / Wound Care - Wound   Order Comments: Remove dressings after 7 days when it is OK to shower. Sponge baths only for 7 days. No submersing the incision in a bath for 4 weeks. OK to cut suture tails at skin after 2 weeks.     Dressing / Wound care - Shower with wound/dressing covered   Order Comments: You must COVER your dressing or incision with saran wrap (or any other non-permeable covering) to allow the incision to remain dry while showering.  You may shower 7 days after surgery when is it OK for the incision to get wet. You are strictly prohibited from soaking   or submerging the surgical wound underwater until your follow up visit.     Dressing / Wound Care - NO Tub Bathing   Order Comments: Tub bathing, swimming, or any other activities that will cause your incision to be submerged in water should be avoided until allowed by your Surgeon.     Medication Instructions - Opioid Instructions (Greater than or equal to 65 years)   Order Comments: You were discharged with an opioid medication (hydromorphone, oxycodone, hydrocodone, or tramadol). This medication should only be taken for breakthrough pain that is not controlled with acetaminophen (TYLENOL). If you rate your pain less than 3 you do not need this medication.  Pain rating 0-3:  You do not need this medication  Pain rating 4-6:  Take 1/2 tablet every 4-6 hours as needed  Pain rating 7-10:  Take 1 tablet every 4-6 hours as needed  Do not exceed 4 tablets per day     General info for SNF   Order Comments: Length of Stay Estimate: Short Term Care: Estimated # of Days <30 Condition at Discharge: Improving Level of care:skilled  Rehabilitation Potential: Excellent Admission  H&P remains valid and up-to-date: Yes Recent Chemotherapy: N/A Use Nursing Home Standing Orders: Yes     Mantoux Instructions   Order Comments: Give two-step Mantoux (PPD) Per Facility Policy {[id number]     Incentive Spirometry   Order Comments: Incentive Spirometry 10 times per hour, 4 times per day.     Assess heels for pressure points     Shower with wound/dressing covered   Order Comments: You must COVER your dressing or incision with saran wrap (or any other non-permeable covering) to allow the incision to remain dry while showering.  You may shower 3 days after surgery as long as the surgical wound stays dry. Continue to cover your dressing or incision for showering until your first office visit.  You are strictly prohibited from soaking   or submerging the surgical wound underwater.     Physical Therapy Adult Consult   Order Comments: Evaluate and treat as clinically indicated.    Reason: Status Post Hip Surgery     Occupational Therapy Adult Consult   Order Comments: Evaluate and treat as clinically indicated.    Reason: Status Post Hip Surgery     Fall precautions     Crutches DME   Order Comments: DME Documentation: Describe the reason for need to support medical necessity: Impaired gait status post hip surgery. I, the undersigned, certify that the above prescribed supplies are medically necessary for this patient and is both reasonable and necessary in reference to accepted standards of medical practice in the treatment of this patient's condition and is not prescribed as a convenience.     Order Specific Question Answer Comments   DME Provider: [facility]    Crutch Type: Standard    Crutches Add On: NA    Length of Need: Lifetime      Cane DME   Order Comments: DME Documentation: Describe the reason for need to support medical necessity: Impaired gait status post hip surgery. I, the undersigned, certify that the above prescribed supplies are medically necessary for this patient and is both reasonable  and necessary in reference to accepted standards of medical practice in the treatment of this patient's condition and is not prescribed as a convenience.     Order Specific Question Answer Comments   DME Provider: [facility]    Cane Type: Single Tip    Reminder: Patient can typically get 1 every 5 years      Walker DME   Order Comments: : DME Documentation: Describe the reason for need to support medical necessity: Impaired gait status post hip surgery. I, the undersigned, certify that the above prescribed supplies are medically necessary for this patient and is both reasonable and necessary in reference to accepted standards of medical practice in the treatment of this patient's condition and is not prescribed as a convenience.     Order Specific Question Answer Comments   DME Provider: [facility]    Walker Type: Standard (2 Wheel)    Accessories: N/A      Crutches DME   Order Comments: DME Documentation: Describe the reason for need to support medical necessity: Impaired gait status post hip surgery. I, the undersigned, certify that the above prescribed supplies are medically necessary for this patient and is both reasonable and necessary in reference to accepted standards of medical practice in the treatment of this patient's condition and is not prescribed as a convenience.     Order Specific Question Answer Comments   DME Provider: [facility]    Crutch Type: Standard    Crutches Add On: NA    Length of Need: Lifetime      Cane DME   Order Comments: DME Documentation: Describe the reason for need to support medical necessity: Impaired gait status post hip surgery. I, the undersigned, certify that the above prescribed supplies are medically necessary for this patient and is both reasonable and necessary in reference to accepted standards of medical practice in the treatment of this patient's condition and is not prescribed as a convenience.     Order Specific Question Answer Comments   DME Provider:  [facility]    Cane Type: Single Tip    Reminder: Patient can typically get 1 every 5 years      Walker DME   Order Comments: : DME Documentation: Describe the reason for need to support medical necessity: Impaired gait status post hip surgery. I, the undersigned, certify that the above prescribed supplies are medically necessary for this patient and is both reasonable and necessary in reference to accepted standards of medical practice in the treatment of this patient's condition and is not prescribed as a convenience.     Order Specific Question Answer Comments   DME Provider: [facility]    Walker Type: Standard (2 Wheel)    Accessories: N/A      Discharge Instruction - Regular Diet Adult     Order Specific Question Answer Comments   Is discharge order? Yes      Diet   Order Comments: Follow this diet upon discharge: Orders Placed This Encounter      Discharge Instruction - Regular Diet Adult      Advance Diet as Tolerated: Regular Diet Adult     Order Specific Question Answer Comments   Is discharge order? Yes        Paradise Reyes MD, PGY-4  Orthopedics  Pager: [phone]   no

## 2023-07-18 NOTE — PROGRESS NOTES
CLINICAL NUTRITION SERVICES - BRIEF NOTE      Nutrition Prescription     RECOMMENDATIONS FOR MDs/PROVIDERS TO ORDER:  RDN to sign off. Please consult if nutrition needs arise.     Recommendations already ordered by Registered Dietitian (RD):  None at this time.     REASON FOR ASSESSMENT  Genaro Ortiz is a/an 78 year old male assessed by the dietitian for Admission Nutrition Risk Screen positive for unsure weight loss and no decreased appetite    EVALUATION OF THE PROGRESS TOWARD GOALS   Diet: Regular  Intake: No reports for inadequate intake per nursing notes.     NEW FINDINGS   No weight loss noted.      Weight:  Wt Readings from Last Encounters:   07/14/23 101.8 kg (224 lb 6.9 oz)   05/01/23 102.1 kg (225 lb)   06/21/21 100.6 kg (221 lb 12.8 oz)   11/19/20 102.4 kg (225 lb 12.8 oz)   10/30/20 101.2 kg (223 lb)      INTERVENTIONS  None at this time.      Monitoring/Evaluation  RDN to sign off at this time. RD may be consulted if needs arise. .     Roselia Fraire MS, RDN, LDN  RD pager: [phone]  [facility] Weekend/Holiday Pager: [phone]

## 2023-07-18 NOTE — PLAN OF CARE
"  VS: /60 (BP Location: Right arm)   Pulse 72   Temp 97.5  F (36.4  C) (Oral)   Resp 16   Ht 1.753 m (5' 9.02\")   Wt 101.8 kg (224 lb 6.9 oz)   SpO2 95%   BMI 33.13 kg/m      O2: Stable on room air   Output: Voids without difficulty in bathroom. Continues to have urinary rentention but states he does not have bladder discomfort. Straight cathed at 1530 with 700 mL out. Bladder scanned at 1830 for 242 mL.    Last BM: 7/17- active bowel sounds   Activity: Ax1 with gait belt, walker   Skin: R hip surgical incision   Pain: Denies. States pain increases with activity but denies pain at rest   CMS: intact   Dressing: R hip dressing CDI- scant, dried drainage   Diet: Regular diet, thin liquids, takes pills whole   LDA: R PIV patent, saline locked   Equipment: IV pole, walker, commode, personal belongings   Plan: Back to OR Friday for Left Hip; will stay on unit until Friday   Additional Info: A&Ox4. Denies headache, chest pain, SOB, numbness or tingling. Uses call light appropriately and makes needs known.        "

## 2023-07-18 NOTE — PLAN OF CARE
Pt. A&Ox4. VSS. Afebrile. Lung sounds CTA. Maintaining sats on RA. LBM 7/17, loose per report after suppository. CMS and neuro's are intact. Denies numbness and tingling in all extremities. Denies nausea, shortness of breath, and chest pain. Denied pain overnight. Voiding spontaneously with urinal, per report PVRs low. Tolerating regular diet. R hip incisional dressing is CDI. Pt up with Ax1. PIV is patent and SL in R arm. Call light is within reach, pt able to make needs known and is resting comfortably between cares. Will continue to monitor.

## 2023-07-18 NOTE — PROGRESS NOTES
7/18/23    CHW scheduled a wheelchair ride from [facility] ([phone]) with a window time frame of 1337 to 1422. Pt will be going to [facility]'s TCU facility. CHW spoke with Luna from admissions. CHW spoke with pt about wheelchair ride transport and they were fine with it, the base rate and added mile rate was disscued. Pt  time was notified by WALESKA coleman, bedside RN, [place] RN, and pt.     UPDATE:  Pt was later declined because of second surgery on Friday, but [facility] is willing to take him after surgery. WALESKA coleman, bedside RN, Charge RN, and pt was notified of the change. Ride was also cancelled. Pt is also know looking at two different facilities [facility] and [facility] as top choices after surgery.    Pending:      TCU  [address]  [place] [address]  7/17: Liaison is following pt  7/18: Liaison was reached out today to see where the pt was on the wait list     [facility]  [address] [place] [address]  PH: [phone]  FAX: [phone]  7/17: Initial referral sent sent via Epic  7/18: No openings today, but possibly tomorrow    [facility]  [address] [place] [address]  PH: [phone]  FAX: [phone]  7/17: Initial referral sent sent via [employer]  7/18: CHW LVM for admissions to call back, will gladly take pt after surgery      [facility]  [address] [place] [address]  PH: [phone]  FAX: [phone]  7/17: Initial referral sent sent via Epic,rerefer after second procedure to discuss bed availability. We will keep on our radar.      [facility]  [address] [place] [address]  PH: [phone]  FAX: [phone]  7/17: Initial referral sent sent via [employer]  7/18: CHW spoke with admissions and they will review pt then give a call back, will gladly take pt after surgery     Declined:    [facility]  [address]  [place] [address]  PH: [phone]  7/17: Closed     Francie Agrawal  Inpatient CHW  [facility] 5 Ortho, 8 Med Surge, & ED  PH: [phone]

## 2023-07-19 ENCOUNTER — APPOINTMENT (OUTPATIENT)
Dept: PHYSICAL THERAPY | Facility: CLINIC | Age: 78
DRG: 462 | End: 2023-07-19
Attending: ORTHOPAEDIC SURGERY
Payer: COMMERCIAL

## 2023-07-19 PROCEDURE — 97530 THERAPEUTIC ACTIVITIES: CPT | Mod: GP

## 2023-07-19 PROCEDURE — 97110 THERAPEUTIC EXERCISES: CPT | Mod: GP

## 2023-07-19 PROCEDURE — 250N000013 HC RX MED GY IP 250 OP 250 PS 637: Performed by: INTERNAL MEDICINE

## 2023-07-19 PROCEDURE — 99221 1ST HOSP IP/OBS SF/LOW 40: CPT | Mod: GC

## 2023-07-19 PROCEDURE — 250N000013 HC RX MED GY IP 250 OP 250 PS 637: Performed by: ORTHOPAEDIC SURGERY

## 2023-07-19 PROCEDURE — 250N000013 HC RX MED GY IP 250 OP 250 PS 637: Performed by: PHYSICIAN ASSISTANT

## 2023-07-19 PROCEDURE — 99232 SBSQ HOSP IP/OBS MODERATE 35: CPT | Performed by: INTERNAL MEDICINE

## 2023-07-19 PROCEDURE — 120N000002 HC R&B MED SURG/OB UMMC

## 2023-07-19 RX ADMIN — PREGABALIN 225 MG: 100 CAPSULE ORAL at 20:28

## 2023-07-19 RX ADMIN — SENNOSIDES AND DOCUSATE SODIUM 1 TABLET: 50; 8.6 TABLET ORAL at 20:28

## 2023-07-19 RX ADMIN — MAGNESIUM HYDROXIDE 30 ML: 400 SUSPENSION ORAL at 06:37

## 2023-07-19 RX ADMIN — PREGABALIN 225 MG: 100 CAPSULE ORAL at 08:44

## 2023-07-19 RX ADMIN — ACETAMINOPHEN 650 MG: 325 TABLET, FILM COATED ORAL at 12:38

## 2023-07-19 RX ADMIN — POLYETHYLENE GLYCOL 3350 17 G: 17 POWDER, FOR SOLUTION ORAL at 08:44

## 2023-07-19 RX ADMIN — TAMSULOSIN HYDROCHLORIDE 0.4 MG: 0.4 CAPSULE ORAL at 15:41

## 2023-07-19 RX ADMIN — SENNOSIDES AND DOCUSATE SODIUM 1 TABLET: 50; 8.6 TABLET ORAL at 08:44

## 2023-07-19 ASSESSMENT — ACTIVITIES OF DAILY LIVING (ADL)
ADLS_ACUITY_SCORE: 33

## 2023-07-19 NOTE — PROGRESS NOTES
"[facility]    Medicine Progress Note - Hospitalist Service, GOLD TEAM 16    Date of Admission:  7/14/2023    Assessment & Plan     78 year old male admitted on 7/14/2023 s/p following procedure: R JOHNNY by Dr Escamilla. Doing well.   EBL: 100 CC      Urinary rentention   -Continues with recurrent episodes of urinary retention and straight cath. Encouraged ambulation. Urology consult.    -Continue on Flomax.     # Orthopedic Surgery  - Ortho is primary team.   - Pain control, DVT prophylaxis and activity orders per ortho.   - He is going back to the OR on Friday.      # Anemia of acute blood loss: HGB 8.6. Acute blood loss anemia expected after surgery. Continue monitoring HGB.      #HTN: stable  - BP normal. No need for antihypertensives at this time, continue monitoring.   - Monitor, resume pta med as appropriate.      #HLD: PTA statin.      # MGUS:   - can theoretically increase thrombosis/DVT risk. As usual encourage early ambulation and routine DVT prevention measures per hip replacement protocol.        Diet: Advance Diet as Tolerated: Regular Diet Adult  Discharge Instruction - Regular Diet Adult    DVT Prophylaxis: Defer to primary service  Manzo Catheter: Not present  Lines: None     Cardiac Monitoring: None  Code Status: Full Code      Clinically Significant Risk Factors                  # Hypertension: Noted on problem list        # Obesity: Estimated body mass index is 33.13 kg/m  as calculated from the following:    Height as of this encounter: 1.753 m (5' 9.02\").    Weight as of this encounter: 101.8 kg (224 lb 6.9 oz).           Disposition Plan      Expected Discharge Date: 07/24/2023,  3:00 PM    Destination: inpatient rehabilitation facility            He Wisdom MD  Hospitalist Service, GOLD TEAM 16  [facility]  Securely message with [employer] (more info)  Text page via [employer] Paging/Directory   See signed " in provider for up to date coverage information  ______________________________________________________________________    Interval History     No acute events overnight.   He was pleasant.   Urinary retention s/p straight cath. Urology consult.   Waiting for his next surgery on Friday.     Physical Exam   Vital Signs: Temp: 98.7  F (37.1  C) Temp src: Oral BP: 123/70 Pulse: 86   Resp: 16 SpO2: 92 % O2 Device: None (Room air)    Weight: 224 lbs 6.85 oz    General Appearance: Alert, room air, no acute distress.   Respiratory: Normal respiratory effort, clear lungs.   Cardiovascular: RRR.  GI: Abdomen soft, + BS, no tenderness to palpation.   Other: Alert, oriented, pleasant. Moving all extremities.     Medical Decision Making       45 MINUTES SPENT BY ME on the date of service doing chart review, history, exam, documentation & further activities per the note.      Data         Imaging results reviewed over the past 24 hrs:   No results found for this or any previous visit (from the past 24 hour(s)).

## 2023-07-19 NOTE — PROGRESS NOTES
Orthopaedic Surgery Progress Note 07/19/2023    S: AVSS, AF. Pain controlled. Ao1 with walker. Retaining urine again and straight cath x1 for 500 ml. Tolerating diet. Dispo plan for TCU. Reviewed POC for OR Friday. Interested in TCU ([facility]).    O:  Temp: 98.9  F (37.2  C) Temp src: Oral BP: 129/69 Pulse: 81   Resp: 16 SpO2: 94 % O2 Device: None (Room air)      Exam:  Gen: No acute distress, resting comfortably in bed.  Resp: Non-labored breathing    RLE:  Dressings c/d/i   Abduction pillow in place  Fires GS/TS/FHL/EHL  SILT SP/DP/Saph/Sural/T  2+ DP/PT, WWP    Recent Labs   Lab 07/16/23  0522 07/15/23  1410   HGB 8.6* 10.8*       Assessment: Genaro Ortiz is a 78 year old male s/p R JOHNNY on 7/14. Doing well.     Plan for L JOHNNY on Friday.    Appreciate med recs regarding ongoing urinary retention.    Discontinue aspirin today in preparation for OR Friday.    Work on [employer] today. Milk of mag ordered.    Plan:  WBAT with posterior hip precautions  Postop abx: ancef x24h, complete  Pain control  DVT ppx: aspirin 162mg daily through 7/18 then hold starting 7/19.  PT/OT  Appreciate medicine recs  Dispo planning - TCU in coming days after L JOHNNY on friday    Future Appointments   Date Time Provider Department Center   7/15/2023  2:00 PM Eric Victor, PT [facility]   8/28/2023 11:00 AM Merritt Toney, PA-C [facility]   10/4/2023  9:00 AM Giovanni Shah MD [facility]   11/7/2023  1:30 PM Eric Abel MD [facility]   12/8/2023  1:15 PM Valdez Quesada DO [facility]       Paradise Reyes MD, PGY-4  Orthopedics  Pager: [phone]

## 2023-07-19 NOTE — PLAN OF CARE
"VS: /70 (BP Location: Right arm)   Pulse 86   Temp 98.7  F (37.1  C) (Oral)   Resp 16   Ht 1.753 m (5' 9.02\")   Wt 101.8 kg (224 lb 6.9 oz)   SpO2 92%   BMI 33.13 kg/m      O2: RA stable    Output: Granados ccatheter placed today due to retenation   Last BM: 7/17/23, Pt stated constipated   Activity: Assistance of one with walker and GB   Skin: CDI   Pain: Denies,N/V, numbness, tingling, SOB, CP   CMS:  Neuro: Intact  A/Ox4 makes needs aware to nurses   Dressing: R hip, CDI.   Diet: Reg, good appetite   LDA: L PIV with NS, had granados catheter placed today.   Equipment: commode SANDRO, walker, personal belongings.   Plan: Con't POC.    Additional Info: Friday paln is surgery of L JOHNNY                            "

## 2023-07-19 NOTE — CONSULTS
Urologic Surgery Consultation Note  [facility]    Genaro Ortiz MRN# [id number]   Age: 78 year old YOB: 1945     Date of Admission:  7/14/2023    Reason for consult: Urinary retention       Requesting physician: David Escamilla MD        Level of consult: Consult, follow and place orders           Assessment:   78 year old gentleman with history of prostate cancer s/p RRP in 2007, arthritis s/p right hip replacement and urology consulted for urinary retention. Likely combination of limited mobility, severe constipation, narcotic medication use.         Recommendations:   - Recommend granados catheter placement now. Recommend keeping until a few days after scheduled contralateral hip surgery, once OOB and activity level close to discharge level. Detailed timing per primary team, ok to TOV on AM of discharge.  - Treat constipation to aim for soft daily bowel movement  - Minimize narcotic meds/other medication with anticholinergic effects  - Continue flomax  - If patient passes TOV, only necessary to follow up with urology prn  - If patient fails TOV again, recommend replacing granados catheter and follow up with urology in outpatient setting. He can choose to follow with his prior outpatient urology provider (although have not needed to be seen in years) or follow up with [facility] urology. If he decides to follow with us please contact urology service and we will set up outpatient follow up appointment  - Urology service to sign off. Please feel free to reach out with any questions.          History of Present Illness:   CC: Urinary retention     History is obtained from the patient    78 year old gentleman with history of prostate cancer s/p RRP in 2007, arthritis s/p right hip replacement. He is about to get another procedure on Friday. He is feeling well but has baseline severe constipation. He reports his last BM was 3 days ago and he is trying bowel meds. He had last BM after a  suppository. He has infrequent bowel movement at baseline. He has no baseline urinary symptoms at baseline. No recent fever chills dysuria hematuria. He reports not having a strong urge to pee. Since surgery he has been attempting to ambulate, but when he tries to urinate, he can barely get much out when he sits on the commode. No suprapubic or flank pain.          Past Medical History:     Past Medical History:   Diagnosis Date     Adenocarcinoma of prostate (H) 5/27/2008     Arthritis      Esophageal reflux      Neuropathy      Pure hypercholesterolemia      Unspecified essential hypertension              Past Surgical History:     Past Surgical History:   Procedure Laterality Date     ABDOMEN SURGERY  1952    Appendectomy     APPENDECTOMY  1952     ARTHROPLASTY HIP Right 7/14/2023    Procedure: ARTHROPLASTY, HIP, TOTAL RIGHT;  Surgeon: David Escamilla MD;  Location: [facility] OR     CATARACT IOL, RT/LT Bilateral 03/12     COLONOSCOPY  2017     COLONOSCOPY N/A 5/18/2023    Procedure: colonoscopy;  Surgeon: Ayse Sears MD;  Location:  GI     GENITOURINARY SURGERY       HERNIORRHAPHY INGUINAL  5/15/2014    Procedure: HERNIORRHAPHY INGUINAL;  Surgeon: Evens Jefferson MD;  Location: [facility] OR     PROSTATE SURGERY  2007             Social History:     Social History     Socioeconomic History     Marital status: Single     Spouse name: Not on file     Number of children: Not on file     Years of education: Not on file     Highest education level: Not on file   Occupational History     Not on file   Tobacco Use     Smoking status: Never     Passive exposure: Never     Smokeless tobacco: Never     Tobacco comments:     Never smoked.   Vaping Use     Vaping Use: Never used   Substance and Sexual Activity     Alcohol use: Not Currently     Comment: occasional     Drug use: No     Sexual activity: Never   Other Topics Concern     Parent/sibling w/ CABG, MI or angioplasty before 65F 55M? No   Social History Narrative     Social Documentation:        Balanced Diet: YES    Calcium intake: 1-2 per day    Caffeine: 2 per day    Exercise:  type of activity gym and work is physical;  2 times per week    Sunscreen: Yes, when needed    Seatbelts:  Yes    Self Breast Exam:  na    Self Testicular Exam: Yes    Physical/Emotional/Sexual Abuse: No    Do you feel safe in your environment? Yes        Cholesterol screen up to date:  6/5/2009                                                                                                             Latest Ref Rng                              CHOLESTEROL                                                 0 - 200 mg/dL                177                     TRIGLYCERIDES                                               0 - 150 mg/dL                177 (H)                 HDL                                                         40 - 110 mg/dL               39 (L)                  LDL CHOLESTEROL CALCULATED                                  0 - 129 mg/dL                102                     VLDL-CHOLESTEROL                                            0 - 30 mg/dL                 35 (H)                  CHOLESTEROL/HDL RATIO                                       0.0 - 5.0                    4.5                         Eye Exam up to date: Yes    Dental Exam up to date: Yes    Pap smear up to date: Does Not Apply    Mammogram up to date: Does Not Apply    Dexa Scan up to date: Does Not Apply    Colonoscopy up to date: Yes    Immunizations up to date: Yes    Glucose screen if over 40:  Component                        Latest Ref Rng               10/31/2008              6/5/2009                GLUCOSE                          60 - 99 mg/dL                106 (H)                 111 (H)                                                      Social Determinants of Health     Financial Resource Strain: Not on file   Food Insecurity: Not on file   Transportation Needs: Not on file   Physical Activity: Not on  file   Stress: Not on file   Social Connections: Not on file   Intimate Partner Violence: Not on file   Housing Stability: Not on file             Family History:     Family History   Problem Relation Age of Onset     Glaucoma Other      Diabetes Mother      Gastrointestinal Disease Mother         pancreas removed     Breast Cancer Mother      Osteoporosis Father      Obesity Father      Macular Degeneration No family hx of      Hypertension No family hx of              Allergies:      Allergies   Allergen Reactions     Penicillins Other (See Comments)     blotches             Medications:   No current facility-administered medications on file prior to encounter.  cholecalciferol (VITAMIN D) 1000 UNIT tablet, Take 1 tablet by mouth daily.  Cyanocobalamin (B-12) 1000 MCG TBCR, Take 1 tablet by mouth daily  fish oil-omega-3 fatty acids 1000 MG capsule, Take 2,000 mg by mouth daily  simvastatin (ZOCOR) 20 MG tablet, TAKE 1 TABLET BY MOUTH EVERYDAY AT BEDTIME      Current Facility-Administered Medications:      acetaminophen (TYLENOL) tablet 650 mg, 650 mg, Oral, Q4H PRN, Dulce Mcbride PA-C, 650 mg at 07/19/23 1238     bisacodyl (DULCOLAX) suppository 10 mg, 10 mg, Rectal, Daily PRN, Dulce Mcbride PA-C, 10 mg at 07/17/23 0948     HYDROmorphone (PF) (DILAUDID) injection 0.2 mg, 0.2 mg, Intravenous, Q2H PRN **OR** HYDROmorphone (PF) (DILAUDID) injection 0.4 mg, 0.4 mg, Intravenous, Q2H PRN, Dulce Mcbride PA-C     lactated ringers infusion, , Intravenous, Continuous, Dulce Mcbride PA-C, Stopped at 07/16/23 1343     lidocaine (LMX4) cream, , Topical, Q1H PRN, Dulce Mcbride PA-C     lidocaine 1 % 0.1-1 mL, 0.1-1 mL, Other, Q1H PRN, Dulce Mcbride PA-C     magnesium hydroxide (MILK OF MAGNESIA) suspension 30 mL, 30 mL, Oral, Daily PRN, Dulce Mcbride PA-C     methocarbamol (ROBAXIN) tablet 500 mg, 500 mg, Oral, Q6H PRN, Dulce Mcbride PA-C, 500 mg at 07/18/23 0837     naloxone (NARCAN) injection  "0.2 mg, 0.2 mg, Intravenous, Q2 Min PRN **OR** naloxone (NARCAN) injection 0.4 mg, 0.4 mg, Intravenous, Q2 Min PRN **OR** naloxone (NARCAN) injection 0.2 mg, 0.2 mg, Intramuscular, Q2 Min PRN **OR** naloxone (NARCAN) injection 0.4 mg, 0.4 mg, Intramuscular, Q2 Min PRN, David Escamilla MD     ondansetron (ZOFRAN ODT) ODT tab 4 mg, 4 mg, Oral, Q6H PRN **OR** ondansetron (ZOFRAN) injection 4 mg, 4 mg, Intravenous, Q6H PRN, Dulce Mcbride PA-C     oxyCODONE (ROXICODONE) tablet 5 mg, 5 mg, Oral, Q4H PRN, 5 mg at 07/15/23 1339 **OR** oxyCODONE IR (ROXICODONE) tablet 10 mg, 10 mg, Oral, Q4H PRN, Dulce Mcbride PA-C     polyethylene glycol (MIRALAX) Packet 17 g, 17 g, Oral, Daily, Dulce Mcbride PA-C, 17 g at 07/19/23 0844     pregabalin (LYRICA) capsule 225 mg, 225 mg, Oral, BID, Dulce Mcbride PA-C, 225 mg at 07/19/23 0844     senna-docusate (SENOKOT-S/PERICOLACE) 8.6-50 MG per tablet 1 tablet, 1 tablet, Oral, BID, Dulce Mcbride PA-C, 1 tablet at 07/19/23 0844     sodium chloride (PF) 0.9% PF flush 3 mL, 3 mL, Intracatheter, Q8H, Dulce Mcbride PA-C, 3 mL at 07/19/23 0640     sodium chloride (PF) 0.9% PF flush 3 mL, 3 mL, Intracatheter, q1 min prn, Dulce Mcbride PA-C     tamsulosin (FLOMAX) capsule 0.4 mg, 0.4 mg, Oral, Daily, He Wisdom MD, 0.4 mg at 07/18/23 9348            Review of Systems:      All other review of systems negative, except for what is mentioned above        Physical Exam:   /70 (BP Location: Right arm)   Pulse 86   Temp 98.7  F (37.1  C) (Oral)   Resp 16   Ht 1.753 m (5' 9.02\")   Wt 101.8 kg (224 lb 6.9 oz)   SpO2 92%   BMI 33.13 kg/m    GEN: AAO*3, not in acute distress, lying comfortably  HEENT: atraumatic, mucosa moist  CVS: normal heart rate, perfusing extremeties  RESP: no resp distress, satting well on RA  ABD: soft, nontender, round, mildly distended  : Deferred  EXT: Extremities grossly wnl, grossly normal range of motion, mild BLE " edema  NEURO/PSYCH: CN grossly normal, no focal weakness, normal mood and thought process              Data:     Recent Labs   Lab Test 07/16/23  0522 07/15/23  1410 06/27/23  0957 08/30/22  0832 11/03/21  1002   WBC  --   --  4.9 4.7 6.2   HGB 8.6* 10.8* 12.9* 13.9 13.7   CR  --   --  1.05 0.86 0.91         No results found for this or any previous visit from the past 365 days.           Discussed with staff surgeon Dr. Stock, who agrees with the assessment and plans    Renan Ramirez MD   PGY-2 Urology  [facility]

## 2023-07-19 NOTE — PLAN OF CARE
"  VS: /59 (BP Location: Right arm)   Pulse 80   Temp 98  F (36.7  C) (Oral)   Resp 16   Ht 1.753 m (5' 9.02\")   Wt 101.8 kg (224 lb 6.9 oz)   SpO2 94%   BMI 33.13 kg/m       O2: Stable on room air   Output: Granados catheter placed today; Patent.    Last BM: 7/17. Active bowel sounds and passing flatus.    Activity: Ax1 with gait belt, walker   Skin: R hip surgical incision   Pain: Denies. PRN tylenol given x1 this shift d/t granados catheter placement pain   CMS: intact   Dressing: R hip surgical incision CDI   Diet: Regular diet, thin liquids, takes pills whole   LDA: R PIV saline locked, patent   Equipment: IV pole, walker, personal belongings   Plan: OR Friday for L hip   Additional Info: A&Ox4. Denies headache, chest pain, SOB, numbness or tingling. Uses call light appropriately and makes needs known.        "

## 2023-07-20 ENCOUNTER — ANESTHESIA EVENT (OUTPATIENT)
Dept: SURGERY | Facility: CLINIC | Age: 78
DRG: 462 | End: 2023-07-20
Payer: COMMERCIAL

## 2023-07-20 ENCOUNTER — APPOINTMENT (OUTPATIENT)
Dept: PHYSICAL THERAPY | Facility: CLINIC | Age: 78
DRG: 462 | End: 2023-07-20
Attending: ORTHOPAEDIC SURGERY
Payer: COMMERCIAL

## 2023-07-20 ENCOUNTER — APPOINTMENT (OUTPATIENT)
Dept: OCCUPATIONAL THERAPY | Facility: CLINIC | Age: 78
DRG: 462 | End: 2023-07-20
Attending: ORTHOPAEDIC SURGERY
Payer: COMMERCIAL

## 2023-07-20 LAB
ABO/RH(D): NORMAL
ANION GAP SERPL CALCULATED.3IONS-SCNC: 12 MMOL/L (ref 7–15)
ANTIBODY SCREEN: NEGATIVE
BUN SERPL-MCNC: 19.7 MG/DL (ref 8–23)
CALCIUM SERPL-MCNC: 8.9 MG/DL (ref 8.8–10.2)
CHLORIDE SERPL-SCNC: 102 MMOL/L (ref 98–107)
CREAT SERPL-MCNC: 0.94 MG/DL (ref 0.67–1.17)
DEPRECATED HCO3 PLAS-SCNC: 22 MMOL/L (ref 22–29)
ERYTHROCYTE [DISTWIDTH] IN BLOOD BY AUTOMATED COUNT: 13.9 % (ref 10–15)
GFR SERPL CREATININE-BSD FRML MDRD: 84 ML/MIN/1.73M2
GLUCOSE SERPL-MCNC: 145 MG/DL (ref 70–99)
HCT VFR BLD AUTO: 28.3 % (ref 40–53)
HGB BLD-MCNC: 9.4 G/DL (ref 13.3–17.7)
MCH RBC QN AUTO: 30.6 PG (ref 26.5–33)
MCHC RBC AUTO-ENTMCNC: 33.2 G/DL (ref 31.5–36.5)
MCV RBC AUTO: 92 FL (ref 78–100)
PLATELET # BLD AUTO: 227 10E3/UL (ref 150–450)
POTASSIUM SERPL-SCNC: 4 MMOL/L (ref 3.4–5.3)
RBC # BLD AUTO: 3.07 10E6/UL (ref 4.4–5.9)
SODIUM SERPL-SCNC: 136 MMOL/L (ref 136–145)
SPECIMEN EXPIRATION DATE: NORMAL
WBC # BLD AUTO: 5.9 10E3/UL (ref 4–11)

## 2023-07-20 PROCEDURE — 99232 SBSQ HOSP IP/OBS MODERATE 35: CPT | Performed by: INTERNAL MEDICINE

## 2023-07-20 PROCEDURE — 86923 COMPATIBILITY TEST ELECTRIC: CPT

## 2023-07-20 PROCEDURE — 85027 COMPLETE CBC AUTOMATED: CPT | Performed by: ORTHOPAEDIC SURGERY

## 2023-07-20 PROCEDURE — 86901 BLOOD TYPING SEROLOGIC RH(D): CPT | Performed by: ORTHOPAEDIC SURGERY

## 2023-07-20 PROCEDURE — 36415 COLL VENOUS BLD VENIPUNCTURE: CPT | Performed by: ORTHOPAEDIC SURGERY

## 2023-07-20 PROCEDURE — 120N000002 HC R&B MED SURG/OB UMMC

## 2023-07-20 PROCEDURE — 250N000013 HC RX MED GY IP 250 OP 250 PS 637: Performed by: PHYSICIAN ASSISTANT

## 2023-07-20 PROCEDURE — 97110 THERAPEUTIC EXERCISES: CPT | Mod: GO

## 2023-07-20 PROCEDURE — 80048 BASIC METABOLIC PNL TOTAL CA: CPT | Performed by: ORTHOPAEDIC SURGERY

## 2023-07-20 PROCEDURE — 97110 THERAPEUTIC EXERCISES: CPT | Mod: GP

## 2023-07-20 PROCEDURE — 250N000013 HC RX MED GY IP 250 OP 250 PS 637: Performed by: ORTHOPAEDIC SURGERY

## 2023-07-20 PROCEDURE — 97535 SELF CARE MNGMENT TRAINING: CPT | Mod: GO

## 2023-07-20 PROCEDURE — 86850 RBC ANTIBODY SCREEN: CPT | Performed by: ORTHOPAEDIC SURGERY

## 2023-07-20 PROCEDURE — 250N000013 HC RX MED GY IP 250 OP 250 PS 637: Performed by: INTERNAL MEDICINE

## 2023-07-20 PROCEDURE — 97530 THERAPEUTIC ACTIVITIES: CPT | Mod: GP

## 2023-07-20 RX ORDER — ONDANSETRON 2 MG/ML
4 INJECTION INTRAMUSCULAR; INTRAVENOUS EVERY 30 MIN PRN
Status: CANCELLED | OUTPATIENT
Start: 2023-07-20

## 2023-07-20 RX ORDER — OXYCODONE HYDROCHLORIDE 5 MG/1
5 TABLET ORAL
Status: CANCELLED | OUTPATIENT
Start: 2023-07-20

## 2023-07-20 RX ORDER — ONDANSETRON 4 MG/1
4 TABLET, ORALLY DISINTEGRATING ORAL EVERY 30 MIN PRN
Status: CANCELLED | OUTPATIENT
Start: 2023-07-20

## 2023-07-20 RX ORDER — MEPERIDINE HYDROCHLORIDE 25 MG/ML
12.5 INJECTION INTRAMUSCULAR; INTRAVENOUS; SUBCUTANEOUS EVERY 5 MIN PRN
Status: CANCELLED | OUTPATIENT
Start: 2023-07-20

## 2023-07-20 RX ORDER — BISACODYL 10 MG
10 SUPPOSITORY, RECTAL RECTAL ONCE
Status: COMPLETED | OUTPATIENT
Start: 2023-07-20 | End: 2023-07-20

## 2023-07-20 RX ORDER — FENTANYL CITRATE 50 UG/ML
25 INJECTION, SOLUTION INTRAMUSCULAR; INTRAVENOUS EVERY 5 MIN PRN
Status: CANCELLED | OUTPATIENT
Start: 2023-07-20

## 2023-07-20 RX ORDER — LABETALOL HYDROCHLORIDE 5 MG/ML
10 INJECTION, SOLUTION INTRAVENOUS
Status: CANCELLED | OUTPATIENT
Start: 2023-07-20

## 2023-07-20 RX ORDER — SODIUM CHLORIDE, SODIUM LACTATE, POTASSIUM CHLORIDE, CALCIUM CHLORIDE 600; 310; 30; 20 MG/100ML; MG/100ML; MG/100ML; MG/100ML
INJECTION, SOLUTION INTRAVENOUS CONTINUOUS
Status: CANCELLED | OUTPATIENT
Start: 2023-07-20

## 2023-07-20 RX ORDER — OXYCODONE HYDROCHLORIDE 5 MG/1
10 TABLET ORAL
Status: CANCELLED | OUTPATIENT
Start: 2023-07-20

## 2023-07-20 RX ORDER — FENTANYL CITRATE 50 UG/ML
50 INJECTION, SOLUTION INTRAMUSCULAR; INTRAVENOUS EVERY 5 MIN PRN
Status: CANCELLED | OUTPATIENT
Start: 2023-07-20

## 2023-07-20 RX ORDER — HYDROMORPHONE HYDROCHLORIDE 1 MG/ML
0.4 INJECTION, SOLUTION INTRAMUSCULAR; INTRAVENOUS; SUBCUTANEOUS EVERY 5 MIN PRN
Status: CANCELLED | OUTPATIENT
Start: 2023-07-20

## 2023-07-20 RX ORDER — FENTANYL CITRATE 50 UG/ML
25 INJECTION, SOLUTION INTRAMUSCULAR; INTRAVENOUS
Status: CANCELLED | OUTPATIENT
Start: 2023-07-20

## 2023-07-20 RX ORDER — HYDROXYZINE HYDROCHLORIDE 10 MG/1
10 TABLET, FILM COATED ORAL EVERY 6 HOURS PRN
Status: CANCELLED | OUTPATIENT
Start: 2023-07-20

## 2023-07-20 RX ORDER — HYDROMORPHONE HYDROCHLORIDE 1 MG/ML
0.2 INJECTION, SOLUTION INTRAMUSCULAR; INTRAVENOUS; SUBCUTANEOUS EVERY 5 MIN PRN
Status: CANCELLED | OUTPATIENT
Start: 2023-07-20

## 2023-07-20 RX ADMIN — TAMSULOSIN HYDROCHLORIDE 0.4 MG: 0.4 CAPSULE ORAL at 14:27

## 2023-07-20 RX ADMIN — SENNOSIDES AND DOCUSATE SODIUM 1 TABLET: 50; 8.6 TABLET ORAL at 07:40

## 2023-07-20 RX ADMIN — POLYETHYLENE GLYCOL 3350 17 G: 17 POWDER, FOR SOLUTION ORAL at 07:40

## 2023-07-20 RX ADMIN — PREGABALIN 225 MG: 100 CAPSULE ORAL at 19:18

## 2023-07-20 RX ADMIN — SENNOSIDES AND DOCUSATE SODIUM 1 TABLET: 50; 8.6 TABLET ORAL at 19:18

## 2023-07-20 RX ADMIN — BISACODYL 10 MG: 10 SUPPOSITORY RECTAL at 07:40

## 2023-07-20 RX ADMIN — PREGABALIN 225 MG: 100 CAPSULE ORAL at 07:40

## 2023-07-20 ASSESSMENT — ACTIVITIES OF DAILY LIVING (ADL)
ADLS_ACUITY_SCORE: 38
ADLS_ACUITY_SCORE: 33
ADLS_ACUITY_SCORE: 34
ADLS_ACUITY_SCORE: 33
ADLS_ACUITY_SCORE: 34
ADLS_ACUITY_SCORE: 33
ADLS_ACUITY_SCORE: 33

## 2023-07-20 NOTE — ANESTHESIA PREPROCEDURE EVALUATION
Anesthesia Pre-Procedure Evaluation    Patient: Genaro Ortiz   MRN: [id number] : 1945        Procedure : Procedure(s):  ARTHROPLASTY, HIP, TOTAL RIGHT          Past Medical History:   Diagnosis Date     Adenocarcinoma of prostate (H) 2008     Arthritis      Esophageal reflux      Neuropathy      Pure hypercholesterolemia      Unspecified essential hypertension       Past Surgical History:   Procedure Laterality Date     ABDOMEN SURGERY      Appendectomy     APPENDECTOMY       ARTHROPLASTY HIP Right 2023    Procedure: ARTHROPLASTY, HIP, TOTAL RIGHT;  Surgeon: David Escamilla MD;  Location: [facility]     CATARACT IOL, RT/LT Bilateral      COLONOSCOPY       COLONOSCOPY N/A 2023    Procedure: colonoscopy;  Surgeon: Ayse Sears MD;  Location:  GI     GENITOURINARY SURGERY       HERNIORRHAPHY INGUINAL  5/15/2014    Procedure: HERNIORRHAPHY INGUINAL;  Surgeon: Evens Jefferson MD;  Location: [facility] OR     PROSTATE SURGERY        Allergies   Allergen Reactions     Penicillins Other (See Comments)     blotches      Social History     Tobacco Use     Smoking status: Never     Passive exposure: Never     Smokeless tobacco: Never     Tobacco comments:     Never smoked.   Substance Use Topics     Alcohol use: Not Currently     Comment: occasional      Wt Readings from Last 1 Encounters:   23 101.8 kg (224 lb 6.9 oz)        Anesthesia Evaluation   Pt has had prior anesthetic. Type: MAC and Regional.    History of anesthetic complications   post op urinary retention after right total hip. Had spinal with fentanyl.    ROS/MED HX  ENT/Pulmonary:  - neg pulmonary ROS     Neurologic:  - neg neurologic ROS     Cardiovascular:     (+) Dyslipidemia hypertension-----    METS/Exercise Tolerance:     Hematologic: Comments: MGUS    (+) anemia,     Musculoskeletal:   (+) arthritis,     GI/Hepatic:     (+) GERD,     Renal/Genitourinary: Comment: Currently has Urinary retention s/p  right hip surgery.       Endo:     (+) Obesity,     Psychiatric/Substance Use:  - neg psychiatric ROS     Infectious Disease:  - neg infectious disease ROS     Malignancy: Comment: Prostate cancer  (+) Malignancy, History of Prostate.Prostate CA Remission status post Surgery.        Other:  - neg other ROS          Anesthesia Pre-Procedure Evaluation    Patient: Genaro Ortiz   MRN: [id number] : 1945        Procedure : Procedure(s):  ARTHROPLASTY, HIP, TOTAL RIGHT          Past Medical History:   Diagnosis Date     Adenocarcinoma of prostate (H) 2008     Arthritis      Esophageal reflux      Neuropathy      Pure hypercholesterolemia      Unspecified essential hypertension       Past Surgical History:   Procedure Laterality Date     ABDOMEN SURGERY      Appendectomy     APPENDECTOMY       ARTHROPLASTY HIP Right 2023    Procedure: ARTHROPLASTY, HIP, TOTAL RIGHT;  Surgeon: David Escamilla MD;  [facility]     CATARACT IOL, RT/LT Bilateral      COLONOSCOPY       COLONOSCOPY N/A 2023    Procedure: colonoscopy;  Surgeon: Ayse Sears MD;  Location:  GI     GENITOURINARY SURGERY       HERNIORRHAPHY INGUINAL  5/15/2014    Procedure: HERNIORRHAPHY INGUINAL;  Surgeon: Evens Jefferson MD;  [facility]     PROSTATE SURGERY  2007      Allergies   Allergen Reactions     Penicillins Other (See Comments)     blotches      Social History     Tobacco Use     Smoking status: Never     Passive exposure: Never     Smokeless tobacco: Never     Tobacco comments:     Never smoked.   Substance Use Topics     Alcohol use: Not Currently     Comment: occasional      Wt Readings from Last 1 Encounters:   23 101.8 kg (224 lb 6.9 oz)        Anesthesia Evaluation   Pt has had prior anesthetic. Type: MAC.        ROS/MED HX  ENT/Pulmonary:  - neg pulmonary ROS     Neurologic:  - neg neurologic ROS     Cardiovascular:     (+) Dyslipidemia hypertension-----    METS/Exercise  Tolerance:     Hematologic: Comments: MGUS    (+) anemia,     Musculoskeletal:   (+) arthritis,     GI/Hepatic:     (+) GERD,     Renal/Genitourinary:  - neg Renal ROS     Endo:     (+) Obesity,     Psychiatric/Substance Use:  - neg psychiatric ROS     Infectious Disease:  - neg infectious disease ROS     Malignancy: Comment: Prostate cancer      Other:               OUTSIDE LABS:  CBC:   Lab Results   Component Value Date    WBC 5.9 07/20/2023    WBC 4.9 06/27/2023    HGB 9.4 (L) 07/20/2023    HGB 8.6 (L) 07/16/2023    HCT 28.3 (L) 07/20/2023    HCT 37.3 (L) 06/27/2023     07/20/2023     06/27/2023     BMP:   Lab Results   Component Value Date     07/20/2023     06/27/2023    POTASSIUM 4.0 07/20/2023    POTASSIUM 4.3 06/27/2023    CHLORIDE 102 07/20/2023    CHLORIDE 103 06/27/2023    CO2 22 07/20/2023    CO2 22 06/27/2023    BUN 19.7 07/20/2023    BUN 27.1 (H) 06/27/2023    CR 0.94 07/20/2023    CR 1.05 06/27/2023     (H) 07/20/2023     (H) 07/14/2023     COAGS: No results found for: PTT, INR, FIBR  POC: No results found for: BGM, HCG, HCGS  HEPATIC:   Lab Results   Component Value Date    ALBUMIN 4.5 06/27/2023    PROTTOTAL 8.1 06/27/2023    ALT 13 06/27/2023    AST 22 06/27/2023    ALKPHOS 86 06/27/2023    BILITOTAL 0.4 06/27/2023     OTHER:   Lab Results   Component Value Date    A1C 6.3 (H) 06/27/2023    MAX 8.9 07/20/2023    TSH 1.33 11/30/2020    CRP <2.9 10/30/2020    SED 21 (H) 10/30/2020       Anesthesia Plan    ASA Status:  3      Anesthesia Type: Spinal.   Induction: Intravenous.      Techniques and Equipment:     - Lines/Monitors: 2nd IV     Consents    Anesthesia Plan(s) and associated risks, benefits, and realistic alternatives discussed. Questions answered and patient/representative(s) expressed understanding.     - Discussed: Risks, Benefits and Alternatives for BOTH SEDATION and the PROCEDURE were discussed     - Discussed with:  Patient    Use of blood  products discussed: Yes.     - Discussed with: Patient.     Postoperative Care    Pain management: IV analgesics, Oral pain medications, Multi-modal analgesia.   PONV prophylaxis: Ondansetron (or other 5HT-3)     Comments:                Cheryl Read MD  Physical Exam    Airway        Mallampati: II   TM distance: > 3 FB   Neck ROM: full   Mouth opening: > 3 cm    Respiratory Devices and Support         Dental       (+) Multiple visibly decayed, broken teeth    B=Bridge, C=Chipped, L=Loose, M=Missing    Cardiovascular   cardiovascular exam normal       Rhythm and rate: regular     Pulmonary   pulmonary exam normal        breath sounds clear to auscultation           OUTSIDE LABS:  CBC:   Lab Results   Component Value Date    WBC 5.9 07/20/2023    WBC 4.9 06/27/2023    HGB 9.4 (L) 07/20/2023    HGB 8.6 (L) 07/16/2023    HCT 28.3 (L) 07/20/2023    HCT 37.3 (L) 06/27/2023     07/20/2023     06/27/2023     BMP:   Lab Results   Component Value Date     07/20/2023     06/27/2023    POTASSIUM 4.0 07/20/2023    POTASSIUM 4.3 06/27/2023    CHLORIDE 102 07/20/2023    CHLORIDE 103 06/27/2023    CO2 22 07/20/2023    CO2 22 06/27/2023    BUN 19.7 07/20/2023    BUN 27.1 (H) 06/27/2023    CR 0.94 07/20/2023    CR 1.05 06/27/2023     (H) 07/20/2023     (H) 07/14/2023     COAGS: No results found for: PTT, INR, FIBR  POC: No results found for: BGM, HCG, HCGS  HEPATIC:   Lab Results   Component Value Date    ALBUMIN 4.5 06/27/2023    PROTTOTAL 8.1 06/27/2023    ALT 13 06/27/2023    AST 22 06/27/2023    ALKPHOS 86 06/27/2023    BILITOTAL 0.4 06/27/2023     OTHER:   Lab Results   Component Value Date    A1C 6.3 (H) 06/27/2023    MAX 8.9 07/20/2023    TSH 1.33 11/30/2020    CRP <2.9 10/30/2020    SED 21 (H) 10/30/2020       Anesthesia Plan    ASA Status:  3   NPO Status:  NPO Appropriate    Anesthesia Type: General.     - Airway: LMA   Induction: Intravenous.      Techniques and Equipment:      - Lines/Monitors: 2nd IV     Consents    Anesthesia Plan(s) and associated risks, benefits, and realistic alternatives discussed. Questions answered and patient/representative(s) expressed understanding.     - Discussed: Risks, Benefits and Alternatives for BOTH SEDATION and the PROCEDURE were discussed     - Discussed with:  Patient      - Extended Intubation/Ventilatory Support Discussed: No.      - Patient is DNR/DNI Status: No    Use of blood products discussed: Yes.     - Discussed with: Patient.     - Consented: consented to blood products            Reason for refusal: other.     Postoperative Care    Pain management: IV analgesics, Oral pain medications, Multi-modal analgesia.   PONV prophylaxis: Ondansetron (or other 5HT-3), Dexamethasone or Solumedrol     Comments:                    Cheryl Read MD

## 2023-07-20 NOTE — PLAN OF CARE
"  VS: /66 (BP Location: Right arm)   Pulse 74   Temp 97.9  F (36.6  C) (Oral)   Resp 16   Ht 1.753 m (5' 9.02\")   Wt 101.8 kg (224 lb 6.9 oz)   SpO2 95%   BMI 33.13 kg/m     O2: Stable on room air   Output: Manzo catheter patent   Last BM: 7/20; active bowel sounds, passing flatus   Activity: Ax1 with gait belt, walker   Skin: R hip surgical incision   Pain: Denies pain at rest and increases only with activity   CMS: intact   Dressing: R hip surgical incision- dried scant drainage otherwise CDI   Diet: Regular diet, thin liquids, takes pills whole   LDA: R PIV saline locked   Equipment: IV pole, walker, commode, personal belongings   Plan: OR tomorrow (Friday -7/21) for L hip currently scheduled for 0930. NPO at midnight.    Additional Info: A&Ox4. Denies headache, chest pain, SOB, numbness or tingling. Uses call light appropriately and makes needs known.        "

## 2023-07-20 NOTE — PROGRESS NOTES
"[facility]    Medicine Progress Note - Hospitalist Service, GOLD TEAM 16    Date of Admission:  7/14/2023    Assessment & Plan     78 year old male admitted on 7/14/2023 s/p following procedure: R JOHNNY by Dr Escamilla. Patient developed acute urinary retention after surgery and granados cath was placed     Urinary rentention   -Urology evaluated the patient. Granados cath placed.     -Continue on Flomax.     # Orthopedic Surgery  - Ortho is primary team.   - Pain control, DVT prophylaxis and activity orders per ortho.   - He is going back to the OR on tomorrow.      # Anemia of acute blood loss: HGB 9.4. Acute blood loss anemia expected after surgery. Continue monitoring HGB.      #HTN: stable  - BP normal. No need for antihypertensives at this time, continue monitoring.   - Monitor, resume pta med as appropriate.      #HLD: PTA statin.      # MGUS:   - can theoretically increase thrombosis/DVT risk. As usual encourage early ambulation and routine DVT prevention measures per hip replacement protocol.        Diet: Advance Diet as Tolerated: Regular Diet Adult  Discharge Instruction - Regular Diet Adult    DVT Prophylaxis: Defer to primary service  Granados Catheter: PRESENT, indication: Retention, Retention  Lines: None     Cardiac Monitoring: None  Code Status: Full Code      Clinically Significant Risk Factors                  # Hypertension: Noted on problem list        # Obesity: Estimated body mass index is 33.13 kg/m  as calculated from the following:    Height as of this encounter: 1.753 m (5' 9.02\").    Weight as of this encounter: 101.8 kg (224 lb 6.9 oz).           Disposition Plan      Expected Discharge Date: 07/24/2023,  3:00 PM    Destination: inpatient rehabilitation facility            He Wisdom MD  Hospitalist Service, GOLD TEAM 16  [facility]  Securely message with [employer] (more info)  Text page via [employer] " Paging/Directory   See signed in provider for up to date coverage information  ______________________________________________________________________    Interval History     No acute events overnight.   He was pleasant.   No new issues.    Physical Exam   Vital Signs: Temp: 97.9  F (36.6  C) Temp src: Oral BP: 112/66 Pulse: 74   Resp: 16 SpO2: 95 % O2 Device: None (Room air)    Weight: 224 lbs 6.85 oz    General Appearance: Alert, room air, no acute distress.   Respiratory: Normal respiratory effort, clear lungs.   Cardiovascular: RRR.  GI: Abdomen soft, + BS, no tenderness to palpation.   Other: Alert, oriented, pleasant. Moving all extremities.     Medical Decision Making       45 MINUTES SPENT BY ME on the date of service doing chart review, history, exam, documentation & further activities per the note.      Data     I have personally reviewed the following data over the past 24 hrs:    5.9  \   9.4 (L)   / 227     136 102 19.7 /  145 (H)   4.0 22 0.94 \       Imaging results reviewed over the past 24 hrs:   No results found for this or any previous visit (from the past 24 hour(s)).

## 2023-07-20 NOTE — PLAN OF CARE
Pt is A&Ox4. VSS. LS lcear, on RA. BS active, LBM on 7/17/2023. Manzo patent and draining well. Pain managed with oxycodone. R hip surgical dressing is c/d/I. Pt up with 1 assist. R PIV is patent and SL. Continue to monitor.

## 2023-07-20 NOTE — PLAN OF CARE
VS:     Vital signs:  Temp: 97.4  F (36.3  C) Temp src: Oral BP: 109/54 Pulse: 74   Resp: 16 SpO2: 94 % O2 Device: None (Room air)      A/O X 4. Afebrile. VSS. Lung sound CTA, equal bilaterally both       anterior and posterior. IS encouraged. Denies nausea, shortness of breath, and chest pain.     Output:     Bowel sound normoactive. Last BM today in commode. FC in place for retention , patent and draining with adequate UO.      Activity:     A1 GB/Walker, WBAT on RLE. Has been ambulating with PT outside the room and has been up to the commode during daytime.       Skin:   Intact, no open area.     Pain:     Denied pain when in bed but it creeps up with mobility. Ice applied and pain med offered but refused.   CMS:     Has baseline neuropathy on BLE, otherwise, neuro's are intact.    Dressing:     Incisional dressing is c/d/i.     Diet:     Tolerating regular diet, denied N/V. NPO at midnight.     LDA:     R PIV is patent and saline locked.     Equipment:     SCD on BLE, Abduction pillow in place.     Plan:     NPO midnight for OR procedure at 0930 the next day. Hibiclens bath done.

## 2023-07-20 NOTE — PROGRESS NOTES
Orthopaedic Surgery Progress Note 07/20/2023    S: AVSS, AF. Pain controlled. Ao1 with walker. Manzo placed, seen by urology who recommend TOV after OR next week. LBM 7/17. Milk of mag yesterday with no results.  Will try suppository today. Tolerating diet. Dispo plan for TCU. Reviewed POC for OR Friday.   Preop labs pending.    O:  Temp: 98  F (36.7  C) Temp src: Oral BP: 105/59 Pulse: 80   Resp: 16 SpO2: 94 % O2 Device: None (Room air)      Exam:  Gen: No acute distress, resting comfortably in bed.  Resp: Non-labored breathing  Manzo catheter w clear, yellow urine    RLE:  Dressings with minimal serosang strikethrough over right hip  Fires GS/TS/FHL/EHL  SILT SP/DP/Saph/Sural/T  2+ DP, WWP    5/5 EHL, FHL, AT, GaSC in the left foot  SILT in the left foot  Hip quite stiff on left (little to no ER/IR)    Recent Labs   Lab 07/16/23  0522 07/15/23  1410   HGB 8.6* 10.8*       Assessment: Genaro Ortiz is a 78 year old male s/p R JOHNNY on 7/14. Doing well.     L JOHNNY on Friday, 7/21, with Dr. Escamilla.  - NPO at midnight  - Hold chemoppx  - preop labs pending    Discussed possible need for transfusion with patient. Amenable. AM labs pending.    Plan:  WBAT with posterior hip precautions  Postop abx: ancef x24h, complete  Pain control  DVT ppx: aspirin HELD  NPO at midnight  Preop labs pending  PT/OT  Manzo placed 7/18. Continue through OR. TOV next week on 7/23 or 7/24  Appreciate medicine recs  Dispo planning - TCU in coming days after L JOHNNY on friday    Future Appointments   Date Time Provider Department Center   7/15/2023  2:00 PM Eric Victor, PT [facility] [place]   8/28/2023 11:00 AM Merritt Toney, [facility]   10/4/2023  9:00 AM Giovanni Shah MD [facility]   11/7/2023  1:30 PM Eric Abel MD [facility]   12/8/2023  1:15 PM Valdez Quesada DO [facility]       Paradise Reyes MD, PGY-4  Orthopedics  Pager: [phone]

## 2023-07-21 ENCOUNTER — APPOINTMENT (OUTPATIENT)
Dept: GENERAL RADIOLOGY | Facility: CLINIC | Age: 78
DRG: 462 | End: 2023-07-21
Attending: STUDENT IN AN ORGANIZED HEALTH CARE EDUCATION/TRAINING PROGRAM
Payer: COMMERCIAL

## 2023-07-21 ENCOUNTER — ANESTHESIA (OUTPATIENT)
Dept: SURGERY | Facility: CLINIC | Age: 78
DRG: 462 | End: 2023-07-21
Payer: COMMERCIAL

## 2023-07-21 PROBLEM — Z96.641 STATUS POST TOTAL REPLACEMENT OF RIGHT HIP: Status: ACTIVE | Noted: 2023-07-21

## 2023-07-21 LAB
BASE EXCESS BLDV CALC-SCNC: -0.5 MMOL/L (ref -8.1–1.9)
BLD PROD TYP BPU: NORMAL
BLD PROD TYP BPU: NORMAL
BLOOD COMPONENT TYPE: NORMAL
BLOOD COMPONENT TYPE: NORMAL
CA-I BLD-MCNC: 4.7 MG/DL (ref 4.4–5.2)
CODING SYSTEM: NORMAL
CODING SYSTEM: NORMAL
CROSSMATCH: NORMAL
CROSSMATCH: NORMAL
GLUCOSE BLD-MCNC: 128 MG/DL (ref 70–99)
HCO3 BLDV-SCNC: 25 MMOL/L (ref 21–28)
HGB BLD-MCNC: 8.8 G/DL (ref 13.3–17.7)
INR PPP: 1.08 (ref 0.85–1.15)
LACTATE BLD-SCNC: 1.1 MMOL/L
O2/TOTAL GAS SETTING VFR VENT: 41 %
PCO2 BLDV: 44 MM HG (ref 40–50)
PH BLDV: 7.36 [PH] (ref 7.32–7.43)
PO2 BLDV: 61 MM HG (ref 25–47)
POTASSIUM BLD-SCNC: 4.3 MMOL/L (ref 3.5–5)
SODIUM BLD-SCNC: 139 MMOL/L (ref 133–144)
UNIT ABO/RH: NORMAL
UNIT ABO/RH: NORMAL
UNIT NUMBER: NORMAL
UNIT NUMBER: NORMAL
UNIT STATUS: NORMAL
UNIT STATUS: NORMAL
UNIT TYPE ISBT: 6200
UNIT TYPE ISBT: 6200

## 2023-07-21 PROCEDURE — 250N000025 HC SEVOFLURANE, PER MIN: Performed by: ORTHOPAEDIC SURGERY

## 2023-07-21 PROCEDURE — 258N000003 HC RX IP 258 OP 636: Performed by: NURSE ANESTHETIST, CERTIFIED REGISTERED

## 2023-07-21 PROCEDURE — C1713 ANCHOR/SCREW BN/BN,TIS/BN: HCPCS | Performed by: ORTHOPAEDIC SURGERY

## 2023-07-21 PROCEDURE — 258N000003 HC RX IP 258 OP 636: Performed by: ORTHOPAEDIC SURGERY

## 2023-07-21 PROCEDURE — 0SRB02A REPLACEMENT OF LEFT HIP JOINT WITH METAL ON POLYETHYLENE SYNTHETIC SUBSTITUTE, UNCEMENTED, OPEN APPROACH: ICD-10-PCS | Performed by: ORTHOPAEDIC SURGERY

## 2023-07-21 PROCEDURE — 250N000009 HC RX 250: Performed by: NURSE ANESTHETIST, CERTIFIED REGISTERED

## 2023-07-21 PROCEDURE — 250N000011 HC RX IP 250 OP 636: Performed by: NURSE ANESTHETIST, CERTIFIED REGISTERED

## 2023-07-21 PROCEDURE — 250N000011 HC RX IP 250 OP 636: Mod: JZ | Performed by: ANESTHESIOLOGY

## 2023-07-21 PROCEDURE — 250N000011 HC RX IP 250 OP 636: Performed by: ORTHOPAEDIC SURGERY

## 2023-07-21 PROCEDURE — 250N000013 HC RX MED GY IP 250 OP 250 PS 637: Performed by: ORTHOPAEDIC SURGERY

## 2023-07-21 PROCEDURE — 710N000010 HC RECOVERY PHASE 1, LEVEL 2, PER MIN: Performed by: ORTHOPAEDIC SURGERY

## 2023-07-21 PROCEDURE — C1776 JOINT DEVICE (IMPLANTABLE): HCPCS | Performed by: ORTHOPAEDIC SURGERY

## 2023-07-21 PROCEDURE — 999N000141 HC STATISTIC PRE-PROCEDURE NURSING ASSESSMENT: Performed by: ORTHOPAEDIC SURGERY

## 2023-07-21 PROCEDURE — 27130 TOTAL HIP ARTHROPLASTY: CPT | Mod: 79 | Performed by: ORTHOPAEDIC SURGERY

## 2023-07-21 PROCEDURE — 84132 ASSAY OF SERUM POTASSIUM: CPT

## 2023-07-21 PROCEDURE — 99207 PR NO BILLABLE SERVICE THIS VISIT: CPT

## 2023-07-21 PROCEDURE — 36415 COLL VENOUS BLD VENIPUNCTURE: CPT | Performed by: ORTHOPAEDIC SURGERY

## 2023-07-21 PROCEDURE — 250N000011 HC RX IP 250 OP 636: Mod: JZ | Performed by: ORTHOPAEDIC SURGERY

## 2023-07-21 PROCEDURE — 250N000011 HC RX IP 250 OP 636: Performed by: PHYSICIAN ASSISTANT

## 2023-07-21 PROCEDURE — 82330 ASSAY OF CALCIUM: CPT

## 2023-07-21 PROCEDURE — 999N000065 XR PELVIS AND HIP PORTABLE LEFT 1 VIEW

## 2023-07-21 PROCEDURE — 250N000013 HC RX MED GY IP 250 OP 250 PS 637: Performed by: PHYSICIAN ASSISTANT

## 2023-07-21 PROCEDURE — 120N000002 HC R&B MED SURG/OB UMMC

## 2023-07-21 PROCEDURE — 370N000017 HC ANESTHESIA TECHNICAL FEE, PER MIN: Performed by: ORTHOPAEDIC SURGERY

## 2023-07-21 PROCEDURE — 360N000077 HC SURGERY LEVEL 4, PER MIN: Performed by: ORTHOPAEDIC SURGERY

## 2023-07-21 PROCEDURE — 272N000001 HC OR GENERAL SUPPLY STERILE: Performed by: ORTHOPAEDIC SURGERY

## 2023-07-21 PROCEDURE — 250N000011 HC RX IP 250 OP 636: Mod: JZ | Performed by: NURSE ANESTHETIST, CERTIFIED REGISTERED

## 2023-07-21 PROCEDURE — 85610 PROTHROMBIN TIME: CPT | Performed by: ORTHOPAEDIC SURGERY

## 2023-07-21 DEVICE — IMPLANTABLE DEVICE
Type: IMPLANTABLE DEVICE | Site: HIP | Status: FUNCTIONAL
Brand: G7® LONGEVITY®

## 2023-07-21 DEVICE — IMPLANTABLE DEVICE: Type: IMPLANTABLE DEVICE | Site: HIP | Status: FUNCTIONAL

## 2023-07-21 RX ORDER — TRANEXAMIC ACID 650 MG/1
1950 TABLET ORAL ONCE
Status: COMPLETED | OUTPATIENT
Start: 2023-07-21 | End: 2023-07-21

## 2023-07-21 RX ORDER — HYDROMORPHONE HCL IN WATER/PF 6 MG/30 ML
0.4 PATIENT CONTROLLED ANALGESIA SYRINGE INTRAVENOUS
Status: DISCONTINUED | OUTPATIENT
Start: 2023-07-21 | End: 2023-07-25 | Stop reason: HOSPADM

## 2023-07-21 RX ORDER — ONDANSETRON 2 MG/ML
4 INJECTION INTRAMUSCULAR; INTRAVENOUS EVERY 30 MIN PRN
Status: DISCONTINUED | OUTPATIENT
Start: 2023-07-21 | End: 2023-07-21 | Stop reason: HOSPADM

## 2023-07-21 RX ORDER — HYDROMORPHONE HYDROCHLORIDE 1 MG/ML
0.4 INJECTION, SOLUTION INTRAMUSCULAR; INTRAVENOUS; SUBCUTANEOUS EVERY 5 MIN PRN
Status: DISCONTINUED | OUTPATIENT
Start: 2023-07-21 | End: 2023-07-21 | Stop reason: HOSPADM

## 2023-07-21 RX ORDER — MEPERIDINE HYDROCHLORIDE 25 MG/ML
12.5 INJECTION INTRAMUSCULAR; INTRAVENOUS; SUBCUTANEOUS EVERY 5 MIN PRN
Status: DISCONTINUED | OUTPATIENT
Start: 2023-07-21 | End: 2023-07-21 | Stop reason: HOSPADM

## 2023-07-21 RX ORDER — FENTANYL CITRATE 50 UG/ML
50 INJECTION, SOLUTION INTRAMUSCULAR; INTRAVENOUS EVERY 5 MIN PRN
Status: DISCONTINUED | OUTPATIENT
Start: 2023-07-21 | End: 2023-07-21 | Stop reason: HOSPADM

## 2023-07-21 RX ORDER — ACETAMINOPHEN 325 MG/1
650 TABLET ORAL EVERY 4 HOURS PRN
Status: DISCONTINUED | OUTPATIENT
Start: 2023-07-24 | End: 2023-07-25 | Stop reason: HOSPADM

## 2023-07-21 RX ORDER — BISACODYL 10 MG
10 SUPPOSITORY, RECTAL RECTAL DAILY PRN
Status: DISCONTINUED | OUTPATIENT
Start: 2023-07-21 | End: 2023-07-25 | Stop reason: HOSPADM

## 2023-07-21 RX ORDER — TRANEXAMIC ACID 10 MG/ML
1 INJECTION, SOLUTION INTRAVENOUS CONTINUOUS
Status: DISCONTINUED | OUTPATIENT
Start: 2023-07-21 | End: 2023-07-21 | Stop reason: ALTCHOICE

## 2023-07-21 RX ORDER — LIDOCAINE HYDROCHLORIDE 20 MG/ML
INJECTION, SOLUTION INFILTRATION; PERINEURAL PRN
Status: DISCONTINUED | OUTPATIENT
Start: 2023-07-21 | End: 2023-07-21

## 2023-07-21 RX ORDER — PROPOFOL 10 MG/ML
INJECTION, EMULSION INTRAVENOUS PRN
Status: DISCONTINUED | OUTPATIENT
Start: 2023-07-21 | End: 2023-07-21

## 2023-07-21 RX ORDER — AMOXICILLIN 250 MG
1 CAPSULE ORAL 2 TIMES DAILY
Status: DISCONTINUED | OUTPATIENT
Start: 2023-07-21 | End: 2023-07-25 | Stop reason: HOSPADM

## 2023-07-21 RX ORDER — METHOCARBAMOL 500 MG/1
500 TABLET, FILM COATED ORAL EVERY 6 HOURS PRN
Status: DISCONTINUED | OUTPATIENT
Start: 2023-07-21 | End: 2023-07-25 | Stop reason: HOSPADM

## 2023-07-21 RX ORDER — POLYETHYLENE GLYCOL 3350 17 G/17G
17 POWDER, FOR SOLUTION ORAL DAILY
Status: DISCONTINUED | OUTPATIENT
Start: 2023-07-22 | End: 2023-07-25 | Stop reason: HOSPADM

## 2023-07-21 RX ORDER — ACETAMINOPHEN 325 MG/1
975 TABLET ORAL ONCE
Status: COMPLETED | OUTPATIENT
Start: 2023-07-21 | End: 2023-07-21

## 2023-07-21 RX ORDER — PROCHLORPERAZINE MALEATE 5 MG
5 TABLET ORAL EVERY 6 HOURS PRN
Status: DISCONTINUED | OUTPATIENT
Start: 2023-07-21 | End: 2023-07-25 | Stop reason: HOSPADM

## 2023-07-21 RX ORDER — ONDANSETRON 4 MG/1
4 TABLET, ORALLY DISINTEGRATING ORAL EVERY 30 MIN PRN
Status: DISCONTINUED | OUTPATIENT
Start: 2023-07-21 | End: 2023-07-21 | Stop reason: HOSPADM

## 2023-07-21 RX ORDER — OXYCODONE HYDROCHLORIDE 5 MG/1
5 TABLET ORAL
Status: DISCONTINUED | OUTPATIENT
Start: 2023-07-21 | End: 2023-07-21 | Stop reason: HOSPADM

## 2023-07-21 RX ORDER — LIDOCAINE 40 MG/G
CREAM TOPICAL
Status: DISCONTINUED | OUTPATIENT
Start: 2023-07-21 | End: 2023-07-25 | Stop reason: HOSPADM

## 2023-07-21 RX ORDER — HYDROXYZINE HYDROCHLORIDE 10 MG/1
10 TABLET, FILM COATED ORAL EVERY 6 HOURS PRN
Status: DISCONTINUED | OUTPATIENT
Start: 2023-07-21 | End: 2023-07-21 | Stop reason: HOSPADM

## 2023-07-21 RX ORDER — OXYCODONE HYDROCHLORIDE 5 MG/1
10 TABLET ORAL
Status: DISCONTINUED | OUTPATIENT
Start: 2023-07-21 | End: 2023-07-21 | Stop reason: HOSPADM

## 2023-07-21 RX ORDER — EPHEDRINE SULFATE 50 MG/ML
INJECTION, SOLUTION INTRAMUSCULAR; INTRAVENOUS; SUBCUTANEOUS PRN
Status: DISCONTINUED | OUTPATIENT
Start: 2023-07-21 | End: 2023-07-21

## 2023-07-21 RX ORDER — SODIUM CHLORIDE, SODIUM LACTATE, POTASSIUM CHLORIDE, CALCIUM CHLORIDE 600; 310; 30; 20 MG/100ML; MG/100ML; MG/100ML; MG/100ML
INJECTION, SOLUTION INTRAVENOUS CONTINUOUS PRN
Status: DISCONTINUED | OUTPATIENT
Start: 2023-07-21 | End: 2023-07-21

## 2023-07-21 RX ORDER — ACETAMINOPHEN 325 MG/1
975 TABLET ORAL EVERY 8 HOURS
Status: COMPLETED | OUTPATIENT
Start: 2023-07-21 | End: 2023-07-24

## 2023-07-21 RX ORDER — ONDANSETRON 4 MG/1
4 TABLET, ORALLY DISINTEGRATING ORAL EVERY 6 HOURS PRN
Status: DISCONTINUED | OUTPATIENT
Start: 2023-07-21 | End: 2023-07-25 | Stop reason: HOSPADM

## 2023-07-21 RX ORDER — FENTANYL CITRATE 50 UG/ML
25 INJECTION, SOLUTION INTRAMUSCULAR; INTRAVENOUS
Status: DISCONTINUED | OUTPATIENT
Start: 2023-07-21 | End: 2023-07-21 | Stop reason: HOSPADM

## 2023-07-21 RX ORDER — LABETALOL HYDROCHLORIDE 5 MG/ML
10 INJECTION, SOLUTION INTRAVENOUS
Status: DISCONTINUED | OUTPATIENT
Start: 2023-07-21 | End: 2023-07-21 | Stop reason: HOSPADM

## 2023-07-21 RX ORDER — CEFAZOLIN SODIUM 2 G/100ML
2 INJECTION, SOLUTION INTRAVENOUS EVERY 8 HOURS
Status: COMPLETED | OUTPATIENT
Start: 2023-07-21 | End: 2023-07-22

## 2023-07-21 RX ORDER — ONDANSETRON 2 MG/ML
4 INJECTION INTRAMUSCULAR; INTRAVENOUS EVERY 6 HOURS PRN
Status: DISCONTINUED | OUTPATIENT
Start: 2023-07-21 | End: 2023-07-25 | Stop reason: HOSPADM

## 2023-07-21 RX ORDER — FENTANYL CITRATE 50 UG/ML
25 INJECTION, SOLUTION INTRAMUSCULAR; INTRAVENOUS EVERY 5 MIN PRN
Status: DISCONTINUED | OUTPATIENT
Start: 2023-07-21 | End: 2023-07-21 | Stop reason: HOSPADM

## 2023-07-21 RX ORDER — SODIUM CHLORIDE, SODIUM LACTATE, POTASSIUM CHLORIDE, CALCIUM CHLORIDE 600; 310; 30; 20 MG/100ML; MG/100ML; MG/100ML; MG/100ML
INJECTION, SOLUTION INTRAVENOUS CONTINUOUS
Status: DISCONTINUED | OUTPATIENT
Start: 2023-07-21 | End: 2023-07-25 | Stop reason: HOSPADM

## 2023-07-21 RX ORDER — CEFAZOLIN SODIUM/WATER 2 G/20 ML
2 SYRINGE (ML) INTRAVENOUS
Status: COMPLETED | OUTPATIENT
Start: 2023-07-21 | End: 2023-07-21

## 2023-07-21 RX ORDER — CEFAZOLIN SODIUM/WATER 2 G/20 ML
2 SYRINGE (ML) INTRAVENOUS SEE ADMIN INSTRUCTIONS
Status: DISCONTINUED | OUTPATIENT
Start: 2023-07-21 | End: 2023-07-21 | Stop reason: HOSPADM

## 2023-07-21 RX ORDER — CELECOXIB 200 MG/1
400 CAPSULE ORAL ONCE
Status: COMPLETED | OUTPATIENT
Start: 2023-07-21 | End: 2023-07-21

## 2023-07-21 RX ORDER — FENTANYL CITRATE 50 UG/ML
INJECTION, SOLUTION INTRAMUSCULAR; INTRAVENOUS PRN
Status: DISCONTINUED | OUTPATIENT
Start: 2023-07-21 | End: 2023-07-21

## 2023-07-21 RX ORDER — HYDROMORPHONE HYDROCHLORIDE 1 MG/ML
0.2 INJECTION, SOLUTION INTRAMUSCULAR; INTRAVENOUS; SUBCUTANEOUS EVERY 5 MIN PRN
Status: DISCONTINUED | OUTPATIENT
Start: 2023-07-21 | End: 2023-07-21 | Stop reason: HOSPADM

## 2023-07-21 RX ORDER — SODIUM CHLORIDE, SODIUM LACTATE, POTASSIUM CHLORIDE, CALCIUM CHLORIDE 600; 310; 30; 20 MG/100ML; MG/100ML; MG/100ML; MG/100ML
INJECTION, SOLUTION INTRAVENOUS CONTINUOUS
Status: DISCONTINUED | OUTPATIENT
Start: 2023-07-21 | End: 2023-07-21 | Stop reason: HOSPADM

## 2023-07-21 RX ORDER — HYDROMORPHONE HCL IN WATER/PF 6 MG/30 ML
0.2 PATIENT CONTROLLED ANALGESIA SYRINGE INTRAVENOUS
Status: DISCONTINUED | OUTPATIENT
Start: 2023-07-21 | End: 2023-07-25 | Stop reason: HOSPADM

## 2023-07-21 RX ORDER — ONDANSETRON 2 MG/ML
INJECTION INTRAMUSCULAR; INTRAVENOUS PRN
Status: DISCONTINUED | OUTPATIENT
Start: 2023-07-21 | End: 2023-07-21

## 2023-07-21 RX ORDER — OXYCODONE HYDROCHLORIDE 10 MG/1
10 TABLET ORAL EVERY 4 HOURS PRN
Status: DISCONTINUED | OUTPATIENT
Start: 2023-07-21 | End: 2023-07-25 | Stop reason: HOSPADM

## 2023-07-21 RX ORDER — OXYCODONE HYDROCHLORIDE 5 MG/1
5 TABLET ORAL EVERY 4 HOURS PRN
Status: DISCONTINUED | OUTPATIENT
Start: 2023-07-21 | End: 2023-07-25 | Stop reason: HOSPADM

## 2023-07-21 RX ORDER — CALCIUM CARBONATE 500 MG/1
500 TABLET, CHEWABLE ORAL 4 TIMES DAILY PRN
Status: DISCONTINUED | OUTPATIENT
Start: 2023-07-21 | End: 2023-07-25 | Stop reason: HOSPADM

## 2023-07-21 RX ORDER — ASPIRIN 81 MG/1
162 TABLET ORAL DAILY
Status: DISCONTINUED | OUTPATIENT
Start: 2023-07-22 | End: 2023-07-25 | Stop reason: HOSPADM

## 2023-07-21 RX ADMIN — TRANEXAMIC ACID 1950 MG: 650 TABLET ORAL at 09:20

## 2023-07-21 RX ADMIN — Medication 50 MG: at 11:01

## 2023-07-21 RX ADMIN — Medication 20 MG: at 11:55

## 2023-07-21 RX ADMIN — Medication 5 MG: at 12:02

## 2023-07-21 RX ADMIN — FENTANYL CITRATE 50 MCG: 50 INJECTION, SOLUTION INTRAMUSCULAR; INTRAVENOUS at 11:00

## 2023-07-21 RX ADMIN — PROPOFOL 100 MG: 10 INJECTION, EMULSION INTRAVENOUS at 10:59

## 2023-07-21 RX ADMIN — ONDANSETRON 4 MG: 2 INJECTION INTRAMUSCULAR; INTRAVENOUS at 15:07

## 2023-07-21 RX ADMIN — FENTANYL CITRATE 50 MCG: 50 INJECTION, SOLUTION INTRAMUSCULAR; INTRAVENOUS at 10:48

## 2023-07-21 RX ADMIN — ACETAMINOPHEN 975 MG: 325 TABLET, FILM COATED ORAL at 17:14

## 2023-07-21 RX ADMIN — Medication 2 G: at 10:59

## 2023-07-21 RX ADMIN — PHENYLEPHRINE HYDROCHLORIDE 200 MCG: 10 INJECTION INTRAVENOUS at 13:32

## 2023-07-21 RX ADMIN — Medication 10 MG: at 11:40

## 2023-07-21 RX ADMIN — SUGAMMADEX 200 MG: 100 INJECTION, SOLUTION INTRAVENOUS at 14:02

## 2023-07-21 RX ADMIN — CEFAZOLIN SODIUM 2 G: 2 INJECTION, SOLUTION INTRAVENOUS at 19:04

## 2023-07-21 RX ADMIN — PHENYLEPHRINE HYDROCHLORIDE 200 MCG: 10 INJECTION INTRAVENOUS at 13:29

## 2023-07-21 RX ADMIN — Medication 10 MG: at 11:56

## 2023-07-21 RX ADMIN — FENTANYL CITRATE 100 MCG: 50 INJECTION, SOLUTION INTRAMUSCULAR; INTRAVENOUS at 11:52

## 2023-07-21 RX ADMIN — SENNOSIDES AND DOCUSATE SODIUM 1 TABLET: 50; 8.6 TABLET ORAL at 21:10

## 2023-07-21 RX ADMIN — FENTANYL CITRATE 50 MCG: 50 INJECTION, SOLUTION INTRAMUSCULAR; INTRAVENOUS at 12:13

## 2023-07-21 RX ADMIN — SODIUM CHLORIDE, POTASSIUM CHLORIDE, SODIUM LACTATE AND CALCIUM CHLORIDE: 600; 310; 30; 20 INJECTION, SOLUTION INTRAVENOUS at 15:58

## 2023-07-21 RX ADMIN — CELECOXIB 400 MG: 200 CAPSULE ORAL at 09:20

## 2023-07-21 RX ADMIN — TAMSULOSIN HYDROCHLORIDE 0.4 MG: 0.4 CAPSULE ORAL at 17:14

## 2023-07-21 RX ADMIN — ONDANSETRON 4 MG: 4 TABLET, ORALLY DISINTEGRATING ORAL at 20:46

## 2023-07-21 RX ADMIN — PHENYLEPHRINE HYDROCHLORIDE 200 MCG: 10 INJECTION INTRAVENOUS at 13:17

## 2023-07-21 RX ADMIN — HYDROMORPHONE HYDROCHLORIDE 0.5 MG: 1 INJECTION, SOLUTION INTRAMUSCULAR; INTRAVENOUS; SUBCUTANEOUS at 12:30

## 2023-07-21 RX ADMIN — ACETAMINOPHEN 975 MG: 325 TABLET, FILM COATED ORAL at 09:20

## 2023-07-21 RX ADMIN — PREGABALIN 225 MG: 100 CAPSULE ORAL at 21:10

## 2023-07-21 RX ADMIN — SODIUM CHLORIDE, POTASSIUM CHLORIDE, SODIUM LACTATE AND CALCIUM CHLORIDE: 600; 310; 30; 20 INJECTION, SOLUTION INTRAVENOUS at 10:49

## 2023-07-21 RX ADMIN — PHENYLEPHRINE HYDROCHLORIDE 300 MCG: 10 INJECTION INTRAVENOUS at 12:02

## 2023-07-21 RX ADMIN — HYDROMORPHONE HYDROCHLORIDE 0.5 MG: 1 INJECTION, SOLUTION INTRAMUSCULAR; INTRAVENOUS; SUBCUTANEOUS at 13:57

## 2023-07-21 RX ADMIN — ONDANSETRON 4 MG: 2 INJECTION INTRAMUSCULAR; INTRAVENOUS at 10:59

## 2023-07-21 RX ADMIN — FENTANYL CITRATE 50 MCG: 50 INJECTION, SOLUTION INTRAMUSCULAR; INTRAVENOUS at 12:34

## 2023-07-21 RX ADMIN — LIDOCAINE HYDROCHLORIDE 100 MG: 20 INJECTION, SOLUTION INFILTRATION; PERINEURAL at 10:59

## 2023-07-21 ASSESSMENT — ACTIVITIES OF DAILY LIVING (ADL)
ADLS_ACUITY_SCORE: 38

## 2023-07-21 NOTE — PROGRESS NOTES
"  VS: /52 (BP Location: Left arm)   Pulse 69   Temp 97.6  F (36.4  C) (Oral)   Resp (!) 8   Ht 1.753 m (5' 9.02\")   Wt 101.8 kg (224 lb 6.9 oz)   SpO2 98%   BMI 33.13 kg/m       O2: RA   Output: Granados in place   Last BM: 7/20   Activity: Not OOB yet   Skin: Intact except for R & L Hip incisions   Pain: denies   CMS: Baseline numbness/tingling in BLE   Dressing: R & L hip dressings   Diet: reg   LDA: PIV in L hand & RFA   Equipment: Gait belt & walker when OOB   Plan: Monitor for pain managment   Additional Info:      Genaro had his L hip surgery done today and arrived back to unit at 1530.  Doing well, very little complaints of pain.  Tolerating a diet.    Not OOB yet and has granados in place  "

## 2023-07-21 NOTE — ANESTHESIA POSTPROCEDURE EVALUATION
Patient: Genaro Ortiz    Procedure: Procedure(s):  ARTHROPLASTY, HIP, TOTAL LEFT       Anesthesia Type:  General    Note:  Disposition: Inpatient   Postop Pain Control: Uneventful            Sign Out: Well controlled pain   PONV: No   Neuro/Psych: Uneventful            Sign Out: Acceptable/Baseline neuro status   Airway/Respiratory: Uneventful            Sign Out: Acceptable/Baseline resp. status   CV/Hemodynamics: Uneventful            Sign Out: Acceptable CV status; No obvious hypovolemia; No obvious fluid overload   Other NRE: NONE   DID A NON-ROUTINE EVENT OCCUR? No           Last vitals:  Vitals Value Taken Time   /71 07/21/23 1500   Temp 36.5  C (97.7  F) 07/21/23 1500   Pulse 73 07/21/23 1514   Resp 11 07/21/23 1515   SpO2 97 % 07/21/23 1515   Vitals shown include unvalidated device data.    Electronically Signed By: Cheryl Read MD  July 21, 2023  3:59 PM

## 2023-07-21 NOTE — PROGRESS NOTES
Brief Medicine Cross Cover Note:    Consult for post-op medical management received, patient is already being followed by [facility] 16 team pre-op. Chart, note, VSS reviewed. Discussed with on-call ortho MD. No acute concerns. Pt seen at bedside, mild nausea, but otherwise no acute concerns.     Interventions:  - Medicine will continue to follow     Zeinab Bishop CNP, APRN  Internal Medicine N[facility] Hospitalist  [facility]  [phone]  Securely message with the [employer]oc[employer] Web Console   Text page via [employer] Paging/Directory   Text page via [employer]

## 2023-07-21 NOTE — PLAN OF CARE
"VS:     Blood pressure 109/54, pulse 74, temperature 97.4  F (36.3  C), temperature source Oral, resp. rate 16, height 1.753 m (5' 9.02\"), weight 101.8 kg (224 lb 6.9 oz), SpO2 94 %.    Output:     LBM 7/20. Manzo catheter in place   Activity:     A1 GB/Walker, WBAT on RLE.    Skin:  R Hip incision   Pain:     Denies pain   CMS:     Has baseline neuropathy on BLE     Dressing:     Dressing CDI   Diet:     NPO    LDA:     R PIV is patent and saline locked.      Plan:     L JOHNNY scheduled for 0930          Both surgical scrubs completed        "

## 2023-07-21 NOTE — PROGRESS NOTES
Orthopaedic Surgery Progress Note 07/21/2023    S: AVSS, AF. Pain controlled. Ao1 with walker. Voiding via granados. LBM 7/20. NPO for OR today.    O:  Temp: 97.4  F (36.3  C) Temp src: Oral BP: 109/54 Pulse: 74   Resp: 16 SpO2: 94 % O2 Device: None (Room air)      Exam:  Gen: No acute distress, resting comfortably in bed.  Resp: Non-labored breathing  Granados catheter w clear, yellow urine    RLE:  Dressings with minimal serosang strikethrough over right hip  Fires GS/TS/FHL/EHL  SILT SP/DP/Saph/Sural/T  2+ DP, WWP    5/5 EHL, FHL, AT, GaSC in the left foot  SILT in the left foot  Hip quite stiff on left (little to no ER/IR)    Recent Labs   Lab 07/20/23  0852 07/16/23  0522 07/15/23  1410   WBC 5.9  --   --    HGB 9.4* 8.6* 10.8*     --   --        Assessment: Genaro Ortiz is a 78 year old male s/p R JOHNNY on 7/14. Doing well.     L JOHNNY on today, 7/21, with Dr. Escamilla.  - NPO   - Hold chemoppx  - preop labs complete    Discussed possible need for transfusion with patient. Amenable.     Plan:  WBAT with posterior hip precautions  Postop abx: periop ancef  Pain control  DVT ppx: aspirin HELD  NPO   Preop labs complete  PT/OT  Granados placed 7/18. Continue through OR. TOV next week on 7/23 or 7/24  Appreciate medicine recs  Dispo planning - TCU in coming days after L JOHNNY on friday    Future Appointments   Date Time Provider Department Center   7/15/2023  2:00 PM Eric Victor PT [facility] [place]   8/28/2023 11:00 AM Merritt Toney PA-C [facility]   10/4/2023  9:00 AM Giovanni Shah MD [facility]   11/7/2023  1:30 PM Eirc Abel MD [facility]   12/8/2023  1:15 PM Valdez Quesada DO [facility]       Paradise Reyes MD, PGY-4  Orthopedics  Pager: [phone]

## 2023-07-21 NOTE — OR NURSING
PACU to Inpatient Nursing Handoff    Patient Genaro Ortiz is a 78 year old male who speaks English.   Procedure Procedure(s):  ARTHROPLASTY, HIP, TOTAL LEFT   Surgeon(s) Primary: David Escamilla MD  Resident - Assisting: Paradise Reyes MD  Fellow - Assisting: Jody Acosta MD     Allergies   Allergen Reactions     Penicillins Other (See Comments)     blotches       Isolation  No active isolations     Past Medical History   has a past medical history of Adenocarcinoma of prostate (H) (5/27/2008), Arthritis, Esophageal reflux, Neuropathy, Pure hypercholesterolemia, and Unspecified essential hypertension.    Anesthesia General   Dermatome Level     Preop Meds acetaminophen (Tylenol) - time given: 0920  celecoxib (Celebrex) - time given: 0920  TXA - time given: 0920   Nerve block Not applicable   Intraop Meds fentanyl (Sublimaze): 300 mcg total  hydromorphone (Dilaudid): 0.5 mg total  ondansetron (Zofran): last given at 1059   Local Meds Yes - Local Cocktail (morphine, ropivacaine, epinephrine, Toradol)   Antibiotics cefazolin (Ancef) - last given at 1059     Pain Patient Currently in Pain: denies   PACU meds  Not applicable   PCA / epidural No   Capnography Respiratory Monitoring (EtCO2): 35 mmHg   Telemetry ECG Rhythm: Normal sinus rhythm   Inpatient Telemetry Monitor Ordered? No        Labs Glucose Lab Results   Component Value Date     07/21/2023     07/14/2023    GLC 96 08/30/2022     06/21/2021       Hgb Lab Results   Component Value Date    HGB 8.8 07/21/2023    HGB 9.4 07/20/2023    HGB 13.6 10/30/2020       INR Lab Results   Component Value Date    INR 1.08 07/21/2023      PACU Imaging Completed     Wound/Incision Incision/Surgical Site 07/14/23 Right Hip (Active)   Incision Assessment UTV 07/21/23 1445   Naima-Incision Assessment UTV 07/21/23 1415   Closure JOSE C 07/21/23 1445   Incision Drainage Amount Scant 07/21/23 1445   Drainage Description Brown 07/21/23 1445   Incision Care Ice applied  07/20/23 1700   Dressing Intervention Dried drainage 07/21/23 0820   Number of days: 7       Incision/Surgical Site 07/14/23 Right Greater Trochanter (Active)   Incision Assessment UTV 07/21/23 1445   Naima-Incision Assessment UTV 07/21/23 1415   Closure JOSE C 07/21/23 1445   Incision Drainage Amount None 07/21/23 1445   Drainage Description UTV 07/21/23 1445   Incision Care Ice applied 07/20/23 1700   Dressing Intervention Dried drainage 07/21/23 1445   Number of days: 7       Incision/Surgical Site 07/21/23 Left;Lateral Hip (Active)   Incision Assessment UTV 07/21/23 1445   Naima-Incision Assessment UTV 07/21/23 1415   Closure Sutures;Approximated;Liquid bandage 07/21/23 1341   Incision Drainage Amount UTV 07/21/23 1445   Dressing Intervention Clean, dry, intact 07/21/23 1445   Number of days: 0      CMS        Equipment ice pack   Other LDA       IV Access Peripheral IV 07/16/23 Right Upper forearm (Active)   Site Assessment WDL 07/21/23 1444   Line Status Infusing 07/21/23 1444   Dressing Transparent 07/21/23 0922   Dressing Status clean;dry;intact 07/21/23 1415   Dressing Intervention Dressing reinforced 07/21/23 0922   Line Intervention Flushed 07/21/23 0922   Phlebitis Scale 0-->no symptoms 07/21/23 1444   Infiltration? no 07/21/23 0922   Infiltration Scale 0 07/21/23 0820   Number of days: 5       Peripheral IV 07/21/23 Left Hand (Active)   Site Assessment WDL 07/21/23 1444   Line Status Saline locked 07/21/23 1444   Dressing Status clean;dry;intact 07/21/23 1415   Phlebitis Scale 0-->no symptoms 07/21/23 1444   Number of days: 0      Blood Products Not applicable  mL   Intake/Output Date 07/21/23 0700 - 07/22/23 0659   Shift 4196-8596 1921-6797 6413-6382 24 Hour Total   INTAKE   I.V. 1700   1700   Shift Total(mL/kg) 1700(16.7)   1700(16.7)   OUTPUT   Urine 900   900   Blood 300   300   Shift Total(mL/kg) 1200(11.79)   1200(11.79)   Weight (kg) 101.8 101.8 101.8 101.8      Drains / Manzo Urethral Catheter  07/19/23 Straight-tip 16 fr (Active)   Tube Description Positional 07/21/23 0922   Catheter Care Catheter wipes;Done 07/21/23 0515   Collection Container Standard 07/21/23 1444   Securement Method Securing device (Describe) 07/21/23 1444   Rationale for Continued Use Retention 07/21/23 1444   Urine Output 450 mL 07/21/23 1137   Number of days: 2      Time of void PreOp Time of Void Prior to Procedure:  (granados in place) (07/21/23 0834)    PostOp Voided (mL): 1 mL (07/18/23 1330)  Urine Occurrence: 1 (07/21/23 0515)  Straight cath: 500 mL (07/19/23 0400)    Diapered? No   Bladder Scan Bladder Scan Volume (mL): 525 ml (07/19/23 1145)    mL (07/20/23 1700)  tolerating sips and water     Vitals    B/P: (!) 142/68  T: 97.7  F (36.5  C)    Temp src: Oral  P:  Pulse: 83 (07/21/23 1445)          R: 10  O2:  SpO2: 97 %    O2 Device: Nasal cannula (07/21/23 1445)    Oxygen Delivery: 2 LPM (07/21/23 1445)         [facility]/support [facility]   Patient belongings     Patient transported on bed   DC meds/scripts (obs/outpt) Not applicable   Inpatient Pain Meds Released? Yes       Special needs/considerations None   Tasks needing completion None       Sara Meehan RN  ASCOM [id number]

## 2023-07-21 NOTE — PROGRESS NOTES
Progress note     Patient taken to the OR earlier today for L JOHNNY. Patient still at the OR, I was not able to see the patient. I reviewed the chart, no acute events overnight. Continues following the patient.   Continue monitoring the patient over the weekend.

## 2023-07-21 NOTE — OR NURSING
Called in to OR and clarified with Dr. Escamilla re: the administration of oral TXA and timing of case. Ok'd to give oral dose at 0920.

## 2023-07-21 NOTE — BRIEF OP NOTE
[facility]    Brief Operative Note    Pre-operative diagnosis: Osteoarthritis of both hips, unspecified osteoarthritis type [M16.0]  Post-operative diagnosis Same as pre-operative diagnosis     Procedure: Procedure(s):  ARTHROPLASTY, HIP, TOTAL LEFT  Surgeon: Surgeon(s) and Role:     * David Escamilla MD - Primary     * Paradise Reyes MD - Resident - Assisting     * Jody Acosta MD - Fellow - Assisting  Anesthesia: General   Estimated Blood Loss: 200 ml    Drains: None  Specimens: * No specimens in log *  Findings:   None.  Complications: None.  Implants:   Implant Name Type Inv. Item Serial No.  Lot No. LRB No. Used Action   IMP SHELL BIOM G7 ACETAB PPS DOWD HOLE 56MM SZ F [id number] - [id number] Total Joint Component/Insert IMP SHELL BIOM G7 ACETAB PPS DOWD HOLE 56MM SZ  [id number]  [employer] [id number] Left 1 Implanted   IMP SCR ZIM 6.5X40MM ACET CUP SELF TAP -017-40 - [id number] Metallic Hardware/[place] IMP SCR ZIM 6.5X40MM ACET CUP SELF TAP -990-40  [employer] [id number] Left 1 Implanted   IMP SCR ZIM 6.5X30MM ACET CUP SELF TAP -065-30 - [id number] Metallic Hardware/[place] IMP SCR ZIM 6.5X30MM ACET CUP SELF TAP -039-30  [employer] [id number] Left 1 Implanted   G7 Acetabular System Liner  High Wall 36 mm I.D. Size F    [employer] [id number] Left 1 Implanted   IMP STEM FEM BIOM ECHO RED PROX STD OFFSET [id number] - [id number] Total Joint Component/Insert IMP STEM FEM BIOM ECHO RED PROX STD OFFSET [id number]  [employer] [id number] Left 1 Implanted   HEAD FEM 0MM OFST 36MM HIP ACTB MDLR TY 1 BLX D G7 - [id number] Total Joint Component/Insert HEAD FEM 0MM OFST 36MM HIP ACTB MDLR TY 1 BLX D G7  [employer] [id number] Left 1 Implanted       Plan:  WBAT with posterior hip precautions bilateral hips  Postop abx: periop ancef  Pain control  DVT ppx: aspirin 162 to start POD1 and continue for 4 weeks  ADAT  Labs on POD1  then PRN  PT/OT  Manzo placed 7/18. TOV next week on 7/23  Appreciate medicine recs  Dispo planning - TCU in coming days, appreciate CM assistance    Paradise Reyes MD, PGY-4  Orthopedics  Pager: [phone]

## 2023-07-21 NOTE — ANESTHESIA PROCEDURE NOTES
Airway       Patient location during procedure: OR       Procedure Start/Stop Times: 7/21/2023 11:05 AM  Staff -        Anesthesiologist:  Cheryl Read MD       Performed By: anesthesiologist  Consent for Airway        Urgency: elective  Indications and Patient Condition       Indications for airway management: ishan-procedural       Induction type:intravenous       Mask difficulty assessment: 1 - vent by mask    Final Airway Details       Final airway type: endotracheal airway       Successful airway: ETT - single  Endotracheal Airway Details        ETT size (mm): 7.5       Cuffed: yes       Successful intubation technique: direct laryngoscopy       DL Blade Type: MAC 4       Grade View of Cords: 2 (with cricoid.)       Adjucts: stylet       Position: Right       Measured from: lips       Secured at (cm): 24       Bite block used: None    Post intubation assessment        Placement verified by: capnometry, equal breath sounds and chest rise        Number of attempts at approach: 1       Secured with: silk tape       Ease of procedure: easy       Dentition: Unchanged    Medication(s) Administered   Medication Administration Time: 7/21/2023 11:05 AM    Additional Comments       Intubated by Dr. Read and CA 1

## 2023-07-22 ENCOUNTER — APPOINTMENT (OUTPATIENT)
Dept: PHYSICAL THERAPY | Facility: CLINIC | Age: 78
DRG: 462 | End: 2023-07-22
Attending: ORTHOPAEDIC SURGERY
Payer: COMMERCIAL

## 2023-07-22 ENCOUNTER — APPOINTMENT (OUTPATIENT)
Dept: OCCUPATIONAL THERAPY | Facility: CLINIC | Age: 78
DRG: 462 | End: 2023-07-22
Attending: ORTHOPAEDIC SURGERY
Payer: COMMERCIAL

## 2023-07-22 LAB
ANION GAP SERPL CALCULATED.3IONS-SCNC: 9 MMOL/L (ref 7–15)
BUN SERPL-MCNC: 25.7 MG/DL (ref 8–23)
CALCIUM SERPL-MCNC: 8.3 MG/DL (ref 8.8–10.2)
CHLORIDE SERPL-SCNC: 104 MMOL/L (ref 98–107)
CREAT SERPL-MCNC: 1.14 MG/DL (ref 0.67–1.17)
DEPRECATED HCO3 PLAS-SCNC: 25 MMOL/L (ref 22–29)
GFR SERPL CREATININE-BSD FRML MDRD: 67 ML/MIN/1.73M2
GLUCOSE SERPL-MCNC: 114 MG/DL (ref 70–99)
HGB BLD-MCNC: 7.2 G/DL (ref 13.3–17.7)
HGB BLD-MCNC: 7.9 G/DL (ref 13.3–17.7)
POTASSIUM SERPL-SCNC: 4.5 MMOL/L (ref 3.4–5.3)
SODIUM SERPL-SCNC: 138 MMOL/L (ref 136–145)

## 2023-07-22 PROCEDURE — 250N000013 HC RX MED GY IP 250 OP 250 PS 637: Performed by: ORTHOPAEDIC SURGERY

## 2023-07-22 PROCEDURE — 97110 THERAPEUTIC EXERCISES: CPT | Mod: GP

## 2023-07-22 PROCEDURE — 99232 SBSQ HOSP IP/OBS MODERATE 35: CPT | Performed by: INTERNAL MEDICINE

## 2023-07-22 PROCEDURE — 85018 HEMOGLOBIN: CPT | Performed by: ORTHOPAEDIC SURGERY

## 2023-07-22 PROCEDURE — 97164 PT RE-EVAL EST PLAN CARE: CPT | Mod: GP

## 2023-07-22 PROCEDURE — 97168 OT RE-EVAL EST PLAN CARE: CPT | Mod: GO | Performed by: OCCUPATIONAL THERAPIST

## 2023-07-22 PROCEDURE — 36415 COLL VENOUS BLD VENIPUNCTURE: CPT | Performed by: ORTHOPAEDIC SURGERY

## 2023-07-22 PROCEDURE — 250N000011 HC RX IP 250 OP 636: Mod: JZ | Performed by: ORTHOPAEDIC SURGERY

## 2023-07-22 PROCEDURE — 80048 BASIC METABOLIC PNL TOTAL CA: CPT | Performed by: ORTHOPAEDIC SURGERY

## 2023-07-22 PROCEDURE — 97535 SELF CARE MNGMENT TRAINING: CPT | Mod: GO | Performed by: OCCUPATIONAL THERAPIST

## 2023-07-22 PROCEDURE — 120N000002 HC R&B MED SURG/OB UMMC

## 2023-07-22 PROCEDURE — 97530 THERAPEUTIC ACTIVITIES: CPT | Mod: GP

## 2023-07-22 RX ADMIN — CEFAZOLIN SODIUM 2 G: 2 INJECTION, SOLUTION INTRAVENOUS at 03:50

## 2023-07-22 RX ADMIN — PREGABALIN 225 MG: 100 CAPSULE ORAL at 19:37

## 2023-07-22 RX ADMIN — ACETAMINOPHEN 975 MG: 325 TABLET, FILM COATED ORAL at 00:38

## 2023-07-22 RX ADMIN — SENNOSIDES AND DOCUSATE SODIUM 1 TABLET: 50; 8.6 TABLET ORAL at 19:38

## 2023-07-22 RX ADMIN — ACETAMINOPHEN 975 MG: 325 TABLET, FILM COATED ORAL at 08:05

## 2023-07-22 RX ADMIN — POLYETHYLENE GLYCOL 3350 17 G: 17 POWDER, FOR SOLUTION ORAL at 08:04

## 2023-07-22 RX ADMIN — PREGABALIN 225 MG: 100 CAPSULE ORAL at 08:05

## 2023-07-22 RX ADMIN — ASPIRIN 162 MG: 81 TABLET, COATED ORAL at 08:05

## 2023-07-22 RX ADMIN — SENNOSIDES AND DOCUSATE SODIUM 1 TABLET: 50; 8.6 TABLET ORAL at 08:04

## 2023-07-22 RX ADMIN — TAMSULOSIN HYDROCHLORIDE 0.4 MG: 0.4 CAPSULE ORAL at 14:05

## 2023-07-22 RX ADMIN — ACETAMINOPHEN 975 MG: 325 TABLET, FILM COATED ORAL at 15:53

## 2023-07-22 ASSESSMENT — ACTIVITIES OF DAILY LIVING (ADL)
ADLS_ACUITY_SCORE: 38
PREVIOUS_RESPONSIBILITIES: HOUSEKEEPING;LAUNDRY;SHOPPING;MEDICATION MANAGEMENT;FINANCES
ADLS_ACUITY_SCORE: 38

## 2023-07-22 NOTE — OP NOTE
Date of Surgery:   7/21/2023     Preop Dx:   Osteoarthritis of left hip.      Postop Dx:   Osteoarthritis of left hip.      Procedure:  left total hip arthroplasty.      Attending Surgeon:  David Escamilla MD     Assistants:  MD Paradise Mcgrath MD     Anesthesia:   General Anesthesia     Complications:   None     EBL:   200cc     IV fluids:   See anesthesia records     Components used:  Biomet G7 Acetabular Cup, Size 56  Echo BiMetric Stem, Size 34i725an, STD Offset  Lipped Polyethylene liner, Size 36mm   Femoral Head, size 36mm +0     Findings:  Osteoarthritis of the left hip.   Severe ankylosis of the hip     Indications for procedure:  The patient presents with severe osteoarthritis of the left hip. He has failed conservative treatments, such as activity modification, oral anti-inflammatory medication, and physical therapy.  We recommended surgical treatment in the form of a total hip arthroplasty.  The risks, benefits, and alternatives to surgery were explained at length with the patient.  The risks include but are not limited to pain, bleeding, infection, damage to surrounding structures, loosening, mechanical hardware failure, dislocation, leg-length discrepancy, blood clots, COVID-19, the need for future procedures, as well as the risks of anesthesia itself.  The patient understood these risks, agreed to have surgery, and voluntarily signed a written informed consent.     Description of Procedure:  The patient was identified in the preoperative holding area by Dr. Escamilla, and the left lower extremity was then marked.  The patient was then greeted by the anesthesia team who brought the patient back to the operating theater.  The patient was then transferred onto the operating table.  General anesthesia was induced without any complications.  The patient was then positioned lateral decubitus position with the left hip pointing up. All of the bony prominences were well-padded.  The left lower extremity was  then prepped and draped in usual sterile fashion.  A timeout was performed confirming the patient name, date of birth, procedure to be performed, as well as laterality.  Perioperative antibiotics were given to the patient prior to commencement of surgery.     A minimal incision posterior lateral approach to the hip joint was then performed making a curvilinear incision over the greater trochanter and taking this down through the subcutaneous tissue. The gluteus edilberto was then split in line with its fibers. Due to the rigid hip, it was impossible to internally rotate the femur. The piriformis and obturator internus muscles were detached and the soft tissue capsule was divided from the posterior aspect of the femoral neck. The tendons were tagged with a suture. At this point, the capsule was incised superiorly and inferiorly as a rhomboid shaped trap-door. The hip joint was then dislocated. A femoral neck osteotomy was performed at the mid portion of the femoral neck. Head was removed.      The femur was then displaced anteriorly and the acetabulum was exposed after additional capsular release as needed. Large osteophytes were excised as needed. The acetabulum was now reamed by medializing to within 2 mm of the medial wall. Sequentially enlarging reaming was performed to a size 55. A size 56 G7 acetabular shell was selected as the optimal implant. It was impacted into a 45-degree abducted and 20-degree anteverted position. A real lipped liner was inserted with the lip at the 9 o'clock position.     Attention was now directed to the femur where it was exposed. The femur was reamed a broached sequentially to a size [id number] echo bimetric stem. After trialing and using the broach, standard offset, and 36mm +0 head, the hip was found to be secure and stable in full extension and external rotation of > 45 degrees as well as flexion of 90 degrees combined with internal rotation > 45 degrees.  XR was taken at this time.  The leg length was equal to the contralateral side.      At this time, we removed the broach and inserted a real [id number] standard offset echo bimetric stem. We trialed with a +0 head again, which had excellent stability and equal leg lengths. We decided to use the 36mm +0 as a read head, which was impacted on the neck after it had been cleansed and dried.      A thorough irrigation of the wound was now performed. Wound closure was accomplished by reapproximating the posterior capsular soft tissues as well as obturator internus and the piriformis muscles. The remainder of the wound was closed in layers with absorbable sutures using standard technique. Sterile dressing was applied.     The patient was then awoken from anesthesia without any complications.  The patient was then transferred onto the hospital bed and taken to the PACU without any complications.  Multiple sponge and needle counts were performed and were correct at the end of the case.  Dr. Escamilla was present and participated throughout the key portions of the procedure.         Postoperative plan:  WBAT with posterior hip precautions  Postop abx: ancef x24h  Postop XR  Pain control  DVT ppx: aspirin 162mg daily for 4 weeks  PT/OT, OOB  Appreciate medicine recs  Dispo planning    Jody Acosta MD  Adult Reconstructive Surgery Fellow  Department of Orthopaedic Surgery, [facility]      [email or web address]

## 2023-07-22 NOTE — PROGRESS NOTES
Orthopaedic Surgery Progress Note 07/22/2023    S: AVSS, AF. Pain controlled. No OOB since OR; did sit at EOB. Voiding via granados. LBM 7/20. Tolerating diet but will advance today. Denies lightheaded or dizziness.    O:  Temp: 97.5  F (36.4  C) Temp src: Oral BP: 101/58 Pulse: 72   Resp: 16 SpO2: 97 % O2 Device: Nasal cannula Oxygen Delivery: 2 LPM    Exam:  Gen: No acute distress, resting comfortably in bed.  Resp: Non-labored breathing  Granados catheter w clear, yellow urine    RLE:  Dressings with minimal serosang strikethrough over right hip  Fires GS/TS/FHL/EHL  SILT SP/DP/Saph/Sural/T  2+ DP, WWP    LLE:   Dressings c/d/I left hip  Fires GS/TS/FHL/EHL  SILT SP/DP/Saph/Sural/T  2+ DP, WWP      Recent Labs   Lab 07/22/23  0629 07/21/23  1317 07/20/23  0852   WBC  --   --  5.9   HGB 7.2* 8.8* 9.4*   PLT  --   --  227       Assessment: Genaro Ortiz is a 78 year old male s/p R JOHNNY on 7/14 and L JOHNNY on 7/21 with . Doing well.     Appreciate med assistance with medical management.    Plan for granados out tomorrow and TOV, per urology recs.     Plan:  WBAT with posterior hip precautions bilateral hips  Postop abx: periop ancef  Pain control  DVT ppx: aspirin 162 to start POD1 and continue for 4 weeks  ADAT  Labs on POD1 then PRN  PT/OT  Granados placed 7/18. TOV on 7/22  Appreciate medicine recs  Dispo planning - TCU in coming days, appreciate CM assistance    Future Appointments   Date Time Provider Department Center   7/15/2023  2:00 PM Eric Victor PT [facility]   8/28/2023 11:00 AM Merritt Toney, BETZAIDA [facility]   10/4/2023  9:00 AM Giovanni Shah MD [facility]   11/7/2023  1:30 PM Eric Abel MD [facility]   12/8/2023  1:15 PM Valdez Quesada DO [facility]       Paradise Reyes MD, PGY-4  Orthopedics  Pager: [phone]

## 2023-07-22 NOTE — PROGRESS NOTES
"   07/22/23 1135   Appointment Info   Signing Clinician's Name / Credentials (OT) Valerie Cm OTR/L   Rehab Comments (OT) re-eval; now B posterior hip precautions   Living Environment   People in Home alone   Current Living Arrangements condominium   Home Accessibility no concerns   Number of Stairs, Within Home, Primary none   Transportation Anticipated family or friend will provide   Living Environment Comments   (Limited support at home)   Self-Care   Usual Activity Tolerance moderate   Current Activity Tolerance fair   Regular Exercise No   Equipment Currently Used at Home dressing device;raised toilet seat;tub bench;walker, rolling   Fall history within last six months no   Activity/Exercise/Self-Care Comment Raudel ADLs baseline, Raudel mobility with 4WW at baseline, pt reports sedentary at baseline with short distance walks for errands as needed   Instrumental Activities of Daily Living (IADL)   Previous Responsibilities housekeeping;laundry;shopping;medication management;finances   IADL Comments has MOW delivered, will walk short distance to store, generally sedentary lifestyle   General Information   Onset of Illness/Injury or Date of Surgery 07/21/23   Referring Physician David Escamilla MD   Patient/Family Therapy Goal Statement (OT) open to tcu   Additional Occupational Profile Info/Pertinent History of Current Problem per chart \"78 year old male s/p R JOHNNY on 7/14 and L JOHNNY on 7/21 with . Doing well.\"   Existing Precautions/Restrictions fall;no hip IR;no hip ADD past midline;90 degree hip flexion;no pivoting or twisting   Left Lower Extremity (Weight-bearing Status) weight-bearing as tolerated (WBAT)   Right Lower Extremity (Weight-bearing Status) weight-bearing as tolerated (WBAT)   Cognitive Status Examination   Orientation Status orientation to person, place and time   Affect/Mental Status (Cognitive) WNL   Follows Commands follows one-step commands;over 90% accuracy   Cognitive Status Comments " "pt needs repetition of 2-step commands   Visual Perception   Visual Impairment/Limitations corrective lenses full-time   Pain Assessment   Patient Currently in Pain Yes, see Vital Sign flowsheet  (\"stiff\")   Posture   Posture forward head position;protracted shoulders   Range of Motion Comprehensive   Comment, General Range of Motion BUE WFL; BLE limited by surgical precautions   Strength Comprehensive (MMT)   Comment, General Manual Muscle Testing (MMT) Assessment BUE WFL; BLE limited by surgical precautions   Coordination   Upper Extremity Coordination No deficits were identified   Bed Mobility   Bed Mobility scooting/bridging;supine-sit;sit-supine   Scooting/Bridging [place] (Bed Mobility) contact guard;verbal cues;set up   Supine-Sit [place] (Bed Mobility) contact guard;verbal cues;set up   Assistive Device (Bed Mobility) bed rails   Transfers   Transfers bed-chair transfer;sit-stand transfer;toilet transfer;shower transfer   Transfer Skill: Bed to Chair/Chair to Bed   Bed-Chair [place] (Transfers) contact guard;verbal cues;set up   Assistive Device (Bed-Chair Transfers) standard walker   Sit-Stand Transfer   Sit-Stand [place] (Transfers) contact guard;verbal cues;set up   Assistive Device (Sit-Stand Transfers) walker, front-wheeled   Sit/Stand Transfer Comments bed raised   Shower Transfer   [place] Level (Shower Transfer) moderate assist (50% patient effort)   Shower Transfer Comments per clinical judgement; tub/shower w/extended tub bench and handheld shower   Toilet Transfer   Type (Toilet Transfer) sit-stand;stand-sit   [place] Level (Toilet Transfer) minimum assist (75% patient effort)   Assistive Device (Toilet Transfer) commode chair   Toilet Transfer Comments pt has RTS with vanity on side   Balance   Balance Comments good seated; unsteady standing, improved with 2WW   Activities of Daily Living   BADL Assessment/Intervention lower body dressing;grooming;toileting   Lower " Body Dressing Assessment/Training   Comment, (Lower Body Dressing) per clinical judgement; pt familiar with AE   [place] Level (Lower Body Dressing) moderate assist (50% patient effort)   Grooming Assessment/Training   [place] Level (Grooming) contact guard assist   Toileting   Comment, (Toileting) per clinical judgement   [place] Level (Toileting) moderate assist (50% patient effort)   Clinical Impression   Criteria for Skilled Therapeutic Interventions Met (OT) Yes, treatment indicated   OT Diagnosis impaired ADLs   OT Problem List-Impairments impacting ADL problems related to;activity tolerance impaired;balance;mobility;range of motion (ROM);strength;pain;post-surgical precautions   Assessment of Occupational Performance 3-5 Performance Deficits   Identified Performance Deficits dressing, toilet transfer, toileting, tub/shower transfer, bathing, home chores   Planned Therapy Interventions (OT) ADL retraining;IADL retraining;bed mobility training;strengthening;transfer training;home program guidelines   Clinical Decision Making Complexity (OT) moderate complexity   Risk & Benefits of therapy have been explained evaluation/treatment results reviewed;patient;friend   OT Total Evaluation Time   OT Eval, Moderate Complexity Minutes (90731) 10   OT Goals   OT Predicted Duration/Target Date for Goal Attainment 08/05/23   OT: Lower Body Dressing Modified independent;using adaptive equipment;within precautions;including set-up/clothing retrieval   OT: Transfer Modified independent;with assistive device;within precautions   OT: Toilet Transfer/Toileting Modified independent;toilet transfer;cleaning and garment management;using adaptive equipment;within precautions   OT: Home Management Supervision/stand-by assist;with light demand household tasks;within precautions   Self-Care/Home Management   Self-Care/Home Mgmt/ADL, Compensatory, Meal Prep Minutes (21254) 25   Symptoms Noted During/After Treatment (Meal  Preparation/Planning Training) fatigue;increased pain   Treatment Detail/Skilled Intervention Pt greeted seated in bed. Facilitated toilet transfer to commode overlay. CGA supine > sit, increased time/effort but pt maintaining B hip prec well.  Bed raised for EOB activity, TH setting up chair for later, pt sat ~5min.  CGA STS with bed raised, using 2WW.  CGA ambulation in room using 2WW, pt now keeping BLE within walker frame, cued to narrow GENNY just a little more as he was bumping onto heels when pushing walker forward.  Increased time for turn, pt maintaining B hip prec.  Pt cued for backing up fully to toilet, for hand placement, and Kylee to control descent.  CGA STS, pt with good balance when transitioning hands from commode arms to walker.  Pt walked to chair and transitioned to sitting with CGA.  Friend present for session, call light in reach.   OT Discharge Planning   OT Plan LE dressing w/AE, ADLs standing at sink, tub/shower transfer w/bench   OT Discharge Recommendation (DC Rec)   (defer to ortho)   OT Rationale for DC Rec Pt below baseline, limited by weakness, pain, surgical precautions, falls risk.  He would benefit from continued therapy to maximize safety and independence with I/ADLs. Pt progressed well in today's session, but needs to be very indep as he lives alone with limited support.   OT Brief overview of current status Kylee toilet transfer to commode overlay   Total Session Time   Timed Code Treatment Minutes 25   Total Session Time (sum of timed and untimed services) 35

## 2023-07-22 NOTE — PROGRESS NOTES
"[facility]    Medicine Progress Note - Hospitalist Service, GOLD TEAM 16    Date of Admission:  7/14/2023    Assessment & Plan     78 year old male admitted on 7/14/2023 s/p following procedure: R JOHNNY by Dr Escamilla. Patient developed acute urinary retention after surgery and granados cath was placed.      Urinary rentention   -Urology evaluated the patient. We will try voiding trial tomorrow AM.      -Continue on Flomax.     # Orthopedic Surgery  - Ortho is primary team. S/P bilateral hip replacement hip replacement by now.    - Pain control, DVT prophylaxis and activity orders per ortho.   - PT / OT following the patient. Patient will go to TCU.       # Anemia of acute blood loss: HGB 7.2. Acute blood loss anemia expected after surgery. I'll order another HGB later today. Transfuse if HGB < 7. Patient seems to be asymptomatic.      #HTN: stable  - BP normal. No need for antihypertensives at this time, continue monitoring.   - Monitor, resume pta med as appropriate.      #HLD: PTA statin.      # MGUS:   - can theoretically increase thrombosis/DVT risk. As usual encourage early ambulation and routine DVT prevention measures per hip replacement protocol.        Diet: Discharge Instruction - Regular Diet Adult  Advance Diet as Tolerated: Regular Diet Adult    DVT Prophylaxis: Defer to primary service  Granados Catheter: PRESENT, indication: Retention, Retention;Anesthesia  Lines: None     Cardiac Monitoring: None  Code Status: Full Code      Clinically Significant Risk Factors                  # Hypertension: Noted on problem list        # Obesity: Estimated body mass index is 33.13 kg/m  as calculated from the following:    Height as of this encounter: 1.753 m (5' 9.02\").    Weight as of this encounter: 101.8 kg (224 lb 6.9 oz).           Disposition Plan     Expected Discharge Date: 07/23/2023      Destination: inpatient rehabilitation facility            Odd Artis" MD  Hospitalist Service, GOLD TEAM 16  [facility]  Securely message with [employer] (more info)  Text page via [employer] Paging/Directory   See signed in provider for up to date coverage information  ______________________________________________________________________    Interval History     No acute events overnight.   He was pleasant.   Denies chest pain, palpitations, shortness of breath, dizziness, lightheadedness, near-syncope, fever or chills.      Physical Exam   Vital Signs: Temp: 97.8  F (36.6  C) Temp src: Oral BP: 97/48 Pulse: 70   Resp: 15 SpO2: 97 % O2 Device: Nasal cannula Oxygen Delivery: 2 LPM  Weight: 224 lbs 6.85 oz    General Appearance: Alert, room air, no acute distress.   Respiratory: Normal respiratory effort, clear lungs.   Cardiovascular: RRR.  GI: Abdomen soft, + BS, no tenderness to palpation.   Other: Alert, oriented, pleasant. Moving all extremities.     Medical Decision Making       45 MINUTES SPENT BY ME on the date of service doing chart review, history, exam, documentation & further activities per the note.      Data     I have personally reviewed the following data over the past 24 hrs:    N/A  \   7.2 (L)   / N/A     138 104 25.7 (H) /  114 (H)   4.5 25 1.14 \       Procal: N/A CRP: N/A Lactic Acid: 1.1         Imaging results reviewed over the past 24 hrs:   Recent Results (from the past 24 hour(s))   XR Pelvis w Hip Port Left 1 View    Narrative    EXAM: XR PELVIS AND HIP PORTABLE LEFT 1 VIEW  LOCATION: [facility]  DATE: 7/21/2023    INDICATION: Status post Hip surgery  COMPARISON: None.      Impression    IMPRESSION: Postoperative changes bilateral total hip arthroplasty. Components appear well seated. No evidence for fracture. Visualized pelvis negative for fracture.

## 2023-07-22 NOTE — PROGRESS NOTES
Care Management Follow Up    Length of Stay (days): 8    Expected Discharge Date: 07/23/2023     Concerns to be Addressed: all concerns addressed in this encounter     Patient plan of care discussed at interdisciplinary rounds: Yes    Anticipated Discharge Disposition: Transitional Care     Anticipated Discharge Services:  PT/OT  Anticipated Discharge DME:  To be determined    Patient/family educated on Medicare website which has current facility and service quality ratings:  Yes-pt prefers to be close to friend [facility]   Education Provided on the Discharge Plan:  yes  Patient/Family in Agreement with the Plan: yes    Referrals Placed by CM/SW:  See below  Private pay costs discussed: transportation costs  Provided estimate for WC ride from [employer] as being $200-$300  Additional Information:    SW and pt discussed discharge plan. He is in agreement with recommendations for TCU stay and prefers to be closer to where his friend lives as friend will visit daily.      His preference for TCU is [facility]. SW and pt discussed potential alternatives should [place] have no TCU beds available and pt was agreeable to writer initiating referrals to [facility] in [place]  and [facility].  Referrals initiated via Epic.    Pt had questions about [employer] SNF coverage, writer provided general information (dependent upon which plan he has) that [employer] typically will cover TCU at 100% of daily rate for days 1-20, and that there most likely will be co-insurance rate should he not discharge from TCU after day 20.    Pending Referrals:    [facility]  [address] [place] [address]  [phone] f: [phone]    [facility]  [address] [place] [address]  [phone] F: [phone]    [facility]  [address] [place] [address]  [phone] F: [phone]    HEATHER Herrera, MSW        Social Work and Care  Management Department       SEARCHABLE in [employer] - search SOCIAL WORK       [place] (0800 - 1630) Saturday and Sunday     Units: 4A, 4C, & 4E Pager: [phone]     Units: 5A & 5B Pager: [phone]     Units: 6A & 6B   Pager: [phone]     Units: 6C & 6D Pager: [phone]     Units: 7A &7B  Pager: [phone]     Units: 7C, 7D, & 5C Pager: [phone]     Unit: [place] ED Pager: [phone]      [facility] ([id number]) Saturday and Sunday      Units: 5 Ortho, 5 Med/Surg & WB ED  Pager:[phone]     Units: 6 Med/Surg, 8A, & 10A ICU  Pager: [phone]        After hours (1630 - 0000) Saturday & Sunday; (0313-0719) Mon-Fri; (2257-0367) [facility] Recognized Holidays     Units: ALL  Pager: [phone]

## 2023-07-22 NOTE — PROGRESS NOTES
"SPIRITUAL HEALTH SERVICES Progress Note  [facility] ([facility] 5AE    Saw pt Genaro per routine consult for emotional support. Pt introduced me to his friend who was visiting and both verbally expressed gratitude for the successful surgeries he's had. Pt shared about being eager to \"visit restaurants and eat outside\". He also shared about his cultural heritage and origins, and reflected on ancient world history.    Plan: He requested unit  continues visiting for emotional and spiritual support. [facility] remains available per pt request.    Matt Treadwell, CPRS, CHP   Intern  Pager [phone]      * [facility] remains available 24/7 for emergent requests/referrals, either by having the switchboard page the on-call  or by entering an ASAP/STAT consult in Epic (this will also page the on-call ). Routine Epic consults receive an initial response within 24 hours.*   "

## 2023-07-22 NOTE — PLAN OF CARE
Pt A&O x's 4. VSS. Afebrile. 02 sats in the 90s on RA. Lungs clear. Denies SOB, chest pain but complained of nausea and given prn Zofran with good effect. Tolerating regular diet. Bowel sound active in all quadrants. No BM but passing gas. Voiding via granados catheter. Pain well managed. Pt declined any prn pain medication. CMS intact, denies any new N/T. Right and left hip dressing intact. Pcds on. Hip abductor pillow in place. Pt dangled and stood at the side of the bed and tolerated well.   PIV patent and infusing. Pt slept between care and is able to make needs known, call light with in reach. Will continue to monitor.

## 2023-07-22 NOTE — PROGRESS NOTES
"  VS: BP 97/48 (BP Location: Left arm)   Pulse 70   Temp 97.8  F (36.6  C) (Oral)   Resp 15   Ht 1.753 m (5' 9.02\")   Wt 101.8 kg (224 lb 6.9 oz)   SpO2 97%   BMI 33.13 kg/m     O2: RA   Output: granados   Last BM: 7/20   Activity: SBA with gait belt & walker   Skin: Intact except for R & L hip incisions   Pain: denies   CMS: intact   Dressing: R & L hip dressings, CDI   Diet: reg   LDA: PIV in RFA   Equipment: Gait belt & walker   Plan: Monitor for pain, take granados out 7/23 discharge to TCU in a few days   Additional Info:      Genaro is a very pleasant & cooperative gentleman who has not complained of any pain during this shift.      Tolerating food with no signs of N&V.  He worked with PT today and was up in a chair. He did well.    "

## 2023-07-22 NOTE — PROGRESS NOTES
"   07/22/23 0916   Appointment Info   Signing Clinician's Name / Credentials (PT) AGATA Ayoub   Student Supervision On-site supervision provided;Direct supervision provided;Therapy services provided with the co-signing licensed therapist guiding and directing the services, and providing the skilled judgement and assessment throughout the session       Present no   Language English   Living Environment   People in Home alone   Current Living Arrangements condominium   Home Accessibility no concerns   Number of Stairs, Within Home, Primary none   Transportation Anticipated family or friend will provide   Self-Care   Usual Activity Tolerance moderate   Current Activity Tolerance fair   Regular Exercise No   Equipment Currently Used at Home dressing device;raised toilet seat;tub bench;walker, rolling   Fall history within last six months no   Activity/Exercise/Self-Care Comment Raudel with 4WW at baseline, pt. reports Raudel for ADLs and sedentary at baseline with short distance walks for errands as needed   General Information   Onset of Illness/Injury or Date of Surgery 07/21/23   Referring Physician Paradise Reyes MD   Patient/Family Therapy Goals Statement (PT) did not endorse   Pertinent History of Current Problem (include personal factors and/or comorbidities that impact the POC) per chart \"78 year old male s/p R JOHNNY on 7/14 and L JOHNNY on 7/21 with . Doing well.\"   Existing Precautions/Restrictions fall;no hip IR;no hip ADD past midline;90 degree hip flexion;no pivoting or twisting  (bilaterally)   Weight-Bearing Status - LUE full weight-bearing   Weight-Bearing Status - RUE full weight-bearing   Weight-Bearing Status - LLE weight-bearing as tolerated   Weight-Bearing Status - RLE weight-bearing as tolerated   Cognition   Affect/Mental Status (Cognition) WNL   Orientation Status (Cognition) oriented x 4   Follows Commands (Cognition) WNL   Pain Assessment   Patient Currently in Pain " Yes, see Vital Sign flowsheet   Integumentary/Edema   Integumentary/Edema no deficits were identifed   Posture    Posture Forward head position;Protracted shoulders   Range of Motion (ROM)   Range of Motion ROM deficits secondary to surgical procedure;ROM deficits secondary to pain   ROM Comment R hip flexion ROM to about 60 degrees, L hip flexion ROM to about 45 degrees visual estimate with heel slides AAROM   Strength (Manual Muscle Testing)   Strength (Manual Muscle Testing) strength is WFL   Bed Mobility   Comment, (Bed Mobility) Pt. completes supine<>sit with Louis   Transfers   Comment, (Transfers) Pt. performs sit<>stand with Louis, wide GENNY   Gait/Stairs (Locomotion)   Comment, (Gait/Stairs) Pt. ambulates with walker, CGA   Balance   Balance other (describe)   Balance Comments balance deficits at baseline, using 4WW for ambulation, independent in sitting balance with UE support of bed   Sensory Examination   Sensory Perception patient reports no sensory changes   Clinical Impression   Criteria for Skilled Therapeutic Intervention Yes, treatment indicated   PT Diagnosis (PT) impaired functional mobility   Influenced by the following impairments increased post-op pain, posterior hip precautions bilaterally, ROM deficits d/t pain   Functional limitations due to impairments impaired bed mobility, transfers, gait   Clinical Presentation (PT Evaluation Complexity) Stable/Uncomplicated   Clinical Presentation Rationale per clinical judgement   Clinical Decision Making (Complexity) low complexity   Planned Therapy Interventions (PT) balance training;bed mobility training;gait training;home exercise program;patient/family education;ROM (range of motion);transfer training;progressive activity/exercise;risk factor education;home program guidelines   Anticipated Equipment Needs at Discharge (PT) walker, standard  (brakes on pt. 4WW do not work, currently ambulating well with FWW with PT, pt. would like one at discharge)    Risk & Benefits of therapy have been explained evaluation/treatment results reviewed;care plan/treatment goals reviewed;risks/benefits reviewed;current/potential barriers reviewed   PT Total Evaluation Time   PT Eval, Low Complexity Minutes (89847) 5   Plan of Care Review   Plan of Care Reviewed With patient   Physical Therapy Goals   PT Frequency Daily   PT Predicted Duration/Target Date for Goal Attainment 07/27/23   PT Goals Bed Mobility;Transfers;Gait   PT: Bed Mobility Modified independent;Supine to/from sit;Within precautions   PT: Transfers Modified independent;Sit to/from stand;Bed to/from chair;Assistive device;Within precautions   PT: Gait Modified independent;Assistive device;Within precautions;150 feet   Therapeutic Procedure/Exercise   Ther. Procedure: strength, endurance, ROM, flexibillity Minutes (35169) 10   Symptoms Noted During/After Treatment increased pain   Treatment Detail/Skilled Intervention Pt. instructed in HEP for JOHNNY with focus on L LE. Pt completes: AP, GS, HS, QS, SAQ, heel slides x10 each. Pt. receives PT assistance for heel slides, HS. Pt. also completes heel slides on R LE x10 to promote ROM as recent R JOHNNY. Good tolerance overall.   Therapeutic Activity   Therapeutic Activities: dynamic activities to improve functional performance Minutes (60744) 25   Symptoms Noted During/After Treatment Increased pain   Treatment Detail/Skilled Intervention Pt. received supine in bed, agreeable to participate in PT session. Pt. re-educated in posterior hip precautions as now has bilateral hip precautions. Pt. understanding of precautions. Pt. then performs supine to sit transfer with Louis at BLE, verbal cues for progression, HOB elevated. Increased time needed for this transition. Pt. then sits EOB with no c/o lightheadedness/dizziness. BP checked =110/59. Pt. then completes sit to stand transfer with bed slightly elevated, verbal cues for maintaining precautions, Louis. Pt. then stands EOB for  minute, initially reporting some dizziness but subsides quickly. Pt. then ambulates with FWW, CGA in hallway about 60' this date. Pt. still demonstrating increased hip abduction bilaterally, decreased gait speed, and shuffling pattern absent of hip and knee flexion. Pt. hip abduction has improved towards more neutral posture since bilateral THAs and now able to stand within walker for ambulation. Pt. receives verbal cues for maintaining hip precautions when turning throughout. Pt. then returns to room and is instructed in lateral steps towards HOB. Pt. completes stand to sit transfer with verbal cues for reaching back, CGA. Pt. then performs sit to supine transfer with SBA, maintaining hip precautions. Pt. instructed in scoots to reposition for better alignment. Pt. then ends session supine in bed with all needs met and questions answered.   PT Discharge Planning   PT Plan repeated STS transfers, progress ambulation with FWW, encourage narrower GENNY, HEP for BLE strengthening and ROM   PT Discharge Recommendation (DC Rec) Transitional Care Facility   PT Rationale for DC Rec Pt. demonstrating mobility deficits and decreased activity tolerance. Pt. has increased hip abduction bilaterally which increases difficulty of safe mobility. Pt. now able to get feet within walker but still very abducted. Pt. would benefit from continued skilled PT to progress safety and independence with mobility.   PT Brief overview of current status Louis bed mobility, Louis-CGA transfers, CGA ambulation with walker   Total Session Time   Timed Code Treatment Minutes 35   Total Session Time (sum of timed and untimed services) 40

## 2023-07-23 ENCOUNTER — APPOINTMENT (OUTPATIENT)
Dept: PHYSICAL THERAPY | Facility: CLINIC | Age: 78
DRG: 462 | End: 2023-07-23
Attending: ORTHOPAEDIC SURGERY
Payer: COMMERCIAL

## 2023-07-23 LAB
ERYTHROCYTE [DISTWIDTH] IN BLOOD BY AUTOMATED COUNT: 14 % (ref 10–15)
HCT VFR BLD AUTO: 20.9 % (ref 40–53)
HGB BLD-MCNC: 7 G/DL (ref 13.3–17.7)
HGB BLD-MCNC: 7.5 G/DL (ref 13.3–17.7)
MCH RBC QN AUTO: 30.8 PG (ref 26.5–33)
MCHC RBC AUTO-ENTMCNC: 33.5 G/DL (ref 31.5–36.5)
MCV RBC AUTO: 92 FL (ref 78–100)
PLATELET # BLD AUTO: 246 10E3/UL (ref 150–450)
RBC # BLD AUTO: 2.27 10E6/UL (ref 4.4–5.9)
WBC # BLD AUTO: 8.2 10E3/UL (ref 4–11)

## 2023-07-23 PROCEDURE — 120N000002 HC R&B MED SURG/OB UMMC

## 2023-07-23 PROCEDURE — 99232 SBSQ HOSP IP/OBS MODERATE 35: CPT | Performed by: INTERNAL MEDICINE

## 2023-07-23 PROCEDURE — 250N000013 HC RX MED GY IP 250 OP 250 PS 637: Performed by: ORTHOPAEDIC SURGERY

## 2023-07-23 PROCEDURE — 97530 THERAPEUTIC ACTIVITIES: CPT | Mod: GP

## 2023-07-23 PROCEDURE — 36415 COLL VENOUS BLD VENIPUNCTURE: CPT | Performed by: INTERNAL MEDICINE

## 2023-07-23 PROCEDURE — 36415 COLL VENOUS BLD VENIPUNCTURE: CPT | Performed by: ORTHOPAEDIC SURGERY

## 2023-07-23 PROCEDURE — 85018 HEMOGLOBIN: CPT | Performed by: ORTHOPAEDIC SURGERY

## 2023-07-23 PROCEDURE — 85027 COMPLETE CBC AUTOMATED: CPT | Performed by: INTERNAL MEDICINE

## 2023-07-23 RX ORDER — BISACODYL 10 MG
10 SUPPOSITORY, RECTAL RECTAL ONCE
Status: COMPLETED | OUTPATIENT
Start: 2023-07-23 | End: 2023-07-23

## 2023-07-23 RX ADMIN — ACETAMINOPHEN 975 MG: 325 TABLET, FILM COATED ORAL at 00:12

## 2023-07-23 RX ADMIN — ASPIRIN 162 MG: 81 TABLET, COATED ORAL at 09:25

## 2023-07-23 RX ADMIN — BISACODYL 10 MG: 10 SUPPOSITORY RECTAL at 09:26

## 2023-07-23 RX ADMIN — PREGABALIN 225 MG: 100 CAPSULE ORAL at 09:26

## 2023-07-23 RX ADMIN — SENNOSIDES AND DOCUSATE SODIUM 1 TABLET: 50; 8.6 TABLET ORAL at 19:44

## 2023-07-23 RX ADMIN — ACETAMINOPHEN 975 MG: 325 TABLET, FILM COATED ORAL at 09:25

## 2023-07-23 RX ADMIN — TAMSULOSIN HYDROCHLORIDE 0.4 MG: 0.4 CAPSULE ORAL at 13:19

## 2023-07-23 RX ADMIN — SENNOSIDES AND DOCUSATE SODIUM 1 TABLET: 50; 8.6 TABLET ORAL at 09:26

## 2023-07-23 RX ADMIN — PREGABALIN 225 MG: 100 CAPSULE ORAL at 19:44

## 2023-07-23 RX ADMIN — ACETAMINOPHEN 975 MG: 325 TABLET, FILM COATED ORAL at 16:12

## 2023-07-23 RX ADMIN — POLYETHYLENE GLYCOL 3350 17 G: 17 POWDER, FOR SOLUTION ORAL at 09:26

## 2023-07-23 ASSESSMENT — ACTIVITIES OF DAILY LIVING (ADL)
ADLS_ACUITY_SCORE: 36
ADLS_ACUITY_SCORE: 38

## 2023-07-23 NOTE — PLAN OF CARE
Pt A&O x's 4. VSS. Afebrile. 02 sats int the 90s on RA.. Lungs clear. Denies SOB, CP and nausea. Tolerating regular diet. Bowel sound active in all quadrants. No BM but passing gas. Granados drained 400cc.Pain well managed , declined pain meds.CMS intact, denies N/T. PIV patent and SL. Pt slept between care and is able to make needs known, call light with in reach. Will continue to monitor.       Patient vital signs are at baseline,   ~ Patient able to ambulate as they were prior to admission or with assist devices provided by therapies during their stay. Ambulates with assist.  ~ Patient MUST void prior to discharge,  granados still in and draining adequate amount.  ~ Patient able to tolerate oral intake to maintain hydration status, yes  ~ Patient has adequate pain control using Oral analgesics, yes  ~ Hypercapnia, hypoventilation or hypoxia resolved for at least 2 hours without supplemental oxygen, on RA  ~ Deficits in sensation, mobility or coordination have resolved if spinal or regional anesthesia was used, denies N?T  ~ Patient has returned to baseline mental status,yes

## 2023-07-23 NOTE — PROVIDER NOTIFICATION
5Gregory SANABRIA9 His Hgb was 7.0 this am, recheck at noon, hospitalist wanted to know if you will put orders in for blood  thanks  Cheryl  [id number]

## 2023-07-23 NOTE — PROGRESS NOTES
"  VS: /59 (BP Location: Right arm)   Pulse 77   Temp 99.1  F (37.3  C) (Oral)   Resp 16   Ht 1.753 m (5' 9.02\")   Wt 101.8 kg (224 lb 6.9 oz)   SpO2 97%   BMI 33.13 kg/m     O2: RA   Output: Due to void   Last BM: 7/23 large   Activity: SBA w/ gait belt & walker   Skin: Intact except for R & L hip incisions   Pain: denies   CMS: intact   Dressing: R & L hip dressings, CDI   Diet: reg   LDA: PIV in RFA   Equipment: Gait belt & walker   Plan: Discharge to TCU in 1-2 days   Additional Info:      Genaro had a large BM this shift.  His granados was removed at 1000, he was bladder scanned at 1500 and showed 133 mls.    Encouraged to drink fluids.    His last HGB was drawn at 1400 & was 7.5      "

## 2023-07-23 NOTE — PROGRESS NOTES
Orthopaedic Surgery Progress Note 07/23/2023    S: AVSS, AF. Pain controlled. Worked with therapies and did well - ambulated 60 feet, followed cues, maintaining precautions. TCU recommending. Voiding via granados - plan for removal and TOV this AM. LBM 7/20. Will give suppos today. Tolerating diet. Denies lightheaded or dizziness.    O:  Temp: 98.8  F (37.1  C) Temp src: Oral BP: 96/58 Pulse: 82   Resp: 16 SpO2: 95 % O2 Device: None (Room air)      Exam:  Gen: No acute distress, resting comfortably in bed.  Resp: Non-labored breathing  Granados catheter w clear, yellow urine    RLE:  Dressings with minimal serosang strikethrough over right hip  Fires GS/TS/FHL/EHL  SILT SP/DP/Saph/Sural/T  2+ DP, WWP    LLE:   Dressings c/d/I left hip  Fires GS/TS/FHL/EHL  SILT SP/DP/Saph/Sural/T  2+ DP, WWP      Recent Labs   Lab 07/22/23  1150 07/22/23  0629 07/21/23  1317 07/20/23  0852   WBC  --   --   --  5.9   HGB 7.9* 7.2* 8.8* 9.4*   PLT  --   --   --  227       Assessment: Genaro Ortiz is a 78 year old male s/p R JOHNNY on 7/14 and L JOHNNY on 7/21 with . Doing well.     Appreciate med assistance with medical management.    Plan for granados out this AM at 0800.     Orthopedically ready for discharge. Awaiting placement and medical stability (bladder function).     BM today - suppository ordered. Patient amenable.    ABLA, hgb 7.0. Asymptomatic. If becomes symptomatic will consider transfusion. CTM.    Plan:  WBAT with posterior hip precautions bilateral hips  Postop abx: periop ancef  Pain control  DVT ppx: aspirin 162 to start POD1 and continue for 4 weeks  ADAT  Labs on POD1 then PRN  PT/OT  Granados placed 7/18. TOV on 7/22  Appreciate medicine recs  Dispo planning - TCU tomorrow, appreciate  recs    Future Appointments   Date Time Provider Department Center   7/15/2023  2:00 PM Eric Victor, PT [facility] [place]   8/28/2023 11:00 AM Merritt Toney, BET[facility]   10/4/2023  9:00 AM Giovanni Shah MD [facility]  [facility]   11/7/2023  1:30 PM [facility]   12/8/2023  1:15 PM Valdez Quesada DO [facility]       Paradise Reyes MD, PGY-4  Orthopedics  Pager: [phone]

## 2023-07-23 NOTE — PROGRESS NOTES
"[facility]    Medicine Progress Note - Hospitalist Service, GOLD TEAM 16    Date of Admission:  7/14/2023    Assessment & Plan     78 year old male admitted on 7/14/2023 s/p following procedure: R JOHNNY by Dr Escamilla. Patient developed acute urinary retention after surgery and granados cath was placed.      Urinary rentention   -Granados cath removed today, continue monitoring bladder scan.       -Continue on Flomax.     # Orthopedic Surgery  - Ortho is primary team. S/P bilateral hip replacement hip replacement by now. Acute blood loss anemia after surgery. HGB 7.0. Defer RBC's to Ortho team, they didn't want to transfuse patient at this time. Patient was ambulating this morning, he was asymptomatic.    - Pain control, DVT prophylaxis and activity orders per ortho.   - PT / OT following the patient. Patient will go to TCU.       # Anemia of acute blood loss: HGB 7.0. Acute blood loss anemia expected after surgery.      #HTN: stable  - BP normal. No need for antihypertensives at this time, continue monitoring.   - Monitor, resume pta med as appropriate.      #HLD: PTA statin.      # MGUS:   - can theoretically increase thrombosis/DVT risk. As usual encourage early ambulation and routine DVT prevention measures per hip replacement protocol.        Diet: Discharge Instruction - Regular Diet Adult  Advance Diet as Tolerated: Regular Diet Adult    DVT Prophylaxis: Defer to primary service  Granados Catheter: PRESENT, indication: Retention, Retention;Anesthesia  Lines: None     Cardiac Monitoring: None  Code Status: Full Code      Clinically Significant Risk Factors                  # Hypertension: Noted on problem list        # Obesity: Estimated body mass index is 33.13 kg/m  as calculated from the following:    Height as of this encounter: 1.753 m (5' 9.02\").    Weight as of this encounter: 101.8 kg (224 lb 6.9 oz).           Disposition Plan     Expected Discharge Date: 07/23/2023    "   Destination: inpatient rehabilitation facility            He Wisdom MD  Hospitalist Service, GOLD TEAM 16  M [facility]  Securely message with [employer] (more info)  Text page via [employer] Paging/Directory   See signed in provider for up to date coverage information  ______________________________________________________________________    Interval History     No acute events overnight. Ambulating.   He was pleasant.   Denies chest pain, palpitations, shortness of breath, dizziness, lightheadedness, near-syncope, fever or chills.    Manzo cath removed.     Physical Exam   Vital Signs: Temp: 99.1  F (37.3  C) Temp src: Oral BP: 104/59 Pulse: 77   Resp: 16 SpO2: 97 % O2 Device: None (Room air)    Weight: 224 lbs 6.85 oz    General Appearance: Alert, room air, no acute distress.   Respiratory: Normal respiratory effort, clear lungs.   Cardiovascular: RRR.  GI: Abdomen soft, + BS, no tenderness to palpation.   Other: Alert, oriented, pleasant. Moving all extremities.     Medical Decision Making       45 MINUTES SPENT BY ME on the date of service doing chart review, history, exam, documentation & further activities per the note.      Data     I have personally reviewed the following data over the past 24 hrs:    8.2  \   7.0 (L)   / 246     N/A N/A N/A /  N/A   N/A N/A N/A \       Imaging results reviewed over the past 24 hrs:   No results found for this or any previous visit (from the past 24 hour(s)).

## 2023-07-24 ENCOUNTER — APPOINTMENT (OUTPATIENT)
Dept: PHYSICAL THERAPY | Facility: CLINIC | Age: 78
DRG: 462 | End: 2023-07-24
Attending: ORTHOPAEDIC SURGERY
Payer: COMMERCIAL

## 2023-07-24 ENCOUNTER — APPOINTMENT (OUTPATIENT)
Dept: OCCUPATIONAL THERAPY | Facility: CLINIC | Age: 78
DRG: 462 | End: 2023-07-24
Attending: ORTHOPAEDIC SURGERY
Payer: COMMERCIAL

## 2023-07-24 LAB
ERYTHROCYTE [DISTWIDTH] IN BLOOD BY AUTOMATED COUNT: 13.8 % (ref 10–15)
GLUCOSE BLDC GLUCOMTR-MCNC: 133 MG/DL (ref 70–99)
HCT VFR BLD AUTO: 22.4 % (ref 40–53)
HGB BLD-MCNC: 7.5 G/DL (ref 13.3–17.7)
MCH RBC QN AUTO: 30.5 PG (ref 26.5–33)
MCHC RBC AUTO-ENTMCNC: 33.5 G/DL (ref 31.5–36.5)
MCV RBC AUTO: 91 FL (ref 78–100)
PLATELET # BLD AUTO: 334 10E3/UL (ref 150–450)
RBC # BLD AUTO: 2.46 10E6/UL (ref 4.4–5.9)
WBC # BLD AUTO: 8.6 10E3/UL (ref 4–11)

## 2023-07-24 PROCEDURE — 120N000002 HC R&B MED SURG/OB UMMC

## 2023-07-24 PROCEDURE — 250N000013 HC RX MED GY IP 250 OP 250 PS 637: Performed by: ORTHOPAEDIC SURGERY

## 2023-07-24 PROCEDURE — 36415 COLL VENOUS BLD VENIPUNCTURE: CPT | Performed by: INTERNAL MEDICINE

## 2023-07-24 PROCEDURE — 99232 SBSQ HOSP IP/OBS MODERATE 35: CPT | Performed by: INTERNAL MEDICINE

## 2023-07-24 PROCEDURE — 97535 SELF CARE MNGMENT TRAINING: CPT | Mod: GO

## 2023-07-24 PROCEDURE — 97530 THERAPEUTIC ACTIVITIES: CPT | Mod: GP

## 2023-07-24 PROCEDURE — 85027 COMPLETE CBC AUTOMATED: CPT | Performed by: INTERNAL MEDICINE

## 2023-07-24 RX ADMIN — SENNOSIDES AND DOCUSATE SODIUM 1 TABLET: 50; 8.6 TABLET ORAL at 19:53

## 2023-07-24 RX ADMIN — METHOCARBAMOL 500 MG: 500 TABLET ORAL at 21:59

## 2023-07-24 RX ADMIN — TAMSULOSIN HYDROCHLORIDE 0.4 MG: 0.4 CAPSULE ORAL at 13:43

## 2023-07-24 RX ADMIN — SENNOSIDES AND DOCUSATE SODIUM 1 TABLET: 50; 8.6 TABLET ORAL at 08:53

## 2023-07-24 RX ADMIN — ACETAMINOPHEN 975 MG: 325 TABLET, FILM COATED ORAL at 00:02

## 2023-07-24 RX ADMIN — ACETAMINOPHEN 975 MG: 325 TABLET, FILM COATED ORAL at 08:54

## 2023-07-24 RX ADMIN — POLYETHYLENE GLYCOL 3350 17 G: 17 POWDER, FOR SOLUTION ORAL at 08:53

## 2023-07-24 RX ADMIN — PREGABALIN 225 MG: 100 CAPSULE ORAL at 08:53

## 2023-07-24 RX ADMIN — ASPIRIN 162 MG: 81 TABLET, COATED ORAL at 08:53

## 2023-07-24 RX ADMIN — PREGABALIN 225 MG: 100 CAPSULE ORAL at 19:53

## 2023-07-24 ASSESSMENT — ACTIVITIES OF DAILY LIVING (ADL)
ADLS_ACUITY_SCORE: 34
ADLS_ACUITY_SCORE: 34
ADLS_ACUITY_SCORE: 36
ADLS_ACUITY_SCORE: 34

## 2023-07-24 NOTE — PROGRESS NOTES
"VS: /62 (BP Location: Left arm)   Pulse 76   Temp 97.8  F (36.6  C) (Oral)   Resp 16   Ht 1.753 m (5' 9.02\")   Wt 101.8 kg (224 lb 6.9 oz)   SpO2 95%   BMI 33.13 kg/m         O2: RA   Output: Difficulty voiding. Urology consult resulted in a granados placement.   Last BM: 7/23 large   Activity: SBA w/ gait belt & walker   Skin: Intact except for R & L hip incisions   Pain: denies   CMS: intact   Dressing: R & L hip dressings, CDI   Diet: reg   LDA: PIV in RFA   Equipment: Gait belt & walker   Plan: Discharge to TCU 7/25 between 1437 and 1522   Additional Info:       "

## 2023-07-24 NOTE — PROGRESS NOTES
"SPIRITUAL HEALTH SERVICES Progress Note  [facility] ([facility]) 5 Ortho    Saw pt Genaro Ortiz per unit follow-up.    Patient/Family Understanding of Illness and Goals of Care - Pt said that he is continually improving and will be released to a care facility in [place] tomorrow. Pt said, \"The facility is right by one of my good friends and so he will be able to visit me frequently.\" Pt seemed hopeful and excited to be discharging to the care facility tomorrow.     Distress and Loss - Not discussed at this time.     Strengths, Coping, and Resources  - Pt told me that he had two members of his Anglican visit him yesterday afternoon and has friends who visit as well. Pt shared that he will be living closer to friends at the care facility in [place] and is excited to be near them.     Meaning, Beliefs, and Spirituality - Pt shared that he was able to watch a [employer] service online yesterday morning and enjoyed hearing about the story of the mustard seed. 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 We both shared our favorite Bible verses and discussed the vastness of God's love.    Plan of Care - Pt appreciates visits and would like a follow-up visit tomorrow if possible before he is discharged. I will plan on visiting tomorrow. Pt is aware how to request for SHS and SHS will remain available.     Ilana Johnson   Intern  Pager [phone]    * SHS remains available 24/7 for emergent requests/referrals, either by having the switchboard page the on-call  or by entering an ASAP/STAT consult in Epic (this will also page the on-call ). Routine Epic consults receive an initial response within 24 hours.*    "

## 2023-07-24 NOTE — PROGRESS NOTES
Per report pt has not voided since  granados removed this morning. Attempted to bladder scan but declined stating that he has an urgency to void and will try to use urinal in a little bit.

## 2023-07-24 NOTE — PLAN OF CARE
Pt A&O x's 4. VSS. Afebrile. 02 sats.. Lungs clear. Denies SOB, CP and nausea. Tolerating regular diet. Bowel sound active in all quadrants. No BM during the shift but passing gas. Unable to void, pt straight cath @ 0600. Denies pain when asked. Stand by assist. CMS intact, denies N/T. Bilateral hip dressing clean, dry and intact. Pt slept between care and is able to make needs known, call light with in reach. Will continue to monitor.

## 2023-07-24 NOTE — PLAN OF CARE
Goal Outcome Evaluation:      Plan of Care Reviewed With: patient    Overall Patient Progress: improving    Outcome Evaluation: Pt with decreased appetite, discussed importance of adequate intakes, role of RD in care. Pt denied need for supplements at this time

## 2023-07-24 NOTE — PLAN OF CARE
[id number]  No acute changes this shift or complaints. NOOB, Pt asleep most of evening. No PRNs given. Denies pain. Bilat hip dressings CDI.     Starting to feel slight urgency at bedtime, unable to void. Bladder scanned @ 2223, 436mL. LBM 7/23.     Plan: trend Hgb 7.5 today, discharge to TCU once medically cleared.

## 2023-07-24 NOTE — PROGRESS NOTES
Orthopaedic Surgery Progress Note 07/24/2023    S: AVSS, AF. Pain controlled. Manzo removed, on flomax, but no void until this AM - straight cath for 600 ml. Patient feels urge to urinate but is unable to void. tolerating diet. Mobilizing well. LBM 7/23     O:  Temp: 98.2  F (36.8  C) Temp src: Oral BP: 118/67 Pulse: 87   Resp: 16 SpO2: 96 % O2 Device: None (Room air)      Exam:  Gen: No acute distress, resting comfortably in bed.  Resp: Non-labored breathing  Manzo catheter w clear, yellow urine    RLE:  Dressings with minimal serosang strikethrough over right hip  Fires GS/TS/FHL/EHL  SILT SP/DP/Saph/Sural/T  2+ DP, WWP    LLE:   Dressings c/d/I left hip  Fires GS/TS/FHL/EHL  SILT SP/DP/Saph/Sural/T  2+ DP, WWP      Recent Labs   Lab 07/23/23  1418 07/23/23  0656 07/22/23  1150 07/21/23  1317 07/20/23  0852   WBC  --  8.2  --   --  5.9   HGB 7.5* 7.0* 7.9*   < > 9.4*   PLT  --  246  --   --  227    < > = values in this interval not displayed.       Assessment: Genaro Ortiz is a 78 year old male s/p R JOHNNY on 7/14 and L JOHNNY on 7/21 with . Doing well.     Appreciate med assistance with medical management.    Manzo removed 7/23. Struggling with retention. On flomax. Appreciate med/urology recs.    Orthopedically ready for discharge. Awaiting placement and medical stability (bladder function).     ABLA, hgb 7.5. Asymptomatic. If becomes symptomatic will consider transfusion. CTM.    Plan:  WBAT with posterior hip precautions bilateral hips  Postop abx: periop ancef  Pain control  DVT ppx: aspirin 162 to start POD1 and continue for 4 weeks  ADAT  No more hgb needed from an ortho perspective unless becomes symptomatic  PT/OT  Manzo placed 7/18 and removed 7/23. Urology following peripherally  Appreciate medicine recs  Dispo planning - TCU today, if cleared medically, appreciate SW recs. Cleared from an ortho perspective to discharge.    Future Appointments   Date Time Provider Department Center   7/15/2023   2:00 PM Eric Victor, PT [facility]   8/28/2023 11:00 AM Merritt Toney, PAGrahamC [facility]   10/4/2023  9:00 AM Giovanni Shah MD [facility]   11/7/2023  1:30 PM Eric Abel MD [facility]   12/8/2023  1:15 PM Valdez Quesada DO [facility]       Paradise Reyes MD, PGY-4  Orthopedics  Pager: [phone]

## 2023-07-24 NOTE — PROGRESS NOTES
Care Management Follow Up    Length of Stay (days): 10    Expected Discharge Date: 07/25/23 at ~3:00 p.m. ([employer] ride pick-up between 2:37 p.m. and 3:22 p.m.)     Concerns to be Addressed: Discharge planning  Patient plan of care discussed at interdisciplinary rounds: Yes    Anticipated Discharge Disposition: Transitional Care    [facility]  [address] [place] [address]  PH: [phone]   FAX: [phone]     Anticipated Discharge Services:  Post acute therapies  Anticipated Discharge DME:  None    Patient/family educated on Medicare website which has current facility and service quality ratings:  Yes  Education Provided on the Discharge Plan:  Yes  Patient/Family in Agreement with the Plan: yes    Referrals Placed by CM/SW:  Post acute facilities  Private pay costs discussed: transportation costs; Medicare co-pays    Additional Information:  Pt accepted at [facility] for Tuesday 07/25/23.    Per Ortho team, pt will be medically ready to discharge tomorrow; 3:00 p.m. discharge time preferable to allow ample time for Urology to determine bladder plan.    SW met with pt at bedside to discuss OOP costs for [employer] WC ride to TCU, which pt was agreeable to. SW provided education on Medicare benefit and what to expect at TCU.     CHW assisted with securing placement, coordinating discharge to TCU, PAS, IMM, setting-up transportation, and notifying pt, bedside nurse, and charge nurse of discharge plan.    SW updated KANDY Durham, and Dr. Wisdom of discharge plan.        Glenys Musa, HIRALW, LSW  5 Ortho & WB ED   PHONE: [phone]  Pager: [phone]

## 2023-07-24 NOTE — PROGRESS NOTES
"CLINICAL NUTRITION SERVICES - ASSESSMENT NOTE     Nutrition Prescription    RECOMMENDATIONS FOR MDs/PROVIDERS TO ORDER:  Please obtain standing weight as able    Malnutrition Status:    Moderate malnutrition in the context of chronic illness    Recommendations already ordered by Registered Dietitian (RD):  Encouraged oral intakes  Nutrition education - role of RD in care    Future/Additional Recommendations:  Monitor labs, intakes, and weight trends.     REASON FOR ASSESSMENT  Genaro Ortiz is a/an 78 year old male assessed by the dietitian for LOS    NUTRITION/MEDICAL HISTORY  History of 7/14/2023 s/p following procedure: R JOHNNY by Dr Escamilla. Patient developed acute urinary retention after surgery and granados cath was placed.     FINDINGS  RD met with pt at bedside. Pt reports that he has had a decreasing appetite over the last couple years, and that he used to go to the gym every other day and \"ever since this disability\" he hasn't been moving much and hasn't been eating as well. Encouraged adequate intakes, offered snacks/supplements, reminded pt of floor stocked foods. Pt reports his weight being stable but he has noticed losses is in his muscles.     CURRENT NUTRITION ORDERS  Diet: Regular    Intake/Tolerance: % of documented meals.     Pt ordering (on average) 1047 kcal and 42 g protein per day per [employer]. With documented intakes pt is likely meeting 40-50% minimum energy and protein needs.    LABS  Labs reviewed    MEDICATIONS  Medications reviewed    ANTHROPOMETRICS  Height: 175.3 cm (5' 9.016\")  Most Recent Weight: 101.8 kg (224 lb 6.9 oz)    IBW: 72.7 kg (140% IBW)  BMI: Obesity Grade I BMI 30-34.9  Weight History: Pt with limited weight history prior to admission, seems weight stable over last couple months.  Pt reports UBW of 225.     Wt Readings from Last Encounters:   07/14/23 101.8 kg (224 lb 6.9 oz)   05/01/23 102.1 kg (225 lb)   06/21/21 100.6 kg (221 lb 12.8 oz)   11/19/20 102.4 kg (225 lb " yes 12.8 oz)     Dosing Weight: 80 kg - ABW using most recent weight and IBW of 72.7 kg.     ASSESSED NUTRITION NEEDS  Estimated Energy Needs: 5436-7932 kcals/day (25 - 30 kcals/kg)  Justification: Maintenance  Estimated Protein Needs: 80-96+ grams protein/day (1 - 1.2+ grams of pro/kg)  Justification: Maintenance v s increased needs  Estimated Fluid Needs: 1 ml/kcal or per provider pending fluid status    PHYSICAL FINDINGS  See malnutrition section below.  Surgical incicions    MALNUTRITION  % Intake: </=75% for >/= 1 month (severe) - suspect per pt report  % Weight Loss: None noted  Subcutaneous Fat Loss: Upper arm:  mild   Muscle Loss: Temporal:  moderate and Upper arm (bicep, tricep):  moderate  Fluid Accumulation/Edema:  Trace-mild per flowsheets  Malnutrition Diagnosis: At least moderate malnutrition in the context of chronic illness    NUTRITION DIAGNOSIS  Inadequate oral intake related to poor appetite as evidenced by pt report, documented intakes      INTERVENTIONS  Implementation  Nutrition Education: Role of RD, available snacks/supplements, encouraged adequate intakes    Goals  Patient to consume % of nutritionally adequate meal trays TID, or the equivalent with supplements/snacks.     Monitoring/Evaluation  Progress toward goals will be monitored and evaluated per protocol.    Roselia Fraire MS, RDN, LD  RD pager: [phone]  WB Weekend/Holiday Pager: [phone]

## 2023-07-24 NOTE — PROGRESS NOTES
7/24/23    Care Management Follow Up     CHW followed up on the following referrals:    UPDATE: [facility] and he agreed to the semi private room with a shared bathroom. No was informed that pt will not [facility]. CHW filed PAS, [id number]. CHW scheduled a wheelchair ride with [employer] ([phone]) and the service window time of 1437 to 1522. SW following, bedside RN, Charge RN, and pt were notified of pick-up time.     Accepted:    [facility]  [address] [place] [address]  PH: [phone]   FAX: [phone]  7/22: Initial referral sent via [facility]  7/24: Pt was accepted    [facility]  [address] [place] [address]  PH:[phone]   FAX: [phone]  7/22: Initial referral sent via [employer]  7/24: Pt was accepted    Pending Referrals:     [facility]  [address] [place] [address]  PH:[phone]   FAX: [phone]  7/22: Initial referral sent via [employer]    [facility]  [address] [place] [address]  PH: [phone]  FAX: [phone]    [facility]  [address] [place] [address]  PH: [phone]  FAX: [phone]    [facility]  [address] [place] [address]  PH: [phone]  FAX: [phone]    [facility]  [address] [place] [address]  PH: [phone]  FAX: [phone]    Francie Agrawal  Inpatient CHW  [facility] 5 Ortho, 8 Med Surge, & ED  PH: [phone]

## 2023-07-24 NOTE — PROGRESS NOTES
"[facility]    Medicine Progress Note - Hospitalist Service, GOLD TEAM 16    Date of Admission:  7/14/2023    Assessment & Plan     78 year old male admitted on 7/14/2023 s/p following procedure: R JOHNNY by Dr Escamilla. Patient developed acute urinary retention after surgery and granados cath was placed.      Urinary rentention   -Patient having recurrent urinary retention. Granados cath placed back today. I defer further management to Urology. Most likely he will be discharge to TCU with catheter and follow-up as outpatient.        -Continue on Flomax.     # Orthopedic Surgery  - Ortho is primary team. S/P bilateral hip replacement hip replacement by now. Acute blood loss anemia after surgery. HGB 7.5. Patient was ambulating this morning, he was asymptomatic, continue monitoring.    - Pain control, DVT prophylaxis and activity orders per ortho.   - PT / OT following the patient. Patient will go to TCU.       # Anemia of acute blood loss: HGB 7.5. Acute blood loss anemia expected after surgery.      #HTN: stable  - BP normal. No need for antihypertensives at this time, continue monitoring.   - Monitor, resume pta med as appropriate.      #HLD: PTA statin.      # MGUS:   - can theoretically increase thrombosis/DVT risk. As usual encourage early ambulation and routine DVT prevention measures per hip replacement protocol.        Diet: Discharge Instruction - Regular Diet Adult  Advance Diet as Tolerated: Regular Diet Adult    DVT Prophylaxis: Defer to primary service  Granados Catheter: Not present  Lines: None     Cardiac Monitoring: None  Code Status: Full Code      Clinically Significant Risk Factors                  # Hypertension: Noted on problem list          # Obesity: Estimated body mass index is 33.13 kg/m  as calculated from the following:    Height as of this encounter: 1.753 m (5' 9.02\").    Weight as of this encounter: 101.8 kg (224 lb 6.9 oz).             Disposition Plan "          He Wisdom MD  Hospitalist Service, GOLD TEAM 16  M [facility]  Securely message with [employer] (more info)  Text page via [employer] Paging/Directory   See signed in provider for up to date coverage information  ______________________________________________________________________    Interval History     No acute events overnight. Ambulating.   Pleasant.   Denies chest pain, palpitations, shortness of breath, dizziness, lightheadedness, near-syncope, fever or chills.    Manzo cath placed this morning.   Possible discharge to TCU tomorrow morning.     Physical Exam   Vital Signs: Temp: 97.8  F (36.6  C) Temp src: Oral BP: 126/62 Pulse: 76   Resp: 16 SpO2: 95 % O2 Device: None (Room air)    Weight: 224 lbs 6.85 oz    General Appearance: Alert, room air, no acute distress.   Respiratory: Normal respiratory effort, clear lungs.   Cardiovascular: RRR.  GI: Abdomen soft, + BS, no tenderness to palpation.   Other: Alert, oriented, pleasant. Moving all extremities.     Medical Decision Making       45 MINUTES SPENT BY ME on the date of service doing chart review, history, exam, documentation & further activities per the note.      Data     I have personally reviewed the following data over the past 24 hrs:    8.6  \   7.5 (L)   / 334     N/A N/A N/A /  133 (H)   N/A N/A N/A \       Imaging results reviewed over the past 24 hrs:   No results found for this or any previous visit (from the past 24 hour(s)).

## 2023-07-25 ENCOUNTER — TELEPHONE (OUTPATIENT)
Dept: UROLOGY | Facility: CLINIC | Age: 78
End: 2023-07-25

## 2023-07-25 VITALS
HEART RATE: 74 BPM | RESPIRATION RATE: 16 BRPM | WEIGHT: 224.43 LBS | BODY MASS INDEX: 33.24 KG/M2 | HEIGHT: 69 IN | SYSTOLIC BLOOD PRESSURE: 107 MMHG | TEMPERATURE: 98 F | OXYGEN SATURATION: 93 % | DIASTOLIC BLOOD PRESSURE: 60 MMHG

## 2023-07-25 PROCEDURE — 99232 SBSQ HOSP IP/OBS MODERATE 35: CPT | Performed by: INTERNAL MEDICINE

## 2023-07-25 PROCEDURE — 250N000013 HC RX MED GY IP 250 OP 250 PS 637: Performed by: ORTHOPAEDIC SURGERY

## 2023-07-25 RX ORDER — PREGABALIN 225 MG/1
225 CAPSULE ORAL 2 TIMES DAILY
Qty: 60 CAPSULE | Refills: 0 | Status: SHIPPED | OUTPATIENT
Start: 2023-07-25 | End: 2023-09-16

## 2023-07-25 RX ORDER — AMOXICILLIN 250 MG
1-2 CAPSULE ORAL 2 TIMES DAILY
Qty: 30 TABLET | Refills: 0 | DISCHARGE
Start: 2023-07-25 | End: 2023-09-16

## 2023-07-25 RX ORDER — ASPIRIN 81 MG/1
162 TABLET ORAL DAILY
Qty: 60 TABLET | Refills: 0 | DISCHARGE
Start: 2023-07-25 | End: 2023-07-25

## 2023-07-25 RX ORDER — ASPIRIN 81 MG/1
162 TABLET ORAL DAILY
Qty: 60 TABLET | Refills: 0 | DISCHARGE
Start: 2023-07-25 | End: 2023-09-16

## 2023-07-25 RX ORDER — TAMSULOSIN HYDROCHLORIDE 0.4 MG/1
0.4 CAPSULE ORAL DAILY
Qty: 30 CAPSULE | Refills: 0 | DISCHARGE
Start: 2023-07-25 | End: 2023-09-16

## 2023-07-25 RX ORDER — ACETAMINOPHEN 325 MG/1
650 TABLET ORAL EVERY 4 HOURS PRN
Qty: 100 TABLET | Refills: 0 | Status: SHIPPED | OUTPATIENT
Start: 2023-07-25 | End: 2023-09-16

## 2023-07-25 RX ORDER — ACETAMINOPHEN 325 MG/1
650 TABLET ORAL EVERY 4 HOURS PRN
Qty: 100 TABLET | Refills: 0 | DISCHARGE
Start: 2023-07-25 | End: 2023-07-25

## 2023-07-25 RX ORDER — OXYCODONE HYDROCHLORIDE 5 MG/1
5-10 TABLET ORAL EVERY 4 HOURS PRN
Qty: 26 TABLET | Refills: 0 | Status: ON HOLD | DISCHARGE
Start: 2023-07-25 | End: 2023-10-03

## 2023-07-25 RX ADMIN — ASPIRIN 162 MG: 81 TABLET, COATED ORAL at 08:54

## 2023-07-25 RX ADMIN — SENNOSIDES AND DOCUSATE SODIUM 1 TABLET: 50; 8.6 TABLET ORAL at 08:54

## 2023-07-25 RX ADMIN — POLYETHYLENE GLYCOL 3350 17 G: 17 POWDER, FOR SOLUTION ORAL at 08:53

## 2023-07-25 RX ADMIN — TAMSULOSIN HYDROCHLORIDE 0.4 MG: 0.4 CAPSULE ORAL at 13:40

## 2023-07-25 RX ADMIN — PREGABALIN 225 MG: 100 CAPSULE ORAL at 08:53

## 2023-07-25 ASSESSMENT — ACTIVITIES OF DAILY LIVING (ADL)
ADLS_ACUITY_SCORE: 34

## 2023-07-25 NOTE — PROGRESS NOTES
"[facility]    Medicine Progress Note - Hospitalist Service, GOLD TEAM 16    Date of Admission:  7/14/2023    Assessment & Plan   78 year old male admitted on 7/14/2023 s/p following procedure: R JOHNNY by Dr Escamilla. Patient developed acute urinary retention after surgery and granados cath was placed.      #Urinary rentention   -- Patient having recurrent urinary retention. Granados cath placed back today. I defer further management to Urology. Most likely he will be discharge to TCU with catheter and follow-up as outpatient.        -- Continue on Flomax.     #Orthopedic surgery  -- Ortho is primary team. S/P bilateral hip replacement hip replacement by now. Acute blood loss anemia after surgery. HGB 7.5. Patient was ambulating this morning, he was asymptomatic, continue monitoring.    -- Pain control, DVT prophylaxis and activity orders per ortho.   -- PT / OT following the patient. Patient will go to TCU.       #Anemia of acute blood loss: Hgb 7.5. Acute blood loss anemia expected after surgery.      #HTN: Stable.  -- BP normal. No need for antihypertensives at this time, continue monitoring.   -- Monitor, resume pta med as appropriate.      #HLD: PTA statin.      #MGUS:   -- Can theoretically increase thrombosis/DVT risk. As usual encourage early ambulation and routine DVT prevention measures per hip replacement protocol.        Diet: Discharge Instruction - Regular Diet Adult  Advance Diet as Tolerated: Regular Diet Adult    DVT Prophylaxis:  mg PO daily  Granados Catheter: PRESENT, indication: Retention, Retention;Anesthesia, Retention  Lines: None     Cardiac Monitoring: None  Code Status: Full Code      Clinically Significant Risk Factors                  # Hypertension: Noted on problem list        # Obesity: Estimated body mass index is 33.13 kg/m  as calculated from the following:    Height as of this encounter: 1.753 m (5' 9.02\").    Weight as of this encounter: " 101.8 kg (224 lb 6.9 oz).   # Moderate Malnutrition: based on nutrition assessment         Disposition Plan     Expected Discharge Date: 07/25/2023,  3:00 PM    Destination: inpatient rehabilitation facility            DO AMAURI Fountain  Hospitalist Service, GOLD TEAM [address] [place] [address]  Securely message with [employer] (more info)  Text page via [employer] Paging/Directory   See signed in provider for up to date coverage information  ______________________________________________________________________    Interval History   Patient is slated for TCU discharge today.  Patient is amenable to discharge planning.  Orthopedic surgery (primary service) coordinating discharge planning.    Physical Exam   Vital Signs: Temp: 98  F (36.7  C) Temp src: Oral BP: 107/60 Pulse: 74   Resp: 16 SpO2: 93 % O2 Device: None (Room air)    Weight: 224 lbs 6.85 oz    GENERAL: Alert and oriented x 3; no acute distress; well-nourished.  HEENT: Normocephalic; atraumatic; PERRLA; MMM.  CV: RRR; normal S1, S2; no rubs, murmurs, or gallops.  RESP: Lung fields clear to aucultation B/L; no wheezing or crepitations.  GI: Abdomen is soft, nontender, nondistended; no organomegaly; normal bowel sounds.  : Deferred genital examination.   MSK: No clubbing, cyanosis, or edema.  DERM: Skin is intact; no rash, lesions, or skin breakdown.  NEURO: No focal deficits appreciated; strength & sensorium are grossly intact.  PSYCH: No active hallucinations; affect, insight appear within normal limits.    Medical Decision Making       45 MINUTES SPENT BY ME on the date of service doing chart review, history, exam, documentation & further activities per the note.      Data     I have personally reviewed the following data over the past 24 hrs:    8.6  \   7.5 (L)   / 334     N/A N/A N/A /  133 (H)   N/A N/A N/A \       Imaging results reviewed over the past 24 hrs:   No results found for this or any previous visit (from the  past 24 hour(s)).

## 2023-07-25 NOTE — PROGRESS NOTES
Orthopaedic Surgery Progress Note 07/25/2023    S: AVSS, AF. Pain controlled. Granados replaced and planning to discharge with granados. Mobilizing well. LBM 7/23. Tolerating diet. No new concerns. Reviewed POC to discharge to TCU today.    O:  Temp: 98.2  F (36.8  C) Temp src: Oral BP: 113/59 Pulse: 74   Resp: 16 SpO2: 97 % O2 Device: None (Room air)      Exam:  Gen: No acute distress, resting comfortably in bed.  Resp: Non-labored breathing  Granados catheter w clear, yellow urine    RLE:  Dressings clean and dry  Fires GS/TS/FHL/EHL  SILT SP/DP/Saph/Sural/T  2+ DP, WWP    LLE:   Dressings c/d/I left hip  Fires GS/TS/FHL/EHL  SILT SP/DP/Saph/Sural/T  2+ DP, WWP      Recent Labs   Lab 07/24/23  0914 07/23/23  1418 07/23/23  0656 07/21/23  1317 07/20/23  0852   WBC 8.6  --  8.2  --  5.9   HGB 7.5* 7.5* 7.0*   < > 9.4*     --  246  --  227    < > = values in this interval not displayed.       Assessment: Genaro Ortiz is a 78 year old male s/p R JOHNNY on 7/14 and L JOHNNY on 7/21 with . Doing well.     Appreciate med assistance with medical management.    Granados removed 7/23. Struggling with retention. On flomax. Appreciate med/urology recs.    Orthopedically ready for discharge. Awaiting placement and medical stability (bladder function).     ABLA, hgb 7.5. Asymptomatic. If becomes symptomatic will consider transfusion. CTM.    Plan:  WBAT with posterior hip precautions bilateral hips  Postop abx: periop ancef  Pain control  DVT ppx: aspirin 162 to start POD1 and continue for 4 weeks  ADAT  No more hgb needed from an ortho perspective unless becomes symptomatic  PT/OT  Granados placed 7/18 and removed 7/23, then replaced and discharging with granados on 7/24. Urology following peripherally  Appreciate medicine recs  Dispo planning - TCU today, if cleared medically, appreciate SW recs. Cleared from an ortho perspective to discharge.    Future Appointments   Date Time Provider Department Center   7/15/2023  2:00 PM  Eric Victor, PT URPT [place]   8/28/2023 11:00 AM Merritt Toney, PA-C [facility]   10/4/2023  9:00 AM Giovanni Shah MD [facility]   11/7/2023  1:30 PM Eric Abel MD [facility]   12/8/2023  1:15 PM Valdez Quesada DO [facility]       Paradise Reyes MD, PGY-4  Orthopedics  Pager: [phone]

## 2023-07-25 NOTE — PLAN OF CARE
Physical Therapy Discharge Summary    Reason for therapy discharge:    Discharged to transitional care facility.    Progress towards therapy goal(s). See goals on Care Plan in [facility] electronic health record for goal details.  Goals partially met.  Barriers to achieving goals:   limited tolerance for therapy.    Therapy recommendation(s):    Continued therapy is recommended.  Rationale/Recommendations:  to progress safety and independence with functional mobility prior to returning home.

## 2023-07-25 NOTE — PLAN OF CARE
Occupational Therapy Discharge Summary    Reason for therapy discharge:    Discharged to transitional care facility.    Progress towards therapy goal(s). See goals on Care Plan in [facility] electronic health record for goal details.  Goals met    Therapy recommendation(s):    Continued therapy is recommended.  Rationale/Recommendations:  recommend continued therapies at TCU to progress functional strength, endurance, safety, and IND with functional mobility and ADL completion within precautions.

## 2023-07-25 NOTE — PROGRESS NOTES
Care Management Discharge Note    Discharge Date: 07/25/2023       Discharge Disposition: Transitional Care    [facility]  [address] [place] [address]  PH: [phone]   FAX: [phone]    Discharge Services:  Post acute therapies    Discharge DME:  None; pt has own 4WW here at hospital    Discharge Transportation: [email or web address] ride pick-up between 2:37 p.m. and 3:22 p.m.     Private pay costs discussed: transportation costs and insurance costs co-pays    Does the patient's insurance plan have a 3 day qualifying hospital stay waiver?  Yes   Will the waiver be used for post-acute placement? No    PAS Confirmation Code: [id number]  Patient/family educated on Medicare website which has current facility and service quality ratings:  Yes    Education Provided on the Discharge Plan:  Yes  Persons Notified of Discharge Plans: patient, bedside nurse, charge nurse, KANDY Durham, Dr. Woods  Patient/Family in Agreement with the Plan: yes    Handoff Referral Completed: Yes    Additional Information:    1305: Discharge orders faxed.    1310: Hard script faxed.    1350:  received VM from [facility], who stated that discharge orders and hard script were received; however, hard script is needed for Lyrica.    1405:  paged Dr. Woods, asking if he could provide script for Lyrica.    1420:  faxed Lyrica hard script to [facility].     1435:  called [facility], provided update of time of Lyrica hard script being faxed. [facility] reported that hard script was not received yet, but will call  if it doesn't come through the fax.    1510: [facility] called back , stated that Lyrica hard script never came through; provided different fax number and agreeable to calling  if fax not received.  faxed Lyrica hard script to [phone], per [facility]'s request.          Glenys Musa, HIRALW, LSW  [facility]   PHONE: [phone]  Pager: [phone]

## 2023-07-25 NOTE — PLAN OF CARE
"Shift: 1900-0730  /59 (BP Location: Left arm, Patient Position: Semi-Ferguson's)   Pulse 74   Temp 98.2  F (36.8  C) (Oral)   Resp 16   Ht 1.753 m (5' 9.02\")   Wt 101.8 kg (224 lb 6.9 oz)   SpO2 97%   BMI 33.13 kg/m       AOx4. ORA. Denies CP, SOB, N/V, N/T.  Bilat hip dressings CDI. Skin otherwise intact.     Pain: Denies pain. C/o stiffness, given PRN Robaxin x1.  Activity: Up A1-2 FWW GB, NOOB this shift.   GI/: LBM 7/23 +FL; Granados in place for recurrent urinary retention, adequate UOP.   Diet: Reg, tolerating. Reporting decreased appetite.  LDA: R PIV SL, patent with flush.    Plan: discharge to TCU today ~1500 - pending bladder plan, possibly discharging with granados.     Slept between cares.  Pt able to make needs known, call light within reach. Care ongoing.      "

## 2023-07-25 NOTE — PLAN OF CARE
"  VS: /57 (BP Location: Left arm)   Pulse 86   Temp 98.1  F (36.7  C) (Oral)   Resp 16   Ht 1.753 m (5' 9.02\")   Wt 101.8 kg (224 lb 6.9 oz)   SpO2 95%   BMI 33.13 kg/m      O2: Room air>90%   Output: Manzo cath in place   Last BM: 7/23/23   Activity: SBA with gait belt and waker   Skin: bilat hip surgical incisions    Pain: Denies pain   CMS: AXO X4. Baseline N/T   Dressing: Left and right hip-CDI   Diet: REG   LDA: Right piv- sl   Equipment: Pt belonigings   Plan: Discharging to TCU Tomorrow at 3:00 pm   Additional Info:                             "

## 2023-07-26 ENCOUNTER — PATIENT OUTREACH (OUTPATIENT)
Dept: CARE COORDINATION | Facility: CLINIC | Age: 78
End: 2023-07-26

## 2023-07-26 ENCOUNTER — TELEPHONE (OUTPATIENT)
Dept: UROLOGY | Facility: CLINIC | Age: 78
End: 2023-07-26

## 2023-07-26 NOTE — LETTER
[facility]   To:   [facility]           Please give to facility    From:   Ava Tian RN  Care Coordinator   [facility]   P: [phone]  [email or web address]   Patient Name:  Genaro Ortiz YOB: 1945   Admit date: 7/25/2023      *Information Needed:  Please contact me when the patient will discharge (or if they will move to long term care)- include the discharge date, disposition, and main diagnosis   If the patient is discharged with home care services, please provide the name of the agency    Also- Please inform me if a care conference is being held.   Phone, Fax or Email with information      Ava Tian RN, BSN, PHN  Primary Care / Care Coordinator   [facility]  E-mail [email or web address]   [phone]                Thank you

## 2023-07-26 NOTE — PROGRESS NOTES
Clinic Care Coordination Contact  Care Coordination Transition Communication    Referral Source: IP Handoff    Clinical Data: Patient was hospitalized at [facility] from 7/14/2023 to 7/25/2023 with diagnosis of Osteoarthritis of both hips,Total Hip Arthroplasty right 7/14/2023, Total Hip Arthroplasty left 7/21/2023.    Transition to Facility:              Facility Name: [facility] phone number/fax: [phone]    Plan: RN/SW Care Coordinator will await notification from facility staff informing RN/SW Care Coordinator of patient's discharge plans/needs. RN/SW Care Coordinator will review chart and outreach to facility staff every 4 weeks and as needed.      Ava Tian RN, BSN, PHN  Primary Care / Care Coordinator   [facility]  E-mail [email or web address]   [phone]

## 2023-07-26 NOTE — TELEPHONE ENCOUNTER
RECORDS RECEIVED FROM: internal   REASON FOR VISIT: new neuromuscular for extreme gait abnormality with proximal weakness of the lower extremities and mild distal symmetric neuropathy    Date of Appt: 10/4/23   NOTES (FOR ALL VISITS) STATUS DETAILS   OFFICE NOTE from referring provider Internal Dr Quesada @ [facility] Neuro:  6/7/23  11/18/22  7/20/22  10/7/21  (Additional encounters)   EMG Internal [facility]:  9/7/22  12/10/20   MEDICATION LIST Internal    IMAGING  (FOR ALL VISITS)     MRI (HEAD, NECK, SPINE) Internal [facility]:  MRI Thoracic Spine 1/29/23  MRI Cervical Spine 1/29/23  MRI Brain 1/29/23  MRI Lumbar Spine 11/12/20

## 2023-07-27 ENCOUNTER — TELEPHONE (OUTPATIENT)
Dept: UROLOGY | Facility: CLINIC | Age: 78
End: 2023-07-27

## 2023-07-27 NOTE — TELEPHONE ENCOUNTER
[facility] Call Center    Phone Message    May a detailed message be left on voicemail: yes     Reason for Call: Other: pt is in trans care and is totally confused as to why he needs to see a urolgoy, but pt does have a cath, please advise with him as to why he needs to be seen   Care place is in [place]  Action Taken: Other: urology    Travel Screening: Not Applicable

## 2023-07-27 NOTE — TELEPHONE ENCOUNTER
----- Message from Sandoval Lock MD sent at 7/24/2023 12:51 PM CDT -----  Hello can we arrange 1 week follow up for TOV in nursing clinic for Genaro? Thanks!  Sandoval       Message had been forwarded to clinic coordinators regarding helping patient schedule a consult with a provider outpatient in order for us to complete a voiding trial.     Patient contacted to discuss. Patient stated understanding. Will message CCs regarding setting up an appointment.  Annabelle DUARTE LPN  [facility]

## 2023-07-31 ENCOUNTER — PATIENT OUTREACH (OUTPATIENT)
Dept: CARE COORDINATION | Facility: CLINIC | Age: 78
End: 2023-07-31

## 2023-07-31 ENCOUNTER — TELEPHONE (OUTPATIENT)
Dept: OPHTHALMOLOGY | Facility: CLINIC | Age: 78
End: 2023-07-31

## 2023-08-02 NOTE — TELEPHONE ENCOUNTER
MEDICAL RECORDS REQUEST   [facility]  Urology Clinic  [address] [place] [address]  PHONE: [phone]  Fax: [phone]        FUTURE VISIT INFORMATION                                                   Genaro H Angel, : 1945 scheduled for future visit at [facility]    APPOINTMENT INFORMATION:  Date: 2023  Provider:  Eric Gerber PA-C  Reason for Visit/Diagnosis: LUTS      RECORDS REQUESTED FOR VISIT                                                     NOTES  STATUS/DETAILS   OFFICE NOTE from other specialist  yes   DISCHARGE SUMMARY from hospital  yes, 2023 -- [facility]   MEDICATION LIST  yes   LABS     URINALYSIS (UA)  no   IMAGES  yes, 2023, 2023 -- XR PELVIS AND HIP  2023 -- XR ABD  2023 -- CT PELVIS BONE     PRE-VISIT CHECKLIST      Joint diagnostic appointment coordinated correctly          (ensure right order & amount of time) Yes   RECORD COLLECTION COMPLETE Yes

## 2023-08-14 ENCOUNTER — PATIENT OUTREACH (OUTPATIENT)
Dept: CARE COORDINATION | Facility: CLINIC | Age: 78
End: 2023-08-14

## 2023-08-14 ENCOUNTER — TELEPHONE (OUTPATIENT)
Dept: FAMILY MEDICINE | Facility: CLINIC | Age: 78
End: 2023-08-14

## 2023-08-14 NOTE — TELEPHONE ENCOUNTER
Linnea MARTINO calling with [facility].  Patient was admitted to their services today.  Hoping that prescriptions can be transferred to [place].  Pended pharmacy if approved.  Callback [phone]  Jessica DUARTE RN

## 2023-08-15 NOTE — TELEPHONE ENCOUNTER
It appears he was discharged from [facility] 08/07/23.     Please help him schedule follow up with me.     Please confirm with his home nurse if the discharge meds in his transitional care discharge summary are correct.  If so please load refills and I can sign/send to Jane.      acetaminophen (TYLENOL) 325 mg tablet Take 2 Tablets (650 mg) by mouth every 4 hours if needed. Max acetaminophen dose: 4000mg in 24 hrs. 0    aspirin (ECOTRIN) 81 mg enteric coated tablet Take 2 Tablets (162 mg) by mouth once daily with a meal. X 4 weeks. End date 8/23/23. 0    cholecalciferol (VITAMIN D) 1,000 unit tablet Take 1,000 Units by mouth once daily.    cyanocobalamin (VITAMIN B12) 1,000 mcg sustained release tablet Take 1 Tablet (1,000 mcg) by mouth once daily.    ferrous sulfate 325 mg delayed release tablet Take 1 Tablet (325 mg) by mouth once daily with a meal. 90 Tablet 3    OMEGA-3/DHA/EPA/FISH OIL (OMEGA-3 FISH OIL ORAL) Take 2,000 mg by mouth once daily.    oxyCODONE (ROXICODONE) 5 mg immediate release tablet Take 1-2 Tablets (5-10 mg) by mouth every 4 hours if needed for Pain. 10 Tablet 0    polyethylene glycol (Miralax) 17 g per packet packet Mix 17 g (1 Packet) in liquid then take by mouth once daily. And 17 g qd PRN 0    pregabalin (LYRICA) 75 mg capsule Take 3 Capsules (225 mg) by mouth two times daily. 0    sennosides-docusate (SENOKOT S) (8.6-50 mg) tablet Take 1-2 Tablets by mouth two times daily. 0    simvastatin (ZOCOR) 20 mg tablet Take 20 mg by mouth at bedtime.    tamsulosin (FLOMAX) 0.4 mg capsule Take 1 Capsule (0.4 mg) by mouth once daily after a meal. 0     Joel Wegener,MD

## 2023-08-23 ENCOUNTER — TELEPHONE (OUTPATIENT)
Dept: FAMILY MEDICINE | Facility: CLINIC | Age: 78
End: 2023-08-23

## 2023-08-23 ENCOUNTER — PATIENT OUTREACH (OUTPATIENT)
Dept: CARE COORDINATION | Facility: CLINIC | Age: 78
End: 2023-08-23

## 2023-08-23 NOTE — PROGRESS NOTES
Clinic Care Coordination Contact    Situation: Patient chart reviewed by care coordinator.    Background:   Patient was admitted to [facility] from 7/14/2023 to 7/25/2023 with a diagnoses of Osteoarthritis of both hips, Total Hip Arthroplasty right 7/14/2023, Total Hip Arthroplasty left 7/21/2023 and discharged to [facility].    Assessment:   [facility] did not inform RNCC of patient discharge.  However, the patient has an upcoming Ortho and Urology appointment, is followed by Home Health Care, [facility] RN/Physical Therapy/Occupational Therapy/Home Health Aide services and is following the plan of care as outlined by care team.    Plan/Recommendations:   No further Care Coordination outreaches at this time.     Ava Tian RN, BSN, PHN  Primary Care / Care Coordinator   [facility]  E-mail [email or web address]   [phone]

## 2023-08-23 NOTE — TELEPHONE ENCOUNTER
Forms/Letter Request    Type of form/letter:   Home Health Certification and plan of care  Cert period: 8/14/2023 to 10/12/2023    Have you been seen for this request: N/A    Do we have the form/letter:   Faxed to the [facility]    Who is the form from?   Home Health Care    Where did/will the form come from? form was faxed in    When is form/letter needed by: n/a    How would you like the form/letter returned:   Fax: [phone]    Patient Notified form requests are processed in 3-5 business days:No    Could we send this information to you in [employer] or would you prefer to receive a phone call?:   n/a    Placed on Dr. Wegener's desk.

## 2023-08-25 DIAGNOSIS — Z96.649 S/P TOTAL HIP ARTHROPLASTY: Primary | ICD-10-CM

## 2023-08-25 NOTE — PROGRESS NOTES
[facility]  Orthopaedic Surgery, Joint Replacement Consultation  by David Escamilla M.D.    Genaro Ortiz MRN# [id number]    YOB: 1945     Requesting physician: Valdez Quesada DO, [facility] neurology  Wegener, Joel Daniel Irwin     Background history:  DX:  Bilateral DJD of hips    TREATMENTS:  7/14/2023, right total hip arthroplasty, Dr. Escamilla, [facility]  7/21/2023, left total hip arthroplasty, Dr. Escamilla, [facility]         Assessment and Plan:   Assessment:  78-year-old male was approximately 3 weeks status post bilateral total hip arthroplasty.  Recovering appropriately    Plan:  I extensively discussed my findings with the patient.  Patient appears to be recovering appropriately.  Patient will continue to work with physical therapy on strengthening and gait training.  Posterior hip precautions were repeated and advised to remain in place until 3 months postoperatively.  Patient has completed the DVT prophylaxis 4 weeks postoperatively.  Antibiotics prior to dental procedures reviewed. All questions were answered.  Patient understands and agrees to the treatment plan as set forth.  We will follow-up with patient at the 1 year postoperative date with renewed radiographic imaging studies.       Merritt Toney PA-C  Physician Assistant   Oncology and Adult Reconstructive Surgery  Dept Orthopaedic Surgery, [facility]    This note was created using dictation software and may contain errors.  Please contact the creator for any clarifications that are needed.              History of Present Illness:   77 year old male approximately 3 weeks status post bilateral total hip arthroplasties.  He states he is very pleased with the surgery and his pain is controlled with no medication.  He is ambulating with a walker working on exercises at home.  He is on aspirin for DVT prophylaxis.  Denies any chest pain, shortness of breath or calf pain.           Physical Exam:     EXAMINATION pertinent  findings:   PSYCH: Pleasant, healthy-appearing, alert, oriented x3, cooperative. Normal mood and affect.  VITAL SIGNS: There were no vitals taken for this visit..  Reviewed nursing intake notes.   There is no height or weight on file to calculate BMI.  RESP: non labored breathing .  Limited thoracic excursion approximately 1 cm.  ABD: benign, soft, non-tender, no acute peritoneal findings  SKIN: grossly normal   LYMPHATIC: grossly normal, no adenopathy, no extremity edema  NEURO: grossly normal , no motor deficits  VASCULAR: satisfactory perfusion of all extremities   MUSCULOSKELETAL:        Bilateral hips: The incision is clean, dry, and intact with no erythema, dehiscence, or discharge.  Calves are soft nontender with negative Homans' sign    Bilateral LE:              Thigh and leg compartments soft and compressible              +Quad/TA/GSC/FHL/EHL              SILT DP/SP/Esau/Saph/Tib nerve distributions              Palpable dorsalis pedis pulse                  Data:   All laboratory data reviewed  All imaging studies reviewed by me    XR AP pelvis bilateral lateral hips on 8/28/2023:  My interpretation: Status post bilateral total hip arthroplasty.  Adequate sizing, fixation and orientation of components.  No signs of immediate postoperative complications.

## 2023-08-28 ENCOUNTER — TELEPHONE (OUTPATIENT)
Dept: FAMILY MEDICINE | Facility: CLINIC | Age: 78
End: 2023-08-28

## 2023-08-28 ENCOUNTER — ANCILLARY PROCEDURE (OUTPATIENT)
Dept: GENERAL RADIOLOGY | Facility: CLINIC | Age: 78
End: 2023-08-28
Attending: PHYSICIAN ASSISTANT
Payer: COMMERCIAL

## 2023-08-28 ENCOUNTER — OFFICE VISIT (OUTPATIENT)
Dept: ORTHOPEDICS | Facility: CLINIC | Age: 78
End: 2023-08-28
Payer: COMMERCIAL

## 2023-08-28 DIAGNOSIS — Z96.649 S/P TOTAL HIP ARTHROPLASTY: ICD-10-CM

## 2023-08-28 DIAGNOSIS — Z96.643 STATUS POST TOTAL REPLACEMENT OF BOTH HIPS: Primary | ICD-10-CM

## 2023-08-28 PROCEDURE — 73522 X-RAY EXAM HIPS BI 3-4 VIEWS: CPT | Performed by: RADIOLOGY

## 2023-08-28 PROCEDURE — 99024 POSTOP FOLLOW-UP VISIT: CPT | Performed by: PHYSICIAN ASSISTANT

## 2023-08-28 NOTE — TELEPHONE ENCOUNTER
Forms/Letter Request    Type of form/letter:  Revision to plan of care    Have you been seen for this request: N/A    Do we have the form/letter: Yes: faxed to [facility], placed in Dr. Fishman's box    Who is the form from? Home care    Where did/will the form come from? form was faxed in    When is form/letter needed by: N/A    How would you like the form/letter returned: Fax : [phone]    Patient Notified form requests are processed in 3-5 business days:No    Could we send this information to you in [employer] or would you prefer to receive a phone call?:   No preference   Okay to leave a detailed message?: No at Home number on file [phone] (home)

## 2023-08-28 NOTE — TELEPHONE ENCOUNTER
Home care nurse calling with pt.  The call ended up coming to the  clinic.  This note will be sent to the [facility] team.  PT needs an [facility] appt. Call sent to central scheduling.    PT wants to transfer all his med to a mail order pharmacy.  He is wanting the [facility] in [facility].    If meds need to be reviewed, please call pt.    Diana Blake RN-[facility]

## 2023-08-28 NOTE — LETTER
8/28/2023         RE: Genaro Ortiz  [address]  [facility] [address]        Dear Colleague,    Thank you for referring your patient, Genaro Ortiz, to the [facility] [place]. Please see a copy of my visit note below.        [facility]  Orthopaedic Surgery, Joint Replacement Consultation  by David Escamilla M.D.    Genaro Ortiz MRN# [id number]    YOB: 1945     Requesting physician: Valdez Quesada DO, [facility] neurology  Wegener, Joel Daniel Irwin     Background history:  DX:  Bilateral DJD of hips    TREATMENTS:  7/14/2023, right total hip arthroplasty, Dr. Escamilla, [facility]  7/21/2023, left total hip arthroplasty, Dr. Escamilla, [facility]         Assessment and Plan:   Assessment:  78-year-old male was approximately 3 weeks status post bilateral total hip arthroplasty.  Recovering appropriately    Plan:  I extensively discussed my findings with the patient.  Patient appears to be recovering appropriately.  Patient will continue to work with physical therapy on strengthening and gait training.  Posterior hip precautions were repeated and advised to remain in place until 3 months postoperatively.  Patient has completed the DVT prophylaxis 4 weeks postoperatively.  Antibiotics prior to dental procedures reviewed. All questions were answered.  Patient understands and agrees to the treatment plan as set forth.  We will follow-up with patient at the 1 year postoperative date with renewed radiographic imaging studies.       Merritt Toney PA-C  Physician Assistant   Oncology and Adult Reconstructive Surgery  Dept Orthopaedic Surgery, [facility]    This note was created using dictation software and may contain errors.  Please contact the creator for any clarifications that are needed.              History of Present Illness:   77 year old male approximately 3 weeks status post bilateral total hip arthroplasties.  He states he is very pleased  with the surgery and his pain is controlled with no medication.  He is ambulating with a walker working on exercises at home.  He is on aspirin for DVT prophylaxis.  Denies any chest pain, shortness of breath or calf pain.           Physical Exam:     EXAMINATION pertinent findings:   PSYCH: Pleasant, healthy-appearing, alert, oriented x3, cooperative. Normal mood and affect.  VITAL SIGNS: There were no vitals taken for this visit..  Reviewed nursing intake notes.   There is no height or weight on file to calculate BMI.  RESP: non labored breathing .  Limited thoracic excursion approximately 1 cm.  ABD: benign, soft, non-tender, no acute peritoneal findings  SKIN: grossly normal   LYMPHATIC: grossly normal, no adenopathy, no extremity edema  NEURO: grossly normal , no motor deficits  VASCULAR: satisfactory perfusion of all extremities   MUSCULOSKELETAL:        Bilateral hips: The incision is clean, dry, and intact with no erythema, dehiscence, or discharge.  Calves are soft nontender with negative Homans' sign    Bilateral LE:              Thigh and leg compartments soft and compressible              +Quad/TA/GSC/FHL/EHL              SILT DP/SP/Esau/Saph/Tib nerve distributions              Palpable dorsalis pedis pulse                  Data:   All laboratory data reviewed  All imaging studies reviewed by me    XR AP pelvis bilateral lateral hips on 8/28/2023:  My interpretation: Status post bilateral total hip arthroplasty.  Adequate sizing, fixation and orientation of components.  No signs of immediate postoperative complications.

## 2023-08-29 ENCOUNTER — MEDICAL CORRESPONDENCE (OUTPATIENT)
Dept: HEALTH INFORMATION MANAGEMENT | Facility: CLINIC | Age: 78
End: 2023-08-29

## 2023-08-29 DIAGNOSIS — Z53.9 DIAGNOSIS NOT YET DEFINED: Primary | ICD-10-CM

## 2023-08-29 PROCEDURE — G0180 MD CERTIFICATION HHA PATIENT: HCPCS | Performed by: FAMILY MEDICINE

## 2023-08-29 NOTE — TELEPHONE ENCOUNTER
Spoke with patient.  Per TE 8/14- patient needs meds transferred and follow-up visit.  Has follow-up visit scheduled with JW.  Also has enough medicine for him to make it to that appointment, so will bring discharge summary with him to that appointment so him and JW can review.  Patient does not have contact number for home healthcare nurse- sounds like someone different comes every time.  States he is getting better every day and will follow-up with us if needed.  Jessica DUARTE RN

## 2023-08-29 NOTE — TELEPHONE ENCOUNTER
See TE from 8/28.  Patient is scheduled for follow-up on 9/21 with [facility].  Has enough medication to last until said appointment, where he would like to review prescriptions with him.  Jessica DUARTE RN

## 2023-09-05 ENCOUNTER — PRE VISIT (OUTPATIENT)
Dept: UROLOGY | Facility: CLINIC | Age: 78
End: 2023-09-05

## 2023-09-05 NOTE — TELEPHONE ENCOUNTER
Reason for visit: Consult      Relevant information: Urinary symptoms      Records/imaging/labs/orders: Epic     At Rooming: Valery Garces  9/5/2023  7:41 AM

## 2023-09-05 NOTE — PROGRESS NOTES
"Subjective     REQUESTING PROVIDER  Dr. David Escamilla MD    REASON FOR VISIT  Post-op urinary retention     HISTORY OF PRESENT ILLNESS  Mr. Ortiz is a pleasant 78 year old male with a past medical history significant for prostate cancer s/p resection in 2007, Vit B12 deficiency, hyperlipidemia, hypertension, monoclonal gammopathy or unknown significance, and hip arthritis s/p bilateral hip arthroplasty who presents today for post-op urinary retention. I personally reviewed the admission note from 7/14/23, the nursing home note from 8/2/23, and the orthopedic note from 8/28/23 in preparation for this visit.     It appears that the Genaro was found to be in urinary retention during his hip arthroplasty post-op, so he was started on Flomax. Unfortunatly he was unable to become catheter free in the hospital, but according to his follow-up notes from the nursing home on 8/2/23, he had his catheter removed at some point since the admission and was \"denying voiding problems.\" It also does not appear from the ortho note from 8/28 that he had a catheter.     Today:  Catheter was taken out in the TCU and was started on Flomax, but then did not have it at all when home  Baseline nocturia x2    Objective     PHYSICAL EXAM  Physical Exam  Constitutional:       Appearance: Normal appearance.   HENT:      Head: Normocephalic and atraumatic.      Nose: No congestion.      Mouth/Throat:      Mouth: Mucous membranes are moist.   Eyes:      General:         Right eye: No discharge.         Left eye: No discharge.   Pulmonary:      Effort: No respiratory distress.   Abdominal:      General: There is no distension.   Musculoskeletal:         General: No deformity.   Skin:     General: Skin is warm and dry.   Neurological:      Mental Status: He is alert and oriented to person, place, and time.   Psychiatric:         Mood and Affect: Mood normal.         Behavior: Behavior normal.       LABORATORY  Lab Results   Component Value Date/Time "    PSA <0.01 08/30/2022 08:32 AM    PSA <0.01 06/21/2021 09:28 AM    PSA <0.01 06/23/2020 08:49 AM    PSA <0.01 02/26/2019 10:28 AM    PSA <0.01 01/30/2018 11:09 AM    PSA  01/04/2017 11:02 AM     <0.01  Assay Method:  Chemiluminescence using Siemens Vista analyzer       TESTING  PVR: 37 mL    Assessment & Plan   Post-op urinary retention   History of prostate cancer s/p resection in 2007    It was my pleasure to meet with Mr. Ortiz in the office today in regards to his postoperative urinary retention in the setting of prostate removal for prostate cancer in 2007.  After reviewing his histor and completing a postvoid residual exam y, I was happy to let him know that I do not see any evidence of anything dangerous going on at the moment given that he is returned to being able to empty his bladder effectively and safely on his own.  We discussed that this episode of retention was likely a byproduct of the anesthesia, and that I do not have concerns that he will go back into urinary retention at this time.    With this in mind, I would recommend he continue to monitor his urinary symptoms and contact me if they were to ever drastically change or refer were to ever get much more difficult to urinate or if he started to feel like he was not emptying his bladder.  If any of these things became the case, we could bring him back in for a postvoid residual.    Mr. Ortiz expressed understanding and agreement to the above discussion and plan and all of his questions were answered to his satisfaction. A business card was provided as a point of contact.     PLAN  Follow-up as needed with urology   Monitor urinary symptoms     Signed by:    Eric Gerber PA-C    I spent a total of 18 minutes spent on the date of the encounter doing chart review, history and exam, documentation, and further activities as noted above.

## 2023-09-06 ENCOUNTER — PRE VISIT (OUTPATIENT)
Dept: UROLOGY | Facility: CLINIC | Age: 78
End: 2023-09-06

## 2023-09-06 ENCOUNTER — OFFICE VISIT (OUTPATIENT)
Dept: UROLOGY | Facility: CLINIC | Age: 78
End: 2023-09-06
Payer: COMMERCIAL

## 2023-09-06 VITALS
DIASTOLIC BLOOD PRESSURE: 85 MMHG | SYSTOLIC BLOOD PRESSURE: 142 MMHG | HEIGHT: 69 IN | HEART RATE: 73 BPM | BODY MASS INDEX: 32.54 KG/M2 | WEIGHT: 219.7 LBS

## 2023-09-06 DIAGNOSIS — Z85.46 HISTORY OF PROSTATE CANCER: ICD-10-CM

## 2023-09-06 DIAGNOSIS — R33.9 URINARY RETENTION: Primary | ICD-10-CM

## 2023-09-06 PROCEDURE — 99203 OFFICE O/P NEW LOW 30 MIN: CPT | Mod: 25 | Performed by: STUDENT IN AN ORGANIZED HEALTH CARE EDUCATION/TRAINING PROGRAM

## 2023-09-06 PROCEDURE — 51798 US URINE CAPACITY MEASURE: CPT | Performed by: STUDENT IN AN ORGANIZED HEALTH CARE EDUCATION/TRAINING PROGRAM

## 2023-09-06 RX ORDER — BNT162B2 ORIGINAL AND OMICRON BA.4/BA.5 .1125; .1125 MG/2.25ML; MG/2.25ML
INJECTION, SUSPENSION INTRAMUSCULAR
COMMUNITY
Start: 2023-05-22 | End: 2023-09-16

## 2023-09-06 ASSESSMENT — PAIN SCALES - GENERAL: PAINLEVEL: NO PAIN (0)

## 2023-09-06 NOTE — NURSING NOTE
"Blood pressure (!) 142/85, pulse 73, height 1.753 m (5' 9.02\"), weight 99.7 kg (219 lb 11.2 oz).  Genevieve Hernandez LPN    "

## 2023-09-06 NOTE — LETTER
"9/6/2023       RE: Genaro Ortiz  [address]  [place] [place] [address]     Dear Colleague,    Thank you for referring your patient, Genaro Ortiz, to the [facility] [place] [facility]. Please see a copy of my visit note below.    Subjective    REQUESTING PROVIDER  Dr. David Escamilla MD    REASON FOR VISIT  Post-op urinary retention     HISTORY OF PRESENT ILLNESS  Mr. Ortiz is a pleasant 78 year old male with a past medical history significant for prostate cancer s/p resection in 2007, Vit B12 deficiency, hyperlipidemia, hypertension, monoclonal gammopathy or unknown significance, and hip arthritis s/p bilateral hip arthroplasty who presents today for post-op urinary retention. I personally reviewed the admission note from 7/14/23, the nursing home note from 8/2/23, and the orthopedic note from 8/28/23 in preparation for this visit.     It appears that the Genaro was found to be in urinary retention during his hip arthroplasty post-op, so he was started on Flomax. Unfortunatly he was unable to become catheter free in the hospital, but according to his follow-up notes from the nursing home on 8/2/23, he had his catheter removed at some point since the admission and was \"denying voiding problems.\" It also does not appear from the ortho note from 8/28 that he had a catheter.     Today:  Catheter was taken out in the TCU and was started on Flomax, but then did not have it at all when home  Baseline nocturia x2    Objective    PHYSICAL EXAM  Physical Exam  Constitutional:       Appearance: Normal appearance.   HENT:      Head: Normocephalic and atraumatic.      Nose: No congestion.      Mouth/Throat:      Mouth: Mucous membranes are moist.   Eyes:      General:         Right eye: No discharge.         Left eye: No discharge.   Pulmonary:      Effort: No respiratory distress.   Abdominal:      General: There is no distension. "   Musculoskeletal:         General: No deformity.   Skin:     General: Skin is warm and dry.   Neurological:      Mental Status: He is alert and oriented to person, place, and time.   Psychiatric:         Mood and Affect: Mood normal.         Behavior: Behavior normal.       LABORATORY  Lab Results   Component Value Date/Time    PSA <0.01 08/30/2022 08:32 AM    PSA <0.01 06/21/2021 09:28 AM    PSA <0.01 06/23/2020 08:49 AM    PSA <0.01 02/26/2019 10:28 AM    PSA <0.01 01/30/2018 11:09 AM    PSA  01/04/2017 11:02 AM     <0.01  Assay Method:  Chemiluminescence using Siemens Vista analyzer       TESTING  PVR: 37 mL    Assessment & Plan  Post-op urinary retention   History of prostate cancer s/p resection in 2007    It was my pleasure to meet with Mr. Ortiz in the office today in regards to his postoperative urinary retention in the setting of prostate removal for prostate cancer in 2007.  After reviewing his histor and completing a postvoid residual exam y, I was happy to let him know that I do not see any evidence of anything dangerous going on at the moment given that he is returned to being able to empty his bladder effectively and safely on his own.  We discussed that this episode of retention was likely a byproduct of the anesthesia, and that I do not have concerns that he will go back into urinary retention at this time.    With this in mind, I would recommend he continue to monitor his urinary symptoms and contact me if they were to ever drastically change or refer were to ever get much more difficult to urinate or if he started to feel like he was not emptying his bladder.  If any of these things became the case, we could bring him back in for a postvoid residual.    Mr. Ortiz expressed understanding and agreement to the above discussion and plan and all of his questions were answered to his satisfaction. A business card was provided as a point of contact.     PLAN  Follow-up as needed with urology   Monitor  urinary symptoms     Signed by:    Eric Gerber PA-C    I spent a total of 18 minutes spent on the date of the encounter doing chart review, history and exam, documentation, and further activities as noted above.

## 2023-09-12 ENCOUNTER — MEDICAL CORRESPONDENCE (OUTPATIENT)
Dept: HEALTH INFORMATION MANAGEMENT | Facility: CLINIC | Age: 78
End: 2023-09-12

## 2023-09-16 ENCOUNTER — APPOINTMENT (OUTPATIENT)
Dept: GENERAL RADIOLOGY | Facility: CLINIC | Age: 78
DRG: 536 | End: 2023-09-16
Attending: STUDENT IN AN ORGANIZED HEALTH CARE EDUCATION/TRAINING PROGRAM
Payer: COMMERCIAL

## 2023-09-16 ENCOUNTER — APPOINTMENT (OUTPATIENT)
Dept: CT IMAGING | Facility: CLINIC | Age: 78
DRG: 536 | End: 2023-09-16
Attending: STUDENT IN AN ORGANIZED HEALTH CARE EDUCATION/TRAINING PROGRAM
Payer: COMMERCIAL

## 2023-09-16 ENCOUNTER — HOSPITAL ENCOUNTER (INPATIENT)
Facility: CLINIC | Age: 78
LOS: 4 days | Discharge: NURSING, REHAB OR LONG-TERM CARE | DRG: 536 | End: 2023-09-20
Attending: STUDENT IN AN ORGANIZED HEALTH CARE EDUCATION/TRAINING PROGRAM | Admitting: PEDIATRICS
Payer: COMMERCIAL

## 2023-09-16 DIAGNOSIS — M97.01XA PERIPROSTHETIC FRACTURE AROUND INTERNAL PROSTHETIC RIGHT HIP JOINT, INITIAL ENCOUNTER (H): ICD-10-CM

## 2023-09-16 DIAGNOSIS — R33.9 URINARY RETENTION: ICD-10-CM
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
## 2023-09-16 DIAGNOSIS — K59.03 DRUG-INDUCED CONSTIPATION: ICD-10-CM

## 2023-09-16 LAB
ANION GAP SERPL CALCULATED.3IONS-SCNC: 12 MMOL/L (ref 7–15)
BASOPHILS # BLD AUTO: 0 10E3/UL (ref 0–0.2)
BASOPHILS NFR BLD AUTO: 0 %
BUN SERPL-MCNC: 25.6 MG/DL (ref 8–23)
CALCIUM SERPL-MCNC: 9.4 MG/DL (ref 8.8–10.2)
CHLORIDE SERPL-SCNC: 103 MMOL/L (ref 98–107)
CREAT SERPL-MCNC: 0.89 MG/DL (ref 0.67–1.17)
DEPRECATED HCO3 PLAS-SCNC: 23 MMOL/L (ref 22–29)
EGFRCR SERPLBLD CKD-EPI 2021: 89 ML/MIN/1.73M2
EOSINOPHIL # BLD AUTO: 0 10E3/UL (ref 0–0.7)
EOSINOPHIL NFR BLD AUTO: 0 %
ERYTHROCYTE [DISTWIDTH] IN BLOOD BY AUTOMATED COUNT: 14.6 % (ref 10–15)
GLUCOSE SERPL-MCNC: 113 MG/DL (ref 70–99)
HCT VFR BLD AUTO: 33.3 % (ref 40–53)
HGB BLD-MCNC: 10.7 G/DL (ref 13.3–17.7)
IMM GRANULOCYTES # BLD: 0 10E3/UL
IMM GRANULOCYTES NFR BLD: 0 %
LYMPHOCYTES # BLD AUTO: 1.8 10E3/UL (ref 0.8–5.3)
LYMPHOCYTES NFR BLD AUTO: 19 %
MCH RBC QN AUTO: 27.6 PG (ref 26.5–33)
MCHC RBC AUTO-ENTMCNC: 32.1 G/DL (ref 31.5–36.5)
MCV RBC AUTO: 86 FL (ref 78–100)
MONOCYTES # BLD AUTO: 0.6 10E3/UL (ref 0–1.3)
MONOCYTES NFR BLD AUTO: 7 %
NEUTROPHILS # BLD AUTO: 6.9 10E3/UL (ref 1.6–8.3)
NEUTROPHILS NFR BLD AUTO: 74 %
NRBC # BLD AUTO: 0 10E3/UL
NRBC BLD AUTO-RTO: 0 /100
PLATELET # BLD AUTO: 211 10E3/UL (ref 150–450)
POTASSIUM SERPL-SCNC: 4 MMOL/L (ref 3.4–5.3)
RBC # BLD AUTO: 3.87 10E6/UL (ref 4.4–5.9)
SODIUM SERPL-SCNC: 138 MMOL/L (ref 136–145)
WBC # BLD AUTO: 9.4 10E3/UL (ref 4–11)

## 2023-09-16 PROCEDURE — 36415 COLL VENOUS BLD VENIPUNCTURE: CPT | Performed by: STUDENT IN AN ORGANIZED HEALTH CARE EDUCATION/TRAINING PROGRAM

## 2023-09-16 PROCEDURE — 250N000013 HC RX MED GY IP 250 OP 250 PS 637: Performed by: STUDENT IN AN ORGANIZED HEALTH CARE EDUCATION/TRAINING PROGRAM

## 2023-09-16 PROCEDURE — 99285 EMERGENCY DEPT VISIT HI MDM: CPT | Performed by: STUDENT IN AN ORGANIZED HEALTH CARE EDUCATION/TRAINING PROGRAM

## 2023-09-16 PROCEDURE — 120N000002 HC R&B MED SURG/OB UMMC

## 2023-09-16 PROCEDURE — 73502 X-RAY EXAM HIP UNI 2-3 VIEWS: CPT

## 2023-09-16 PROCEDURE — 73560 X-RAY EXAM OF KNEE 1 OR 2: CPT | Mod: 26 | Performed by: RADIOLOGY

## 2023-09-16 PROCEDURE — 73552 X-RAY EXAM OF FEMUR 2/>: CPT | Mod: 26 | Performed by: RADIOLOGY

## 2023-09-16 PROCEDURE — 73700 CT LOWER EXTREMITY W/O DYE: CPT | Mod: 26 | Performed by: RADIOLOGY

## 2023-09-16 PROCEDURE — 73502 X-RAY EXAM HIP UNI 2-3 VIEWS: CPT | Mod: 26 | Performed by: RADIOLOGY

## 2023-09-16 PROCEDURE — 73560 X-RAY EXAM OF KNEE 1 OR 2: CPT | Mod: RT

## 2023-09-16 PROCEDURE — 80048 BASIC METABOLIC PNL TOTAL CA: CPT | Performed by: STUDENT IN AN ORGANIZED HEALTH CARE EDUCATION/TRAINING PROGRAM

## 2023-09-16 PROCEDURE — 250N000013 HC RX MED GY IP 250 OP 250 PS 637: Performed by: PHYSICIAN ASSISTANT

## 2023-09-16 PROCEDURE — 85025 COMPLETE CBC W/AUTO DIFF WBC: CPT | Performed by: STUDENT IN AN ORGANIZED HEALTH CARE EDUCATION/TRAINING PROGRAM

## 2023-09-16 PROCEDURE — 73552 X-RAY EXAM OF FEMUR 2/>: CPT | Mod: RT

## 2023-09-16 PROCEDURE — 73700 CT LOWER EXTREMITY W/O DYE: CPT | Mod: RT

## 2023-09-16 PROCEDURE — 99207 PR APP CREDIT; MD BILLING SHARED VISIT: CPT | Performed by: PEDIATRICS

## 2023-09-16 PROCEDURE — 99222 1ST HOSP IP/OBS MODERATE 55: CPT | Mod: FS | Performed by: PHYSICIAN ASSISTANT

## 2023-09-16 PROCEDURE — 99285 EMERGENCY DEPT VISIT HI MDM: CPT | Mod: 25 | Performed by: STUDENT IN AN ORGANIZED HEALTH CARE EDUCATION/TRAINING PROGRAM

## 2023-09-16 RX ORDER — POLYETHYLENE GLYCOL 3350 17 G/17G
17 POWDER, FOR SOLUTION ORAL 2 TIMES DAILY PRN
Status: DISCONTINUED | OUTPATIENT
Start: 2023-09-16 | End: 2023-09-20 | Stop reason: HOSPADM

## 2023-09-16 RX ORDER — LIDOCAINE 40 MG/G
CREAM TOPICAL
Status: DISCONTINUED | OUTPATIENT
Start: 2023-09-16 | End: 2023-09-20 | Stop reason: HOSPADM

## 2023-09-16 RX ORDER — CYCLOBENZAPRINE HCL 5 MG
5 TABLET ORAL EVERY 8 HOURS PRN
Status: DISCONTINUED | OUTPATIENT
Start: 2023-09-16 | End: 2023-09-20 | Stop reason: HOSPADM

## 2023-09-16 RX ORDER — VITAMIN B COMPLEX
1000 TABLET ORAL DAILY
Status: DISCONTINUED | OUTPATIENT
Start: 2023-09-16 | End: 2023-09-20 | Stop reason: HOSPADM

## 2023-09-16 RX ORDER — PREGABALIN 225 MG/1
225 CAPSULE ORAL DAILY
Status: ON HOLD | COMMUNITY
End: 2023-10-03

## 2023-09-16 RX ORDER — PREGABALIN 75 MG/1
225 CAPSULE ORAL DAILY
Status: DISCONTINUED | OUTPATIENT
Start: 2023-09-16 | End: 2023-09-20 | Stop reason: HOSPADM

## 2023-09-16 RX ORDER — SIMVASTATIN 20 MG
20 TABLET ORAL AT BEDTIME
Status: DISCONTINUED | OUTPATIENT
Start: 2023-09-16 | End: 2023-09-20 | Stop reason: HOSPADM

## 2023-09-16 RX ORDER — ACETAMINOPHEN 500 MG
1000 TABLET ORAL ONCE
Status: COMPLETED | OUTPATIENT
Start: 2023-09-16 | End: 2023-09-16

## 2023-09-16 RX ORDER — ACETAMINOPHEN 325 MG/1
650 TABLET ORAL EVERY 6 HOURS PRN
Status: DISCONTINUED | OUTPATIENT
Start: 2023-09-16 | End: 2023-09-20 | Stop reason: HOSPADM

## 2023-09-16 RX ORDER — AMOXICILLIN 250 MG
1 CAPSULE ORAL DAILY PRN
Status: DISCONTINUED | OUTPATIENT
Start: 2023-09-16 | End: 2023-09-20 | Stop reason: HOSPADM

## 2023-09-16 RX ORDER — AMOXICILLIN 250 MG
1 CAPSULE ORAL DAILY PRN
COMMUNITY
End: 2023-11-08

## 2023-09-16 RX ORDER — POLYETHYLENE GLYCOL 3350 17 G/17G
1 POWDER, FOR SOLUTION ORAL EVERY MORNING
Status: ON HOLD | COMMUNITY
End: 2023-11-10

## 2023-09-16 RX ORDER — ONDANSETRON 2 MG/ML
4 INJECTION INTRAMUSCULAR; INTRAVENOUS EVERY 6 HOURS PRN
Status: DISCONTINUED | OUTPATIENT
Start: 2023-09-16 | End: 2023-09-20 | Stop reason: HOSPADM

## 2023-09-16 RX ORDER — LANOLIN ALCOHOL/MO/W.PET/CERES
1000 CREAM (GRAM) TOPICAL DAILY
Status: DISCONTINUED | OUTPATIENT
Start: 2023-09-16 | End: 2023-09-20 | Stop reason: HOSPADM

## 2023-09-16 RX ORDER — ACETAMINOPHEN 650 MG/1
650 SUPPOSITORY RECTAL EVERY 6 HOURS PRN
Status: DISCONTINUED | OUTPATIENT
Start: 2023-09-16 | End: 2023-09-20 | Stop reason: HOSPADM

## 2023-09-16 RX ORDER — ONDANSETRON 4 MG/1
4 TABLET, ORALLY DISINTEGRATING ORAL EVERY 6 HOURS PRN
Status: DISCONTINUED | OUTPATIENT
Start: 2023-09-16 | End: 2023-09-20 | Stop reason: HOSPADM

## 2023-09-16 RX ORDER — ASPIRIN 81 MG/1
162 TABLET ORAL DAILY
Status: DISCONTINUED | OUTPATIENT
Start: 2023-09-16 | End: 2023-09-20 | Stop reason: HOSPADM

## 2023-09-16 RX ORDER — ASPIRIN 81 MG/1
81 TABLET ORAL DAILY
Status: ON HOLD | COMMUNITY
End: 2023-10-03

## 2023-09-16 RX ADMIN — CYANOCOBALAMIN TAB 1000 MCG 1000 MCG: 1000 TAB at 19:13

## 2023-09-16 RX ADMIN — CYCLOBENZAPRINE HYDROCHLORIDE 5 MG: 5 TABLET, FILM COATED ORAL at 20:30

## 2023-09-16 RX ADMIN — Medication 1000 UNITS: at 19:13

## 2023-09-16 RX ADMIN — SIMVASTATIN 20 MG: 20 TABLET, FILM COATED ORAL at 20:30

## 2023-09-16 RX ADMIN — ACETAMINOPHEN 1000 MG: 500 TABLET ORAL at 15:45

## 2023-09-16 RX ADMIN — ASPIRIN 162 MG: 81 TABLET ORAL at 19:13

## 2023-09-16 RX ADMIN — PREGABALIN 225 MG: 75 CAPSULE ORAL at 20:30

## 2023-09-16 ASSESSMENT — ACTIVITIES OF DAILY LIVING (ADL)
ADLS_ACUITY_SCORE: 37
FALL_HISTORY_WITHIN_LAST_SIX_MONTHS: YES
DIFFICULTY_EATING/SWALLOWING: NO
NUMBER_OF_TIMES_PATIENT_HAS_FALLEN_WITHIN_LAST_SIX_MONTHS: 1
VISION_MANAGEMENT: EYE GLASSES
ADLS_ACUITY_SCORE: 37
CONCENTRATING,_REMEMBERING_OR_MAKING_DECISIONS_DIFFICULTY: NO
ADLS_ACUITY_SCORE: 33
TOILETING_ISSUES: NO
WEAR_GLASSES_OR_BLIND: YES
WALKING_OR_CLIMBING_STAIRS_DIFFICULTY: YES
CHANGE_IN_FUNCTIONAL_STATUS_SINCE_ONSET_OF_CURRENT_ILLNESS/INJURY: YES
ADLS_ACUITY_SCORE: 24
ADLS_ACUITY_SCORE: 33
ADLS_ACUITY_SCORE: 24
DRESSING/BATHING_DIFFICULTY: NO
DOING_ERRANDS_INDEPENDENTLY_DIFFICULTY: NO
TRANSFERRING: 1-->ASSISTANCE (EQUIPMENT/PERSON) NEEDED (NOT DEVELOPMENTALLY APPROPRIATE)
WALKING_OR_CLIMBING_STAIRS: STAIR CLIMBING DIFFICULTY, ASSISTANCE 1 PERSON
TRANSFERRING: 1-->ASSISTANCE (EQUIPMENT/PERSON) NEEDED

## 2023-09-16 NOTE — MEDICATION SCRIBE - ADMISSION MEDICATION HISTORY
Medication Scribe Admission Medication History    Admission medication history is complete. The information provided in this note is only as accurate as the sources available at the time of the update.    Medication reconciliation/reorder completed by provider prior to medication history? No    Information Source(s): Patient via in-person    Pertinent Information: Pt stated that he stopped taking Flomax 0.4 mg because his urination became normal.     Changes made to PTA medication list:  Added: None  Deleted: Aspirin 81 mg (2 tab), COVID-19 VAC, MIRALAX (every day), Senna 8.6-50 mg (BID), Tamsulosin 0.4 mg  Changed: MIRALAX (PRN), Senna 8.6-50 mg (PRN), Aspirin 81 mg (1 tab),    Medication Affordability:  Not including over the counter (OTC) medications, was there a time in the past 3 months when you did not take your medications as prescribed because of cost?: No    Allergies reviewed with patient and updates made in EHR: yes    Medication History Completed By: Hiwot Phillips 9/16/2023 4:41 PM    Prior to Admission medications    Medication Sig Last Dose Taking? Auth Provider Long Term End Date   aspirin 81 MG EC tablet Take 81 mg by mouth daily 9/15/2023 at unknown Yes Reported, Patient     cholecalciferol (VITAMIN D) 1000 UNIT tablet Take 1 tablet by mouth daily. 9/15/2023 at unknown Yes Berhane Oquendo MD     Cyanocobalamin (B-12) 1000 MCG TBCR Take 1 tablet by mouth daily 9/15/2023 at unknown Yes Reported, Patient     fish oil-omega-3 fatty acids 1000 MG capsule Take 2,000 mg by mouth daily 9/15/2023 at unknown Yes Berhane Oquendo MD     oxyCODONE (ROXICODONE) 5 MG tablet Take 1-2 tablets (5-10 mg) by mouth every 4 hours as needed for moderate to severe pain Unknown at unknown Yes Ania Combs PA-C     polyethylene glycol (MIRALAX) 17 g packet Take 1 packet by mouth as needed for constipation Past Week at unknown Yes Reported, Patient     pregabalin (LYRICA) 225 MG capsule Take 225 mg by mouth  daily 9/15/2023 at pm Yes Reported, Patient No    senna-docusate (SENOKOT-S/PERICOLACE) 8.6-50 MG tablet Take 1 tablet by mouth daily as needed for constipation Unknown at unknown Yes Reported, Patient     simvastatin (ZOCOR) 20 MG tablet TAKE 1 TABLET BY MOUTH EVERYDAY AT BEDTIME 9/15/2023 at pm Yes Wegener, Joel Daniel Irwin, MD Yes

## 2023-09-16 NOTE — ED TRIAGE NOTES
"Pt BIBA following fall. HX BL hip replacement. Pt states he was making his bed, and fell on his RLE. Pt describes leg as a \"noodle\", and unable to move it post fall.      Triage Assessment       Row Name 09/16/23 1126       Triage Assessment (Adult)    Airway WDL WDL       Respiratory WDL    Respiratory WDL WDL       Skin Circulation/Temperature WDL    Skin Circulation/Temperature WDL WDL       Cardiac WDL    Cardiac WDL WDL       Peripheral/Neurovascular WDL    Peripheral Neurovascular WDL X  RLE weakness       Cognitive/Neuro/Behavioral WDL    Cognitive/Neuro/Behavioral WDL WDL                    "

## 2023-09-16 NOTE — ED PROVIDER NOTES
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  Patient states that he had been making his bed earlier this morning, when he had tripped and fallen onto the lateral aspect of his right hip.  He experienced pain immediately following the incident and is now unable to bear weight.  The pain is most prominent in the lateral aspect, and is most severe with movement.  States that he feels as if he has no strength in his hip.  He denies head trauma, loss of consciousness, or use of anticoagulation.  No chest pain, back pain, or abdominal pain.  Patient has not yet taken or been administered any pain medication.    Past Medical History  Past Medical History:   Diagnosis Date    Adenocarcinoma of prostate (H) 5/27/2008    Arthritis     Esophageal reflux     Neuropathy     Pure hypercholesterolemia     Unspecified essential hypertension      Past Surgical History:   Procedure Laterality Date    ABDOMEN SURGERY  1952    Appendectomy    APPENDECTOMY  1952    ARTHROPLASTY HIP Right 7/14/2023    Procedure: ARTHROPLASTY, HIP, TOTAL RIGHT;  Surgeon: David Escamilla MD;  Location: [facility] OR    ARTHROPLASTY HIP Left 7/21/2023    Procedure: ARTHROPLASTY, HIP, TOTAL LEFT;  Surgeon: David Escamilla MD;  Location: [facility] OR    CATARACT IOL, RT/LT Bilateral 03/12    COLONOSCOPY  2017    COLONOSCOPY N/A 5/18/2023    Procedure: colonoscopy;  Surgeon: Ayse Sears MD;  Location:  GI    GENITOURINARY SURGERY      HERNIORRHAPHY INGUINAL  5/15/2014    Procedure: HERNIORRHAPHY INGUINAL;  Surgeon: Evens Jefferson MD;   Location: [facility] OR    PROSTATE SURGERY  2007     aspirin 81 MG EC tablet  cholecalciferol (VITAMIN D) 1000 UNIT tablet  Cyanocobalamin (B-12) 1000 MCG TBCR  fish oil-omega-3 fatty acids 1000 MG capsule  oxyCODONE (ROXICODONE) 5 MG tablet  polyethylene glycol (MIRALAX) 17 g packet  pregabalin (LYRICA) 225 MG capsule  senna-docusate (SENOKOT-S/PERICOLACE) 8.6-50 MG tablet  simvastatin (ZOCOR) 20 MG tablet      Allergies   Allergen Reactions    Penicillins Other (See Comments)     blotches     Family History  Family History   Problem Relation Age of Onset    Glaucoma Other     Diabetes Mother     Gastrointestinal Disease Mother         pancreas removed    Breast Cancer Mother     Osteoporosis Father     Obesity Father     Macular Degeneration No family hx of     Hypertension No family hx of      Social History   Social History     Tobacco Use    Smoking status: Never     Passive exposure: Never    Smokeless tobacco: Never    Tobacco comments:     Never smoked.   Vaping Use    Vaping Use: Never used   Substance Use Topics    Alcohol use: Not Currently     Comment: occasional    Drug use: No      Past medical history, past surgical history, medications, allergies, family history, and social history were reviewed with the patient. No additional pertinent items.      A medically appropriate review of systems was performed with pertinent positives and negatives noted in the HPI, and all other systems negative.    Physical Exam   BP: 137/89  Pulse: 99  Temp: 98.2  F (36.8  C)  Resp: 15  SpO2: 97 %  Physical Exam  Vital Signs Reviewed  Gen: Well nourished, well developed, resting comfortably, no acute distress  HEENT: NC/AT, PERRL, EOMI, MMM  Neck: Supple, FROM  CV: Regular Rate, no murmur/rub/gallop  Lungs/Chest: Normal Effort, CTAB  Abd: Non-distended, non-tender  MSK/Back: FROM, no visible deformity.  Tenderness in the right lateral hip.  Unable to bear weight.  Neuro: A&Ox3, GCS 15, CN II-XII unremarkable  Skin: Warm, Dry,  Intact, no visible lesions    ED Course, Procedures, & Data    11:32 AM  The patient was seen and examined by James Lundberg   in [facility].   X-rays obtained  Periprosthetic fracture noted on x-ray  Orthopedic surgery consulted recommending CT scan  Orthopedic surgery followed up with after CT scan, recommending nonoperative intervention  Will be admitted to medicine for pain control, PT OT in the morning    Procedures                     Results for orders placed or performed during the hospital encounter of 09/16/23   XR Hip Right 2-3 Views     Status: None    Narrative    EXAM: XR HIP RIGHT 2-3 VIEWS  LOCATION: [facility]  DATE: 9/16/2023    INDICATION: Fall, pain, hx hip replacement, unable to bear weight  COMPARISON: 08/28/2023.      Impression    IMPRESSION: Right total hip arthroplasty - no evidence of loosening. Acute mildly displaced and angulated periprosthetic fracture at the base of the right greater trochanter.    XR Femur Right 2 Views     Status: None    Narrative    EXAM: XR FEMUR RIGHT 2 VIEWS  LOCATION: [facility]  DATE: 9/16/2023    INDICATION: Fall, pain, hx hip replacement, unable to bear weight  COMPARISON: None.      Impression    IMPRESSION: Right total hip arthroplasty-components appear well-seated. Acute mildly displaced periprosthetic fracture at the base of the greater trochanter.   XR Knee Right 1/2 Views     Status: None    Narrative    EXAM: XR KNEE RIGHT 1/2 VIEWS  LOCATION: [facility]  DATE: 9/16/2023    INDICATION: Right knee pain after a fall.  COMPARISON: None.      Impression    IMPRESSION:   1.  No fracture or joint malalignment.  2.  Mild degenerative changes in the right knee patellofemoral compartment.  3.  No joint effusion.  4.  Small osteochondral body projecting over the lateral joint space.    CT Hip Right w/o Contrast     Status:  None    Narrative    EXAM: CT HIP RIGHT W/O CONTRAST  LOCATION: [facility]  DATE: 9/16/2023    INDICATION: Fall, right periprosthetic hip fracture, thin cuts per ortho.  COMPARISON: Radiographs from 09/16/2023.  TECHNIQUE: Noncontrast. Axial, sagittal and coronal thin-section reconstruction. Dose reduction techniques were used.     FINDINGS:     BONES AND JOINTS:  -Small amount of bridging bone across the right SI joint. Osteoarthrosis of the SI joints partially included in the field-of-view. Mild osteoarthrosis of the pubic symphysis.    -Bilateral total hip arthroplasties. Comminuted periprosthetic fracture of the proximal right femur. This involves the entire greater trochanter and extends distally of the proximal metadiaphysis. There is up to 46 mm of superolateral displacement and up   to 40 mm of anterior displacement of the main fracture fragment, which is divided into a large anterior fragment and a small posterior inferior fragment.    SOFT TISSUES:  -There is an approximately 2 x 3 x 4 cm focus of hemorrhage or hematoma in the region of the right greater trochanter adjacent to the fracture. There is moderate adjacent superficial and deep soft tissue edema and there is a small amount of hemorrhage   extending distally from this area along the superficial aspect of the vastus lateralis muscle belly.      Impression    IMPRESSION:  1.  Comminuted periprosthetic fracture around the femoral component of the right total hip arthroplasty. The large fracture fragment is divided into two fragments, not displaced from each other, but displaced up to 46 mm from the remainder of the femur.  2.  Small focus of hemorrhage/hematoma lateral to the proximal right femur in the region of the fracture. There is also a small amount of hemorrhage extending distally along the superficial aspect of the lateral portion of the vastus lateralis muscle.  3.  Osteoarthrosis of the SI  joints and pubic symphysis.     Basic metabolic panel     Status: Abnormal   Result Value Ref Range    Sodium 138 136 - 145 mmol/L    Potassium 4.0 3.4 - 5.3 mmol/L    Chloride 103 98 - 107 mmol/L    Carbon Dioxide (CO2) 23 22 - 29 mmol/L    Anion Gap 12 7 - 15 mmol/L    Urea Nitrogen 25.6 (H) 8.0 - 23.0 mg/dL    Creatinine 0.89 0.67 - 1.17 mg/dL    Calcium 9.4 8.8 - 10.2 mg/dL    Glucose 113 (H) 70 - 99 mg/dL    GFR Estimate 89 >60 mL/min/1.73m2   CBC with platelets and differential     Status: Abnormal   Result Value Ref Range    WBC Count 9.4 4.0 - 11.0 10e3/uL    RBC Count 3.87 (L) 4.40 - 5.90 10e6/uL    Hemoglobin 10.7 (L) 13.3 - 17.7 g/dL    Hematocrit 33.3 (L) 40.0 - 53.0 %    MCV 86 78 - 100 fL    MCH 27.6 26.5 - 33.0 pg    MCHC 32.1 31.5 - 36.5 g/dL    RDW 14.6 10.0 - 15.0 %    Platelet Count 211 150 - 450 10e3/uL    % Neutrophils 74 %    % Lymphocytes 19 %    % Monocytes 7 %    % Eosinophils 0 %    % Basophils 0 %    % Immature Granulocytes 0 %    NRBCs per 100 WBC 0 <1 /100    Absolute Neutrophils 6.9 1.6 - 8.3 10e3/uL    Absolute Lymphocytes 1.8 0.8 - 5.3 10e3/uL    Absolute Monocytes 0.6 0.0 - 1.3 10e3/uL    Absolute Eosinophils 0.0 0.0 - 0.7 10e3/uL    Absolute Basophils 0.0 0.0 - 0.2 10e3/uL    Absolute Immature Granulocytes 0.0 <=0.4 10e3/uL    Absolute NRBCs 0.0 10e3/uL   CBC with platelets differential     Status: Abnormal    Narrative    The following orders were created for panel order CBC with platelets differential.  Procedure                               Abnormality         Status                     ---------                               -----------         ------                     CBC with platelets and d...[[id number]]  Abnormal            Final result                 Please view results for these tests on the individual orders.     Medications   acetaminophen (TYLENOL) tablet 1,000 mg (1,000 mg Oral $Given 9/16/23 5916)     Labs Ordered and Resulted from Time of ED Arrival to Time of ED  Departure   BASIC METABOLIC PANEL - Abnormal       Result Value    Sodium 138      Potassium 4.0      Chloride 103      Carbon Dioxide (CO2) 23      Anion Gap 12      Urea Nitrogen 25.6 (*)     Creatinine 0.89      Calcium 9.4      Glucose 113 (*)     GFR Estimate 89     CBC WITH PLATELETS AND DIFFERENTIAL - Abnormal    WBC Count 9.4      RBC Count 3.87 (*)     Hemoglobin 10.7 (*)     Hematocrit 33.3 (*)     MCV 86      MCH 27.6      MCHC 32.1      RDW 14.6      Platelet Count 211      % Neutrophils 74      % Lymphocytes 19      % Monocytes 7      % Eosinophils 0      % Basophils 0      % Immature Granulocytes 0      NRBCs per 100 WBC 0      Absolute Neutrophils 6.9      Absolute Lymphocytes 1.8      Absolute Monocytes 0.6      Absolute Eosinophils 0.0      Absolute Basophils 0.0      Absolute Immature Granulocytes 0.0      Absolute NRBCs 0.0       CT Hip Right w/o Contrast   Final Result   IMPRESSION:   1.  Comminuted periprosthetic fracture around the femoral component of the right total hip arthroplasty. The large fracture fragment is divided into two fragments, not displaced from each other, but displaced up to 46 mm from the remainder of the femur.   2.  Small focus of hemorrhage/hematoma lateral to the proximal right femur in the region of the fracture. There is also a small amount of hemorrhage extending distally along the superficial aspect of the lateral portion of the vastus lateralis muscle.   3.  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         XR Femur Right 2 Views   Final Result   IMPRESSION: Right total hip arthroplasty-components appear well-seated. Acute mildly displaced periprosthetic fracture at the base of the greater trochanter.      XR Knee Right 1/2 Views   Final Result   IMPRESSION:    1.  No fracture or joint malalignment.   2.  Mild degenerative changes in the right knee patellofemoral compartment.   3.  No joint effusion.   4.  Small osteochondral body projecting over the  lateral joint space.       XR Hip Right 2-3 Views   Final Result   IMPRESSION: Right total hip arthroplasty - no evidence of loosening. Acute mildly displaced and angulated periprosthetic fracture at the base of the right greater trochanter.              Critical care was not performed.     Medical Decision Making  The patient's presentation was of high complexity (an acute health issue posing potential threat to life or bodily function).    The patient's evaluation involved:  ordering and/or review of 3+ test(s) in this encounter (see separate area of note for details)  discussion of management or test interpretation with another health professional (orthopedics)    The patient's management necessitated high risk (a decision regarding hospitalization).    Assessment & Plan    Patient is a 78-year-old man with a past medical history of bilateral hip replacements, hyperlipidemia, hypertension, prostate adenoma that was resected.  He presents today after an accidental fall with pain in the right hip and inability to bear weight.  X-rays show a periprosthetic fracture.  Orthopedic surgery was consulted and additional imaging in the form of a CT scan was obtained.  Patient is unable to bear weight on the right.  He is not safe to discharge home at this time as he will have limited weightbearing on the right leg.  Will admit to medicine after discussion with orthopedic surgery for pain control, PT OT evaluation and determination of need for acute versus subacute rehabilitation versus discharge home with additional support.    I have reviewed the nursing notes. I have reviewed the findings, diagnosis, plan and need for follow up with the patient.    New Prescriptions    No medications on file       Final diagnoses:   Periprosthetic fracture around internal prosthetic right hip joint, initial encounter (H)   [place] BUSCH, am serving as a trained medical scribe to document services personally performed by James  MD Tamika, based on the provider's statements to me.     I, James Underwood MD, was physically present and have reviewed and verified the accuracy of this note documented by [place] Dave.      James Underwood Jr., MD   [facility] EMERGENCY DEPARTMENT  9/16/2023     James Underwood MD  09/16/23 [id number]

## 2023-09-16 NOTE — CONSULTS
[facility]  ORTHOPAEDIC SURGERY CONSULT - HISTORY AND PHYSICAL    DATE OF CONSULT: 9/16/2023 5:39 PM    REQUESTING PROVIDER: James Underwood MD, MD - [facility] ED Staff.    CC: Right hip pain    DATE OF INJURY: 9/16/2023    HISTORY OF PRESENT ILLNESS:   The orthopaedic surgery service was consulted by James Post MD for evaluation and treatment recommendations of right hip pain.  Genaro is a pleasant 78-year-old gentleman who presents to the emergency department with right hip pain after a ground-level fall.  He states that he landed directly on his right hip and did not hit his head during this fall.  He has a history of bilateral total hip arthroplasties which were performed sequentially 1 week apart this year on 7/14/2023 for the right and 7/21/2023 for the left by Dr. Escamilla.  He has been doing very well since the surgery and has been very happy with his outcome thus far.  He attempted to stand up after his fall, but could not bear weight on his leg due to significant pain.  He has been able to assist with transfers thus far today but has significant pain with attempted use of the right leg.  He does not have significant pain at rest and is quite comfortable currently.  He denies changes in his postoperative pain in the hip prior to this fall.    Denies numbness, tingling, or weakness to the affected extremities.  Denies fevers, chills, nausea, vomiting, diarrhea, constipation, chest pain, shortness of breath.    PAST MEDICAL HISTORY:   Past Medical History:   Diagnosis Date    Adenocarcinoma of prostate (H) 5/27/2008    Arthritis     Esophageal reflux     Neuropathy     Pure hypercholesterolemia     Unspecified essential hypertension        PAST SURGICAL HISTORY:    Past Surgical History:   Procedure Laterality Date    ABDOMEN SURGERY  1952    Appendectomy    APPENDECTOMY  1952    ARTHROPLASTY HIP Right 7/14/2023    Procedure: ARTHROPLASTY, HIP, TOTAL RIGHT;  Surgeon: David Escamilla MD;   [facility]    ARTHROPLASTY HIP Left 7/21/2023    Procedure: ARTHROPLASTY, HIP, TOTAL LEFT;  Surgeon: David Escamilla MD;  [facility] OR    CATARACT IOL, RT/LT Bilateral 03/12    COLONOSCOPY  2017    COLONOSCOPY N/A 5/18/2023    Procedure: colonoscopy;  Surgeon: Ayse Sears MD;  Location:  GI    GENITOURINARY SURGERY      HERNIORRHAPHY INGUINAL  5/15/2014    Procedure: HERNIORRHAPHY INGUINAL;  Surgeon: Evens Jefferson MD;  [facility]: [facility] OR    PROSTATE SURGERY  2007       MEDICATIONS:   Prior to Admission medications    Medication Sig Last Dose Taking? Auth Provider Long Term End Date   aspirin 81 MG EC tablet Take 81 mg by mouth daily 9/15/2023 at unknown Yes Reported, Patient     cholecalciferol (VITAMIN D) 1000 UNIT tablet Take 1 tablet by mouth daily. 9/15/2023 at unknown Yes Berhane Oquendo MD     Cyanocobalamin (B-12) 1000 MCG TBCR Take 1 tablet by mouth daily 9/15/2023 at unknown Yes Reported, Patient     fish oil-omega-3 fatty acids 1000 MG capsule Take 2,000 mg by mouth daily 9/15/2023 at unknown Yes Berhane Oquendo MD     oxyCODONE (ROXICODONE) 5 MG tablet Take 1-2 tablets (5-10 mg) by mouth every 4 hours as needed for moderate to severe pain Unknown at unknown Yes Ania Combs PA-C     polyethylene glycol (MIRALAX) 17 g packet Take 1 packet by mouth as needed for constipation Past Week at unknown Yes Reported, Patient     pregabalin (LYRICA) 225 MG capsule Take 225 mg by mouth daily 9/15/2023 at pm Yes Reported, Patient No    senna-docusate (SENOKOT-S/PERICOLACE) 8.6-50 MG tablet Take 1 tablet by mouth daily as needed for constipation Unknown at unknown Yes Reported, Patient     simvastatin (ZOCOR) 20 MG tablet TAKE 1 TABLET BY MOUTH EVERYDAY AT BEDTIME 9/15/2023 at pm Yes Wegener, Joel Daniel Irwin, MD Yes        ALLERGIES:   Penicillins    SOCIAL HISTORY:   Social History     Socioeconomic History    Marital status: Single     Spouse name: Not on file    Number  of children: Not on file    Years of education: Not on file    Highest education level: Not on file   Occupational History    Not on file   Tobacco Use    Smoking status: Never     Passive exposure: Never    Smokeless tobacco: Never    Tobacco comments:     Never smoked.   Vaping Use    Vaping Use: Never used   Substance and Sexual Activity    Alcohol use: Not Currently     Comment: occasional    Drug use: No    Sexual activity: Never   Other Topics Concern    Parent/sibling w/ CABG, MI or angioplasty before 65F 55M? No   Social History Narrative    Social Documentation:        Balanced Diet: YES    Calcium intake: 1-2 per day    Caffeine: 2 per day    Exercise:  type of activity gym and work is physical;  2 times per week    Sunscreen: Yes, when needed    Seatbelts:  Yes    Self Breast Exam:  na    Self Testicular Exam: Yes    Physical/Emotional/Sexual Abuse: No    Do you feel safe in your environment? Yes        Cholesterol screen up to date:  6/5/2009                                                                                                             Latest Ref Rng                              CHOLESTEROL                                                 0 - 200 mg/dL                177                     TRIGLYCERIDES                                               0 - 150 mg/dL                177 (H)                 HDL                                                         40 - 110 mg/dL               39 (L)                  LDL CHOLESTEROL CALCULATED                                  0 - 129 mg/dL                102                     VLDL-CHOLESTEROL                                            0 - 30 mg/dL                 35 (H)                  CHOLESTEROL/HDL RATIO                                       0.0 - 5.0                    4.5                         Eye Exam up to date: Yes    Dental Exam up to date: Yes    Pap smear up to date: Does Not Apply    Mammogram up to date: Does Not Apply    Dexa  Scan up to date: Does Not Apply    Colonoscopy up to date: Yes    Immunizations up to date: Yes    Glucose screen if over 40:  Component                        Latest Ref Rng               10/31/2008              6/5/2009                GLUCOSE                          60 - 99 mg/dL                106 (H)                 111 (H)                                                      Social Determinants of Health     Financial Resource Strain: Not on file   Food Insecurity: Not on file   Transportation Needs: Not on file   Physical Activity: Not on file   Stress: Not on file   Social Connections: Not on file   Intimate Partner Violence: Not on file   Housing Stability: Not on file       FAMILY HISTORY:  Family History   Problem Relation Age of Onset    Glaucoma Other     Diabetes Mother     Gastrointestinal Disease Mother         pancreas removed    Breast Cancer Mother     Osteoporosis Father     Obesity Father     Macular Degeneration No family hx of     Hypertension No family hx of             REVIEW OF SYSTEMS:   Negative except as per HPI  PHYSICAL EXAM:   Vitals:    09/16/23 1544 09/16/23 1545 09/16/23 1546 09/16/23 1630   BP:    (!) 148/90   Pulse:    92   Resp:       Temp:       TempSrc:       SpO2: 99% 98% 98% 98%     General: Awake, alert, appropriate, following commands, NAD.  Neuro: CN II-XII grossly intact.   Skin: No rashes,  skin color normal.  HEENT: Normal.   Lungs: Breathing comfortably and nonlabored, no wheezes or stridor noted.  Right Lower Extremity: No deformity, skin intact.  Mildly tender to palpation over the lateral hip.  No significant ecchymosis.  Able to wiggle toes and fire TA/GS.  Has significant pain when attempting to lift the leg though he is able to initiate this motion.  Very significant pain with attempted hip abduction or extension.  No pain with logroll.  Sensation intact distally.    LABS:  Hemoglobin   Date Value Ref Range Status   09/16/2023 10.7 (L) 13.3 - 17.7 g/dL Final    07/24/2023 7.5 (L) 13.3 - 17.7 g/dL Final   10/30/2020 13.6 13.3 - 17.7 g/dL Final   06/23/2020 13.4 13.3 - 17.7 g/dL Final     WBC   Date Value Ref Range Status   10/30/2020 7.9 4.0 - 11.0 10e9/L Final     WBC Count   Date Value Ref Range Status   09/16/2023 9.4 4.0 - 11.0 10e3/uL Final     Platelet Count   Date Value Ref Range Status   09/16/2023 211 150 - 450 10e3/uL Final   10/30/2020 246 150 - 450 10e9/L Final     INR   Date Value Ref Range Status   07/21/2023 1.08 0.85 - 1.15 Final     Creatinine   Date Value Ref Range Status   09/16/2023 0.89 0.67 - 1.17 mg/dL Final   06/21/2021 1.06 0.66 - 1.25 mg/dL Final     Glucose   Date Value Ref Range Status   09/16/2023 113 (H) 70 - 99 mg/dL Final   08/30/2022 96 70 - 99 mg/dL Final   06/21/2021 106 (H) 70 - 99 mg/dL Final     GLUCOSE BY METER POCT   Date Value Ref Range Status   07/24/2023 133 (H) 70 - 99 mg/dL Final       IMAGING:  Radiographs obtained today of the right hip, knee, femur demonstrate a comminuted periprosthetic fracture of the greater trochanter around his right total hip arthroplasty.  Comparing these radiographs to prior imaging there does not appear to be significant subsidence or osteolysis of the femoral component.    A CT scan was obtained of the right hip to evaluate the fracture fragment.  This demonstrates that the greater trochanter piece does not seem to have significant extension distally around the stem.  The stem again does not appear to be loose on the CT scan.    IMPRESSION:   Genaro Ortiz is a 78 year old male who has a history of bilateral total hip arthroplasties performed by Dr. Escamilla in July of this year who unfortunately developed greater trochanter fracture of the right hip after a ground-level fall today.    RECOMMENDATIONS:   -Based on the CT scan it appears that he has a well fixed diaphyseal fitting stem with a greater trochanter fracture that does not extend distally into the shaft.  I reviewed the imaging with   Cornelio who agreed with this finding and we made the decision to recommend nonoperative treatment.  I discussed this with the patient, he was understanding, and while disappointed that he will have restrictions for his motion he is thankful that he will not required additional surgery at this time.  -Recommend toe-touch weightbearing to the right lower extremity utilizing assistive device.  Given the patient's baseline deconditioning related to his recent surgeries he will likely require physical therapy for this.  I discussed this with the emergency department who recommended observation and therapy.  He will be admitted to the medicine service for this.  - Anticoagulation/DVT Prophylaxis: Per primary, would recommend  daily for DVT prevention.  - Antibiotics/Tetanus: None  - X-rays/Imaging: Complete  - Activity: As tolerated with weightbearing restriction  - Weight bearing: Toe-touch weightbearing with right lower extremity and assistive device  - Pain control: Per primary  - Diet: Okay for regular from orthopedic perspective  - Follow-up: 2 weeks with either Dr. Escamilla or Dr. Grimes if Dr. Escamilla is not available.    Assessment and Plan discussed with Dr. Grimes, Orthopaedic Surgery Staff.     Chico Mac MD  Orthopaedic Surgery PGY-4    I have communicated with Resident and reviewed records and images and concur with evaluation and treatment plan  Berhane Grimes MD

## 2023-09-16 NOTE — H&P
[facility]    History and Physical - Hospitalist Service, GOLD TEAM        Date of Admission:  9/16/2023    Assessment & Plan   Genaro Ortiz is a 78 year old male admitted on 9/16/2023 for hip fracture following a mechanical fall. 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  Experience pain immediately.  Unable to bear weight following. He denies any head trauma, loss of consciousness, dizziness, lightheadedness, injuries to other area of his body.  Patient had just had bilateral total hip arthroplasty in July, and felt he was recovering well, has been living independently in [facility]. Imaging in ED showed comminuted periprosthetic fracture, around right greater trochanter.  - orthopedic consult: no plans for surgical intervention at this time, nonoperative management.   - PT eval and treat  - activity as tolerated with TTW to RLE with assistive device  - aspirin 162 mg daily for DVT prevention  - PRN Tylenol 650 mg Q6H   - PRN oxycodone 2.6 mg Q4H (he was prescribed oxycodone following his bilateral THAs and never took)  - PRN flexeril 5 mg Q8H   - cont PTA pregabalin 225 mg daily (was prescribed for his OA pain, states he feels he no longer needs and PCP was going to taper off at next visit)    Hypertension hx not on chronic medications  Dyslipidemia cont PTA Zocor 20 mg nightly  GERD    Normocytic anemia, appears chronic  Hemoglobin 10.7 upon check today.  This is higher than his hemoglobin from the last few months. No evidence of recent bleeding.     Adenocarcinoma of the prostate status post prostatectomy  History of vitamin B12  "deficiency cont PTA vit B12 supplement  History of vitamin D deficiency cont PTA vit D supplement     Diet:  regular  DVT Prophylaxis: Pneumatic Compression Devices and aspirin 162 mg daily  Manzo Catheter: Not present  Lines: None     Cardiac Monitoring: None  Code Status:  full code    Clinically Significant Risk Factors Present on Admission                # Drug Induced Platelet Defect: home medication list includes an antiplatelet medication   # Hypertension: Noted on problem list      # Obesity: Estimated body mass index is 32.43 kg/m  as calculated from the following:    Height as of 9/6/23: 1.753 m (5' 9.02\").    Weight as of 9/6/23: 99.7 kg (219 lb 11.2 oz).              Disposition Plan      Expected Discharge Date: 09/18/2023                The patient's care was discussed with the Attending Physician, Dr. Barr and Patient.    Mariah Quezada PA-C  Hospitalist Service, [facility]  Securely message with [employer] (more info)  Text page via [facility] Paging/Directory   See signed in provider for up to date coverage information    ______________________________________________________________________    Chief Complaint   Right hip and thigh tightness    History is obtained from the patient    History of Present Illness   Genaro Ortiz is a 78 year old male who is seen in ED for a medical evaluation. Patient reports presently some mild muscle tightness to his right thigh. Patient reports he was making his bed when his foot got caught and he fell onto lateral aspect of his right hip earlier today.  Experienced pain immediately.  Unable to bear weight following. He denies any head trauma, loss of consciousness, dizziness, lightheadedness, injuries to other area of his body.  Patient had just had bilateral total hip arthroplasty in July, and felt he was recovering well, has been living independently in [facility]. He otherwise denies any recent chest pain, abdominal " pain, SOB, fevers, chills, n/v/c/d, changes to urine or stool, or evidence of bleeding to stools.       Past Medical History    Past Medical History:   Diagnosis Date    Adenocarcinoma of prostate (H) 5/27/2008    Arthritis     Esophageal reflux     Neuropathy     Pure hypercholesterolemia     Unspecified essential hypertension        Past Surgical History   Past Surgical History:   Procedure Laterality Date    ABDOMEN SURGERY  1952    Appendectomy    APPENDECTOMY  1952    ARTHROPLASTY HIP Right 7/14/2023    Procedure: ARTHROPLASTY, HIP, TOTAL RIGHT;  Surgeon: David Escamilla MD;  [facility]    ARTHROPLASTY HIP Left 7/21/2023    Procedure: ARTHROPLASTY, HIP, TOTAL LEFT;  Surgeon: David Escamilla MD;  [facility]    CATARACT IOL, RT/LT Bilateral 03/12    COLONOSCOPY  2017    COLONOSCOPY N/A 5/18/2023    Procedure: colonoscopy;  Surgeon: Ayse Sears MD;  Location:  GI    GENITOURINARY SURGERY      HERNIORRHAPHY INGUINAL  5/15/2014    Procedure: HERNIORRHAPHY INGUINAL;  Surgeon: Evens Jefferson MD;  [facility]    PROSTATE SURGERY  2007       Prior to Admission Medications   Prior to Admission Medications   Prescriptions Last Dose Informant Patient Reported? Taking?   Cyanocobalamin (B-12) 1000 MCG TBCR 9/15/2023 at unknown  Yes Yes   Sig: Take 1 tablet by mouth daily   aspirin 81 MG EC tablet 9/15/2023 at unknown  Yes Yes   Sig: Take 81 mg by mouth daily   cholecalciferol (VITAMIN D) 1000 UNIT tablet 9/15/2023 at unknown  Yes Yes   Sig: Take 1 tablet by mouth daily.   fish oil-omega-3 fatty acids 1000 MG capsule 9/15/2023 at unknown  Yes Yes   Sig: Take 2,000 mg by mouth daily   oxyCODONE (ROXICODONE) 5 MG tablet Unknown at unknown  No Yes   Sig: Take 1-2 tablets (5-10 mg) by mouth every 4 hours as needed for moderate to severe pain   polyethylene glycol (MIRALAX) 17 g packet Past Week at unknown  Yes Yes   Sig: Take 1 packet by mouth as needed for constipation   pregabalin (LYRICA)  225 MG capsule 9/15/2023 at pm  Yes Yes   Sig: Take 225 mg by mouth daily   senna-docusate (SENOKOT-S/PERICOLACE) 8.6-50 MG tablet Unknown at unknown  Yes Yes   Sig: Take 1 tablet by mouth daily as needed for constipation   simvastatin (ZOCOR) 20 MG tablet 9/15/2023 at pm  No Yes   Sig: TAKE 1 TABLET BY MOUTH EVERYDAY AT BEDTIME      Facility-Administered Medications Last Administration Doses Remaining   bevacizumab (AVASTIN) intravitreal inj 1.25 mg None recorded 1   cyanocobalamin injection 1,000 mcg 5/27/2021 11:30 AM    cyanocobalamin injection 1,000 mcg 10/27/2021  1:13 PM            Review of Systems    The 10 point Review of Systems is negative other than noted in the HPI.     Physical Exam   Vital Signs: Temp: 98.2  F (36.8  C) Temp src: Oral BP: (!) 148/90 Pulse: 92   Resp: 15 SpO2: 98 % O2 Device: None (Room air)    Weight: 0 lbs 0 oz    GENERAL: older adult male seen resting reclined in bed. NAD.   NEURO / PSYCH: Alert, converses appropriately. No focal deficits. Moves all extremities.   HEENT: Anicteric sclera. PERRL.   CV: RRR. S1, S2. No murmurs appreciated.    RESPIRATORY: Effort normal. Lungs CTAB with no wheezing, rales, rhonchi.   GI: Abdomen soft and non distended with bowel sounds rare. No tenderness, rebound, guarding.   MSK: no gross deformities  EXTREMITIES: nonedematous  SKIN: No jaundice. No rashes or lesions to exposed areas.       Medical Decision Making       60 MINUTES SPENT BY ME on the date of service doing chart review, history, exam, documentation & further activities per the note.      Data     I have personally reviewed the following data over the past 24 hrs:    9.4  \   10.7 (L)   / 211     138 103 25.6 (H) /  113 (H)   4.0 23 0.89 \

## 2023-09-17 ENCOUNTER — APPOINTMENT (OUTPATIENT)
Dept: PHYSICAL THERAPY | Facility: CLINIC | Age: 78
DRG: 536 | End: 2023-09-17
Attending: PHYSICIAN ASSISTANT
Payer: COMMERCIAL

## 2023-09-17 ENCOUNTER — APPOINTMENT (OUTPATIENT)
Dept: OCCUPATIONAL THERAPY | Facility: CLINIC | Age: 78
DRG: 536 | End: 2023-09-17
Attending: PEDIATRICS
Payer: COMMERCIAL

## 2023-09-17 PROCEDURE — 250N000013 HC RX MED GY IP 250 OP 250 PS 637: Performed by: HOSPITALIST

## 2023-09-17 PROCEDURE — 97110 THERAPEUTIC EXERCISES: CPT | Mod: GP

## 2023-09-17 PROCEDURE — 97530 THERAPEUTIC ACTIVITIES: CPT | Mod: GP

## 2023-09-17 PROCEDURE — 97161 PT EVAL LOW COMPLEX 20 MIN: CPT | Mod: GP

## 2023-09-17 PROCEDURE — 97535 SELF CARE MNGMENT TRAINING: CPT | Mod: GO

## 2023-09-17 PROCEDURE — 97166 OT EVAL MOD COMPLEX 45 MIN: CPT | Mod: GO

## 2023-09-17 PROCEDURE — 250N000013 HC RX MED GY IP 250 OP 250 PS 637: Performed by: PHYSICIAN ASSISTANT

## 2023-09-17 PROCEDURE — 99232 SBSQ HOSP IP/OBS MODERATE 35: CPT | Performed by: HOSPITALIST

## 2023-09-17 PROCEDURE — 120N000002 HC R&B MED SURG/OB UMMC

## 2023-09-17 RX ORDER — TAMSULOSIN HYDROCHLORIDE 0.4 MG/1
0.4 CAPSULE ORAL DAILY
Status: DISCONTINUED | OUTPATIENT
Start: 2023-09-17 | End: 2023-09-20 | Stop reason: HOSPADM

## 2023-09-17 RX ADMIN — POLYETHYLENE GLYCOL 3350 17 G: 17 POWDER, FOR SOLUTION ORAL at 09:10

## 2023-09-17 RX ADMIN — Medication 1000 UNITS: at 09:06

## 2023-09-17 RX ADMIN — TAMSULOSIN HYDROCHLORIDE 0.4 MG: 0.4 CAPSULE ORAL at 11:03

## 2023-09-17 RX ADMIN — CYANOCOBALAMIN TAB 1000 MCG 1000 MCG: 1000 TAB at 09:06

## 2023-09-17 RX ADMIN — PREGABALIN 225 MG: 75 CAPSULE ORAL at 21:17

## 2023-09-17 RX ADMIN — ASPIRIN 162 MG: 81 TABLET ORAL at 09:06

## 2023-09-17 RX ADMIN — SIMVASTATIN 20 MG: 20 TABLET, FILM COATED ORAL at 21:17

## 2023-09-17 RX ADMIN — ACETAMINOPHEN 650 MG: 325 TABLET, FILM COATED ORAL at 09:05

## 2023-09-17 ASSESSMENT — ACTIVITIES OF DAILY LIVING (ADL)
ADLS_ACUITY_SCORE: 24
DEPENDENT_IADLS:: TRANSPORTATION
ADLS_ACUITY_SCORE: 24

## 2023-09-17 NOTE — PLAN OF CARE
Goal Outcome Evaluation:      Plan of Care Reviewed With: patient          Outcome Evaluation: Discussed discharge planning. Patient plans to [facility] to [facility]. Please see this writer's consult from yo for more information.

## 2023-09-17 NOTE — PLAN OF CARE
Goal Outcome Evaluation:      Plan of Care Reviewed With: patient    VS: VSS   O2: SpO2 > 90% and stable on RA. LS clear and equal bilaterally. Denies chest pain and SOB.    Output: Voids using beside urinal    Last BM: 9/16/23   Activity: Not oob.    Skin: Intact   Pain: Pain managed with tylenol    CMS: AOx4. Denies numbness and tingling.   Dressing: None    Diet: Regular diet.    LDA: No PIV access.    Equipment: IV pole, personal belongings,    Plan: Continue with plan of care.    Additional Info: Monitor urine output and st cath as needed.

## 2023-09-17 NOTE — PROGRESS NOTES
Orthopaedic Surgery Progress Note 09/17/2023    S: No acute events overnight.  Working with PT this AM. In good spirits.    O:  Temp: 98.1  F (36.7  C) Temp src: Oral BP: 117/65 Pulse: 61   Resp: 16 SpO2: 100 % O2 Device: None (Room air)      Exam:  Gen: No acute distress, resting comfortably in bed.  Resp: Non-labored breathing  MSK:    RLE:   Able to wiggle toes and fire TA/GS.  Sensation intact distally. Toes WWP.      Recent Labs   Lab 09/16/23  1639   WBC 9.4   HGB 10.7*          IMPRESSION:   Genaro Oritz is a 78 year old male who has a history of bilateral total hip arthroplasties performed by Dr. Escamilla in July of this year who unfortunately developed greater trochanter fracture of the right hip after a ground-level fall     RECOMMENDATIONS:   -Recommend toe-touch weightbearing to the right lower extremity utilizing assistive device.    - Did well with PT today, continue therapy   - Anticoagulation/DVT Prophylaxis: Per primary, would recommend  daily for DVT prevention.  - Antibiotics/Tetanus: None  - X-rays/Imaging: Complete  - Activity: As tolerated with weightbearing restriction  - Weight bearing: Toe-touch weightbearing with right lower extremity and assistive device  - Pain control: Per primary  - Diet: Okay for regular from orthopedic perspective  - Follow-up: 2 weeks with either Dr. Escamilla or Dr. Grimes if Dr. Escamilla is not available.    Future Appointments   Date Time Provider Department Center   9/17/2023 12:50 PM [facility] OT WAITLIST [facility] [place]   9/18/2023  9:45 AM Renny Shah, PT [facility] [place]   9/21/2023  1:30 PM Wegener, Joel Daniel Irwin, MD [facility]   10/4/2023  9:00 AM Giovanni Shah MD [facility]   12/7/2023 10:15 AM Eric Abel MD [facility]   12/8/2023  1:15 PM Valdez Quesada DO [facility]       Orthopedics will follow peripherally, please contact with concerns.    Chico Mac MD  Orthopaedic Surgery PGY-4

## 2023-09-17 NOTE — PLAN OF CARE
Goal Outcome Evaluation:      Plan of Care Reviewed With: patient     Pt is A&Ox4. VSS. LS clear, on RA. BS active, LBM on 9/16/2023. Voiding using bedside urinal. Right hip Pain managed with tylenol. Pt up with 1 assist. Toe-touch weight bearing right leg. Continue to monitor.

## 2023-09-17 NOTE — CONSULTS
Care Management Initial Consult    General Information  Assessment completed with: Patient,    Type of CM/SW Visit: Initial Assessment    Primary Care Provider verified and updated as needed: Yes   Readmission within the last 30 days: no previous admission in last 30 days      Reason for Consult: discharge planning  Advance Care Planning: Advance Care Planning Reviewed: present on chart          Communication Assessment  Patient's communication style: spoken language (English or Bilingual)    Hearing Difficulty or Deaf: no   Wear Glasses or Blind: yes    Cognitive  Cognitive/Neuro/Behavioral: WDL                      Living Environment:   People in home: alone     Current living Arrangements: apartment      Able to return to prior arrangements: other (see comments) (TCU is currently recommended. Patient will discharge home after TCU stay.)       Family/Social Support:  Care provided by: self, friend (Patient is able to care for himself, but does have assistance from friends on occasional when needed.)  Provides care for: no one  Marital Status: Single  Other (specify) (Patient identifies several friends and Rastafarian community members that are strong supports.)          Description of Support System: Supportive    Support Assessment: Adequate family and caregiver support, Adequate social supports    Current Resources:   Patient receiving home care services: Yes  Skilled Home Care Services: Skilled Nursing, Home Health Aid, Physical Therapy, Occupational Therapy (Patient receiving home care through [facility])  Community Resources: Housekeeping/Chore Agency, Meals on Wheels, Home Care  Equipment currently used at home: dressing device, raised toilet seat, tub bench, walker, rolling  Supplies currently used at home: None    Employment/Financial:  Employment Status: retired        Financial Concerns: No concerns identified   Referral to Financial Worker: No       Does the patient's insurance plan have a 3 day  "qualifying hospital stay waiver?  Yes   Will the waiver be used for post-acute placement? Yes    Lifestyle & Psychosocial Needs:  Social Determinants of Health     Tobacco Use: Low Risk  (9/6/2023)    Patient History     Smoking Tobacco Use: Never     Smokeless Tobacco Use: Never     Passive Exposure: Never   Alcohol Use: Not on file   Financial Resource Strain: Not on file   Food Insecurity: Not on file   Transportation Needs: Not on file   Physical Activity: Not on file   Stress: Not on file   Social Connections: Not on file   Intimate Partner Violence: Not on file   Depression: Not at risk (5/1/2023)    PHQ-2     PHQ-2 Score: 0   Housing Stability: Not on file       Functional Status:  Prior to admission patient needed assistance:   Dependent ADLs:: Ambulation-walker, Bathing (Patient notes that he can bathe on his own but utilizes the [facility]  Dependent IADLs:: Transportation (Patient has license, but has not driven for several months due to his his injury. Patient currently utilizes assistance from a friend for rides, or ride share services (Lyft, public transportation). Recently applied for [employer].)  Assesssment of Functional Status: Needs placement in a SNF/TCF for rehabilitation    Mental Health Status:  Mental Health Status: No Current Concerns       Chemical Dependency Status:  Chemical Dependency Status: No Current Concerns             Values/Beliefs:  Spiritual, Cultural Beliefs, Caodaism Practices, Values that affect care: no               Additional Information:    Per H&P, \"Genaro Ortiz is a 78 year old male admitted on 9/16/2023 for hip fracture following a mechanical fall. 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 SW completed Initial Assessment. Please see above for assessment information.    SW and patient " "discussed TCU recommendations. Patient agrees that he will need a TCU stay before he is strong enough to go home. Patient's plan after TCU is to return home. He identifies support in friends who live in the area, some even live in his building.     SW presented patient with Medicare \"Care Compare\" list. Discussed process of TCU referrals. Patient briefly reviewed list and identified three preferences (listed below). Patient is going to more thoroughly review the list and will identify more preferences.     SW attempted to page provider regarding when patient may be medically ready for discharge. Received call back, provider stated that medical readiness will depend on how things go this afternoon. Will send referrals when RODNEY is more clearly identified.        Initial TCU Preferences:    [facility] 4th floor  [address] [place] [address]  [id number]  Admissions: [phone]  Fax: [phone]  RN to RN:  [phone]    [facility]  [address] [place], [place] [id number] [phone]    [facility]  [address] [place] [address] [phone];  Admissions:  [phone]        RUBY Leyva, LSW  9/17/2023       Social Work and Care Management Department       SEARCHABLE in [facility] - search SOCIAL WORK       [place] (0800 - 1630) Saturday and Sunday     Units: 4A, 4C, & 4E Pager: [phone]     Units: 5A, 5B, & 5C Pager: [phone]     Units: 6A & 6B   Pager: [phone]     Units: 6C & 6D Pager: [phone]     Units: 7A & 7B  Pager: [phone]     Units: 7C & 7D Pager: [phone]     Unit: [place] [facility] Pager: [phone]      [facility] [place] [place] (6410-7064) Saturday and Sunday      Units: 5 Ortho, 5 Med/Surg & WB ED  Pager:[phone]     Units: 6 Med/Surg, 8A, & 10A ICU  Pager: [phone]        After hours (1630 - 0000) Saturday & Sunday; (1119-1788) Mon-Fri; (2799-3544) [facility] Recognized Holidays     Units: ALL  Pager: [phone]       "

## 2023-09-17 NOTE — UTILIZATION REVIEW
Admission Status; Secondary Review Determination       Under the authority of the Utilization Management Committee, the utilization review process indicated a secondary review on the above patient. The review outcome is based on review of the medical records, discussions with staff, and applying clinical experience noted on the date of the review.     (x) Inpatient Status Appropriate - This patient's medical care is consistent with medical management for inpatient care and reasonable inpatient medical practice.     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 He presented to the ED after an accidental fall with pain in the right hip and inability to bear weight. X-rays showed a periprosthetic fracture. Orthopedic surgery was consulted and CT scan was obtained. Patient was admitted for further evaluation and treatment. He was seen by ortho and it was decided to treat conservatively without surgery. He has been seen by PT and discharge to TCU is being planned.     At the time of admission with the information available to the attending physician more than 2 nights Hospital complex care was anticipated, based on patient risk of adverse outcome if treated as outpatient and complex care required. Inpatient admission is appropriate based on the Medicare guidelines.     This document was produced using voice recognition software       The information on this document is developed by the utilization review team in order for the business office to ensure compliance. This only denotes the appropriateness of proper admission status and does not reflect the quality of care rendered.   The definitions of Inpatient Status and Observation Status used in making the determination above are those provided in the CMS Coverage Manual, Chapter 1 and Chapter 6, section 70.4.   Sincerely,      Isidro Edgar MD    Utilization Review  Physician Advisor  [facility].

## 2023-09-17 NOTE — PROGRESS NOTES
09/17/23 1420   Appointment Info   Signing Clinician's Name / Credentials (OT) Ramandeep Mcclendon, OTR/L   Living Environment   People in Home alone   Current Living Arrangements condominium   Home Accessibility no concerns   Living Environment Comments building has elevator; tub shower   Self-Care   Usual Activity Tolerance good   Current Activity Tolerance fair   Equipment Currently Used at Home walker, rolling;raised toilet seat;tub bench  (4WW, FWW, reacher, sock aid, long handle shoe horn, hand held shower head)   Fall history within last six months yes   Number of times patient has fallen within last six months 1   Activity/Exercise/Self-Care Comment Mod I at baseline with ADLs. Pt had HHOT/PT/RN before hospitalization. 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 Pt has Meals of Wheels.   General Information   Onset of Illness/Injury or Date of Surgery 09/16/23   Referring Physician Dr. Barr   Patient/Family Therapy Goal Statement (OT) to go home   Additional Occupational Profile Info/Pertinent History of Current Problem per chart, 78 year old male with previous hip replacements and prostate cancer s/p prostatectomy presented with hip pain after tripping and falling. Patient was found to have a periprosthetic hip fracture. He was admitted to the hospital. Orthopedics was consulted and non-operative treatment was recommended.   Existing Precautions/Restrictions fall;weight bearing   Left Upper Extremity (Weight-bearing Status) full weight-bearing (FWB)   Right Upper Extremity (Weight-bearing Status) full weight-bearing (FWB)   Left Lower Extremity (Weight-bearing Status) full weight-bearing (FWB)   Right Lower Extremity (Weight-bearing Status) touch down weight-bearing (TDWB)   Cognitive Status Examination   Orientation Status orientation to person, place and time   Affect/Mental Status (Cognitive) WNL   Follows Commands WNL   Visual Perception   Visual Impairment/Limitations corrective lenses full-time   Impact of  Vision Impairment on Function (Vision) diagnosed with macular degeneration   Sensory   Sensory Quick Adds sensation intact   Pain Assessment   Patient Currently in Pain Yes, see Vital Sign flowsheet   Posture   Posture forward head position;protracted shoulders   Range of Motion Comprehensive   General Range of Motion no range of motion deficits identified   Strength Comprehensive (MMT)   Comment, General Manual Muscle Testing (MMT) Assessment generalized weakness   Muscle Tone Assessment   Muscle Tone Quick Adds No deficits were identified   Coordination   Upper Extremity Coordination No deficits were identified   Bed Mobility   Bed Mobility supine-sit;sit-supine   Supine-Sit [place] (Bed Mobility) maximum assist (25% patient effort)   Sit-Supine [place] (Bed Mobility) maximum assist (25% patient effort)   Transfers   Transfers sit-stand transfer   Sit-Stand Transfer   Sit-Stand [place] (Transfers) maximum assist (25% patient effort)   Balance   Balance Assessment standing balance: static;standing balance: dynamic;sit to stand balance   Activities of Daily Living   BADL Assessment/Intervention lower body dressing   Lower Body Dressing Assessment/Training   [place] Level (Lower Body Dressing) maximum assist (25% patient effort)   Clinical Impression   Criteria for Skilled Therapeutic Interventions Met (OT) Yes, treatment indicated   OT Diagnosis decreased ADL independence   Influenced by the following impairments R hip fx   OT Problem List-Impairments impacting ADL problems related to;activity tolerance impaired;other (see comments);pain;range of motion (ROM)  (weight bearing precautions)   Assessment of Occupational Performance 3-5 Performance Deficits   Identified Performance Deficits dressing, toileting, bed mob, func mob   Planned Therapy Interventions (OT) ADL retraining   Clinical Decision Making Complexity (OT) moderate complexity   Risk & Benefits of therapy have been explained  evaluation/treatment results reviewed   OT Total Evaluation Time   OT Eval, Moderate Complexity Minutes (97106) 7   OT Goals   Therapy Frequency (OT) 6 times/wk   OT Predicted Duration/Target Date for Goal Attainment 10/01/23   OT Goals Lower Body Dressing;Bed Mobility;Transfers;Toilet Transfer/Toileting   OT: Lower Body Dressing Modified independent;within precautions;using adaptive equipment   OT: Bed Mobility Modified independent;supine to/from sitting;within precautions   OT: Transfer Modified independent;with assistive device;within precautions   OT: Toilet Transfer/Toileting Modified independent;using adaptive equipment;within precautions   Interventions   Interventions Quick Adds Self-Care/Home Management   Self-Care/Home Management   Self-Care/Home Mgmt/ADL, Compensatory, Meal Prep Minutes (82569) 24   Symptoms Noted During/After Treatment (Meal Preparation/Planning Training) increased pain;fatigue   Treatment Detail/Skilled Intervention Pt supine in bed upon OT arrival and agreeable to therapy. Educ on role of OT in acute care and TTWB RLE precaution. Pt Mod A for supine <> sit bed mob with elevated HOB, bed rail and A to move RLE and scoot hips forward to EOB. v/c for sequencing, hand palcement and safe tech. Educ on PLB for pain. Pt SBA for unsupported sitting balance EOB. Educ on LB dressing with AE. Pt Min A for LB dressing to doff socks with reacher, don socks with sock aid, don underwear with reacher while sitting EOB and Max A to pull underwear up while squating with FWW. Pt only able to lift glutes off of bed a few inches d/t pain, weakness and decreased activity tolerance. v/c for safe tech, use of AE  and hand placement. Educ on RLE extension to decrease pain and stiffness while sitting/standing. Pt SBA for scooting hips to HOB when sitting EOB. v/c for safe tech and postioning. Pt SBA for scooting hips to center of bed when supine using bed rails. Increased time and effort d/t pain and decreased  activity tolerance. Pt left supine in bed with all needs within reach and RN present.   OT Discharge Planning   OT Plan bed mob, LB dressing, seated activity tolerance, STS   OT Discharge Recommendation (DC Rec) Transitional Care Facility   OT Rationale for DC Rec Pt is significantly below ADL independence and would benefit from TCU stay and continued skilled OT to increase ADL independence and activity tolerance.   OT Brief overview of current status Mod-Max A for bed mob and LB dressing   Total Session Time   Timed Code Treatment Minutes 24   Total Session Time (sum of timed and untimed services) 31

## 2023-09-17 NOTE — PLAN OF CARE
Goal Outcome Evaluation:            Pt arrived from [facility] @ 2361. Pt is A&Ox4. VSS. LS clear, on RA. BS active, LBM on 9/16/2023. Voiding using bedside urinal. Right hip Pain managed with tylenol and flexeril . Pt up with 1 assist. Toe-touch weight bearing right leg. Continue to monitor.

## 2023-09-17 NOTE — PROGRESS NOTES
09/17/23 0825   Appointment Info   Signing Clinician's Name / Credentials (PT) Mahnaz Earl DPT       Present no   Language English   Living Environment   People in Home alone   Current Living Arrangements condominium   Home Accessibility no concerns   Transportation Anticipated family or friend will provide   Self-Care   Usual Activity Tolerance good   Current Activity Tolerance fair   Regular Exercise No   General Information   Onset of Illness/Injury or Date of Surgery 09/16/23   Referring Physician Mariah Quezada PA-C   Patient/Family Therapy Goals Statement (PT) Did not endorse   Pertinent History of Current Problem (include personal factors and/or comorbidities that impact the POC) 9/16/2023 for hip fracture following a mechanical fall. 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   Existing Precautions/Restrictions fall   Weight-Bearing Status - LUE full weight-bearing   Weight-Bearing Status - RUE full weight-bearing   Weight-Bearing Status - LLE full weight-bearing   Weight-Bearing Status - RLE toe touch weight-bearing   General Observations Supine in bed upon arrival, agreeable to PT   Cognition   Affect/Mental Status (Cognition) WNL   Orientation Status (Cognition) oriented x 3   Follows Commands (Cognition) WNL   Pain Assessment   Patient Currently in Pain Yes, see Vital Sign flowsheet   Integumentary/Edema   Integumentary/Edema no deficits were identifed   Posture    Posture Forward head position;Protracted shoulders;Kyphosis   Range of Motion (ROM)   ROM Comment Did not formally assess, demonstrates functional ROM with mobility but very limited R hip ROM d/t pain- guarding noted   Strength (Manual Muscle Testing)   Strength Comments Did not formally assess, demonstrates functional strength with mobility   Bed Mobility   Comment, (Bed Mobility) Completes supine to sit transfer with very light  Louis and step by step cues, increased pain noted throughout   Transfers   Comment, (Transfers) Completes sit<>stand transfer with heavy Louis using walker, increased cues needed throughout   Gait/Stairs (Locomotion)   Comment, (Gait/Stairs) Took a few steps from bed to chair wiht Louis using walker, significant difficulty noted maintaining TTWB   Balance   Balance Comments Independent sitting balance, Louis for standing balance with UE support from walker   Sensory Examination   Sensory Perception WNL   Clinical Impression   Criteria for Skilled Therapeutic Intervention Yes, treatment indicated   PT Diagnosis (PT) impaired functional mobility   Influenced by the following impairments increased pain, decreased R LE strength and ROM, decreased activity tolerance   Functional limitations due to impairments impaired bed mobility, transfers and ambulation   Clinical Presentation (PT Evaluation Complexity) Stable/Uncomplicated   Clinical Presentation Rationale Per clinical judgment   Clinical Decision Making (Complexity) low complexity   Planned Therapy Interventions (PT) balance training;bed mobility training;gait training;home exercise program;strengthening;transfer training;progressive activity/exercise;risk factor education;home program guidelines   Anticipated Equipment Needs at Discharge (PT) walker, rolling   Risk & Benefits of therapy have been explained evaluation/treatment results reviewed;risks/benefits reviewed;care plan/treatment goals reviewed;current/potential barriers reviewed   PT Total Evaluation Time   PT Eval, Low Complexity Minutes (69451) 8   Physical Therapy Goals   PT Frequency Daily   PT Predicted Duration/Target Date for Goal Attainment 09/24/23   PT Goals Bed Mobility;Transfers;Gait;PT Goal 1   PT: Bed Mobility Independent;Supine to/from sit   PT: Transfers Modified independent;Sit to/from stand;Bed to/from chair;Assistive device;Within precautions   PT: Gait Modified independent;Assistive  device;Within precautions;100 feet   PT: Goal 1 Independent with HEP of LE strengthening   Therapeutic Procedure/Exercise   Ther. Procedure: strength, endurance, ROM, flexibillity Minutes (63850) 10   Symptoms Noted During/After Treatment fatigue;increased pain   Treatment Detail/Skilled Intervention PT: Issued and completed supine exercises on R x 10 for strength and circulation including AP, QS, GS, HS, heelslides (assist through limited range), SAQ. Fairly good tolerance and understanding noted.   Therapeutic Activity   Therapeutic Activities: dynamic activities to improve functional performance Minutes (09656) 14   Symptoms Noted During/After Treatment Fatigue;Increased pain   Treatment Detail/Skilled Intervention PT: Supine in bed upon arrival, agreeable to PT. Educated on TTWB status, verbalized understanding. Increased time needed throughout for all movement. Completes supine to sit transfer with step by step cues, HOB elevated, heavy use of side rail and overall Louis. Patient moaning in pain throughout. Sat EOB with fairly good tolerance. Completes sit<>stand transfer with Louis using walker, significant cues for TTWB R LE throughout. Once upright emphasis on full standing prior to initiating more movement. Patient then takes a few shuffled steps from bed to chair with Louis using walker, again significant difficulty with TTWB throughout. patient ended in chair with needs in reach.   PT Discharge Planning   PT Plan PT: Continue overall functional mobility, trial bed mobility with leg , taking steps as able with TTWB   PT Discharge Recommendation (DC Rec) Transitional Care Facility   PT Rationale for DC Rec PT: Patient well below baseline level of function and significant difficulty noted maintaining TTWB throughout. Recommend d/c to rehab to progress safety and independence with functional mobility prior to returning home   PT Brief overview of current status PT: Louis for all mobility, difficulty  maintaining TTWB throughout   Total Session Time   Timed Code Treatment Minutes 24   Total Session Time (sum of timed and untimed services) 32

## 2023-09-17 NOTE — PLAN OF CARE
VS: VSS   O2: Stable on RA.   Output: Void 100 ml. Scanned for 350 ml. Page sent to Dr. KIMBELRY Hope to inform of these values. To st cath if over 300 ml scanned. Scanned for 249 ml this afternoon.   Last BM: Yesterday.   Activity: In chair and unable to stand. Used Camilo lift to return him to bed. Assist of 3 with lift.   Skin: Intact.   Pain: Minimal. Taking Tylenol. Only has pain with movement.   Neuro: Intact. Denies numbness or tingling. A&O times 4.   Dressing: None.   Diet: Regular.   LDA: None.   Equipment: Walker, gait belt   Plan: Monitor urine output and st cath as needed.

## 2023-09-17 NOTE — PROGRESS NOTES
"[facility]    Medicine Progress Note - Hospitalist Service, GOLD TEAM 18    Date of Admission:  9/16/2023    Assessment & Plan   78 year old male with previous hip replacements and prostate cancer s/p prostatectomy presented with hip pain after tripping and falling. Patient was found to have a periprosthetic hip fracture. He was admitted to the hospital. Orthopedics was consulted and non-operative treatment was recommended.  9/17  Patient with urinary retention. Flomax started.    Right hip fracture from mechanical fall  Osteoarthritis of bilateral hips status post bilateral total hip arthroplasties in July  -continue pt  -toe-touch weightbearing to the right lower extremity utilizing assistive device.  -aspirin 162 mg daily for DVT prevention  -continue PRN Tylenol 650 mg Q6H   -continue PRN oxycodone 2.6 mg Q4H  -continue PRN flexeril 5 mg Q8H   -continue pregabalin 225 mg daily      Essential hypertension   -not on chronic medications  -monitor    Dyslipidemia   -continue Zocor 20 mg nightly    Adenocarcinoma of the prostate status post prostatectomy  Urinary retention  -PVR elevated  -start flomax  -straight cath orders placed  -urology consulted     Normocytic anemia, appears chronic  Vitamin B12 deficiency   -continue vitamin B12 supplement  -monitor     Vitamin D deficiency   - continue vitamin D supplement       Diet: Regular Diet Adult    DVT Prophylaxis: aspirin as above  Manzo Catheter: Not present  Lines: None     Cardiac Monitoring: None  Code Status: Full Code      Clinically Significant Risk Factors Present on Admission                # Drug Induced Platelet Defect: home medication list includes an antiplatelet medication   # Hypertension: Noted on problem list      # Obesity: Estimated body mass index is 32.43 kg/m  as calculated from the following:    Height as of 9/6/23: 1.753 m (5' 9.02\").    Weight as of 9/6/23: 99.7 kg (219 lb 11.2 oz).          "     Disposition Plan     Expected Discharge Date: 09/18/2023      Destination: other (comment) (Transitional care)            Beny Hope MD  Hospitalist Service, GOLD TEAM 18  [facility]  Securely message with [employer] (more info)  Text page via [facility] Paging/Directory   See signed in provider for up to date coverage information  ______________________________________________________________________    Interval History   Patient reported no significant pain at rest but pain with weight bearing. Also reported trouble initiating urine stream. No other concerns reported by RN. Case discussed during care rounds.    Physical Exam   Vital Signs: Temp: 98.1  F (36.7  C) Temp src: Oral BP: 117/65 Pulse: 61   Resp: 16 SpO2: 100 % O2 Device: None (Room air)    Weight: 0 lbs 0 oz    Gen:  no acute distress  Resp:  breathing normally on room air. No rales, wheezing, or stridor  Card:  normal rate, normal rhythm. No murmurs appreciated  Abd:  Soft, non-tender, non-distended, normoactive bowel sounds are present  Psych:  Alert      Medical Decision Making             Data

## 2023-09-18 ENCOUNTER — APPOINTMENT (OUTPATIENT)
Dept: PHYSICAL THERAPY | Facility: CLINIC | Age: 78
DRG: 536 | End: 2023-09-18
Payer: COMMERCIAL

## 2023-09-18 ENCOUNTER — APPOINTMENT (OUTPATIENT)
Dept: OCCUPATIONAL THERAPY | Facility: CLINIC | Age: 78
DRG: 536 | End: 2023-09-18
Payer: COMMERCIAL

## 2023-09-18 LAB
ANION GAP SERPL CALCULATED.3IONS-SCNC: 12 MMOL/L (ref 7–15)
BUN SERPL-MCNC: 26.7 MG/DL (ref 8–23)
CALCIUM SERPL-MCNC: 9.5 MG/DL (ref 8.8–10.2)
CHLORIDE SERPL-SCNC: 103 MMOL/L (ref 98–107)
CREAT SERPL-MCNC: 0.9 MG/DL (ref 0.67–1.17)
DEPRECATED HCO3 PLAS-SCNC: 23 MMOL/L (ref 22–29)
EGFRCR SERPLBLD CKD-EPI 2021: 89 ML/MIN/1.73M2
ERYTHROCYTE [DISTWIDTH] IN BLOOD BY AUTOMATED COUNT: 14.6 % (ref 10–15)
GLUCOSE SERPL-MCNC: 137 MG/DL (ref 70–99)
HCT VFR BLD AUTO: 33.5 % (ref 40–53)
HGB BLD-MCNC: 11.1 G/DL (ref 13.3–17.7)
MCH RBC QN AUTO: 28.2 PG (ref 26.5–33)
MCHC RBC AUTO-ENTMCNC: 33.1 G/DL (ref 31.5–36.5)
MCV RBC AUTO: 85 FL (ref 78–100)
PLATELET # BLD AUTO: 222 10E3/UL (ref 150–450)
POTASSIUM SERPL-SCNC: 3.9 MMOL/L (ref 3.4–5.3)
RBC # BLD AUTO: 3.94 10E6/UL (ref 4.4–5.9)
SODIUM SERPL-SCNC: 138 MMOL/L (ref 136–145)
WBC # BLD AUTO: 7.1 10E3/UL (ref 4–11)

## 2023-09-18 PROCEDURE — 97535 SELF CARE MNGMENT TRAINING: CPT | Mod: GO

## 2023-09-18 PROCEDURE — 250N000013 HC RX MED GY IP 250 OP 250 PS 637: Performed by: HOSPITALIST

## 2023-09-18 PROCEDURE — 82310 ASSAY OF CALCIUM: CPT | Performed by: HOSPITALIST

## 2023-09-18 PROCEDURE — 99232 SBSQ HOSP IP/OBS MODERATE 35: CPT | Performed by: HOSPITALIST

## 2023-09-18 PROCEDURE — 99207 PR NO BILLABLE SERVICE THIS VISIT: CPT

## 2023-09-18 PROCEDURE — 85027 COMPLETE CBC AUTOMATED: CPT | Performed by: HOSPITALIST

## 2023-09-18 PROCEDURE — 120N000002 HC R&B MED SURG/OB UMMC

## 2023-09-18 PROCEDURE — 97530 THERAPEUTIC ACTIVITIES: CPT | Mod: GP

## 2023-09-18 PROCEDURE — 250N000013 HC RX MED GY IP 250 OP 250 PS 637: Performed by: PHYSICIAN ASSISTANT

## 2023-09-18 PROCEDURE — 36415 COLL VENOUS BLD VENIPUNCTURE: CPT | Performed by: HOSPITALIST

## 2023-09-18 PROCEDURE — 97110 THERAPEUTIC EXERCISES: CPT | Mod: GP

## 2023-09-18 RX ADMIN — Medication 2.5 MG: at 08:01

## 2023-09-18 RX ADMIN — SIMVASTATIN 20 MG: 20 TABLET, FILM COATED ORAL at 21:03

## 2023-09-18 RX ADMIN — TAMSULOSIN HYDROCHLORIDE 0.4 MG: 0.4 CAPSULE ORAL at 08:01

## 2023-09-18 RX ADMIN — PREGABALIN 225 MG: 75 CAPSULE ORAL at 21:03

## 2023-09-18 RX ADMIN — Medication 1000 UNITS: at 08:02

## 2023-09-18 RX ADMIN — CYANOCOBALAMIN TAB 1000 MCG 1000 MCG: 1000 TAB at 08:02

## 2023-09-18 RX ADMIN — POLYETHYLENE GLYCOL 3350 17 G: 17 POWDER, FOR SOLUTION ORAL at 08:31

## 2023-09-18 RX ADMIN — ASPIRIN 162 MG: 81 TABLET ORAL at 08:01

## 2023-09-18 ASSESSMENT — ACTIVITIES OF DAILY LIVING (ADL)
ADLS_ACUITY_SCORE: 24
ADLS_ACUITY_SCORE: 30
ADLS_ACUITY_SCORE: 24
ADLS_ACUITY_SCORE: 24
ADLS_ACUITY_SCORE: 30
ADLS_ACUITY_SCORE: 24
ADLS_ACUITY_SCORE: 30
ADLS_ACUITY_SCORE: 30
ADLS_ACUITY_SCORE: 24

## 2023-09-18 NOTE — PLAN OF CARE
Pt is A&Ox4. VSS. LS clear, on RA. BS active, LBM on 9/16/2023. Voiding well. Pain managed with 2.5mg oxycodone. Pt up with 1 assist per PT. No PIV access. Continue to monitor.

## 2023-09-18 NOTE — PROGRESS NOTES
No new assessment changes from previous shift   Pt is A&Ox4. VSS. LS clear, on RA. BS active, LBM on 9/16/2023. Voiding using bedside urinal. Right hip Pain managed with tylenol. Pt up with 2 assist. did not get oob. Toe-touch weight bearing right leg. Continue to monitor.

## 2023-09-18 NOTE — PLAN OF CARE
"  VS: /64 (BP Location: Left arm)   Pulse 84   Temp 97.8  F (36.6  C) (Oral)   Resp 16   SpO2 96%     O2: To room air, not in any form of distress   Output: Occasional incontinence, wearing disposable briefs, uses urinals at bedside,    Last BM: 9/18/2023 morning shift   Activity: Assist of 1-2 from chair to bed   Skin: Blister noted on scalp area, pt stated \"it is from treatment\"   Pain: Mild pain when at rest 2-3/10 pain scale, managed by scheduled pregabalin PO   CMS: Denied any tingling and numbness   Dressing: Negative    Diet: Regular diet   LDA: Negative   Equipment: Pt belonging, PCD   Plan: Possible discharge on 09-20-23 to TCU   Additional Info:       "

## 2023-09-18 NOTE — PROGRESS NOTES
[facility]    Medicine Progress Note - Hospitalist Service, GOLD TEAM 18    Date of Admission:  9/16/2023    Assessment & Plan   78 year old male with previous hip replacements and prostate cancer s/p prostatectomy presented with hip pain after tripping and falling. Patient was found to have a periprosthetic hip fracture. He was admitted to the hospital. Orthopedics was consulted and non-operative treatment was recommended.  9/17  Patient with urinary retention. Flomax started.  9/18  Patient seen by urology. Continue to monitor PVR, straight cath as needed but can place granados if PVR trending up.     Right hip fracture from mechanical fall  Osteoarthritis of bilateral hips status post bilateral total hip arthroplasties in July  -continue pt  -toe-touch weightbearing to the right lower extremity utilizing assistive device.  -aspirin 162 mg daily for DVT prevention  -continue PRN Tylenol 650 mg Q6H   -continue PRN oxycodone 2.6 mg Q4H though minimize use given propensity to cause urinary retention  -continue PRN flexeril 5 mg Q8H though minimize use given propensity to cause urinary retention  -continue pregabalin 225 mg daily      Essential hypertension   -not on chronic medications  -monitor    Dyslipidemia   -continue Zocor 20 mg nightly    Adenocarcinoma of the prostate status post prostatectomy  Urinary retention  -PVR elevated  -continue flomax  -monitor PVR and continue straight cath for now  -urology's help much appreciated     Normocytic anemia, appears chronic  Vitamin B12 deficiency   -continue vitamin B12 supplement  -monitor     Vitamin D deficiency   - continue vitamin D supplement       Diet: Regular Diet Adult    DVT Prophylaxis: aspirin as above  Granados Catheter: Not present  Lines: None     Cardiac Monitoring: None  Code Status: Full Code      Clinically Significant Risk Factors                  # Hypertension: Noted on problem list        # Obesity:  "Estimated body mass index is 32.43 kg/m  as calculated from the following:    Height as of 9/6/23: 1.753 m (5' 9.02\").    Weight as of 9/6/23: 99.7 kg (219 lb 11.2 oz)., PRESENT ON ADMISSION            Disposition Plan      Expected Discharge Date: 09/20/2023,  3:00 PM    Destination: other (comment) (Transitional care)  Discharge Comments: TCU          Beny Hope MD  Hospitalist Service, GOLD TEAM 18  [facility]  Securely message with [employer] (more info)  Text page via [facility] Paging/Directory   See signed in provider for up to date coverage information  ______________________________________________________________________    Interval History   Patient reported no significant interval changes. Doing well at rest. With pain with weight bearing. No other concerns or new symptoms. No other concerns reported by RN. Case discussed during care rounds.    Physical Exam   Vital Signs: Temp: 98.7  F (37.1  C) Temp src: Oral BP: 120/68 Pulse: 70   Resp: 18 SpO2: 97 % O2 Device: None (Room air)    Weight: 0 lbs 0 oz    Gen:  no acute distress  Resp:  breathing normally on room air. No rales, wheezing, or stridor  Card:  normal rate, normal rhythm. No murmurs appreciated  Abd:  Soft, non-tender, non-distended, normoactive bowel sounds are present  Psych:  Alert    Medical Decision Making             Data     "

## 2023-09-19 ENCOUNTER — APPOINTMENT (OUTPATIENT)
Dept: PHYSICAL THERAPY | Facility: CLINIC | Age: 78
DRG: 536 | End: 2023-09-19
Payer: COMMERCIAL

## 2023-09-19 ENCOUNTER — APPOINTMENT (OUTPATIENT)
Dept: OCCUPATIONAL THERAPY | Facility: CLINIC | Age: 78
DRG: 536 | End: 2023-09-19
Payer: COMMERCIAL

## 2023-09-19 DIAGNOSIS — Z96.643 STATUS POST TOTAL REPLACEMENT OF BOTH HIPS: Primary | ICD-10-CM

## 2023-09-19 LAB
ANION GAP SERPL CALCULATED.3IONS-SCNC: 8 MMOL/L (ref 7–15)
BUN SERPL-MCNC: 31.7 MG/DL (ref 8–23)
CALCIUM SERPL-MCNC: 8.7 MG/DL (ref 8.8–10.2)
CHLORIDE SERPL-SCNC: 103 MMOL/L (ref 98–107)
CREAT SERPL-MCNC: 0.92 MG/DL (ref 0.67–1.17)
DEPRECATED HCO3 PLAS-SCNC: 24 MMOL/L (ref 22–29)
EGFRCR SERPLBLD CKD-EPI 2021: 86 ML/MIN/1.73M2
ERYTHROCYTE [DISTWIDTH] IN BLOOD BY AUTOMATED COUNT: 14.6 % (ref 10–15)
GLUCOSE SERPL-MCNC: 109 MG/DL (ref 70–99)
HCT VFR BLD AUTO: 27.4 % (ref 40–53)
HGB BLD-MCNC: 8.9 G/DL (ref 13.3–17.7)
MCH RBC QN AUTO: 27.8 PG (ref 26.5–33)
MCHC RBC AUTO-ENTMCNC: 32.5 G/DL (ref 31.5–36.5)
MCV RBC AUTO: 86 FL (ref 78–100)
PLATELET # BLD AUTO: 186 10E3/UL (ref 150–450)
POTASSIUM SERPL-SCNC: 4 MMOL/L (ref 3.4–5.3)
RBC # BLD AUTO: 3.2 10E6/UL (ref 4.4–5.9)
SODIUM SERPL-SCNC: 135 MMOL/L (ref 136–145)
WBC # BLD AUTO: 6 10E3/UL (ref 4–11)

## 2023-09-19 PROCEDURE — 250N000013 HC RX MED GY IP 250 OP 250 PS 637: Performed by: PHYSICIAN ASSISTANT

## 2023-09-19 PROCEDURE — 120N000002 HC R&B MED SURG/OB UMMC

## 2023-09-19 PROCEDURE — 99232 SBSQ HOSP IP/OBS MODERATE 35: CPT | Performed by: INTERNAL MEDICINE

## 2023-09-19 PROCEDURE — 82310 ASSAY OF CALCIUM: CPT | Performed by: HOSPITALIST

## 2023-09-19 PROCEDURE — 97530 THERAPEUTIC ACTIVITIES: CPT | Mod: GP | Performed by: PHYSICAL THERAPIST

## 2023-09-19 PROCEDURE — 85027 COMPLETE CBC AUTOMATED: CPT | Performed by: HOSPITALIST

## 2023-09-19 PROCEDURE — 97535 SELF CARE MNGMENT TRAINING: CPT | Mod: GO | Performed by: OCCUPATIONAL THERAPIST

## 2023-09-19 PROCEDURE — 250N000013 HC RX MED GY IP 250 OP 250 PS 637: Performed by: HOSPITALIST

## 2023-09-19 PROCEDURE — 36415 COLL VENOUS BLD VENIPUNCTURE: CPT | Performed by: HOSPITALIST

## 2023-09-19 RX ADMIN — ACETAMINOPHEN 650 MG: 325 TABLET, FILM COATED ORAL at 07:51

## 2023-09-19 RX ADMIN — Medication 1000 UNITS: at 07:52

## 2023-09-19 RX ADMIN — ASPIRIN 162 MG: 81 TABLET ORAL at 07:51

## 2023-09-19 RX ADMIN — TAMSULOSIN HYDROCHLORIDE 0.4 MG: 0.4 CAPSULE ORAL at 07:52

## 2023-09-19 RX ADMIN — CYANOCOBALAMIN TAB 1000 MCG 1000 MCG: 1000 TAB at 07:52

## 2023-09-19 RX ADMIN — SIMVASTATIN 20 MG: 20 TABLET, FILM COATED ORAL at 20:14

## 2023-09-19 RX ADMIN — PREGABALIN 225 MG: 75 CAPSULE ORAL at 20:14

## 2023-09-19 ASSESSMENT — ACTIVITIES OF DAILY LIVING (ADL)
ADLS_ACUITY_SCORE: 30

## 2023-09-19 NOTE — PLAN OF CARE
Goal Outcome Evaluation:      Plan of Care Reviewed With: patient    . Pt is A&Ox4. VSS. LS clear, on RA. BS active, LBM on 9/18/2023. Voiding using bedside urinal. PVR Q 4hrs 650, Cath 800. Right hip Pain managed with tylenol. Pt up with 2 assist. did not get oob this shift. Toe-touch weight bearing right leg. Continue to monitor.

## 2023-09-19 NOTE — INTERIM SUMMARY
[facility] Pre-Admission Screen    Referral Source:  [facility] MED SURG ORTHOPEDIC [id number]  Admit date to referring facility: 9/16/2023    Physical Medicine and Rehab Consult Completed: No    Rehab Diagnosis:    Orthopaedic Disorders 08.11 Unilateral Hip Fracture: comminuted periprosthetic fracture, around right greater trochanter     Justification for Acute Inpatient Rehabilitation  Quinton Ortiz is a 78 year old male w/ PMH significant for recent bilateral total hip arthroplasties in July 2023, adenocarcinoma of prostate s/p prostatectomy, HLD, and HTN, who presented hospital after a mechanical fall. Pt found to have comminuted periprosthetic fracture, around right greater trochanter.  Ortho was consulted and non-operative mgmt was advised w/ TTWB to RLE. He has required medical mgmt of his pain, bowels, and urinary retention requiring urology consult. He is now medically stable and ready to discharge to acute inpatient rehab.     Normally Quinton is modified independent w/ use of 4ww for ambulation. He currently requires requires Ax1-2 for all mobility.  The patient requires an intensive inpatient rehab program to address the following acute impairments: weakness, impaired balance, impaired weight shifting in setting of new RLE TTWB, pain, impulsivity, impaired safety awareness, and fatigue. These deficits are impacting his functional independence w/ transfers, gait, and ADLs.  He  requires transfer to [facility] for intensive therapies not available in a lesser level of care including PT/OT, ongoing medical management at least 3 days per week, and rehabilitative nursing care to maximize his functional recovery in order to return home.     Current Active Medical Management Needs/Risks for Clinical Complications  The patient requires the high level of rehabilitation physician supervision that accompanies the provision of intensive rehabilitation therapy.  The patient needs the services  of the rehabilitation physician to assess the patient medically and functionally and to modify the course of treatment as needed to maximize the patient's capacity to benefit from the rehabilitation process.  The patient requires physician oversight at least 3x/wk to medically manage and assess:  Orthopedic needs: pt w/ right greater trochanter comminuted periprosthetic fracture. TTWB to RLE per ortho. He is at risk for DVT/PE - on DVT prophylaxis Aspirin 162 mg daily. He is also at risk for further falls w/ fractures, pain, and loss of range of motion.    Pain: Patient will need ongoing assessment and adjustment of pain medications for optimal participation in therapies. Currently on Tylenol, Oxycodone, Flexeril, and Pregabalin. Minimize use of Oxycodone and Flexeril given propensity to cause urinary retention.   Acute urinary retention: pt at risk for falls, UTI, incontinence, and falls in setting of urinary retention. PMR to assist w/ bladder mgmt. Initiated on Flomax 9/17/23 w/ moderately elevated PVR. Pt requiring intermitted straight cathing w/ PVR > 600 at this time and ongoing monitoring of PVR.    Bowels: assist w/ bowel regimen in pt at risk for opioid induced constipation - on Miralax. Urology also advises titrating bowel meds for soft daily BM.  Normocytic anemia: Pt at risk for poor endurance, shortness of breath, fatigue, weakness and falls in setting of anemia. On vitamin B12 supplement. Monitor CBC. HGb 8.9 on 9/19/23.     Past Medical/Surgical History  Surgery in the past 100 days: Yes: R JOHNNY 7/14/2023, L JOHNNY 7/21/2023  Additional relevant past medical history: adenocarcinoma of prostate s/p prostatectomy, HLD, HTN, severe OA now s/p bilateral total hip arthroplasties in July 2023, GERD, chronic normocytic anemia, neuropathy    Level of Functioning Prior to Admission:  LIVING ENVIRONMENT  People in Home: alone  Current Living Arrangements: condominium  Home Accessibility: no concerns  Living  Environment Comments: building has elevator; tub shower    SELF-CARE  Usual Activity Tolerance: good  Regular Exercise: No  Equipment Currently Used at Home: walker, rolling, raised toilet seat, tub bench (4WW, FWW, reacher, sock aid, long handle shoe horn, hand held shower head)  Activity/Exercise/Self-Care Comment: Mod I at baseline with ADLs. Pt had HHOT/PT/RN before hospitalization. 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 Pt has Meals of Wheels.    Level of Function: GG Scale (Section GG Functional Ability and Goals; CMS's BURROUGHS Version 3.0 Manual effective 10.1.2019):  PT Current Function Goals for Rehab   Bed Rolling 4 Supervision or touching assitance 6 Independent   Supine to Sit 4 Supervision or touching assitance 6 Independent   Sit to Stand 3 Partial/moderate assistance 6 Independent   Transfer 3 Partial/moderate assistance 6 Independent   Ambulation 88 Not attempted due to safety 6 Independent   Stairs 88 Not attempted due to safety Not Applicable     OT Current Function Goals for Rehab   Feeding 6 Independent 6 Independent   Grooming Not completed 6 Independent   Bathing Not completed 6 Independent   Upper Body Dressing Not completed 6 Independent   Lower Body Dressing 2 Substantial/maximal assistance 6 Independent   Toileting 2 Substantial/maximal assistance 6 Independent   Toilet Transfer 3 Partial/moderate assistance 6 Independent   Tub/Shower Transfer 88 Not attempted due to safety 6 Independent   Cognition Not Assessed Independent     SLP Current Function Goals for Rehab   Swallow Not Impaired Not applicable   Communication Not Impaired Not applicable     Current Diet:  0-Thin and 7-Regular    Summary Statement:  Quinton normally lives alone independently and is mod IND w/ mobility w/ use of 4ww. He now has new deficits in strength, balance, weight shifting, pain, and fatigue impacting his functional mobility. He is now requiring Ax1 for all mobility.  He performs STS tranfers to WW w/ cues for safe  technique w/ min A. He requires repeated cues for TTWB status on RLE. He has yet been able to ambulate in setting of RLE TTWB. Pt has demonstrated impulsivity and has needed repeated cues for TTWB status - cognition screen would be advised on acute rehab. Intensive PT/OT services are indicated to maximize his functional recovery in order to return home.     Expected Therapies and Services Required During Inpatient Rehab Admission  Intensity of Therapy: Patient requires intensive therapies not available in a lesser level of care. Patient is motivated, making gains, and can tolerate 3 hours of therapy a day.  Physical Therapy: 90 minutes per day, 7 days a week for 14 days  Occupational Therapy: 90 minutes per day, 7 days a week for 14 days  Speech and Language Therapy:   Rehabilitation Nursing Needs: Patient requires 24 hour Rehab Nursing to manage bowel program, bladder program, vitals, medication education, carryover of new rehab techniques, care coordination, pain management, and provide safe environment for patient at falls risk.    Precautions/restrictions/special needs:  Precautions: fall precautions and Weight bearing precautions (TTWB RLE)  Restrictions: TTWB to RLE  Special Needs:  none    Expected Level of Improvement: Pt will achieve a level of mod IND for all bed mobility, transfers, short distance gait, and ADLs. It is anticipated he will need manual w/c for community based mobility.   Expected Length of time to achieve: 14 days    Anticipated Discharge Needs:  Anticipated Discharge Destination: Home  Anticipated Discharge Support: None  24/7 support available : No  Identified caregiver(s):  none  Anticipated Discharge Needs: Home with homecare and Home with outpatient therapy    Identified challenges/barriers: TTWB RLE and pt lives alone.     Liaison signature/date/time:    Physician statement of review and agreement:  I have reviewed and am in agreement of the need for IRF stay to address above  functional and medical needs. In addition to above statements address, Patient requires intensive active and ongoing therapeutic intervention and multiple therapies; Patient requires medical supervision; Expected to actively participate in the intensive rehab program; Sufficiently stable to actively participate; Expectation for measurable improvement in functional capacity or adaption to impairments.    MD signature/date/time:

## 2023-09-19 NOTE — PROGRESS NOTES
[facility]    Medicine Progress Note - Hospitalist Service, GOLD TEAM 18    Date of Admission:  9/16/2023    Assessment & Plan   78 year old male with previous hip replacements and prostate cancer s/p prostatectomy presented with hip pain after tripping and falling. Patient was found to have a periprosthetic hip fracture. He was admitted to the hospital. Orthopedics was consulted and non-operative treatment was recommended.      Acute post operative urinary retention , required straight catheterization once, limiting use of narcotics and medications to treat constipation. Last BM yesterday. Urology was consulted and signed off. I would do straight cath up to x2 for AUR with PVR> 600.     Right hip fracture from mechanical fall  Osteoarthritis of bilateral hips status post bilateral total hip arthroplasties in July  -continue pt  -toe-touch weightbearing to the right lower extremity utilizing assistive device.  -aspirin 162 mg daily for DVT prevention  -continue PRN Tylenol 650 mg Q6H   -continue PRN oxycodone 2.6 mg Q4H though minimize use given propensity to cause urinary retention  -continue PRN flexeril 5 mg Q8H though minimize use given propensity to cause urinary retention  -continue pregabalin 225 mg daily      Essential hypertension   -not on chronic medications  -monitor    Dyslipidemia   -continue Zocor 20 mg nightly    Adenocarcinoma of the prostate status post prostatectomy  Urinary retention  -PVR elevated  -continue flomax  -monitor PVR and continue straight cath for now  -urology's help much appreciated     Normocytic anemia, appears chronic  Vitamin B12 deficiency   -continue vitamin B12 supplement  -monitor     Vitamin D deficiency   - continue vitamin D supplement       Diet: Regular Diet Adult    DVT Prophylaxis: aspirin as above  Manzo Catheter: Not present  Lines: None     Cardiac Monitoring: None  Code Status: Full Code      Clinically Significant  "Risk Factors                  # Hypertension: Noted on problem list          # Obesity: Estimated body mass index is 32.43 kg/m  as calculated from the following:    Height as of 9/6/23: 1.753 m (5' 9.02\").    Weight as of 9/6/23: 99.7 kg (219 lb 11.2 oz).  , PRESENT ON ADMISSION            Disposition Plan      Expected Discharge Date: 09/20/2023,  3:00 PM    Destination: other (comment) (Transitional care)  Discharge Comments: TCU          Porfirio Diaz MD  Hospitalist Service, GOLD TEAM 18  [facility]  Securely message with [employer] (more info)  Text page via [facility] Paging/Directory   See signed in provider for up to date coverage information  ______________________________________________________________________    Interval History   Patient reported no significant interval changes. Doing well at rest. With pain with weight bearing. No other concerns or new symptoms. No other concerns reported by RN. Case discussed during care rounds.    Physical Exam   Vital Signs: Temp: 97.6  F (36.4  C) Temp src: Oral BP: 136/64 Pulse: 73   Resp: 16 SpO2: 99 % O2 Device: None (Room air)    Weight: 0 lbs 0 oz    Gen:  no acute distress  Resp:  breathing normally on room air. No rales, wheezing, or stridor  Card:  normal rate, normal rhythm. No murmurs appreciated  Abd:  Soft, non-tender, non-distended, normoactive bowel sounds are present  Psych:  Alert    Medical Decision Making             Data   CBC RESULTS:   Recent Labs   Lab Test 09/19/23  0736   WBC 6.0   RBC 3.20*   HGB 8.9*   HCT 27.4*   MCV 86   MCH 27.8   MCHC 32.5   RDW 14.6        Last Comprehensive Metabolic Panel:  Lab Results   Component Value Date     (L) 09/19/2023    POTASSIUM 4.0 09/19/2023    CHLORIDE 103 09/19/2023    CO2 24 09/19/2023    ANIONGAP 8 09/19/2023     (H) 09/19/2023    BUN 31.7 (H) 09/19/2023    CR 0.92 09/19/2023    GFRESTIMATED 86 09/19/2023    MAX 8.7 (L) 09/19/2023           "

## 2023-09-19 NOTE — PLAN OF CARE
Pt is A&Ox4. VSS. LS clear, on RA. BS active, LBM on 9/16/2023. Voiding well. Pain managed with tylenol. Pt up with 1-2 assist. No PIV access. Continue to monitor

## 2023-09-19 NOTE — PLAN OF CARE
Goal Outcome Evaluation:      Plan of Care Reviewed With: patient          Outcome Evaluation: Pt will discharge to [facility] once medically stable and bed is available.

## 2023-09-19 NOTE — PLAN OF CARE
VS: Temp: 97.6  F (36.4  C) Temp src: Oral BP: 136/64 Pulse: 73   Resp: 16 SpO2: 99 % O2 Device: None (Room air)       O2: Pt.'s O2 sata above 90 on room air, denies shortness of breath and chest pain   Output: Pt. Voiding in bedside urinal independently and without difficulty. Bladder scan for urinary retention Q4 hours, last void at 1936: 75mL,  pvr 158   Last BM: 09/19/2023   Activity: Toe-Touch WB RLE, stand/pivot with assist of 1-2 to commode, chair   Skin: Blisters on top of head that pt. States are from dermatology treatment   Pain: Pt. Reports pain  only with movement, managed with prn tylenol, scheduled neurontin   CMS: A&O x4, denies numbness and tingling   Dressing: none   Diet: Regular, tolerating well, denies nausea and vomiting   LDA: None, no piv   Equipment: Walker, gait belt, personal belongings   Plan: Possible discharge 09/20 to [facility] acute rehab   Additional Info:

## 2023-09-19 NOTE — PROGRESS NOTES
"Rehab Admissions:  I met w/ Quinton this afternoon to discuss [facility]. Discussed setup and structure of ARC level of care w/ skilled PT/OT services 90 minutes each daily for ELOS of 10 days prior to return home with either ongoing home care or OP therapy. Pt reports he was mod IND prior to admission using 4ww for mobility, lives in condo w/ no stairs. He reports having a cleaning service 1x/month. He reports he does not have any identified family or friends who could provide assistance after discharge. Pt eager for acute rehab although he expresses some hesitancy w/ anticipated \"short\" length of stay.     Thank you for the referral, we will continue to follow this patient for post acute placement.     Determination of admission is based upon the patient's need for an intensive, interdisciplinary approach to rehabilitation, their ability to progress, their ability to tolerate intensive therapies, their need for daily physician supervision, their need for twenty four hour nursing assistance, and their ability and willingness to participate in such a program.    Lyndsey Sullivan CM  Rehab Liaison/  [facility]  9/19/2023    4:08 PM    "

## 2023-09-19 NOTE — PROGRESS NOTES
Care Management Follow Up    Length of Stay (days): 3    Expected Discharge Date: 09/20/2023     Concerns to be Addressed: discharge planning     Patient plan of care discussed at interdisciplinary rounds: Yes    Anticipated Discharge Disposition: Acute Rehab    [facility]  [address]  [address] [place] [address]  Admissions: [phone]  RN Station: [phone]  ARU SW: [phone]      Anticipated Discharge Services:  (Acute therapies)  Anticipated Discharge DME: None    Patient/family educated on Medicare website which has current facility and service quality ratings: yes  Education Provided on the Discharge Plan: Yes  Patient/Family in Agreement with the Plan: yes    Referrals Placed by CM/SW: Post Acute Facilities (and [facility] liaison for ARC)  Private pay costs discussed: WALESKA discussed Medicare being billed for IP hospital stay while at [facility].    Additional Information:  [facility] liaison is following pt for ARC. SW discussed with pt at bedside the difference between TCU and ARU. Pt stated he is agreeable to ARU and prefers to go to [facility].    [facility] liaison, Lyndsey Sullivan, reported that ARU will likely have a bed for pt tomorrow if he is medically ready to discharge. Piedad Ellington will be [facility] liaison tomorrow.        RUBY Slater, LSW  [facility]   PHONE: [phone]  Pager: [phone]

## 2023-09-20 ENCOUNTER — APPOINTMENT (OUTPATIENT)
Dept: PHYSICAL THERAPY | Facility: CLINIC | Age: 78
DRG: 536 | End: 2023-09-20
Payer: COMMERCIAL

## 2023-09-20 ENCOUNTER — HOSPITAL ENCOUNTER (INPATIENT)
Facility: CLINIC | Age: 78
LOS: 14 days | Discharge: HOME HEALTH | DRG: 561 | End: 2023-10-04
Attending: PHYSICAL MEDICINE & REHABILITATION | Admitting: PHYSICAL MEDICINE & REHABILITATION
Payer: COMMERCIAL

## 2023-09-20 VITALS
DIASTOLIC BLOOD PRESSURE: 62 MMHG | RESPIRATION RATE: 16 BRPM | HEART RATE: 66 BPM | SYSTOLIC BLOOD PRESSURE: 105 MMHG | TEMPERATURE: 98.1 F | OXYGEN SATURATION: 96 %

## 2023-09-20 DIAGNOSIS — Z87.81 S/P RIGHT HIP FRACTURE: Primary | ICD-10-CM

## 2023-09-20 DIAGNOSIS — E53.8 VITAMIN B12 DEFICIENCY (NON ANEMIC): ICD-10-CM

## 2023-09-20 DIAGNOSIS — Z00.00 ROUTINE GENERAL MEDICAL EXAMINATION AT A HEALTH CARE FACILITY: ICD-10-CM

## 2023-09-20 DIAGNOSIS — E78.5 HYPERLIPIDEMIA LDL GOAL <130: ICD-10-CM

## 2023-09-20 PROCEDURE — 250N000013 HC RX MED GY IP 250 OP 250 PS 637: Performed by: PHYSICIAN ASSISTANT

## 2023-09-20 PROCEDURE — 99223 1ST HOSP IP/OBS HIGH 75: CPT | Mod: FS | Performed by: PHYSICIAN ASSISTANT

## 2023-09-20 PROCEDURE — 250N000013 HC RX MED GY IP 250 OP 250 PS 637: Performed by: HOSPITALIST

## 2023-09-20 PROCEDURE — 128N000003 HC R&B REHAB

## 2023-09-20 PROCEDURE — 2894A VOIDCORRECT: CPT | Performed by: INTERNAL MEDICINE

## 2023-09-20 PROCEDURE — 99239 HOSP IP/OBS DSCHRG MGMT >30: CPT | Performed by: INTERNAL MEDICINE

## 2023-09-20 PROCEDURE — 97530 THERAPEUTIC ACTIVITIES: CPT | Mod: GP | Performed by: PHYSICAL THERAPIST

## 2023-09-20 RX ORDER — SENNOSIDES 8.6 MG
2 TABLET ORAL 2 TIMES DAILY
Status: DISCONTINUED | OUTPATIENT
Start: 2023-09-20 | End: 2023-09-20 | Stop reason: HOSPADM

## 2023-09-20 RX ORDER — AMOXICILLIN 250 MG
1 CAPSULE ORAL 2 TIMES DAILY
Qty: 60 TABLET | Refills: 0 | Status: ON HOLD | OUTPATIENT
Start: 2023-09-20 | End: 2023-10-03

## 2023-09-20 RX ORDER — SIMVASTATIN 10 MG
20 TABLET ORAL AT BEDTIME
Status: DISCONTINUED | OUTPATIENT
Start: 2023-09-20 | End: 2023-10-04 | Stop reason: HOSPADM

## 2023-09-20 RX ORDER — TAMSULOSIN HYDROCHLORIDE 0.4 MG/1
0.4 CAPSULE ORAL DAILY
Status: DISCONTINUED | OUTPATIENT
Start: 2023-09-21 | End: 2023-10-04 | Stop reason: HOSPADM

## 2023-09-20 RX ORDER — ACETAMINOPHEN 325 MG/1
650 TABLET ORAL EVERY 6 HOURS PRN
Status: DISCONTINUED | OUTPATIENT
Start: 2023-09-20 | End: 2023-10-04 | Stop reason: HOSPADM

## 2023-09-20 RX ORDER — NALOXONE HYDROCHLORIDE 0.4 MG/ML
0.4 INJECTION, SOLUTION INTRAMUSCULAR; INTRAVENOUS; SUBCUTANEOUS
Status: DISCONTINUED | OUTPATIENT
Start: 2023-09-20 | End: 2023-10-04 | Stop reason: HOSPADM

## 2023-09-20 RX ORDER — PREGABALIN 75 MG/1
225 CAPSULE ORAL EVERY EVENING
Status: DISCONTINUED | OUTPATIENT
Start: 2023-09-20 | End: 2023-10-04 | Stop reason: HOSPADM

## 2023-09-20 RX ORDER — VITAMIN B COMPLEX
1000 TABLET ORAL DAILY
Status: DISCONTINUED | OUTPATIENT
Start: 2023-09-21 | End: 2023-10-04 | Stop reason: HOSPADM

## 2023-09-20 RX ORDER — LANOLIN ALCOHOL/MO/W.PET/CERES
1000 CREAM (GRAM) TOPICAL DAILY
Status: DISCONTINUED | OUTPATIENT
Start: 2023-09-21 | End: 2023-10-04 | Stop reason: HOSPADM

## 2023-09-20 RX ORDER — ASPIRIN 81 MG/1
162 TABLET ORAL DAILY
Status: DISCONTINUED | OUTPATIENT
Start: 2023-09-21 | End: 2023-10-04 | Stop reason: HOSPADM

## 2023-09-20 RX ORDER — POLYETHYLENE GLYCOL 3350 17 G/17G
17 POWDER, FOR SOLUTION ORAL DAILY PRN
Status: DISCONTINUED | OUTPATIENT
Start: 2023-09-20 | End: 2023-10-04 | Stop reason: HOSPADM

## 2023-09-20 RX ORDER — NALOXONE HYDROCHLORIDE 0.4 MG/ML
0.2 INJECTION, SOLUTION INTRAMUSCULAR; INTRAVENOUS; SUBCUTANEOUS
Status: DISCONTINUED | OUTPATIENT
Start: 2023-09-20 | End: 2023-10-04 | Stop reason: HOSPADM

## 2023-09-20 RX ORDER — AMOXICILLIN 250 MG
1 CAPSULE ORAL DAILY PRN
Status: DISCONTINUED | OUTPATIENT
Start: 2023-09-20 | End: 2023-10-04 | Stop reason: HOSPADM

## 2023-09-20 RX ORDER — TAMSULOSIN HYDROCHLORIDE 0.4 MG/1
0.4 CAPSULE ORAL DAILY
Qty: 90 CAPSULE | Refills: 0 | Status: ON HOLD | OUTPATIENT
Start: 2023-09-21 | End: 2023-10-03

## 2023-09-20 RX ADMIN — CYANOCOBALAMIN TAB 1000 MCG 1000 MCG: 1000 TAB at 09:10

## 2023-09-20 RX ADMIN — Medication 1000 UNITS: at 09:09

## 2023-09-20 RX ADMIN — TAMSULOSIN HYDROCHLORIDE 0.4 MG: 0.4 CAPSULE ORAL at 09:10

## 2023-09-20 RX ADMIN — PREGABALIN 225 MG: 75 CAPSULE ORAL at 21:04

## 2023-09-20 RX ADMIN — ASPIRIN 162 MG: 81 TABLET ORAL at 09:10

## 2023-09-20 RX ADMIN — SIMVASTATIN 20 MG: 10 TABLET, FILM COATED ORAL at 21:04

## 2023-09-20 ASSESSMENT — ACTIVITIES OF DAILY LIVING (ADL)
FALL_HISTORY_WITHIN_LAST_SIX_MONTHS: YES
WALKING_OR_CLIMBING_STAIRS_DIFFICULTY: YES
ADLS_ACUITY_SCORE: 30
ADLS_ACUITY_SCORE: 22
ADLS_ACUITY_SCORE: 30
ADLS_ACUITY_SCORE: 30
WEAR_GLASSES_OR_BLIND: YES
NUMBER_OF_TIMES_PATIENT_HAS_FALLEN_WITHIN_LAST_SIX_MONTHS: 1
TRANSFERRING: 1-->ASSISTANCE (EQUIPMENT/PERSON) NEEDED (NOT DEVELOPMENTALLY APPROPRIATE)
CHANGE_IN_FUNCTIONAL_STATUS_SINCE_ONSET_OF_CURRENT_ILLNESS/INJURY: YES
WALKING_OR_CLIMBING_STAIRS: AMBULATION DIFFICULTY, REQUIRES EQUIPMENT
ADLS_ACUITY_SCORE: 35
DRESSING/BATHING_DIFFICULTY: NO
ADLS_ACUITY_SCORE: 22
ADLS_ACUITY_SCORE: 27
ADLS_ACUITY_SCORE: 30
ADLS_ACUITY_SCORE: 34
CONCENTRATING,_REMEMBERING_OR_MAKING_DECISIONS_DIFFICULTY: NO
TOILETING_ISSUES: NO
DOING_ERRANDS_INDEPENDENTLY_DIFFICULTY: NO
ADLS_ACUITY_SCORE: 30
ADLS_ACUITY_SCORE: 27
ADLS_ACUITY_SCORE: 27
EQUIPMENT_CURRENTLY_USED_AT_HOME: WALKER, STANDARD
DIFFICULTY_EATING/SWALLOWING: NO
TRANSFERRING: 2-->COMPLETELY DEPENDENT

## 2023-09-20 NOTE — H&P
[facility]   Acute Rehabilitation Unit  Admission History and Physical    CHIEF COMPLAINT   S/p Right hip fracture     HISTORY OF PRESENT ILLNESS  Genaro Ortiz is a 78 year old man with history of prostate cancer, esophageal reflux, HLD, HTN, pre-diabetes, MGUS, Obesity,  and osteoarthritis s/p total hip arthroplasty right 7/14/23 and left 7/21/23 discharged 7/25/23, who was admitted 9/16/23 in setting of fall with right hip fracture.  Seen by orthopedics recommendations for toe touch weight bearing and non operative management with outpatient orthopedics follow up and asa 162 for dvt prevention. Seen by urology in setting of urinary retention currently undergoing monitoring and intermittent straight cath.      Noted to have impaired strength, impaired activity tolerance, and impaired balance.  He is currently assist of one for all mobility, sit to stand with min assist.  Demonstrated impulsivity needs cues for toe touch weight bearing. Would benefit from OT cognitive screen.  Goals for home with resumption of home care.       PAST MEDICAL HISTORY   Reviewed and updated in Epic.  Past Medical History:   Diagnosis Date    Adenocarcinoma of prostate (H) 5/27/2008    Arthritis     Esophageal reflux     Neuropathy     Pure hypercholesterolemia     Unspecified essential hypertension        SURGICAL HISTORY  Reviewed and updated in Epic.  Past Surgical History:   Procedure Laterality Date    ABDOMEN SURGERY  1952    Appendectomy    APPENDECTOMY  1952    ARTHROPLASTY HIP Right 7/14/2023    Procedure: ARTHROPLASTY, HIP, TOTAL RIGHT;  Surgeon: David Escamilla MD;  Location: [facility]    ARTHROPLASTY HIP Left 7/21/2023    Procedure: ARTHROPLASTY, HIP, TOTAL LEFT;  Surgeon: David Escamilla MD;  Location: [facility] OR    CATARACT IOL, RT/LT Bilateral 03/12    COLONOSCOPY  2017    COLONOSCOPY N/A 5/18/2023    Procedure: colonoscopy;  Surgeon: Ayse Sears MD;  Location:  GI     GENITOURINARY SURGERY      HERNIORRHAPHY INGUINAL  5/15/2014    Procedure: HERNIORRHAPHY INGUINAL;  Surgeon: Evens Jefferson MD;  Location: [facility] OR    PROSTATE SURGERY  2007       SOCIAL HISTORY  Reviewed and updated in Epic.  Living situation: lives in condo  Family support: limited  Tobacco use: none  Alcohol use: none  Illicit drug use: none  Social History     Socioeconomic History    Marital status: Single     Spouse name: Not on file    Number of children: Not on file    Years of education: Not on file    Highest education level: Not on file   Occupational History    Not on file   Tobacco Use    Smoking status: Never     Passive exposure: Never    Smokeless tobacco: Never    Tobacco comments:     Never smoked.   Vaping Use    Vaping Use: Never used   Substance and Sexual Activity    Alcohol use: Not Currently     Comment: occasional    Drug use: No    Sexual activity: Never   Other Topics Concern    Parent/sibling w/ CABG, MI or angioplasty before 65F 55M? No   Social History Narrative    Social Documentation:        Balanced Diet: YES    Calcium intake: 1-2 per day    Caffeine: 2 per day    Exercise:  type of activity gym and work is physical;  2 times per week    Sunscreen: Yes, when needed    Seatbelts:  Yes    Self Breast Exam:  na    Self Testicular Exam: Yes    Physical/Emotional/Sexual Abuse: No    Do you feel safe in your environment? Yes        Cholesterol screen up to date:  6/5/2009                                                                                                             Latest Ref Rng                              CHOLESTEROL                                                 0 - 200 mg/dL                177                     TRIGLYCERIDES                                               0 - 150 mg/dL                177 (H)                 HDL                                                         40 - 110 mg/dL               39 (L)                  LDL CHOLESTEROL CALCULATED                                   0 - 129 mg/dL                102                     VLDL-CHOLESTEROL                                            0 - 30 mg/dL                 35 (H)                  CHOLESTEROL/HDL RATIO                                       0.0 - 5.0                    4.5                         Eye Exam up to date: Yes    Dental Exam up to date: Yes    Pap smear up to date: Does Not Apply    Mammogram up to date: Does Not Apply    Dexa Scan up to date: Does Not Apply    Colonoscopy up to date: Yes    Immunizations up to date: Yes    Glucose screen if over 40:  Component                        Latest Ref Rng               10/31/2008              6/5/2009                GLUCOSE                          60 - 99 mg/dL                106 (H)                 111 (H)                                                      Social Determinants of Health     Financial Resource Strain: Not on file   Food Insecurity: Not on file   Transportation Needs: Not on file   Physical Activity: Not on file   Stress: Not on file   Social Connections: Not on file   Interpersonal Safety: Not on file   Housing Stability: Not on file       FAMILY HISTORY  Reviewed and updated in Epic.  Family History   Problem Relation Age of Onset    Glaucoma Other     Diabetes Mother     Gastrointestinal Disease Mother         pancreas removed    Breast Cancer Mother     Osteoporosis Father     Obesity Father     Macular Degeneration No family hx of     Hypertension No family hx of          PRIOR FUNCTIONAL HISTORY   Lived alone mod I with basic mobility and adls with walker.    Used:  walker, rolling, raised toilet seat, tub bench (4WW, FWW, reacher, sock aid, long handle shoe horn, hand held shower head)  Activity/Exercise/Self-Care Comment: Mod I at baseline with ADLs. Pt had HHOT/PT/RN before hospitalization. 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 Pt has Meals of Wheels.    MEDICATIONS  Scheduled meds  Facility-Administered Medications Prior to  "Admission   Medication Dose Route Frequency Provider Last Rate Last Admin    bevacizumab (AVASTIN) intravitreal inj 1.25 mg  1.25 mg Intravitreal Once Eric Abel MD        cyanocobalamin injection 1,000 mcg  1,000 mcg Intramuscular Q30 Days Francisco Sneed MD   1,000 mcg at 10/27/21 1313    cyanocobalamin injection 1,000 mcg  1,000 mcg Intramuscular Q30 Days Francisco Sneed MD   1,000 mcg at 05/27/21 1130     Medications Prior to Admission   Medication Sig Dispense Refill Last Dose    aspirin 81 MG EC tablet Take 81 mg by mouth daily       cholecalciferol (VITAMIN D) 1000 UNIT tablet Take 1 tablet by mouth daily. 100 tablet 12     Cyanocobalamin (B-12) 1000 MCG TBCR Take 1 tablet by mouth daily       fish oil-omega-3 fatty acids 1000 MG capsule Take 2,000 mg by mouth daily 120 capsule 11     oxyCODONE (ROXICODONE) 5 MG tablet Take 1-2 tablets (5-10 mg) by mouth every 4 hours as needed for moderate to severe pain 26 tablet 0     polyethylene glycol (MIRALAX) 17 g packet Take 1 packet by mouth as needed for constipation       pregabalin (LYRICA) 225 MG capsule Take 225 mg by mouth daily       senna-docusate (SENOKOT-S/PERICOLACE) 8.6-50 MG tablet Take 1 tablet by mouth daily as needed for constipation       simvastatin (ZOCOR) 20 MG tablet TAKE 1 TABLET BY MOUTH EVERYDAY AT BEDTIME 90 tablet 3        ALLERGIES     Allergies   Allergen Reactions    Penicillins Other (See Comments)     blotches         PHYSICAL EXAM  VITAL SIGNS:  There were no vitals taken for this visit.  BMI:  Estimated body mass index is 32.43 kg/m  as calculated from the following:    Height as of 9/6/23: 1.753 m (5' 9.02\").    Weight as of 9/6/23: 99.7 kg (219 lb 11.2 oz).     General: awake alert nad  Resp: non labored on room air  Defer to exam by Dr. Angel    LABS  CBC RESULTS:   Recent Labs   Lab Test 09/19/23  0736 09/18/23  0857 09/16/23  1639   WBC 6.0 7.1 9.4   RBC 3.20* 3.94* 3.87*   HGB 8.9* 11.1* " 10.7*   HCT 27.4* 33.5* 33.3*   MCV 86 85 86   MCH 27.8 28.2 27.6   MCHC 32.5 33.1 32.1   RDW 14.6 14.6 14.6    222 211     Last Basic Metabolic Panel:  Recent Labs   Lab Test 09/19/23  0736 09/18/23  0857 09/16/23  1639   * 138 138   POTASSIUM 4.0 3.9 4.0   CHLORIDE 103 103 103   CO2 24 23 23   ANIONGAP 8 12 12   * 137* 113*   BUN 31.7* 26.7* 25.6*   CR 0.92 0.90 0.89   GFRESTIMATED 86 89 89   MAX 8.7* 9.5 9.4       IMPRESSION/PLAN:  Genaro Ortiz is a 78 year old man with past medical history of OA s/p bilateral hip  arthroplasties 7/2023, HTN, prostate cancer, HLE, and anemia who presented with right hip fracture in setting of mechanical fall 9/16/23 seen by ortho managed non-operatively. Course complicated by urinary retention.  Admitted to rehab 9/20/23.       Admission to acute inpatient rehab right hip fracture.    Impairment group code: 08.11      PT, OT 90 minutes of each on a daily basis, in addition to rehab nursing and close management of physiatrist.      Impairment of ADL's: Noted to have impaired strength, impaired activity tolerance, and impaired balance leading to decreased ability to independently complete ADL's.  Will benefit from ongoing OT with goal for MOD I with basic ADLs.     Impairment of mobility:  Noted to have impaired strength, impaired activity tolerance, and impaired balance leading to decreased mobility.  Will benefit from ongoing PT with goal for THIEN with basic mobility.       Medical Conditions  Right hip fracture from mechanical fall  OA of bilateral hips s/p bilateral total hip arthroplasties (7/14 R & 7/21 L)  -Toe touch weight bearing on right  -continue PT/OT  -follow up Ortho  -asa 162 per ortho    HTN  Not on medications at time of rehab admission  -monitor    Dyslipidemia  Zocor 20 mg nightly    Chronic Anemia  B12 deficiency  -trend continue vitamin B supplement      History of prostate Cancer s/p prostatectomy  Urinary retention  Occurred post  operatively in July seen 9/6 in urology clinic without acute concerns and then inpatient recommended intermittent straight cath and ongoing monitoring.   -straight cath prn  -monitor pvrs  -continue flomax  -follow up urology    Neuropathic pain  Seen by neurology 6/7/23  -continue lyrica    Vitamin D Deficiency  -continue vitamin D supplement  Adjustment to disability:  Clinical psychology to eval and treat as indicated  FEN: reg  Bowel: monitor  Bladder: retention- monitor  DVT Prophylaxis: asa 162 per ortho  GI Prophylaxis: none  Code: full   Disposition: goal for home  ELOS:  14 days.  Rehab prognosis:  fair  Follow up Appointments on Discharge: ortho. Pcp. urology         discussed with Dr. Angel , PM&R staff physician     Emma Gutierrez PA-CHARLIE  Rehab Service

## 2023-09-20 NOTE — PLAN OF CARE
Occupational Therapy Discharge Summary    Reason for therapy discharge:    Discharged to acute rehabilitation facility.    Progress towards therapy goal(s). See goals on Care Plan in [facility] electronic health record for goal details.  Goals partially met.  Barriers to achieving goals:   discharge from facility.    Therapy recommendation(s):    Continued therapy is recommended.  Rationale/Recommendations:  to maximize safety and I with ADL.

## 2023-09-20 NOTE — PROGRESS NOTES
[facility]    Medicine Progress Note - Hospitalist Service, GOLD TEAM 18    Date of Admission:  9/16/2023    Assessment & Plan   78 year old male with previous hip replacements and prostate cancer s/p prostatectomy presented with hip pain after tripping and falling. Patient was found to have a periprosthetic hip fracture. He was admitted to the hospital. Orthopedics was consulted and non-operative treatment was recommended.      Acute post operative urinary retention , required straight catheterization once, I would limit use of narcotics and medications to treat constipation. Last BM yesterday. Urology was consulted and signed off. I would do straight cath up to x2 for AUR with PVR> 500. Schedule stool softeners while on opioid pain medications and as needed to prevent constipation     Right hip fracture from mechanical fall  Osteoarthritis of bilateral hips status post bilateral total hip arthroplasties in July  -continue pt  -toe-touch weightbearing to the right lower extremity utilizing assistive device.  -aspirin 162 mg daily for DVT prevention  -continue PRN Tylenol 650 mg Q6H   -continue PRN oxycodone 2.6 mg Q4H though minimize use given propensity to cause urinary retention  -continue PRN flexeril 5 mg Q8H though minimize use given propensity to cause urinary retention  -continue pregabalin 225 mg daily      Essential hypertension   -not on chronic medications  -monitor    Dyslipidemia   -continue Zocor 20 mg nightly    Adenocarcinoma of the prostate status post prostatectomy  Urinary retention  -PVR elevated  -continue flomax  -monitor PVR and continue straight cath for now  -urology's help much appreciated     Normocytic anemia, appears chronic  Vitamin B12 deficiency   -continue vitamin B12 supplement  -monitor     Vitamin D deficiency   - continue vitamin D supplement       Diet: Regular Diet Adult    DVT Prophylaxis: aspirin as above  Manzo Catheter: Not  "present  Lines: None     Cardiac Monitoring: None  Code Status: Full Code      Clinically Significant Risk Factors                  # Hypertension: Noted on problem list          # Obesity: Estimated body mass index is 32.43 kg/m  as calculated from the following:    Height as of 9/6/23: 1.753 m (5' 9.02\").    Weight as of 9/6/23: 99.7 kg (219 lb 11.2 oz).  , PRESENT ON ADMISSION            Disposition Plan     Expected Discharge Date: 09/20/2023,  3:00 PM    Destination: other (comment) (Transitional care)  Discharge Comments: TCU          Porfirio Diaz MD  Hospitalist Service, GOLD TEAM 18  [facility]  Securely message with [email or web address] (more info)  Text page via [facility] Paging/Directory   See signed in provider for up to date coverage information  ______________________________________________________________________    Interval History   Patient reported no significant interval changes. Doing well at rest. With pain with weight bearing. No other concerns or new symptoms. No other concerns reported by RN. Case discussed during care rounds.    Physical Exam   Vital Signs: Temp: 98.1  F (36.7  C) Temp src: Oral BP: 105/62 Pulse: 66   Resp: 16 SpO2: 96 % O2 Device: None (Room air)    Weight: 0 lbs 0 oz    Gen:  no acute distress  Resp:  breathing normally on room air. No rales, wheezing, or stridor  Card:  normal rate, normal rhythm. No murmurs appreciated  Abd:  Soft, non-tender, non-distended, normoactive bowel sounds are present  Psych:  Alert    Medical Decision Making             Data   CBC RESULTS:   Recent Labs   Lab Test 09/19/23  0736   WBC 6.0   RBC 3.20*   HGB 8.9*   HCT 27.4*   MCV 86   MCH 27.8   MCHC 32.5   RDW 14.6        Last Comprehensive Metabolic Panel:  Lab Results   Component Value Date     (L) 09/19/2023    POTASSIUM 4.0 09/19/2023    CHLORIDE 103 09/19/2023    CO2 24 09/19/2023    ANIONGAP 8 09/19/2023     (H) 09/19/2023    BUN 31.7 " (H) 09/19/2023    CR 0.92 09/19/2023    GFRESTIMATED 86 09/19/2023    MAX 8.7 (L) 09/19/2023

## 2023-09-20 NOTE — PLAN OF CARE
Goal Outcome Evaluation:  Pt is A/O x4 denies CP,SOB, N/V. CMS intact. Denies pain. Pt. had  bladder scan 580 mL  and then  straight cath 700 mL .No acute changes in this shift. Continue plan of care.    Recent vital signs:  /62 (BP Location: Left arm)   Pulse 66   Temp 98.1  F (36.7  C) (Oral)   Resp 16   SpO2 96%

## 2023-09-20 NOTE — PROGRESS NOTES
A/Ox's 4. Pt denied pain. CMS intact. Tolerated regular diet. Denied any nausea, CP, SOB, lightheadedness or dizziness. Pt is voiding with PVR last at 390ml. Passing flatus. Bilateral heels elevated off bed. Resting in bed at this time with call light in reach. Able to make needs known.

## 2023-09-20 NOTE — DISCHARGE SUMMARY
Medicine Discharge Summary       Genaro Ortiz MRN# [id number]   YOB: 1945 Age: 78 year old     Date of Admission:  9/20/2023  Date of Discharge:  No discharge date for patient encounter.  Admitting Physician:  Reilly Angel MD  Discharge Physician:  Kasey Pacheco  Discharging Service:  Hospital Medicine     Primary Provider: Wegener, Joel Daniel Irwin              Discharge Diagnosis:     Right hip fracture from mechanical fall  Osteoarthritis of bilateral hips status post bilateral total hip arthroplasties  Acute urinary retention  Chronic anemia  Impaired mobility       Consultations:   Orthopedic surgery  Social work  Urology         Hospital Course       78 year old male with previous hip replacements and prostate cancer s/p prostatectomy presented with hip pain after tripping and falling. Patient was found to have a periprosthetic hip fracture. He was admitted to the hospital. Orthopedics was consulted and non-operative treatment was recommended.        Acute post operative urinary retention , required straight catheterization once, I would limit use of narcotics and medications to treat constipation. Last BM yesterday. Urology was consulted and signed off. I would do straight cath up to x2 for AUR with PVR> 500. Schedule stool softeners while on opioid pain medications and as needed to prevent constipation      Right hip fracture from mechanical fall  Osteoarthritis of bilateral hips status post bilateral total hip arthroplasties in July  -continue pt  -toe-touch weightbearing to the right lower extremity utilizing assistive device.  -aspirin 162 mg daily for DVT prevention  -continue PRN Tylenol 650 mg Q6H   -continue PRN oxycodone 2.6 mg Q4H though minimize use given propensity to cause urinary retention  -continue PRN flexeril 5 mg Q8H though minimize use given propensity to cause urinary retention  -continue pregabalin 225 mg daily      Essential hypertension   -not on chronic  "medications  -monitor     Dyslipidemia   -continue Zocor 20 mg nightly     Adenocarcinoma of the prostate status post prostatectomy  Urinary retention  -PVR elevated  -continue flomax  -monitor PVR and continue straight cath for now  -urology's help much appreciated     Normocytic anemia, appears chronic  Vitamin B12 deficiency   -continue vitamin B12 supplement  -monitor     Vitamin D deficiency   - continue vitamin D supplement             On Exam ;   Alert and oriented . No acute distress  Vital signs:                         Estimated body mass index is 32.43 kg/m  as calculated from the following:    Height as of 9/6/23: 1.753 m (5' 9.02\").    Weight as of 9/6/23: 99.7 kg (219 lb 11.2 oz).    Gen:  no acute distress  Resp:  breathing normally on room air. No rales, wheezing, or stridor  Card:  normal rate, normal rhythm. No murmurs appreciated  Abd:  Soft, non-tender, non-distended, normoactive bowel sounds are present  Psych:  Alert     ROUTINE IP LABS (Last four results)  BMP  Recent Labs   Lab 09/19/23  0736 09/18/23  0857 09/16/23  1639   * 138 138   POTASSIUM 4.0 3.9 4.0   CHLORIDE 103 103 103   MAX 8.7* 9.5 9.4   CO2 24 23 23   BUN 31.7* 26.7* 25.6*   CR 0.92 0.90 0.89   * 137* 113*     CBC  Recent Labs   Lab 09/19/23  0736 09/18/23  0857 09/16/23  1639   WBC 6.0 7.1 9.4   RBC 3.20* 3.94* 3.87*   HGB 8.9* 11.1* 10.7*   HCT 27.4* 33.5* 33.3*   MCV 86 85 86   MCH 27.8 28.2 27.6   MCHC 32.5 33.1 32.1   RDW 14.6 14.6 14.6    222 211     INRNo lab results found in last 7 days.                 Discharge Medications:     Current Discharge Medication List        CONTINUE these medications which have NOT CHANGED    Details   aspirin 81 MG EC tablet Take 81 mg by mouth daily      cholecalciferol (VITAMIN D) 1000 UNIT tablet Take 1 tablet by mouth daily.  Qty: 100 tablet, Refills: 12    Associated Diagnoses: Routine general medical examination at a health care facility      Cyanocobalamin (B-12) " 1000 MCG TBCR Take 1 tablet by mouth daily      fish oil-omega-3 fatty acids 1000 MG capsule Take 2,000 mg by mouth daily  Qty: 120 capsule, Refills: 11    Associated Diagnoses: Hyperlipidemia LDL goal <130      oxyCODONE (ROXICODONE) 5 MG tablet Take 1-2 tablets (5-10 mg) by mouth every 4 hours as needed for moderate to severe pain  Qty: 26 tablet, Refills: 0    Associated Diagnoses: Status post total replacement of right hip      pregabalin (LYRICA) 225 MG capsule Take 225 mg by mouth daily      !! senna-docusate (SENOKOT-S/PERICOLACE) 8.6-50 MG tablet Take 1 tablet by mouth 2 times daily for 30 days  Qty: 60 tablet, Refills: 0    Associated Diagnoses: Drug-induced constipation      !! senna-docusate (SENOKOT-S/PERICOLACE) 8.6-50 MG tablet Take 1 tablet by mouth daily as needed for constipation      simvastatin (ZOCOR) 20 MG tablet TAKE 1 TABLET BY MOUTH EVERYDAY AT BEDTIME  Qty: 90 tablet, Refills: 3    Comments: Profile Rx: patient will contact pharmacy when needed  Associated Diagnoses: Hyperlipidemia LDL goal <130      tamsulosin (FLOMAX) 0.4 MG capsule Take 1 capsule (0.4 mg) by mouth daily for 90 days  Qty: 90 capsule, Refills: 0    Associated Diagnoses: Urinary retention      polyethylene glycol (MIRALAX) 17 g packet Take 1 packet by mouth as needed for constipation       !! - Potential duplicate medications found. Please discuss with provider.               Discharge Instructions and Follow-Up:   No discharge procedures on file.   No discharge procedures on file.             Discharge Disposition:   35 minutes spent in discharge, including >50% in counseling and coordination of care, medication review and plan of care recommended on follow up. Questions were answered to satisfaction       Porfirio Daiz MD  Internal Medicine Hospitalist & Staff Physician  [facility]

## 2023-09-20 NOTE — PLAN OF CARE
Physical Therapy Discharge Summary    Reason for therapy discharge:    Discharged to acute rehabilitation facility.    Progress towards therapy goal(s). See goals on Care Plan in [facility] electronic health record for goal details.  Goals partially met.  Barriers to achieving goals:   discharge from facility.    Therapy recommendation(s):    Continued therapy is recommended.  Rationale/Recommendations:  ARU to improve functional mobility and independence.

## 2023-09-20 NOTE — PHARMACY-ADMISSION MEDICATION HISTORY
Please see Admission Medication History completed on 9/16/23 by the medication scribe under [place] [facility] [place] information regarding prior to admission medications.    Soha Garcia, PharmD, BCPS

## 2023-09-21 ENCOUNTER — APPOINTMENT (OUTPATIENT)
Dept: OCCUPATIONAL THERAPY | Facility: CLINIC | Age: 78
DRG: 561 | End: 2023-09-21
Attending: PHYSICAL MEDICINE & REHABILITATION
Payer: COMMERCIAL

## 2023-09-21 ENCOUNTER — APPOINTMENT (OUTPATIENT)
Dept: PHYSICAL THERAPY | Facility: CLINIC | Age: 78
DRG: 561 | End: 2023-09-21
Attending: PHYSICAL MEDICINE & REHABILITATION
Payer: COMMERCIAL

## 2023-09-21 LAB
ANION GAP SERPL CALCULATED.3IONS-SCNC: 10 MMOL/L (ref 7–15)
BUN SERPL-MCNC: 24.9 MG/DL (ref 8–23)
CALCIUM SERPL-MCNC: 8.9 MG/DL (ref 8.8–10.2)
CHLORIDE SERPL-SCNC: 106 MMOL/L (ref 98–107)
CREAT SERPL-MCNC: 0.79 MG/DL (ref 0.67–1.17)
DEPRECATED HCO3 PLAS-SCNC: 21 MMOL/L (ref 22–29)
EGFRCR SERPLBLD CKD-EPI 2021: >90 ML/MIN/1.73M2
ERYTHROCYTE [DISTWIDTH] IN BLOOD BY AUTOMATED COUNT: 14.8 % (ref 10–15)
GLUCOSE SERPL-MCNC: 107 MG/DL (ref 70–99)
HCT VFR BLD AUTO: 26.8 % (ref 40–53)
HGB BLD-MCNC: 8.6 G/DL (ref 13.3–17.7)
MCH RBC QN AUTO: 27.9 PG (ref 26.5–33)
MCHC RBC AUTO-ENTMCNC: 32.1 G/DL (ref 31.5–36.5)
MCV RBC AUTO: 87 FL (ref 78–100)
PLATELET # BLD AUTO: 201 10E3/UL (ref 150–450)
POTASSIUM SERPL-SCNC: 4 MMOL/L (ref 3.4–5.3)
RBC # BLD AUTO: 3.08 10E6/UL (ref 4.4–5.9)
SODIUM SERPL-SCNC: 137 MMOL/L (ref 136–145)
WBC # BLD AUTO: 6 10E3/UL (ref 4–11)

## 2023-09-21 PROCEDURE — 85027 COMPLETE CBC AUTOMATED: CPT | Performed by: PHYSICIAN ASSISTANT

## 2023-09-21 PROCEDURE — 250N000013 HC RX MED GY IP 250 OP 250 PS 637: Performed by: PHYSICIAN ASSISTANT

## 2023-09-21 PROCEDURE — 97162 PT EVAL MOD COMPLEX 30 MIN: CPT | Mod: GP

## 2023-09-21 PROCEDURE — 80048 BASIC METABOLIC PNL TOTAL CA: CPT | Performed by: PHYSICIAN ASSISTANT

## 2023-09-21 PROCEDURE — 97535 SELF CARE MNGMENT TRAINING: CPT | Mod: GO

## 2023-09-21 PROCEDURE — 36415 COLL VENOUS BLD VENIPUNCTURE: CPT | Performed by: PHYSICIAN ASSISTANT

## 2023-09-21 PROCEDURE — 97166 OT EVAL MOD COMPLEX 45 MIN: CPT | Mod: GO

## 2023-09-21 PROCEDURE — 128N000003 HC R&B REHAB

## 2023-09-21 PROCEDURE — 97530 THERAPEUTIC ACTIVITIES: CPT | Mod: GP

## 2023-09-21 PROCEDURE — 99232 SBSQ HOSP IP/OBS MODERATE 35: CPT | Mod: FS | Performed by: PHYSICIAN ASSISTANT

## 2023-09-21 RX ADMIN — Medication 1000 UNITS: at 07:52

## 2023-09-21 RX ADMIN — SIMVASTATIN 20 MG: 10 TABLET, FILM COATED ORAL at 20:56

## 2023-09-21 RX ADMIN — SENNOSIDES AND DOCUSATE SODIUM 1 TABLET: 50; 8.6 TABLET ORAL at 07:58

## 2023-09-21 RX ADMIN — ASPIRIN 162 MG: 81 TABLET, COATED ORAL at 07:52

## 2023-09-21 RX ADMIN — CYANOCOBALAMIN TAB 1000 MCG 1000 MCG: 1000 TAB at 07:52

## 2023-09-21 RX ADMIN — POLYETHYLENE GLYCOL 3350 17 G: 17 POWDER, FOR SOLUTION ORAL at 07:58

## 2023-09-21 RX ADMIN — PREGABALIN 225 MG: 75 CAPSULE ORAL at 20:56

## 2023-09-21 RX ADMIN — TAMSULOSIN HYDROCHLORIDE 0.4 MG: 0.4 CAPSULE ORAL at 07:52

## 2023-09-21 ASSESSMENT — ACTIVITIES OF DAILY LIVING (ADL)
ADLS_ACUITY_SCORE: 29
ADLS_ACUITY_SCORE: 29
ADLS_ACUITY_SCORE: 27
IADLS,_PREVIOUS_FUNCTIONAL_LEVEL: INDEPENDENT
IADLS,_PREVIOUS_FUNCTIONAL_LEVEL: INDEPENDENT
BADLS,_PREVIOUS_FUNCTIONAL_LEVEL: INDEPENDENT
ADLS_ACUITY_SCORE: 27
PREVIOUS_RESPONSIBILITIES: MEAL PREP;HOUSEKEEPING;LAUNDRY;SHOPPING;MEDICATION MANAGEMENT;FINANCES;DRIVING
ADLS_ACUITY_SCORE: 27
ADLS_ACUITY_SCORE: 27
ADLS_ACUITY_SCORE: 29
ADLS_ACUITY_SCORE: 29
ADLS_ACUITY_SCORE: 27
ADLS_ACUITY_SCORE: 27
BADLS,_PREVIOUS_FUNCTIONAL_LEVEL: INDEPENDENT
ADLS_ACUITY_SCORE: 29
ADLS_ACUITY_SCORE: 27

## 2023-09-21 NOTE — PROGRESS NOTES
"   09/21/23 1100   Appointment Info   Signing Clinician's Name / Credentials (PT) Eileen Veronica DPT      Language English   Living Environment   People in Home alone   Current Living Arrangements condominium   Home Accessibility no concerns   Transportation Anticipated agency   Living Environment Comments Elevator no stairs elevated toilet seat tub/shower combination with hand held shower and extended tub bench flat bed b hip replacement R july 14 and L 21 of July 2023 went to home set for 20 days.   Self-Care   Usual Activity Tolerance good   Current Activity Tolerance fair   Equipment Currently Used at Home walker, standard  (has 4 ww also)   Fall history within last six months yes   Number of times patient has fallen within last six months 1   Activity/Exercise/Self-Care Comment Mod I at baseline with ADLs. Pt had HHOT/PT/RN before hospitalization. 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 Pt has Meals of Wheels.   Post-Acute Assessment Only   Post-Acute Functional Assessment See below   Previous Level of Function/Home Environm   Bathing/Grooming, Premorbid Functional Level   (had hha after b hip surgery)   Dressing, Premorbid Functional Level uses device or equipment;independent   Eating/Feeding, Premorbid Functional Level independent   Toileting, Premorbid Functional Level independent   BADLs, Premorbid Functional Level independent   IADLs, Premorbid Functional Level independent   Bed Mobility, Premorbid Functional Level independent   Transfers, Premorbid Functional Level independent   Household Ambulation, Premorbid Functional Level independent   Stairs, Premorbid Functional Level not applicable (see comment)   Community Ambulation, Premorbid Functional Level uses device or equipment   Functional Cognition, Premorbid Functional Level wfl   General Information   Onset of Illness/Injury or Date of Surgery 09/16/23   Referring Physician Dr. Reilly Angel   Patient/Family Therapy Goals Statement (PT) \"To " "go home\"   Pertinent History of Current Problem (include personal factors and/or comorbidities that impact the POC) Per chart: Genaro Ortiz is a 78 year old man with history of prostate cancer, esophageal reflux, HLD, HTN, pre-diabetes, MGUS, Obesity,  and osteoarthritis s/p total hip arthroplasty right 7/14/23 and left 7/21/23 discharged 7/25/23, who was admitted 9/16/23 in setting of fall with right hip fracture.  Seen by orthopedics recommendations for toe touch weight bearing and non operative management with outpatient orthopedics follow up and asa 162 for dvt prevention. Seen by urology in setting of urinary retention currently undergoing monitoring and intermittent straight cath.   Existing Precautions/Restrictions fall   Weight-Bearing Status - LUE full weight-bearing   Weight-Bearing Status - RUE full weight-bearing   Weight-Bearing Status - LLE full weight-bearing   Weight-Bearing Status - RLE toe touch weight-bearing   Heart Disease Risk Factors High blood pressure;Dislipidemia;Overweight;Stress;Family history;Medical history;Gender;Age;Lack of physical activity;Diabetes   General Observations Pt appears anxious about return to home, despite adequate setup and previous B JOHNNY success.   Cognition   Cognitive Status Comments Appears WNL   Pain Assessment   Patient Currently in Pain Yes, see Vital Sign flowsheet   Integumentary/Edema   Integumentary/Edema   (mild R thigh pain)   Integumentary/Edema Comments Mild edema in B lower legs and R thigh. Compression to R lower leg to prevent further distal migration of edema.   Posture    Posture Forward head position   Range of Motion (ROM)   ROM Comment Baseline lower leg postural deformity. El[place]-legged posture with WBing on lateral feet, severe L knee valgus, and excessive hip abduction.   Strength (Manual Muscle Testing)   Strength Comments Generally deconditioned 4/5 in BLE with exception of 2/5 R hip ext/flex/abd/add d/t pain   Balance   Balance Comments Good " body awareness, but relies heavily on external support when moving from sit <> stand.   Sensory Examination   Sensory Perception Comments Baseline neuropathy in BLE. Feet with absent protective sensation and vibration.   Coordination   Coordination Comments Slow moving, but likely d/t pain and fear of falling.   Muscle Tone   Muscle Tone no deficits were identified   Clinical Impression   Criteria for Skilled Therapeutic Intervention Yes, treatment indicated   PT Diagnosis (PT) impaired force production   Influenced by the following impairments see clinical impression comments   Functional limitations due to impairments see clinical impression comments   Clinical Presentation (PT Evaluation Complexity) Evolving/Changing   Clinical Presentation Rationale Moderately complex d/t acute on chronic deficits, lives alone   Clinical Decision Making (Complexity) moderate complexity   Planned Therapy Interventions (PT) balance training;bed mobility training;gait training;home exercise program;neuromuscular re-education;patient/family education;postural re-education;ROM (range of motion);stretching;strengthening;home program guidelines;risk factor education;progressive activity/exercise;wheelchair management/propulsion training;transfer training   Anticipated Equipment Needs at Discharge (PT) walker, rolling   Risk & Benefits of therapy have been explained evaluation/treatment results reviewed;care plan/treatment goals reviewed;risks/benefits reviewed;current/potential barriers reviewed;participants voiced agreement with care plan;participants included;patient   Clinical Impression Comments Pt is below baseline for functional mobility s/p fall after B JOHNNY in July of this year. He presents with R thigh pain, weakness, and swelling. Working on TTWB walker based goals. Pt is hesitant to return to home, but has adequate equipment and accessible apartment.   PT Total Evaluation Time   PT Olivia Moderate Complexity Minutes (40297) 45    Physical Therapy Goals   PT Frequency Daily   PT Predicted Duration/Target Date for Goal Attainment 09/29/23   PT Goals Bed Mobility;Gait;Transfers;PT Goal 1;PT Goal 2;PT Goal 3   PT: Bed Mobility Modified independent;Supine to/from sit;Rolling;Within precautions   PT: Transfers Modified independent;Sit to/from stand;Bed to/from chair;Within precautions;Assistive device  (R TTWB)   PT: Gait Modified independent;Greater than 200 feet;Goal Met   PT: Goal 1 MOD I car transfer with TTWBing on R side   PT: Goal 2 Pt will recite 3 fall prevention strategies for safety at home.   Interventions   Interventions Quick Adds Therapeutic Activity;Therapeutic Procedure;Physical Perf Test/Measures   Therapeutic Activity   Therapeutic Activities: dynamic activities to improve functional performance Minutes (69523) 15   Treatment Detail/Skilled Intervention PT: initial GG assessment performed - see below.   PT Discharge Planning   PT Plan PT: TUG, gait speed, amb circuits, car transfer   Total Session Time   Timed Code Treatment Minutes 15   Total Session Time (sum of timed and untimed services) 60   Post Acute Settings Only   What unit is patient on? Acute Rehab   PT - Acute Rehab Center Time   Individual Time (minutes) - enter zero if not applicable - PT 60   Group Time (minutes) - enter zero if not applicable  - PT 0   Concurrent Time (minutes) - enter zero if not applicable  - PT 0   Co-Treatment Time (minutes) - enter zero if not applicable  - PT 0   ARC Total Session Time (minutes) - PT 60   Toilet Transfer   Complete independence with getting on and off a toilet or commode no   Describe performance PT: MIN A with toillet transfer with commode overlay. Extensive time and coaching needed.   Physical assistance for getting on and off a toilet or commode Requires minimal assistance for getting on and off the toilet/commode, less than or equal to 25% assist provided by staff   Chair/bed-to-chair Transfer   Complete independence  for chair-bed-to-chair transfer no   Describe performance PT: MIN A from elevated surfaces. MAX A from standard height surfaces.   Physical assistance for getting from chair-bed-to-chair Requires maximal assistance- lifting AND lowering assist provided by staff for getting from chair-bed-to-chair, 51%-75% assist provided by staff   Roll Left and Right   Comment Declined today   Reason if not Attempted Refused to perform   Roll Left to Right CARE Score 7   Sit to Lying   Comment Declined today   Reason if not Attempted Refused to perform   Sit to Lying CARE Score 7   Lying to Sitting on Side of Bed   Comment Declined today   Reason if not Attempted Refused to perform   Lying to Sitting CARE Score 7   Sit to Stand   Physical Assistance Level 50%-74%   Comment PT: MAX A from standard height, MIN A from elevated surfaces   Sitting to Standing CARE Score 2   Locomotion   Locomotion Comment PT: MIN A in room with WW. Very wide GENNY, with unsteadiness and heavy reliance on walker. TTWBing on R side with cues. Antalgic step to pattern with reported stiffness in R thigh.   Walk 10 Feet   Physical Assistance Level Less than 25%   Walk 10 Ft. CARE Score 3   Walk 150 Feet   Reason if not Attempted Safety concerns   Walk 150 Ft. CARE Score 88   Walking 10 Feet on Uneven Surfaces   Reason if not Attempted Safety concerns   Walking 10 Feet on Uneven Surfaces CARE Score 88   Stairs   Stairs Comment PT: not needed for home - declined to perform   1 Step (Curb)   Reason if not Attempted Refused to perform   1 Step CARE Score 7   4 Steps   Reason if not Attempted Refused to perform   4 Steps CARE Score 7   12 Steps   Reason if not Attempted Refused to perform   12 Steps CARE Score 7   Picking Up Object   Reason if not Attempted Refused to perform    CARE Score 7

## 2023-09-21 NOTE — PROGRESS NOTES
09/21/23 2236   Appointment Info   Signing Clinician's Name / Credentials (OT) Zita Martinez OTR-L   Living Environment   People in Home alone   Current Living Arrangements condominium   Home Accessibility no concerns   Transportation Anticipated agency   Living Environment Comments elevator no stairs elevated toilet seat tub/shower combination with hand held shower and extended tub bench flat bed b hip replacement Rjuly 14 and L 21 of July 2023 went to home set for 20 days   Self-Care   Usual Activity Tolerance good   Current Activity Tolerance fair   Regular Exercise Yes   Activity/Exercise Type strength training  (with PT hep fronm OT for arms does 3x week)   Equipment Currently Used at Home walker, standard  (has 4 ww also)   Fall history within last six months yes   Number of times patient has fallen within last six months 1   Activity/Exercise/Self-Care Comment Mod I at baseline with ADLs. Pt had HHOT/PT/RN before hospitalization. 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 Pt has Meals of Wheels.   Instrumental Activities of Daily Living (IADL)   Previous Responsibilities meal prep;housekeeping;laundry;shopping;medication management;finances;driving  (gets mow, and delivery of grocerys, house keeper, laundry down the cannon, not driving since B hip surgery)   Post-Acute Assessment Only   Post-Acute Functional Assessment See below   Previous Level of Function/Home Environm   Bathing/Grooming, Premorbid Functional Level   (had hha after b hip surgery)   Dressing, Premorbid Functional Level uses device or equipment;independent   Eating/Feeding, Premorbid Functional Level independent   Toileting, Premorbid Functional Level independent   BADLs, Premorbid Functional Level independent   IADLs, Premorbid Functional Level independent   Bed Mobility, Premorbid Functional Level independent   Transfers, Premorbid Functional Level independent   Household Ambulation, Premorbid Functional Level independent   Stairs, Premorbid  Functional Level not applicable (see comment)   Community Ambulation, Premorbid Functional Level uses device or equipment   Functional Cognition, Premorbid Functional Level wfl   General Information   Onset of Illness/Injury or Date of Surgery 09/16/23   Referring Physician Stanley Grover   Patient/Family Therapy Goal Statement (OT) to return home   Additional Occupational Profile Info/Pertinent History of Current Problem per MD report 78 year old male with previous hip replacements and prostate cancer s/p prostatectomy presented with hip pain after tripping and falling. Patient was found to have a periprosthetic hip fracture. He was admitted to the hospital. Orthopedics was consulted and non-operative treatment was recommended.   Existing Precautions/Restrictions fall;weight bearing   Left Upper Extremity (Weight-bearing Status) full weight-bearing (FWB)   Right Upper Extremity (Weight-bearing Status) full weight-bearing (FWB)   Left Lower Extremity (Weight-bearing Status) full weight-bearing (FWB)   Right Lower Extremity (Weight-bearing Status) touch down weight-bearing (TDWB)   Cognitive Status Examination   Orientation Status orientation to person, place and time   Visual Perception   Visual Impairment/Limitations corrective lenses full-time   Visual Field Deficit   (starting of macular generation seeing eye dr regulary)   Sensory   Sensory Quick Adds sensation intact  (u/b)   Posture   Posture forward head position;protracted shoulders   Range of Motion Comprehensive   General Range of Motion no range of motion deficits identified   Strength Comprehensive (MMT)   General Manual Muscle Testing (MMT) Assessment no strength deficits identified   Coordination   Upper Extremity Coordination No deficits were identified   Clinical Impression   Criteria for Skilled Therapeutic Interventions Met (OT) Yes, treatment indicated   OT Diagnosis decrease adls and Iadls   OT Problem List-Impairments impacting ADL  problems related to;activity tolerance impaired;other (see comments);pain;range of motion (ROM);balance;motor control;mobility   Assessment of Occupational Performance 3-5 Performance Deficits   Identified Performance Deficits bathing dressing toileting meal prep home management   Planned Therapy Interventions (OT) ADL retraining;IADL retraining;balance training;bed mobility training;motor coordination training;strengthening;transfer training;home program guidelines;progressive activity/exercise;risk factor education   Clinical Decision Making Complexity (OT) moderate complexity   Clinical Impression Comments pt78 year old male with previous  b hip replacements  in July 2023 presented with hip pain after tripping and falling. Patient was found to have a periprosthetic hip fracture. He was admitted to the hospital. Orthopedics was consulted and non-operative treatment was recommended. Pt had hha and home ot/PT nursing PTA for hip replacements  pt ttwb r l/e and will benefit from ot per ot goals   OT Total Evaluation Time   OT Eval, Moderate Complexity Minutes (08153) 15   OT Goals   Therapy Frequency (OT) Daily   OT Predicted Duration/Target Date for Goal Attainment 09/29/23   OT Goals Hygiene/Grooming;Upper Body Dressing;Lower Body Dressing;Upper Body Bathing;Lower Body Bathing;Bed Mobility;Transfers;Toilet Transfer/Toileting;Meal Preparation;Home Management   OT: Hygiene/Grooming modified independent   OT: Upper Body Dressing Modified independent   OT: Lower Body Dressing Modified independent   OT: Upper Body Bathing Supervision/stand-by assist   OT: Lower Body Bathing Supervision/stand-by assist;Modified independent   OT: Bed Mobility Modified independent   OT: Transfer Supervision/stand-by assist  (shower)   OT: Toilet Transfer/Toileting Modified independent   OT: Meal Preparation Modified independent   OT: Home Management Modified independent   Self-Care/Home Management   Self-Care/Home Mgmt/ADL, Compensatory,  Meal Prep Minutes (74617) 46   Treatment Detail/Skilled Intervention bed mobility and dressing   OT Discharge Planning   OT Plan ot shower w/c to extended tub bench   Post Acute Settings Only   What unit is patient on? Acute Rehab   OT - Acute Rehab Center Time   Individual Time (minutes) - enter zero if not applicable - OT 60   Group Time (minutes) - enter zero if not applicable  - OT 0   Concurrent Time (minutes) - enter zero if not applicable  - OT 0   Co-Treatment Time (minutes) - enter zero if not applicable  - OT 0   ARC Total Session Time (minutes) - OT 60   Upper Body Dressing   Describe performance ot pt able to dress in shirt after clothes retrievel   Lower Body Dressing (Pants/Undergarments)   Describe performance mod a needed r leg started into short able to get L leg and standing with min a to adjust pants over hips at fww   Lower Body Dressing putting on/taking off footwear   Describe performance ot dep   Lying to Sitting on Side of Bed   Comment ot mod a   Sit to Stand   Comment ot max a from standard height, min a from elevtated

## 2023-09-21 NOTE — PROGRESS NOTES
Attempted to see pt. Pt with multiple visitors. Will follow-up tomorrow, Friday 09/22.     PHI Pfeiffer   [facility]   Direct Phone: [phone]  I   Pager: [phone]  I  Fax: [phone]

## 2023-09-21 NOTE — PLAN OF CARE
Discharge Planner Post-Acute Rehab OT:     Discharge Plan: home  9-29  with home ot and HHAt  Precautions: twb R L/E    Current Status:  ADLs:  Mobility: supine to eob mod a, sit to stand max from standard height   Grooming: sba sitting  Dressing:U/Bpt able to dress in shirt after clothes retrievel   L/B dressing:mod a needed r leg started into short able to get L leg and standing with min a to adjust pants over hips at fww   Feet socks ot dep , ot doff shoes with dressing stick min a to get heel of shoe out from behind heel of foot min a with lh shoe horn, educated in no hands step in shoes   Bathing: tba  Toileting: min a with cues to maintain tt wb in R l/e with commode over toilet   IADLs: ongoing  Vision/Cognition: wfl    Assessment: pt78 year old male with previous b hip replacements in July 2023 presented with hip pain after tripping and falling. Patient was found to have a periprosthetic hip fracture. He was admitted to the hospital. Orthopedics was consulted and non-operative treatment was recommended. Pt had hha and home ot/PT nursing PTA for hip replacements pt ttwb r l/e and will benefit from ot per ot goals     Other Barriers to Discharge (DME, Family Training, etc): as needed         Home set up :elevator no stairs elevated toilet seat tub/shower combination with hand held shower and extended tub bench flat bed b hip replacement R july 14 and L 21 of July 2023 went to home set for 20 days was receiving hha for shower and home PT and OT had mow

## 2023-09-21 NOTE — CONSULTS
Social Work: Initial Assessment with Discharge Plan    Patient Name: Genaro Ortiz  : 1945  Age: 78 year old  MRN: [id number]  Completed assessment with: Chart review and interview with patient   Admitted to ARU: 2023    Presenting Information   Date of SW assessment: 2023  Health Care Directive: Copy in Chart  Primary Health Care Agent: Patient/self   Secondary Health Care Agent: Ac & Fred (friends)   Living Situation: Lives alone in an apt. [address]. Elevator accessible. Elevated toilet seat, tub/shower combination with hand held shower, and extended tub bench.  Previous Functional Status: Mod I at baseline with ADLs. Pt had HHOT/PT/RN before hospitalization. 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 Pt has Meals of Wheels. B hip replacement 2023. Patient has license, but has not driven for several months due to his his injury. Patient currently utilizes assistance from a friend for rides, or ride share services (Lyft, public transportation). Recently applied for [employer].   DME available: raised toilet seat, tub bench, walker, rolling (4WW), reacher, sock aid, long handle shoe horn, hand held shower head  Patient and family understanding of hospitalization: Appropriate and pleasant. Appears anxious.   Cultural/Language/Spiritual Considerations: 79 y/o male, english-speaking, , and practicing Adventist.     Physical Health  Reason for admission: Orthopaedic Disorders  Unilateral Hip Fracture: comminuted periprosthetic fracture, around right greater trochanter      Justification for Acute Inpatient Rehabilitation  Genaro Ortiz is a 78 year old male w/ PMH significant for recent bilateral total hip arthroplasties in 2023, adenocarcinoma of prostate s/p prostatectomy, HLD, and HTN, who presented hospital after a mechanical fall. Pt found to have comminuted periprosthetic fracture, around right greater trochanter.  Ortho was consulted and non-operative mgmt was  advised w/ TTWB to RLE. He has required medical mgmt of his pain, bowels, and urinary retention requiring urology consult. He is now medically stable and ready to discharge to acute inpatient rehab.      Normally [place] is modified independent w/ use of 4ww for ambulation. He currently requires requires Ax1-2 for all mobility.  The patient requires an intensive inpatient rehab program to address the following acute impairments: weakness, impaired balance, impaired weight shifting in setting of new RLE TTWB, pain, impulsivity, impaired safety awareness, and fatigue. These deficits are impacting his functional independence w/ transfers, gait, and ADLs.  He  requires transfer to [facility] for intensive therapies not available in a lesser level of care including PT/OT, ongoing medical management at least 3 days per week, and rehabilitative nursing care to maximize his functional recovery in order to return home.     Provider Information   Primary Care Physician:Wegener, Joel Daniel Irwin   [facility] will schedule PCP apt at discharge.   : Ava Tian RN, BSN, PHN  Primary Care / Care Coordinator   E-mail: [email or web address]   PH: [phone]    Mental Health/Chemical Dependency:   Diagnosis: None reported   Alcohol/Tobacco/Narcotis: None reported   Support/Services in Place: None reported   Services Needed/Recommended: [place] [facility] support while on ARU available.   Sexuality/Intimacy: Not discussed     Support System  Marital Status: Single.   Family support: None reported   Other support available: Support from friends (some live in his building) and Christianity community.     Community Resources  Current in home services:  PTA. [facility] Liaison emailed 09/21/23.   Previous services: [facility] July 2023.     Financial/Employment/Education  Employment Status: Retired   Income Source: SSI  Education: Not discussed   Financial Concerns:  None reported   Insurance: UCARE/UCARE  "MEDICARE     Discharge Plan   Patient and family discharge goal: TBD, pending progress  Provided Education on discharge plan: Evaluations and discharge recommendations pending.   Patient agreeable to discharge plan:  Pending further discussion. Evaluations and discharge recommendations pending.   Provided education and attained signature for Medicare IM and IRF Patient Rights and Privacy Information provided to patient : YES  Provided patient with [facility] Resources: N/A  Barriers to discharge: None identified.     Discharge Recommendations   Disposition: See above   Transportation Needs: Patient, family/friends, paid transport, insurance transport (if applicable)     Additional comments   Discharge ARIN BERGERON 10-14 days. SW will remain available and continue to follow as needs arise.     -------------------------------------------------------------------------------------------------------------  ZAHRA Pain Assessment    Pain Effect on Sleep  Over the past 5 days, how much of the time has pain made it hard for you to sleep at night?\"    1. Rarely or not at all    Pain Interference with Therapy Activities  \"Over the past 5 days, how often have you limited your participation in rehabilitation therapy sessions due to pain?\"  1. Rarely or not at all    Pain Interference with Day-to-Day Activities  \"Over the past 5 days, how often have you limited your day-to-day activities (excluding rehabilitation therapy sessions) because of pain?\"  1. Rarely or not at all  -------------------------------------------------------------------------------------------------------------    Krystyna Miller [facility], Acute Inpatient Rehab Unit   [address] [place] [address], [address]th [place] [address]  Phone: [phone], Fax: [phone], Pager: [phone]             "

## 2023-09-21 NOTE — PROGRESS NOTES
"  [facility]   Acute Rehabilitation Unit  Daily progress note    INTERVAL HISTORY  Genaro Ortiz was seen and examined at bedside. Sitting up in chair, pain tolerable, therapy this am went well.  Denies headache, dizziness, sob and fever.  Asks if we have COVID and flu vaccines available, will confirm and give if available for inpatient injection       Functionally, undergoing therapy evaluations today        MEDICATIONS   aspirin  162 mg Oral Daily    cyanocobalamin  1,000 mcg Oral Daily    pregabalin  225 mg Oral QPM    simvastatin  20 mg Oral At Bedtime    tamsulosin  0.4 mg Oral Daily    vitamin D3  1,000 Units Oral Daily        acetaminophen, naloxone **OR** naloxone **OR** naloxone **OR** naloxone, oxyCODONE, polyethylene glycol, senna-docusate     PHYSICAL EXAM  /61 (BP Location: Right arm)   Pulse 84   Temp 97.6  F (36.4  C) (Oral)   Resp 17   Ht 1.753 m (5' 9\")   Wt 100 kg (220 lb 7.4 oz)   SpO2 97%   BMI 32.56 kg/m    Gen: awake alert nad  HEENT: mmm   Cardio: rrr  Pulm: non labored clear  Abd: soft non distended non tender  Ext: warm dry with no significant edema  Neuro/MSK: alert speech clear    LABS  CBC RESULTS:   Recent Labs   Lab Test 09/21/23  0612 09/19/23  0736 09/18/23  0857   WBC 6.0 6.0 7.1   RBC 3.08* 3.20* 3.94*   HGB 8.6* 8.9* 11.1*   HCT 26.8* 27.4* 33.5*   MCV 87 86 85   MCH 27.9 27.8 28.2   MCHC 32.1 32.5 33.1   RDW 14.8 14.6 14.6    186 222     Last Basic Metabolic Panel:  Recent Labs   Lab Test 09/21/23  0612 09/19/23  0736 09/18/23  0857    135* 138   POTASSIUM 4.0 4.0 3.9   CHLORIDE 106 103 103   CO2 21* 24 23   ANIONGAP 10 8 12   * 109* 137*   BUN 24.9* 31.7* 26.7*   CR 0.79 0.92 0.90   GFRESTIMATED >90 86 89   MAX 8.9 8.7* 9.5         Rehabilitation - continue comprehensive acute inpatient rehabilitation program with multidisciplinary approach including therapies, rehab nursing, and physiatry following. See " interval history for updates.      ASSESSMENT AND PLAN    Genaro Ortiz is a 78 year old man with past medical history of OA s/p bilateral hip  arthroplasties 7/2023, HTN, prostate cancer, HLE, and anemia who presented with right hip fracture in setting of mechanical fall 9/16/23 seen by ortho managed non-operatively. Course complicated by urinary retention.  Admitted to rehab 9/20/23.     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   -straight cath prn  -monitor pvrs  -continue flomax  -follow up urology     Neuropathic pain  Seen by neurology 6/7/23  -continue lyrica     Vitamin D Deficiency  -continue vitamin D supplement    HTN  Not on medications at time of rehab admission  -monitor    Adjustment to disability:    FEN: reg  Bowel: monitor  Bladder: some retention monitor  DVT Prophylaxis: asa per ortho  GI Prophylaxis:na   Code: full   Disposition: goal for home   ELOS: 9/29  Follow up Appointments on Discharge:  Ortho, pcp, urology        Emma Gutierrez PA-C  Physical Medicine & Rehabilitation

## 2023-09-21 NOTE — PLAN OF CARE
Goal Outcome Evaluation:    Plan of Care Reviewed With: patient    Overall Patient Progress: no change  Outcome Evaluation: no change in pt progress this shift    VSS, AOX4, Ax1; walker, continent x2; urinal at bedisde, bladder retention, last bm yesterday. Regular diet.     Incision site; WNL     Pt denies any pain, sob, chest pain. Pt states he wants to try and void after his company leaves the room. States he's worried about continuously being straight cathed, as it hurts.       No acute changes during this shift, call light within reach, continue with plan of care.

## 2023-09-21 NOTE — PLAN OF CARE
Discharge Planner Post-Acute Rehab PT:     Discharge Plan: Home alone on 9/29, with HCPT    Precautions: B JOHNNY in July 2023, Falls, RLE TTWBing (periprosthetic femur fracture)    Current Status:  Bed Mobility: Declined to perform  Transfer: MIN A with WW  Gait: MIN A with WW, TTWB R  Stairs: None at baseline  Balance: Heavy reliance on walker for stability    Assessment: Pt is below baseline for functional mobility s/p fall after B JOHNNY in July of this year. He presents with R thigh pain, weakness, and swelling. Working on TTWB walker based goals. Pt is hesitant to return to home, but has adequate equipment and accessible apartment.     Other Barriers to Discharge (DME, Family Training, etc): Lives alone  Fatigue

## 2023-09-21 NOTE — PLAN OF CARE
"Goal Outcome Evaluation:    Overall Patient Progress: no change    Outcome Evaluation: No change in pt progress this shift    Pt is A/O x4. VSS. Denies pain, SOB, chest pain, numbness/tingling, or nausea/vomiting. Continent of B/B, LBM 9/19. PVR after voiding was 177 mL. Encouraged pt to void 2 hours later, pt refused. Also refused random bladder scan. Reported he feels \"depressed\" about ongoing urinary retention. Transfers A2 walker, non-weight bearing on RLE. Pt appeared asleep most of night during safety rounds. Safety checks complete, call light in reach, bed alarm on. Continue plan of care.  "

## 2023-09-21 NOTE — CONSULTS
SPIRITUAL HEALTH SERVICES Consult Note  [facility] ([facility]) ARU    Saw pt Genaro Ortiz per request for emotional support.    Patient/Family Understanding of Illness and Goals of Care - Not discussed.    Distress and Loss - Genaro's home Pentecostal (more info in strength/coping/resources) is a significant source of meaning and social connection for him. He shared feelings of sadness regarding missing fellowship events and language classes that he typically attends.    Strengths, Coping, and Resources  - Primary focus of visit today was appreciation for sources of support, including neighbors at Genaro's condo, and spiritual and cultural community found at his home Pentec[employer]al, [place], a [place]-language [employer]-day Pent[employer] in [place].    Meaning, Beliefs, and Spirituality - Community and connection is very important to Genaro; he spoke appreciatively of several members of his [employer] reaching out to him to see how he is doing. He was also looking forward to a visit from his , Sonny Higginbotham, who joined us a few minutes into this visit.    Plan of Care - Genaro was appreciative of visit, and care from other chaplains during previous hospital and rehab stays. He welcomes follow up; I will advise unit  Wilfred.    FLYNN Harding.   Associate   Pager: [phone]    * [facility] remains available 24/7 for emergent requests/referrals, either by having the switchboard page the on-call  or by entering an ASAP/STAT consult in Epic (this will also page the on-call ). Routine Epic consults receive an initial response within 24 hours.*

## 2023-09-21 NOTE — PLAN OF CARE
Goal Outcome Evaluation:       Patient alert and oriented X 4 able to verbalize his needs uses call light appropriately.On regular diet , patient denies pain.Assist of 1 with walker R hip injury  using R leg toes without putting weight on the right leg he ambulated with the writer very well and sat on the commode ,no bowel movement today but urinated:PVR was 158mL.Patient ate 100% of his dinner ,he is pleasant. Call light within reach alarms on for safety.

## 2023-09-22 ENCOUNTER — APPOINTMENT (OUTPATIENT)
Dept: PHYSICAL THERAPY | Facility: CLINIC | Age: 78
DRG: 561 | End: 2023-09-22
Attending: PHYSICAL MEDICINE & REHABILITATION
Payer: COMMERCIAL

## 2023-09-22 ENCOUNTER — APPOINTMENT (OUTPATIENT)
Dept: OCCUPATIONAL THERAPY | Facility: CLINIC | Age: 78
DRG: 561 | End: 2023-09-22
Attending: PHYSICAL MEDICINE & REHABILITATION
Payer: COMMERCIAL

## 2023-09-22 PROCEDURE — 250N000013 HC RX MED GY IP 250 OP 250 PS 637: Performed by: PHYSICIAN ASSISTANT

## 2023-09-22 PROCEDURE — 97530 THERAPEUTIC ACTIVITIES: CPT | Mod: GP | Performed by: REHABILITATION PRACTITIONER

## 2023-09-22 PROCEDURE — 97116 GAIT TRAINING THERAPY: CPT | Mod: GP | Performed by: REHABILITATION PRACTITIONER

## 2023-09-22 PROCEDURE — 99231 SBSQ HOSP IP/OBS SF/LOW 25: CPT | Mod: FS | Performed by: PHYSICIAN ASSISTANT

## 2023-09-22 PROCEDURE — 97530 THERAPEUTIC ACTIVITIES: CPT | Mod: GP | Performed by: PHYSICAL THERAPIST

## 2023-09-22 PROCEDURE — 97750 PHYSICAL PERFORMANCE TEST: CPT | Mod: GP | Performed by: PHYSICAL THERAPIST

## 2023-09-22 PROCEDURE — 97110 THERAPEUTIC EXERCISES: CPT | Mod: GP | Performed by: PHYSICAL THERAPIST

## 2023-09-22 PROCEDURE — 97110 THERAPEUTIC EXERCISES: CPT | Mod: GP | Performed by: REHABILITATION PRACTITIONER

## 2023-09-22 PROCEDURE — 97116 GAIT TRAINING THERAPY: CPT | Mod: GP | Performed by: PHYSICAL THERAPIST

## 2023-09-22 PROCEDURE — 97535 SELF CARE MNGMENT TRAINING: CPT | Mod: GO | Performed by: OCCUPATIONAL THERAPIST

## 2023-09-22 PROCEDURE — 128N000003 HC R&B REHAB

## 2023-09-22 RX ADMIN — ASPIRIN 162 MG: 81 TABLET, COATED ORAL at 08:27

## 2023-09-22 RX ADMIN — POLYETHYLENE GLYCOL 3350 17 G: 17 POWDER, FOR SOLUTION ORAL at 08:42

## 2023-09-22 RX ADMIN — CYANOCOBALAMIN TAB 1000 MCG 1000 MCG: 1000 TAB at 08:27

## 2023-09-22 RX ADMIN — SIMVASTATIN 20 MG: 10 TABLET, FILM COATED ORAL at 20:17

## 2023-09-22 RX ADMIN — PREGABALIN 225 MG: 75 CAPSULE ORAL at 20:17

## 2023-09-22 RX ADMIN — Medication 1000 UNITS: at 08:27

## 2023-09-22 RX ADMIN — TAMSULOSIN HYDROCHLORIDE 0.4 MG: 0.4 CAPSULE ORAL at 08:27

## 2023-09-22 ASSESSMENT — ACTIVITIES OF DAILY LIVING (ADL)
ADLS_ACUITY_SCORE: 29

## 2023-09-22 NOTE — PLAN OF CARE
FOCUS/GOAL  Bowel management, Bladder management, Pain management, Mobility, Skin integrity, and Safety management      Plan of Care Reviewed With: patient    Overall Patient Progress: no changeOverall Patient Progress: no change     ASSESSMENT, INTERVENTIONS AND CONTINUING PLAN FOR GOAL:   Patient is alert and oriented x 4. Denied pain, headache, dizziness,  or SOB. A-1 walker Continent of B/B last BM is 09/21. PVR 7 and 56. Uses urinal independently. VS WDL no care concern at this time. Call light is in reach alarm is on.     Goal Outcome Evaluation:

## 2023-09-22 NOTE — DISCHARGE INSTRUCTIONS
Follow up primary care follow up hospitalization                                                                                                                                                         You are scheduled to see Maricruz Hemphill PA-C on October 13 2023 at 1:45 pm.    Address  [address] [address]    Phone   [phone]                                                                                                                                                                                                                                                                                                                                                                                                                                                                                                                                                                                                                                Follow up Ortho right hip fracture   You are scheduled to see Merritt Toney PA-C on October 9 2023 at 10:45 am.    Address  [address] [place] [address]  Phone   [phone]                                                                                                                                                                                                                                                                                                                                                                                                                                                                                                                                           Follow up Urology  If patient continues to have significant urinary voiding symptoms, he can set up appointment with Delfino Gerber PA-C  as-needed.     [address] [place] [address]  [address]    Phone: [phone]      Home Health Care:   [facility]   PH: [phone], Fax: [phone]   Nurse, physical therapy, occupational therapy, and home health aide     Primary Care Care Coordinator:  Ava Tian RN, BSN, PHN  PH: [phone]  E-mail: [email or web address]

## 2023-09-22 NOTE — PROGRESS NOTES
Assessment completed today. [facility] PTA. Referral sent to [facility] to resume and agreeable. Information added to AVS, along with contact information for PCP CC. Will update HC if needs or date changes. Will continue to follow.     Home Health Care:   [facility]   PH: [phone], Fax: [phone]   Nurse, physical therapy, occupational therapy, and home health aide     Primary Care Care Coordinator:  Ava Tian RN, BSN, PHN  PH: [phone]  E-mail: [email or web address]     PHI Pfeiffer   [facility]   Direct Phone: [phone]  I   Pager: [phone]  I  Fax: [phone]

## 2023-09-22 NOTE — PLAN OF CARE
Discharge Planner Post-Acute Rehab OT:      Discharge Plan: Home w/ HH likely and HH therapy   Precautions: TT WB RLE; Fall Risk      Current Status:  ADLs:  Mobility: supine to eob mod a, sit to stand max from standard height   Grooming: sba sitting  Dressing:U/Bpt able to dress in shirt after clothes retrievel   L/B dressing:mod a needed r leg started into short able to get L leg and standing with min a to adjust pants over hips at fww   Feet socks ot dep , ot doff shoes with dressing stick min a to get heel of shoe out from behind heel of foot min a with  shoe horn, educated in no hands step in shoes   Bathing: Mod A to wash body on ETB and to get back side; min A to stand from ETB and W/c  Toileting: min a with cues to maintain tt wb in R l/e with commode over toilet   IADLs: ongoing  Vision/Cognition: wfl     Assessment: Pt completing gg this date for showering. Pt needing mod A to wash body, and min A for sit<>stands from ETB and w/c.     Other Barriers to Discharge (DME, Family Training, etc): as needed  Family Training: TBD  Continued HH services  Lives alone

## 2023-09-22 NOTE — PHARMACY-MEDICATION REGIMEN REVIEW
Pharmacy Medication Regimen Review  Genaro Ortiz is a 78 year old male who is currently in the Acute Rehab Unit.    Assessment: All medications have an appropriate indications, durations and no unnecessary use was found.    Per medication history done 9/16/23 patient reported he stopped taking his tamsulosin, now restarted as patient had urinary retention. He also reported taking aspirin 81 mg daily, now increased to 162 mg daily for DVT prophylaxis.    Plan:   Continue current medication regimen.    Attending provider will be sent this note for review.  If there are any emergent issues noted above, pharmacist will contact provider directly by phone.      Pharmacy will periodically review the resident's medication regimen for any PRN medications not administered in > 72 hours and discontinue them. The pharmacist will discuss gradual dose reductions of psychopharmacologic medications with interdisciplinary team on a regular basis.    Please contact pharmacy if the above does not answer specific medication questions/concerns.    Background:  A pharmacist has reviewed all medications and pertinent medical history today.  Medications were reviewed for appropriate use and any irregularities found are listed with recommendations.    Poonam Calderon, ShilpiD, BCPS    Current Facility-Administered Medications:     acetaminophen (TYLENOL) tablet 650 mg, 650 mg, Oral, Q6H PRN, Emma Gutierrez PA    aspirin EC tablet 162 mg, 162 mg, Oral, Daily, Emma Gutierrez PA, 162 mg at 09/22/23 0827    cyanocobalamin (VITAMIN B-12) tablet 1,000 mcg, 1,000 mcg, Oral, Daily, Emma Gutierrez PA, 1,000 mcg at 09/22/23 0827    naloxone (NARCAN) injection 0.2 mg, 0.2 mg, Intravenous, Q2 Min PRN **OR** naloxone (NARCAN) injection 0.4 mg, 0.4 mg, Intravenous, Q2 Min PRN **OR** naloxone (NARCAN) injection 0.2 mg, 0.2 mg, Intramuscular, Q2 Min PRN **OR** naloxone (NARCAN) injection 0.4 mg, 0.4 mg, Intramuscular, Q2 Min PRN, Stanley  MD Reilly    oxyCODONE IR (ROXICODONE) half-tab 2.5 mg, 2.5 mg, Oral, Q6H PRN, Emma Gutierrez PA    polyethylene glycol (MIRALAX) Packet 17 g, 17 g, Oral, Daily PRN, Emma Gutierrez PA, 17 g at 09/22/23 0842    pregabalin (LYRICA) capsule 225 mg, 225 mg, Oral, QPM, Emma Gutierrez PA, 225 mg at 09/21/23 2056    senna-docusate (SENOKOT-S/PERICOLACE) 8.6-50 MG per tablet 1 tablet, 1 tablet, Oral, Daily PRN, Emma Gutierrez PA, 1 tablet at 09/21/23 0758    simvastatin (ZOCOR) tablet 20 mg, 20 mg, Oral, At Bedtime, Emma Gutierrez PA, 20 mg at 09/21/23 2056    tamsulosin (FLOMAX) capsule 0.4 mg, 0.4 mg, Oral, Daily, Emma Gutierrez PA, 0.4 mg at 09/22/23 0827    Vitamin D3 (CHOLECALCIFEROL) tablet 1,000 Units, 1,000 Units, Oral, Daily, Emma Gutierrez PA, 1,000 Units at 09/22/23 0827

## 2023-09-22 NOTE — PLAN OF CARE
Goal Outcome Evaluation:       Patient alert and oriented X 4. Able to make his needs known.Regular diet thin liquids.Patient denied pain uses urinal at bed patient had a bowel movement today  no concerns will continue with the current care pan.

## 2023-09-22 NOTE — PLAN OF CARE
Timed Up and Go (TUG): TUG is a test of basic mobility skills. It is used to screen individuals prone to falls.  Gait assistive device used: FWW     Patient score 128 seconds  ?13.5 seconds indicate at risk for falls in older adults according to Jace et al 2000.  ?30 seconds - indicates dependency in most ADL and mobility skills according to Poslee & Bill 1991  >11.5 seconds indicate at risk for falls in adults with Parkinson's Disease    Minimal Detectable Change for patients with Alzheimer s = 4.09 sec   Minimal Detectable Change for patients with Parkinson s Disease = 3.5 sec   according to Pepito & Bautista Carey 2011    Normative Data from [employer] [place], Helene D, Charley M, Debbie E, Genesis. 2017  Age                 Average Time (in seconds)  20-39                     5.9-7.4         40-59                     6.3-7.8   60-69                     7.1-9.0  70-79                     8.2-10.2  80-99                    10.0-12.7            Assessment (rationale for performing, application to patient s function & care plan):  Use of TUG to assess mobility at initiation of rehab stay.  T significantly limited with mobility needed extensive time to complete both STS.  Pt has slow junaid with R TTWB and very slow turn.  Mild instability noted throughout, but no LOB.   (Minutes billed as physical performance test): 10

## 2023-09-22 NOTE — PLAN OF CARE
FOCUS/GOAL  Medical management    ASSESSMENT, INTERVENTIONS AND CONTINUING PLAN FOR GOAL:  Pt is alert and oriented. No complaints of pain. Assist of 1 with walker per report, not oob this shift. PVRs not needed as 2 are below 100. Appeared to be sleeping on rounds.

## 2023-09-22 NOTE — PROGRESS NOTES
"  [facility]   Acute Rehabilitation Unit  Daily progress note    INTERVAL HISTORY  Genaro Ortiz was seen sitting up in bed, therapy going well.  Denies n/v/, sob, fevers and dizziness.  Expressing frustration with fall and injury.  Denies other questions or concerns today.        OT:   Assessment: Pt completing gg this date for showering. Pt needing mod A to wash body, and min A for sit<>stands from ETB and w/c.       MEDICATIONS   aspirin  162 mg Oral Daily    cyanocobalamin  1,000 mcg Oral Daily    pregabalin  225 mg Oral QPM    simvastatin  20 mg Oral At Bedtime    tamsulosin  0.4 mg Oral Daily    vitamin D3  1,000 Units Oral Daily        acetaminophen, naloxone **OR** naloxone **OR** naloxone **OR** naloxone, oxyCODONE, polyethylene glycol, senna-docusate     PHYSICAL EXAM  /58 (BP Location: Right arm)   Pulse 76   Temp 98.2  F (36.8  C) (Oral)   Resp 16   Ht 1.753 m (5' 9\")   Wt 100 kg (220 lb 7.4 oz)   SpO2 94%   BMI 32.56 kg/m    Gen: awake alert nad  HEENT: mmm   Cardio: rrr  Pulm: non labored clear  Abd: soft non distended non tender  Ext: warm dry with no significant edema  Neuro/MSK: alert speech clear    LABS  CBC RESULTS:   Recent Labs   Lab Test 09/21/23  0612 09/19/23  0736 09/18/23  0857   WBC 6.0 6.0 7.1   RBC 3.08* 3.20* 3.94*   HGB 8.6* 8.9* 11.1*   HCT 26.8* 27.4* 33.5*   MCV 87 86 85   MCH 27.9 27.8 28.2   MCHC 32.1 32.5 33.1   RDW 14.8 14.6 14.6    186 222       Last Basic Metabolic Panel:  Recent Labs   Lab Test 09/21/23  0612 09/19/23  0736 09/18/23  0857    135* 138   POTASSIUM 4.0 4.0 3.9   CHLORIDE 106 103 103   CO2 21* 24 23   ANIONGAP 10 8 12   * 109* 137*   BUN 24.9* 31.7* 26.7*   CR 0.79 0.92 0.90   GFRESTIMATED >90 86 89   MAX 8.9 8.7* 9.5           Rehabilitation - continue comprehensive acute inpatient rehabilitation program with multidisciplinary approach including therapies, rehab nursing, and physiatry " following. See interval history for updates.      ASSESSMENT AND PLAN    Genaro Ortiz is a 78 year old man with past medical history of OA s/p bilateral hip  arthroplasties 7/2023, HTN, prostate cancer, HLE, and anemia who presented with right hip fracture in setting of mechanical fall 9/16/23 seen by ortho managed non-operatively. Course complicated by urinary retention.  Admitted to rehab 9/20/23.     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   -straight cath prn  -monitor pvrs  -continue flomax  -follow up urology     Neuropathic pain  Seen by neurology 6/7/23  -continue lyrica     Vitamin D Deficiency  -continue vitamin D supplement    HTN  Not on medications at time of rehab admission  -monitor    Adjustment to disability:    FEN: reg  Bowel: monitor  Bladder: some retention monitor  DVT Prophylaxis: asa per ortho  GI Prophylaxis:na   Code: full   Disposition: goal for home   ELOS: 9/29  Follow up Appointments on Discharge:  Ortho, pcp, urology        Emma Gutierrez PA-C  Physical Medicine & Rehabilitation

## 2023-09-22 NOTE — CONSULTS
"SPIRITUAL HEALTH SERVICES Consult Note  [facility] ([facility]) Acute Rehab    Saw pt Genaro Ortiz per routine consult order request.    Patient/Family Understanding of Illness and Goals of Care - pt shared regarding his hip replacement surgeries and his recent fall and subsequent injury. Pt said this is his first time on Acute Rehab; he is hopeful he will gain back the strength to be able to \"be safe at home\". Pt is he grateful he \"will have home healthcare nursing support when I leave the hospital.\"     Distress and Loss - Not Discussed    Strengths, Coping, and Resources  - friends and [employer] ([employer] - the [place] in [facility]) are very supportive. Pt's  \"came yesterday and brought me Holy Communion\".  Pt also talked about the many opportunities for fellowship \"mostly with other senior citizens\" he has at [employer].    Meaning, Beliefs, and Spirituality - Pt's [employer] lupe very important to him - he welcomed prayer today. Pt also talked about \"the many relatives I have who are pastors\".     Plan of Care - continue to follow, will visit again next week.     James Jang) Archie Hidalgo M.Div., [facility]  Staff   Pager [phone]    * [facility] remains available 24/7 for emergent requests/referrals, either by having the switchboard page the on-call  or by entering an ASAP/STAT consult in Epic (this will also page the on-call ). Routine Epic consults receive an initial response within 24 hours.*    "

## 2023-09-23 ENCOUNTER — APPOINTMENT (OUTPATIENT)
Dept: PHYSICAL THERAPY | Facility: CLINIC | Age: 78
DRG: 561 | End: 2023-09-23
Attending: PHYSICAL MEDICINE & REHABILITATION
Payer: COMMERCIAL

## 2023-09-23 ENCOUNTER — APPOINTMENT (OUTPATIENT)
Dept: OCCUPATIONAL THERAPY | Facility: CLINIC | Age: 78
DRG: 561 | End: 2023-09-23
Attending: PHYSICAL MEDICINE & REHABILITATION
Payer: COMMERCIAL

## 2023-09-23 PROCEDURE — 250N000013 HC RX MED GY IP 250 OP 250 PS 637: Performed by: PHYSICIAN ASSISTANT

## 2023-09-23 PROCEDURE — 128N000003 HC R&B REHAB

## 2023-09-23 PROCEDURE — 97110 THERAPEUTIC EXERCISES: CPT | Mod: GP

## 2023-09-23 PROCEDURE — 97530 THERAPEUTIC ACTIVITIES: CPT | Mod: GP

## 2023-09-23 PROCEDURE — 97535 SELF CARE MNGMENT TRAINING: CPT | Mod: GO | Performed by: STUDENT IN AN ORGANIZED HEALTH CARE EDUCATION/TRAINING PROGRAM

## 2023-09-23 PROCEDURE — 99231 SBSQ HOSP IP/OBS SF/LOW 25: CPT | Performed by: PHYSICAL MEDICINE & REHABILITATION

## 2023-09-23 RX ADMIN — POLYETHYLENE GLYCOL 3350 17 G: 17 POWDER, FOR SOLUTION ORAL at 09:02

## 2023-09-23 RX ADMIN — Medication 1000 UNITS: at 08:55

## 2023-09-23 RX ADMIN — CYANOCOBALAMIN TAB 1000 MCG 1000 MCG: 1000 TAB at 08:55

## 2023-09-23 RX ADMIN — SIMVASTATIN 20 MG: 10 TABLET, FILM COATED ORAL at 20:23

## 2023-09-23 RX ADMIN — PREGABALIN 225 MG: 75 CAPSULE ORAL at 20:22

## 2023-09-23 RX ADMIN — TAMSULOSIN HYDROCHLORIDE 0.4 MG: 0.4 CAPSULE ORAL at 08:55

## 2023-09-23 RX ADMIN — ASPIRIN 162 MG: 81 TABLET, COATED ORAL at 08:55

## 2023-09-23 ASSESSMENT — ACTIVITIES OF DAILY LIVING (ADL)
ADLS_ACUITY_SCORE: 29

## 2023-09-23 NOTE — PLAN OF CARE
"Discharge Planner Post-Acute Rehab PT:     Discharge Plan: Home alone on 9/29, with HCPT    Precautions: B JOHNNY in July 2023, Falls, RLE TTWBing (periprosthetic femur fracture)    Current Status:  Bed Mobility: Declined to perform  Transfer: MIN A with WW  Gait: MIN A with WW, TTWB R  Stairs: None at baseline  Balance: Heavy reliance on walker for stability    Assessment: AM session focused on sit<>stand transfers from 24>22\" surfaces with cues to shift weight over to L LE, hand positioning and reminders for TTWB on R LE.  PM session focused on initiating NuStep and continued progress with ambulation tolerance.  Noted improvement with sit<>stand transfers in PM session (carryover from AM).      Other Barriers to Discharge (DME, Family Training, etc): Lives alone - ? Mixed mobility for community accessibility and fatigue with household ADLs  Fatigue   "

## 2023-09-23 NOTE — PLAN OF CARE
Individualized Overall Plan Of Care (IOPOC)      Rehab diagnosis/Impairment Group Code: Orthopaedic disorders 08.11 unilateral hip fracture: comminuted periprosthetic fracture, around right greater trochanter  S/p right hip fracture       Expected functional outcome:  Pt will achieve a level of mod IND for all bed mobility, transfers, short distance gait, and ADLs. It is anticipated he will need manual w/c for community based mobility.     Clinical Impression Comments: 78 year old male w/ PMH significant for recent bilateral total hip arthroplasties in July 2023, adenocarcinoma of prostate s/p prostatectomy, HLD, and HTN, who presented hospital after a mechanical fall. Pt found to have comminuted periprosthetic fracture, around right greater trochanter.  Ortho was consulted and non-operative mgmt was advised w/ TTWB to RLE. He has required medical mgmt of his pain, bowels, and urinary retention requiring urology consult.     Mobility: Pt is below baseline for functional mobility s/p fall after B JOHNNY in July of this year. He presents with R thigh pain, weakness, and swelling. Working on TTWB walker based goals. Pt is hesitant to return to home, but has adequate equipment and accessible apartment.    ADL: pt78 year old male with previous  b hip replacements  in July 2023 presented with hip pain after tripping and falling. Patient was found to have a periprosthetic hip fracture. He was admitted to the hospital. Orthopedics was consulted and non-operative treatment was recommended. Pt had hha and home ot/PT nursing PTA for hip replacements  pt ttwb r l/e and will benefit from ot per ot goals    Communication/Cognition/Swallow:       Intensity of therapy:   PT 90 minutes, Daily, for 14 days  OT 90 minutes, Daily, for 14 days    Orthotics  None  Education  bowel program, bladder program, vitals, medication education, carryover of new rehab techniques, care coordination, pain management, and provide safe environment for  patient at falls risk.  Neuropsychology Testing: No  Other:  None      Medical Prognosis: Fair      Physician summary statement: In addition to above statements address, Patient requires intensive active and ongoing therapeutic intervention and multiple therapies; Patient requires medical supervision; Expected to actively participate in the intensive rehab program; Sufficiently stable to actively participate; Expectation for measurable improvement in functional capacity or adaption to impairments.      Discharge destination: prior home  Discharge rehabilitation needs: home care      Estimated length of stay: 14 days      Rehabilitation Physician Reilly Angel MD

## 2023-09-23 NOTE — PLAN OF CARE
Goal Outcome Evaluation:      Plan of Care Reviewed With: patient    Overall Patient Progress: improvingOverall Patient Progress: improving  Pt alert and oriented x4, able to make needs known and utilizes the call lights effectively. Call light within reach and bed alarm on. A x 1 for transfers with walker and gait belt. On regular diet /thin liquid. Denies pain/SOB

## 2023-09-23 NOTE — PLAN OF CARE
Goal Outcome Evaluation:      Orientation: Aox4  Bowel: Continent   Bladder: Continent, uses urinal   Ambulation/Transfers: Ax1 walker, R toe touch  Diet/ Liquids: Regular   Tubes/ Lines/ Drains: none  Tube Feeding: n/a  Oxygen: RA  Skin: Visible skin intact

## 2023-09-23 NOTE — PLAN OF CARE
Discharge Planner Post-Acute Rehab OT:      Discharge Plan: Home w/ HH likely and HH therapy   Precautions: TT WB RLE; Fall Risk      Current Status:  ADLs:  Mobility: supine to eob mod a, sit to stand max from standard height   Grooming: sba sitting  Dressing:U/Bpt able to dress in shirt after clothes retrievel   L/B dressing:mod a needed r leg started into short able to get L leg and standing with min a to adjust pants over hips at fww   Feet socks ot dep , ot doff shoes with dressing stick min a to get heel of shoe out from behind heel of foot min a with  shoe horn, educated in no hands step in shoes   Bathing: Mod A to wash body on ETB and to get back side; min A to stand from ETB and W/c  Toileting: min a with cues to maintain tt wb in R l/e with commode over toilet   IADLs: ongoing  Vision/Cognition: wfl     Assessment: Working on functional mobility using walker. Pt anxious, recommend he get a walker tray. Simulated multiple kitchen tasks with walker. CGA with occasional assist for sit to stand.     Other Barriers to Discharge (DME, Family Training, etc): as needed  Family Training: TBD  Continued HH services  Lives alone

## 2023-09-23 NOTE — PROGRESS NOTES
"  [facility]   Acute Rehabilitation Unit  Daily progress note    INTERVAL HISTORY  Genaro Ortiz was seen sitting up in bed, therapy going well.  Denies n/v/, sob, fevers and dizziness.  Denies other questions or concerns today.        OT:   Bed Mobility: Declined to perform  Transfer: MIN A with WW  Gait: MIN A with WW, TTWB R  Stairs: None at baseline  Balance: Heavy reliance on walker for stability     Assessment: AM session focused on sit<>stand transfers from 24>22\" surfaces with cues to shift weight over to L LE, hand positioning and reminders for TTWB on R LE.  PM session focused on initiating NuStep and continued progress with ambulation tolerance.  Noted improvement with sit<>stand transfers in PM session (carryover from AM).         MEDICATIONS   aspirin  162 mg Oral Daily    cyanocobalamin  1,000 mcg Oral Daily    pregabalin  225 mg Oral QPM    simvastatin  20 mg Oral At Bedtime    tamsulosin  0.4 mg Oral Daily    vitamin D3  1,000 Units Oral Daily        acetaminophen, naloxone **OR** naloxone **OR** naloxone **OR** naloxone, oxyCODONE, polyethylene glycol, senna-docusate     PHYSICAL EXAM  /63 (BP Location: Right arm)   Pulse 73   Temp 97.6  F (36.4  C) (Oral)   Resp 16   Ht 1.753 m (5' 9\")   Wt 100 kg (220 lb 7.4 oz)   SpO2 98%   BMI 32.56 kg/m    Gen: awake alert nad  HEENT: mmm   Cardio: rrr  Pulm: non labored clear  Abd: soft non distended non tender  Ext: warm dry with no significant edema  Neuro/MSK: alert speech clear    LABS  CBC RESULTS:   Recent Labs   Lab Test 09/21/23 0612 09/19/23  0736 09/18/23  0857   WBC 6.0 6.0 7.1   RBC 3.08* 3.20* 3.94*   HGB 8.6* 8.9* 11.1*   HCT 26.8* 27.4* 33.5*   MCV 87 86 85   MCH 27.9 27.8 28.2   MCHC 32.1 32.5 33.1   RDW 14.8 14.6 14.6    186 222       Last Basic Metabolic Panel:  Recent Labs   Lab Test 09/21/23 0612 09/19/23  0736 09/18/23  0857    135* 138   POTASSIUM 4.0 4.0 3.9   CHLORIDE 106 " 103 103   CO2 21* 24 23   ANIONGAP 10 8 12   * 109* 137*   BUN 24.9* 31.7* 26.7*   CR 0.79 0.92 0.90   GFRESTIMATED >90 86 89   MAX 8.9 8.7* 9.5           Rehabilitation - continue comprehensive acute inpatient rehabilitation program with multidisciplinary approach including therapies, rehab nursing, and physiatry following. See interval history for updates.      ASSESSMENT AND PLAN    Genaro Ortiz is a 78 year old man with past medical history of OA s/p bilateral hip  arthroplasties 7/2023, HTN, prostate cancer, HLE, and anemia who presented with right hip fracture in setting of mechanical fall 9/16/23 seen by ortho managed non-operatively. Course complicated by urinary retention.  Admitted to rehab 9/20/23.     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   -straight cath prn  -monitor pvrs  -continue flomax  -follow up urology     Neuropathic pain  Seen by neurology 6/7/23  -continue lyrica     Vitamin D Deficiency  -continue vitamin D supplement    HTN  Not on medications at time of rehab admission  -monitor    Adjustment to disability:    FEN: reg  Bowel: monitor  Bladder: some retention monitor  DVT Prophylaxis: asa per ortho  GI Prophylaxis:na   Code: full   Disposition: goal for home   ELOS: 9/29  Follow up Appointments on Discharge:  Ortho, pcp, urology    Doing well. Discussed with team. Continue cares and plans outlined.    Reilly Angel MD

## 2023-09-24 ENCOUNTER — APPOINTMENT (OUTPATIENT)
Dept: OCCUPATIONAL THERAPY | Facility: CLINIC | Age: 78
DRG: 561 | End: 2023-09-24
Attending: PHYSICAL MEDICINE & REHABILITATION
Payer: COMMERCIAL

## 2023-09-24 ENCOUNTER — APPOINTMENT (OUTPATIENT)
Dept: PHYSICAL THERAPY | Facility: CLINIC | Age: 78
DRG: 561 | End: 2023-09-24
Attending: PHYSICAL MEDICINE & REHABILITATION
Payer: COMMERCIAL

## 2023-09-24 PROCEDURE — 97110 THERAPEUTIC EXERCISES: CPT | Mod: GO

## 2023-09-24 PROCEDURE — 128N000003 HC R&B REHAB

## 2023-09-24 PROCEDURE — 250N000013 HC RX MED GY IP 250 OP 250 PS 637: Performed by: PHYSICIAN ASSISTANT

## 2023-09-24 PROCEDURE — 97530 THERAPEUTIC ACTIVITIES: CPT | Mod: GO

## 2023-09-24 PROCEDURE — 97530 THERAPEUTIC ACTIVITIES: CPT | Mod: GP

## 2023-09-24 PROCEDURE — 99231 SBSQ HOSP IP/OBS SF/LOW 25: CPT | Performed by: PHYSICAL MEDICINE & REHABILITATION

## 2023-09-24 RX ADMIN — TAMSULOSIN HYDROCHLORIDE 0.4 MG: 0.4 CAPSULE ORAL at 07:39

## 2023-09-24 RX ADMIN — POLYETHYLENE GLYCOL 3350 17 G: 17 POWDER, FOR SOLUTION ORAL at 07:46

## 2023-09-24 RX ADMIN — SIMVASTATIN 20 MG: 10 TABLET, FILM COATED ORAL at 20:19

## 2023-09-24 RX ADMIN — ASPIRIN 162 MG: 81 TABLET, COATED ORAL at 07:38

## 2023-09-24 RX ADMIN — PREGABALIN 225 MG: 75 CAPSULE ORAL at 20:19

## 2023-09-24 RX ADMIN — Medication 1000 UNITS: at 07:38

## 2023-09-24 RX ADMIN — CYANOCOBALAMIN TAB 1000 MCG 1000 MCG: 1000 TAB at 07:38

## 2023-09-24 ASSESSMENT — ACTIVITIES OF DAILY LIVING (ADL)
ADLS_ACUITY_SCORE: 29

## 2023-09-24 NOTE — PLAN OF CARE
Discharge Planner Post-Acute Rehab OT:      Discharge Plan: Home w/ HH likely and HH therapy   Precautions: TT WB RLE; Fall Risk      Current Status:  ADLs:  Mobility: supine to eob mod a, sit to stand max from standard height   Grooming: sba sitting  Dressing:U/Bpt able to dress in shirt after clothes retrievel   L/B dressing:mod a needed r leg started into short able to get L leg and standing with min a to adjust pants over hips at fww   Feet socks ot dep , ot doff shoes with dressing stick min a to get heel of shoe out from behind heel of foot min a with  shoe horn, educated in no hands step in shoes   Bathing: Mod A to wash body on ETB and to get back side; min A to stand from ETB and W/c  Toileting: min a with cues to maintain tt wb in R l/e with commode over toilet   IADLs: ongoing  Vision/Cognition: wfl     Assessment: Focus of session on UB strengthening and endurance training. Patient also participated in mobility and transfer tasks during ADLS. Good motivation and participation throughout.      Other Barriers to Discharge (DME, Family Training, etc): as needed  Family Training: TBD  Continued HH services  Lives alone

## 2023-09-24 NOTE — PLAN OF CARE
"Discharge Planner Post-Acute Rehab PT:     Discharge Plan: Home alone on 9/29, with HCPT    Precautions: B JOHNNY in July 2023, Falls, RLE TTWBing (periprosthetic femur fracture). Size E spandigrip B LE's for edema management.     Current Status:  Bed Mobility: Declined to perform  Transfer: MIN A with WW  Gait: pt unable to maintain TTWB during longer distances. PT to work on shorter distances with within precautions.  Stairs: None at baseline  Balance: Heavy reliance on walker for stability    Assessment: Continued with ambulation tolerance and mass practice of sit<>stand transfers.  Able to comfortably complete transfers at 21.5\" with good technique and adherence to TTWB on R LE.  Discussed talking to his friends about rides home as he will need to have a higher SUV if possible.  Pt still very apprehensive about going home.    Other Barriers to Discharge (DME, Family Training, etc): Lives alone - ? Mixed mobility for community accessibility and fatigue with household ADLs  Fatigue   "

## 2023-09-24 NOTE — PROGRESS NOTES
"  [facility]   Acute Rehabilitation Unit  Daily progress note    INTERVAL HISTORY  Genaro Ortiz was seen sitting up in bed, therapy going well.  Denies n/v/, sob, fevers and dizziness.  Denies other questions or concerns today.     Funcyionally:  Bed Mobility: Declined to perform  Transfer: MIN A with WW  Gait: MIN A with WW, TTWB R  Stairs: None at baseline  Balance: Heavy reliance on walker for stability     Assessment: Continued with ambulation tolerance and mass practice of sit<>stand transfers.  Able to comfortably complete transfers at 21.5\" with good technique and adherence to TTWB on R LE.  Discussed talking to his friends about rides home as he will need to have a higher SUV if possible.  Pt still very apprehensive about going home.       MEDICATIONS   aspirin  162 mg Oral Daily    cyanocobalamin  1,000 mcg Oral Daily    pregabalin  225 mg Oral QPM    simvastatin  20 mg Oral At Bedtime    tamsulosin  0.4 mg Oral Daily    vitamin D3  1,000 Units Oral Daily        acetaminophen, naloxone **OR** naloxone **OR** naloxone **OR** naloxone, oxyCODONE, polyethylene glycol, senna-docusate     PHYSICAL EXAM  /62 (BP Location: Right arm)   Pulse 75   Temp 97.8  F (36.6  C) (Oral)   Resp 18   Ht 1.753 m (5' 9\")   Wt 100 kg (220 lb 7.4 oz)   SpO2 95%   BMI 32.56 kg/m    Gen: awake alert nad  HEENT: mmm   Cardio: rrr  Pulm: non labored clear  Abd: soft non distended non tender  Ext: warm dry with no significant edema  Neuro/MSK: alert speech clear    LABS  CBC RESULTS:   Recent Labs   Lab Test 09/21/23  0612 09/19/23  0736 09/18/23  0857   WBC 6.0 6.0 7.1   RBC 3.08* 3.20* 3.94*   HGB 8.6* 8.9* 11.1*   HCT 26.8* 27.4* 33.5*   MCV 87 86 85   MCH 27.9 27.8 28.2   MCHC 32.1 32.5 33.1   RDW 14.8 14.6 14.6    186 222       Last Basic Metabolic Panel:  Recent Labs   Lab Test 09/21/23  0612 09/19/23  0736 09/18/23  0857    135* 138   POTASSIUM 4.0 4.0 3.9 "   CHLORIDE 106 103 103   CO2 21* 24 23   ANIONGAP 10 8 12   * 109* 137*   BUN 24.9* 31.7* 26.7*   CR 0.79 0.92 0.90   GFRESTIMATED >90 86 89   MAX 8.9 8.7* 9.5           Rehabilitation - continue comprehensive acute inpatient rehabilitation program with multidisciplinary approach including therapies, rehab nursing, and physiatry following. See interval history for updates.      ASSESSMENT AND PLAN    Genaro Ortiz is a 78 year old man with past medical history of OA s/p bilateral hip  arthroplasties 7/2023, HTN, prostate cancer, HLE, and anemia who presented with right hip fracture in setting of mechanical fall 9/16/23 seen by ortho managed non-operatively. Course complicated by urinary retention.  Admitted to rehab 9/20/23.     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   -straight cath prn  -monitor pvrs  -continue flomax  -follow up urology     Neuropathic pain  Seen by neurology 6/7/23  -continue lyrica     Vitamin D Deficiency  -continue vitamin D supplement    HTN  Not on medications at time of rehab admission  -monitor    Adjustment to disability:    FEN: reg  Bowel: monitor  Bladder: some retention monitor  DVT Prophylaxis: asa per ortho  GI Prophylaxis:na   Code: full   Disposition: goal for home   ELOS: 9/29  Follow up Appointments on Discharge:  Ortho, pcp, urology    Doing well. Discussed with team. Continue cares and plans outlined.    Reilly Angel MD

## 2023-09-24 NOTE — PLAN OF CARE
Goal Outcome Evaluation:      Plan of Care Reviewed With: patient    No significant changes this shift. A/O x 4, afebrile, denied SOB, no complaint of pain. Left and right hip incision healed ( with scar), EMETERIO. Participates with therapy, will continue POC.        Patient's most recent vital signs are:     Vital signs:  BP: 113/62  Temp: 97.8  HR: 75  RR: 18  SpO2: 95 %     Patient does not have new respiratory symptoms.  Patient does not have new sore throat.  Patient does not have a fever greater than 99.5.

## 2023-09-24 NOTE — PLAN OF CARE
Goal Outcome Evaluation:       Patient alert and oriented X 4. Able to make his needs known.Regular diet thin liquids.Patient denied pain uses urinal at bed patient had a bowel movement today.Patient denied pain ,had large BM 9/23/23. No concerns will continue with the current care plan.

## 2023-09-25 ENCOUNTER — APPOINTMENT (OUTPATIENT)
Dept: PHYSICAL THERAPY | Facility: CLINIC | Age: 78
DRG: 561 | End: 2023-09-25
Attending: PHYSICAL MEDICINE & REHABILITATION
Payer: COMMERCIAL

## 2023-09-25 ENCOUNTER — APPOINTMENT (OUTPATIENT)
Dept: OCCUPATIONAL THERAPY | Facility: CLINIC | Age: 78
DRG: 561 | End: 2023-09-25
Attending: PHYSICAL MEDICINE & REHABILITATION
Payer: COMMERCIAL

## 2023-09-25 ENCOUNTER — APPOINTMENT (OUTPATIENT)
Dept: GENERAL RADIOLOGY | Facility: CLINIC | Age: 78
DRG: 561 | End: 2023-09-25
Attending: PHYSICAL MEDICINE & REHABILITATION
Payer: COMMERCIAL

## 2023-09-25 LAB
ANION GAP SERPL CALCULATED.3IONS-SCNC: 10 MMOL/L (ref 7–15)
BUN SERPL-MCNC: 22.1 MG/DL (ref 8–23)
CALCIUM SERPL-MCNC: 8.9 MG/DL (ref 8.8–10.2)
CHLORIDE SERPL-SCNC: 106 MMOL/L (ref 98–107)
CREAT SERPL-MCNC: 0.86 MG/DL (ref 0.67–1.17)
DEPRECATED HCO3 PLAS-SCNC: 23 MMOL/L (ref 22–29)
EGFRCR SERPLBLD CKD-EPI 2021: 90 ML/MIN/1.73M2
ERYTHROCYTE [DISTWIDTH] IN BLOOD BY AUTOMATED COUNT: 14.9 % (ref 10–15)
GLUCOSE SERPL-MCNC: 103 MG/DL (ref 70–99)
HCT VFR BLD AUTO: 26.7 % (ref 40–53)
HGB BLD-MCNC: 8.5 G/DL (ref 13.3–17.7)
MCH RBC QN AUTO: 27.8 PG (ref 26.5–33)
MCHC RBC AUTO-ENTMCNC: 31.8 G/DL (ref 31.5–36.5)
MCV RBC AUTO: 87 FL (ref 78–100)
PLATELET # BLD AUTO: 245 10E3/UL (ref 150–450)
POTASSIUM SERPL-SCNC: 4.1 MMOL/L (ref 3.4–5.3)
RBC # BLD AUTO: 3.06 10E6/UL (ref 4.4–5.9)
SODIUM SERPL-SCNC: 139 MMOL/L (ref 136–145)
WBC # BLD AUTO: 5.8 10E3/UL (ref 4–11)

## 2023-09-25 PROCEDURE — 97530 THERAPEUTIC ACTIVITIES: CPT | Mod: GO | Performed by: OCCUPATIONAL THERAPIST

## 2023-09-25 PROCEDURE — 99232 SBSQ HOSP IP/OBS MODERATE 35: CPT | Performed by: PHYSICAL MEDICINE & REHABILITATION

## 2023-09-25 PROCEDURE — 97535 SELF CARE MNGMENT TRAINING: CPT | Mod: GO | Performed by: OCCUPATIONAL THERAPIST

## 2023-09-25 PROCEDURE — 85027 COMPLETE CBC AUTOMATED: CPT | Performed by: PHYSICIAN ASSISTANT

## 2023-09-25 PROCEDURE — 128N000003 HC R&B REHAB

## 2023-09-25 PROCEDURE — 36415 COLL VENOUS BLD VENIPUNCTURE: CPT | Performed by: PHYSICIAN ASSISTANT

## 2023-09-25 PROCEDURE — 82435 ASSAY OF BLOOD CHLORIDE: CPT | Performed by: PHYSICIAN ASSISTANT

## 2023-09-25 PROCEDURE — 250N000013 HC RX MED GY IP 250 OP 250 PS 637: Performed by: PHYSICIAN ASSISTANT

## 2023-09-25 PROCEDURE — 73502 X-RAY EXAM HIP UNI 2-3 VIEWS: CPT

## 2023-09-25 PROCEDURE — 97530 THERAPEUTIC ACTIVITIES: CPT | Mod: GP | Performed by: REHABILITATION PRACTITIONER

## 2023-09-25 PROCEDURE — 97530 THERAPEUTIC ACTIVITIES: CPT | Mod: GP | Performed by: STUDENT IN AN ORGANIZED HEALTH CARE EDUCATION/TRAINING PROGRAM

## 2023-09-25 RX ADMIN — PREGABALIN 225 MG: 75 CAPSULE ORAL at 20:08

## 2023-09-25 RX ADMIN — Medication 1000 UNITS: at 09:21

## 2023-09-25 RX ADMIN — ASPIRIN 162 MG: 81 TABLET, COATED ORAL at 09:21

## 2023-09-25 RX ADMIN — CYANOCOBALAMIN TAB 1000 MCG 1000 MCG: 1000 TAB at 09:21

## 2023-09-25 RX ADMIN — TAMSULOSIN HYDROCHLORIDE 0.4 MG: 0.4 CAPSULE ORAL at 09:21

## 2023-09-25 RX ADMIN — SIMVASTATIN 20 MG: 10 TABLET, FILM COATED ORAL at 20:08

## 2023-09-25 ASSESSMENT — ACTIVITIES OF DAILY LIVING (ADL)
ADLS_ACUITY_SCORE: 29

## 2023-09-25 NOTE — PROGRESS NOTES
SPIRITUAL HEALTH SERVICES Progress Note  [facility] ([facility]) Acute Rehab    Follow-up visit with pt - talked mostly about pt's Sabianism, [employer], an [employer] [place] specific [employer] Roman [employer] in [facility]. Pt was reading a book about the [employer]. His Roman Catholic is a place of enjoyment and consolation; pt particularly appreciates the cultural aspect of the Roman Catholic, as he deeply appreciates his own Swazi roots.  Will continue to follow while pt on unit, visiting at least weekly.     James Hidalgo M.Div. (Bill), [facility]  Staff   Pager [phone]      * [facility] remains available 24/7 for emergent requests/referrals, either by having the switchboard page the on-call  or by entering an ASAP/STAT consult in Epic (this will also page the on-call ). Routine Epic consults receive an initial response within 24 hours.*

## 2023-09-25 NOTE — PLAN OF CARE
"Discharge Planner Post-Acute Rehab PT:     Discharge Plan: Home alone on 9/29, with HCPT    Precautions: B JOHNNY in July 2023, Falls, RLE TTWBing (periprosthetic femur fracture). Size E spandigrip B LE's for edema management.     Current Status:  Bed Mobility: Declined to perform  Transfer: MIN A with WW  Gait: pt unable to maintain TTWB during longer distances. PT to work on shorter distances with within precautions.  Stairs: None at baseline  Balance: Heavy reliance on walker for stability    Assessment: Continued with ambulation tolerance and mass practice of sit<>stand transfers.  Able to comfortably complete transfers at 21.5\" with good technique and adherence to TTWB on R LE.  Discussed talking to his friends about rides home as he will need to have a higher SUV if possible.  Pt still very apprehensive about going home.   Pt still unable to demo amb  and STS lower than 21\" while maintaining TTWB.   PT may need to switch Pt to W/C based mob.     Other Barriers to Discharge (DME, Family Training, etc): Lives alone - ? Mixed mobility for community accessibility and fatigue with household ADLs  Fatigue   "

## 2023-09-25 NOTE — PLAN OF CARE
Goal Outcome Evaluation:  Pt. Is A/O x4,makes needs known.LBM 9/25/23. Pt. Denies CP, SOB, N/V, T/N and pain. No acute changes in this shift. Continue POC

## 2023-09-25 NOTE — PLAN OF CARE
Goal Outcome Evaluation:      Plan of Care Reviewed With: patient    Overall Patient Progress: improvingOverall Patient Progress: improving    Patient is alert and oriented. Able to use a call light and make his needs known. Assist of x 1 with a walker. On regular diet, thi liquids, takes pills whole. Continent of BB, LBM 9/23. Denies pain on this shift. Nursing staff will continue with poc.

## 2023-09-25 NOTE — PLAN OF CARE
Goal Outcome Evaluation:    Patient slept throughout the night, even and non-labored respirations. No pain or discomfort reported this shift. No new complaints voiced thus far. Continent of bladder, uses bedside urinal with staff to empty. Fall precautions maintained, call light in reach, safety checks completed.    Continue with POC.

## 2023-09-25 NOTE — PROGRESS NOTES
"  [facility]   Acute Rehabilitation Unit  Daily progress note    INTERVAL HISTORY  Genaro Ortiz was seen sitting up in bed, therapy going well.  Denies n/v/, sob, fevers and dizziness.  Therapists note that he has been loading the right lower extremity more than TTWB. Xray planned. Denies other questions or concerns today.     Funcyionally:  Bed Mobility: Declined to perform  Transfer: MIN A with WW  Gait: pt unable to maintain TTWB during longer distances. PT to work on shorter distances with within precautions.  Stairs: None at baseline  Balance: Heavy reliance on walker for stability        Assessment: Continued with ambulation tolerance and mass practice of sit<>stand transfers.  Able to comfortably complete transfers at 21.5\" with good technique and adherence to TTWB on R LE.  Discussed talking to his friends about rides home as he will need to have a higher SUV if possible.  Pt still very apprehensive about going home.   Pt still unable to demo amb  and STS lower than 21\" while maintaining TTWB.   PT may need to switch Pt to W/C based mob.       MEDICATIONS   aspirin  162 mg Oral Daily    cyanocobalamin  1,000 mcg Oral Daily    pregabalin  225 mg Oral QPM    simvastatin  20 mg Oral At Bedtime    tamsulosin  0.4 mg Oral Daily    vitamin D3  1,000 Units Oral Daily        acetaminophen, naloxone **OR** naloxone **OR** naloxone **OR** naloxone, oxyCODONE, polyethylene glycol, senna-docusate     PHYSICAL EXAM  /57 (BP Location: Right arm)   Pulse 69   Temp 96.8  F (36  C) (Oral)   Resp 16   Ht 1.753 m (5' 9\")   Wt 100 kg (220 lb 7.4 oz)   SpO2 95%   BMI 32.56 kg/m    Gen: awake alert nad  HEENT: mmm   Cardio: rrr  Pulm: non labored clear  Abd: soft non distended non tender  Ext: warm dry with no significant edema  Neuro/MSK: alert speech clear. Non tender.    LABS  CBC RESULTS:   Recent Labs   Lab Test 09/25/23  0621 09/21/23  0612 09/19/23  0736   WBC 5.8 6.0 6.0 "   RBC 3.06* 3.08* 3.20*   HGB 8.5* 8.6* 8.9*   HCT 26.7* 26.8* 27.4*   MCV 87 87 86   MCH 27.8 27.9 27.8   MCHC 31.8 32.1 32.5   RDW 14.9 14.8 14.6    201 186       Last Basic Metabolic Panel:  Recent Labs   Lab Test 09/25/23  0621 09/21/23  0612 09/19/23  0736    137 135*   POTASSIUM 4.1 4.0 4.0   CHLORIDE 106 106 103   CO2 23 21* 24   ANIONGAP 10 10 8   * 107* 109*   BUN 22.1 24.9* 31.7*   CR 0.86 0.79 0.92   GFRESTIMATED 90 >90 86   MAX 8.9 8.9 8.7*           Rehabilitation - continue comprehensive acute inpatient rehabilitation program with multidisciplinary approach including therapies, rehab nursing, and physiatry following. See interval history for updates.      ASSESSMENT AND PLAN    Genaro Ortiz is a 78 year old man with past medical history of OA s/p bilateral hip  arthroplasties 7/2023, HTN, prostate cancer, HLE, and anemia who presented with right hip fracture in setting of mechanical fall 9/16/23 seen by ortho managed non-operatively. Course complicated by urinary retention.  Admitted to rehab 9/20/23.     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   -straight cath prn  -monitor pvrs  -continue flomax  -follow up urology     Neuropathic pain  Seen by neurology 6/7/23  -continue lyrica     Vitamin D Deficiency  -continue vitamin D supplement    HTN  Not on medications at time of rehab admission  -monitor    Adjustment to disability:    FEN: reg  Bowel:  monitor  Bladder: some retention monitor  DVT Prophylaxis: asa per ortho  GI Prophylaxis:na   Code: full   Disposition: goal for home   ELOS: 9/29  Follow up Appointments on Discharge:  Ortho, pcp, urology    Doing well. Discussed with team. Continue cares and plans outlined.    Reilly Angel MD

## 2023-09-25 NOTE — PLAN OF CARE
"     Discharge Planner Post-Acute Rehab OT:      Discharge Plan: Home w/ HH likely and HH therapy   Precautions: TT WB RLE; Fall Risk      Current Status:  ADLs:  Mobility: supine to eob mod a, sit to stand max from standard height   Grooming: sba sitting  Dressing:U/Bpt able to dress in shirt after clothes retrievel   L/B dressing:mod a needed r leg started into short able to get L leg and standing with min a to adjust pants over hips at fww   Feet socks ot dep , ot doff shoes with dressing stick min a to get heel of shoe out from behind heel of foot min a with lh shoe horn, educated in no hands step in shoes   Bathing: Mod A to wash body on ETB and to get back side; min A to stand from ETB and W/c  Toileting: min a with cues to maintain tt wb in R l/e with commode over toilet   IADLs: has cleaning staff come 1x/week  Vision/Cognition: vision wnl, assess cog     Assessment: AM: Pt needing reminders for WB on RLE. Patient improves when OTR tells him \"think about there being a chip under your foot and you don't want to squish the chip\". Pt needing safety reminders w/ use of w/c and to lock breaks prior to standing. Patient having some mild memory/cog concerns and will will benefit from cog assessment, as well as MAP prior to going home alone. Pt is concerned he is not ready to go home this week. PM session: pt receives 25/30 on SLUMS this date indicating mild cog impairment. Suspect this is from pt being anxious during cog screening. Pt losing mild points in simple subtraction and clock drawing. However, patient able to verbally explain his errors.      Other Barriers to Discharge (DME, Family Training, etc):   Family Training: TBD- Likely none d/t not having anyone to help him.  Continued HH services  Order tray for walker or wheelchair  Needs to do MAP  Lives alone           "

## 2023-09-26 ENCOUNTER — APPOINTMENT (OUTPATIENT)
Dept: PHYSICAL THERAPY | Facility: CLINIC | Age: 78
DRG: 561 | End: 2023-09-26
Attending: PHYSICAL MEDICINE & REHABILITATION
Payer: COMMERCIAL

## 2023-09-26 ENCOUNTER — APPOINTMENT (OUTPATIENT)
Dept: OCCUPATIONAL THERAPY | Facility: CLINIC | Age: 78
DRG: 561 | End: 2023-09-26
Attending: PHYSICAL MEDICINE & REHABILITATION
Payer: COMMERCIAL

## 2023-09-26 PROCEDURE — 97530 THERAPEUTIC ACTIVITIES: CPT | Mod: GO | Performed by: OCCUPATIONAL THERAPIST

## 2023-09-26 PROCEDURE — 97535 SELF CARE MNGMENT TRAINING: CPT | Mod: GO | Performed by: OCCUPATIONAL THERAPIST

## 2023-09-26 PROCEDURE — 99231 SBSQ HOSP IP/OBS SF/LOW 25: CPT | Performed by: PHYSICAL MEDICINE & REHABILITATION

## 2023-09-26 PROCEDURE — 128N000003 HC R&B REHAB

## 2023-09-26 PROCEDURE — 250N000013 HC RX MED GY IP 250 OP 250 PS 637: Performed by: PHYSICIAN ASSISTANT

## 2023-09-26 PROCEDURE — 97530 THERAPEUTIC ACTIVITIES: CPT | Mod: GP

## 2023-09-26 PROCEDURE — 97110 THERAPEUTIC EXERCISES: CPT | Mod: GO | Performed by: OCCUPATIONAL THERAPIST

## 2023-09-26 RX ADMIN — SIMVASTATIN 20 MG: 10 TABLET, FILM COATED ORAL at 21:06

## 2023-09-26 RX ADMIN — Medication 1000 UNITS: at 09:24

## 2023-09-26 RX ADMIN — TAMSULOSIN HYDROCHLORIDE 0.4 MG: 0.4 CAPSULE ORAL at 09:27

## 2023-09-26 RX ADMIN — PREGABALIN 225 MG: 75 CAPSULE ORAL at 21:06

## 2023-09-26 RX ADMIN — ASPIRIN 162 MG: 81 TABLET, COATED ORAL at 09:24

## 2023-09-26 RX ADMIN — CYANOCOBALAMIN TAB 1000 MCG 1000 MCG: 1000 TAB at 09:23

## 2023-09-26 ASSESSMENT — ACTIVITIES OF DAILY LIVING (ADL)
ADLS_ACUITY_SCORE: 29

## 2023-09-26 NOTE — PLAN OF CARE
Discharge Planner Post-Acute Rehab OT:      Discharge Plan: Home w/ HH likely and HH therapy   Precautions: TT WB RLE; Fall Risk      Current Status:  ADLs:  Mobility: supine to eob mod a, CGA sit<>stand and with walking in room. Pt having difficulty maintaining WB precautions sometimes.  Grooming: sba sitting/standing at sink  Dressing:U/Bpt able to dress in shirt after clothes retrievel   L/B dressing:mod a needed r leg started into short able to get L leg and standing with min a to adjust pants over hips at fww   Feet socks ot dep , ot doff shoes with dressing stick min a to get heel of shoe out from behind heel of foot min a with  shoe horn, educated in no hands step in shoes   Bathing: Mod A to wash body on ETB and to get back side; min A to stand from ETB and W/c  Toileting: min a with cues to maintain ttwb in RLE with commode over toilet and standing for pericare   IADLs: has cleaning staff come 1x/week  Vision/Cognition: vision wnl, 25/30 SLUMS, currently doing MAP     Assessment: Pt continues to need cueing for TTWB on RLE. Therapy thinks patient will need wheelchair when going home in order to have mixed mobility.      Other Barriers to Discharge (DME, Family Training, etc):   Family Training: TBD- Likely none d/t not having anyone to help him.  Continued HH services  Order tray for wheelchair on [employer]  Doing MAP now  Lives alone

## 2023-09-26 NOTE — PROGRESS NOTES
"  [facility]   Acute Rehabilitation Unit  Daily progress note    INTERVAL HISTORY  Genaro Ortiz was seen sitting up in bed, therapy going well.  Denies n/v/, sob, fevers and dizziness.  Therapists note that he has been loading the right lower extremity more than TTWB. Xray completed stable. Reviewed TTWB. WC as needed. Denies other questions or concerns today.     Funcyionally:  Bed Mobility: Declined to perform  Transfer: MIN A with WW  Gait: pt unable to maintain TTWB during longer distances. PT to work on shorter distances with within precautions.  Stairs: None at baseline  Balance: Heavy reliance on walker for stability     Assessment: Continues to have difficulty with maintaining TTWB of RLE. Trialed slideboard transfers w/c<>mat, better able to maintain TTWB when transferring to the L compared to R. No updates from physician regarding change in WB status at this time.        MEDICATIONS   - Medication Assessment Program - Rehab Services   Does not apply See Admin Instructions    aspirin  162 mg Oral Daily    cyanocobalamin  1,000 mcg Oral Daily    pregabalin  225 mg Oral QPM    simvastatin  20 mg Oral At Bedtime    tamsulosin  0.4 mg Oral Daily    vitamin D3  1,000 Units Oral Daily        acetaminophen, naloxone **OR** naloxone **OR** naloxone **OR** naloxone, oxyCODONE, polyethylene glycol, senna-docusate     PHYSICAL EXAM  /62 (BP Location: Right arm)   Pulse 64   Temp 96.8  F (36  C) (Oral)   Resp 18   Ht 1.753 m (5' 9\")   Wt 100 kg (220 lb 7.4 oz)   SpO2 98%   BMI 32.56 kg/m    Gen: awake alert nad  HEENT: mmm   Cardio: rrr  Pulm: non labored clear  Abd: soft non distended non tender  Ext: warm dry with no significant edema  Neuro/MSK: alert speech clear. Non tender.    LABS  CBC RESULTS:   Recent Labs   Lab Test 09/25/23  0621 09/21/23  0612 09/19/23  0736   WBC 5.8 6.0 6.0   RBC 3.06* 3.08* 3.20*   HGB 8.5* 8.6* 8.9*   HCT 26.7* 26.8* 27.4*   MCV 87 87 " 86   MCH 27.8 27.9 27.8   MCHC 31.8 32.1 32.5   RDW 14.9 14.8 14.6    201 186       Last Basic Metabolic Panel:  Recent Labs   Lab Test 09/25/23  0621 09/21/23  0612 09/19/23  0736    137 135*   POTASSIUM 4.1 4.0 4.0   CHLORIDE 106 106 103   CO2 23 21* 24   ANIONGAP 10 10 8   * 107* 109*   BUN 22.1 24.9* 31.7*   CR 0.86 0.79 0.92   GFRESTIMATED 90 >90 86   MAX 8.9 8.9 8.7*           Rehabilitation - continue comprehensive acute inpatient rehabilitation program with multidisciplinary approach including therapies, rehab nursing, and physiatry following. See interval history for updates.      ASSESSMENT AND PLAN    Genaro Ortiz is a 78 year old man with past medical history of OA s/p bilateral hip  arthroplasties 7/2023, HTN, prostate cancer, HLE, and anemia who presented with right hip fracture in setting of mechanical fall 9/16/23 seen by ortho managed non-operatively. Course complicated by urinary retention.  Admitted to rehab 9/20/23.     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 Doubt we can advance. Will ask Ortho to confirm, as well as duration of restriction.  -continue PT/OT  -follow up Ortho  -asa 162 per ortho       Dyslipidemia  Zocor 20 mg nightly     Chronic Anemia  B12 deficiency  HGB 8.5  -trend continue vitamin B supplement        History of prostate Cancer s/p prostatectomy  Urinary retention  Occurred post operatively in July seen 9/6 in urology clinic without acute concerns and then inpatient recommended intermittent straight cath and ongoing monitoring.   -straight cath prn  -monitor pvrs  -continue flomax  -follow up urology     Neuropathic pain  Seen by neurology 6/7/23  -continue lyrica     Vitamin D Deficiency  -continue vitamin D supplement    HTN  Not on medications at time of rehab admission  -monitor    Adjustment to disability:    FEN: reg  Bowel:  monitor  Bladder: some retention monitor  DVT Prophylaxis: asa per ortho  GI Prophylaxis:na   Code: full   Disposition: goal for home   ELOS: 9/29  Follow up Appointments on Discharge:  Ortho, pcp, urology    Doing well. Discussed with team. Continue cares and plans outlined.    Reilly Angel MD

## 2023-09-26 NOTE — PLAN OF CARE
Discharge Planner Post-Acute Rehab PT:     Discharge Plan: Home alone on 9/29, with HCPT    Precautions: B JOHNNY in July 2023, Falls, RLE TTWBing (periprosthetic femur fracture). Size E spandigrip B LE's for edema management.     Current Status:  Bed Mobility: Declined to perform  Transfer: MIN A with WW  Gait: pt unable to maintain TTWB during longer distances. PT to work on shorter distances with within precautions.  Stairs: None at baseline  Balance: Heavy reliance on walker for stability    Assessment: Continues to have difficulty with maintaining TTWB of RLE. Trialed slideboard transfers w/c<>mat, better able to maintain TTWB when transferring to the L compared to R. No updates from physician regarding change in WB status at this time.     Other Barriers to Discharge (DME, Family Training, etc): Lives alone - ? Mixed mobility for community accessibility and fatigue with household ADLs  Fatigue

## 2023-09-26 NOTE — PLAN OF CARE
Goal Outcome Evaluation:      Plan of Care Reviewed With: patient    Overall Patient Progress: improvingOverall Patient Progress: improving    Patient is alert and oriented. Slept well through the night. Able to use a call light and make his needs known. Assist of x 1 SPV for transfers, Toe touch to the right leg r/t R hip fracture but patient is not a candidate for surgery at this time. On reg diet thin liquids, takes pills whole. Continent of BB, LBM 9/23/23. Denies pain on this shift. Patient will be starting MAP this morning 9/26 Had X-ray done and there is no new finding that may need immediate intervention. See X-ray on the results tab for more details. Nursing staff will continue with poc.

## 2023-09-26 NOTE — PLAN OF CARE
Goal Outcome Evaluation:         Pt alert and oriented X4, able to make needs known. No c/o chest pain, SOB, N/V. MAP started this morning, pt able to follow instructions and familiar with the medication, although he did not call RN for his meds this morning. Had a large BM this afternoon. Pleasant and cooperative. Takes pills whole with thin liquids. Referral to ortho related to weight bearing status. Will continue with POC.          Patient's most recent vital signs are:     Vital signs:  BP: 126/62  Temp: 96.8  HR: 64  RR: 18  SpO2: 98 %     Patient does not have new respiratory symptoms.  Patient does not have new sore throat.  Patient does not have a fever greater than 99.5.

## 2023-09-27 ENCOUNTER — APPOINTMENT (OUTPATIENT)
Dept: PHYSICAL THERAPY | Facility: CLINIC | Age: 78
DRG: 561 | End: 2023-09-27
Attending: PHYSICAL MEDICINE & REHABILITATION
Payer: COMMERCIAL

## 2023-09-27 ENCOUNTER — APPOINTMENT (OUTPATIENT)
Dept: OCCUPATIONAL THERAPY | Facility: CLINIC | Age: 78
DRG: 561 | End: 2023-09-27
Attending: PHYSICAL MEDICINE & REHABILITATION
Payer: COMMERCIAL

## 2023-09-27 PROCEDURE — 99231 SBSQ HOSP IP/OBS SF/LOW 25: CPT | Performed by: PHYSICAL MEDICINE & REHABILITATION

## 2023-09-27 PROCEDURE — 97110 THERAPEUTIC EXERCISES: CPT | Mod: GO

## 2023-09-27 PROCEDURE — 97535 SELF CARE MNGMENT TRAINING: CPT | Mod: GO | Performed by: OCCUPATIONAL THERAPIST

## 2023-09-27 PROCEDURE — 128N000003 HC R&B REHAB

## 2023-09-27 PROCEDURE — 97535 SELF CARE MNGMENT TRAINING: CPT | Mod: GO

## 2023-09-27 PROCEDURE — 97530 THERAPEUTIC ACTIVITIES: CPT | Mod: GP | Performed by: STUDENT IN AN ORGANIZED HEALTH CARE EDUCATION/TRAINING PROGRAM

## 2023-09-27 PROCEDURE — 999N000125 HC STATISTIC PATIENT MED CONFERENCE < 30 MIN: Performed by: OCCUPATIONAL THERAPIST

## 2023-09-27 PROCEDURE — 999N000150 HC STATISTIC PT MED CONFERENCE < 30 MIN

## 2023-09-27 PROCEDURE — 250N000013 HC RX MED GY IP 250 OP 250 PS 637: Performed by: PHYSICIAN ASSISTANT

## 2023-09-27 PROCEDURE — 90791 PSYCH DIAGNOSTIC EVALUATION: CPT | Mod: 95 | Performed by: PSYCHOLOGIST

## 2023-09-27 PROCEDURE — 97530 THERAPEUTIC ACTIVITIES: CPT | Mod: GO | Performed by: OCCUPATIONAL THERAPIST

## 2023-09-27 RX ADMIN — TAMSULOSIN HYDROCHLORIDE 0.4 MG: 0.4 CAPSULE ORAL at 08:25

## 2023-09-27 RX ADMIN — Medication 1000 UNITS: at 08:25

## 2023-09-27 RX ADMIN — ASPIRIN 162 MG: 81 TABLET, COATED ORAL at 08:25

## 2023-09-27 RX ADMIN — PREGABALIN 225 MG: 75 CAPSULE ORAL at 20:50

## 2023-09-27 RX ADMIN — CYANOCOBALAMIN TAB 1000 MCG 1000 MCG: 1000 TAB at 08:25

## 2023-09-27 RX ADMIN — SIMVASTATIN 20 MG: 10 TABLET, FILM COATED ORAL at 20:50

## 2023-09-27 ASSESSMENT — ACTIVITIES OF DAILY LIVING (ADL)
ADLS_ACUITY_SCORE: 31

## 2023-09-27 NOTE — CONSULTS
Start Time: 11:00 AM  Stop Time: 11:23 AM  Session Duration in Minutes: 23  Patient was seen remotely using iPad telemedicine system    REASON FOR CONSULTATION: Psychology consulted to provide emotional support.    BACKGROUND PER EMR: Genaro Ortiz is a 78 year old man with past medical history of OA s/p bilateral hip arthroplasties 7/2023, HTN, prostate cancer, HLE, and anemia who presented with right hip fracture in setting of mechanical fall 9/16/23 seen by ortho managed non-operatively. Course complicated by urinary retention. Admitted to ARU 9/20/23 for multidisciplinary rehabilitation.     SUBJECTIVE: Met with Genaro today to assess mental health status and provide social support. He shared that he is doing well and has not major concerns. He described feeling very optimistic and is pleased about his discharge date being extended. He denies any concerns about his mood. Denies psychiatric history. Genaro states his pain is well-controlled with Lyrica, which also helps him sleep. We discussed Genaro's social support system, which includes his [employer] community and friends in his [employer] building. We also discussed Genaro's hobbies (reading, going to [employer]) and how continuing to engage in this hobbies is important to maintain positive mood and motivation. Genaro states his long-term goals are to be able to drive again and to be able to workout at the gym. Short-term, he hopes to increase the distance he can walk and to improve his ability to move around in bed.     Therapeutic interventions included active listening, validation, and building rapport.    OBJECTIVE: Genaro was sitting in his wheelchair during our visit. He was awake, alert, and engaged in the session. He reported positive and optimistic mood with congruent affect. Thought process was logical and linear. Insight and judgment were good. Speech was WNL. Denied suicidal ideation.    ASSESSMENT: Genaro presents with positive mood with no psychiatric history. He  has strong social support from his [employer] and friends in his [employer] building. He appears to be coping well with his injury using his lupe and prayer as his primary coping strategies.    DIAGNOSIS: Adjustment Disorder    RECOMMENDATION/PLAN: Will follow as needed throughout his rehabilitation stay.     Please feel free to call if urgent concerns arise.    Amrita Calderon, PhD, LP  Pager: [phone]

## 2023-09-27 NOTE — PLAN OF CARE
Discharge Planner Post-Acute Rehab OT:      Discharge Plan: Home w/ HH likely and HH therapy     Precautions: TT WB RLE Per orhto 9/27- pt able to do partial WB on RLE as long as it does not cause discomfort to pt.      Current Status:  ADLs:  Mobility: CGA-SBA using walker; supervision in w/c   Dressing:Superivison w use of AE for LB; IND overhead shirt  Bathing: Mod A to wash body on ETB and to get back side; min A to stand from ETB and W/c  Toileting: min a with cues to maintain ttwb in RLE with commode over toilet and standing for pericare   IADLs: has cleaning staff come 1x/week; supervision at w/c level   Vision/Cognition: vision wnl, 25/30 SLUMS, currently doing MAP     Assessment: Pt completes IADL tasks this date in kitchen including washing dishes and making sandwich from w/c level.Pt concerned about going home alone. Per orhto 9/27- pt able to do partial WB on RLE as long as it does not cause discomfort to pt.     Other Barriers to Discharge (DME, Family Training, etc):   Family Training: TBD- Likely none d/t not having anyone to help him.  Continued HH services  Order tray for wheelchair on [employer]  Doing MAP now  Lives alone

## 2023-09-27 NOTE — PROGRESS NOTES
SPIRITUAL HEALTH SERVICES Progress Note  [facility] ([facility] Acute Rehab    Follow-up  visit with pt; visit was brief as he was about to have a therapy session. Pt said he feels he is making progress with therapies- he continues to fell motivated and hopeful.  Pt always enjoys talking about his love of [employer] culture. Prayer was welcomed today; I will visit again early next week.     James Hidalgo M.Div. (Bill), [facility]  Staff   Pager [phone]      * [facility] remains available 24/7 for emergent requests/referrals, either by having the switchboard page the on-call  or by entering an ASAP/STAT consult in Epic (this will also page the on-call ). Routine Epic consults receive an initial response within 24 hours.*

## 2023-09-27 NOTE — PLAN OF CARE
Discharge Planner Post-Acute Rehab PT:     Discharge Plan: Home alone on 9/29, with HCPT    Precautions: B JOHNNY in July 2023, Falls, RLE TTWBing (periprosthetic femur fracture).     Edema: Size E spandigrip B LE's and shoes on for edema management. Will need to problem solve managing at home    Current Status:  Bed Mobility: SBA, increased time  Transfer: SBA with FWW  Gait: SBA with FWW with partial WB RLE ~200'.  Stairs: None at baseline  Balance: Heavy reliance on walker for stability    Assessment: Ortho cleared pt with partial RLE WB as long as no increase in pain. PT to focus on progressing to MOD I mobility, equipment planning. Pt needs new shoes and needs to problem solve donning/doffing     Other Barriers to Discharge (DME, Family Training, etc):   Lives alone - ? Mixed mobility for community accessibility and fatigue with household ADLs  [employer] rental vs transport wc (will be OOP)  Shoes with velcro straps, or magnetic laces- could possibly get new slip on shoes.current slip on shoe too loose and L foot/ankle slipping out and partially WB on lateral aspect

## 2023-09-27 NOTE — PLAN OF CARE
Acute Rehab Care Conference/Team Rounds      Type: Team Rounds    Present: Dr. Reilly Angel PM&R, Dr. Dilcia Thomas Neuropsychologist, Eileen Veronica PT, Michaela Lester OT, Krystyna YIP, Lynn Watts RD, Darby Schneider RN, and Genaro Ortiz Patient.       Discharge Barriers/Treatment/Education    Rehab Diagnosis: Orthopaedic Disorders 08.11 Unilateral Hip Fracture: comminuted periprosthetic fracture, around right greater trochanter      Active Medical Co-morbidities/Prognosis:   Patient Active Problem List   Diagnosis    HYPERLIPIDEMIA LDL GOAL <130    Hypertension goal BP (blood pressure) < 140/90    Advance Care Planning    Anemia due to blood loss, acute    Health Care Home    Obesity due to excess calories, unspecified obesity severity    MGUS (monoclonal gammopathy of unknown significance)    Proximal limb muscle weakness    Vitamin B12 deficiency (non anemic)    Screen for colon cancer    Encounter for Medicare annual wellness exam    Hypovitaminosis D    History of prostate cancer    Encounter for monitoring of chronic aspirin therapy    Screening for prostate cancer    History of prostatectomy    Osteoarthritis of both hips, unspecified osteoarthritis type    Status post total replacement of right hip    Periprosthetic fracture around internal prosthetic right hip joint, initial encounter (H)    S/P right hip fracture        Safety: A1 with walker, alert and oriented, calls appropriately    Pain: no pain reported    Medications, Skin, Tubes/Lines:takes medication whole, no skin issue, no lines/tubes    Swallowing/Nutrition:    Bowel/Bladder: incontinent of Bladder, primo fit in placed at night, LBM 9/25/2023    Psychosocial: Single and live alone. Supportive friends and [employer] community. MOD I at baseline and PTA. HC PTA. No substance use concerns reported. Pt appears highly anxious. No financial concerns reported. Retired. Ava Tian, PH: [phone] PCP CC.     ADLs/IADLs:  Mobility: SBA w/  walker for funct mob in room; Pt having difficulty maintaining WB precautions at times.  Grooming: sba sitting/standing at sink  Dressing:U/Bpt able to dress in shirt after clothes retrievel   L/B dressing:mod a needed r leg started into short able to get L leg and standing with min a to adjust pants over hips at fww   Feet socks ot dep , ot doff shoes with dressing stick min a to get heel of shoe out from behind heel of foot min a with lh shoe horn, educated in no hands step in shoes   Bathing: Mod A to wash body on ETB and to get back side; min A to stand from ETB and W/c  Toileting: min a with cues to maintain ttwb in RLE with commode over toilet and standing for pericare   IADLs: continue to assess from w/c based  Vision/Cognition: vision wnl, 25/30 SLUMS, currently doing MAP     Discharge Plan:  Continued HH services  -Needs w/c to go home      Mobility: Pt remains anxious about return to home despite safe function with WW. Will discuss home plan with team today. Pt has been cleared for partial WBing, should be safe to be ambulatory at time of discharge.    Cognition/Language:    Community Re-Entry:    Transportation: Not a  - friends to provide.    Decision maker: self    Plan of Care and goals reviewed and updated.    Discharge Plan/Recommendations    Fall Precautions: continue    Patient/Family input to goals: Yes    Anticipated rehab needs following discharge: Home with family    Anticipated care giver support after discharge: Family    Estimated length of stay: 10/4/23    Overall plan for the patient: Continue IP Rehabilitation.       Utilization Review and Continued Stay Justification    Medical Necessity Criteria:    For any criteria that is not met, please document reason and plan for discharge, transfer, or modification of plan of care to address.    Requires intensive rehabilitation program to treat functional deficits?: Yes    Requires 3x per week or greater involvement of rehabilitation  physician to oversee rehabilitation program?: Yes    Requires rehabilitation nursing interventions?: Yes    Patient is making functional progress?: Yes    There is a potential for additional functional progress? Yes    Patient is participating in therapy 3 hours per day a minimum of 5 days per week or 15 hours per week in 7 day period?:Yes    Has discharge needs that require coordinated discharge planning approach?:Yes            Final Physician Sign off    Statement of Approval: I approve the plan of care.     Patient Goals    Social Work Goals: Confirm discharge recommendations with therapy, coordinate safe discharge plan and remain available to support and assist as needed.    OT Predicted Duration/Target Date for Goal Attainment: 10/04/23  Therapy Frequency (OT): Daily  OT: Hygiene/Grooming: modified independent  OT: Upper Body Dressing: Modified independent  OT: Lower Body Dressing: Modified independent  OT: Upper Body Bathing: Supervision/stand-by assist  OT: Lower Body Bathing: Supervision/stand-by assist, Modified independent  OT: Bed Mobility: Modified independent  OT: Transfer: Supervision/stand-by assist (shower)  OT: Toilet Transfer/Toileting: Modified independent  OT: Meal Preparation: Modified independent  OT: Home Management: Modified independent    PT Predicted Duration/Target Date for Goal Attainment: 10/04/23  PT Frequency: Daily  PT: Bed Mobility: Modified independent, Supine to/from sit, Rolling, Within precautions  PT: Transfers: Modified independent, Sit to/from stand, Bed to/from chair, Within precautions, Assistive device (R TTWB)  PT: Gait: Modified independent, Greater than 200 feet, Goal Met  PT: Goal 1: MOD I car transfer with TTWBing on R side  PT: Goal 2: Pt will recite 3 fall prevention strategies for safety at home.                                                           Patient Goal:  Pain Management: patient will be free from pain at the of stay in [facility]              Patient/Family  Goal: Bladder: Patient will be free from urine retention able to use the urinal or use bathroom regularly during his stay in [facility]           Goal: Mobility: Patient will be free from falls and able to ambulate using  appropriate devices  at the end of stay in [facility]                       Goal: Safety Management: Patient will be safe  and free from injury at the end of stay in [facility]                        Goal Outcome Evaluation:

## 2023-09-27 NOTE — PLAN OF CARE
FOCUS/GOAL  Medication management, Discharge planning, and Safety management    ASSESSMENT, INTERVENTIONS AND CONTINUING PLAN FOR GOAL:  Patient is alert and oriented x4.  Regular thin diet, takes pills whole.  Discharging soon, on MAP.  Needs to call when meds are due.  Continent of bowel and bladder.  Transfers assist of one with walker and gait belt.  LBM 9/26, uses urinal.  Goal Outcome Evaluation:

## 2023-09-27 NOTE — PLAN OF CARE
Goal Outcome Evaluation:       Overall Patient Progress: no changeOverall Patient Progress: no change    Outcome Evaluation: Denies pain. Continent of bladder this pm shift, used the urinal. Ate well, 100% for dinner independently. Able to make his needs known and used call light appropriaely. Continues on MAP, forgets to call but able to pick out meds correctly. Reminded to call in due time for meds. Assisted with 1 and walker/gait belt for transfers/ambulation.

## 2023-09-27 NOTE — PROGRESS NOTES
Alert and oriented times four. Completed MAP independently without errors today. Continent B/B. LBM 9/26. Uses bedside urinal. Ate 100% breakfast and lunch. No complaint of pain. Uses call light appropriately. Assist of one with walker/gait belt. Calm and cooperative.     Patient's most recent vital signs are:     Vital signs:  BP: 134/71  Temp: 97  HR: 62  RR: 18  SpO2: 96 %     Patient does not have new respiratory symptoms.  Patient does not have new sore throat.  Patient does not have a fever greater than 99.5.

## 2023-09-27 NOTE — DISCHARGE SUMMARY
"[facility]  Hospitalist Discharge Summary      Date of Admission:  9/16/2023  Date of Discharge:  9/20/2023 11:56 AM  Discharging Provider: Porfirio Diaz MD  Discharge Service: Hospitalist Service, GOLD TEAM 18    Discharge Diagnoses     Clinically Significant Risk Factors     # Obesity: Estimated body mass index is 32.56 kg/m  as calculated from the following:    Height as of 9/20/23: 1.753 m (5' 9\").    Weight as of 9/20/23: 100 kg (220 lb 7.4 oz).       Follow-ups Needed After Discharge       Unresulted Labs Ordered in the Past 30 Days of this Admission       No orders found from 8/17/2023 to 9/17/2023.        These results will be followed up by TCU physician    Discharge Disposition   Discharged to short-term care facility  Condition at discharge: Stable    Hospital Course   78 year old male with previous hip replacements and prostate cancer s/p prostatectomy presented with hip pain after tripping and falling. Patient was found to have a periprosthetic hip fracture. He was admitted to the hospital. Orthopedics was consulted and non-operative treatment was recommended.        Acute post operative urinary retention , required straight catheterization once, limiting use of narcotics and medications to treat constipation. Last BM yesterday. Urology was consulted and signed off. I would do straight cath up to x2 for AUR with PVR> 600.      Right hip fracture from mechanical fall  Osteoarthritis of bilateral hips status post bilateral total hip arthroplasties in July  -continue pt  -toe-touch weightbearing to the right lower extremity utilizing assistive device.  -aspirin 162 mg daily for DVT prevention  -continue PRN Tylenol 650 mg Q6H   -continue PRN oxycodone 2.6 mg Q4H though minimize use given propensity to cause urinary retention  -continue PRN flexeril 5 mg Q8H though minimize use given propensity to cause urinary retention  -continue pregabalin 225 mg daily    "   Essential hypertension   -not on chronic medications  -monitor     Dyslipidemia   -continue Zocor 20 mg nightly     Adenocarcinoma of the prostate status post prostatectomy  Urinary retention  -PVR elevated  -continue flomax  -monitor PVR and continue straight cath for now  -urology's help much appreciated     Normocytic anemia, appears chronic  Vitamin B12 deficiency   -continue vitamin B12 supplement  -monitor     Vitamin D deficiency   - continue vitamin D supplement       Consultations This Hospital Stay   ORTHOPAEDIC SURGERY ADULT/PEDS IP CONSULT  PHYSICAL THERAPY ADULT IP CONSULT  OCCUPATIONAL THERAPY ADULT IP CONSULT  CARE MANAGEMENT / SOCIAL WORK IP CONSULT  UROLOGY IP CONSULT  PHYSICAL THERAPY ADULT IP CONSULT  OCCUPATIONAL THERAPY ADULT IP CONSULT    Code Status   Full Code    Time Spent on this Encounter   I, Porfirio Diaz MD, personally saw the patient today and spent greater than 30 minutes discharging this patient.       Porfirio Diaz MD  [facility] MED SURG ORTHOPEDIC  [address] [place] [id number]  Phone: [phone]  Fax: [phone]  ______________________________________________________________________    Physical Exam   Vital Signs:                    Weight: 0 lbs 0 oz  Gen:  no acute distress  Resp:  breathing normally on room air. No rales, wheezing, or stridor  Card:  normal rate, normal rhythm. No murmurs appreciated  Abd:  Soft, non-tender, non-distended, normoactive bowel sounds are present  Psych:  Alert     Primary Care Physician   Joel Daniel Wegener    Discharge Orders      General info for SNF    Length of Stay Estimate: Short Term Care: Estimated # of Days <30  Condition at Discharge: Improving  Level of care:skilled   Rehabilitation Potential: Fair  Admission H&P remains valid and up-to-date: Yes  Recent Chemotherapy: N/A  Use Nursing Home Standing Orders: Yes     Mantoux instructions    Give two-step Mantoux (PPD) Per Facility Policy Yes  "    Follow Up and recommended labs and tests    Follow up with Nursing home physician.  No follow up labs or test are needed.     Reason for your hospital stay    Right periprosthetic intertrochanteric femur fracture     Activity - Up with nursing assistance     When to call - Contact Surgeon Team    You may experience symptoms that require follow-up before your scheduled appointment. Refer to the \"Stoplight Tool\" for instructions on when to contact your Surgeon Team if you are concerned about pain control, blood clots, constipation, or if you are unable to urinate.     When to call - Reach out to Urgent Care    If you are not able to reach your Surgeon Team and you need immediate care, go to the Orthopedic Walk-in Clinic or Urgent Care at your Surgeon's office.  Do NOT go to the Emergency Room unless you have shortness of breath, chest pain, or other signs of a medical emergency.     When to call - Reasons to Call 911    Call 911 immediately if you experience sudden-onset chest pain, arm weakness/numbness, slurred speech, or shortness of breath     Follow Up Care    Follow-up with your Surgeon Team in 2 weeks with Dr. Grimes or Dr. Escamilla for progress check, call [phone] for appt.     Physical Therapy Adult Consult    Evaluate and treat as clinically indicated.    Reason:  impaired mobility and periprosthetic hip fracture     Occupational Therapy Adult Consult    Evaluate and treat as clinically indicated.    Reason:  Reason:  impaired mobility and periprosthetic hip fracture     Fall precautions     Diet    Follow this diet upon discharge: Orders Placed This Encounter      Regular Diet Adult       Significant Results and Procedures   Most Recent 3 CBC's:  Recent Labs   Lab Test 09/25/23  0621 09/21/23  0612 09/19/23  0736   WBC 5.8 6.0 6.0   HGB 8.5* 8.6* 8.9*   MCV 87 87 86    201 186     Most Recent 3 BMP's:  Recent Labs   Lab Test 09/25/23  0621 09/21/23  0612 09/19/23  0736    137 135* "   POTASSIUM 4.1 4.0 4.0   CHLORIDE 106 106 103   CO2 23 21* 24   BUN 22.1 24.9* 31.7*   CR 0.86 0.79 0.92   ANIONGAP 10 10 8   MAX 8.9 8.9 8.7*   * 107* 109*     Most Recent 2 LFT's:  Recent Labs   Lab Test 06/27/23  0957 08/30/22  0832   AST 22 24   ALT 13 23   ALKPHOS 86 84   BILITOTAL 0.4 0.6     Most Recent Urinalysis:No lab results found.    Discharge Medications   Discharge Medication List as of 9/20/2023 11:56 AM        START taking these medications    Details   !! senna-docusate (SENOKOT-S/PERICOLACE) 8.6-50 MG tablet Take 1 tablet by mouth 2 times daily for 30 days, Disp-60 tablet, R-0, E-Prescribe      tamsulosin (FLOMAX) 0.4 MG capsule Take 1 capsule (0.4 mg) by mouth daily for 90 days, Disp-90 capsule, R-0, E-Prescribe       !! - Potential duplicate medications found. Please discuss with provider.        CONTINUE these medications which have NOT CHANGED    Details   aspirin 81 MG EC tablet Take 81 mg by mouth daily, Historical      cholecalciferol (VITAMIN D) 1000 UNIT tablet Take 1 tablet by mouth daily., Disp-100 tablet, R-12, Historical      Cyanocobalamin (B-12) 1000 MCG TBCR Take 1 tablet by mouth daily, Historical      fish oil-omega-3 fatty acids 1000 MG capsule Take 2,000 mg by mouth daily, Disp-120 capsule, R-11, Historical      oxyCODONE (ROXICODONE) 5 MG tablet Take 1-2 tablets (5-10 mg) by mouth every 4 hours as needed for moderate to severe pain, Disp-26 tablet, R-0, Transitional      polyethylene glycol (MIRALAX) 17 g packet Take 1 packet by mouth as needed for constipation, Historical      pregabalin (LYRICA) 225 MG capsule Take 225 mg by mouth daily, Historical      !! senna-docusate (SENOKOT-S/PERICOLACE) 8.6-50 MG tablet Take 1 tablet by mouth daily as needed for constipation, Historical      simvastatin (ZOCOR) 20 MG tablet TAKE 1 TABLET BY MOUTH EVERYDAY AT BEDTIME, Disp-90 tablet, R-3, E-PrescribeProfile Rx: patient will contact pharmacy when needed       !! - Potential  duplicate medications found. Please discuss with provider.        Allergies   Allergies   Allergen Reactions    Penicillins Other (See Comments)     taya

## 2023-09-27 NOTE — PROGRESS NOTES
"  [facility]   Acute Rehabilitation Unit  Daily progress note    INTERVAL HISTORY  TR held and reviewed.    Genaro Ortiz was seen sitting up in bed, therapy going well.  Denies n/v/, sob, fevers and dizziness.  Therapists note that he has been loading the right lower extremity more than TTWB. Xray completed stable. Reviewed weight bearing plans. Denies other questions or concerns today.     Funcyionally:  See TR for details.       MEDICATIONS   aspirin  162 mg Oral Daily    cyanocobalamin  1,000 mcg Oral Daily    pregabalin  225 mg Oral QPM    simvastatin  20 mg Oral At Bedtime    tamsulosin  0.4 mg Oral Daily    vitamin D3  1,000 Units Oral Daily        acetaminophen, naloxone **OR** naloxone **OR** naloxone **OR** naloxone, oxyCODONE, polyethylene glycol, senna-docusate     PHYSICAL EXAM  /63 (BP Location: Left arm)   Pulse 71   Temp 97.6  F (36.4  C) (Oral)   Resp 18   Ht 1.753 m (5' 9\")   Wt 100 kg (220 lb 7.4 oz)   SpO2 97%   BMI 32.56 kg/m    Gen: awake alert nad  HEENT: mmm   Cardio: rrr  Pulm: non labored clear  Abd: soft non distended non tender  Ext: warm dry with no significant edema  Neuro/MSK: alert speech clear. Non tender.    LABS  CBC RESULTS:   Recent Labs   Lab Test 09/25/23  0621 09/21/23  0612 09/19/23  0736   WBC 5.8 6.0 6.0   RBC 3.06* 3.08* 3.20*   HGB 8.5* 8.6* 8.9*   HCT 26.7* 26.8* 27.4*   MCV 87 87 86   MCH 27.8 27.9 27.8   MCHC 31.8 32.1 32.5   RDW 14.9 14.8 14.6    201 186       Last Basic Metabolic Panel:  Recent Labs   Lab Test 09/25/23  0621 09/21/23  0612 09/19/23  0736    137 135*   POTASSIUM 4.1 4.0 4.0   CHLORIDE 106 106 103   CO2 23 21* 24   ANIONGAP 10 10 8   * 107* 109*   BUN 22.1 24.9* 31.7*   CR 0.86 0.79 0.92   GFRESTIMATED 90 >90 86   MAX 8.9 8.9 8.7*           Rehabilitation - continue comprehensive acute inpatient rehabilitation program with multidisciplinary approach including therapies, rehab " nursing, and physiatry following. See interval history for updates.      ASSESSMENT AND PLAN    Genaro Ortiz is a 78 year old man with past medical history of OA s/p bilateral hip  arthroplasties 7/2023, HTN, prostate cancer, HLE, and anemia who presented with right hip fracture in setting of mechanical fall 9/16/23 seen by ortho managed non-operatively. Course complicated by urinary retention.  Admitted to rehab 9/20/23.     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    -continue PT/OT  -follow up Ortho  -asa 162 per ortho       Dyslipidemia  Zocor 20 mg nightly     Chronic Anemia  B12 deficiency  HGB 8.5  -trend continue vitamin B supplement        History of prostate Cancer s/p prostatectomy  Urinary retention  Occurred post operatively in July seen 9/6 in urology clinic without acute concerns and then inpatient recommended intermittent straight cath and ongoing monitoring.   -straight cath prn  -monitor pvrs  -continue flomax  -follow up urology     Neuropathic pain  Seen by neurology 6/7/23  -continue lyrica     Vitamin D Deficiency  -continue vitamin D supplement    HTN  Not on medications at time of rehab admission  -monitor    Adjustment to disability:    FEN: reg  Bowel: monitor  Bladder: some retention monitor  DVT Prophylaxis: asa per ortho  GI Prophylaxis:na   Code: full   Disposition: goal for home   ELOS: 10/4/23  Follow up Appointments on Discharge:  Ortho, pcp, urology    Doing well. Discussed with team. Continue cares and plans outlined.    Reilly Angel MD

## 2023-09-27 NOTE — PROGRESS NOTES
CLINICAL NUTRITION SERVICES    Reviewed nutrition risk factors due to LOS. Pt is tolerating diet, eating well per nursing documentation (100% per flowsheets). Per [employer], pt ordering 3 adequate meals/day from room service. No nutrition issues identified at this time. RD will follow via rounds at this time, unless consulted.    Lynn Watts RD, LD  ARU RD pager: [phone]  Weekend/Holiday RD pager: [phone]

## 2023-09-27 NOTE — PROGRESS NOTES
Team rounds today. Discharge moved to Wed 10/04/23. Belén Washington with [facility] updated. Will continue to follow.     PHI Pfeiffer   [facility]   Direct Phone: [phone]  I   Pager: [phone]  I  Fax: [phone]

## 2023-09-28 ENCOUNTER — APPOINTMENT (OUTPATIENT)
Dept: PHYSICAL THERAPY | Facility: CLINIC | Age: 78
DRG: 561 | End: 2023-09-28
Attending: PHYSICAL MEDICINE & REHABILITATION
Payer: COMMERCIAL

## 2023-09-28 ENCOUNTER — APPOINTMENT (OUTPATIENT)
Dept: OCCUPATIONAL THERAPY | Facility: CLINIC | Age: 78
DRG: 561 | End: 2023-09-28
Attending: PHYSICAL MEDICINE & REHABILITATION
Payer: COMMERCIAL

## 2023-09-28 PROCEDURE — 97530 THERAPEUTIC ACTIVITIES: CPT | Mod: GP | Performed by: STUDENT IN AN ORGANIZED HEALTH CARE EDUCATION/TRAINING PROGRAM

## 2023-09-28 PROCEDURE — 97535 SELF CARE MNGMENT TRAINING: CPT | Mod: GO

## 2023-09-28 PROCEDURE — 99231 SBSQ HOSP IP/OBS SF/LOW 25: CPT | Mod: FS | Performed by: PHYSICIAN ASSISTANT

## 2023-09-28 PROCEDURE — 250N000013 HC RX MED GY IP 250 OP 250 PS 637: Performed by: PHYSICIAN ASSISTANT

## 2023-09-28 PROCEDURE — 128N000003 HC R&B REHAB

## 2023-09-28 PROCEDURE — 97530 THERAPEUTIC ACTIVITIES: CPT | Mod: GP

## 2023-09-28 RX ADMIN — SIMVASTATIN 20 MG: 10 TABLET, FILM COATED ORAL at 21:30

## 2023-09-28 RX ADMIN — Medication 1000 UNITS: at 07:48

## 2023-09-28 RX ADMIN — PREGABALIN 225 MG: 75 CAPSULE ORAL at 21:30

## 2023-09-28 RX ADMIN — CYANOCOBALAMIN TAB 1000 MCG 1000 MCG: 1000 TAB at 07:48

## 2023-09-28 RX ADMIN — ASPIRIN 162 MG: 81 TABLET, COATED ORAL at 07:48

## 2023-09-28 RX ADMIN — TAMSULOSIN HYDROCHLORIDE 0.4 MG: 0.4 CAPSULE ORAL at 07:48

## 2023-09-28 ASSESSMENT — ACTIVITIES OF DAILY LIVING (ADL)
ADLS_ACUITY_SCORE: 31

## 2023-09-28 NOTE — PLAN OF CARE
Goal Outcome Evaluation:         Overall Patient Progress: no changeOverall Patient Progress: no change    Outcome Evaluation: Denies pain. Ate well, 100% for dinner. Assisted with 1 for ambulation/transfers. Continent of bladder, used the urinal with staff to empty. Able to use call light appropriately and waited for assistance.

## 2023-09-28 NOTE — PLAN OF CARE
Discharge Planner Post-Acute Rehab OT:      Discharge Plan: Home w/ HH likely and HH therapy     Precautions: TT WB RLE Per orhto 9/27- pt able to do partial WB on RLE as long as it does not cause discomfort to pt.      Current Status:  ADLs:  Mobility: CGA-SBA using walker; supervision in w/c   Dressing:Superivison w use of AE for LB; IND overhead shirt  Bathing: Mod A to wash body on ETB and to get back side; min A to stand from ETB and W/c  Toileting: min a with cues to maintain ttwb in RLE with commode over toilet and standing for pericare   IADLs: has cleaning staff come 1x/week; supervision at w/c level   Vision/Cognition: vision wnl, 25/30 SLUMS, currently doing MAP     Assessment: Pt demonstrating good adherence and safety with updated partial Wbing on RLE with the use of FWW for all ADLs this AM. Continues to required CGA. Please limit walking to household distances.      Other Barriers to Discharge (DME, Family Training, etc):   Family Training: TBD- Likely none d/t not having anyone to help him.  Continued HH services  Order tray for wheelchair on [employer]  Doing MAP now  Lives alone

## 2023-09-28 NOTE — PLAN OF CARE
FOCUS/GOAL  Bladder management, Mobility, and Safety management    ASSESSMENT, INTERVENTIONS AND CONTINUING PLAN FOR GOAL:  Patient is alert and oriented x4.  Transfers assist of 1 with walker and gait belt.  MAP completed.  Tube socks off at night.  VS stable.  Continent of both bowel and bladder, LBM 9/26.  Denies pain.  Alarms are on; call light within reach.      Goal Outcome Evaluation:

## 2023-09-28 NOTE — PROGRESS NOTES
"  [facility]   Acute Rehabilitation Unit  Daily progress note    INTERVAL HISTORY  Seen sitting up in chair, doing well denies pain, denies sob, headache, dizziness and fever.  Urinating well with flomax though feels this causes urgency, he is hesitant to make changes to this regimen as \"better than retaining\".  Denies other concerns.    Discussed weight bearing with ortho 9/27- wbat, given ongoing hip fracture would limit weight bearing to what is functionally needed for recovery-     PT:   Assessment: Nearly ready for MOD I in room with partial RLE WBing using WW. Please limit walking to household distances. Pt has (~100 ft hallway to enter condo unit).     OT:   Assessment: Pt demonstrating good adherence and safety with updated partial Wbing on RLE with the use of FWW for all ADLs this AM. Continues to required CGA. Please limit walking to household distances.         MEDICATIONS   aspirin  162 mg Oral Daily    cyanocobalamin  1,000 mcg Oral Daily    pregabalin  225 mg Oral QPM    simvastatin  20 mg Oral At Bedtime    tamsulosin  0.4 mg Oral Daily    vitamin D3  1,000 Units Oral Daily        acetaminophen, naloxone **OR** naloxone **OR** naloxone **OR** naloxone, oxyCODONE, polyethylene glycol, senna-docusate     PHYSICAL EXAM  /69 (BP Location: Right arm)   Pulse 65   Temp 98.4  F (36.9  C) (Oral)   Resp 16   Ht 1.753 m (5' 9\")   Wt 100 kg (220 lb 7.4 oz)   SpO2 97%   BMI 32.56 kg/m    Gen: awake alert nad  HEENT: mmm   Cardio: rrr  Pulm: non labored clear  Abd: soft non distended non tender  Ext: warm dry with no significant edema  Neuro/MSK: alert speech clear. Non tender.    LABS  CBC RESULTS:   Recent Labs   Lab Test 09/25/23  0621 09/21/23  0612 09/19/23  0736   WBC 5.8 6.0 6.0   RBC 3.06* 3.08* 3.20*   HGB 8.5* 8.6* 8.9*   HCT 26.7* 26.8* 27.4*   MCV 87 87 86   MCH 27.8 27.9 27.8   MCHC 31.8 32.1 32.5   RDW 14.9 14.8 14.6    201 186       Last Basic " Metabolic Panel:  Recent Labs   Lab Test 09/25/23  0621 09/21/23  0612 09/19/23  0736    137 135*   POTASSIUM 4.1 4.0 4.0   CHLORIDE 106 106 103   CO2 23 21* 24   ANIONGAP 10 10 8   * 107* 109*   BUN 22.1 24.9* 31.7*   CR 0.86 0.79 0.92   GFRESTIMATED 90 >90 86   MAX 8.9 8.9 8.7*           Rehabilitation - continue comprehensive acute inpatient rehabilitation program with multidisciplinary approach including therapies, rehab nursing, and physiatry following. See interval history for updates.      ASSESSMENT AND PLAN    Genaro Ortiz is a 78 year old man with past medical history of OA s/p bilateral hip  arthroplasties 7/2023, HTN, prostate cancer, HLE, and anemia who presented with right hip fracture in setting of mechanical fall 9/16/23 seen by ortho managed non-operatively. Course complicated by urinary retention.  Admitted to rehab 9/20/23.     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    -continue PT/OT  -follow up Ortho  -asa 162 per ortho   -partial weight bearing RLE- limit to household distances    Dyslipidemia  Zocor 20 mg nightly     Chronic Anemia  B12 deficiency  HGB 8.5 9/25  -trend continue vitamin B supplement     History of prostate Cancer s/p prostatectomy  Urinary retention  Occurred post operatively in July seen 9/6 in urology clinic without acute concerns and then inpatient recommended intermittent straight cath and ongoing monitoring.   -straight cath prn  -monitor pvrs  -continue flomax  -follow up urology     Neuropathic pain  Seen by neurology 6/7/23  -continue lyrica     Vitamin D Deficiency  -continue vitamin D supplement    HTN  Not on medications at time of rehab admission  -monitor    Adjustment to disability:    FEN: reg  Bowel: monitor  Bladder: some retention monitor  DVT Prophylaxis: asa per  ortho  GI Prophylaxis:na   Code: full   Disposition: goal for home   ELOS: 10/4/23  Follow up Appointments on Discharge:  Ortho, pcp, urology    Emma Gutierrez PA-C

## 2023-09-28 NOTE — PLAN OF CARE
Discharge Planner Post-Acute Rehab PT:     Discharge Plan: Home alone on 9/29, with HCPT    Precautions: B JOHNNY in July 2023, Falls, RLE partial WBing as tolerated as long as it does not cause discomfort (periprosthetic femur fracture).     Edema: Size E spandigrip B LE's and shoes on for edema management. Will need to problem solve managing at home    Current Status:  Bed Mobility: SBA, increased time  Transfer: SBA with FWW  Gait: SBA with FWW with partial WB RLE  Stairs: None at baseline  Balance: Heavy reliance on walker for stability    Assessment: Nearly ready for MOD I in room with partial RLE WBing using WW. Please limit walking to household distances. Pt has (~100 ft hallway to enter condo unit).    Other Barriers to Discharge (DME, Family Training, etc):   Lives alone - ? Mixed mobility for community accessibility and fatigue with household ADLs  K3 rental vs transport wc (will be OOP)  Shoes with velcro straps, or magnetic laces- could possibly get new slip on shoes.current slip on shoe too loose and L foot/ankle slipping out and partially WB on lateral aspect

## 2023-09-29 ENCOUNTER — APPOINTMENT (OUTPATIENT)
Dept: OCCUPATIONAL THERAPY | Facility: CLINIC | Age: 78
DRG: 561 | End: 2023-09-29
Attending: PHYSICAL MEDICINE & REHABILITATION
Payer: COMMERCIAL

## 2023-09-29 ENCOUNTER — APPOINTMENT (OUTPATIENT)
Dept: PHYSICAL THERAPY | Facility: CLINIC | Age: 78
DRG: 561 | End: 2023-09-29
Attending: PHYSICAL MEDICINE & REHABILITATION
Payer: COMMERCIAL

## 2023-09-29 PROCEDURE — 99231 SBSQ HOSP IP/OBS SF/LOW 25: CPT | Mod: FS | Performed by: PHYSICIAN ASSISTANT

## 2023-09-29 PROCEDURE — 97535 SELF CARE MNGMENT TRAINING: CPT | Mod: GO

## 2023-09-29 PROCEDURE — 97110 THERAPEUTIC EXERCISES: CPT | Mod: GO

## 2023-09-29 PROCEDURE — 97530 THERAPEUTIC ACTIVITIES: CPT | Mod: GP

## 2023-09-29 PROCEDURE — 128N000003 HC R&B REHAB

## 2023-09-29 PROCEDURE — 97535 SELF CARE MNGMENT TRAINING: CPT | Mod: GO | Performed by: OCCUPATIONAL THERAPIST

## 2023-09-29 PROCEDURE — 250N000013 HC RX MED GY IP 250 OP 250 PS 637: Performed by: PHYSICIAN ASSISTANT

## 2023-09-29 RX ADMIN — ASPIRIN 162 MG: 81 TABLET, COATED ORAL at 07:56

## 2023-09-29 RX ADMIN — Medication 1000 UNITS: at 07:56

## 2023-09-29 RX ADMIN — CYANOCOBALAMIN TAB 1000 MCG 1000 MCG: 1000 TAB at 07:56

## 2023-09-29 RX ADMIN — TAMSULOSIN HYDROCHLORIDE 0.4 MG: 0.4 CAPSULE ORAL at 07:56

## 2023-09-29 RX ADMIN — PREGABALIN 225 MG: 75 CAPSULE ORAL at 20:40

## 2023-09-29 RX ADMIN — SIMVASTATIN 20 MG: 10 TABLET, FILM COATED ORAL at 20:40

## 2023-09-29 ASSESSMENT — ACTIVITIES OF DAILY LIVING (ADL)
ADLS_ACUITY_SCORE: 34
ADLS_ACUITY_SCORE: 30
ADLS_ACUITY_SCORE: 31
ADLS_ACUITY_SCORE: 30
ADLS_ACUITY_SCORE: 31
ADLS_ACUITY_SCORE: 30
ADLS_ACUITY_SCORE: 34
ADLS_ACUITY_SCORE: 34
ADLS_ACUITY_SCORE: 30
ADLS_ACUITY_SCORE: 34

## 2023-09-29 NOTE — PLAN OF CARE
Goal Outcome Evaluation:         Patient is alert and oriented, calls appropriately, slept most of the night, awaken in between toilet needs, No complained of pain, headache, chest pain, N&V, no SOB. Continent utilized urinal at night, voiding well, no BM. Safety rounding checked completed, 3 side rails UP, bed alarm ON, call light/bedside table within reach.   Plan of care ongoing

## 2023-09-29 NOTE — PLAN OF CARE
Goal Outcome Evaluation:       Overall Patient Progress: no changeOverall Patient Progress: no change    Outcome Evaluation: Denies pain. Ate 100% of dinner. Continent of bladder, used the urinal with staff to empty. Continent of bowel, used the toilet with assist of 1 gait belt/walker. Compression stockings off at bedtime. Able to make his needs known, used his call light appropriately and waited for assistance.

## 2023-09-29 NOTE — PLAN OF CARE
Goal Outcome Evaluation:    Care plan reviewed with: Patient    Overall patient progress: Progressing    Patient is alert and oriented x 4, denies headache or dizziness, denies cough or sob. Voiding spontaneously on the urinal and staff empties it.Continent of BM on the toilet, assisted with ishan care, will continue  Poc

## 2023-09-29 NOTE — PLAN OF CARE
"Discharge Planner Post-Acute Rehab PT:     Discharge Plan: Home alone on 9/29, with HCPT    Precautions: B JOHNNY in July 2023, Falls, RLE partial WBing as tolerated as long as it does not cause discomfort (periprosthetic femur fracture).     Edema: Size E spandigrip B LE's and shoes on for edema management. Will need to problem solve managing at home    Current Status:  Bed Mobility: SBA, increased time  Transfer: SBA with FWW  Gait: SBA with FWW with partial WB RLE  Stairs: None at baseline  Balance: Heavy reliance on walker for stability    Assessment: Pt has been advised by medical team to limit ambulation to \"only as needed.\" On track for discharge to home on Wednesday 10/4. Has ordered manual wheelchair through [employer], to be delivered by 10/4. Pt needs to progress to safe sit <> stand from 20\" height, as well as become proficient in wheelchair operation of brakes and leg rests prior to discharge.    Other Barriers to Discharge (DME, Family Training, etc):   Lives alone  Wheelchair ordered: 9/29 ([employer], 20\" height)  [employer] shoes ordered: 9/29  Transportation to home  "

## 2023-09-29 NOTE — PROGRESS NOTES
"  [facility]   Acute Rehabilitation Unit  Daily progress note    INTERVAL HISTORY  Seen sitting up in chair, some bleeding from superficial scab on forehead on right which he reports he scratched earlier.  Denies pain, headaches, fevers, dizziness, and shortness of breath.  Eating and sleep ok, denies bowel or bladder concerns.  Right lower extremity remains more swollen than right.        MEDICATIONS   aspirin  162 mg Oral Daily    cyanocobalamin  1,000 mcg Oral Daily    pregabalin  225 mg Oral QPM    simvastatin  20 mg Oral At Bedtime    tamsulosin  0.4 mg Oral Daily    vitamin D3  1,000 Units Oral Daily        acetaminophen, naloxone **OR** naloxone **OR** naloxone **OR** naloxone, polyethylene glycol, senna-docusate     PHYSICAL EXAM  /72 (BP Location: Left arm)   Pulse 59   Temp 98.2  F (36.8  C) (Oral)   Resp 16   Ht 1.753 m (5' 9\")   Wt 99.3 kg (219 lb)   SpO2 100%   BMI 32.34 kg/m    Gen: awake alert nad  HEENT: mmm   Cardio: rrr  Pulm: non labored clear  Abd: soft non distended non tender  Ext: warm dry with no significant edema  Neuro/MSK: alert speech clear. Non tender.    LABS  CBC RESULTS:   Recent Labs   Lab Test 09/25/23  0621 09/21/23  0612 09/19/23  0736   WBC 5.8 6.0 6.0   RBC 3.06* 3.08* 3.20*   HGB 8.5* 8.6* 8.9*   HCT 26.7* 26.8* 27.4*   MCV 87 87 86   MCH 27.8 27.9 27.8   MCHC 31.8 32.1 32.5   RDW 14.9 14.8 14.6    201 186       Last Basic Metabolic Panel:  Recent Labs   Lab Test 09/25/23  0621 09/21/23  0612 09/19/23  0736    137 135*   POTASSIUM 4.1 4.0 4.0   CHLORIDE 106 106 103   CO2 23 21* 24   ANIONGAP 10 10 8   * 107* 109*   BUN 22.1 24.9* 31.7*   CR 0.86 0.79 0.92   GFRESTIMATED 90 >90 86   MAX 8.9 8.9 8.7*           Rehabilitation - continue comprehensive acute inpatient rehabilitation program with multidisciplinary approach including therapies, rehab nursing, and physiatry following. See interval history for " updates.      ASSESSMENT AND PLAN    Genaro Ortiz is a 78 year old man with past medical history of OA s/p bilateral hip  arthroplasties 7/2023, HTN, prostate cancer, HLE, and anemia who presented with right hip fracture in setting of mechanical fall 9/16/23 seen by ortho managed non-operatively. Course complicated by urinary retention.  Admitted to rehab 9/20/23.     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    -continue PT/OT  -follow up Ortho  -asa 162 per ortho   -partial weight bearing RLE- limit to household distances    Dyslipidemia  Zocor 20 mg nightly     Chronic Anemia  B12 deficiency  HGB 8.5 9/25  -trend continue vitamin B supplement     History of prostate Cancer s/p prostatectomy  Urinary retention  Occurred post operatively in July seen 9/6 in urology clinic without acute concerns and then inpatient recommended intermittent straight cath and ongoing monitoring.   -straight cath prn  -monitor pvrs  -continue flomax  -follow up urology     Neuropathic pain  Seen by neurology 6/7/23  -continue lyrica     Vitamin D Deficiency  -continue vitamin D supplement    HTN  Not on medications at time of rehab admission, BP at goal.   -monitor    Adjustment to disability:    FEN: reg  Bowel: monitor  Bladder: some retention monitor  DVT Prophylaxis: asa per ortho  GI Prophylaxis:na   Code: full   Disposition: goal for home   ELOS: 10/4/23  Follow up Appointments on Discharge:  Ortho, pcp, urology    Emma Gutierrez PA-C

## 2023-09-30 ENCOUNTER — APPOINTMENT (OUTPATIENT)
Dept: PHYSICAL THERAPY | Facility: CLINIC | Age: 78
DRG: 561 | End: 2023-09-30
Attending: PHYSICAL MEDICINE & REHABILITATION
Payer: COMMERCIAL

## 2023-09-30 ENCOUNTER — APPOINTMENT (OUTPATIENT)
Dept: OCCUPATIONAL THERAPY | Facility: CLINIC | Age: 78
DRG: 561 | End: 2023-09-30
Attending: PHYSICAL MEDICINE & REHABILITATION
Payer: COMMERCIAL

## 2023-09-30 PROCEDURE — 97535 SELF CARE MNGMENT TRAINING: CPT | Mod: GO | Performed by: OCCUPATIONAL THERAPIST

## 2023-09-30 PROCEDURE — 128N000003 HC R&B REHAB

## 2023-09-30 PROCEDURE — 97530 THERAPEUTIC ACTIVITIES: CPT | Mod: GO | Performed by: OCCUPATIONAL THERAPIST

## 2023-09-30 PROCEDURE — 250N000013 HC RX MED GY IP 250 OP 250 PS 637: Performed by: PHYSICIAN ASSISTANT

## 2023-09-30 PROCEDURE — 97530 THERAPEUTIC ACTIVITIES: CPT | Mod: GP | Performed by: REHABILITATION PRACTITIONER

## 2023-09-30 PROCEDURE — 97150 GROUP THERAPEUTIC PROCEDURES: CPT | Mod: GP | Performed by: PHYSICAL THERAPIST

## 2023-09-30 RX ADMIN — Medication 1000 UNITS: at 08:15

## 2023-09-30 RX ADMIN — SIMVASTATIN 20 MG: 10 TABLET, FILM COATED ORAL at 21:22

## 2023-09-30 RX ADMIN — PREGABALIN 225 MG: 75 CAPSULE ORAL at 21:22

## 2023-09-30 RX ADMIN — CYANOCOBALAMIN TAB 1000 MCG 1000 MCG: 1000 TAB at 08:15

## 2023-09-30 RX ADMIN — TAMSULOSIN HYDROCHLORIDE 0.4 MG: 0.4 CAPSULE ORAL at 08:15

## 2023-09-30 RX ADMIN — ASPIRIN 162 MG: 81 TABLET, COATED ORAL at 08:14

## 2023-09-30 ASSESSMENT — ACTIVITIES OF DAILY LIVING (ADL)
ADLS_ACUITY_SCORE: 34
ADLS_ACUITY_SCORE: 35
ADLS_ACUITY_SCORE: 34
ADLS_ACUITY_SCORE: 35
ADLS_ACUITY_SCORE: 34

## 2023-09-30 NOTE — PLAN OF CARE
Pt here for R hip fx, nonsurgical    Goal Outcome Evaluation:      Plan of Care Reviewed With: patient    Overall Patient Progress: no change    Outcome Evaluation: No changes      VS: Temp: 98.2  F (36.8  C) Temp src: Oral BP: 119/51 Pulse: 67   Resp: 16 SpO2: 98 % O2 Device: None (Room air)      O2: RA   Output: Not recorded   Last BM: Today   Activity: Ax1 GBW   Skin: Intact   Pain: Denies   Neuro: A&O x4   Dressing: NA   Diet: Regular, ate 100% of meal   LDA: None   Equipment: Walker   Plan: Continue therapies    Additional Info:

## 2023-09-30 NOTE — PLAN OF CARE
Goal Outcome Evaluation:      Plan of Care Reviewed With: patient    Overall Patient Progress: no change  Alert and oriented and calls appropriately. Denies pain, chest tightness, sob or n/v. Continent of bladder and uses the urinal to void. Edema on both LE noted, legs elevated with pillows. Safety measures intact. Bed alarms on and call light is within reach. POC ongoing.

## 2023-09-30 NOTE — PLAN OF CARE
Discharge Planner Post-Acute Rehab OT:      Discharge Plan: Home w/ HH likely and HH therapy      Precautions: TT WB RLE Per ortho 9/27- pt able to do partial WB on RLE as long as it does not cause discomfort to pt. However, very short distances only (walking from doorway in bathroom to toilet, and tx from bed to wheelchair. Otherwise needs to be in w/c most the time to allow for foot fx to heal).     Current Status:  ADLs:  Mobility: CGA-SBA using walker; supervision in w/c   Dressing:Superivison w use of AE for LB; IND overhead shirt  Bathing: Mod A to wash body on ETB and to get back side; min A to stand from ETB and W/c  Toileting: Supervision tx with commode over toilet and seated pericare   IADLs: has cleaning staff come 1x/month; supervision at w/c level   Vision/Cognition: vision wnl, 25/30 SLUMS, currently doing MAP     Assessment: Pt sba-supervision with toileting this date. Pt needing 1 reminder to not pull w/c so close to doorway to make room to fit walker in with over-lay commode. Pt does well with wiping this date as well and managing clothing. Pt uses reacher complete LBD for brief and short change as well on toilet. Anticipate a safe return home on scheduled discharge date. Pt wondering about metro mobility, so reached out to social work for pt.     Other Barriers to Discharge (DME, Family Training, etc):   Family Training: TBD- Likely none d/t not having anyone to help him.  Continued HH services  Order tray for wheelchair on [employer]  Doing MAP now  Lives alone

## 2023-09-30 NOTE — PROGRESS NOTES
"  [facility]   Acute Rehabilitation Unit  Daily progress note    INTERVAL HISTORY  Seen sitting up in chair when checked in during am rounds. Denies pain, headaches, fevers, dizziness, and shortness of breath.        MEDICATIONS   aspirin  162 mg Oral Daily    cyanocobalamin  1,000 mcg Oral Daily    pregabalin  225 mg Oral QPM    simvastatin  20 mg Oral At Bedtime    tamsulosin  0.4 mg Oral Daily    vitamin D3  1,000 Units Oral Daily        acetaminophen, naloxone **OR** naloxone **OR** naloxone **OR** naloxone, polyethylene glycol, senna-docusate     PHYSICAL EXAM  BP (!) 141/73 (BP Location: Right arm)   Pulse 68   Temp 97.8  F (36.6  C) (Oral)   Resp 16   Ht 1.753 m (5' 9\")   Wt 99.3 kg (219 lb)   SpO2 97%   BMI 32.34 kg/m    Gen: awake alert nad  HEENT: mmm   Cardio: rrr  Pulm: non labored clear  Abd: soft non distended non tender  Ext: warm dry with no significant edema  Neuro/MSK: alert speech clear. Non tender.    LABS  CBC RESULTS:   Recent Labs   Lab Test 09/25/23  0621 09/21/23  0612 09/19/23  0736   WBC 5.8 6.0 6.0   RBC 3.06* 3.08* 3.20*   HGB 8.5* 8.6* 8.9*   HCT 26.7* 26.8* 27.4*   MCV 87 87 86   MCH 27.8 27.9 27.8   MCHC 31.8 32.1 32.5   RDW 14.9 14.8 14.6    201 186       Last Basic Metabolic Panel:  Recent Labs   Lab Test 09/25/23  0621 09/21/23  0612 09/19/23  0736    137 135*   POTASSIUM 4.1 4.0 4.0   CHLORIDE 106 106 103   CO2 23 21* 24   ANIONGAP 10 10 8   * 107* 109*   BUN 22.1 24.9* 31.7*   CR 0.86 0.79 0.92   GFRESTIMATED 90 >90 86   MAX 8.9 8.9 8.7*           Rehabilitation - continue comprehensive acute inpatient rehabilitation program with multidisciplinary approach including therapies, rehab nursing, and physiatry following. See interval history for updates.      ASSESSMENT AND PLAN    Genaro Ortiz is a 78 year old man with past medical history of OA s/p bilateral hip  arthroplasties 7/2023, HTN, prostate cancer, HLE, and " anemia who presented with right hip fracture in setting of mechanical fall 9/16/23 seen by ortho managed non-operatively. Course complicated by urinary retention.  Admitted to rehab 9/20/23.     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    -continue PT/OT  -follow up Ortho  -asa 162 per ortho   -partial weight bearing RLE- limit to household distances    Dyslipidemia  Zocor 20 mg nightly     Chronic Anemia  B12 deficiency  HGB 8.5 9/25  -trend continue vitamin B supplement     History of prostate Cancer s/p prostatectomy  Urinary retention  Occurred post operatively in July seen 9/6 in urology clinic without acute concerns and then inpatient recommended intermittent straight cath and ongoing monitoring.   -straight cath prn  -monitor pvrs  -continue flomax  -follow up urology     Neuropathic pain  Seen by neurology 6/7/23  -continue lyrica     Vitamin D Deficiency  -continue vitamin D supplement    HTN  Not on medications at time of rehab admission, BP at goal.   -monitor    Adjustment to disability:    FEN: reg  Bowel: monitor  Bladder: some retention monitor  DVT Prophylaxis: asa per ortho  GI Prophylaxis:na   Code: full   Disposition: goal for home   ELOS: 10/4/23  Follow up Appointments on Discharge:  Ortho, pcp, urology    Continue current management    .Nidia Andres    Spasticity    [facility]

## 2023-09-30 NOTE — PLAN OF CARE
Pt attended Falls Prevention class today with group of 3 patients. Pt selected for class due to documented gait deficit and falls risk. Class includes education in falls risks, how to decrease that risk through behavior and home modifications and energy conservation; and instruction in available equipment designed to increase home safety. Pt was able to verbalize understanding of materials and participated appropriately in the discussion and problem-solving segments of the class.

## 2023-09-30 NOTE — PLAN OF CARE
"Discharge Planner Post-Acute Rehab PT:     Discharge Plan: Home alone on 9/29, with HCPT    Precautions: B JOHNNY in July 2023, Falls, RLE partial WBing as tolerated as long as it does not cause discomfort (periprosthetic femur fracture).     Edema: Size E spandigrip B LE's and shoes on for edema management. Will need to problem solve managing at home    Current Status:  Bed Mobility: SBA, increased time  Transfer: SBA with FWW  Gait: SBA with FWW with partial WB RLE  Stairs: None at baseline  Balance: Heavy reliance on walker for stability    Assessment: Pt has been advised by medical team to limit ambulation to \"only as needed.\" On track for discharge to home on Wednesday 10/4. Has ordered manual wheelchair through [employer], to be delivered by 10/4. Pt cont to progress with STS down to 19\"     Other Barriers to Discharge (DME, Family Training, etc):   Lives alone  Wheelchair ordered: 9/29 ([employer], 20\" height)  [employer] shoes ordered: 9/29  Transportation to home  "

## 2023-09-30 NOTE — PLAN OF CARE
Goal Outcome Evaluation:      Plan of Care Reviewed With: patient    Overall Patient Progress: no changeOverall Patient Progress: no change    Outcome Evaluation: No changes during shift. Continent of both. R/T/W. Needs in reach and safety measures in palce.

## 2023-10-01 ENCOUNTER — APPOINTMENT (OUTPATIENT)
Dept: PHYSICAL THERAPY | Facility: CLINIC | Age: 78
DRG: 561 | End: 2023-10-01
Attending: PHYSICAL MEDICINE & REHABILITATION
Payer: COMMERCIAL

## 2023-10-01 ENCOUNTER — APPOINTMENT (OUTPATIENT)
Dept: OCCUPATIONAL THERAPY | Facility: CLINIC | Age: 78
DRG: 561 | End: 2023-10-01
Attending: PHYSICAL MEDICINE & REHABILITATION
Payer: COMMERCIAL

## 2023-10-01 PROCEDURE — 97116 GAIT TRAINING THERAPY: CPT | Mod: GP | Performed by: PHYSICAL THERAPIST

## 2023-10-01 PROCEDURE — 97530 THERAPEUTIC ACTIVITIES: CPT | Mod: GP | Performed by: REHABILITATION PRACTITIONER

## 2023-10-01 PROCEDURE — 97110 THERAPEUTIC EXERCISES: CPT | Mod: GO | Performed by: OCCUPATIONAL THERAPIST

## 2023-10-01 PROCEDURE — 97530 THERAPEUTIC ACTIVITIES: CPT | Mod: GP | Performed by: PHYSICAL THERAPIST

## 2023-10-01 PROCEDURE — 250N000013 HC RX MED GY IP 250 OP 250 PS 637: Performed by: PHYSICIAN ASSISTANT

## 2023-10-01 PROCEDURE — 97535 SELF CARE MNGMENT TRAINING: CPT | Mod: GO | Performed by: OCCUPATIONAL THERAPIST

## 2023-10-01 PROCEDURE — 128N000003 HC R&B REHAB

## 2023-10-01 PROCEDURE — 97110 THERAPEUTIC EXERCISES: CPT | Mod: GP | Performed by: PHYSICAL THERAPIST

## 2023-10-01 PROCEDURE — 97530 THERAPEUTIC ACTIVITIES: CPT | Mod: GO | Performed by: OCCUPATIONAL THERAPIST

## 2023-10-01 RX ADMIN — CYANOCOBALAMIN TAB 1000 MCG 1000 MCG: 1000 TAB at 08:44

## 2023-10-01 RX ADMIN — PREGABALIN 225 MG: 75 CAPSULE ORAL at 20:22

## 2023-10-01 RX ADMIN — SIMVASTATIN 20 MG: 10 TABLET, FILM COATED ORAL at 20:22

## 2023-10-01 RX ADMIN — Medication 1000 UNITS: at 08:44

## 2023-10-01 RX ADMIN — TAMSULOSIN HYDROCHLORIDE 0.4 MG: 0.4 CAPSULE ORAL at 08:44

## 2023-10-01 RX ADMIN — ASPIRIN 162 MG: 81 TABLET, COATED ORAL at 08:44

## 2023-10-01 ASSESSMENT — ACTIVITIES OF DAILY LIVING (ADL)
ADLS_ACUITY_SCORE: 33
ADLS_ACUITY_SCORE: 34
ADLS_ACUITY_SCORE: 35
ADLS_ACUITY_SCORE: 33
ADLS_ACUITY_SCORE: 34
ADLS_ACUITY_SCORE: 35
ADLS_ACUITY_SCORE: 34
ADLS_ACUITY_SCORE: 33
ADLS_ACUITY_SCORE: 34
ADLS_ACUITY_SCORE: 34
ADLS_ACUITY_SCORE: 33
ADLS_ACUITY_SCORE: 34

## 2023-10-01 NOTE — PLAN OF CARE
Discharge Planner Post-Acute Rehab OT:      Discharge Plan: Home w/ HH likely and HH therapy      Precautions: Per ortho 9/27- pt able to do partial WB on RLE as long as it does not cause discomfort to pt. However, very short distances only (walking from doorway in bathroom to toilet, and tx from bed to w/c. Otherwise needs to be in w/c most the time to allow for foot fx to heal).     Current Status:  ADLs:  Mobility: SBA using walker; supervision in w/c   Dressing:Superivison w use of AE for LB; IND overhead shirt  Bathing: Mod A to wash body on ETB and to get back side; min A to stand from ETB and W/c  Toileting: Supervision tx with commode over toilet and seated pericare   IADLs: Has cleaning staff come 1x/month; supervision at w/c level   Vision/Cognition: Vision wnl, 25/30 SLUMS, currently doing MAP     Assessment: Pt completes tx to w/c w sba this date and all ADLs seated in w/c in BR with supervision. Pt making good progress towards mod IND in room. Working on IADLs this date including picking up items off floor, carrying items on walker tray, wiping off kitchen counters and washing dishes at the sink from w/c level. Pt does better w/o use of leg rests on w/c.    Other Barriers to Discharge (DME, Family Training, etc):   Family Training: TBD- Likely none d/t not having anyone to help him.  Continued HH services  Order tray for wheelchair on [employer]  Doing MAP now  Lives alone

## 2023-10-01 NOTE — PLAN OF CARE
FOCUS/GOAL  Bladder management, Pain management, Cognition/Memory/Judgment/Problem solving, and Safety management    ASSESSMENT, INTERVENTIONS AND CONTINUING PLAN FOR GOAL:  Pt is alert and oriented. Continent of bladder, voiding spontaneously using urinal. Staff empties urinal. Denied pain or discomfort overnight. Appeared to be sleeping during rounds. Uses call light appropriately, able to make needs known. Bed alarm on for safety.                            [FreeTextEntry1] : -c/w current medications, refill provided\par -F/U in 6 months or PRN

## 2023-10-01 NOTE — PLAN OF CARE
"Discharge Planner Post-Acute Rehab PT:     Discharge Plan: Home alone on 9/29, with HCPT    Precautions: B JOHNNY in July 2023, Falls, RLE partial WBing as tolerated as long as it does not cause discomfort (periprosthetic femur fracture).     Edema: Size E spandigrip B LE's and shoes on for edema management. Will need to problem solve managing at home    Current Status:  Bed Mobility: SBA, increased time  Transfer: SBA with FWW  Gait: SBA with FWW with partial WB RLE  Stairs: None at baseline  Balance: Heavy reliance on walker for stability    Assessment: Pt has been advised by medical team to limit ambulation to \"only as needed.\" On track for discharge to home on Wednesday 10/4. Has ordered manual wheelchair through [employer], to be delivered by 10/4. Pt cont to progress with STS down to 19\" pt progress with functional mob in W/C outside with mulit turns and up and down small inclines and declines all with SBA      Other Barriers to Discharge (DME, Family Training, etc):   Lives alone  Wheelchair ordered: 9/29 ([employer], 20\" height)  [employer] shoes ordered: 9/29  Transportation to home  "

## 2023-10-01 NOTE — PLAN OF CARE
Goal Outcome Evaluation:      Plan of Care Reviewed With: patient    Overall Patient Progress: improvingOverall Patient Progress: improving    Outcome Evaluation: Alert and oriented, using the call light appropriately, participating in therapies, will continue poc

## 2023-10-01 NOTE — PLAN OF CARE
Goal Outcome Evaluation:      Plan of Care Reviewed With: patient    Overall Patient Progress: no change     Orientation: A/O x4, vss on RA.    Bowel: LBM today per pt report.    Bladder:cont using the urinal or to the toilet.   Pain:no c/o pain, no prn medications given.    Ambulation/Transfers: up with assist one with gait belt and walker.    Diet/ Liquids:Regular thin, pills whole.   Skin: bilateral hip incision approximated and open to air.    Bed alarm on for safety, call light within reach. Continue with POC.

## 2023-10-02 ENCOUNTER — APPOINTMENT (OUTPATIENT)
Dept: PHYSICAL THERAPY | Facility: CLINIC | Age: 78
DRG: 561 | End: 2023-10-02
Attending: PHYSICAL MEDICINE & REHABILITATION
Payer: COMMERCIAL

## 2023-10-02 ENCOUNTER — APPOINTMENT (OUTPATIENT)
Dept: OCCUPATIONAL THERAPY | Facility: CLINIC | Age: 78
DRG: 561 | End: 2023-10-02
Attending: PHYSICAL MEDICINE & REHABILITATION
Payer: COMMERCIAL

## 2023-10-02 LAB
ANION GAP SERPL CALCULATED.3IONS-SCNC: 12 MMOL/L (ref 7–15)
BUN SERPL-MCNC: 21.6 MG/DL (ref 8–23)
CALCIUM SERPL-MCNC: 8.8 MG/DL (ref 8.8–10.2)
CHLORIDE SERPL-SCNC: 109 MMOL/L (ref 98–107)
CREAT SERPL-MCNC: 0.84 MG/DL (ref 0.67–1.17)
DEPRECATED HCO3 PLAS-SCNC: 21 MMOL/L (ref 22–29)
EGFRCR SERPLBLD CKD-EPI 2021: 90 ML/MIN/1.73M2
ERYTHROCYTE [DISTWIDTH] IN BLOOD BY AUTOMATED COUNT: 15.6 % (ref 10–15)
GLUCOSE SERPL-MCNC: 103 MG/DL (ref 70–99)
HCT VFR BLD AUTO: 30.1 % (ref 40–53)
HGB BLD-MCNC: 9.8 G/DL (ref 13.3–17.7)
MCH RBC QN AUTO: 27.9 PG (ref 26.5–33)
MCHC RBC AUTO-ENTMCNC: 32.6 G/DL (ref 31.5–36.5)
MCV RBC AUTO: 86 FL (ref 78–100)
PLATELET # BLD AUTO: 292 10E3/UL (ref 150–450)
POTASSIUM SERPL-SCNC: 3.9 MMOL/L (ref 3.4–5.3)
RBC # BLD AUTO: 3.51 10E6/UL (ref 4.4–5.9)
SODIUM SERPL-SCNC: 142 MMOL/L (ref 135–145)
WBC # BLD AUTO: 5.6 10E3/UL (ref 4–11)

## 2023-10-02 PROCEDURE — 80048 BASIC METABOLIC PNL TOTAL CA: CPT | Performed by: PHYSICIAN ASSISTANT

## 2023-10-02 PROCEDURE — 97530 THERAPEUTIC ACTIVITIES: CPT | Mod: GP | Performed by: STUDENT IN AN ORGANIZED HEALTH CARE EDUCATION/TRAINING PROGRAM

## 2023-10-02 PROCEDURE — 250N000013 HC RX MED GY IP 250 OP 250 PS 637: Performed by: PHYSICIAN ASSISTANT

## 2023-10-02 PROCEDURE — 97110 THERAPEUTIC EXERCISES: CPT | Mod: GP | Performed by: STUDENT IN AN ORGANIZED HEALTH CARE EDUCATION/TRAINING PROGRAM

## 2023-10-02 PROCEDURE — 97535 SELF CARE MNGMENT TRAINING: CPT | Mod: GO | Performed by: OCCUPATIONAL THERAPIST

## 2023-10-02 PROCEDURE — 97110 THERAPEUTIC EXERCISES: CPT | Mod: GP

## 2023-10-02 PROCEDURE — 36415 COLL VENOUS BLD VENIPUNCTURE: CPT | Performed by: PHYSICIAN ASSISTANT

## 2023-10-02 PROCEDURE — 85027 COMPLETE CBC AUTOMATED: CPT | Performed by: PHYSICIAN ASSISTANT

## 2023-10-02 PROCEDURE — 97110 THERAPEUTIC EXERCISES: CPT | Mod: GO | Performed by: OCCUPATIONAL THERAPIST

## 2023-10-02 PROCEDURE — 128N000003 HC R&B REHAB

## 2023-10-02 PROCEDURE — 99232 SBSQ HOSP IP/OBS MODERATE 35: CPT | Mod: FS | Performed by: PHYSICIAN ASSISTANT

## 2023-10-02 RX ORDER — DIPHENHYDRAMINE HYDROCHLORIDE 50 MG/ML
50 INJECTION INTRAMUSCULAR; INTRAVENOUS
Status: DISCONTINUED | OUTPATIENT
Start: 2023-10-02 | End: 2023-10-04 | Stop reason: HOSPADM

## 2023-10-02 RX ORDER — DIPHENHYDRAMINE HCL 25 MG
50 CAPSULE ORAL
Status: DISCONTINUED | OUTPATIENT
Start: 2023-10-02 | End: 2023-10-04 | Stop reason: HOSPADM

## 2023-10-02 RX ADMIN — TAMSULOSIN HYDROCHLORIDE 0.4 MG: 0.4 CAPSULE ORAL at 08:34

## 2023-10-02 RX ADMIN — CYANOCOBALAMIN TAB 1000 MCG 1000 MCG: 1000 TAB at 08:34

## 2023-10-02 RX ADMIN — Medication 1000 UNITS: at 08:34

## 2023-10-02 RX ADMIN — ASPIRIN 162 MG: 81 TABLET, COATED ORAL at 08:34

## 2023-10-02 RX ADMIN — SIMVASTATIN 20 MG: 10 TABLET, FILM COATED ORAL at 21:24

## 2023-10-02 RX ADMIN — PREGABALIN 225 MG: 75 CAPSULE ORAL at 21:24

## 2023-10-02 ASSESSMENT — ACTIVITIES OF DAILY LIVING (ADL)
ADLS_ACUITY_SCORE: 36
ADLS_ACUITY_SCORE: 36
ADLS_ACUITY_SCORE: 34
ADLS_ACUITY_SCORE: 34
ADLS_ACUITY_SCORE: 36
ADLS_ACUITY_SCORE: 34
ADLS_ACUITY_SCORE: 36
ADLS_ACUITY_SCORE: 34
ADLS_ACUITY_SCORE: 36
ADLS_ACUITY_SCORE: 36
ADLS_ACUITY_SCORE: 34
ADLS_ACUITY_SCORE: 36

## 2023-10-02 NOTE — PROGRESS NOTES
"  [facility]   Acute Rehabilitation Unit  Daily progress note    INTERVAL HISTORY  Seen sitting up in chair working with OT on upper body strength.  Has been made MOD I wheel chair based in room.  Planning for home 10/4/23 with resumption of home care. Denies n/v/d, sob, headache, dizziness and changes in right hip pain, stable right hip swelling.     MOD I today- goals for home 10/4/23.     MEDICATIONS   aspirin  162 mg Oral Daily    COVID-19 mRNA vaccine 12+y  30 mcg Intramuscular Once    cyanocobalamin  1,000 mcg Oral Daily    [START ON 10/3/2023] influenza vac high-dose quad  0.7 mL Intramuscular Prior to discharge    pregabalin  225 mg Oral QPM    simvastatin  20 mg Oral At Bedtime    tamsulosin  0.4 mg Oral Daily    vitamin D3  1,000 Units Oral Daily        acetaminophen, diphenhydrAMINE **OR** diphenhydrAMINE, EPINEPHrine, naloxone **OR** naloxone **OR** naloxone **OR** naloxone, polyethylene glycol, senna-docusate     PHYSICAL EXAM  /70 (BP Location: Right arm)   Pulse 79   Temp 98.4  F (36.9  C) (Oral)   Resp 16   Ht 1.753 m (5' 9\")   Wt 99.3 kg (219 lb)   SpO2 96%   BMI 32.34 kg/m    Gen: awake alert nad  HEENT: mmm   Cardio: rrr  Pulm: non labored clear  Abd: soft non distended non tender  Ext: warm dry with R>L ble edema.   Neuro/MSK: alert speech clear. Non tender.    LABS  CBC RESULTS:   Recent Labs   Lab Test 10/02/23  0755 09/25/23  0621 09/21/23  0612   WBC 5.6 5.8 6.0   RBC 3.51* 3.06* 3.08*   HGB 9.8* 8.5* 8.6*   HCT 30.1* 26.7* 26.8*   MCV 86 87 87   MCH 27.9 27.8 27.9   MCHC 32.6 31.8 32.1   RDW 15.6* 14.9 14.8    245 201       Last Basic Metabolic Panel:  Recent Labs   Lab Test 10/02/23  0755 09/25/23  0621 09/21/23  0612    139 137   POTASSIUM 3.9 4.1 4.0   CHLORIDE 109* 106 106   CO2 21* 23 21*   ANIONGAP 12 10 10   * 103* 107*   BUN 21.6 22.1 24.9*   CR 0.84 0.86 0.79   GFRESTIMATED 90 90 >90   MAX 8.8 8.9 8.9 "           Rehabilitation - continue comprehensive acute inpatient rehabilitation program with multidisciplinary approach including therapies, rehab nursing, and physiatry following. See interval history for updates.      ASSESSMENT AND PLAN    Genaro Ortiz is a 78 year old man with past medical history of OA s/p bilateral hip  arthroplasties 7/2023, HTN, prostate cancer, HLE, and anemia who presented with right hip fracture in setting of mechanical fall 9/16/23 seen by ortho managed non-operatively. Course complicated by urinary retention.  Admitted to rehab 9/20/23.     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    -continue PT/OT  -follow up Ortho  -asa 162 per ortho   -partial weight bearing RLE- limit to household distances    Dyslipidemia  Zocor 20 mg nightly     Chronic Anemia  B12 deficiency  HGB 8.5 9/25  -trend continue vitamin B supplement     History of prostate Cancer s/p prostatectomy  Urinary retention  Occurred post operatively in July seen 9/6 in urology clinic without acute concerns and then inpatient recommended intermittent straight cath and ongoing monitoring.   -straight cath prn  -monitor pvrs  -continue flomax  -follow up urology     Neuropathic pain  Seen by neurology 6/7/23  -continue lyrica     Vitamin D Deficiency  -continue vitamin D supplement    HTN  Not on medications at time of rehab admission, BP at goal.   -monitor    Adjustment to disability:    FEN: reg  Bowel: monitor  Bladder: some retention monitor  DVT Prophylaxis: asa per ortho  GI Prophylaxis:na   Code: full   Disposition: goal for home   ELOS: 10/4/23  Follow up Appointments on Discharge:  Ortho, pcp, urology    Emma Gutierrez PA-C

## 2023-10-02 NOTE — PROGRESS NOTES
Right Hip fx    Orientation: Pt A&O x4  Bowel: cont LBM 10/2  Bladder: cont, uses urinal   Pain: no pain this am   Ambulation/Transfers: MOD-I in room wheelchair based   Diet/ Liquids: regular, thin, whole   Tubes/ Lines/ Drains: none   Oxygen: RA  Skin: right hip incision     Pt pleasant today, working well with therapies. Spandigrip on BLE, edema +1-2. Discharge this week.    Karina Beck RN on 10/2/2023 at 2:38 PM

## 2023-10-02 NOTE — PLAN OF CARE
Goal Outcome Evaluation:      Plan of Care Reviewed With: patient    Overall Patient Progress: improvingOverall Patient Progress: improving    Patient is alert and oriented. Able to use a call light and make his needs known. Assist of x 1 SPV for transfers, Toe touch to the right leg r/t R hip fracture but patient is not a candidate for surgery at this time. On reg diet thin liquids, takes pills whole. Continent of BB, LBM 9/23/23. Denies pain on this shift. Nursing staff will continue with poc.

## 2023-10-02 NOTE — PLAN OF CARE
"Discharge Planner Post-Acute Rehab PT:     Discharge Plan: Home alone on 9/29, with HCPT    Precautions: B JOHNNY in July 2023, Falls, Limit WB to transfers only (periprosthetic femur fracture).     Edema: Size E spandigrip B LE's and shoes on for edema management. Will need to problem solve managing at home    Current Status:  Bed Mobility: Mod-I  Transfer: Mod-I  Gait: Mod-I w/c based  Stairs: None at baseline  Balance: Heavy reliance on walker for stability    Assessment: Handoff to further limit walking after the weekend. No further mobility barriers as pt is mod-I w/c based.    Other Barriers to Discharge (DME, Family Training, etc):   Lives alone  Wheelchair ordered: 9/29 ([employer], 20\" height)  Kizik shoes ordered: 9/29  Transportation to home  "

## 2023-10-02 NOTE — PLAN OF CARE
Goal Outcome Evaluation:    Overall Patient Progress: no change    Outcome Evaluation: No change in Pt progress this shift.    Pt is alert and oriented. Continent of bladder - uses urinal independently with staff assistance in emptying. LBM 10/1. Ax1 walker. Denied pain, SOB, CP, and n/t. Call light within reach. Bed alarms on. Safety rounds completed. Will continue with POC.

## 2023-10-02 NOTE — PROGRESS NOTES
"SPIRITUAL HEALTH SERVICES Progress Note  [facility] ([facility] Acute Rehab    Brief follow-up visit with pt, who was about to have a therapy session. Pt said he is pleased that \"I just got off the phone with a good friend, who said he will be able to come pick me up from the hospital when I discharge on Wednesday.\" Pt pleased with his progress, looking forward to discharge. Prayer welcomed today. I will attempt to see pt again on Wednesday before discharge.     James Hidalgo M.Div. (Bill), [facility]  Staff   Pager [phone]      * [facility] remains available 24/7 for emergent requests/referrals, either by having the switchboard page the on-call  or by entering an ASAP/STAT consult in Epic (this will also page the on-call ). Routine Epic consults receive an initial response within 24 hours.*  "

## 2023-10-02 NOTE — PROGRESS NOTES
"Met with pt. Notified pt of resumed HC with [facility]. Pt expressed understanding and agreement. At time of admission, pt reported considering getting an apple watch and SW made a plan to bring life alert info as well. At time of admission, pt reported that he had recently applied for [employer] mobility. This morning, OT Michaela reported that pt wanted to apply for [employer] mobility. When meeting with pt, SW brought pt a flyer for life alert and senior linkage line (to inquire about additional resources). SW checked in on metro mobility and pt confirmed that he did apply and just hadn't heard anything. SW brought pt information for metro mobility customer service to call and follow up. Pt confirmed that he will have a ride home on the day of discharge. Denied additional needs, questions, or concerns. IRF and IMM completed with pt. Pt denied additional needs.     Home Health Care:   [facility]   PH: [phone], Fax: [phone]   Nurse, physical therapy, occupational therapy, and home health aide      Primary Care Care Coordinator:  Ava Tian RN, BSN, PHN  PH: [phone]  E-mail: [email or web address]   *In AVS for pt reference and updated 10/02    IRF-ZAHRA Pain Assessment  Pain Effect on Sleep  \"Over the past 5 days, how much of the time has pain made it hard for you to sleep at night?\"    0. Does not apply - I have not had any pain or hurting in the past 5 days    PHI Pfeiffer   [place] [facility]   Direct Phone: [phone]  I   Pager: [phone]  I  Fax: [phone]      "

## 2023-10-02 NOTE — PLAN OF CARE
Discharge Planner Post-Acute Rehab OT:      Discharge Plan: Home w/ HH likely and HH therapy      Precautions: Per ortho 9/27- pt able to do partial WB on RLE as long as it does not cause discomfort to pt. However, very short distances only (walking from doorway in bathroom to toilet, and tx from bed to w/c. Otherwise needs to be in w/c most the time to allow for foot fx to heal).     Current Status:  ADLs:  Mobility: Mod I using walker in room and from wheelchair   Dressing: IND w use of AE for LB; IND overhead shirt  Bathing: Mod A to wash body on ETB and to get back side; min A to stand from ETB and W/c  Toileting: Mod IND tx with commode over toilet and seated pericare   IADLs: Has cleaning staff come 1x/month; IND at w/c level  Vision/Cognition: Vision wnl, 25/30 SLUMS, currently doing MAP     Assessment: Pt is now Mod IND in room with use of w/c and FWW (for tx'ing only) to toilet and from bed<>w/c. Pt completes gg's for toileting and grooming, as well as dressing. Pt also completes new UB exercises with blue band. Pt on track to discharge home w HH therapy in 2 days.     Other Barriers to Discharge (DME, Family Training, etc):

## 2023-10-03 ENCOUNTER — APPOINTMENT (OUTPATIENT)
Dept: PHYSICAL THERAPY | Facility: CLINIC | Age: 78
DRG: 561 | End: 2023-10-03
Attending: PHYSICAL MEDICINE & REHABILITATION
Payer: COMMERCIAL

## 2023-10-03 ENCOUNTER — APPOINTMENT (OUTPATIENT)
Dept: OCCUPATIONAL THERAPY | Facility: CLINIC | Age: 78
DRG: 561 | End: 2023-10-03
Attending: PHYSICAL MEDICINE & REHABILITATION
Payer: COMMERCIAL

## 2023-10-03 PROCEDURE — 90662 IIV NO PRSV INCREASED AG IM: CPT | Performed by: PHYSICIAN ASSISTANT

## 2023-10-03 PROCEDURE — 99231 SBSQ HOSP IP/OBS SF/LOW 25: CPT | Mod: FS | Performed by: PHYSICIAN ASSISTANT

## 2023-10-03 PROCEDURE — 91320 SARSCV2 VAC 30MCG TRS-SUC IM: CPT | Performed by: PHYSICIAN ASSISTANT

## 2023-10-03 PROCEDURE — 97530 THERAPEUTIC ACTIVITIES: CPT | Mod: GP

## 2023-10-03 PROCEDURE — 128N000003 HC R&B REHAB

## 2023-10-03 PROCEDURE — 250N000011 HC RX IP 250 OP 636: Performed by: PHYSICIAN ASSISTANT

## 2023-10-03 PROCEDURE — G0008 ADMIN INFLUENZA VIRUS VAC: HCPCS | Performed by: PHYSICIAN ASSISTANT

## 2023-10-03 PROCEDURE — 90480 ADMN SARSCOV2 VAC 1/ONLY CMP: CPT | Performed by: PHYSICIAN ASSISTANT

## 2023-10-03 PROCEDURE — XW023W7 INTRODUCTION OF COVID-19 VACCINE BOOSTER INTO MUSCLE, PERCUTANEOUS APPROACH, NEW TECHNOLOGY GROUP 7: ICD-10-PCS | Performed by: PHYSICIAN ASSISTANT

## 2023-10-03 PROCEDURE — 250N000013 HC RX MED GY IP 250 OP 250 PS 637: Performed by: PHYSICIAN ASSISTANT

## 2023-10-03 PROCEDURE — 97110 THERAPEUTIC EXERCISES: CPT | Mod: GO

## 2023-10-03 PROCEDURE — 97535 SELF CARE MNGMENT TRAINING: CPT | Mod: GO | Performed by: OCCUPATIONAL THERAPIST

## 2023-10-03 RX ORDER — SIMVASTATIN 20 MG
TABLET ORAL
Qty: 30 TABLET | Refills: 0 | Status: SHIPPED | OUTPATIENT
Start: 2023-10-03 | End: 2023-10-13

## 2023-10-03 RX ORDER — PREGABALIN 225 MG/1
225 CAPSULE ORAL EVERY EVENING
Qty: 30 CAPSULE | Refills: 0 | Status: SHIPPED | OUTPATIENT
Start: 2023-10-03 | End: 2023-10-13

## 2023-10-03 RX ORDER — VITAMIN B COMPLEX
25 TABLET ORAL DAILY
Qty: 30 TABLET | Refills: 0 | Status: SHIPPED | OUTPATIENT
Start: 2023-10-03

## 2023-10-03 RX ORDER — ASPIRIN 81 MG/1
162 TABLET ORAL DAILY
Qty: 60 TABLET | Refills: 0 | Status: ON HOLD | OUTPATIENT
Start: 2023-10-03 | End: 2023-11-20

## 2023-10-03 RX ORDER — ACETAMINOPHEN 325 MG/1
650 TABLET ORAL EVERY 6 HOURS PRN
Status: ON HOLD | COMMUNITY
Start: 2023-10-03 | End: 2023-11-10

## 2023-10-03 RX ADMIN — INFLUENZA A VIRUS A/VICTORIA/4897/2022 IVR-238 (H1N1) ANTIGEN (FORMALDEHYDE INACTIVATED), INFLUENZA A VIRUS A/DARWIN/9/2021 SAN-010 (H3N2) ANTIGEN (FORMALDEHYDE INACTIVATED), INFLUENZA B VIRUS B/PHUKET/3073/2013 ANTIGEN (FORMALDEHYDE INACTIVATED), AND INFLUENZA B VIRUS B/MICHIGAN/01/2021 ANTIGEN (FORMALDEHYDE INACTIVATED) 0.7 ML: 60; 60; 60; 60 INJECTION, SUSPENSION INTRAMUSCULAR at 17:02

## 2023-10-03 RX ADMIN — TAMSULOSIN HYDROCHLORIDE 0.4 MG: 0.4 CAPSULE ORAL at 08:37

## 2023-10-03 RX ADMIN — COVID-19 VACCINE, MRNA 30 MCG: 0.05 INJECTION, SUSPENSION INTRAMUSCULAR at 09:22

## 2023-10-03 RX ADMIN — PREGABALIN 225 MG: 75 CAPSULE ORAL at 21:10

## 2023-10-03 RX ADMIN — ASPIRIN 162 MG: 81 TABLET, COATED ORAL at 08:39

## 2023-10-03 RX ADMIN — Medication 1000 UNITS: at 08:37

## 2023-10-03 RX ADMIN — SIMVASTATIN 20 MG: 10 TABLET, FILM COATED ORAL at 21:10

## 2023-10-03 RX ADMIN — CYANOCOBALAMIN TAB 1000 MCG 1000 MCG: 1000 TAB at 08:37

## 2023-10-03 ASSESSMENT — ACTIVITIES OF DAILY LIVING (ADL)
ADLS_ACUITY_SCORE: 35
ADLS_ACUITY_SCORE: 36
ADLS_ACUITY_SCORE: 36
ADLS_ACUITY_SCORE: 35
ADLS_ACUITY_SCORE: 30
ADLS_ACUITY_SCORE: 35
ADLS_ACUITY_SCORE: 35
ADLS_ACUITY_SCORE: 30
ADLS_ACUITY_SCORE: 36

## 2023-10-03 NOTE — PROGRESS NOTES
"  VS: /71 (BP Location: Right arm, Patient Position: Semi-Ferguson's, Cuff Size: Adult Regular)   Pulse 68   Temp (!) 96.1  F (35.6  C) (Oral)   Resp 16   Ht 1.753 m (5' 9\")   Wt 99.3 kg (219 lb)   SpO2 97%   BMI 32.34 kg/m     O2: 97% on RA   Output: Voiding spontaneously without difficulty   Last BM: 10/2   Activity: Mod I    Skin: R hip incision EMETERIO   Pain: None reported this shift   CMS: A&O x4   Dressing: No dressings present   Diet: Combination   LDA: None present   Equipment: Walker   Plan: Continue POC, planned discharge 10/4   Additional Info:        "

## 2023-10-03 NOTE — PROGRESS NOTES
"  [facility]   Acute Rehabilitation Unit  Daily progress note    INTERVAL HISTORY  Seen sitting up in chair, feeling well.  We reviewed medications for home and recommended follow up.  Mr. Ortiz notes voiding well on flomax had no difficulty and recent urology visit prior to hip fracture.  Given recent urology visit and no new medications/ neurologic reasons for retention felt hospital retention in setting of medications, constipation, immobility.  Will discontinue flomax. Will resume home care upon discharge.     PT:   ssessment: Pt's wheelchair will arrive \"before 8pm\" from [employer] tomorrow. Will supply with [employer] transport chair for interim. Pt on track for discharge, but will require IADL and intermittent assist from friends for the next few months while partial WBing persists.     OT:   Pt is supervision with bathing for tx's. Pt will have home health therapy to help with this upon discharge home and he is agreeable to not shower alone and only with therapies until they state he is IND in this.     MEDICATIONS   aspirin  162 mg Oral Daily    COVID-19 mRNA vaccine 12+y  30 mcg Intramuscular Once    cyanocobalamin  1,000 mcg Oral Daily    influenza vac high-dose quad  0.7 mL Intramuscular Prior to discharge    pregabalin  225 mg Oral QPM    simvastatin  20 mg Oral At Bedtime    tamsulosin  0.4 mg Oral Daily    vitamin D3  1,000 Units Oral Daily        acetaminophen, diphenhydrAMINE **OR** diphenhydrAMINE, EPINEPHrine, naloxone **OR** naloxone **OR** naloxone **OR** naloxone, polyethylene glycol, senna-docusate     PHYSICAL EXAM  /71 (BP Location: Right arm, Patient Position: Semi-Ferguson's, Cuff Size: Adult Regular)   Pulse 68   Temp (!) 96.1  F (35.6  C) (Oral)   Resp 16   Ht 1.753 m (5' 9\")   Wt 99.3 kg (219 lb)   SpO2 97%   BMI 32.34 kg/m    Gen: awake alert nad  HEENT: mmm   Cardio: rrr  Pulm: non labored clear  Abd: soft non distended non tender  Ext: warm " dry with R>L ble edema.   Neuro/MSK: alert speech clear. Non tender.    LABS  CBC RESULTS:   Recent Labs   Lab Test 10/02/23  0755 09/25/23  0621 09/21/23  0612   WBC 5.6 5.8 6.0   RBC 3.51* 3.06* 3.08*   HGB 9.8* 8.5* 8.6*   HCT 30.1* 26.7* 26.8*   MCV 86 87 87   MCH 27.9 27.8 27.9   MCHC 32.6 31.8 32.1   RDW 15.6* 14.9 14.8    245 201       Last Basic Metabolic Panel:  Recent Labs   Lab Test 10/02/23  0755 09/25/23  0621 09/21/23  0612    139 137   POTASSIUM 3.9 4.1 4.0   CHLORIDE 109* 106 106   CO2 21* 23 21*   ANIONGAP 12 10 10   * 103* 107*   BUN 21.6 22.1 24.9*   CR 0.84 0.86 0.79   GFRESTIMATED 90 90 >90   MAX 8.8 8.9 8.9         Rehabilitation - continue comprehensive acute inpatient rehabilitation program with multidisciplinary approach including therapies, rehab nursing, and physiatry following. See interval history for updates.      ASSESSMENT AND PLAN    Genaro Ortiz is a 78 year old man with past medical history of OA s/p bilateral hip  arthroplasties 7/2023, HTN, prostate cancer, HLE, and anemia who presented with right hip fracture in setting of mechanical fall 9/16/23 seen by ortho managed non-operatively. Course complicated by urinary retention.  Admitted to rehab 9/20/23.     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    -continue PT/OT  -follow up Ortho  -asa 162 per ortho   -partial weight bearing RLE- limit to household distances    Dyslipidemia  Zocor 20 mg nightly     Chronic Anemia  B12 deficiency  HGB 9.8 10/2/23  -trend continue vitamin B supplement     History of prostate Cancer s/p prostatectomy  Urinary retention  Occurred post operatively in July seen 9/6 in urology clinic without acute concerns and then inpatient recommended intermittent straight cath and ongoing monitoring, now voiding without  significant retention would like to discontinue flomax given lack of urinary concerns 7/2023 medication was discontinued per request. .   -follow up urology as needed.      Neuropathic pain  Seen by neurology 6/7/23  -continue lyrica     Vitamin D Deficiency  -continue vitamin D supplement    HTN  Not on medications at time of rehab admission, BP at goal.   -monitor follow up primary care.     Adjustment to disability:    FEN: reg  Bowel: monitor  Bladder: some retention monitor  DVT Prophylaxis: asa per ortho  GI Prophylaxis:na   Code: full   Disposition: goal for home   ELOS: 10/4/23  Follow up Appointments on Discharge:  Ortho, pcp, urology    Emma Gutierrez PA-C

## 2023-10-03 NOTE — PLAN OF CARE
"Discharge Planner Post-Acute Rehab PT:     Discharge Plan: Home alone on 9/29, with HCPT    Precautions: B JOHNNY in July 2023, Falls, Limit WB to transfers only (periprosthetic femur fracture).     Edema: Size E spandigrip B LE's and shoes on for edema management.     Current Status:  Bed Mobility: Mod-I  Transfer: Mod-I  Gait: Mod-I w/c based  Stairs: None at baseline  Balance: Heavy reliance on walker for stability    Assessment: Pt's wheelchair will arrive \"before 8pm\" from [employer] tomorrow. Will supply with [employer] transport chair for interim. Pt on track for discharge, but will require IADL and intermittent assist from friends for the next few months while partial WBing persists.    Other Barriers to Discharge (DME, Family Training, etc):   Lives alone  Wheelchair ordered: 9/29 (Drive Blue Streak, 20\" height)  [employer] shoes ordered: 9/29  Transportation to home  "

## 2023-10-03 NOTE — PLAN OF CARE
Goal Outcome Evaluation:      Plan of Care Reviewed With: patient    Overall Patient Progress: no change    Outcome Evaluation: Pt is alert and oriented x 4. Denies pain.  Mod I in room,WC based. Continent of bowel and bladder. LBM 10/2 . uses a urinal at bedside. pt will get his covid vaccine tomorrow morning. call light  within reach. continue POC.

## 2023-10-03 NOTE — PROGRESS NOTES
Covid vaccine given this morning in right upper deltoid.  No adverse reactions noted. Lot [id number] 0.3 ml given.  Expiration is 12-31-24. Patient is also asking for flu vaccine also to be given.  Nursing to please give patient flu vaccine in opposite arm when given.  Continue plan of care.

## 2023-10-03 NOTE — PROGRESS NOTES
"Physical Therapy Discharge Summary    Reason for therapy discharge:    Discharged to home with home therapy.    Progress towards therapy goal(s). See goals on Care Plan in [facility] electronic health record for goal details.  Goals met    Therapy recommendation(s):    Continued therapy is recommended.  Rationale/Recommendations:  ..     Precautions: B JOHNNY in July 2023, Falls, Limit WB to transfers only (periprosthetic femur fracture).      Pt is now MOD I with combination of w/c and walker based ambulation. Per medical team recommendation \"90% wheelchair based with walker for toileting and necessary transfers.\" Pt is able to manage this on his own, and is ready to discharge to home. Recommending HCPT to progress mobility when WBing is upgraded, provide home safety assessment, and ensure ongoing conditioning to prepare for return to full WBing.     Has ordered slip on shoes, wheelchair, and wheelchair cushion from [employer]. Will arrive \"by 8PM tomorrow\" 10/4. Friend willing and able to assist with transportation and setup of all AE at home.  "

## 2023-10-03 NOTE — PLAN OF CARE
Discharge Planner Post-Acute Rehab OT:      Discharge Plan: Home w/ HH likely and HH therapy      Precautions: Per ortho 9/27- pt able to do partial WB on RLE as long as it does not cause discomfort to pt. However, very short distances only (walking from doorway in bathroom to toilet, and tx from bed to w/c. Otherwise needs to be in w/c most the time to allow for foot fx to heal).     Current Status:  ADLs:  Mobility: Mod I using walker in room and from wheelchair   Dressing: IND w use of AE for LB; IND overhead shirt  Bathing: Mod I with washing body with AE; Supervision on/off ETB  Toileting: Mod IND tx with commode over toilet and seated pericare   IADLs: Has cleaning staff come 1x/month; IND at w/c level  Vision/Cognition: Vision wnl, 25/30 SLUMS, currently doing MAP     Assessment: Pt completes gg codes this date for showering. Pt is supervision with bathing for tx's. Pt will have home health therapy to help with this upon discharge home and he is agreeable to not shower alone and only with therapies until they state he is IND in this.    Other Barriers to Discharge (DME, Family Training, etc):

## 2023-10-03 NOTE — PLAN OF CARE
Goal Outcome Evaluation:    Overall Patient Progress: no changeOverall Patient Progress: no change    Patient slept all night. No voiced concerns overnight. No complaints of pain, sob, dizziness, N/V, or any weakness/N/T. Remains continent of B&B. Uses the urinal at night with staff to empty. No bed alarms. Mod I wheelchair based in transfers. Hourly rounds done. Non-labored respirations and without any signs of distress. Continue poc.

## 2023-10-04 ENCOUNTER — PRE VISIT (OUTPATIENT)
Dept: NEUROLOGY | Facility: CLINIC | Age: 78
End: 2023-10-04

## 2023-10-04 VITALS
HEIGHT: 69 IN | BODY MASS INDEX: 33.36 KG/M2 | OXYGEN SATURATION: 98 % | DIASTOLIC BLOOD PRESSURE: 77 MMHG | HEART RATE: 99 BPM | SYSTOLIC BLOOD PRESSURE: 136 MMHG | WEIGHT: 225.2 LBS | RESPIRATION RATE: 16 BRPM | TEMPERATURE: 97.3 F

## 2023-10-04 PROCEDURE — 250N000013 HC RX MED GY IP 250 OP 250 PS 637: Performed by: PHYSICIAN ASSISTANT

## 2023-10-04 PROCEDURE — 99239 HOSP IP/OBS DSCHRG MGMT >30: CPT | Performed by: PHYSICIAN ASSISTANT

## 2023-10-04 RX ADMIN — TAMSULOSIN HYDROCHLORIDE 0.4 MG: 0.4 CAPSULE ORAL at 09:53

## 2023-10-04 RX ADMIN — Medication 1000 UNITS: at 09:53

## 2023-10-04 RX ADMIN — ASPIRIN 162 MG: 81 TABLET, COATED ORAL at 09:53

## 2023-10-04 RX ADMIN — CYANOCOBALAMIN TAB 1000 MCG 1000 MCG: 1000 TAB at 09:54

## 2023-10-04 ASSESSMENT — ACTIVITIES OF DAILY LIVING (ADL)
ADLS_ACUITY_SCORE: 30

## 2023-10-04 NOTE — PLAN OF CARE
Goal Outcome Evaluation:    Plan of Care Reviewed With: patient    Overall Patient Progress: improving    Outcome Evaluation: Pt is already on Mod I w/c based or walker for short distances. He is reminded to call staff whenever the need arises.     Influenza vaccination given at left deltoid this afternoon, no untoward reactions noted.    For discharge tomorrow, home medications here.

## 2023-10-04 NOTE — PLAN OF CARE
Goal Outcome Evaluation:    Overall Patient Progress: improvingOverall Patient Progress: improving    Patient is on track with discharge today. No complaints of pain, sob, or any weakness tonight. Continent of bladder using the urinal with staff to empty. Mod I with the wheelchair and/or walker with short distances. No expressed needs at this time.

## 2023-10-04 NOTE — PLAN OF CARE
Goal Outcome Evaluation:    Outcome Evaluation: Pt AxO, able to make needs known. Pleasant and cooperative with cares and assessments. Mod I, w/c based, uses the walker for SPT. Denied pain, dizziness, SOB. Continent of bowel and bladder, able to use the toilet, no BM this shift. Appetite good, meds taken whole with water. Discharged today. Home meds sent with, discharge paperwork discussed with pt and also sent with, questions answered appropriately. Discharged in good condition. NA escorted pt out of the unit and into awaiting vehicle, accompanied by his friend.

## 2023-10-04 NOTE — DISCHARGE SUMMARY
"    [facility]   Acute Rehabilitation Unit  Discharge summary     Date of Admission: 9/20/2023  Date of Discharge: 10/04/2023  Disposition: home  Primary Care Physician: Wegener, Joel Daniel Irwin  Attending physician: Reilly Angel MD      DISCHARGE DIAGNOSIS  Right hip fracture  S/p bilateral total hip (7/2023)  Chronic anemia      BRIEF SUMMARY  Genaro Ortiz is a 78 year old man with past medical history of OA s/p bilateral hip  arthroplasties 7/2023, HTN, prostate cancer, HLE, and anemia who presented with right hip fracture in setting of mechanical fall 9/16/23 seen by ortho managed non-operatively. Course complicated by urinary retention.  Admitted to rehab 9/20/23.     REHABILITATION COURSE  Physical Therapy Discharge Summary     Reason for therapy discharge:    Discharged to home with home therapy.     Progress towards therapy goal(s). See goals on Care Plan in [facility] electronic health record for goal details.  Goals met     Therapy recommendation(s):    Continued therapy is recommended.  Rationale/Recommendations:  ..     Precautions: B JOHNNY in July 2023, Falls, Limit WB to transfers only (periprosthetic femur fracture).      Pt is now MOD I with combination of w/c and walker based ambulation. Per medical team recommendation \"90% wheelchair based with walker for toileting and necessary transfers.\" Pt is able to manage this on his own, and is ready to discharge to home. Recommending HCPT to progress mobility when WBing is upgraded, provide home safety assessment, and ensure ongoing conditioning to prepare for return to full WBing.      Has ordered slip on shoes, wheelchair, and wheelchair cushion from [employer]. Will arrive \"by 8PM tomorrow\" 10/4. Friend willing and able to assist with transportation and setup of all AE at home.    Current Status:  ADLs:  Mobility: Mod I using walker in room and from wheelchair   Dressing: IND w use of AE for LB; IND overhead " shirt  Bathing: Mod I with washing body with AE; Supervision on/off ETB  Toileting: Mod IND tx with commode over toilet and seated pericare   IADLs: Has cleaning staff come 1x/month; IND at w/c level  Vision/Cognition: Vision wnl, 25/30 SLUMS, currently doing MAP     Assessment: Pt completes gg codes this date for showering. Pt is supervision with bathing for tx's. Pt will have home health therapy to help with this upon discharge home and he is agreeable to not shower alone and only with therapies until they state he is IND in this.  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    -continue PT/OT  -follow up Ortho  -asa 162 per ortho   -partial weight bearing RLE- limit to household distances     Dyslipidemia  Zocor 20 mg nightly     Chronic Anemia  B12 deficiency  HGB 9.8 10/2/23   continue vitamin B supplement     History of prostate Cancer s/p prostatectomy  Urinary retention  Occurred post operatively in July seen 9/6 in urology clinic without acute concerns and then inpatient recommended intermittent straight cath and ongoing monitoring, now voiding without significant retention would like to discontinue flomax given lack of urinary concerns 7/2023 medication was discontinued per request. .   -follow up urology as needed.      Neuropathic pain  Seen by neurology 6/7/23  -continue lyrica     Vitamin D Deficiency  -continue vitamin D supplement     HTN  Not on medications at time of rehab admission, BP at goal.   -monitor follow up primary care.     DISCHARGE MEDICATIONS  Current Discharge Medication List        START taking these medications    Details   acetaminophen (TYLENOL) 325 MG tablet Take 2 tablets (650 mg) by mouth every 6 hours as needed for mild pain    Associated Diagnoses: S/P right hip fracture            CONTINUE these medications which have CHANGED    Details   aspirin 81 MG EC tablet Take 2 tablets (162 mg) by mouth daily Resume 81 mg daily per orthopedics recommendations  Qty: 60 tablet, Refills: 0    Associated Diagnoses: S/P right hip fracture      Cyanocobalamin (B-12) 1000 MCG TBCR Take 1 tablet by mouth daily  Qty: 30 tablet, Refills: 0    Associated Diagnoses: Vitamin B12 deficiency (non anemic)      pregabalin (LYRICA) 225 MG capsule Take 1 capsule (225 mg) by mouth every evening  Qty: 30 capsule, Refills: 0    Associated Diagnoses: S/P right hip fracture      simvastatin (ZOCOR) 20 MG tablet TAKE 1 TABLET BY MOUTH EVERYDAY AT BEDTIME  Qty: 30 tablet, Refills: 0    Associated Diagnoses: Hyperlipidemia LDL goal <130      Vitamin D3 (D 1000) 25 mcg (1000 units) tablet Take 1 tablet (25 mcg) by mouth daily  Qty: 30 tablet, Refills: 0    Associated Diagnoses: Routine general medical examination at a health care facility           CONTINUE these medications which have NOT CHANGED    Details   fish oil-omega-3 fatty acids 1000 MG capsule Take 2,000 mg by mouth daily  Qty: 120 capsule, Refills: 11    Associated Diagnoses: Hyperlipidemia LDL goal <130      polyethylene glycol (MIRALAX) 17 g packet Take 1 packet by mouth as needed for constipation      senna-docusate (SENOKOT-S/PERICOLACE) 8.6-50 MG tablet Take 1 tablet by mouth daily as needed for constipation           STOP taking these medications       oxyCODONE (ROXICODONE) 5 MG tablet Comments:   Reason for Stopping:         tamsulosin (FLOMAX) 0.4 MG capsule Comments:   Reason for Stopping:                 DISCHARGE INSTRUCTIONS AND FOLLOW UP  Discharge Procedure Orders   Reason for your hospital stay   Order Comments: Admitted for rehabilitation following right hip fracture.     Activity   Order Comments: Your activity upon discharge: activity as tolerated continue home care.  Partial weight bearing to right lower extremity.     Order Specific Question  Answer Comments   Is discharge order? Yes      Resume Home Care Services     Adult [facility] Follow-up and recommended labs and tests   Order Comments: Primary care follow up hospitalization  Follow up ortho follow up right hip fracture.    Appointments on [place] and/or [facility] (with [facility] or [facility] provider or service). Call [phone] if you haven't heard regarding these appointments within 7 days of discharge.     Diet   Order Comments: Follow this diet upon discharge:  Regular Diet     Order Specific Question Answer Comments   Is discharge order? Yes           PHYSICAL EXAMINATION    Most recent Vital Signs:   Vitals:    10/02/23 1635 10/03/23 0631 10/03/23 1631 10/04/23 0500   BP: 123/63 136/71 (!) 143/66    BP Location: Right arm Right arm Right arm    Patient Position:  Semi-Ferguson's     Cuff Size:  Adult Regular     Pulse: 66 68 71    Resp: 16 16 16    Temp:  (!) 96.1  F (35.6  C) 97.3  F (36.3  C)    TempSrc:  Oral Oral    SpO2: 98% 97%     Weight:    102.2 kg (225 lb 3.2 oz)   Height:       General: awake alert nad  Resp: non labored clear  CV: rrr  Ab: soft non distended non tender  Extremities: mild right proximal lower extremity edema no redness/warmth  MSK/neuro: alert speech clear moves all extremities against gravity      40 minutes spent in discharge, including >50% in counseling and coordination of care, medication review and plan of care recommended on follow up.     Patient was evaluated on day of discharge by attending physician, Reilly Angel MD, who agrees with plan of care.    Discharge summary was forwarded to Wegener, Joel Daniel Irwin (PCP) at the time of discharge, so as to bridge from hospital to outpatient care.     It was our pleasure to care for Genaro Ortiz during this hospitalization. Please do not hesitate to contact me should there be questions regarding the hospital course or discharge plan.          Emma Gutierrez PA-C  Physical Medicine and Rehabilitation

## 2023-10-04 NOTE — PROGRESS NOTES
"SPIRITUAL HEALTH SERVICES Progress Note  [facility] ([facility]) Acute Rehab     visit with pt on day of discharge. Pt's friend Esvin arrived; he will be providing a ride for pt today. Pt requested prayer for \"patience, and for continued healing...\" No further needs indicated before discharge.     James Jang) Archie Hidalgo M.Div., [facility]  Staff   Pager [phone]      * [facility] remains available 24/7 for emergent requests/referrals, either by having the switchboard page the on-call  or by entering an ASAP/STAT consult in Epic (this will also page the on-call ). Routine Epic consults receive an initial response within 24 hours.*        "

## 2023-10-05 ENCOUNTER — PATIENT OUTREACH (OUTPATIENT)
Dept: CARE COORDINATION | Facility: CLINIC | Age: 78
End: 2023-10-05

## 2023-10-05 NOTE — PROGRESS NOTES
Clinic Care Coordination Contact  [facility]/Voicemail    Referral Source: IP Handoff  Clinical Data: Care Coordinator Outreach  Outreach attempted x 1.  Left message on patient's voicemail with call back information and requested return call.    Plan: Care Coordinator will try to reach patient again in 1-2 business days.     Ava Tian RN, BSN, PHN  Primary Care / Care Coordinator   [facility]  E-mail [email or web address]   [phone]

## 2023-10-09 ENCOUNTER — MEDICAL CORRESPONDENCE (OUTPATIENT)
Dept: HEALTH INFORMATION MANAGEMENT | Facility: CLINIC | Age: 78
End: 2023-10-09

## 2023-10-09 ENCOUNTER — PATIENT OUTREACH (OUTPATIENT)
Dept: CARE COORDINATION | Facility: CLINIC | Age: 78
End: 2023-10-09

## 2023-10-09 NOTE — PROGRESS NOTES
Clinic Care Coordination Contact    Situation: Patient chart reviewed by care coordinator.    Background:   Patient was admitted to [facility] from 9/16/2023 to 9/20/2023 with a diagnoses of Periprosthetic fracture around internal prosthetic right hip joint and discharged to [facility] Acute Rehab Unit.j    Assessment:   Patient was discharged from [facility] Acute Rehab on 10/4/2023 and discharged home with [facility] RN/Physical Therapy/Occupational Therapy services, has a scheduled follow up Primary Care Provider/Transitional Care Unit appointment on 10/13/2023 and is following the plan of care as outlined by the care team.    Plan/Recommendations:   No further Care Coordination outreaches at this time.     Ava Tian RN, BSN, PHN  Primary Care / Care Coordinator   [facility]  [email or web address] [email or web address]   [phone]

## 2023-10-09 NOTE — LETTER
[facility] CARE COORDINATION  [address]  [place] [address]    October 9, 2023    Genaro Ortiz  [address]   [place] [address]      Dear Genaro,    I am a  clinic care coordinator who works with Joel Daniel Wegener, MD with the [facility]. I recently tried to call and was unable to reach you. Below is a description of clinic care coordination and how I can further assist you.       The clinic care coordination team is made up of a registered nurse, , financial resource worker and community health worker who understand the health care system. The goal of clinic care coordination is to help you manage your health and improve access to the health care system. Our team works alongside your provider to assist you in determining your health and social needs. We can help you obtain health care and community resources, providing you with necessary information and education. We can work with you through any barriers and develop a care plan that helps coordinate and strengthen the communication between you and your care team.  Our services are voluntary and are offered without charge to you personally.    Please feel free to contact me with any questions or concerns regarding care coordination and what we can offer.      We are focused on providing you with the highest-quality healthcare experience possible.    Sincerely,      Ava Tian RN, BSN, PHN  Primary Care / Care Coordinator   [facility]  [email or web address] [email or web address]   [phone]

## 2023-10-13 ENCOUNTER — OFFICE VISIT (OUTPATIENT)
Dept: FAMILY MEDICINE | Facility: CLINIC | Age: 78
End: 2023-10-13
Payer: COMMERCIAL

## 2023-10-13 ENCOUNTER — MEDICAL CORRESPONDENCE (OUTPATIENT)
Dept: FAMILY MEDICINE | Facility: CLINIC | Age: 78
End: 2023-10-13

## 2023-10-13 VITALS
SYSTOLIC BLOOD PRESSURE: 130 MMHG | TEMPERATURE: 97.4 F | HEART RATE: 76 BPM | OXYGEN SATURATION: 98 % | RESPIRATION RATE: 17 BRPM | DIASTOLIC BLOOD PRESSURE: 72 MMHG

## 2023-10-13 DIAGNOSIS — E78.5 HYPERLIPIDEMIA LDL GOAL <130: ICD-10-CM
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
## 2023-10-13 DIAGNOSIS — Z87.81 S/P RIGHT HIP FRACTURE: Primary | ICD-10-CM

## 2023-10-13 DIAGNOSIS — Z12.5 ENCOUNTER FOR SCREENING FOR MALIGNANT NEOPLASM OF PROSTATE: ICD-10-CM

## 2023-10-13 DIAGNOSIS — Z13.1 SCREENING FOR DIABETES MELLITUS: ICD-10-CM

## 2023-10-13 LAB
ALBUMIN SERPL BCG-MCNC: 4.3 G/DL (ref 3.5–5.2)
ALP SERPL-CCNC: 214 U/L (ref 40–129)
ALT SERPL W P-5'-P-CCNC: 7 U/L (ref 0–70)
ANION GAP SERPL CALCULATED.3IONS-SCNC: 14 MMOL/L (ref 7–15)
AST SERPL W P-5'-P-CCNC: 19 U/L (ref 0–45)
BASO+EOS+MONOS # BLD AUTO: ABNORMAL 10*3/UL
BASO+EOS+MONOS NFR BLD AUTO: ABNORMAL %
BASOPHILS # BLD AUTO: 0 10E3/UL (ref 0–0.2)
BASOPHILS NFR BLD AUTO: 1 %
BILIRUB SERPL-MCNC: 0.2 MG/DL
BUN SERPL-MCNC: 28.7 MG/DL (ref 8–23)
CALCIUM SERPL-MCNC: 9.7 MG/DL (ref 8.8–10.2)
CHLORIDE SERPL-SCNC: 100 MMOL/L (ref 98–107)
CREAT SERPL-MCNC: 0.99 MG/DL (ref 0.67–1.17)
DEPRECATED HCO3 PLAS-SCNC: 21 MMOL/L (ref 22–29)
EGFRCR SERPLBLD CKD-EPI 2021: 79 ML/MIN/1.73M2
EOSINOPHIL # BLD AUTO: 0.1 10E3/UL (ref 0–0.7)
EOSINOPHIL NFR BLD AUTO: 1 %
ERYTHROCYTE [DISTWIDTH] IN BLOOD BY AUTOMATED COUNT: 15.3 % (ref 10–15)
FERRITIN SERPL-MCNC: 330 NG/ML (ref 31–409)
GLUCOSE SERPL-MCNC: 111 MG/DL (ref 70–99)
HBA1C MFR BLD: 5.6 % (ref 0–5.6)
HCT VFR BLD AUTO: 35.9 % (ref 40–53)
HGB BLD-MCNC: 11.6 G/DL (ref 13.3–17.7)
IMM GRANULOCYTES # BLD: 0 10E3/UL
IMM GRANULOCYTES NFR BLD: 0 %
IRON BINDING CAPACITY (ROCHE): 277 UG/DL (ref 240–430)
IRON SATN MFR SERPL: 14 % (ref 15–46)
IRON SERPL-MCNC: 39 UG/DL (ref 61–157)
LYMPHOCYTES # BLD AUTO: 1.9 10E3/UL (ref 0.8–5.3)
LYMPHOCYTES NFR BLD AUTO: 31 %
MCH RBC QN AUTO: 27.3 PG (ref 26.5–33)
MCHC RBC AUTO-ENTMCNC: 32.3 G/DL (ref 31.5–36.5)
MCV RBC AUTO: 85 FL (ref 78–100)
MONOCYTES # BLD AUTO: 0.5 10E3/UL (ref 0–1.3)
MONOCYTES NFR BLD AUTO: 8 %
NEUTROPHILS # BLD AUTO: 3.8 10E3/UL (ref 1.6–8.3)
NEUTROPHILS NFR BLD AUTO: 60 %
PLATELET # BLD AUTO: 317 10E3/UL (ref 150–450)
POTASSIUM SERPL-SCNC: 3.9 MMOL/L (ref 3.4–5.3)
PROT SERPL-MCNC: 8.3 G/DL (ref 6.4–8.3)
PSA SERPL DL<=0.01 NG/ML-MCNC: <0.01 NG/ML (ref 0–6.5)
RBC # BLD AUTO: 4.25 10E6/UL (ref 4.4–5.9)
SODIUM SERPL-SCNC: 135 MMOL/L (ref 135–145)
VIT B12 SERPL-MCNC: 786 PG/ML (ref 232–1245)
VIT D+METAB SERPL-MCNC: 45 NG/ML (ref 20–50)
WBC # BLD AUTO: 6.3 10E3/UL (ref 4–11)

## 2023-10-13 PROCEDURE — 83540 ASSAY OF IRON: CPT | Performed by: PHYSICIAN ASSISTANT

## 2023-10-13 PROCEDURE — 99214 OFFICE O/P EST MOD 30 MIN: CPT | Performed by: PHYSICIAN ASSISTANT

## 2023-10-13 PROCEDURE — 80053 COMPREHEN METABOLIC PANEL: CPT | Performed by: PHYSICIAN ASSISTANT

## 2023-10-13 PROCEDURE — 83036 HEMOGLOBIN GLYCOSYLATED A1C: CPT | Performed by: PHYSICIAN ASSISTANT

## 2023-10-13 PROCEDURE — G0103 PSA SCREENING: HCPCS | Performed by: PHYSICIAN ASSISTANT

## 2023-10-13 PROCEDURE — 85025 COMPLETE CBC W/AUTO DIFF WBC: CPT | Performed by: PHYSICIAN ASSISTANT

## 2023-10-13 PROCEDURE — 82607 VITAMIN B-12: CPT | Performed by: PHYSICIAN ASSISTANT

## 2023-10-13 PROCEDURE — 83550 IRON BINDING TEST: CPT | Performed by: PHYSICIAN ASSISTANT

## 2023-10-13 PROCEDURE — 82728 ASSAY OF FERRITIN: CPT | Performed by: PHYSICIAN ASSISTANT

## 2023-10-13 PROCEDURE — 36415 COLL VENOUS BLD VENIPUNCTURE: CPT | Performed by: PHYSICIAN ASSISTANT

## 2023-10-13 PROCEDURE — 82306 VITAMIN D 25 HYDROXY: CPT | Performed by: PHYSICIAN ASSISTANT

## 2023-10-13 RX ORDER — PREGABALIN 225 MG/1
225 CAPSULE ORAL EVERY EVENING
Qty: 30 CAPSULE | Refills: 1 | Status: ON HOLD | OUTPATIENT
Start: 2023-10-13 | End: 2023-11-20

## 2023-10-13 RX ORDER — SIMVASTATIN 20 MG
TABLET ORAL
Qty: 90 TABLET | Refills: 1 | Status: SHIPPED | OUTPATIENT
Start: 2023-10-13 | End: 2024-08-26

## 2023-10-13 ASSESSMENT — PAIN SCALES - GENERAL: PAINLEVEL: NO PAIN (0)

## 2023-10-13 NOTE — PATIENT INSTRUCTIONS
Ava Tian RN, BSN, PHN  Primary Care / Care Coordinator   [facility]  E-mail [email or web address]   [phone]

## 2023-10-13 NOTE — PROGRESS NOTES
"  Assessment & Plan     S/P right hip fracture  Fall, sequela  Improving overall just needs further support with homecare heading out next week.  - Comprehensive metabolic panel; Future  - CBC with Platelets & Differential; Future  - Home Care Referral  - Vitamin B12; Future  - Vitamin D Deficiency; Future  - Ferritin; Future  - Iron & Iron Binding Capacity; Future    Hyperlipidemia LDL goal <130  - simvastatin (ZOCOR) 20 MG tablet; TAKE 1 TABLET BY MOUTH EVERYDAY AT BEDTIME    Screening for diabetes mellitus  - Hemoglobin A1c; Future    Encounter for screening for malignant neoplasm of prostate  - Prostate Specific Antigen Screen; Future    Review of prior external note(s) from - previous PCP and acute care notes  20 minutes spent by me on the date of the encounter doing chart review, history and exam, documentation and further activities per the note     MED REC REQUIRED  Post Medication Reconciliation Status: discharge medications reconciled, continue medications without change  BMI:   Estimated body mass index is 33.26 kg/m  as calculated from the following:    Height as of 9/20/23: 1.753 m (5' 9\").    Weight as of 10/4/23: 102.2 kg (225 lb 3.2 oz).   Weight management plan: Discussed healthy diet and exercise guidelines    Patient Instructions   Ava Tian RN, BSN, PHN  Primary Care / Care Coordinator   [facility]  E-mail [email or web address]   [phone]    Rj Hemphill PA-C  [facility] HospitalHILARIA Alcantara is a 78 year old, presenting for the following health issues:  Hospital F/U        10/13/23   10:17 AM   Additional Questions   Roomed by Berhane BREWER     Hospital Follow-up Visit:    Hospital/Nursing Home/IP Rehab Facility: [facility]  Date of Admission: 09/16/23  Date of " "Discharge: 09/20/23  Reason(s) for Admission: fall after surgery    Was your hospitalization related to COVID-19? No   Problems taking medications regularly:  None  Medication changes since discharge: None  Problems adhering to non-medication therapy:  None    Summary of hospitalization:  [facility] discharge summary reviewed  Diagnostic Tests/Treatments reviewed.  Follow up needed: home health care and PT  Other Healthcare Providers Involved in Patient s Care:  Homecare  Update since discharge: improved.       [facility] RN/Physical Therapy/Occupational Therapy services     Plan of care communicated with patient           Hospital course:  \"78 year old male with previous hip replacements and prostate cancer s/p prostatectomy presented with hip pain after tripping and falling. Patient was found to have a periprosthetic hip fracture. He was admitted to the hospital. Orthopedics was consulted and non-operative treatment was recommended.        Acute post operative urinary retention , required straight catheterization once, I would limit use of narcotics and medications to treat constipation. Last BM yesterday. Urology was consulted and signed off. I would do straight cath up to x2 for AUR with PVR> 500. Schedule stool softeners while on opioid pain medications and as needed to prevent constipation      Right hip fracture from mechanical fall  Osteoarthritis of bilateral hips status post bilateral total hip arthroplasties in July  -continue pt  -toe-touch weightbearing to the right lower extremity utilizing assistive device.  -aspirin 162 mg daily for DVT prevention  -continue PRN Tylenol 650 mg Q6H   -continue PRN oxycodone 2.6 mg Q4H though minimize use given propensity to cause urinary retention  -continue PRN flexeril 5 mg Q8H though minimize use given propensity to cause urinary retention  -continue pregabalin 225 mg daily      Essential hypertension   -not on chronic " "medications  -monitor     Dyslipidemia   -continue Zocor 20 mg nightly     Adenocarcinoma of the prostate status post prostatectomy  Urinary retention  -PVR elevated  -continue flomax  -monitor PVR and continue straight cath for now  -urology's help much appreciated     Normocytic anemia, appears chronic  Vitamin B12 deficiency   -continue vitamin B12 supplement  -monitor     Vitamin D deficiency   - continue vitamin D supplement\"            Review of Systems   Constitutional, HEENT, cardiovascular, pulmonary, GI, , musculoskeletal, neuro, skin, endocrine and psych systems are negative, except as otherwise noted.      Objective    /72 (BP Location: Left arm, Patient Position: Sitting, Cuff Size: Adult Regular)   Pulse 76   Temp 97.4  F (36.3  C) (Temporal)   Resp 17   SpO2 98%   There is no height or weight on file to calculate BMI.  Physical Exam   GENERAL: healthy, alert and no distress  NECK: no adenopathy, no asymmetry, masses, or scars and thyroid normal to palpation  RESP: lungs clear to auscultation - no rales, rhonchi or wheezes  CV: regular rate and rhythm, normal S1 S2, no S3 or S4, no murmur, click or rub, no peripheral edema and peripheral pulses strong  MS: no gross musculoskeletal defects noted, no edema  PSYCH: mentation appears normal, affect normal/bright                  "

## 2023-10-16 NOTE — RESULT ENCOUNTER NOTE
"Byron Lam  Your attached labs are overall improving and stable.    Please contact the office with any questions or concerns.    Marisol Buck \"Rj\" BETZAIDA Hemphill    "

## 2023-10-17 ENCOUNTER — TELEPHONE (OUTPATIENT)
Dept: FAMILY MEDICINE | Facility: CLINIC | Age: 78
End: 2023-10-17

## 2023-10-17 ENCOUNTER — MEDICAL CORRESPONDENCE (OUTPATIENT)
Dept: HEALTH INFORMATION MANAGEMENT | Facility: CLINIC | Age: 78
End: 2023-10-17

## 2023-10-17 NOTE — TELEPHONE ENCOUNTER
Forms/Letter Request    Type of form/letter:  revison to plan of care: client admitted to the hospital/SN visit 1 wkly 2weeks 10/06/2023-10/12/2023    Have you been seen for this request: Yes     Do we have the form/letter: Yes: order    Who is the form from? Home care    Where did/will the form come from? form was faxed in    When is form/letter needed by: asap    How would you like the form/letter returned: Fax : [phone]    Patient Notified form requests are processed in 3-5 business days:Yes    Could we send this information to you in [employer] or would you prefer to receive a phone call?:   No preference   Okay to leave a detailed message?: Yes at Other phone number:  [phone]

## 2023-10-18 ENCOUNTER — MYC MEDICAL ADVICE (OUTPATIENT)
Dept: FAMILY MEDICINE | Facility: CLINIC | Age: 78
End: 2023-10-18

## 2023-10-18 DIAGNOSIS — R33.9 URINARY RETENTION: ICD-10-CM

## 2023-10-19 ENCOUNTER — MYC MEDICAL ADVICE (OUTPATIENT)
Dept: FAMILY MEDICINE | Facility: CLINIC | Age: 78
End: 2023-10-19

## 2023-10-19 ENCOUNTER — TELEPHONE (OUTPATIENT)
Dept: FAMILY MEDICINE | Facility: CLINIC | Age: 78
End: 2023-10-19

## 2023-10-19 DIAGNOSIS — Z96.643 STATUS POST TOTAL REPLACEMENT OF BOTH HIPS: Primary | ICD-10-CM

## 2023-10-19 DIAGNOSIS — R33.9 URINARY RETENTION: ICD-10-CM

## 2023-10-19 RX ORDER — TAMSULOSIN HYDROCHLORIDE 0.4 MG/1
0.4 CAPSULE ORAL DAILY
Qty: 90 CAPSULE | Refills: 3 | Status: SHIPPED | OUTPATIENT
Start: 2023-10-19 | End: 2023-12-05

## 2023-10-19 NOTE — PROGRESS NOTES
[facility]  Orthopaedic Surgery, Joint Replacement Consultation  by David Escamilla M.D.    Genaro Ortiz MRN# [id number]    YOB: 1945     Requesting physician: Valdez Quesada DO, [facility] neurology  Wegener, Joel Daniel Irwin     Background history:  DX:  Bilateral DJD of hips    TREATMENTS:  7/14/2023, right total hip arthroplasty, Dr. Escamilla, [facility]  7/21/2023, left total hip arthroplasty, Dr. Escamilla, [facility]         Assessment and Plan:   Assessment:  78-year-old male with history of bilateral total hip arthroplasty presents today with a displaced periprosthetic right greater trochanter fracture following a fall 6 weeks ago    Plan:  After examined the patient and reviewing the imaging I extensively discussed my findings with him today.  Since his fall in September the fracture has displaced and is demonstrated that it is unstable and will likely not heal.  Therefore is my recommendation that we proceed with an open reduction and fixation of the fracture with a claw plate.  The patient states he had extreme difficulty with the previous 2 surgeries therefore against my medical advice has decided to not proceed with the surgery.  I explained the multiple morbidities as result of this including chronic limp, pain, increased risk of dislocation and loosening of the hardware.  The patient expressed verbal understanding of these risks but still wished to not proceed with the  procedure.  He will continue with toe-touch weightbearing. I recommend he continue with therapy for ambulation training.  The patient will follow-up in 1 month to monitor his progress.    MD Chetan Pelayo  Oncology and Adult Reconstructive Surgery  Dept Orthopaedic Surgery, [facility]  [phone] office, [phone] pager  [email or web address]     Merritt Toney PA-C  Physician Assistant   Oncology and Adult Reconstructive Surgery  Dept Orthopaedic Surgery, [facility]    This  note was created using dictation software and may contain errors.  Please contact the creator for any clarifications that are needed.              History of Present Illness:   77 year old male who presents today for evaluation of his right periprosthetic displaced greater trochanteric fracture following a fall.  On 9/16/2023 suffered a fall and was seen at the ER.  An x-ray and CT scan demonstrated fracture of the right greater trochanter.  He was evaluated inpatient and nonoperative treatment was recommended.  He has been toe-touch weightbearing since his consultation.  He states he is extremely concerned about falling and has been ambulating with a wheelchair.  He states he has no pain.         Physical Exam:     EXAMINATION pertinent findings:   PSYCH: Pleasant, healthy-appearing, alert, oriented x3, cooperative. Normal mood and affect.  VITAL SIGNS: There were no vitals taken for this visit..  Reviewed nursing intake notes.   There is no height or weight on file to calculate BMI.  RESP: non labored breathing .  Limited thoracic excursion approximately 1 cm.  ABD: benign, soft, non-tender, no acute peritoneal findings  SKIN: grossly normal   LYMPHATIC: grossly normal, no adenopathy, no extremity edema  NEURO: grossly normal , no motor deficits  VASCULAR: satisfactory perfusion of all extremities   MUSCULOSKELETAL:        Right lower extremity: Patient reluctant to bear weight or do range of motion with the right lower extremity/hip    Bilateral LE:              Thigh and leg compartments soft and compressible              +Quad/TA/GSC/FHL/EHL              SILT DP/SP/Esau/Saph/Tib nerve distributions              Palpable dorsalis pedis pulse                  Data:   All laboratory data reviewed  All imaging studies reviewed by me    10/30/2023 AP pelvis lateral right hip: Increased lateral displacement of right periprosthetic greater trochanter fracture.  There appears to be increased displacement with different  views.  Some callus formation noted    9/16/2023 CT right hip:    IMPRESSION:  1.  Comminuted periprosthetic fracture around the femoral component of the right total hip arthroplasty. The large fracture fragment is divided into two fragments, not displaced from each other, but displaced up to 46 mm from the remainder of the femur.  2.  Small focus of hemorrhage/hematoma lateral to the proximal right femur in the region of the fracture. There is also a small amount of hemorrhage extending distally along the superficial aspect of the lateral portion of the vastus lateralis muscle.  3.  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

## 2023-10-19 NOTE — TELEPHONE ENCOUNTER
Forms/Letter Request    Type of form/letter:  revision to plan of care  pt 2 visit wkly 1 for week PT visit 1 weekly 8 weeks 10/23/2023-12/11/2023 OT visit 1 weekly for 8 weeks 10/23/2023-12/11/2023    Have you been seen for this request: Yes     Do we have the form/letter: Yes: order    Who is the form from? Home care    Where did/will the form come from? form was faxed in    When is form/letter needed by: asap    How would you like the form/letter returned: Fax : [phone]    Patient Notified form requests are processed in 3-5 business days:Yes    Could we send this information to you in [employer] or would you prefer to receive a phone call?:   No preference   Okay to leave a detailed message?: Yes at Other phone number:  [phone]

## 2023-10-19 NOTE — TELEPHONE ENCOUNTER
Forms/Letter Request     Type of form/letter:  revision to plan of care  pt 2 visit wkly 1 for week PT visit 1 weekly 8 weeks 10/23/2023-12/11/2023 OT visit 1 weekly for 8 weeks 10/23/2023-12/11/2023     Have you been seen for this request: Yes      Do we have the form/letter: Yes: order     Who is the form from? Home care     Where did/will the form come from? form was faxed in     When is form/letter needed by: asap     How would you like the form/letter returned: Fax : [phone]     Patient Notified form requests are processed in 3-5 business days:Yes     Could we send this information to you in [employer] or would you prefer to receive a phone call?:   No preference   Okay to leave a detailed message?: Yes at Other phone number:  [phone]

## 2023-10-23 ENCOUNTER — MEDICAL CORRESPONDENCE (OUTPATIENT)
Dept: HEALTH INFORMATION MANAGEMENT | Facility: CLINIC | Age: 78
End: 2023-10-23

## 2023-10-23 ENCOUNTER — TELEPHONE (OUTPATIENT)
Dept: FAMILY MEDICINE | Facility: CLINIC | Age: 78
End: 2023-10-23

## 2023-10-23 NOTE — TELEPHONE ENCOUNTER
Forms/Letter Request    Type of form/letter:  home health certification and plan of care. For dates 10/13/23-12/11/23    Have you been seen for this request: N/A    Do we have the form/letter: Yes:     Who is the form from? Home care    Where did/will the form come from? form was faxed in    When is form/letter needed by: asap    How would you like the form/letter returned: Fax : [phone]    Patient Notified form requests are processed in 3-5 business days:No    Could we send this information to you in [employer] or would you prefer to receive a phone call?:   No preference   Okay to leave a detailed message?: No at Other phone number:  [facility]- [phone]

## 2023-10-23 NOTE — TELEPHONE ENCOUNTER
Faxed to [facility] [phone] & copy sent to scan.    Sara Keene  TC

## 2023-10-30 ENCOUNTER — OFFICE VISIT (OUTPATIENT)
Dept: ORTHOPEDICS | Facility: CLINIC | Age: 78
End: 2023-10-30
Payer: COMMERCIAL

## 2023-10-30 ENCOUNTER — ANCILLARY PROCEDURE (OUTPATIENT)
Dept: GENERAL RADIOLOGY | Facility: CLINIC | Age: 78
End: 2023-10-30
Attending: PHYSICIAN ASSISTANT
Payer: COMMERCIAL

## 2023-10-30 DIAGNOSIS — S72.111P: Primary | ICD-10-CM

## 2023-10-30 DIAGNOSIS — Z96.643 STATUS POST TOTAL REPLACEMENT OF BOTH HIPS: ICD-10-CM

## 2023-10-30 PROCEDURE — 99214 OFFICE O/P EST MOD 30 MIN: CPT | Performed by: PHYSICIAN ASSISTANT

## 2023-10-30 PROCEDURE — 73502 X-RAY EXAM HIP UNI 2-3 VIEWS: CPT | Mod: GC | Performed by: RADIOLOGY

## 2023-10-30 NOTE — LETTER
10/30/2023         RE: Genaro Ortiz  [address]  [facility] [address]        Dear Colleague,    Thank you for referring your patient, Genaro Ortiz, to the [facility] [place]. Please see a copy of my visit note below.        [facility]  Orthopaedic Surgery, Joint Replacement Consultation  by David Escamilla M.D.    Genaro Ortiz MRN# [id number]    YOB: 1945     Requesting physician: Valdez Quesada DO, [facility] neurology  Wegener, Joel Daniel Irwin     Background history:  DX:  Bilateral DJD of hips    TREATMENTS:  7/14/2023, right total hip arthroplasty, Dr. Escamilla, [facility]  7/21/2023, left total hip arthroplasty, Dr. Escamilla, [facility]         Assessment and Plan:   Assessment:  78-year-old male with history of bilateral total hip arthroplasty presents today with a displaced periprosthetic right greater trochanter fracture following a fall 6 weeks ago    Plan:  After examined the patient and reviewing the imaging I extensively discussed my findings with him today.  Since his fall in September the fracture has displaced and is demonstrated that it is unstable and will likely not heal.  Therefore is my recommendation that we proceed with an open reduction and fixation of the fracture with a claw plate.  The patient states he had extreme difficulty with the previous 2 surgeries therefore against my medical advice has decided to not proceed with the surgery.  I explained the multiple morbidities as result of this including chronic limp, pain, increased risk of dislocation and loosening of the hardware.  The patient expressed verbal understanding of these risks but still wished to not proceed with the  procedure.  He will continue with toe-touch weightbearing. I recommend he continue with therapy for ambulation training.  The patient will follow-up in 1 month to monitor his progress.    MD Chetan Pelayo Family Professor  Oncology and  Adult Reconstructive Surgery  Dept Orthopaedic Surgery, [facility]  [phone] office, [phone] pager  [email or web address]     Merritt Toney PA-C  Physician Assistant   Oncology and Adult Reconstructive Surgery  Dept Orthopaedic Surgery, [facility]    This note was created using dictation software and may contain errors.  Please contact the creator for any clarifications that are needed.              History of Present Illness:   77 year old male who presents today for evaluation of his right periprosthetic displaced greater trochanteric fracture following a fall.  On 9/16/2023 suffered a fall and was seen at the ER.  An x-ray and CT scan demonstrated fracture of the right greater trochanter.  He was evaluated inpatient and nonoperative treatment was recommended.  He has been toe-touch weightbearing since his consultation.  He states he is extremely concerned about falling and has been ambulating with a wheelchair.  He states he has no pain.         Physical Exam:     EXAMINATION pertinent findings:   PSYCH: Pleasant, healthy-appearing, alert, oriented x3, cooperative. Normal mood and affect.  VITAL SIGNS: There were no vitals taken for this visit..  Reviewed nursing intake notes.   There is no height or weight on file to calculate BMI.  RESP: non labored breathing .  Limited thoracic excursion approximately 1 cm.  ABD: benign, soft, non-tender, no acute peritoneal findings  SKIN: grossly normal   LYMPHATIC: grossly normal, no adenopathy, no extremity edema  NEURO: grossly normal , no motor deficits  VASCULAR: satisfactory perfusion of all extremities   MUSCULOSKELETAL:        Right lower extremity: Patient reluctant to bear weight or do range of motion with the right lower extremity/hip    Bilateral LE:              Thigh and leg compartments soft and compressible              +Quad/TA/GSC/FHL/EHL              SILT DP/SP/Esau/Saph/Tib nerve distributions              Palpable dorsalis pedis  pulse                  Data:   All laboratory data reviewed  All imaging studies reviewed by me    10/30/2023 AP pelvis lateral right hip: Increased lateral displacement of right periprosthetic greater trochanter fracture.  There appears to be increased displacement with different views.  Some callus formation noted    9/16/2023 CT right hip:    IMPRESSION:  1.  Comminuted periprosthetic fracture around the femoral component of the right total hip arthroplasty. The large fracture fragment is divided into two fragments, not displaced from each other, but displaced up to 46 mm from the remainder of the femur.  2.  Small focus of hemorrhage/hematoma lateral to the proximal right femur in the region of the fracture. There is also a small amount of hemorrhage extending distally along the superficial aspect of the lateral portion of the vastus lateralis muscle.  3.  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

## 2023-11-06 ENCOUNTER — TELEPHONE (OUTPATIENT)
Dept: ORTHOPEDICS | Facility: CLINIC | Age: 78
End: 2023-11-06

## 2023-11-06 NOTE — TELEPHONE ENCOUNTER
FUTURE VISIT INFORMATION      SURGERY INFORMATION:  Date: 11/10/23  Location: ur or  Surgeon:  David Escamilla MD   Anesthesia Type:  choice  Procedure:   OPEN REDUCTION INTERNAL FIXATION, FRACTURE, RIGHT  FEMUR, PROXIMAL GREATER TROCHANTER       RECORDS REQUESTED FROM:       Primary Care Provider: Wegener, Joel Daniel Irwin, MD - [facility]    Pertinent Medical History: hypertension

## 2023-11-06 NOTE — TELEPHONE ENCOUNTER
Patient Returning Call    Reason for call:  pt is returning call from RN Esvin is able to take him to and from surgery on Friday, and he will have someone stay with him over night, callback if needed     Information relayed to patient:  te sent to clinic     Patient has additional questions:  No      Could we send this information to you in [facility] or would you prefer to receive a phone call?:   Patient would prefer a phone call   Okay to leave a detailed message?: Yes at Cell number on file:    Telephone Information:   Mobile [phone]

## 2023-11-06 NOTE — NURSING NOTE
Pre-Op Teaching was done in person at the clinic.    Teaching Flowsheet   Relevant Diagnosis: Pre-Op Teaching  Teaching Topic:      Person(s) involved in teaching:   Patient     Motivation Level:  Asks Questions: Yes  Eager to Learn: Yes  Cooperative: Yes  Receptive (willing/able to accept information): Yes  Any cultural factors/Zoroastrian beliefs that may influence understanding or compliance? No     Patient demonstrates understanding of the following:  Reason for the appointment, diagnosis and treatment plan: Yes  Knowledge of proper use of medications and conditions for which they are ordered (with special attention to potential side effects or drug interactions): Yes  Which situations necessitate calling provider and whom to contact: Yes- discussed the stoplight tool to help assist with this.      Teaching Concerns Addressed:      Proper use of surgical scrub explain: Yes    Nutritional needs and diet plan: Yes  Pain management techniques: Yes  Wound Care: Yes  How and/when to access community resources: Yes  Need for pre-op with in 30 days: Yes  -I asked them to ensure they go over their daily medications during this visit and discuss what medications need to be stopped before surgery and when. If you are doing a pre-op with your PCP and they are not within the [email or web address], I ask them to fax it to our pre-op office. Patient verbalized understanding.      Instructional Materials Used/Given:  a surgery packet faxed to email. Confirmed email on call. Instructed patient to buy or get 8 ounces of antiseptic surgical soap called 4% CHG. Common name brand of this soap are Hibiclens and Exidine. I told them they can find this at their local pharmacy, clinic or retail store. If they have told, I told them to ask their pharmacist to help them find a substitute.      - Important contact info/ phone numbers: emphasizing clinic number and after hours number  - Map/ location of surgery  - Showering instructions  - Stop  light tool    Additionally the following was discussed with patient:  Patient verbalized understanding they need an adults drive and to have someone stay with them for 24 hours after surgery.  He is reaching out to his friend Esvin and will let me know if he is able to bring him. If so we will proceed with surgery as schedule.      -Next step: 11/10 and PAC appointment scheduled for 11/8.    Time spent with patient: 15 minutes.

## 2023-11-07 ENCOUNTER — TELEPHONE (OUTPATIENT)
Dept: FAMILY MEDICINE | Facility: CLINIC | Age: 78
End: 2023-11-07

## 2023-11-07 ENCOUNTER — MEDICAL CORRESPONDENCE (OUTPATIENT)
Dept: HEALTH INFORMATION MANAGEMENT | Facility: CLINIC | Age: 78
End: 2023-11-07

## 2023-11-07 NOTE — TELEPHONE ENCOUNTER
Telephone Encounter  Beni Valadez     Forms/Letter Request     Type of form/letter:  revison to plan of care 10/13/2023-12/11/2023     Have you been seen for this request: N/A     Do we have the form/letter: Yes: order     Who is the form from? Home care     Where did/will the form come from? form was faxed in     When is form/letter needed by: asap     How would you like the form/letter returned: Fax : [phone]     Patient Notified form requests are processed in 3-5 business days:Yes     Okay to leave a detailed message?: No at Other phone number:  [phone]

## 2023-11-08 ENCOUNTER — PRE VISIT (OUTPATIENT)
Dept: SURGERY | Facility: CLINIC | Age: 78
End: 2023-11-08

## 2023-11-08 ENCOUNTER — VIRTUAL VISIT (OUTPATIENT)
Dept: SURGERY | Facility: CLINIC | Age: 78
End: 2023-11-08
Payer: COMMERCIAL

## 2023-11-08 ENCOUNTER — ANESTHESIA EVENT (OUTPATIENT)
Dept: SURGERY | Facility: CLINIC | Age: 78
DRG: 481 | End: 2023-11-08
Payer: COMMERCIAL

## 2023-11-08 ENCOUNTER — PREP FOR PROCEDURE (OUTPATIENT)
Dept: OTHER | Facility: CLINIC | Age: 78
End: 2023-11-08

## 2023-11-08 DIAGNOSIS — Z01.818 PRE-OP EVALUATION: Primary | ICD-10-CM

## 2023-11-08 PROCEDURE — 99203 OFFICE O/P NEW LOW 30 MIN: CPT | Mod: 95 | Performed by: PHYSICIAN ASSISTANT

## 2023-11-08 ASSESSMENT — ENCOUNTER SYMPTOMS: SEIZURES: 0

## 2023-11-08 ASSESSMENT — LIFESTYLE VARIABLES: TOBACCO_USE: 0

## 2023-11-08 NOTE — PROGRESS NOTES
SURGERY PLAN (PRE-OP PLAN)     Patient Position (indicated by x):  X  Lateral decubitus, Wixson hip positioner     Regular OR table     Manzo catheter   X  Revision JOHNNY drape with plastic side bags for leg   X  Blue U drape x2   Blue and white stockinet   Coban   Ioban        General Equipment Requests (indicated by x):    X  C-Arm with C-Armor drape     C-Arm (video capable, Therma Flite 9900 model)     O-Arm with Stealth imaging     Fracture Table   X  Rao XR Table (radiolucent table)     SurgiGraphic 6000 (diving board) fluoro table     Cell saver     Andi Biopsy trephine set w/ K-wire & pituitary rongeurs     Small pituitary rongeur     Andi's angled curettes, narrow shaft     Bone graft, kapner gouges     Midas Rickey Medtronic alexandria, electric motor     Phenol 5%     [place] BMAC stem cell     Vancomycin 1 gram powder     Zometa 4 mg vials     Depo Medrol steroid     Blunt Pelvic Retractor (.55, Blunt Hohmann with  slight bend)     (1) Portable hand held radiation detector machine for sentinel node biopsy and (2) Lymphazurin     [place] Osteotomes     Trauma/Fixation Requests (indicated by x):    Large Frag AO plates     Small Frag AO plates     Locking periarticular plates - AO     Locking periarticular prox humerus plate - Chang/Nephew     Pelvic recon plates (curved & straight), 3.5 & 4.5 mm     Rena sihan-prosthetic fracture plate     DHS hip screw     Morenci Claw plates     AO, DCS 95 degree plate/screw     AO, 95 degree condylar blade plate     AO, 3.0, 3.5, 4.0, 4.5 mm Cannulated screws     AO, 6.5, 7.0, 7.3 mm Cannulated screws- Stainless Steel     AO, 6.5 mm Cannulated screws- Titanium     Rena cerclage cable plates     AO IM nails     AO Large Ex Fix     AO Small Ex Fix     Large pelvic bone clamps   X  Large fx reduction forceps - AO     Bone clamps - Large (Alistair Yeh, Joselyn)     Bone clamps - Medium (Rao)     JOHNNY Requests (indicated by x):    Biomet ECHO Bimetric ingrowth stem      Biomet Integral cemented stem     Biomet RB cup, 28+32 heads     Biomet Bipolar cup     Biomet Constrained Freedom liner with heads     Chang Nephew BHR resurfacing JOHNNY with Akademos navigation unit (need 2 weeks advance notice)     DePuy S-ROM long stems     Larslan Restoration modular long stem     Biomet ZAYRA long stems, both interlocked and splined stems     Biomet Regenerex TM cup + augments     Larslan Tritanium TM cup +  augments     Larslan GAP II acet cage + cemented poly cup     Biomet OSS prox femur replacement JOHNNY     Biomet OSS Compress fixation     IM flexible reamers + guidewire, < 9 mm     IM flexible reamers + guidewire, > 9 mm     Allograft: femoral head     Allograft: distal femur     Allograft: proximal tibia     Bone mill     Allograft cancellous chips     Allograft cancellous crushed   X  Ashutosh Trochanteric Claw plates     Dall [employer] Troch Claw + cable, insertion instruments     Dall Aliva [employer]aceuticals beaded cerclage cable, green cable  x2 , insertion instruments     Rena CTR Troch cable plate     AO pelvic instruments and clamps (angled) Blunt Hohmann & angled Kc, large bone reduction forceps     Specimens and cultures (indicated by x):      Tissue cultures, aerobic and anaerobic without gram stain      Frozen section      pathology specimens - fresh      pathology specimens - formalin        Plan: Open reduction and internal fixation of right femur greater trochanter with Larslan implants    Ac Box MD  Adult Joint Reconstruction Fellow  Dept Orthopaedic Surgery, [facility]

## 2023-11-08 NOTE — PATIENT INSTRUCTIONS
Preparing for Your Surgery      Name:  Genaro Ortiz   MRN:  [id number]   :  1945   Today's Date:  2023       Arriving for surgery:  Surgery date:  11/10/2023  Arrival time:  11:00 am    Please come to:     Please come to:      [facility] [address]  [address] [place]  [address]  The Green Ramp for patients and visitors is located beneath the [facility]. The parking facility entrance is at the intersection of [address] [place] [address] and [address]. Patients and visitors who self-park will receive the reduced hospital parking rate (no ticket validation needed).  [employer] parking, located at the [facility] main entrance on [address] [place] [address], is available Monday - Friday from 7 am to 3:30 pm.  Discounted parking pass options can be purchased from  attendants during business hours.  -Check in at the security desk in the [facility] ([facility]) Lobby. They will direct you to the correct elevators.  -Proceed to the 3rd floor, check in at the Adult Surgery Waiting Lounge. [phone]  If you are in need of directions, a wheelchair or escort please stop at the Information Desk in the lobby.  Inform the information person that you are here for surgery; a wheelchair and escort to [address] will be provided.   An escort to the Adult Surgery Waiting Lounge will be provided.    What can I eat or drink?  -  You may eat and drink normally up to 8 hours prior to arrival time. (Until 3 am)  -  You may have clear liquids until 2 hours prior to arrival time. (Until 9 am)    Examples of clear liquids:  Water  Clear broth  Juices (apple, white grape, white cranberry  and cider) without pulp  Noncarbonated, powder based beverages  (lemonade and Vidal-Aid)  Sodas (Sprite, 7-Up, ginger ale and seltzer)  Coffee or tea (without milk or cream)  Gatorade    -  No Alcohol or cannabis  products for at least 24 hours before surgery.     Which medicines can I take?    Hold Aspirin for 7 days before surgery.   Hold Multivitamins for 7 days before surgery.  Hold Supplements for 7 days before surgery. Omega 3)  Hold Ibuprofen (Advil, Motrin) for 3 day(s) before surgery--unless otherwise directed by surgeon.  Hold Naproxen (Aleve) for 4 days before surgery.    -  DO NOT take these medications the day of surgery:  Vitamin B 12  Miralax  Vitamin D      -  PLEASE TAKE these medications the day of surgery:  Tamsulosin       How do I prepare myself?  - Please take 2 showers (one the night prior to surgery and one the morning of surgery) using Scrubcare or Hibiclens soap.    Use this soap only from the neck to your toes.     Leave the soap on your skin for one minute--then rinse thoroughly.      You may use your own shampoo and conditioner. No other hair products.   - Please remove all jewelry and body piercings.  - No lotions, deodorants or fragrance.  - No makeup or fingernail polish.   - Bring your ID and insurance card.    -If you have a Deep Brain Stimulator, Spinal Cord Stimulator, or any Neuro Stimulator device---you must bring the remote control to the hospital.      ALL PATIENTS GOING HOME THE SAME DAY OF SURGERY ARE REQUIRED TO HAVE A RESPONSIBLE ADULT TO DRIVE AND BE IN ATTENDANCE WITH THEM FOR 24 HOURS FOLLOWING SURGERY.    Covid testing policy as of 12/06/2022  Your surgeon will notify and schedule you for a COVID test if one is needed before surgery--please direct any questions or COVID symptoms to your surgeon      Questions or Concerns:    - For any questions regarding the day of surgery or your hospital stay, please contact the Pre Admission Nursing Office at [phone].       - If you have health changes between today and your surgery, please call your surgeon.       - For questions after surgery, please call your surgeons office.           Current Visitor Guidelines    You may have 2  visitors in the pre op area.    Visiting hours: 8 a.m. to 8:30 p.m.    You may have four visitors during your inpatient hospital stay.    Patients confirmed or suspected to have symptoms of COVID 19 or flu:     No visitors allowed for adult patients.   Children (under age 18) can have 1 named visitor.     People who are sick or showing symptoms of COVID 19 or flu:    Are not allowed to visit patients--we can only make exceptions in special situations.       Please follow these guidelines for your visit:          Please maintain social distance          Masking is optional--however at times you may be asked to wear a mask for the safety of yourself and others     Clean your hands with alcohol hand . Do this when you arrive at and leave the building and patient room,    And again after you touch your mask or anything in the room.     Go directly to and from the room you are visiting.     Stay in the patient s room during your visit. Limit going to other places in the hospital as much as possible     Leave bags and jackets at home or in the car.     For everyone s health, please don t come and go during your visit. That includes for smoking   during your visit.

## 2023-11-08 NOTE — PROGRESS NOTES
Quinton is a 78 year old who is being evaluated via a billable video visit.      How would you like to obtain your AVS? Obduliahart        Subjective   Quinton is a 78 year old, presenting for the following health issues:  Pre-Op Exam (/)          HILARIA Yuan LPN

## 2023-11-08 NOTE — H&P
Pre-Operative H & P     CC:  Preoperative exam to assess for increased cardiopulmonary risk while undergoing surgery and anesthesia.    Date of Encounter: 11/8/2023  Primary Care Physician:  Wegener, Joel Daniel Irwin     Reason for visit:   Encounter Diagnosis   Name Primary?    Pre-op evaluation Yes       HPI  Genaro Ortiz is a 78 year old male who presents for pre-operative H & P in preparation for  Procedure Information       Case: [id number] Date/Time: 11/10/23 1325    Procedure: OPEN REDUCTION INTERNAL FIXATION, FRACTURE, RIGHT  FEMUR, PROXIMAL GREATER TROCHANTER (Right: Hip)    Anesthesia type: Choice    Diagnosis: Displaced fracture of greater trochanter of right femur, subsequent encounter for closed fracture with malunion [S72.111P]    Pre-op diagnosis: Displaced fracture of greater trochanter of right femur, subsequent encounter for closed fracture with malunion [S72.111P]    Location:  OR 14 / [facility]    Providers: David Escamilla MD            Patient is being evaluated for comorbid conditions of hypertension, dyslipidemia, MGUS, obesity, GERD, arthritis, prostate cancer    Mr. Ortiz is s/p bilateral total hip arthroplasty. This was complicated by a fall causing displaced periprosthetic right greater trochanter fracture. He was recently seen by Dr. Escamilla and is now scheduled for the above procedure.     History is obtained from the patient and chart review    Hx of abnormal bleeding or anti-platelet use: ASA 81 mg      Past Medical History  Past Medical History:   Diagnosis Date    Adenocarcinoma of prostate (H) 5/27/2008    Arthritis     Esophageal reflux     Neuropathy     Pure hypercholesterolemia     Unspecified essential hypertension        Past Surgical History  Past Surgical History:   Procedure Laterality Date    ABDOMEN SURGERY  1952    Appendectomy    APPENDECTOMY  1952    ARTHROPLASTY HIP Right 7/14/2023    Procedure: ARTHROPLASTY, HIP, TOTAL RIGHT;  Surgeon: David Escamilla MD;   [facility]    ARTHROPLASTY HIP Left 7/21/2023    Procedure: ARTHROPLASTY, HIP, TOTAL LEFT;  Surgeon: David Escamilla MD;  [facility]    CATARACT IOL, RT/LT Bilateral 03/12    COLONOSCOPY  2017    COLONOSCOPY N/A 5/18/2023    Procedure: colonoscopy;  Surgeon: Ayse Sears MD;  Location:  GI    GENITOURINARY SURGERY      HERNIORRHAPHY INGUINAL  5/15/2014    Procedure: HERNIORRHAPHY INGUINAL;  Surgeon: Evens Jefferson MD;  [facility]    PROSTATE SURGERY  2007       Prior to Admission Medications  Current Outpatient Medications   Medication Sig Dispense Refill    acetaminophen (TYLENOL) 325 MG tablet Take 2 tablets (650 mg) by mouth every 6 hours as needed for mild pain      aspirin 81 MG EC tablet Take 2 tablets (162 mg) by mouth daily Resume 81 mg daily per orthopedics recommendations (Patient taking differently: Take 162 mg by mouth every morning Resume 81 mg daily per orthopedics recommendations) 60 tablet 0    Cyanocobalamin (B-12) 1000 MCG TBCR Take 1 tablet by mouth daily (Patient taking differently: Take 1 tablet by mouth every morning) 30 tablet 0    fish oil-omega-3 fatty acids 1000 MG capsule Take 2,000 mg by mouth every morning 120 capsule 11    polyethylene glycol (MIRALAX) 17 g packet Take 1 packet by mouth every morning      pregabalin (LYRICA) 225 MG capsule Take 1 capsule (225 mg) by mouth every evening 30 capsule 1    simvastatin (ZOCOR) 20 MG tablet TAKE 1 TABLET BY MOUTH EVERYDAY AT BEDTIME (Patient taking differently: Take 20 mg by mouth at bedtime TAKE 1 TABLET BY MOUTH EVERYDAY AT BEDTIME) 90 tablet 1    tamsulosin (FLOMAX) 0.4 MG capsule Take 1 capsule (0.4 mg) by mouth daily (Patient taking differently: Take 0.4 mg by mouth every morning) 90 capsule 3    Vitamin D3 (D 1000) 25 mcg (1000 units) tablet Take 1 tablet (25 mcg) by mouth daily (Patient taking differently: Take 25 mcg by mouth every morning) 30 tablet 0       Allergies  Allergies   Allergen Reactions     Penicillins Other (See Comments)     taya       Social History  Social History     Socioeconomic History    Marital status: Single     Spouse name: Not on file    Number of children: Not on file    Years of education: Not on file    Highest education level: Not on file   Occupational History    Not on file   Tobacco Use    Smoking status: Never     Passive exposure: Never    Smokeless tobacco: Never    Tobacco comments:     Never smoked.   Vaping Use    Vaping Use: Never used   Substance and Sexual Activity    Alcohol use: Not Currently    Drug use: No    Sexual activity: Never   Other Topics Concern    Parent/sibling w/ CABG, MI or angioplasty before 65F 55M? No   Social History Narrative    Social Documentation:        Balanced Diet: YES    Calcium intake: 1-2 per day    Caffeine: 2 per day    Exercise:  type of activity gym and work is physical;  2 times per week    Sunscreen: Yes, when needed    Seatbelts:  Yes    Self Breast Exam:  na    Self Testicular Exam: Yes    Physical/Emotional/Sexual Abuse: No    Do you feel safe in your environment? Yes        Cholesterol screen up to date:  6/5/2009                                                                                                             Latest Ref Rng                              CHOLESTEROL                                                 0 - 200 mg/dL                177                     TRIGLYCERIDES                                               0 - 150 mg/dL                177 (H)                 HDL                                                         40 - 110 mg/dL               39 (L)                  LDL CHOLESTEROL CALCULATED                                  0 - 129 mg/dL                102                     VLDL-CHOLESTEROL                                            0 - 30 mg/dL                 35 (H)                  CHOLESTEROL/HDL RATIO                                       0.0 - 5.0                    4.5                          Eye Exam up to date: Yes    Dental Exam up to date: Yes    Pap smear up to date: Does Not Apply    Mammogram up to date: Does Not Apply    Dexa Scan up to date: Does Not Apply    Colonoscopy up to date: Yes    Immunizations up to date: Yes    Glucose screen if over 40:  Component                        Latest Ref Rng               10/31/2008              6/5/2009                GLUCOSE                          60 - 99 mg/dL                106 (H)                 111 (H)                                                      Social Determinants of Health     Financial Resource Strain: Low Risk  (10/12/2023)    Financial Resource Strain     Within the past 12 months, have you or your family members you live with been unable to get utilities (heat, electricity) when it was really needed?: No   Food Insecurity: Low Risk  (10/12/2023)    Food Insecurity     Within the past 12 months, did you worry that your food would run out before you got money to buy more?: No     Within the past 12 months, did the food you bought just not last and you didn t have money to get more?: No   Transportation Needs: Low Risk  (10/12/2023)    Transportation Needs     Within the past 12 months, has lack of transportation kept you from medical appointments, getting your medicines, non-medical meetings or appointments, work, or from getting things that you need?: No   Physical Activity: Not on file   Stress: Not on file   Social Connections: Not on file   Interpersonal Safety: Low Risk  (10/13/2023)    Interpersonal Safety     Do you feel physically and emotionally safe where you currently live?: Yes     Within the past 12 months, have you been hit, slapped, kicked or otherwise physically hurt by someone?: No     Within the past 12 months, have you been humiliated or emotionally abused in other ways by your partner or ex-partner?: No   Housing Stability: Low Risk  (10/12/2023)    Housing Stability     Do you have housing? : Yes     Are you  worried about losing your housing?: No       Family History  Family History   Problem Relation Age of Onset    Diabetes Mother     Gastrointestinal Disease Mother         pancreas removed    Breast Cancer Mother     Osteoporosis Father     Obesity Father     Glaucoma Other     Macular Degeneration No family hx of     Hypertension No family hx of     Anesthesia Reaction No family hx of     Deep Vein Thrombosis (DVT) No family hx of        Review of Systems  The complete review of systems is negative other than noted in the HPI or here.   Anesthesia Evaluation   Pt has had prior anesthetic.     No history of anesthetic complications       ROS/MED HX  ENT/Pulmonary:  - neg pulmonary ROS  (-) tobacco use   Neurologic:  - neg neurologic ROS  (-) no seizures and no CVA   Cardiovascular:     (+) Dyslipidemia - -   -  - -   Taking blood thinners                              Previous cardiac testing   Echo: Date: Results:    Stress Test:  Date: Results:    ECG Reviewed:  Date: 5/2014 Results:  NSR  Cath:  Date: Results:      METS/Exercise Tolerance: 1 - Eating, dressing Comment: Was using walker prior to hip procedures. Now limited due to fracture. Denies ALCOCER/CP   Hematologic:  - neg hematologic  ROS  (-) history of blood clots and history of blood transfusion   Musculoskeletal: Comment: S/p bilateral JOHNNY  Now with displaced periprosthetic fracture right side       GI/Hepatic:  - neg GI/hepatic ROS     Renal/Genitourinary:  - neg Renal ROS     Endo:     (+)               Obesity (BMI 32),    (-) chronic steroid usage   Psychiatric/Substance Use:  - neg psychiatric ROS     Infectious Disease:  - neg infectious disease ROS     Malignancy:   (+) Malignancy, History of Prostate.Prostate CA Remission status post Surgery.      Other:            Virtual visit -  No vitals were obtained    Physical Exam  Constitutional: Awake, alert, cooperative, no apparent distress, and appears stated age.  HENT: Normocephalic  Respiratory: non  "labored breathing   Neurologic: Awake, alert, oriented to name, place and time.   Neuropsychiatric: Calm, cooperative. Normal affect.      Prior Labs/Diagnostic Studies   All labs and imaging personally reviewed     EKG/ stress test - if available please see in ROS above   No results found.       No data to display                  The patient's records and results personally reviewed by this provider.     Outside records reviewed from: Care Everywhere      Assessment    Genaro Ortiz is a 78 year old male seen as a PAC referral for risk assessment and optimization for anesthesia.    Plan/Recommendations  Pt will be optimized for the proposed procedure.  See below for details on the assessment, risk, and preoperative recommendations    NEUROLOGY  - No history of TIA, CVA or seizure  -Post Op delirium risk factors:  No risk identified    ENT  - No current airway concerns.  Will need to be reassessed day of surgery.  Mallampati: Unable to assess  TM: Unable to assess    CARDIAC  - No history of CAD and Afib  -dyslipidemia using zocor  -denies cardiac history or symptoms. Limited activity due to leg fracture- using wheelchair.   - METS (Metabolic Equivalents)  Patient CANNOT perform 4 METS exercise without symptoms            Total Score: 1    Functional Capacity: Unable to complete 4 METS      RCRI-Very low risk: Class 1 0.4% complication rate            Total Score: 0        PULMONARY  MARY Low Risk            Total Score: 2    MARY: Over 50 ys old    MARY: Male      - Denies asthma or inhaler use  - Tobacco History    History   Smoking Status    Never   Smokeless Tobacco    Never       GI  PONV Medium Risk  Total Score: 2           1 AN PONV: Patient is not a current smoker    1 AN PONV: Intended Post Op Opioids        /RENAL  - Baseline Creatinine WNL    ENDOCRINE    - BMI: Estimated body mass index is 33.26 kg/m  as calculated from the following:    Height as of 9/20/23: 1.753 m (5' 9\").    Weight as of 10/4/23: " 102.2 kg (225 lb 3.2 oz).  Obesity (BMI >30)  - No history of Diabetes Mellitus    HEME  VTE Low Risk 0.5%            Total Score: 3    VTE: Greater than 59 yrs old    VTE: Male      - Platelet disfunction second to Aspirin (Rukhsana, many others)  -ordered T&S to be completed prior to surgery. Patient reports he is unable to drive and lives alone, so will not be able to get to a [facility] tomorrow to complete labs. Will order T&S to be completed DOS.     MSK  -s/p bilateral JOHNNY.  Now with right periprosthetic fracture with above procedure planned     Different anesthesia methods/types have been discussed with the patient, but they are aware that the final plan will be decided by the assigned anesthesia provider on the date of service.    The patient is optimized for their procedure. AVS with information on surgery time/arrival time, meds and NPO status given by nursing staff. No further diagnostic testing indicated.    Please refer to the physical examination documented by the anesthesiologist in the anesthesia record on the day of surgery.    Video-Visit Details    Type of service:  Video Visit    Provider received verbal consent for a Video Visit from the patient? Yes   Video Start Time:  0750  Video End Time: 0802    Originating Location (pt. Location): Home    Distant Location (provider location):  Off-site  Mode of Communication:  Video Conference via vozero  On the day of service:     Prep time: 19 minutes  Visit time: 12 minutes  Documentation time: 5 minutes  ------------------------------------------  Total time: 36 minutes      Maria T Stanton PA-C  Preoperative Assessment Center  [facility]  Phone: [phone]  Fax: [phone]

## 2023-11-09 ASSESSMENT — LIFESTYLE VARIABLES: TOBACCO_USE: 0

## 2023-11-09 ASSESSMENT — ENCOUNTER SYMPTOMS: SEIZURES: 0

## 2023-11-09 NOTE — ANESTHESIA PREPROCEDURE EVALUATION
Anesthesia Pre-Procedure Evaluation    Patient: Genaro Ortiz   MRN: [id number] : 1945        Procedure : Procedure(s):  OPEN REDUCTION INTERNAL FIXATION, FRACTURE, RIGHT  FEMUR, PROXIMAL GREATER TROCHANTER          Past Medical History:   Diagnosis Date    Adenocarcinoma of prostate (H) 2008    Arthritis     Esophageal reflux     Neuropathy     Pure hypercholesterolemia     Unspecified essential hypertension       Past Surgical History:   Procedure Laterality Date    ABDOMEN SURGERY      Appendectomy    APPENDECTOMY      ARTHROPLASTY HIP Right 2023    Procedure: ARTHROPLASTY, HIP, TOTAL RIGHT;  Surgeon: David Escamilla MD;  [facility]    ARTHROPLASTY HIP Left 2023    Procedure: ARTHROPLASTY, HIP, TOTAL LEFT;  Surgeon: David Escamilla MD;  [facility]    CATARACT IOL, RT/LT Bilateral     COLONOSCOPY      COLONOSCOPY N/A 2023    Procedure: colonoscopy;  Surgeon: Ayse Sears MD;  Location:  GI    GENITOURINARY SURGERY      HERNIORRHAPHY INGUINAL  5/15/2014    Procedure: HERNIORRHAPHY INGUINAL;  Surgeon: Evens Jefferson MD;  [facility]    PROSTATE SURGERY  2007      Allergies   Allergen Reactions    Penicillins Other (See Comments)     blotches      Social History     Tobacco Use    Smoking status: Never     Passive exposure: Never    Smokeless tobacco: Never    Tobacco comments:     Never smoked.   Substance Use Topics    Alcohol use: Not Currently      Wt Readings from Last 1 Encounters:   10/04/23 102.2 kg (225 lb 3.2 oz)        Anesthesia Evaluation   Pt has had prior anesthetic. Type: General and Regional.    No history of anesthetic complications       ROS/MED HX  ENT/Pulmonary:  - neg pulmonary ROS  (-) tobacco use   Neurologic:  - neg neurologic ROS  (-) no seizures and no CVA   Cardiovascular:     (+) Dyslipidemia hypertension- -   -  - -   Taking blood thinners Pt has received instructions:                             Previous  cardiac testing   Echo: Date: Results:    Stress Test:  Date: Results:    ECG Reviewed:  Date: 5/2014 Results:  NSR  Cath:  Date: Results:      METS/Exercise Tolerance: 1 - Eating, dressing Comment: Was using walker prior to hip procedures. Now limited due to fracture. Denies ALCOCER/CP   Hematologic: Comments: MGUS   (-) history of blood clots and history of blood transfusion   Musculoskeletal: Comment: S/p bilateral JOHNNY  Now with displaced periprosthetic fracture right side       GI/Hepatic:     (+) GERD, Asymptomatic on medication,                  Renal/Genitourinary:  - neg Renal ROS     Endo:     (+)               Obesity (BMI 32),    (-) chronic steroid usage   Psychiatric/Substance Use:  - neg psychiatric ROS     Infectious Disease:  - neg infectious disease ROS     Malignancy:   (+) Malignancy, History of Prostate.Prostate CA Remission status post Surgery.      Other:            Physical Exam    Airway        Mallampati: II   TM distance: > 3 FB   Neck ROM: limited   Mouth opening: < 3 cm    Respiratory Devices and Support         Dental       (+) Multiple visibly decayed, broken teeth    B=Bridge, C=Chipped, L=Loose, M=Missing    Cardiovascular          Rhythm and rate: regular and normal     Pulmonary           breath sounds clear to auscultation           OUTSIDE LABS:  CBC:   Lab Results   Component Value Date    WBC 6.3 10/13/2023    WBC 5.6 10/02/2023    HGB 11.6 (L) 10/13/2023    HGB 9.8 (L) 10/02/2023    HCT 35.9 (L) 10/13/2023    HCT 30.1 (L) 10/02/2023     10/13/2023     10/02/2023     BMP:   Lab Results   Component Value Date     10/13/2023     10/02/2023    POTASSIUM 3.9 10/13/2023    POTASSIUM 3.9 10/02/2023    CHLORIDE 100 10/13/2023    CHLORIDE 109 (H) 10/02/2023    CO2 21 (L) 10/13/2023    CO2 21 (L) 10/02/2023    BUN 28.7 (H) 10/13/2023    BUN 21.6 10/02/2023    CR 0.99 10/13/2023    CR 0.84 10/02/2023     (H) 10/13/2023     (H) 10/02/2023     COAGS:  "  Lab Results   Component Value Date    INR 1.08 07/21/2023     POC: No results found for: \"BGM\", \"HCG\", \"HCGS\"  HEPATIC:   Lab Results   Component Value Date    ALBUMIN 4.3 10/13/2023    PROTTOTAL 8.3 10/13/2023    ALT 7 10/13/2023    AST 19 10/13/2023    ALKPHOS 214 (H) 10/13/2023    BILITOTAL 0.2 10/13/2023     OTHER:   Lab Results   Component Value Date    LACT 1.1 07/21/2023    A1C 5.6 10/13/2023    MAX 9.7 10/13/2023    TSH 1.33 11/30/2020    CRP <2.9 10/30/2020    SED 21 (H) 10/30/2020       Anesthesia Plan    ASA Status:  3    NPO Status:  NPO Appropriate    Anesthesia Type: Spinal.      Maintenance: TIVA.   Techniques and Equipment:     - Lines/Monitors: 2nd IV     - Blood: T&S     - Drips/Meds: Phenylephrine     Consents    Anesthesia Plan(s) and associated risks, benefits, and realistic alternatives discussed. Questions answered and patient/representative(s) expressed understanding.     - Discussed:     - Discussed with:  Patient      - Extended Intubation/Ventilatory Support Discussed: No.      - Patient is DNR/DNI Status: No     Use of blood products discussed: Yes.     - Discussed with: Patient.     - Consented: consented to blood products     Postoperative Care    Pain management: IV analgesics, Oral pain medications, Multi-modal analgesia.   PONV prophylaxis: Ondansetron (or other 5HT-3), Dexamethasone or Solumedrol, Background Propofol Infusion     Comments:                Rudy Kuhn MD  "

## 2023-11-10 ENCOUNTER — APPOINTMENT (OUTPATIENT)
Dept: GENERAL RADIOLOGY | Facility: CLINIC | Age: 78
DRG: 481 | End: 2023-11-10
Attending: ORTHOPAEDIC SURGERY
Payer: COMMERCIAL

## 2023-11-10 ENCOUNTER — APPOINTMENT (OUTPATIENT)
Dept: GENERAL RADIOLOGY | Facility: CLINIC | Age: 78
DRG: 481 | End: 2023-11-10
Attending: STUDENT IN AN ORGANIZED HEALTH CARE EDUCATION/TRAINING PROGRAM
Payer: COMMERCIAL

## 2023-11-10 ENCOUNTER — ANESTHESIA (OUTPATIENT)
Dept: SURGERY | Facility: CLINIC | Age: 78
DRG: 481 | End: 2023-11-10
Payer: COMMERCIAL

## 2023-11-10 ENCOUNTER — HOSPITAL ENCOUNTER (INPATIENT)
Facility: CLINIC | Age: 78
LOS: 4 days | Discharge: NURSING, REHAB OR LONG-TERM CARE | DRG: 481 | End: 2023-11-14
Attending: ORTHOPAEDIC SURGERY | Admitting: ORTHOPAEDIC SURGERY
Payer: COMMERCIAL

## 2023-11-10 DIAGNOSIS — Z96.641 STATUS POST TOTAL REPLACEMENT OF RIGHT HIP: ICD-10-CM

## 2023-11-10 DIAGNOSIS — G89.18 ACUTE POST-OPERATIVE PAIN: ICD-10-CM

## 2023-11-10 DIAGNOSIS — Z87.81 S/P RIGHT HIP FRACTURE: ICD-10-CM

## 2023-11-10 DIAGNOSIS — M97.01XA PERIPROSTHETIC FRACTURE AROUND INTERNAL PROSTHETIC RIGHT HIP JOINT, INITIAL ENCOUNTER (H): Primary | ICD-10-CM

## 2023-11-10 LAB
ABO/RH(D): NORMAL
ANTIBODY SCREEN: NEGATIVE
GLUCOSE BLDC GLUCOMTR-MCNC: 111 MG/DL (ref 70–99)
SPECIMEN EXPIRATION DATE: NORMAL

## 2023-11-10 PROCEDURE — 258N000003 HC RX IP 258 OP 636: Performed by: NURSE ANESTHETIST, CERTIFIED REGISTERED

## 2023-11-10 PROCEDURE — 258N000003 HC RX IP 258 OP 636: Performed by: STUDENT IN AN ORGANIZED HEALTH CARE EDUCATION/TRAINING PROGRAM

## 2023-11-10 PROCEDURE — 999N000065 XR CHEST PORT 1 VIEW

## 2023-11-10 PROCEDURE — 250N000009 HC RX 250: Performed by: STUDENT IN AN ORGANIZED HEALTH CARE EDUCATION/TRAINING PROGRAM

## 2023-11-10 PROCEDURE — 250N000025 HC SEVOFLURANE, PER MIN: Performed by: ORTHOPAEDIC SURGERY

## 2023-11-10 PROCEDURE — 71045 X-RAY EXAM CHEST 1 VIEW: CPT | Mod: 26 | Performed by: RADIOLOGY

## 2023-11-10 PROCEDURE — 36415 COLL VENOUS BLD VENIPUNCTURE: CPT | Performed by: PHYSICIAN ASSISTANT

## 2023-11-10 PROCEDURE — 250N000011 HC RX IP 250 OP 636: Performed by: STUDENT IN AN ORGANIZED HEALTH CARE EDUCATION/TRAINING PROGRAM

## 2023-11-10 PROCEDURE — 999N000180 XR SURGERY CARM FLUORO LESS THAN 5 MIN: Mod: TC

## 2023-11-10 PROCEDURE — 250N000011 HC RX IP 250 OP 636: Mod: JZ | Performed by: STUDENT IN AN ORGANIZED HEALTH CARE EDUCATION/TRAINING PROGRAM

## 2023-11-10 PROCEDURE — 71045 X-RAY EXAM CHEST 1 VIEW: CPT

## 2023-11-10 PROCEDURE — 250N000011 HC RX IP 250 OP 636: Mod: JZ

## 2023-11-10 PROCEDURE — 250N000011 HC RX IP 250 OP 636: Mod: JZ | Performed by: ANESTHESIOLOGY

## 2023-11-10 PROCEDURE — 99221 1ST HOSP IP/OBS SF/LOW 40: CPT

## 2023-11-10 PROCEDURE — 250N000013 HC RX MED GY IP 250 OP 250 PS 637: Performed by: PHYSICIAN ASSISTANT

## 2023-11-10 PROCEDURE — 710N000009 HC RECOVERY PHASE 1, LEVEL 1, PER MIN: Performed by: ORTHOPAEDIC SURGERY

## 2023-11-10 PROCEDURE — 250N000009 HC RX 250

## 2023-11-10 PROCEDURE — 86900 BLOOD TYPING SEROLOGIC ABO: CPT | Performed by: PHYSICIAN ASSISTANT

## 2023-11-10 PROCEDURE — 120N000002 HC R&B MED SURG/OB UMMC

## 2023-11-10 PROCEDURE — 250N000011 HC RX IP 250 OP 636: Mod: JZ | Performed by: NURSE ANESTHETIST, CERTIFIED REGISTERED

## 2023-11-10 PROCEDURE — 360N000084 HC SURGERY LEVEL 4 W/ FLUORO, PER MIN: Performed by: ORTHOPAEDIC SURGERY

## 2023-11-10 PROCEDURE — 272N000001 HC OR GENERAL SUPPLY STERILE: Performed by: ORTHOPAEDIC SURGERY

## 2023-11-10 PROCEDURE — 250N000009 HC RX 250: Performed by: NURSE ANESTHETIST, CERTIFIED REGISTERED

## 2023-11-10 PROCEDURE — 250N000013 HC RX MED GY IP 250 OP 250 PS 637

## 2023-11-10 PROCEDURE — 250N000013 HC RX MED GY IP 250 OP 250 PS 637: Performed by: STUDENT IN AN ORGANIZED HEALTH CARE EDUCATION/TRAINING PROGRAM

## 2023-11-10 PROCEDURE — 999N000065 XR PELVIS AND HIP PORTABLE RIGHT 1 VIEW

## 2023-11-10 PROCEDURE — 999N000141 HC STATISTIC PRE-PROCEDURE NURSING ASSESSMENT: Performed by: ORTHOPAEDIC SURGERY

## 2023-11-10 PROCEDURE — 370N000017 HC ANESTHESIA TECHNICAL FEE, PER MIN: Performed by: ORTHOPAEDIC SURGERY

## 2023-11-10 PROCEDURE — C1713 ANCHOR/SCREW BN/BN,TIS/BN: HCPCS | Performed by: ORTHOPAEDIC SURGERY

## 2023-11-10 PROCEDURE — 0QS604Z REPOSITION RIGHT UPPER FEMUR WITH INTERNAL FIXATION DEVICE, OPEN APPROACH: ICD-10-PCS | Performed by: ORTHOPAEDIC SURGERY

## 2023-11-10 PROCEDURE — 250N000011 HC RX IP 250 OP 636: Performed by: PHYSICIAN ASSISTANT

## 2023-11-10 DEVICE — TROCHANTERIC GRIP PLATE WITH 2 CABLES
Type: IMPLANTABLE DEVICE | Site: HIP | Status: FUNCTIONAL
Brand: DALL-MILES

## 2023-11-10 DEVICE — BEADED CABLE AND SLEEVE SET
Type: IMPLANTABLE DEVICE | Site: HIP | Status: FUNCTIONAL
Brand: DALL-MILES

## 2023-11-10 RX ORDER — CEFAZOLIN SODIUM 2 G/100ML
2 INJECTION, SOLUTION INTRAVENOUS EVERY 8 HOURS
Qty: 200 ML | Refills: 0 | Status: COMPLETED | OUTPATIENT
Start: 2023-11-10 | End: 2023-11-11

## 2023-11-10 RX ORDER — CEFAZOLIN SODIUM/WATER 2 G/20 ML
2 SYRINGE (ML) INTRAVENOUS
Status: COMPLETED | OUTPATIENT
Start: 2023-11-10 | End: 2023-11-10

## 2023-11-10 RX ORDER — PHENYLEPHRINE HCL IN 0.9% NACL 50MG/250ML
.5-1.25 PLASTIC BAG, INJECTION (ML) INTRAVENOUS CONTINUOUS
Status: DISCONTINUED | OUTPATIENT
Start: 2023-11-10 | End: 2023-11-14 | Stop reason: HOSPADM

## 2023-11-10 RX ORDER — ACETAMINOPHEN 325 MG/1
975 TABLET ORAL EVERY 8 HOURS
Status: DISPENSED | OUTPATIENT
Start: 2023-11-10 | End: 2023-11-13

## 2023-11-10 RX ORDER — ONDANSETRON 2 MG/ML
4 INJECTION INTRAMUSCULAR; INTRAVENOUS EVERY 30 MIN PRN
Status: DISCONTINUED | OUTPATIENT
Start: 2023-11-10 | End: 2023-11-10 | Stop reason: HOSPADM

## 2023-11-10 RX ORDER — CEFAZOLIN SODIUM/WATER 2 G/20 ML
2 SYRINGE (ML) INTRAVENOUS SEE ADMIN INSTRUCTIONS
Status: DISCONTINUED | OUTPATIENT
Start: 2023-11-10 | End: 2023-11-10 | Stop reason: HOSPADM

## 2023-11-10 RX ORDER — LABETALOL HYDROCHLORIDE 5 MG/ML
10 INJECTION, SOLUTION INTRAVENOUS
Status: DISCONTINUED | OUTPATIENT
Start: 2023-11-10 | End: 2023-11-10 | Stop reason: HOSPADM

## 2023-11-10 RX ORDER — FENTANYL CITRATE 50 UG/ML
50 INJECTION, SOLUTION INTRAMUSCULAR; INTRAVENOUS EVERY 5 MIN PRN
Status: DISCONTINUED | OUTPATIENT
Start: 2023-11-10 | End: 2023-11-10 | Stop reason: HOSPADM

## 2023-11-10 RX ORDER — POLYETHYLENE GLYCOL 3350 17 G/17G
17 POWDER, FOR SOLUTION ORAL DAILY
Status: DISCONTINUED | OUTPATIENT
Start: 2023-11-11 | End: 2023-11-14 | Stop reason: HOSPADM

## 2023-11-10 RX ORDER — HALOPERIDOL 5 MG/ML
1 INJECTION INTRAMUSCULAR
Status: DISCONTINUED | OUTPATIENT
Start: 2023-11-10 | End: 2023-11-10 | Stop reason: HOSPADM

## 2023-11-10 RX ORDER — NALOXONE HYDROCHLORIDE 0.4 MG/ML
0.2 INJECTION, SOLUTION INTRAMUSCULAR; INTRAVENOUS; SUBCUTANEOUS
Status: DISCONTINUED | OUTPATIENT
Start: 2023-11-10 | End: 2023-11-14 | Stop reason: HOSPADM

## 2023-11-10 RX ORDER — HYDROXYZINE HYDROCHLORIDE 10 MG/1
10 TABLET, FILM COATED ORAL EVERY 6 HOURS PRN
Status: DISCONTINUED | OUTPATIENT
Start: 2023-11-10 | End: 2023-11-10 | Stop reason: HOSPADM

## 2023-11-10 RX ORDER — LIDOCAINE 40 MG/G
CREAM TOPICAL
Status: DISCONTINUED | OUTPATIENT
Start: 2023-11-10 | End: 2023-11-14 | Stop reason: HOSPADM

## 2023-11-10 RX ORDER — SODIUM CHLORIDE, SODIUM LACTATE, POTASSIUM CHLORIDE, CALCIUM CHLORIDE 600; 310; 30; 20 MG/100ML; MG/100ML; MG/100ML; MG/100ML
INJECTION, SOLUTION INTRAVENOUS CONTINUOUS PRN
Status: DISCONTINUED | OUTPATIENT
Start: 2023-11-10 | End: 2023-11-10

## 2023-11-10 RX ORDER — ACETAMINOPHEN 325 MG/1
650 TABLET ORAL EVERY 4 HOURS PRN
Status: DISCONTINUED | OUTPATIENT
Start: 2023-11-13 | End: 2023-11-14 | Stop reason: HOSPADM

## 2023-11-10 RX ORDER — KETAMINE HYDROCHLORIDE 10 MG/ML
INJECTION INTRAMUSCULAR; INTRAVENOUS PRN
Status: DISCONTINUED | OUTPATIENT
Start: 2023-11-10 | End: 2023-11-10

## 2023-11-10 RX ORDER — OXYCODONE HYDROCHLORIDE 5 MG/1
5 TABLET ORAL EVERY 4 HOURS PRN
Status: DISCONTINUED | OUTPATIENT
Start: 2023-11-10 | End: 2023-11-14 | Stop reason: HOSPADM

## 2023-11-10 RX ORDER — ASPIRIN 81 MG/1
81 TABLET ORAL 2 TIMES DAILY
Status: DISCONTINUED | OUTPATIENT
Start: 2023-11-11 | End: 2023-11-14 | Stop reason: HOSPADM

## 2023-11-10 RX ORDER — HYDROMORPHONE HYDROCHLORIDE 1 MG/ML
0.4 INJECTION, SOLUTION INTRAMUSCULAR; INTRAVENOUS; SUBCUTANEOUS EVERY 5 MIN PRN
Status: DISCONTINUED | OUTPATIENT
Start: 2023-11-10 | End: 2023-11-10 | Stop reason: HOSPADM

## 2023-11-10 RX ORDER — OXYCODONE HYDROCHLORIDE 10 MG/1
10 TABLET ORAL EVERY 4 HOURS PRN
Status: DISCONTINUED | OUTPATIENT
Start: 2023-11-10 | End: 2023-11-14 | Stop reason: HOSPADM

## 2023-11-10 RX ORDER — PROPOFOL 10 MG/ML
INJECTION, EMULSION INTRAVENOUS CONTINUOUS PRN
Status: DISCONTINUED | OUTPATIENT
Start: 2023-11-10 | End: 2023-11-10

## 2023-11-10 RX ORDER — HYDROMORPHONE HYDROCHLORIDE 1 MG/ML
0.2 INJECTION, SOLUTION INTRAMUSCULAR; INTRAVENOUS; SUBCUTANEOUS EVERY 5 MIN PRN
Status: DISCONTINUED | OUTPATIENT
Start: 2023-11-10 | End: 2023-11-10 | Stop reason: HOSPADM

## 2023-11-10 RX ORDER — BISACODYL 10 MG
10 SUPPOSITORY, RECTAL RECTAL DAILY PRN
Status: DISCONTINUED | OUTPATIENT
Start: 2023-11-10 | End: 2023-11-14 | Stop reason: HOSPADM

## 2023-11-10 RX ORDER — SODIUM CHLORIDE, SODIUM LACTATE, POTASSIUM CHLORIDE, CALCIUM CHLORIDE 600; 310; 30; 20 MG/100ML; MG/100ML; MG/100ML; MG/100ML
INJECTION, SOLUTION INTRAVENOUS CONTINUOUS
Status: DISCONTINUED | OUTPATIENT
Start: 2023-11-10 | End: 2023-11-10 | Stop reason: HOSPADM

## 2023-11-10 RX ORDER — AMOXICILLIN 250 MG
1 CAPSULE ORAL 2 TIMES DAILY
Status: DISCONTINUED | OUTPATIENT
Start: 2023-11-10 | End: 2023-11-14 | Stop reason: HOSPADM

## 2023-11-10 RX ORDER — ACETAMINOPHEN 325 MG/1
975 TABLET ORAL ONCE
Status: COMPLETED | OUTPATIENT
Start: 2023-11-10 | End: 2023-11-10

## 2023-11-10 RX ORDER — TAMSULOSIN HYDROCHLORIDE 0.4 MG/1
0.4 CAPSULE ORAL DAILY
Status: DISCONTINUED | OUTPATIENT
Start: 2023-11-11 | End: 2023-11-14 | Stop reason: HOSPADM

## 2023-11-10 RX ORDER — DEXMEDETOMIDINE HYDROCHLORIDE 4 UG/ML
INJECTION, SOLUTION INTRAVENOUS PRN
Status: DISCONTINUED | OUTPATIENT
Start: 2023-11-10 | End: 2023-11-10

## 2023-11-10 RX ORDER — ONDANSETRON 2 MG/ML
INJECTION INTRAMUSCULAR; INTRAVENOUS PRN
Status: DISCONTINUED | OUTPATIENT
Start: 2023-11-10 | End: 2023-11-10

## 2023-11-10 RX ORDER — LIDOCAINE 40 MG/G
CREAM TOPICAL
Status: DISCONTINUED | OUTPATIENT
Start: 2023-11-10 | End: 2023-11-10 | Stop reason: HOSPADM

## 2023-11-10 RX ORDER — HYDROXYZINE HYDROCHLORIDE 10 MG/1
10 TABLET, FILM COATED ORAL EVERY 6 HOURS PRN
Status: DISCONTINUED | OUTPATIENT
Start: 2023-11-10 | End: 2023-11-14 | Stop reason: HOSPADM

## 2023-11-10 RX ORDER — SODIUM CHLORIDE, SODIUM LACTATE, POTASSIUM CHLORIDE, CALCIUM CHLORIDE 600; 310; 30; 20 MG/100ML; MG/100ML; MG/100ML; MG/100ML
INJECTION, SOLUTION INTRAVENOUS CONTINUOUS
Status: DISCONTINUED | OUTPATIENT
Start: 2023-11-10 | End: 2023-11-14 | Stop reason: HOSPADM

## 2023-11-10 RX ORDER — FENTANYL CITRATE 50 UG/ML
25 INJECTION, SOLUTION INTRAMUSCULAR; INTRAVENOUS EVERY 5 MIN PRN
Status: DISCONTINUED | OUTPATIENT
Start: 2023-11-10 | End: 2023-11-10 | Stop reason: HOSPADM

## 2023-11-10 RX ORDER — OXYCODONE HYDROCHLORIDE 5 MG/1
5 TABLET ORAL EVERY 4 HOURS PRN
Qty: 24 TABLET | Refills: 0 | Status: SHIPPED | OUTPATIENT
Start: 2023-11-10 | End: 2023-11-13

## 2023-11-10 RX ORDER — DEXAMETHASONE SODIUM PHOSPHATE 4 MG/ML
INJECTION, SOLUTION INTRA-ARTICULAR; INTRALESIONAL; INTRAMUSCULAR; INTRAVENOUS; SOFT TISSUE PRN
Status: DISCONTINUED | OUTPATIENT
Start: 2023-11-10 | End: 2023-11-10

## 2023-11-10 RX ORDER — ONDANSETRON 4 MG/1
4 TABLET, ORALLY DISINTEGRATING ORAL EVERY 30 MIN PRN
Status: DISCONTINUED | OUTPATIENT
Start: 2023-11-10 | End: 2023-11-10 | Stop reason: HOSPADM

## 2023-11-10 RX ORDER — NALOXONE HYDROCHLORIDE 0.4 MG/ML
0.4 INJECTION, SOLUTION INTRAMUSCULAR; INTRAVENOUS; SUBCUTANEOUS
Status: DISCONTINUED | OUTPATIENT
Start: 2023-11-10 | End: 2023-11-14 | Stop reason: HOSPADM

## 2023-11-10 RX ORDER — BUPIVACAINE HYDROCHLORIDE 7.5 MG/ML
INJECTION, SOLUTION INTRASPINAL
Status: COMPLETED | OUTPATIENT
Start: 2023-11-10 | End: 2023-11-10

## 2023-11-10 RX ORDER — HYDROMORPHONE HYDROCHLORIDE 1 MG/ML
0.2 INJECTION, SOLUTION INTRAMUSCULAR; INTRAVENOUS; SUBCUTANEOUS
Status: DISCONTINUED | OUTPATIENT
Start: 2023-11-10 | End: 2023-11-14 | Stop reason: HOSPADM

## 2023-11-10 RX ORDER — HYDROMORPHONE HYDROCHLORIDE 1 MG/ML
0.4 INJECTION, SOLUTION INTRAMUSCULAR; INTRAVENOUS; SUBCUTANEOUS
Status: DISCONTINUED | OUTPATIENT
Start: 2023-11-10 | End: 2023-11-14 | Stop reason: HOSPADM

## 2023-11-10 RX ORDER — SIMVASTATIN 20 MG
20 TABLET ORAL AT BEDTIME
Status: DISCONTINUED | OUTPATIENT
Start: 2023-11-10 | End: 2023-11-14 | Stop reason: HOSPADM

## 2023-11-10 RX ORDER — METHOCARBAMOL 500 MG/1
500 TABLET, FILM COATED ORAL EVERY 6 HOURS PRN
Status: DISCONTINUED | OUTPATIENT
Start: 2023-11-10 | End: 2023-11-14 | Stop reason: HOSPADM

## 2023-11-10 RX ORDER — ONDANSETRON 2 MG/ML
4 INJECTION INTRAMUSCULAR; INTRAVENOUS EVERY 6 HOURS PRN
Status: DISCONTINUED | OUTPATIENT
Start: 2023-11-10 | End: 2023-11-14 | Stop reason: HOSPADM

## 2023-11-10 RX ORDER — TRANEXAMIC ACID 650 MG/1
1950 TABLET ORAL ONCE
Status: COMPLETED | OUTPATIENT
Start: 2023-11-10 | End: 2023-11-10

## 2023-11-10 RX ORDER — PROPOFOL 10 MG/ML
INJECTION, EMULSION INTRAVENOUS PRN
Status: DISCONTINUED | OUTPATIENT
Start: 2023-11-10 | End: 2023-11-10

## 2023-11-10 RX ORDER — PROCHLORPERAZINE MALEATE 5 MG
5 TABLET ORAL EVERY 6 HOURS PRN
Status: DISCONTINUED | OUTPATIENT
Start: 2023-11-10 | End: 2023-11-14 | Stop reason: HOSPADM

## 2023-11-10 RX ORDER — ONDANSETRON 4 MG/1
4 TABLET, ORALLY DISINTEGRATING ORAL EVERY 6 HOURS PRN
Status: DISCONTINUED | OUTPATIENT
Start: 2023-11-10 | End: 2023-11-14 | Stop reason: HOSPADM

## 2023-11-10 RX ADMIN — BUPIVACAINE HYDROCHLORIDE IN DEXTROSE 1.6 ML: 7.5 INJECTION, SOLUTION SUBARACHNOID at 13:15

## 2023-11-10 RX ADMIN — SODIUM CHLORIDE, POTASSIUM CHLORIDE, SODIUM LACTATE AND CALCIUM CHLORIDE: 600; 310; 30; 20 INJECTION, SOLUTION INTRAVENOUS at 13:12

## 2023-11-10 RX ADMIN — SODIUM CHLORIDE, POTASSIUM CHLORIDE, SODIUM LACTATE AND CALCIUM CHLORIDE: 600; 310; 30; 20 INJECTION, SOLUTION INTRAVENOUS at 15:56

## 2023-11-10 RX ADMIN — FENTANYL CITRATE 25 MCG: 50 INJECTION INTRAMUSCULAR; INTRAVENOUS at 17:42

## 2023-11-10 RX ADMIN — SENNOSIDES AND DOCUSATE SODIUM 1 TABLET: 50; 8.6 TABLET ORAL at 19:41

## 2023-11-10 RX ADMIN — OXYCODONE HYDROCHLORIDE 5 MG: 5 TABLET ORAL at 19:41

## 2023-11-10 RX ADMIN — FENTANYL CITRATE 25 MCG: 50 INJECTION INTRAMUSCULAR; INTRAVENOUS at 18:17

## 2023-11-10 RX ADMIN — OXYCODONE HYDROCHLORIDE 5 MG: 5 TABLET ORAL at 18:15

## 2023-11-10 RX ADMIN — TRANEXAMIC ACID 1950 MG: 650 TABLET ORAL at 10:55

## 2023-11-10 RX ADMIN — Medication 2 G: at 13:31

## 2023-11-10 RX ADMIN — FENTANYL CITRATE 25 MCG: 50 INJECTION INTRAMUSCULAR; INTRAVENOUS at 18:00

## 2023-11-10 RX ADMIN — ONDANSETRON 4 MG: 2 INJECTION INTRAMUSCULAR; INTRAVENOUS at 16:36

## 2023-11-10 RX ADMIN — PROPOFOL 150 MG: 10 INJECTION, EMULSION INTRAVENOUS at 15:49

## 2023-11-10 RX ADMIN — HYDROXYZINE HYDROCHLORIDE 10 MG: 10 TABLET ORAL at 19:41

## 2023-11-10 RX ADMIN — DEXAMETHASONE SODIUM PHOSPHATE 8 MG: 4 INJECTION, SOLUTION INTRA-ARTICULAR; INTRALESIONAL; INTRAMUSCULAR; INTRAVENOUS; SOFT TISSUE at 14:00

## 2023-11-10 RX ADMIN — ACETAMINOPHEN 975 MG: 325 TABLET, FILM COATED ORAL at 10:55

## 2023-11-10 RX ADMIN — PROPOFOL 50 MG: 10 INJECTION, EMULSION INTRAVENOUS at 15:26

## 2023-11-10 RX ADMIN — PROPOFOL 30 MG: 10 INJECTION, EMULSION INTRAVENOUS at 13:27

## 2023-11-10 RX ADMIN — Medication 8 MCG: at 15:24

## 2023-11-10 RX ADMIN — FENTANYL CITRATE 25 MCG: 50 INJECTION INTRAMUSCULAR; INTRAVENOUS at 17:50

## 2023-11-10 RX ADMIN — Medication 50 MG: at 15:50

## 2023-11-10 RX ADMIN — HYDROMORPHONE HYDROCHLORIDE 0.4 MG: 1 INJECTION, SOLUTION INTRAMUSCULAR; INTRAVENOUS; SUBCUTANEOUS at 20:58

## 2023-11-10 RX ADMIN — PHENYLEPHRINE HYDROCHLORIDE 100 MCG: 10 INJECTION INTRAVENOUS at 15:39

## 2023-11-10 RX ADMIN — SUGAMMADEX 200 MG: 100 INJECTION, SOLUTION INTRAVENOUS at 17:10

## 2023-11-10 RX ADMIN — ACETAMINOPHEN 975 MG: 325 TABLET, FILM COATED ORAL at 18:15

## 2023-11-10 RX ADMIN — PROPOFOL 30 MG: 10 INJECTION, EMULSION INTRAVENOUS at 13:34

## 2023-11-10 RX ADMIN — SIMVASTATIN 20 MG: 20 TABLET, FILM COATED ORAL at 23:46

## 2023-11-10 RX ADMIN — PHENYLEPHRINE HYDROCHLORIDE 100 MCG: 10 INJECTION INTRAVENOUS at 15:57

## 2023-11-10 RX ADMIN — Medication 20 MG: at 15:16

## 2023-11-10 RX ADMIN — CEFAZOLIN SODIUM 2 G: 2 INJECTION, SOLUTION INTRAVENOUS at 20:57

## 2023-11-10 RX ADMIN — PROPOFOL 75 MCG/KG/MIN: 10 INJECTION, EMULSION INTRAVENOUS at 13:27

## 2023-11-10 RX ADMIN — PROPOFOL 90 MCG/KG/MIN: 10 INJECTION, EMULSION INTRAVENOUS at 14:52

## 2023-11-10 RX ADMIN — Medication 0.5 MCG/KG/MIN: at 13:52

## 2023-11-10 ASSESSMENT — ACTIVITIES OF DAILY LIVING (ADL)
ADLS_ACUITY_SCORE: 36
ADLS_ACUITY_SCORE: 32
ADLS_ACUITY_SCORE: 36
ADLS_ACUITY_SCORE: 34
ADLS_ACUITY_SCORE: 36

## 2023-11-10 NOTE — ANESTHESIA PROCEDURE NOTES
"Intrathecal injection Procedure Note    Pre-Procedure   Staff -        Anesthesiologist:  Stephanie Acevedo MD       Resident/Fellow: Rudy Kuhn MD       Performed By: resident and with residents       Procedure performed by resident/fellow/CRNA in presence of a teaching physician.         Location: OR       Procedure Start/Stop Times: 11/10/2023 1:15 PM and 11/10/2023 1:22 PM       Pre-Anesthestic Checklist: patient identified, IV checked, risks and benefits discussed, informed consent, monitors and equipment checked, pre-op evaluation, at physician/surgeon's request and post-op pain management  Timeout:       Correct Patient: Yes        Correct Procedure: Yes        Correct Site: Yes        Correct Position: Yes   Procedure Documentation  Procedure: intrathecal injection       Patient Position: sitting       Skin prep: Chloraprep       Insertion Site: L2-3. (midline approach).       Needle Gauge: 25.        Needle Length (Inches): 3.5        Spinal Needle Type: Pencan       Introducer used       Introducer: 20 G       # of attempts: 1 and  # of redirects:  0    Assessment/Narrative         Paresthesias: No.       Sensory Level: T10       CSF fluid: clear.       Opening pressure was cmH2O while  Sitting.      Medication(s) Administered   0.75% Hyperbaric Bupivacaine (Intrathecal) - Intrathecal   1.6 mL - 11/10/2023 1:15:00 PM  Medication Administration Time: 11/10/2023 1:15 PM      FOR [facility] ([facility] ONLY:   Pain Team Contact information: please page the Pain Team Via [employer]. Search \"Pain\". During daytime hours, please page the attending first. At night please page the resident first.      "

## 2023-11-10 NOTE — ANESTHESIA PROCEDURE NOTES
Airway       Patient location during procedure: OR       Procedure Start/Stop Times: 11/10/2023 3:50 PM  Staff -        Anesthesiologist:  Haresh Brewer MD       CRNA: Manjula Renae APRN CRNA       Other Anesthesia Staff: Krystyna Walden APRN CRNA       Performed By: CRNA  Consent for Airway        Urgency: emergent       Consent: The procedure was performed in an emergent situation.  Indications and Patient Condition       Indications for airway management: ishan-procedural       Induction type:intravenous       Mask difficulty assessment: 0 - not attempted    Final Airway Details       Final airway type: endotracheal airway       Successful airway: ETT - single and Oral  Endotracheal Airway Details        ETT size (mm): 7.5       Cuffed: yes       Successful intubation technique: video laryngoscopy       VL Blade Size: Glidescope 4       Grade View of Cords: 3       Adjucts: stylet       Position: Right       Measured from: lips       Secured at (cm): 23       Bite block used: None    Post intubation assessment        Placement verified by: capnometry, equal breath sounds and chest rise        Number of attempts at approach: 2       Secured with: silk tape       Ease of procedure: difficult       Dentition: Unchanged    Medication(s) Administered   Medication Administration Time: 11/10/2023 3:50 PM    Additional Comments       Acute brownish, assumably GI liquid coming out of LMA and mouth, ineffective ventilation. Urgent help in room. LMA dc'd and patient intubated with glidescope in lateral position with multiple assistants. O2 sats unchanged in high 90's.

## 2023-11-10 NOTE — BRIEF OP NOTE
[facility]    Brief Operative Note    Pre-operative diagnosis: Displaced fracture of greater trochanter of right femur, subsequent encounter for closed fracture with malunion [S72.111P]  Post-operative diagnosis Same as pre-operative diagnosis    Procedure: OPEN REDUCTION INTERNAL FIXATION, FRACTURE, RIGHT  FEMUR, PROXIMAL GREATER TROCHANTER, Right - Hip    Surgeon: Surgeon(s) and Role:     * David Escamilla MD - Primary     * Kb Joe MD - Resident - Assisting  Anesthesia: Choice   Estimated Blood Loss: 200 ml    Drains: None  Specimens: * No specimens in log *  Findings:   None.  Complications: Please see detailed operative report  Implants:   Implant Name Type Inv. Item Serial No.  Lot No. LRB No. Used Action   IMP CABLE DALL MILES BEADED CBL W/SLEEVE SET 2MM 6704-0-520 - [id number] [employer]/[place] IMP CABLE DALL MILES BEADED CBL W/SLEEVE SET 2MM 6704-0-520  [employer] [id number] Right 1 Implanted   IMP CABLE DALL MILES BEADED CBL W/SLEEVE SET 2MM 6704-0-520 - [id number] [employer]/[place] IMP CABLE DALL MILES BEADED CBL W/SLEEVE SET 2MM 6704-0-520  [employer] [id number] Right 1 Implanted   PLATE TROCHANTERIC  160MM - [id number] [employer]ware/[place] PLATE TROCHANTERIC  160MM [id number] [employer] [id number] Right 1 Implanted       Assessment and Plan: Genaro Ortiz is a 78 year old male now s/p ORIF right greater trochanter fracture on 11/10/2023 with Dr. Escamilla c/b intra-op emesis and aspiration requiring intubation.    Activity: Up ad loretta. No active abduction of RLE. Patient should be in AFO w outrigger to prevent external rotation  Weight bearing status: 50% WB to RLE  Antibiotics: Cefazolin x 24 hours. Spoke with infectious disease team. They recommended just monitoring and continuing with post-operative ancef. No need to broad for prophylactic coverage at this time.  Diet: Begin  with clear fluids and progress diet as tolerated. Bowel regimen. Anti-emetics PRN.    Wound Care: Tegaderm and alginate x 2 weeks   Drains: none  Pain management: Orals PRN, IV for breakthrough only  X-rays: AP Pelvis and Lateral operative hip in PACU. STAT CXR in PACU. Follow-up CXR 4 hours after initial then repeat CXR in am.   Physical Therapy: Mobilization, ROM, ADL's. No active abduction of RLE.   Occupational Therapy: ADL's  Labs: Trend Hgb on POD #1, 2  Consults: PT, OT. Hospitalist, appreciate assistance in caring for this patient throughout the perioperative period  Follow-up: TBD  Disposition: Pending progress with therapies, pain control on orals, and medical stability, anticipate discharge to Home vs. TCU on POD #2-3     Kb Joe MD  Orthopaedic Surgery PGY-4

## 2023-11-10 NOTE — ANESTHESIA CARE TRANSFER NOTE
Patient: Genaro Ortiz    Procedure: Procedure(s):  OPEN REDUCTION INTERNAL FIXATION, FRACTURE, RIGHT  FEMUR, PROXIMAL GREATER TROCHANTER       Diagnosis: Displaced fracture of greater trochanter of right femur, subsequent encounter for closed fracture with malunion [S72.111P]  Diagnosis Additional Information: No value filed.    Anesthesia Type:   General     Note:    Oropharynx: oropharynx clear of all foreign objects and spontaneously breathing  Level of Consciousness: awake  Oxygen Supplementation: face mask  Level of Supplemental Oxygen (L/min / FiO2): 3  Independent Airway: airway patency satisfactory and stable  Dentition: dentition unchanged  Vital Signs Stable: post-procedure vital signs reviewed and stable  Report to RN Given: handoff report given  Patient transferred to: PACU    Handoff Report: Identifed the Patient, Identified the Reponsible Provider, Reviewed the pertinent medical history, Discussed the surgical course, Reviewed Intra-OP anesthesia mangement and issues during anesthesia, Set expectations for post-procedure period and Allowed opportunity for questions and acknowledgement of understanding    Vitals:  Vitals Value Taken Time   /87 11/10/23 1720   Temp 36.5    Pulse 79 11/10/23 1726   Resp 10 11/10/23 1726   SpO2 98 % 11/10/23 1726   Vitals shown include unfiled device data.    Electronically Signed By: NATALYA Jernigan CRNA  November 10, 2023  5:27 PM

## 2023-11-11 ENCOUNTER — APPOINTMENT (OUTPATIENT)
Dept: OCCUPATIONAL THERAPY | Facility: CLINIC | Age: 78
DRG: 481 | End: 2023-11-11
Attending: ORTHOPAEDIC SURGERY
Payer: COMMERCIAL

## 2023-11-11 ENCOUNTER — APPOINTMENT (OUTPATIENT)
Dept: PHYSICAL THERAPY | Facility: CLINIC | Age: 78
DRG: 481 | End: 2023-11-11
Attending: ORTHOPAEDIC SURGERY
Payer: COMMERCIAL

## 2023-11-11 ENCOUNTER — APPOINTMENT (OUTPATIENT)
Dept: GENERAL RADIOLOGY | Facility: CLINIC | Age: 78
DRG: 481 | End: 2023-11-11
Attending: STUDENT IN AN ORGANIZED HEALTH CARE EDUCATION/TRAINING PROGRAM
Payer: COMMERCIAL

## 2023-11-11 LAB — HGB BLD-MCNC: 10.7 G/DL (ref 13.3–17.7)

## 2023-11-11 PROCEDURE — 250N000011 HC RX IP 250 OP 636: Mod: JZ | Performed by: STUDENT IN AN ORGANIZED HEALTH CARE EDUCATION/TRAINING PROGRAM

## 2023-11-11 PROCEDURE — 85018 HEMOGLOBIN: CPT | Performed by: STUDENT IN AN ORGANIZED HEALTH CARE EDUCATION/TRAINING PROGRAM

## 2023-11-11 PROCEDURE — 120N000002 HC R&B MED SURG/OB UMMC

## 2023-11-11 PROCEDURE — L4396 STATIC OR DYNAMI AFO PRE CST: HCPCS

## 2023-11-11 PROCEDURE — 250N000013 HC RX MED GY IP 250 OP 250 PS 637: Performed by: STUDENT IN AN ORGANIZED HEALTH CARE EDUCATION/TRAINING PROGRAM

## 2023-11-11 PROCEDURE — 71045 X-RAY EXAM CHEST 1 VIEW: CPT

## 2023-11-11 PROCEDURE — 97530 THERAPEUTIC ACTIVITIES: CPT | Mod: GP

## 2023-11-11 PROCEDURE — 97162 PT EVAL MOD COMPLEX 30 MIN: CPT | Mod: GP

## 2023-11-11 PROCEDURE — 97535 SELF CARE MNGMENT TRAINING: CPT | Mod: GO

## 2023-11-11 PROCEDURE — 71045 X-RAY EXAM CHEST 1 VIEW: CPT | Mod: 26 | Performed by: RADIOLOGY

## 2023-11-11 PROCEDURE — 97166 OT EVAL MOD COMPLEX 45 MIN: CPT | Mod: GO

## 2023-11-11 PROCEDURE — 36415 COLL VENOUS BLD VENIPUNCTURE: CPT | Performed by: STUDENT IN AN ORGANIZED HEALTH CARE EDUCATION/TRAINING PROGRAM

## 2023-11-11 PROCEDURE — 99232 SBSQ HOSP IP/OBS MODERATE 35: CPT | Performed by: INTERNAL MEDICINE

## 2023-11-11 PROCEDURE — 250N000013 HC RX MED GY IP 250 OP 250 PS 637

## 2023-11-11 RX ORDER — LIDOCAINE HYDROCHLORIDE 20 MG/ML
JELLY TOPICAL EVERY 4 HOURS PRN
Status: DISCONTINUED | OUTPATIENT
Start: 2023-11-11 | End: 2023-11-14 | Stop reason: HOSPADM

## 2023-11-11 RX ADMIN — ASPIRIN 81 MG: 81 TABLET, COATED ORAL at 20:01

## 2023-11-11 RX ADMIN — OXYCODONE HYDROCHLORIDE 10 MG: 10 TABLET ORAL at 14:07

## 2023-11-11 RX ADMIN — TAMSULOSIN HYDROCHLORIDE 0.4 MG: 0.4 CAPSULE ORAL at 03:48

## 2023-11-11 RX ADMIN — OXYCODONE HYDROCHLORIDE 10 MG: 10 TABLET ORAL at 01:50

## 2023-11-11 RX ADMIN — ACETAMINOPHEN 975 MG: 325 TABLET, FILM COATED ORAL at 01:50

## 2023-11-11 RX ADMIN — OXYCODONE HYDROCHLORIDE 5 MG: 5 TABLET ORAL at 08:45

## 2023-11-11 RX ADMIN — ACETAMINOPHEN 975 MG: 325 TABLET, FILM COATED ORAL at 11:24

## 2023-11-11 RX ADMIN — SENNOSIDES AND DOCUSATE SODIUM 1 TABLET: 50; 8.6 TABLET ORAL at 20:01

## 2023-11-11 RX ADMIN — ACETAMINOPHEN 975 MG: 325 TABLET, FILM COATED ORAL at 19:06

## 2023-11-11 RX ADMIN — POLYETHYLENE GLYCOL 3350 17 G: 17 POWDER, FOR SOLUTION ORAL at 08:01

## 2023-11-11 RX ADMIN — HYDROXYZINE HYDROCHLORIDE 10 MG: 10 TABLET ORAL at 08:45

## 2023-11-11 RX ADMIN — SENNOSIDES AND DOCUSATE SODIUM 1 TABLET: 50; 8.6 TABLET ORAL at 08:01

## 2023-11-11 RX ADMIN — CEFAZOLIN SODIUM 2 G: 2 INJECTION, SOLUTION INTRAVENOUS at 05:56

## 2023-11-11 RX ADMIN — ASPIRIN 81 MG: 81 TABLET, COATED ORAL at 08:01

## 2023-11-11 RX ADMIN — OXYCODONE HYDROCHLORIDE 10 MG: 10 TABLET ORAL at 19:06

## 2023-11-11 RX ADMIN — SIMVASTATIN 20 MG: 20 TABLET, FILM COATED ORAL at 22:01

## 2023-11-11 ASSESSMENT — ACTIVITIES OF DAILY LIVING (ADL)
ADLS_ACUITY_SCORE: 36
ADLS_ACUITY_SCORE: 34
ADLS_ACUITY_SCORE: 36
ADLS_ACUITY_SCORE: 34
ADLS_ACUITY_SCORE: 36
ADLS_ACUITY_SCORE: 34
ADLS_ACUITY_SCORE: 36
ADLS_ACUITY_SCORE: 34

## 2023-11-11 NOTE — ANESTHESIA POSTPROCEDURE EVALUATION
Patient: Genaro Ortiz    Procedure: Procedure(s):  OPEN REDUCTION INTERNAL FIXATION, FRACTURE, RIGHT  FEMUR, PROXIMAL GREATER TROCHANTER       Anesthesia Type:  General    Note:  Disposition: Inpatient   Postop Pain Control: Uneventful            Sign Out: Well controlled pain   PONV: No   Neuro/Psych: Uneventful            Sign Out: Acceptable/Baseline neuro status   Airway/Respiratory: Uneventful            Sign Out: AIRWAY IN SITU/Resp. Support   CV/Hemodynamics: Uneventful            Sign Out: Acceptable CV status; No obvious hypovolemia; No obvious fluid overload   Other NRE: NONE   DID A NON-ROUTINE EVENT OCCUR? No    Event details/Postop Comments:  Called to OR due to brownish content in the airway, presumably gastric contents. Help called. Intubation with video laryngoscope. Saturations down to low 90's, recovered. Intubation was done uneventfully. Saturation back up to 100%, rest of VS within normal limits.  Case completed without issues. Fiberoptic used to assess gastric content in lower airways. Bronchial tree clear from gastric contents. Patient extubated without problems, saturations 100%.   Recommended CXR in PACU as baseline and assess CXR in 4 hours to assess possible aspiration pneumonitis.            Last vitals:  Vitals Value Taken Time   /87 11/10/23 1830   Temp 36.5  C (97.7  F) 11/10/23 1720   Pulse 65 11/10/23 1835   Resp 0 11/10/23 1835   SpO2 97 % 11/10/23 1835   Vitals shown include unfiled device data.    Electronically Signed By: Yadiel Azar MD  November 10, 2023  6:36 PM

## 2023-11-11 NOTE — PROGRESS NOTES
"Pt safely arrived to unit at 1845 w/ belongings   BP (!) 144/81   Pulse 70   Temp 97.4  F (36.3  C) (Oral)   Resp 15   Ht 1.753 m (5' 9\")   Wt 92 kg (202 lb 13.2 oz)   SpO2 96%   BMI 29.95 kg/m      "

## 2023-11-11 NOTE — PLAN OF CARE
"Goal Outcome Evaluation:      Plan of Care Reviewed With: patient    Overall Patient Progress: improvingOverall Patient Progress: improving    Outcome Evaluation: Pt AOx4, POD1, reporting pain to RLE, managed w/ ice packs, scheduled tylenol and prn oxy. Ax2 w/ transfers as pt had difficulty with standing but is able to walk short distances, 50%WB to RLE. Pt also reported difficulty w/ voiding, (previously reported w/ past surgeries), straight cath'd 1x this shift. Plan to continue w/ therapies and management for post op pain.    /64 (BP Location: Right arm)   Pulse 68   Temp 98.2  F (36.8  C) (Oral)   Resp 16   Ht 1.753 m (5' 9\")   Wt 92 kg (202 lb 13.2 oz)   SpO2 99%   BMI 29.95 kg/m    O2> 95% ORA, denies feeling SOB, lightheadedness    Output: Difficulty voiding, states this has happened after previous surgeries, straight cath'd one time on scheduled flomax daily   Activity: Ax2 w/ walker and GB to stand and pivot, pt had difficulty standing and ambulating, 50% WB on RLE.    Skin: Slight blanchable redness to R/L hip area, improving from yesterday    Dressing: R hip dressing CDI   Diet: Reg, denies N&V   LDA: L PIV SL    Plan: Plan to continue POC for therapies and pain management   Will continue to monitor for voiding ability      "

## 2023-11-11 NOTE — CONSULTS
"[facility]  Consult Note - Hospitalist Service, GOLD TEAM   Date of Admission:  11/10/2023  Consult Requested by: Andi LOAIZA  Reason for Consult: Co-management    Assessment & Plan   Genaro Ortiz is a 78 year old man admitted on 11/10/2023. He has a history of hyperlipemia and prostate cancer and is admitted by our orthopedic surgery service after ORIF of the right hip on 11/10/2023.  Medicine has been consulted for co-management.    1) S/p R hip ORIF   Pt reports significant pain post op, worse than previous operation on hip. Pain meds have not been immensely helpful, but is continuing to trial.  - Orthopedics managing as primary team    2) Aspiration in OR - Anesthesia note suggests the patient had presumed GI contents coming out of his LMA with ineffective ventilation concerning for aspiration.  The patient was intubated but did not have significant desaturation.   O2 saturations WNL, no supplemental oxygen. Lung sounds clear on exam. Pt w/o complaints of SOB, cough, chest pain/discomfort.   - Monitor for development of symptoms c/f aspiration pneumonia  - CXR ordered in PACU for 2100 to follow up possible aspiration event    2) History of hyperlipidemia  - Continue home simvastatin    3) History of prostate cancer  - Continue home tamsulosin      Clinically Significant Risk Factors Present on Admission                # Drug Induced Platelet Defect: home medication list includes an antiplatelet medication   # Hypertension: Noted on problem list      # Overweight: Estimated body mass index is 29.95 kg/m  as calculated from the following:    Height as of this encounter: 1.753 m (5' 9\").    Weight as of this encounter: 92 kg (202 lb 13.2 oz).              NATALYA Putnam CNP  Hospitalist Service, GOLD TEAM   Securely message with [employer] (more info)  Text page via [employer] Paging/Directory   See signed in provider for up to date coverage " information  ______________________________________________________________________    Chief Complaint   S/p R hip ORIF    History is obtained from the patient    History of Present Illness   Genaro Ortiz is a 78 year old man admitted on 11/10/2023. He has a history of hyperlipemia and prostate cancer and is admitted by our orthopedic surgery service after ORIF of the right hip on 11/10/2023.  Medicine has been consulted for co-management.    The patient tells me that he is in a lot of pain, which is surprising to him. He had a hip replacement recently and stated that his pain at that time was not nearly as intense as his current pain. States that the pain meds have not made a huge difference, but he is still using them to see if they help. Is aware of the possible aspiration event while he was in the OR. Denies any cough, SOB, chest pain/discomfort since waking up from surgery. He is tolerating saltine crackers and water thus far and no issues at this point.     Denies hx of tobacco use and alcohol use.     He otherwise denies an recent illness or new complaints.      Past Medical History    Past Medical History:   Diagnosis Date    Adenocarcinoma of prostate (H) 5/27/2008    Arthritis     Esophageal reflux     Neuropathy     Pure hypercholesterolemia     Unspecified essential hypertension        Past Surgical History   Past Surgical History:   Procedure Laterality Date    ABDOMEN SURGERY  1952    Appendectomy    APPENDECTOMY  1952    ARTHROPLASTY HIP Right 7/14/2023    Procedure: ARTHROPLASTY, HIP, TOTAL RIGHT;  Surgeon: David Escamilla MD;  Location: [facility] OR    ARTHROPLASTY HIP Left 7/21/2023    Procedure: ARTHROPLASTY, HIP, TOTAL LEFT;  Surgeon: David Escamilla MD;  Location: [facility] OR    CATARACT IOL, RT/LT Bilateral 03/12    COLONOSCOPY  2017    COLONOSCOPY N/A 5/18/2023    Procedure: colonoscopy;  Surgeon: Ayse Sears MD;  Location:  GI    GENITOURINARY SURGERY      HERNIORRHAPHY INGUINAL  5/15/2014     Procedure: HERNIORRHAPHY INGUINAL;  Surgeon: Evens Jefferson MD;  Location: [facility] OR    PROSTATE SURGERY  2007       Medications   I have reviewed this patient's current medications  Medications Prior to Admission   Medication Sig Dispense Refill Last Dose    Cyanocobalamin (B-12) 1000 MCG TBCR Take 1 tablet by mouth daily 30 tablet 0 11/9/2023 at AM    fish oil-omega-3 fatty acids 1000 MG capsule Take 2,000 mg by mouth every morning 120 capsule 11 11/9/2023 at AM    pregabalin (LYRICA) 225 MG capsule Take 1 capsule (225 mg) by mouth every evening 30 capsule 1 11/9/2023 at PM    simvastatin (ZOCOR) 20 MG tablet TAKE 1 TABLET BY MOUTH EVERYDAY AT BEDTIME 90 tablet 1 11/9/2023 at PM    tamsulosin (FLOMAX) 0.4 MG capsule Take 1 capsule (0.4 mg) by mouth daily 90 capsule 3 11/10/2023 at AM    Vitamin D3 (D 1000) 25 mcg (1000 units) tablet Take 1 tablet (25 mcg) by mouth daily 30 tablet 0 11/9/2023 at AM    aspirin 81 MG EC tablet Take 2 tablets (162 mg) by mouth daily Resume 81 mg daily per orthopedics recommendations 60 tablet 0 11/8/2023 at AM             Physical Exam   Vital Signs: Temp: 97.4  F (36.3  C) Temp src: Oral BP: (!) 144/81 Pulse: 70   Resp: 15 SpO2: 96 % O2 Device: None (Room air)    Weight: 202 lbs 13.17 oz    Physical Exam   Constitutional:   Well nourished, well developed, resting comfortably   Cardiovascular: Regular rate and rhythm without murmurs or gallops  Pulmonary/Chest: Clear to auscultation bilaterally, with no wheezes or retractions. No respiratory distress.  GI: Non-tender, non-distended, soft, no guarding, no rebound, no peritoneal signs.   Skin: Skin is warm and dry. No rash noted.   Neurological: Alert and oriented to person, place, and time. Nonfocal exam  Psychiatric:  Pleasant mood and affect. Conversational and chatty with provider.       Medical Decision Making       35 MINUTES SPENT BY ME on the date of service doing chart review, history, exam, documentation & further  activities per the note.      Data   Prior ortho notes reviewed

## 2023-11-11 NOTE — OR NURSING
PACU to Inpatient Nursing Handoff    Patient Genaro Ortiz is a 78 year old male who speaks English.   Procedure Procedure(s):  OPEN REDUCTION INTERNAL FIXATION, FRACTURE, RIGHT  FEMUR, PROXIMAL GREATER TROCHANTER   Surgeon(s) Primary: David Escamilla MD  Resident - Assisting: Kb Joe MD     Allergies   Allergen Reactions    Penicillins Other (See Comments)     blotches       Isolation  [unfilled]     Past Medical History   has a past medical history of Adenocarcinoma of prostate (H) (5/27/2008), Arthritis, Esophageal reflux, Neuropathy, Pure hypercholesterolemia, and Unspecified essential hypertension.    Anesthesia Choice   Dermatome Level     Preop Meds acetaminophen (Tylenol) - time given: 1055  1950 TXA - time given: 1055   Nerve block Not applicable   Intraop Meds dexamethasone (Decadron)  dexmedetomidine (Precedex): 8 mcg total  ketamine (Ketalar): 20 mg given  ondansetron (Zofran): last given at 1636   Local Meds No   Antibiotics cefazolin (Ancef) - last given at 1331     Pain Patient Currently in Pain: yes   PACU meds  acetaminophen (Tylenol): 975 mg (total dose) last given at 1815   fentanyl (Sublimaze): 100 mcg (total dose) last given at 1817   oxycodone (Roxicodone): 5 mg (total dose) last given at 1815    PCA / epidural No   Capnography     Telemetry ECG Rhythm: Sinus rhythm   Inpatient Telemetry Monitor Ordered? No        Labs Glucose Lab Results   Component Value Date     11/10/2023    GLC 96 08/30/2022     06/21/2021       Hgb Lab Results   Component Value Date    HGB 11.6 10/13/2023    HGB 13.6 10/30/2020       INR Lab Results   Component Value Date    INR 1.08 07/21/2023      PACU Imaging Completed     Wound/Incision Incision/Surgical Site 11/10/23 Right;Lateral Hip (Active)   Incision Assessment UTV 11/10/23 1800   Naima-Incision Assessment UTV 11/10/23 1800   Closure Sutures;Liquid bandage;Other (Comment);Approximated 11/10/23 1642   Dressing Intervention Clean, dry,  intact 11/10/23 1800   Number of days: 0      CMS        Equipment ice pack   Other LDA ETT Cuffed Single;Oral 7.5 mm (Active)   Number of days: 0        IV Access Peripheral IV 11/10/23 Right Hand (Active)   Site Assessment WDL 11/10/23 1720   Line Status Infusing 11/10/23 1720   Dressing Status clean;dry;intact 11/10/23 1720   Number of days: 0      Blood Products Not applicable  mL   Intake/Output Date 11/10/23 0700 - 11/11/23 0659   Shift 5273-1050 8112-2161 3632-3142 24 Hour Total   INTAKE   I.V.  1400  1400   Shift Total(mL/kg)  1400(15.22)  1400(15.22)   OUTPUT   Blood  200  200   Shift Total(mL/kg)  200(2.17)  200(2.17)   Weight (kg) 92 92 92 92      Drains / Manzo     Time of void PreOp Time of Void Prior to Procedure: 0900 (11/10/23 1044)    PostOp      Diapered? No   Bladder Scan Bladder Scan Volume (mL): 252 ml (11/10/23 1728)   PO    water     Vitals    B/P: (!) 141/92  T: 97.7  F (36.5  C)    Temp src: Axillary  P:  Pulse: 68 (11/10/23 1800)          R: 14  O2:  SpO2: 97 %    O2 Device: None (Room air) (11/10/23 1720)              Family/support present Friend   Patient belongings     Patient transported on bed   DC meds/scripts (obs/outpt) Yes, meds   Inpatient Pain Meds Released? Yes       Special needs/considerations Pt aspirated in OR, chest x-ray in PACU and x-ray ordered for 2100 and 0600 on 11/11.   Tasks needing completion None       Peyton Ruiz, RN  [id number]

## 2023-11-11 NOTE — PROGRESS NOTES
"Orthopaedic Surgery Progress Note 11/11/2023    S: Patient seen and examined this morning. No acute events overnight.  He reports he initially had bad post-operative pain but now pain is well controlled with medications. Denies numbness or tingling. He has been mostly in bed since the surgery. He will work with physical therapy today. Denies any other concerns.     O:  Temp: 97.4  F (36.3  C) Temp src: Oral BP: 127/76 Pulse: 73   Resp: 16 SpO2: 99 % O2 Device: None (Room air)      Exam:  Gen: No acute distress, resting comfortably in bed.  Resp: Non-labored breathing    RLE:  Dressings c/d/i   Fires GS/TS/FHL/EHL  SILT SP/DP/Saph/Sural/T  2+ DP/PT, WWP    No lab results found in last 7 days.    Invalid input(s): \"SEDRATE\"    Assessment: Genaro Ortiz is a 78 year old male s/p ORIF right greater trochanter fracture on 11/10/2023 with Dr. Escamilla c/b intra-op emesis and aspiration requiring intubation.     Plan:  Activity: Up ad loretta. No active abduction of RLE. Patient should be in AFO w outrigger to prevent external rotation  Weight bearing status: 50% WB to RLE  Antibiotics: Cefazolin x 24 hours. Spoke with infectious disease team. They recommended just monitoring and continuing with post-operative ancef. No need to broad for prophylactic coverage at this time.  Diet: Begin with clear fluids and progress diet as tolerated. Bowel regimen. Anti-emetics PRN.    Wound Care: Tegaderm and alginate x 2 weeks   Drains: none  Pain management: Orals PRN, IV for breakthrough only  X-rays: AP Pelvis and Lateral operative hip in PACU. STAT CXR in PACU. Follow-up CXR 4 hours after initial then repeat CXR in am.   Physical Therapy: Mobilization, ROM, ADL's. No active abduction of RLE.   Occupational Therapy: ADL's  Labs: Trend Hgb on POD #1, 2  Consults: PT, OT. Hospitalist, appreciate assistance in caring for this patient throughout the perioperative period  Follow-up: TBD  Disposition: Pending progress with therapies, pain " control on orals, and medical stability, anticipate discharge to Home vs. TCU on POD #2-3     Future Appointments   Date Time Provider Department Center   11/11/2023 11:15 AM Trice Cannon, PT [facility] [place]   11/11/2023  3:30 PM Ramandeep Mcclendon, OTR [facility] [place]   12/4/2023 12:30 PM Merritt Toney, PA-C [facility]   12/7/2023 10:15 AM Eric Abel MD [facility]   12/8/2023  1:15 PM Valdez Quesada,  [facility]       Ac Box MD  Adult Reconstructive Surgery Fellow  Department of Orthopaedic Surgery, [facility]

## 2023-11-11 NOTE — PROGRESS NOTES
A/Ox's 4. Pt rated pain as tolerable. Oxycodone and tylenol given for pain control. Dressing CDI. CMS intact. Tolerated regular diet. Denied any nausea, CP, SOB, lightheadedness or dizziness. Pt is starting to void with decreasing PVR. Up with assist of one, Pt stood at the bedside only. Resting in bed at this time with call light in reach. Able to make needs known. Continue to monitor.

## 2023-11-11 NOTE — PROGRESS NOTES
"   11/11/23 0900   Appointment Info   Signing Clinician's Name / Credentials (PT) Marisabel Del Cid, PT, DPT   Rehab Comments (PT) PWB R LE (<50%), no active R hip abd      Language English   Living Environment   People in Home alone   Current Living Arrangements condominium   Home Accessibility no concerns;wheelchair accessible   Living Environment Comments Pt reports he had been driving prior to his hip surgeries in July 2023, but has not driven since.   Self-Care   Equipment Currently Used at Home grab bar, tub/shower;raised toilet seat;tub bench;walker, rolling;wheelchair, manual   Fall history within last six months yes   Number of times patient has fallen within last six months 1   Activity/Exercise/Self-Care Comment Pt reports he utilizes Meals on Wheels. He was previously IND, though pt reports having difficulty w/ mobility since he started having more prominent B hip problems in 2020. Pt also reports he used to use the gym in his [employer] building, but has been unable to use recently.   General Information   Onset of Illness/Injury or Date of Surgery 11/10/23   Referring Physician David Escamilla MD   Patient/Family Therapy Goals Statement (PT) none stated   Pertinent History of Current Problem (include personal factors and/or comorbidities that impact the POC) Per chart: \"Genaro Ortiz is a 78 year old male with PMHx of hyperlipemia and prostate cancer, now s/p ORIF right greater trochanter fracture on 11/10/2023 with Dr. Escamilla c/b intra-op emesis and aspiration requiring intubation.\"   Existing Precautions/Restrictions brace worn when out of bed;fall;no active hip ABD;weight bearing   Weight-Bearing Status - LUE full weight-bearing   Weight-Bearing Status - RUE full weight-bearing   Weight-Bearing Status - LLE full weight-bearing   Weight-Bearing Status - RLE partial weight-bearing (% in comments)  (50%)   Cognition   Affect/Mental Status (Cognition) WFL   Orientation Status (Cognition) " "oriented x 4   Cognitive Status Comments Pt's cognition appears generally appropriate, though pt did display occasional word-finding difficulties; defer further cognitive assessments to OT as indicated.   Posture    Posture Comments abnormal posture through the hips, appearing somewhat splayed in sitting and standing; pt describes this as somewhat consistent w/ his baseline, stating that he would see in photos that he \"stood funny\"   Range of Motion (ROM)   Range of Motion ROM deficits secondary to surgical procedure   Strength (Manual Muscle Testing)   Strength (Manual Muscle Testing) Deficits observed during functional mobility   Strength Comments Pt demos fair strength through the UEs, but demos core and B LE weakness.   Bed Mobility   Comment, (Bed Mobility) mod-max Ax1   Transfers   Maintains Weight-bearing Status (Transfers) able to maintain;verbal cues to maintain;physical assist to maintain   Comment, (Transfers) sit<>stand mod Ax1 w/ FWW   Gait/Stairs (Locomotion)   Comment, (Gait/Stairs) unable to assess this date   Balance   Balance Comments Pt requires heavy reliance on UEs to maintain unsupported sitting balance and FWW to maintain standing balance; overall unsteady.   Sensory Examination   Sensory Perception patient reports no sensory changes   Coordination   Coordination no deficits were identified   Muscle Tone   Muscle Tone no deficits were identified   Clinical Impression   Criteria for Skilled Therapeutic Intervention Yes, treatment indicated   PT Diagnosis (PT) impaired functional mobility   Influenced by the following impairments pain, weakness, multiple hip surgeries   Functional limitations due to impairments bed mobility, transfers, gait   Clinical Presentation (PT Evaluation Complexity) evolving   Clinical Presentation Rationale per clinical judgment   Clinical Decision Making (Complexity) moderate complexity   Planned Therapy Interventions (PT) balance training;bed mobility training;gait " training;home exercise program;neuromuscular re-education;patient/family education;strengthening;transfer training;wheelchair management/propulsion training;progressive activity/exercise;risk factor education;home program guidelines   Risk & Benefits of therapy have been explained evaluation/treatment results reviewed;care plan/treatment goals reviewed;risks/benefits reviewed;current/potential barriers reviewed;participants voiced agreement with care plan;participants included;patient   Clinical Impression Comments Pt will benefit from skilled inpatient PT to progress strength and functional independence in order to facilitate safe d/c planning.   PT Total Evaluation Time   PT Eval, Moderate Complexity Minutes (32932) 15   Physical Therapy Goals   PT Frequency Daily   PT Predicted Duration/Target Date for Goal Attainment 11/25/23   PT Goals Bed Mobility;Transfers;Gait   PT: Bed Mobility Supervision/stand-by assist;Supine to/from sit;Within precautions   PT: Transfers Modified independent;Sit to/from stand;Assistive device;Within precautions   PT: Gait Modified independent;Rolling walker;Within precautions;100 feet   Interventions   Interventions Quick Adds Therapeutic Activity   Therapeutic Activity   Therapeutic Activities: dynamic activities to improve functional performance Minutes (62906) 30   Symptoms Noted During/After Treatment Fatigue;Increased pain   Treatment Detail/Skilled Intervention Pt greeted supine in bed, agreeable to PT session. Educated pt on post-surgical precautions (PWB of 50% and no active hip abd on R LE) and pt verbalized understanding. Verbal cues for breathing throughout activity for pain management. Facilitated supine>sit w/ mod-max A at LEs and 1HHA, increased time to transition d/t general stiffness through the trunk and LEs; pt ultimately able to achieve sitting EOB w/ adequate hip and knee flexion upon cueing. Sit<>stand from EOB w/ min-mod Ax1, FWW, and verbal cues to maintain WB  precautions. Facilitated transfer EOB>recliner w/ CGA and FWW, pt able to take small steps to complete transfer safely, mod A to return to sitting. Pt required max Ax1 for additional sit<>stand from low recliner, good use of UEs on armrests but difficulty negotiating anterior weight shift necessary to stand. Educated pt that he should not stand by himself and should have nsg staff x2 present to help w/ transfers. Pt agreeable that he is not currently safe to return home at this time. Pt left sitting in recliner at end of session, needs met prior to PT departure.   PT Discharge Planning   PT Plan progress IND w/ bed mobility and STS, transfers w/ FWW, co-tx w/ OT?   PT Discharge Recommendation (DC Rec) Transitional Care Facility   PT Rationale for DC Rec Pt is currently below his functional mobility baseline and is unsafe to return home at this time. Recommend post-acute rehab to progress strength, activity tolerance, and independence w/ functional mobility in order to facilitate return to living independently at home. Pt is motivated, has a good support network of friends, and reports having been to ARU after previous hip surgeries earlier this year (need to clarify if pt was at  TCU or ARU). Home setup seems appropriate per pt report once pt is mobilizing better and more safely as pt made adaptations per previous rehab's recommendations (sturdy grab bars and other DME).   PT Brief overview of current status Ax2 w/ FWW for transfers   PT Equipment Needed at Discharge   (TBD; none anticipated as pt already owns FWW, tub bench, toilet riser, grab bars, and manual w/c)   Total Session Time   Timed Code Treatment Minutes 30   Total Session Time (sum of timed and untimed services) 45

## 2023-11-11 NOTE — PROGRESS NOTES
11/11/23 1030   Appointment Info   Signing Clinician's Name / Credentials (OT) Ramandeep Denise, OTR/L   Living Environment   People in Home alone   Current Living Arrangements condominium   Home Accessibility no concerns;wheelchair accessible   Living Environment Comments tub shower with tub bench   Self-Care   Usual Activity Tolerance good   Current Activity Tolerance fair   Equipment Currently Used at Home grab bar, tub/shower;raised toilet seat;tub bench;walker, rolling;wheelchair, manual  (reacher, sock aid, RTS with arms)   Fall history within last six months yes   Number of times patient has fallen within last six months 1   Activity/Exercise/Self-Care Comment Pt reports that he needed assist with showering sin hip surgeries in July, but home health aide never came so he has not showered other than sponge bath; ind with dressing, toileting, ordering groceries. 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 Pt reports hx of  B JOHNNY in july and stay in TCU prior to d/c home with OT/PT.   General Information   Onset of Illness/Injury or Date of Surgery 11/10/23   Referring Physician Dr. Escamilla   Patient/Family Therapy Goal Statement (OT) to go to a TCU to get stronger   Additional Occupational Profile Info/Pertinent History of Current Problem per chart, 78 year old man admitted on 11/10/2023. He has a history of hyperlipemia and prostate cancer and is admitted by our orthopedic surgery service after ORIF of the right hip on 11/10/2023.  Medicine has been consulted for co-management.   Existing Precautions/Restrictions fall;no active hip ABD;no hip ER;weight bearing   Left Upper Extremity (Weight-bearing Status) full weight-bearing (FWB)   Right Upper Extremity (Weight-bearing Status) full weight-bearing (FWB)   Left Lower Extremity (Weight-bearing Status) full weight-bearing (FWB)   Right Lower Extremity (Weight-bearing Status) partial weight-bearing (PWB)   General Observations and Info pt hx and baseline  activity is unclear at this time. When follow up questions asked, pt gives contradicting answers to initial answers.   Cognitive Status Examination   Orientation Status orientation to person, place and time   Affect/Mental Status (Cognitive) WFL   Follows Commands WFL   Cognitive Status Comments continue to monitor cogs   Visual Perception   Visual Impairment/Limitations corrective lenses for reading   Sensory   Sensory Quick Adds sensation intact   Pain Assessment   Patient Currently in Pain Yes, see Vital Sign flowsheet   Posture   Posture Comments not assessed at this time d/t pt declining all out of chair activity including standing.   Range of Motion Comprehensive   General Range of Motion no range of motion deficits identified   Strength Comprehensive (MMT)   General Manual Muscle Testing (MMT) Assessment no strength deficits identified   Muscle Tone Assessment   Muscle Tone Quick Adds No deficits were identified   Coordination   Upper Extremity Coordination No deficits were identified   Bed Mobility   Comment (Bed Mobility) not assessed at this time d/t pt declining all out of chair activity including standing.   Transfers   Transfer Comments not assessed at this time d/t pt declining all out of chair activity including standing.   Balance   Balance Comments not assessed at this time d/t pt declining all out of chair activity including standing.   Activities of Daily Living   BADL Assessment/Intervention lower body dressing   Lower Body Dressing Assessment/Training   [place] Level (Lower Body Dressing) moderate assist (50% patient effort)   Clinical Impression   Criteria for Skilled Therapeutic Interventions Met (OT) Yes, treatment indicated   OT Diagnosis decreased ADL ind   Influenced by the following impairments s/p ORIF hip R   OT Problem List-Impairments impacting ADL problems related to;activity tolerance impaired;pain;post-surgical precautions   Assessment of Occupational Performance 3-5  "Performance Deficits   Identified Performance Deficits dressing, toileting, bed mob, func mob   Planned Therapy Interventions (OT) ADL retraining;cognition   Clinical Decision Making Complexity (OT) detailed assessment/moderate complexity   Risk & Benefits of therapy have been explained evaluation/treatment results reviewed;patient   OT Total Evaluation Time   OT Eval, Moderate Complexity Minutes (48487) 10   OT Goals   Therapy Frequency (OT) Daily   OT Predicted Duration/Target Date for Goal Attainment 11/25/23   OT Goals Hygiene/Grooming;Lower Body Dressing;Lower Body Bathing;Bed Mobility;Transfers;Toilet Transfer/Toileting   OT: Hygiene/Grooming modified independent;using adaptive equipment;within precautions   OT: Lower Body Dressing Modified independent;using adaptive equipment;within precautions   OT: Lower Body Bathing Modified independent;using adaptive equipment;with precautions   OT: Bed Mobility Modified independent;supine to/from sitting;within precautions   OT: Transfer Modified independent;with assistive device;within precautions   OT: Toilet Transfer/Toileting Modified independent;using adaptive equipment;within precautions   Interventions   Interventions Quick Adds Self-Care/Home Management   Self-Care/Home Management   Self-Care/Home Mgmt/ADL, Compensatory, Meal Prep Minutes (55281) 23   Symptoms Noted During/After Treatment (Meal Preparation/Planning Training) none   Treatment Detail/Skilled Intervention Pt sitting in chair upon OT arrival and agreeable to therapy. Educ on role of OT and precautions of PWB, no ABD/ER. Pt given options of OT activities. Pt reporting that he cannot move per PT reporting pt should have Ax2. Clarified with PT, PT reports Ax2 for nursing staff and Ax2 as needed for therapy. Pt educ on this. Pt continue to insist needs Ax2. When told OT will get second A, pt reports \"but I can't stand\". Pt agreeable to LB dressing. Educ on safe LB dressing with AE. Pt continuing to " decline STS from chair with FWW to pull up brief. Pt Mod A for LB dressing to doff socks with reacher, don socks with sock aid and don brief to knees with reacher. cues for safe tech/more efficient tech with AE. Pt acknowledges OT suggestion/demo, but continues with his tech with reacher and sock aid. Pt declining further activities. Increased time and effort needed d/t tech with AE, pain and precautions. Pt left in chair with needs within reach.   OT Discharge Planning   OT Plan cog screen, LB dressing, STS, toileting, transfers, bed mob   OT Discharge Recommendation (DC Rec) Transitional Care Facility   OT Rationale for DC Rec Pt appears below ADL baseline, although it is unclear what his baseline is at this time. Pt would benefit from continused skilled OT to increase safety, ADL ind and act tolerance in addition to cog screening.   OT Brief overview of current status declining all out of chair activity and STS   Total Session Time   Timed Code Treatment Minutes 23   Total Session Time (sum of timed and untimed services) 33

## 2023-11-11 NOTE — PHARMACY-ADMISSION MEDICATION HISTORY
Pharmacist Admission Medication History    Admission medication history is complete. The information provided in this note is only as accurate as the sources available at the time of the update.    Information Source(s): Patient via in-person    Pertinent Information: Done    Changes made to PTA medication list:  Added: None  Deleted: None  Changed: None    Medication Affordability:       Allergies reviewed with patient and updates made in EHR: yes    Medication History Completed By: MARCELLA BAKER RPH 11/10/2023 7:20 PM    PTA Med List   Medication Sig Last Dose    Cyanocobalamin (B-12) 1000 MCG TBCR Take 1 tablet by mouth daily 11/9/2023 at AM    fish oil-omega-3 fatty acids 1000 MG capsule Take 2,000 mg by mouth every morning 11/9/2023 at AM    oxyCODONE (ROXICODONE) 5 MG tablet Take 1 tablet (5 mg) by mouth every 4 hours as needed for pain     pregabalin (LYRICA) 225 MG capsule Take 1 capsule (225 mg) by mouth every evening 11/9/2023 at PM    simvastatin (ZOCOR) 20 MG tablet TAKE 1 TABLET BY MOUTH EVERYDAY AT BEDTIME 11/9/2023 at PM    tamsulosin (FLOMAX) 0.4 MG capsule Take 1 capsule (0.4 mg) by mouth daily 11/10/2023 at AM    Vitamin D3 (D 1000) 25 mcg (1000 units) tablet Take 1 tablet (25 mcg) by mouth daily 11/9/2023 at AM

## 2023-11-11 NOTE — PROGRESS NOTES
"[facility]    Medicine Progress Note - Hospitalist Service, GOLD TEAM 19    Date of Admission:  11/10/2023    Assessment & Plan   Genaro Ortiz is a 78 year old man admitted on 11/10/2023. He has a history of hyperlipemia and prostate cancer and is admitted by our orthopedic surgery service after ORIF of the right hip on 11/10/2023.  Medicine has been consulted for co-management.     #S/p R hip ORIF   -- EBL ~200 ml   -- Pain control, dvt ppx, per primary team  -- PT/OT to see patient.      #Aspiration in OR   - Anesthesia note suggests the patient had presumed GI contents coming out of his LMA with ineffective ventilation concerning for aspiration.   -  The patient was intubated but did not have significant desaturation.   - O2 saturations WNL, no supplemental oxygen. Lung sounds clear on exam.   - Pt w/o complaints of SOB, cough, chest pain/discomfort.   - no concern for hypoxia at this time.      #History of hyperlipidemia  - Continue home simvastatin     #History of prostate cancer  - Continue home tamsulosin          Diet: Advance Diet as Tolerated: Regular Diet Adult    DVT Prophylaxis: ASA 81 mg BID  Manzo Catheter: Not present  Lines: None     Cardiac Monitoring: None  Code Status: Full Code      Clinically Significant Risk Factors                  # Hypertension: Noted on problem list        # Overweight: Estimated body mass index is 29.95 kg/m  as calculated from the following:    Height as of this encounter: 1.753 m (5' 9\").    Weight as of this encounter: 92 kg (202 lb 13.2 oz)., PRESENT ON ADMISSION            Disposition Plan  defer to primary team     Expected Discharge Date: 11/12/2023                    JOSI MOREAU MD  Hospitalist Service, GOLD TEAM 19  [facility]  Securely message with [employer] (more info)  Text page via [employer] Paging/Directory   See signed in provider for up to date coverage " information  ______________________________________________________________________    Interval History   - NAEON  - Reports hx of urinary retention following previous   - reports some increased discomfort with urinary and is wondering whether he'll require granados later today    Physical Exam   Vital Signs: Temp: 98.2  F (36.8  C) Temp src: Oral BP: 118/64 Pulse: 68   Resp: 16 SpO2: 99 % O2 Device: None (Room air)    Weight: 202 lbs 13.17 oz    General Appearance: Lying comfortably in bed in NAD  HEENT: PERRL: EOMI; moist mucous membrane w/o lesions  Neck: No JVD  Pulmonary: Clear to auscultation bilaterally, no wheezes or crackles  CVS: Regular rhythm, no murmurs, rubs or gallops  GI: BS (+), soft nontender, no rebound or guarding   Extremities: R-hip dressing c/d/I   Skin: No rashes or lesions  Neurologic: A&O x3      Medical Decision Making       45 MINUTES SPENT BY ME on the date of service doing chart review, history, exam, documentation & further activities per the note.      Data   ------------------------- PAST 24 HR DATA REVIEWED -----------------------------------------------    I have personally reviewed the following data over the past 24 hrs:    N/A  \   10.7 (L)   / N/A     N/A N/A N/A /  N/A   N/A N/A N/A \       Imaging results reviewed over the past 24 hrs:   Recent Results (from the past 24 hour(s))   XR Surgery KRIS L/T 5 Min Fluoro    Narrative    This exam was marked as non-reportable because it will not be read by a   radiologist or a [place] non-radiologist provider.         XR Chest Port 1 View    Narrative    Examination: XR CHEST PORT 1 VIEW 11/10/2023 5:57 PM    Indication: Possible aspiration in OR    Comparison: None    Findings:  AP portable chest. Trachea is midline. Cardiac silhouette is within  normal limits. Left basilar atelectasis versus possible small  effusion. No significant right pleural effusion. No discernible  pneumothorax. Slight perihilar haziness with bronchial cuffing.  No  focal consolidation. Unremarkable visualized upper abdomen. No acute  osseous abnormality.      Impression    Impression:   1. Slight streaky and hazy densities in the left lower lung likely  related to small pleural effusion or atelectasis. Difficult to  definitely exclude aspiration.  2. Mild perihilar edema and atelectasis.    I have personally reviewed the examination and initial interpretation  and I agree with the findings.    LIZETTE FERRARI MD         SYSTEM ID:  [id number]   XR Pelvis w Hip Port Right 1 View    Narrative    EXAM: XR PELVIS AND HIP PORTABLE RIGHT 1 VIEW  LOCATION: [facility]  DATE: 11/10/2023    INDICATION: Status post Hip surgery  COMPARISON: 10/30/2023      Impression    IMPRESSION: Plate and cerclage wire fixation of a mildly displaced periprosthetic fracture of the greater trochanter with improved alignment. Expected postoperative soft tissue thickening, edema, and scattered foci of gas. Otherwise, negative for   postoperative purposes.   XR Chest Port 1 View    Narrative    Examination: XR CHEST PORT 1 VIEW 11/10/2023 10:27 PM    Indication: Repeat CXR 4 hrs after stat in PACU. Rule out aspiration  pneumonia    Comparison: X-ray 11/10/2023 at 1732    Findings:  AP portable chest. Trachea is midline. Cardiac silhouette is within  normal limits. Slight decrease in the left lower lobe atelectasis. No  significant pleural effusions. No discernible pneumothorax. Decrease  in the minimal perihilar edema/atelectasis. Unchanged osseous  structures. Unremarkable visualized upper abdomen.      Impression    Impression:   1. Decreased left basilar atelectasis, decreasing minimal perihilar  hazy edema/atelectasis, and no significant pleural effusions.  2. No evidence of aspiration pneumonia. No new opacities.    I have personally reviewed the examination and initial interpretation  and I agree with the findings.    LUZ OZUNA DO         SYSTEM ID:   [id number]   XR Chest Port 1 View    Narrative    Exam: XR CHEST PORT 1 VIEW, 11/11/2023 9:23 AM    Indication: Possible aspiration pneumonia in OR    Comparison: 11/10/2023    Findings:   The cardiomediastinal silhouette and pulmonary vasculature are within  normal limits. No appreciable pleural effusion or pneumothorax. Stable  mild streaky perihilar and left greater than right basilar opacities.  Low lung volumes.      Impression    Impression: No significant change in streaky perihilar and left  basilar opacities, favored to represent atelectasis.    LUZ OZUNA DO         SYSTEM ID:  [id number]

## 2023-11-11 NOTE — PROGRESS NOTES
S: Pt was fit at  with Right Pressure Relief Ankle Foot Orthosis (PRAFO) for the lower extremity as ordered by Dr. Escamilla    O/g: braces have been recommended to help reduce foot drop and off-weight heel from pressure.      A: The patient's knee was flexed to relax the gastroc muscle.  Once the foot was positioned, the soft liner was closed over the top of foot and checked that surface is smooth and consistent.  The middle Velcro strap was secured next.  The positioning of posterior heel was re-evaluated then all dorsal straps and calf strap was secured.  The foot position was re-evaluated so that the sole of foot met the plantar surface of the orthosis, including calcaneus and that the heel clearance was visible through the posterior opening.  The dorsi/plantar flexion bar was adjusted by hand to achieve desired degree.  The PRAFO toe extension/protection was adjusted by positioning extension plate under toes to desired length.     P: patient/nursing staff instructed to contact our office with any problems or questions.   MEHUL Metz.

## 2023-11-12 ENCOUNTER — APPOINTMENT (OUTPATIENT)
Dept: PHYSICAL THERAPY | Facility: CLINIC | Age: 78
DRG: 481 | End: 2023-11-12
Attending: ORTHOPAEDIC SURGERY
Payer: COMMERCIAL

## 2023-11-12 ENCOUNTER — APPOINTMENT (OUTPATIENT)
Dept: OCCUPATIONAL THERAPY | Facility: CLINIC | Age: 78
DRG: 481 | End: 2023-11-12
Attending: ORTHOPAEDIC SURGERY
Payer: COMMERCIAL

## 2023-11-12 LAB
GLUCOSE BLDC GLUCOMTR-MCNC: 122 MG/DL (ref 70–99)
HCT VFR BLD AUTO: 30.2 % (ref 40–53)
HGB BLD-MCNC: 10 G/DL (ref 13.3–17.7)
HGB BLD-MCNC: 9.8 G/DL (ref 13.3–17.7)

## 2023-11-12 PROCEDURE — 120N000002 HC R&B MED SURG/OB UMMC

## 2023-11-12 PROCEDURE — 36415 COLL VENOUS BLD VENIPUNCTURE: CPT | Performed by: ORTHOPAEDIC SURGERY

## 2023-11-12 PROCEDURE — 36415 COLL VENOUS BLD VENIPUNCTURE: CPT | Performed by: STUDENT IN AN ORGANIZED HEALTH CARE EDUCATION/TRAINING PROGRAM

## 2023-11-12 PROCEDURE — 85018 HEMOGLOBIN: CPT | Performed by: ORTHOPAEDIC SURGERY

## 2023-11-12 PROCEDURE — 97110 THERAPEUTIC EXERCISES: CPT | Mod: GP

## 2023-11-12 PROCEDURE — 97116 GAIT TRAINING THERAPY: CPT | Mod: GP

## 2023-11-12 PROCEDURE — 250N000013 HC RX MED GY IP 250 OP 250 PS 637: Performed by: STUDENT IN AN ORGANIZED HEALTH CARE EDUCATION/TRAINING PROGRAM

## 2023-11-12 PROCEDURE — 250N000013 HC RX MED GY IP 250 OP 250 PS 637

## 2023-11-12 PROCEDURE — 85018 HEMOGLOBIN: CPT | Performed by: STUDENT IN AN ORGANIZED HEALTH CARE EDUCATION/TRAINING PROGRAM

## 2023-11-12 PROCEDURE — 250N000009 HC RX 250: Performed by: INTERNAL MEDICINE

## 2023-11-12 PROCEDURE — 97129 THER IVNTJ 1ST 15 MIN: CPT | Mod: GO

## 2023-11-12 PROCEDURE — 99232 SBSQ HOSP IP/OBS MODERATE 35: CPT | Performed by: INTERNAL MEDICINE

## 2023-11-12 PROCEDURE — 97530 THERAPEUTIC ACTIVITIES: CPT | Mod: GO

## 2023-11-12 RX ADMIN — ACETAMINOPHEN 975 MG: 325 TABLET, FILM COATED ORAL at 10:21

## 2023-11-12 RX ADMIN — POLYETHYLENE GLYCOL 3350 17 G: 17 POWDER, FOR SOLUTION ORAL at 07:44

## 2023-11-12 RX ADMIN — SENNOSIDES AND DOCUSATE SODIUM 1 TABLET: 50; 8.6 TABLET ORAL at 07:44

## 2023-11-12 RX ADMIN — ACETAMINOPHEN 975 MG: 325 TABLET, FILM COATED ORAL at 02:43

## 2023-11-12 RX ADMIN — SIMVASTATIN 20 MG: 20 TABLET, FILM COATED ORAL at 21:59

## 2023-11-12 RX ADMIN — ACETAMINOPHEN 975 MG: 325 TABLET, FILM COATED ORAL at 17:40

## 2023-11-12 RX ADMIN — ASPIRIN 81 MG: 81 TABLET, COATED ORAL at 07:44

## 2023-11-12 RX ADMIN — OXYCODONE HYDROCHLORIDE 5 MG: 5 TABLET ORAL at 11:59

## 2023-11-12 RX ADMIN — LIDOCAINE HYDROCHLORIDE: 20 JELLY TOPICAL at 10:05

## 2023-11-12 RX ADMIN — SENNOSIDES AND DOCUSATE SODIUM 1 TABLET: 50; 8.6 TABLET ORAL at 19:53

## 2023-11-12 RX ADMIN — TAMSULOSIN HYDROCHLORIDE 0.4 MG: 0.4 CAPSULE ORAL at 04:36

## 2023-11-12 RX ADMIN — ASPIRIN 81 MG: 81 TABLET, COATED ORAL at 19:53

## 2023-11-12 ASSESSMENT — ACTIVITIES OF DAILY LIVING (ADL)
DEPENDENT_IADLS:: CLEANING;COOKING;MEAL PREPARATION
ADLS_ACUITY_SCORE: 36

## 2023-11-12 NOTE — PROGRESS NOTES
Orthopaedic Surgery Progress Note 11/12/2023    S: Patient seen and examined this morning. No acute events overnight.  Pain is decently controlled but still high. Pain is worse than after his JOHNNY. Either way he was able to move a bit with therapy. Required straight cath x1 overnight. Bladder scan for 350 ml this AM. Will straight cath if > 500. He did need to discharge with granados after his JOHNNY last time and is known to urology. Orthotist placed boot for R foot yesterday.  No SOB. On RA today.    O:  Temp: 98  F (36.7  C) Temp src: Oral BP: 110/69 Pulse: 66   Resp: 16 SpO2: 97 % O2 Device: None (Room air)      Exam:  Gen: No acute distress, resting comfortably in bed.  Resp: Non-labored breathing    RLE:  Dressings c/d/i   Boot in place on RLE  Fires GS/TS/FHL/EHL. Fires quads minimally 2/2 pain  SILT SP/DP/Saph/Sural/T  WWP    Recent Labs   Lab 11/12/23  0610 11/11/23  0748   HGB 10.0* 10.7*       Assessment: Genaro Ortiz is a 78 year old male s/p ORIF right greater trochanter fracture on 11/10/2023 with Dr. Escamilla c/b intra-op emesis and aspiration requiring intubation.     #urinary retention. He discharged with granados after surgeries this summer. Involve urology, if needed. Straight cath again for > 500 on bladder scan today. Granados on 3rd cath, if needed. Appreciate med assistance.    #postop pain. Multimodal regimen.    No signs or symptoms of PNA. Continuing to monitor respiratory status closely in setting of intra-op aspiration.      Plan:  Activity: Up ad loretta. No active abduction of RLE. Patient should be in AFO w outrigger to prevent external rotation  Weight bearing status: 50% WB to RLE  Antibiotics: Cefazolin x 24 hours. Spoke with infectious disease team. They recommended just monitoring and continuing with post-operative ancef. No need to broad for prophylactic coverage at this time.  Diet: Begin with clear fluids and progress diet as tolerated. Bowel regimen. Anti-emetics PRN.    Wound Care: Tegaderm  and alginate x 2 weeks. Okay to shower  Drains: none  Pain management: Orals PRN, IV for breakthrough only  X-rays: AP Pelvis and Lateral operative hip in PACU. STAT CXR in PACU. Follow-up CXR 4 hours after initial then repeat CXR in am.   Physical Therapy: Mobilization, ROM, ADL's. No active abduction of RLE.   Occupational Therapy: ADL's  Labs: Trend Hgb on POD #1, 2  Consults: PT, OT. Hospitalist, appreciate assistance in caring for this patient throughout the perioperative period  Follow-up: 4 weeks with Dr. Escamilla's team  Disposition: Pending progress with therapies, pain control on orals, and medical stability, anticipate discharge to TCU on POD4-5.    Future Appointments   Date Time Provider Department [place]   11/11/2023 11:15 AM Trice Cannon, PT URPT [place]   11/11/2023  3:30 PM Ramandeep Mcclendon, OTR UROT [place]   12/4/2023 12:30 PM Merritt Toney, PA-C [facility]   12/7/2023 10:15 AM Eric Abel MD [facility]   12/8/2023  1:15 PM Valdez Quesada DO [facility]       Paradise Reyes MD, PGY-4  Orthopedics

## 2023-11-12 NOTE — PLAN OF CARE
Goal Outcome Evaluation:      Plan of Care Reviewed With: patient    Overall Patient Progress: improving    Outcome Evaluation: Pt is calm & cooperative. Pt reported minimal pain of 3/10 on RLE that was managed with scheduled Tylenol. Pt still not able to void this shift well, PVR: 325mL. Pt not out of bed this shift.  Pt slept comfortably during the night.      VS: Temp: 98  F (36.7  C) Temp src: Oral BP: 110/69 Pulse: 66   Resp: 16 SpO2: 97 % O2 Device: None (Room air)      O2: SpO2 > 95% and stable on RA. LS  diminished equal bilaterally. Denies chest pain and SOB.    Output: Voids spontaneously without difficulty using beside urinal. Pt reported not able to void well. PVR at 0623: 325 mL.   Last BM: 11/9/2023 per pt. denies abdominal discomfort. BS active / passing flatus.    Activity: 50% PWB to RLE.   Skin: WDL except, R hip surgical incision.   Pain: Pain managed with scheduled Tylenol.   CMS: Intact, AOx4. Denies numbness and tingling.   Dressing: R hip dressing is CDI   Diet: Regular diet. Denies nausea/vomiting.    LDA: L PIV SL.   Equipment: IV pole, personal belongings,    Plan: Continue with plan of care and pain management. Call light within reach, pt able to make needs known.

## 2023-11-12 NOTE — CONSULTS
Care Management Initial Consult    General Information  Assessment completed with: Patient,    Type of CM/SW Visit: Initial Assessment    Primary Care Provider verified and updated as needed: Yes   Readmission within the last 30 days: no previous admission in last 30 days      Reason for Consult: discharge planning  Advance Care Planning: Advance Care Planning Reviewed: present on chart          Communication Assessment  Patient's communication style: spoken language (English or Bilingual)    Hearing Difficulty or Deaf: no   Wear Glasses or Blind: yes    Cognitive  Cognitive/Neuro/Behavioral: WDL  Level of Consciousness: alert  Arousal Level: opens eyes spontaneously                Living Environment:   People in home: alone     Current living Arrangements: apartment      Able to return to prior arrangements: other (see comments) (TCU recs)       Family/Social Support:  Care provided by: self  Provides care for: no one  Marital Status: Single  Other (specify) (Friends Esvin and Francisco)          Description of Support System: Supportive, Involved    Support Assessment: Adequate family and caregiver support, Adequate social supports    Current Resources:   Patient receiving home care services: Yes ([facility]  Skilled Home Care Services: Skilled Nursing, Physical Therapy, Occupational Therapy  Community Resources: None  Equipment currently used at home: grab bar, toilet, grab bar, tub/shower, raised toilet seat, shower chair, walker, standard, wheelchair, manual  Supplies currently used at home: None    Employment/Financial:  Employment Status: retired        Financial Concerns: none   Referral to Financial Worker: No       Does the patient's insurance plan have a 3 day qualifying hospital stay waiver?  Yes     Which insurance plan 3 day waiver is available? Alternative insurance waiver    Will the waiver be used for post-acute placement? No    Lifestyle & Psychosocial Needs:  Social Determinants of Health      Food Insecurity: Low Risk  (10/12/2023)    Food Insecurity     Within the past 12 months, did you worry that your food would run out before you got money to buy more?: No     Within the past 12 months, did the food you bought just not last and you didn t have money to get more?: No   Depression: Not at risk (5/1/2023)    PHQ-2     PHQ-2 Score: 0   Housing Stability: Low Risk  (10/12/2023)    Housing Stability     Do you have housing? : Yes     Are you worried about losing your housing?: No   Tobacco Use: Low Risk  (11/10/2023)    Patient History     Smoking Tobacco Use: Never     Smokeless Tobacco Use: Never     Passive Exposure: Never   Financial Resource Strain: Low Risk  (10/12/2023)    Financial Resource Strain     Within the past 12 months, have you or your family members you live with been unable to get utilities (heat, electricity) when it was really needed?: No   Alcohol Use: Not on file   Transportation Needs: Low Risk  (10/12/2023)    Transportation Needs     Within the past 12 months, has lack of transportation kept you from medical appointments, getting your medicines, non-medical meetings or appointments, work, or from getting things that you need?: No   Physical Activity: Not on file   Interpersonal Safety: Low Risk  (10/13/2023)    Interpersonal Safety     Do you feel physically and emotionally safe where you currently live?: Yes     Within the past 12 months, have you been hit, slapped, kicked or otherwise physically hurt by someone?: No     Within the past 12 months, have you been humiliated or emotionally abused in other ways by your partner or ex-partner?: No   Stress: Not on file   Social Connections: Not on file       Functional Status:  Prior to admission patient needed assistance:   Dependent ADLs:: Ambulation-walker, Wheelchair-with assist  Dependent IADLs:: Cleaning, Cooking, Meal Preparation  Assesssment of Functional Status: Not at baseline with mobility, Not at baseline with ADL  Functioning    Mental Health Status:  Mental Health Status: No Current Concerns  Mental Health Management:  (N/A)    Chemical Dependency Status:  Chemical Dependency Status: No Current Concerns  Chemical Dependency Management:  (N/A)          Values/Beliefs:  Spiritual, Cultural Beliefs, Jewish Practices, Values that affect care: no       Cultural/Jewish Practices Patient Routinely Participates In:  (N/A)       Additional Information:    HPI: Genaro Ortiz is a 78 year old man admitted on 11/10/2023. He has a history of hyperlipemia and prostate cancer and is admitted by our orthopedic surgery service after ORIF of the right hip on 11/10/2023.     Met with patient in room. Introduced self and RNCC role. Verified address, phone number, PCP, HCD, and insurance.    Patient lives on the 3rd floor in an apartment complex. He does have an elevator to get to his apartment. There no steps leading up to the apartment or inside his apartment. Patient has difficulty ambulating at this time and has recs for TCU.    Patient does not have family nearby or many contacts if needed. But his friends Esvin and Francisco are able to assist with rides and other assistance if necessary. He does drive but is unable to drive at this time. His friends have been able to offer rides and he is able to take [employer], as well.    He does receive Meals on Wheels and he orders groceries to be delivered through [employer]. Advised patient to let [employer] know that he will not be needing their services at this time.    Writer provided a list of TCUs to the patient and asked him to review his preferred locations. His number one preference is [facility] because he was there in the past. Writer confirmed with  rehab that he was in the acute rehab unit.      Referrals sent to:    [facility]  [address] [place] [id number]  Phone: [phone]    [facility]  [address] [place] [address]  Phone: [phone]    [facility]  [place]  [address] [place] [address]  Phone: [phone]    [facility]  [address]   [place] [address]  Phone: [phone]    [facility]  [address] [place] [address]  Phone: [phone]    [facility]  [address] [place] [address]  Phone: [phone]          Jenny Johnson, MSN, APRN, AGCNS-BC - RN Care Coordinator  5MS [phone]    SEARCHABLE in [facility] - search CARE COORDINATOR     [place] [employer] [place] ([id number]) Saturday & Sunday; ([phone]) [facility] Recognized Holidays     Units: 5A, 5B & 5C  Pager: [phone]    Units: 6B, 6C & 6D    Pager: [phone]    Units: 7A, 7B, 7C & 7D    Pager: [phone]    Units: 6A & ICU   Pager: [phone]    Units: 5 Ortho, 5MS & WB ED Pager: [phone]    Units: 6MS, 8A & 10 ICU  Pager [phone]

## 2023-11-12 NOTE — PROGRESS NOTES
"[facility]    Medicine Progress Note - Hospitalist Service, GOLD TEAM 19    Date of Admission:  11/10/2023    Assessment & Plan   Genaro Ortiz is a 78 year old man admitted on 11/10/2023. He has a history of hyperlipemia and prostate cancer and is admitted by our orthopedic surgery service after ORIF of the right hip on 11/10/2023.  Medicine has been consulted for co-management.     #S/p R hip ORIF   -- EBL ~200 ml   -- Pain control, dvt ppx, per primary team  -- PT/OT to see patient.   -Postop Hgb 10.7 --> 10.0      #Aspiration in OR   - Anesthesia note suggests the patient had presumed GI contents coming out of his LMA with ineffective ventilation concerning for aspiration.   -  The patient was intubated but did not have significant desaturation.   - O2 saturations WNL, no supplemental oxygen. Lung sounds clear on exam.   - Pt w/o complaints of SOB, cough, chest pain/discomfort.   - no concern for hypoxia at this time.      #History of hyperlipidemia  - Continue home simvastatin     #History of prostate cancer  - Continue home tamsulosin    #Concern for urinary retention  -Continue  RN managed bladder protocol; patient might need to be discharged on a Manzo catheter and trial of void outpatient.          Diet: Advance Diet as Tolerated: Regular Diet Adult    DVT Prophylaxis: ASA 81 mg BID  Manzo Catheter: Not present  Lines: None     Cardiac Monitoring: None  Code Status: Full Code      Clinically Significant Risk Factors                  # Hypertension: Noted on problem list        # Overweight: Estimated body mass index is 29.95 kg/m  as calculated from the following:    Height as of this encounter: 1.753 m (5' 9\").    Weight as of this encounter: 92 kg (202 lb 13.2 oz)., PRESENT ON ADMISSION            Disposition Plan  defer to primary team.  Therapy recommending discharge to TCU.    Expected Discharge Date: 11/12/2023                    JOSI MOREAU " MD  Hospitalist Service, GOLD TEAM 19  [facility]  Securely message with [employer] (more info)  Text page via [employer] Paging/Directory   See signed in provider for up to date coverage information  ______________________________________________________________________    Interval History   - NAEON  -Afebrile and hemodynamically stable, continues to saturate well on room air  -Continues to report difficulty with urination.  Otherwise, reports adequate pain control.    Physical Exam   Vital Signs: Temp: 98  F (36.7  C) Temp src: Oral BP: 110/69 Pulse: 66   Resp: 16 SpO2: 97 % O2 Device: None (Room air)    Weight: 202 lbs 13.17 oz    General Appearance: Lying comfortably in bed in NAD  HEENT: PERRL: EOMI; moist mucous membrane w/o lesions  Neck: No JVD  Pulmonary: Clear to auscultation bilaterally, no wheezes or crackles  CVS: Regular rhythm, no murmurs, rubs or gallops  GI: BS (+), soft nontender, no rebound or guarding   Extremities: R-hip dressing c/d/I   Skin: No rashes or lesions  Neurologic: A&O x3      Medical Decision Making       45 MINUTES SPENT BY ME on the date of service doing chart review, history, exam, documentation & further activities per the note.      Data   ------------------------- PAST 24 HR DATA REVIEWED -----------------------------------------------    I have personally reviewed the following data over the past 24 hrs:    N/A  \   10.0 (L)   / N/A     N/A N/A N/A /  122 (H)   N/A N/A N/A \       Imaging results reviewed over the past 24 hrs:   Recent Results (from the past 24 hour(s))   XR Chest Port 1 View    Narrative    Exam: XR CHEST PORT 1 VIEW, 11/11/2023 9:23 AM    Indication: Possible aspiration pneumonia in OR    Comparison: 11/10/2023    Findings:   The cardiomediastinal silhouette and pulmonary vasculature are within  normal limits. No appreciable pleural effusion or pneumothorax. Stable  mild streaky perihilar and left greater than right  basilar opacities.  Low lung volumes.      Impression    Impression: No significant change in streaky perihilar and left  basilar opacities, favored to represent atelectasis.    LUZ OZUNA DO         SYSTEM ID:  [id number]

## 2023-11-12 NOTE — PLAN OF CARE
Goal Outcome Evaluation:      Plan of Care Reviewed With: patient    Overall Patient Progress: no changeOverall Patient Progress: no change    Outcome Evaluation: Met with patient in room to discuss discharge planning. Provided list of possible TCUs to send referrals to.    Jenny Johnson, MSN, APRN, AGCNS-BC - RN Care Coordinator  5MS [phone]    SEARCHABLE in [facility] - search CARE COORDINATOR     [place] [facility] [place] ([phone]) Saturday & Sunday; ([phone]) [facility] Recognized Holidays     Units: 5A, 5B & 5C  Pager: [phone]    Units: 6B, 6C & 6D    Pager: [phone]    Units: 7A, 7B, 7C & 7D    Pager: [phone]    Units: 6A & ICU   Pager: [phone]    Units: 5 Ortho, 5MS & WB ED Pager: [phone]    Units: 6MS, 8A & 10 ICU  Pager [phone]

## 2023-11-12 NOTE — PLAN OF CARE
"Goal Outcome Evaluation:      Plan of Care Reviewed With: patient    Overall Patient Progress: improvingOverall Patient Progress: improving    Outcome Evaluation: PT AOX4, continues reporting pain to RLE, managed w/ ice packs, sched tylenol and prn oxy. Plan to continue work w/ therapies. Referrals sent out for TCu placement. Continued difficulties w/ voiding, pt spoke to MD, indwelling catheter ordered and placed.    /59 (BP Location: Left arm)   Pulse 80   Temp 97.9  F (36.6  C) (Oral)   Resp 18   Ht 1.753 m (5' 9\")   Wt 92 kg (202 lb 13.2 oz)   SpO2 95%   BMI 29.95 kg/m    O2> 95% ORA, denies feeling SOB, lightheadedness, lungs clear, equal bilat    Output: Difficulty voiding, states this has happened after previous surgeries, indwelling catheter in place   Activity: Ax2 w/ walker and GB to stand and pivot, pt had difficulty standing and ambulating, 50% WB on RLE.   Boot to RLE    Skin: Slight blanchable redness to R/L hip area, improving from yesterday    Dressing: R hip dressing CDI   Diet: Reg, denies N&V   LDA: L PIV SL    Plan: Plan to continue POC for therapies and pain management   Will continue to monitor for voiding ability        "

## 2023-11-13 ENCOUNTER — APPOINTMENT (OUTPATIENT)
Dept: PHYSICAL THERAPY | Facility: CLINIC | Age: 78
DRG: 481 | End: 2023-11-13
Attending: ORTHOPAEDIC SURGERY
Payer: COMMERCIAL

## 2023-11-13 ENCOUNTER — APPOINTMENT (OUTPATIENT)
Dept: OCCUPATIONAL THERAPY | Facility: CLINIC | Age: 78
DRG: 481 | End: 2023-11-13
Attending: ORTHOPAEDIC SURGERY
Payer: COMMERCIAL

## 2023-11-13 PROCEDURE — 250N000013 HC RX MED GY IP 250 OP 250 PS 637

## 2023-11-13 PROCEDURE — 99232 SBSQ HOSP IP/OBS MODERATE 35: CPT | Performed by: INTERNAL MEDICINE

## 2023-11-13 PROCEDURE — 120N000002 HC R&B MED SURG/OB UMMC

## 2023-11-13 PROCEDURE — 97530 THERAPEUTIC ACTIVITIES: CPT | Mod: GP

## 2023-11-13 PROCEDURE — 250N000013 HC RX MED GY IP 250 OP 250 PS 637: Performed by: STUDENT IN AN ORGANIZED HEALTH CARE EDUCATION/TRAINING PROGRAM

## 2023-11-13 PROCEDURE — 97530 THERAPEUTIC ACTIVITIES: CPT | Mod: GO

## 2023-11-13 RX ORDER — OXYCODONE HYDROCHLORIDE 5 MG/1
5 TABLET ORAL EVERY 4 HOURS PRN
Status: SHIPPED | DISCHARGE
Start: 2023-11-13 | End: 2023-11-14

## 2023-11-13 RX ADMIN — OXYCODONE HYDROCHLORIDE 10 MG: 10 TABLET ORAL at 20:41

## 2023-11-13 RX ADMIN — MAGNESIUM HYDROXIDE 30 ML: 400 SUSPENSION ORAL at 08:23

## 2023-11-13 RX ADMIN — TAMSULOSIN HYDROCHLORIDE 0.4 MG: 0.4 CAPSULE ORAL at 04:23

## 2023-11-13 RX ADMIN — POLYETHYLENE GLYCOL 3350 17 G: 17 POWDER, FOR SOLUTION ORAL at 07:37

## 2023-11-13 RX ADMIN — ASPIRIN 81 MG: 81 TABLET, COATED ORAL at 07:37

## 2023-11-13 RX ADMIN — SIMVASTATIN 20 MG: 20 TABLET, FILM COATED ORAL at 22:14

## 2023-11-13 RX ADMIN — SENNOSIDES AND DOCUSATE SODIUM 1 TABLET: 50; 8.6 TABLET ORAL at 07:37

## 2023-11-13 RX ADMIN — ACETAMINOPHEN 975 MG: 325 TABLET, FILM COATED ORAL at 10:42

## 2023-11-13 RX ADMIN — SENNOSIDES AND DOCUSATE SODIUM 1 TABLET: 50; 8.6 TABLET ORAL at 20:41

## 2023-11-13 RX ADMIN — ASPIRIN 81 MG: 81 TABLET, COATED ORAL at 20:42

## 2023-11-13 RX ADMIN — OXYCODONE HYDROCHLORIDE 10 MG: 10 TABLET ORAL at 08:23

## 2023-11-13 RX ADMIN — ACETAMINOPHEN 975 MG: 325 TABLET, FILM COATED ORAL at 02:14

## 2023-11-13 ASSESSMENT — ACTIVITIES OF DAILY LIVING (ADL)
ADLS_ACUITY_SCORE: 40
ADLS_ACUITY_SCORE: 36
ADLS_ACUITY_SCORE: 40
ADLS_ACUITY_SCORE: 40
ADLS_ACUITY_SCORE: 36
ADLS_ACUITY_SCORE: 40
ADLS_ACUITY_SCORE: 36
ADLS_ACUITY_SCORE: 36
ADLS_ACUITY_SCORE: 40

## 2023-11-13 NOTE — PLAN OF CARE
"Goal Outcome Evaluation:      Plan of Care Reviewed With: patient    Overall Patient Progress: improvingOverall Patient Progress: improving       VS: /63   Pulse 73   Temp 97.9  F (36.6  C)   Resp 18   Ht 1.753 m (5' 9\")   Wt 92 kg (202 lb 13.2 oz)   SpO2 95%   BMI 29.95 kg/m       O2: SpO2 > 90% on RA. Denies SOB/chest pain.    Output: Manzo catheter in place with adequate output.    Last BM: 11/9, BS active in all four quadrants. Milk of Magnesia given.    Activity: PWB on RLE (50%), walker and GB, asst x 2.    Skin: RLE incision    Pain: Pain with activity, controlled with scheduled tylenol and PRN oxycodone    CMS: A&Ox4, denies N/T.    Dressing: RLE surgical dressing CDI    Diet: Regular    LDA: R PIV SL    Equipment: IV pole, walker, GB    Plan: Discharge to TCU pending placement    Additional Info:            "

## 2023-11-13 NOTE — PLAN OF CARE
Goal Outcome Evaluation:      Plan of Care Reviewed With: patient    Overall Patient Progress: improving    Outcome Evaluation: Pt is calm & cooperative. Pt reported minimal pain to RLE that was managed with scheduled Tylenol & ice packs. Pt got p & sat on the chair this shift.  Pt slept comfortably during the night.    VS: Temp: 97.7  F (36.5  C) Temp src: Oral BP: 136/54 Pulse: 74   Resp: 18 SpO2: 96 % O2 Device: None (Room air)      O2: SpO2 > 95% and stable on RA. LS  diminished equal bilaterally. Denies chest pain and SOB.    Output: Manzo in place for retention.   Last BM: 11/9/2023, denies abdominal discomfort. BS active / passing flatus.    Activity: Up with Ax2 to chair using walker & GB.  50% PWB to RLE.    Skin: WDL except, T hip surgical incision; blanchable redness to bilateral hips.    Pain: Pain managed with scheduled Tylenol & Ice packs.    CMS: Intact, AOx4. Denies numbness and tingling.   Dressing: CDI   Diet: Regular diet. Denies nausea/vomiting.    LDA: L PIV SL.   Equipment: IV pole, personal belongings,    Plan: Pending TCU placement. Continue with plan of care & pain management. Call light within reach, pt able to make needs known.

## 2023-11-13 NOTE — PROGRESS NOTES
"[facility]    Medicine Progress Note - Hospitalist Service, GOLD TEAM 19    Date of Admission:  11/10/2023    Assessment & Plan   Genaro Ortiz is a 78 year old man admitted on 11/10/2023. He has a history of hyperlipemia and prostate cancer and is admitted by our orthopedic surgery service after ORIF of the right hip on 11/10/2023.  Medicine has been consulted for co-management.     #S/p R hip ORIF   -- EBL ~200 ml   -- Pain control, dvt ppx, per primary team  -- PT/OT to see patient.   -Postop Hgb 10.7 --> 10.0      #Aspiration in OR   - Anesthesia note suggests the patient had presumed GI contents coming out of his LMA with ineffective ventilation concerning for aspiration.   -  The patient was intubated but did not have significant desaturation.   - O2 saturations WNL, no supplemental oxygen. Lung sounds clear on exam.   - Pt w/o complaints of SOB, cough, chest pain/discomfort.   - no concern for hypoxia at this time.      #History of hyperlipidemia  - Continue home simvastatin     #History of prostate cancer  - Continue home tamsulosin    #Concern for urinary retention  -retained urine post-op, he has a hx of this   -granados catheter placed already  He'll require TOV outpatient           Diet: Advance Diet as Tolerated: Regular Diet Adult    DVT Prophylaxis: ASA 81 mg BID  Granados Catheter: PRESENT, indication: Retention  Lines: None     Cardiac Monitoring: None  Code Status: Full Code      Clinically Significant Risk Factors                  # Hypertension: Noted on problem list        # Overweight: Estimated body mass index is 29.95 kg/m  as calculated from the following:    Height as of this encounter: 1.753 m (5' 9\").    Weight as of this encounter: 92 kg (202 lb 13.2 oz)., PRESENT ON ADMISSION     # Financial/Environmental Concerns: none         Disposition Plan  defer to primary team.  Therapy recommending discharge to TCU.            JOSI MOREAU, " MD  Hospitalist Service, GOLD TEAM 19  [facility]  Securely message with [employer] (more info)  Text page via [employer] Paging/Directory   See signed in provider for up to date coverage information  ______________________________________________________________________    Interval History   - NAEON  -Afebrile and hemodynamically stable, continues to saturate well on room air  - Manzo catheter placed   - Reports adequate pain control.     Physical Exam   Vital Signs: Temp: 97.9  F (36.6  C) Temp src: Oral BP: 132/63 Pulse: 73   Resp: 18 SpO2: 95 % O2 Device: None (Room air)    Weight: 202 lbs 13.17 oz    General Appearance: Lying comfortably in bed in NAD  HEENT: PERRL: EOMI; moist mucous membrane w/o lesions  Neck: No JVD  Pulmonary: Clear to auscultation bilaterally, no wheezes or crackles  CVS: Regular rhythm, no murmurs, rubs or gallops  GI: BS (+), soft nontender, no rebound or guarding   Extremities: R-hip dressing c/d/I   Skin: No rashes or lesions  Neurologic: A&O x3      Medical Decision Making       45 MINUTES SPENT BY ME on the date of service doing chart review, history, exam, documentation & further activities per the note.      Data   ------------------------- PAST 24 HR DATA REVIEWED -----------------------------------------------    I have personally reviewed the following data over the past 24 hrs:    N/A  \   9.8 (L)   / N/A     N/A N/A N/A /  N/A   N/A N/A N/A \       Imaging results reviewed over the past 24 hrs:   No results found for this or any previous visit (from the past 24 hour(s)).

## 2023-11-13 NOTE — PROGRESS NOTES
Orthopaedic Surgery Progress Note 11/13/2023    S: Patient seen and examined this morning. No acute events overnight.  Pain is decently controlled at rest but increases with mobilization.     O:  Temp: 97.7  F (36.5  C) Temp src: Oral BP: 136/54 Pulse: 74   Resp: 18 SpO2: 96 % O2 Device: None (Room air)      Exam:  Gen: No acute distress, resting comfortably in bed.  Resp: Non-labored breathing  : Manzo in place    RLE:  Dressings c/d/i   Boot in place on RLE  Fires GS/TS/FHL/EHL. Fires quads minimally 2/2 pain  SILT SP/DP/Saph/Sural/T  WWP    Recent Labs   Lab 11/12/23  1239 11/12/23  0610 11/11/23  0748   HGB 9.8* 10.0* 10.7*       Assessment: Genaro Ortiz is a 78 year old male s/p ORIF right greater trochanter fracture on 11/10/2023 with Dr. Escamilla c/b intra-op emesis and aspiration requiring intubation.     #urinary retention. Manzo in palce    #postop pain. Multimodal regimen.      Plan:  Activity: Up ad loretta. No active abduction of RLE. Patient should be in AFO w outrigger to prevent external rotation  Weight bearing status: 50% WB to RLE  Antibiotics: Cefazolin x 24 hours.   Diet: Begin with clear fluids and progress diet as tolerated. Bowel regimen. Anti-emetics PRN.    Wound Care: Tegaderm and alginate x 2 weeks. Okay to shower  Drains: none  Pain management: Orals PRN, IV for breakthrough only  X-rays: AP Pelvis and Lateral operative hip in PACU. STAT CXR in PACU. Follow-up CXR 4 hours after initial then repeat CXR in am.   Physical Therapy: Mobilization, ROM, ADL's. No active abduction of RLE.   Occupational Therapy: ADL's  Labs: Trend Hgb on POD #1, 2  Consults: PT, OT. Hospitalist, appreciate assistance in caring for this patient throughout the perioperative period  Follow-up: 4 weeks with Dr. Escamilla's team  Disposition: Pending progress with therapies, pain control on orals, and medical stability, anticipate discharge to TCU on POD4-5.    Future Appointments   Date Time Provider Department Center    11/11/2023 11:15 AM Trice Cannon, PT URPT [place]   11/11/2023  3:30 PM Ramandeep Mcclendon, OTR UROT [place]   12/4/2023 12:30 PM Merritt Toney, PAFELTON [facility]   12/7/2023 10:15 AM [facility]   12/8/2023  1:15 PM Valdez Quesada DO [facility]     Afshin Ann MD  Orthopedic Surgery PGY4

## 2023-11-13 NOTE — PROGRESS NOTES
"Care Management Follow Up    Length of Stay (days): 3    Expected Discharge Date: 11/15/23     Concerns to be Addressed: discharge planning     Patient plan of care discussed at interdisciplinary rounds: Yes    Anticipated Discharge Disposition: Acute Rehab    [facility]  [address]  [address] [place] [address]  Admissions: [phone]  RN Station: [phone]  ARU SW: [phone]      Anticipated Discharge Services:  (acute therapies)  Anticipated Discharge DME: None    Patient/family educated on Medicare website which has current facility and service quality ratings: yes  Education Provided on the Discharge Plan: Yes  Patient/Family in Agreement with the Plan: yes    Referrals Placed by CM/SW: Post Acute Facilities (and [facility])    ACCEPTED  [facility] - Pt's 1st preference  [address]  [address] [place] [address]  Admissions: [phone]  RN Station: [phone]  ARU SW: [phone]       PENDING  [facility]  [address] [place] [address]  Phone: [phone]     [email or web address]  [address] [place] [address]  Phone: [phone]     [facility]  [address] [place] [address]  Phone: [phone]     [facility]  [address] [place] [address]  Phone: [phone]     [facility]  [address] [place] [address]  Phone: [phone]    Private pay costs discussed: Not discussed at this time.    Additional Information:    Per IDT rounds, pt is medically ready to discharge.    SW in communication with [facility] liaison, Lyndsey Sullivan, re: pt's recs. Pt has recs for TCU, but  ARU is able to accept.     SW discussed this information with pt, asked if he prefers ARU or TCU, and provided education on the difference. Pt stated that he prefers to go to [facility], said, \"I had a really good experience there last time.\"    SW updated [facility] liaison of pt's " preference to go to [facility]. Lyndsey stated that pt is # 7 on the waitlist, but ARU will have discharges every day this week and pt will likely move up the list quickly.          RUBY Slater, LSW  [facility]   PHONE: [phone]  Pager: [phone]

## 2023-11-13 NOTE — INTERIM SUMMARY
[facility] Acute Rehab Center Pre-Admission Screen    Referral Source:  [facility] MED SURG ORTHOPEDIC [id number]  Admit date to referring facility: 11/10/2023    Physical Medicine and Rehab Consult Completed: No    Rehab Diagnosis:    Orthopaedic Disorders 08.11 Unilateral Hip Fracture - R hip fracture; now s/p ORIF     Justification for Acute Inpatient Rehabilitation  Genaro Ortiz is a 78 year old man w/ PMH of OA s/p bilateral hip arthroplasties 7/2023, HTN, prostate cancer, HLE, anemia and recent R displaced periprosthetic right greater trochanter fracture in 9/2023 after a fall which was managed non-operatively.  On 10/30, during orthopedic follow-up, pt was noted to have displaced unstable periprosthetic right greater trochanter fracture. He was admitted for ORIF of R greater trochanter fracture on 11/10/23. His post-op course complicated by intra-op emesis and aspiration requiring intubation, as well as urinary retention.  He has also required medical mgmt of his pain.   He is now medically stable and ready for transfer to acute inpatient rehabilitation.     The patient requires transfer to [facility] for intensive therapies not available in a lesser level of care including PT/OT, ongoing medical management at least 3 days per week, and rehabilitative nursing care. Most recently his baseline is mod IND with a combination of WW and w/c based mobility w/ limited WB thru RLE d/t fracture.  Currently he is needing Ax1-2 for safe mobility and functional transfers. Patient requires an intensive inpatient rehab program to address the following acute impairments:impaired activity tolerance, impaired balance, impaired strength, impaired weight shifting, pain, and RLE partial weightbearing status (50%). Genaro is an excellent ARC candidate given his motivation, participation and accessible home environment.     Current Active Medical Management Needs/Risks for Clinical Complications  The patient requires the  high level of rehabilitation physician supervision that accompanies the provision of intensive rehabilitation therapy.  The patient needs the services of the rehabilitation physician to assess the patient medically and functionally and to modify the course of treatment as needed to maximize the patient's capacity to benefit from the rehabilitation process. The patient requires physician oversight at least 3x/week to manage the following:   Orthopedics: S/p R hip fracture and ORIF. Patient is at risk for falls with or without injury in the setting of gait and balance impairments. Continue to PT/OT to maximize safety within 50% weight bearing and no ABD precaution. AFO w/ outrigger to prevent external rotation.  Pain: Patient will need ongoing assessment and adjustment of pain medications for optimal participation in therapies. Continue tylenol and oxycodone.  Respiratory: Pt with aspiration in the OR. Intubated. Currently stable on RA. Continue to monitor with progressive mobility, at risk for O2 desaturation.   Cardiology in the setting of HLD: Continue PTA simvastatin.   Hx of prostate CA: PTA tamsulosin.   Bladder in the setting of urinary retention: Manzo in place. Will need TOV outpatient. At risk for UTI, alteration in skin integrity in the setting of urinary retention w/ catheter.   Bowels: At risk for constipation in the setting of opioid use and decreased mobility. Continue regimen: senna, milk of magnesia, and miraLAX.   DVT Prophylaxis: ASA 81 mg BID     Past Medical/Surgical History  Surgery in the past 100 days: Yes  Additional relevant past medical history: OA s/p bilateral hip arthroplasties 7/2023, HTN, prostate cancer, HLE, anemia and recent R hip fx in 9/2023, GERD,     Level of Functioning Prior to Admission:    LIVING ENVIRONMENT  People in Home: alone  Current Living Arrangements: condominium  Home Accessibility: no concerns, wheelchair accessible  Transportation Anticipated: agency  Living  Environment Comments: tub shower with tub bench    SELF-CARE  Usual Activity Tolerance: good  Equipment Currently Used at Home: grab bar, toilet, grab bar, tub/shower, raised toilet seat, shower chair, walker, standard, wheelchair, manual  Activity/Exercise/Self-Care Comment: Pt reports that he needed assist with showering sin hip surgeries in July, but home health aide never came so he has not showered other than sponge bath; ind with dressing, toileting, ordering groceries. 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 Pt reports hx of  B JOHNNY in july and stay in TCU prior to d/c home with OT/PT.    Level of Function: GG Scale (Section GG Functional Ability and Goals; CMS's BURROUGHS Version 3.0 Manual effective 10.1.2019):  PT Current Function Goals for Rehab   Bed Rolling 4 Supervision or touching assitance 6 Independent   Supine to Sit 2 Substantial/maximal assistance 6 Independent   Sit to Stand 1 Dependent (Ax2) 6 Independent   Transfer 1 Dependent (Ax2) 6 Independent   Ambulation 1 Dependent (Ax2) 6 Independent   Stairs 88 Not attempted due to safety Not Applicable     OT Current Function Goals for Rehab   Feeding 5 Setup or clean-up assistance 6 Independent   Grooming Not completed 6 Independent   Bathing Not completed 6 Independent   Upper Body Dressing Not completed 6 Independent   Lower Body Dressing 3 Partial/moderate assistance 6 Independent   Toileting 1 Dependent 6 Independent   Toilet Transfer 1 Dependent (Ax2) 6 Independent   Tub/Shower Transfer 88 Not attempted due to safety 3 Partial/moderate assistance   Cognition Not Impaired Independent     SLP Current Function Goals for Rehab   Swallow Not Impaired Not applicable   Communication Not Impaired Not applicable     Current Diet:  Regular diet, thins    Summary Statement:  At this time, [facility] has physical therapy and occupational therapy needs. Patient with variable performance on STS transfers. At best, able to perform with Louis from an elevated  bed height or up to modAx2 or maxAx1. Requiring Ax2 with nursing staff. Able to walk 12' with FWW and CGAx2. Patient did score a 25/30 on the MoCA with OT On 11/12. They can continue to screen cognition to see if additional SLP needs arise.     Expected Therapies and Services Required During Inpatient Rehab Admission  Intensity of Therapy: Patient requires intensive therapies not available in a lesser level of care. Patient is motivated, making gains, and can tolerate 3 hours of therapy a day.  Physical Therapy: 90 minutes per day, 7 days a week for 12 days  Occupational Therapy: 90 minutes per day, 7 days a week for 12 days  Speech and Language Therapy: Not indicated at this time.   Rehabilitation Nursing Needs: Patient requires 24 hour Rehab Nursing to manage vitals, medication education, positioning, carryover of new rehab techniques, care coordination, skin integrity, pain management, post-surgical incision care to promote healing and prevent infection, and provide safe environment for patient at falls risk.    Precautions/restrictions/special needs:  Precautions: hip precautions, fall precautions, and Weight bearing precautions (PWB R LE 50%)  Restrictions:  No active R hip abd, AFO w/ outrigger to prevent external rotation   Special Needs:  N/A    Expected Level of Improvement: Anticipate with intensive therapies, close medical management, and rehabilitative nursing care the patient will improve strength, balance, tolerance to activity, safety to ensure Mod I with basic mobility and ADL performance to allow return home.    Expected Length of time to achieve: 12 days    Anticipated Discharge Needs:  Anticipated Discharge Destination: Home  Anticipated Discharge Support: None  24/7 support available : No  Identified caregiver(s):  none  Anticipated Discharge Needs:  Home with home or OP therapies pending functional progress    Identified challenges/barriers:  None anticipated.     Liaison  signature/date/time:    Physician statement of review and agreement:  I have reviewed and am in agreement of the need for IRF stay to address above functional and medical needs. In addition to above statements address, Patient requires intensive active and ongoing therapeutic intervention and multiple therapies; Patient requires medical supervision; Expected to actively participate in the intensive rehab program; Sufficiently stable to actively participate; Expectation for measurable improvement in functional capacity or adaption to impairments.    MD signature/date/time:

## 2023-11-13 NOTE — PLAN OF CARE
Goal Outcome Evaluation:      Plan of Care Reviewed With: patient          Outcome Evaluation: Pt will d/c to [facility] when bed becomes available.

## 2023-11-14 ENCOUNTER — APPOINTMENT (OUTPATIENT)
Dept: OCCUPATIONAL THERAPY | Facility: CLINIC | Age: 78
DRG: 481 | End: 2023-11-14
Attending: ORTHOPAEDIC SURGERY
Payer: COMMERCIAL

## 2023-11-14 ENCOUNTER — HOSPITAL ENCOUNTER (INPATIENT)
Facility: CLINIC | Age: 78
LOS: 7 days | Discharge: HOME HEALTH | DRG: 561 | End: 2023-11-21
Attending: PHYSICAL MEDICINE & REHABILITATION | Admitting: PHYSICAL MEDICINE & REHABILITATION
Payer: COMMERCIAL

## 2023-11-14 VITALS
DIASTOLIC BLOOD PRESSURE: 82 MMHG | HEART RATE: 92 BPM | RESPIRATION RATE: 16 BRPM | SYSTOLIC BLOOD PRESSURE: 169 MMHG | OXYGEN SATURATION: 96 % | HEIGHT: 69 IN | WEIGHT: 202.82 LBS | BODY MASS INDEX: 30.04 KG/M2 | TEMPERATURE: 98.2 F

## 2023-11-14 DIAGNOSIS — Z87.81 S/P RIGHT HIP FRACTURE: ICD-10-CM

## 2023-11-14 PROCEDURE — 250N000013 HC RX MED GY IP 250 OP 250 PS 637

## 2023-11-14 PROCEDURE — 97110 THERAPEUTIC EXERCISES: CPT | Mod: GO

## 2023-11-14 PROCEDURE — 99222 1ST HOSP IP/OBS MODERATE 55: CPT | Mod: AI | Performed by: PHYSICIAN ASSISTANT

## 2023-11-14 PROCEDURE — 250N000013 HC RX MED GY IP 250 OP 250 PS 637: Performed by: STUDENT IN AN ORGANIZED HEALTH CARE EDUCATION/TRAINING PROGRAM

## 2023-11-14 PROCEDURE — 99232 SBSQ HOSP IP/OBS MODERATE 35: CPT | Performed by: INTERNAL MEDICINE

## 2023-11-14 PROCEDURE — 97535 SELF CARE MNGMENT TRAINING: CPT | Mod: GO

## 2023-11-14 PROCEDURE — 97530 THERAPEUTIC ACTIVITIES: CPT | Mod: GO

## 2023-11-14 PROCEDURE — 128N000003 HC R&B REHAB

## 2023-11-14 PROCEDURE — 250N000013 HC RX MED GY IP 250 OP 250 PS 637: Performed by: PHYSICIAN ASSISTANT

## 2023-11-14 RX ORDER — OXYCODONE HYDROCHLORIDE 5 MG/1
5-10 TABLET ORAL EVERY 4 HOURS PRN
Qty: 30 TABLET | Refills: 0 | Status: ON HOLD
Start: 2023-11-14 | End: 2023-11-20

## 2023-11-14 RX ORDER — OXYCODONE HYDROCHLORIDE 5 MG/1
5 TABLET ORAL EVERY 4 HOURS PRN
Start: 2023-11-14 | End: 2023-11-14

## 2023-11-14 RX ORDER — OXYCODONE HYDROCHLORIDE 5 MG/1
5 TABLET ORAL EVERY 4 HOURS PRN
Status: DISCONTINUED | OUTPATIENT
Start: 2023-11-14 | End: 2023-11-20

## 2023-11-14 RX ORDER — POLYETHYLENE GLYCOL 3350 17 G/17G
17 POWDER, FOR SOLUTION ORAL DAILY
DISCHARGE
Start: 2023-11-14

## 2023-11-14 RX ORDER — NALOXONE HYDROCHLORIDE 0.4 MG/ML
0.2 INJECTION, SOLUTION INTRAMUSCULAR; INTRAVENOUS; SUBCUTANEOUS
Status: DISCONTINUED | OUTPATIENT
Start: 2023-11-14 | End: 2023-11-21 | Stop reason: HOSPADM

## 2023-11-14 RX ORDER — LANOLIN ALCOHOL/MO/W.PET/CERES
1000 CREAM (GRAM) TOPICAL DAILY
Status: DISCONTINUED | OUTPATIENT
Start: 2023-11-15 | End: 2023-11-21 | Stop reason: HOSPADM

## 2023-11-14 RX ORDER — POLYETHYLENE GLYCOL 3350 17 G/17G
17 POWDER, FOR SOLUTION ORAL DAILY
Status: DISCONTINUED | OUTPATIENT
Start: 2023-11-15 | End: 2023-11-21 | Stop reason: HOSPADM

## 2023-11-14 RX ORDER — BISACODYL 5 MG
5 TABLET, DELAYED RELEASE (ENTERIC COATED) ORAL DAILY PRN
Status: DISCONTINUED | OUTPATIENT
Start: 2023-11-14 | End: 2023-11-14 | Stop reason: HOSPADM

## 2023-11-14 RX ORDER — PREGABALIN 100 MG/1
100 CAPSULE ORAL EVERY EVENING
Status: DISCONTINUED | OUTPATIENT
Start: 2023-11-14 | End: 2023-11-21 | Stop reason: HOSPADM

## 2023-11-14 RX ORDER — ACETAMINOPHEN 325 MG/1
975 TABLET ORAL 3 TIMES DAILY
Status: DISCONTINUED | OUTPATIENT
Start: 2023-11-14 | End: 2023-11-20

## 2023-11-14 RX ORDER — NALOXONE HYDROCHLORIDE 0.4 MG/ML
0.4 INJECTION, SOLUTION INTRAMUSCULAR; INTRAVENOUS; SUBCUTANEOUS
Status: DISCONTINUED | OUTPATIENT
Start: 2023-11-14 | End: 2023-11-21 | Stop reason: HOSPADM

## 2023-11-14 RX ORDER — SIMVASTATIN 20 MG
20 TABLET ORAL AT BEDTIME
Status: DISCONTINUED | OUTPATIENT
Start: 2023-11-14 | End: 2023-11-21 | Stop reason: HOSPADM

## 2023-11-14 RX ORDER — AMOXICILLIN 250 MG
1 CAPSULE ORAL 2 TIMES DAILY PRN
Status: DISCONTINUED | OUTPATIENT
Start: 2023-11-14 | End: 2023-11-21 | Stop reason: HOSPADM

## 2023-11-14 RX ORDER — ACETAMINOPHEN 325 MG/1
650 TABLET ORAL EVERY 6 HOURS PRN
Status: DISCONTINUED | OUTPATIENT
Start: 2023-11-14 | End: 2023-11-14

## 2023-11-14 RX ORDER — AMOXICILLIN 250 MG
1 CAPSULE ORAL 2 TIMES DAILY PRN
Qty: 20 TABLET | DISCHARGE
Start: 2023-11-14

## 2023-11-14 RX ORDER — VITAMIN B COMPLEX
25 TABLET ORAL DAILY
Status: DISCONTINUED | OUTPATIENT
Start: 2023-11-15 | End: 2023-11-21 | Stop reason: HOSPADM

## 2023-11-14 RX ORDER — PREGABALIN 75 MG/1
225 CAPSULE ORAL EVERY EVENING
Status: DISCONTINUED | OUTPATIENT
Start: 2023-11-14 | End: 2023-11-14

## 2023-11-14 RX ORDER — TAMSULOSIN HYDROCHLORIDE 0.4 MG/1
0.4 CAPSULE ORAL DAILY
Status: DISCONTINUED | OUTPATIENT
Start: 2023-11-15 | End: 2023-11-21 | Stop reason: HOSPADM

## 2023-11-14 RX ORDER — ASPIRIN 81 MG/1
81 TABLET ORAL 2 TIMES DAILY
Status: DISCONTINUED | OUTPATIENT
Start: 2023-11-14 | End: 2023-11-21 | Stop reason: HOSPADM

## 2023-11-14 RX ORDER — METHOCARBAMOL 500 MG/1
500 TABLET, FILM COATED ORAL EVERY 6 HOURS PRN
Status: DISCONTINUED | OUTPATIENT
Start: 2023-11-14 | End: 2023-11-20

## 2023-11-14 RX ORDER — ACETAMINOPHEN 325 MG/1
650 TABLET ORAL EVERY 4 HOURS PRN
Status: DISCONTINUED | OUTPATIENT
Start: 2023-11-14 | End: 2023-11-14

## 2023-11-14 RX ORDER — ACETAMINOPHEN 325 MG/1
650 TABLET ORAL EVERY 4 HOURS PRN
Qty: 100 TABLET | Refills: 0 | DISCHARGE
Start: 2023-11-14

## 2023-11-14 RX ADMIN — ASPIRIN 81 MG: 81 TABLET, COATED ORAL at 09:23

## 2023-11-14 RX ADMIN — ASPIRIN 81 MG: 81 TABLET, COATED ORAL at 20:39

## 2023-11-14 RX ADMIN — PREGABALIN 100 MG: 100 CAPSULE ORAL at 20:39

## 2023-11-14 RX ADMIN — ACETAMINOPHEN 975 MG: 325 TABLET, FILM COATED ORAL at 20:38

## 2023-11-14 RX ADMIN — ACETAMINOPHEN 650 MG: 325 TABLET, FILM COATED ORAL at 09:28

## 2023-11-14 RX ADMIN — SIMVASTATIN 20 MG: 20 TABLET, FILM COATED ORAL at 20:39

## 2023-11-14 RX ADMIN — DOCUSATE SODIUM AND SENNOSIDES 1 TABLET: 8.6; 5 TABLET, FILM COATED ORAL at 14:51

## 2023-11-14 RX ADMIN — POLYETHYLENE GLYCOL 3350 17 G: 17 POWDER, FOR SOLUTION ORAL at 09:23

## 2023-11-14 RX ADMIN — SENNOSIDES AND DOCUSATE SODIUM 1 TABLET: 50; 8.6 TABLET ORAL at 09:23

## 2023-11-14 RX ADMIN — OXYCODONE HYDROCHLORIDE 10 MG: 10 TABLET ORAL at 09:28

## 2023-11-14 RX ADMIN — TAMSULOSIN HYDROCHLORIDE 0.4 MG: 0.4 CAPSULE ORAL at 04:12

## 2023-11-14 ASSESSMENT — ACTIVITIES OF DAILY LIVING (ADL)
ADLS_ACUITY_SCORE: 40
ADLS_ACUITY_SCORE: 32
ADLS_ACUITY_SCORE: 40
ADLS_ACUITY_SCORE: 32
ADLS_ACUITY_SCORE: 32
ADLS_ACUITY_SCORE: 40
ADLS_ACUITY_SCORE: 32
ADLS_ACUITY_SCORE: 40
ADLS_ACUITY_SCORE: 32

## 2023-11-14 NOTE — PROGRESS NOTES
Orthopaedic Surgery Progress Note 11/14/2023    S: Patient seen and examined this morning. No acute events overnight.  Pain continues to improve with mobilization. Minimal pain at rest. +BM yesterday.     O:  Temp: 98.1  F (36.7  C) Temp src: Oral BP: 126/61 Pulse: 90   Resp: 18 SpO2: 95 % O2 Device: None (Room air)      Exam:  Gen: No acute distress, resting comfortably in bed.  Resp: Non-labored breathing  : Manzo in place    RLE:  Dressings c/d/i   Boot in place on RLE  Fires GS/TS/FHL/EHL. Fires quads minimally 2/2 pain  SILT SP/DP/Saph/Sural/T  WWP    Recent Labs   Lab 11/12/23  1239 11/12/23  0610 11/11/23  0748   HGB 9.8* 10.0* 10.7*       Assessment: Genaro Otriz is a 78 year old male s/p ORIF right greater trochanter fracture on 11/10/2023 with Dr. Escamilla c/b intra-op emesis and aspiration requiring intubation.     #urinary retention. Manzo in place. Will require TOV while outpatient.     #postop pain. Multimodal regimen.      Plan:  Activity: Up ad loretta. No active abduction of RLE. Patient should be in AFO w outrigger to prevent external rotation  Weight bearing status: 50% WB to RLE  Antibiotics: Cefazolin x 24 hours.   Diet: Begin with clear fluids and progress diet as tolerated. Bowel regimen. Anti-emetics PRN.    Wound Care: Tegaderm and alginate x 2 weeks. Okay to shower  Drains: none  Pain management: Orals PRN, IV for breakthrough only  X-rays: AP Pelvis and Lateral operative hip in PACU. STAT CXR in PACU. Follow-up CXR 4 hours after initial then repeat CXR in am.   Physical Therapy: Mobilization, ROM, ADL's. No active abduction of RLE.   Occupational Therapy: ADL's  Labs: Trend Hgb on POD #1, 2  Consults: PT, OT. Hospitalist, appreciate assistance in caring for this patient throughout the perioperative period  Follow-up: 4 weeks with Dr. Escamilla's team  Disposition: Pending progress with therapies, pain control on orals, and medical stability, anticipate discharge to TCU on POD4-5.    Future  Appointments   Date Time Provider Department Center   11/11/2023 11:15 AM Trice Cannon, PT URPT [place]   11/11/2023  3:30 PM Ramandeep Mcclendon, OTR UROT [place]   12/4/2023 12:30 PM Merritt Toney, PAFELTON [facility]   12/7/2023 10:15 AM Eric Abel MD [facility]   12/8/2023  1:15 PM Valdez Quesada DO [facility]     Afshin Ann MD  Orthopedic Surgery PGY4

## 2023-11-14 NOTE — PROGRESS NOTES
Rehab Admissions:  Met with patient today to discuss acute inpatient rehabilitation. Discussed PT/OT, visitor policy and estimated length of stay. Pt reports he has no concerns as he recently had a stay on [facility]. Pt reports if he is not progressing functionally, he would be open to assisted options in the future. He stated he has not started this process. Explained that home is always the goal from ARC if safe and possible. Explained interdisciplinary team role in discharge planning and continued support available while on the unit. All questions answered.     Thank you for the referral, we will continue to follow this patient for post acute placement.      Determination of admission is based upon the patient's need for an intensive, interdisciplinary approach to rehabilitation, their ability to progress, their ability to tolerate intensive therapies, their need for daily physician supervision, their need for twenty four hour nursing assistance, and their ability and willingness to participate in such a program.     Mague Trimble, PT, DPT  Rehab Liaison/  [facility] Rehabilitation [place] and Transitional Care Unit

## 2023-11-14 NOTE — PLAN OF CARE
Goal Outcome Evaluation:      Plan of Care Reviewed With: patient    Overall Patient Progress: improvingOverall Patient Progress: improving    Outcome Evaluation: pt is calm, cooperative, pleasant. had one small BM today. pain in R leg managed with scheduled tylenol and prn oxy.      VS: Temp: 97.8  F (36.6  C) Temp src: Oral BP: (!) 150/61 Pulse: 81   Resp: 18 SpO2: 96 % O2 Device: None (Room air)       O2: SpO2>90 % on room air, denies shortness of breath and chest    Output: Manzo catheter in place, adequate output. Cath cares done   Last BM: 11/13/2023   Activity: Up with assist of 2, walker, gait belt. 50% WB RLE   Skin: R leg surgical incision   Pain: Managed with scheduled tylenol, prn oxy   CMS: A&O x4, denies N/T   Dressing: R hip surgical dressing CDI   Diet: Regular, tolerating well, denies nausea   LDA: R hand PIV SL   Equipment: IV pole, walker, gait belt, personal belongings   Plan: Rehab, awaiting placement   Additional Info:

## 2023-11-14 NOTE — PLAN OF CARE
"Shift  6545-2696  VS: /61 (BP Location: Left arm, Patient Position: Semi-Ferguson's)   Pulse 90   Temp 98.1  F (36.7  C) (Oral)   Resp 18   Ht 1.753 m (5' 9\")   Wt 92 kg (202 lb 13.2 oz)   SpO2 95%   BMI 29.95 kg/m      O2: Room Air>90%   Output: Manzo in place- draining    Last BM: 11/13   Activity: Not OOB. Assist of 2 with G/W   Skin: Right leg incision    Pain: Denies    CMS: AXO X4 ,Denies N/T   Dressing: RLE-CDI   Diet: Reg diet. Takes pills whole   LDA: Right PIV-saline locked   Plan: Medically stable to discharge to  ARC. Waiting for bed availability.   Additional Info: Makes needs known. Call light within reach.                           "

## 2023-11-14 NOTE — PROGRESS NOTES
Care Management Discharge Note    Discharge Date: 11/15/2023       Discharge Disposition: Acute Rehab    Discharge Services:  (acute therapies)    Discharge DME: None    Discharge Transportation: agency    Private pay costs discussed: Not applicable    Does the patient's insurance plan have a 3 day qualifying hospital stay waiver?  No    PAS Confirmation Code:  (N/A)  Patient/family educated on Medicare website which has current facility and service quality ratings: yes    Education Provided on the Discharge Plan: Yes  Persons Notified of Discharge Plans: patient  Patient/Family in Agreement with the Plan: yes    Handoff Referral Completed: Yes    Additional Information:  Patient accepted to  ARU. Ania Combs PA-C paged to finish orders. Dr. Woods to finish med rec. Plan for patient to transport over to ARU at 12:30pm. Bedside RN and charge RN notified. RNCC available as needed.      Cheryl Collins RN, BSN  Care Coordinator, 5 Orth[facility]  Phone [phone]  Pager [phone]

## 2023-11-14 NOTE — PROGRESS NOTES
"[facility]    Medicine Progress Note - Hospitalist Service, GOLD TEAM 19    Date of Admission:  11/10/2023    Assessment & Plan   Genaro Ortiz is a 78 year old man admitted on 11/10/2023. He has a history of hyperlipemia and prostate cancer and is admitted by our orthopedic surgery service after ORIF of the right hip on 11/10/2023.  Medicine has been consulted for co-management.     #Status post R hip ORIF   - EBL ~200 ml   - Pain control, dvt ppx, per primary team  - PT/OT to see patient.   - Postop Hgb 10.7 --> 10.0      #Aspiration in OR   - Anesthesia note suggests the patient had presumed GI contents coming out of his LMA with ineffective ventilation concerning for aspiration.   - The patient was intubated but did not have significant desaturation.   - O2 saturations WNL, no supplemental oxygen. Lung sounds clear on exam.   - Patient w/o complaints of SOB, cough, chest pain/discomfort.   - No concern for hypoxia at this time.      #Hx hyperlipidemia  - Continue home simvastatin     #Hx prostate cancer  - Continue home tamsulosin     #Concern for urinary retention  - Retained urine post-op, he has a hx of this   - Manzo catheter placed already  > He'll require TOV outpatient          Diet: Advance Diet as Tolerated: Regular Diet Adult  Diet    DVT Prophylaxis: ASA 81 mg PO BID  Manzo Catheter: PRESENT, indication: Retention  Lines: None     Cardiac Monitoring: None  Code Status: Full Code      Clinically Significant Risk Factors                  # Hypertension: Noted on problem list        # Overweight: Estimated body mass index is 29.95 kg/m  as calculated from the following:    Height as of this encounter: 1.753 m (5' 9\").    Weight as of this encounter: 92 kg (202 lb 13.2 oz).      # Financial/Environmental Concerns: none         Disposition Plan      Expected Discharge Date: 11/15/2023,  3:00 PM    Destination: [facility]  Discharge Comments: . GINA Morgan " DO Chuck, MELINAS  Hospitalist Service, GOLD TEAM 19  [facility]  Securely message with [employer] (more info)  Text page via [employer] Paging/Directory   See signed in provider for up to date coverage information  ______________________________________________________________________    Interval History   Patient discharging to ARU today per orthopedic surgery.  Patient reports some right distal lower extremity discomfort with ambulating.  Patient is following diligently with therapies.  Medically clear for discharge to ARU.  Discussed case with care management, orthopedic surgery NICOLE.    Physical Exam   Vital Signs: Temp: 98.2  F (36.8  C) Temp src: Oral BP: (!) 169/82 Pulse: 92   Resp: 16 SpO2: 96 % O2 Device: None (Room air)    Weight: 202 lbs 13.17 oz    GENERAL: Alert and oriented x 3; no acute distress; well-nourished.  HEENT: Normocephalic; atraumatic; PERRLA; MMM.  CV: RRR; normal S1, S2; no rubs, murmurs, or gallops.  RESP: Lung fields clear to aucultation B/L; no wheezing or crepitations.  GI: Abdomen is soft, nontender, nondistended; no organomegaly; normal bowel sounds.  : Deferred genital examination.   MSK: Boot on right foot.  DERM: Skin is intact; no rash, lesions, or skin breakdown.  NEURO: No focal deficits appreciated; strength & sensorium are grossly intact.  PSYCH: No active hallucinations; affect, insight appear within normal limits.    Medical Decision Making       45 MINUTES SPENT BY ME on the date of service doing chart review, history, exam, documentation & further activities per the note.      Data         Imaging results reviewed over the past 24 hrs:   No results found for this or any previous visit (from the past 24 hour(s)).

## 2023-11-14 NOTE — DISCHARGE SUMMARY
ORTHOPAEDIC SURGERY DISCHARGE SUMMARY     Date of Admission: 11/10/2023  Date of Discharge: 11/14/2023  Disposition: Rehab  Staff Physician: David Escamilla MD  Primary Care Provider: Wegener, Joel Daniel Irwin    DISCHARGE DIAGNOSIS:  Displaced fracture of greater trochanter of right femur, subsequent encounter for closed fracture with malunion [S72.111P]    PROCEDURES: Procedure(s):  OPEN REDUCTION INTERNAL FIXATION, FRACTURE, RIGHT  FEMUR, PROXIMAL GREATER TROCHANTER on 11/10/2023    BRIEF HISTORY:  Genaro Ortiz is a 77 y/o male with history of bilateral total hip arthroplasty. He sustained a fall in September 2023 resulting in a displaced periprosthetic greater trochanter fracture. He was admitted for ORIF.      HOSPITAL COURSE:    The patient was admitted following the above listed procedures for pain control and rehabilitation.  Intra-op course complicated by emesis and aspiration requiring intubation. Patient was extubated post op uneventfully. Genaro Ortiz otherwise did well post-operatively. Medicine was consulted post operatively to aid in management of medical co-morbidities. The patient received routine nursing cares and at the time of discharge was medically stable. Vital signs were stable throughout admission. The patient is tolerating a regular diet and is voiding spontaneously. PT/OT recommending rehab for continued therapies. Pain is now controlled on oral medications which will be available on discharge. Stool softeners have been used while taking pain medications to help prevent constipation. Genaro Ortiz is deemed medically safe to discharge.     Antibiotics:  Ancef given periop and 24 hours postop.   DVT prophylaxis:  Aspirin initiated after surgery and will be continued for 4 weeks.   PT Progress:  PT/OT recommending rehab for continued therapies  Pain Meds:  Weaned off all IV pain meds by discharge.  Inpatient Events: Intraop aspiration.     PHYSICAL EXAM:    Patient not examined by  writer, please see exam in progress note dated 11/14/23    FOLLOWUP:    Follow up with Dr. Escamilla at 4 weeks postoperatively.    Future Appointments   Date Time Provider Department Center   11/14/2023 11:00 AM Ilana Anne, PT [facility] [place]   11/15/2023 10:30 AM Maricruz Pedersen, OT [facility] [place]   12/4/2023 12:30 PM Merritt Toney, BETZAIDA [facility]   12/7/2023 10:15 AM Eric Abel MD [facility]   12/8/2023  1:15 PM Valdez Quesada DO [facility]       Orthopaedic Surgery appointments are at the [facility] ([address] [place] [address]). Call [phone]  to schedule a follow-up appointment at this location with your provider.     PLANNED DISCHARGE ORDERS:      Current Discharge Medication List        START taking these medications    Details   acetaminophen (TYLENOL) 325 MG tablet Take 2 tablets (650 mg) by mouth every 4 hours as needed for other (For optimal non-opioid multimodal pain management to improve pain control.)  Qty: 100 tablet, Refills: 0    Associated Diagnoses: Acute post-operative pain      oxyCODONE (ROXICODONE) 5 MG tablet Take 1-2 tablets (5-10 mg) by mouth every 4 hours as needed for pain  Qty: 30 tablet, Refills: 0    Associated Diagnoses: Periprosthetic fracture around internal prosthetic right hip joint, initial encounter (H)      polyethylene glycol (MIRALAX) 17 GM/Dose powder Take 17 g by mouth daily    Associated Diagnoses: Acute post-operative pain      senna-docusate (SENOKOT-S/PERICOLACE) 8.6-50 MG tablet Take 1 tablet by mouth 2 times daily as needed for constipation  Qty: 20 tablet    Associated Diagnoses: Acute post-operative pain           CONTINUE these medications which have NOT CHANGED    Details   Cyanocobalamin (B-12) 1000 MCG TBCR Take 1 tablet by mouth daily  Qty: 30 tablet, Refills: 0    Associated Diagnoses: Vitamin B12 deficiency (non anemic)      fish oil-omega-3 fatty acids 1000 MG capsule Take  "2,000 mg by mouth every morning  Qty: 120 capsule, Refills: 11    Associated Diagnoses: Hyperlipidemia LDL goal <130      pregabalin (LYRICA) 225 MG capsule Take 1 capsule (225 mg) by mouth every evening  Qty: 30 capsule, Refills: 1    Comments: Patient will notify pharmacy when refills needed.  Associated Diagnoses: S/P right hip fracture      simvastatin (ZOCOR) 20 MG tablet TAKE 1 TABLET BY MOUTH EVERYDAY AT BEDTIME  Qty: 90 tablet, Refills: 1    Associated Diagnoses: Hyperlipidemia LDL goal <130      tamsulosin (FLOMAX) 0.4 MG capsule Take 1 capsule (0.4 mg) by mouth daily  Qty: 90 capsule, Refills: 3    Associated Diagnoses: Urinary retention      Vitamin D3 (D 1000) 25 mcg (1000 units) tablet Take 1 tablet (25 mcg) by mouth daily  Qty: 30 tablet, Refills: 0    Associated Diagnoses: Routine general medical examination at a health care facility      aspirin 81 MG EC tablet Take 2 tablets (162 mg) by mouth daily Resume 81 mg daily per orthopedics recommendations  Qty: 60 tablet, Refills: 0    Associated Diagnoses: S/P right hip fracture               Discharge Procedure Orders   General info for SNF   Order Comments: Length of Stay Estimate: Short Term Care: Estimated # of Days <30 Condition at Discharge: Improving Level of care:skilled  Rehabilitation Potential: Good Admission H&P remains valid and up-to-date: Yes Recent Chemotherapy: N/A Use Nursing Home Standing Orders: Yes     Mantoux Instructions   Order Comments: Give two-step Mantoux (PPD) Per Facility Policy {.:[id number]     Incentive Spirometry   Order Comments: Incentive Spirometry 10 times per hour, 4 times per day.     Reason for your hospital stay   Order Comments: You were admitted after your hip surgery.     When to call - Contact Surgeon Team   Order Comments: You may experience symptoms that require follow-up before your scheduled appointment. Refer to the \"Stoplight Tool\" for instructions on when to contact your Surgeon Team if you are concerned " about pain control, blood clots, constipation, or if you are unable to urinate.     When to call - Reach out to Urgent Care   Order Comments: If you are not able to reach your Surgeon Team and you need immediate care, go to the Orthopedic Walk-in Clinic or Urgent Care at your Surgeon's office.  Do NOT go to the Emergency Room unless you have shortness of breath, chest pain, or other signs of a medical emergency.     When to call - Reasons to Call 911   Order Comments: Call 911 immediately if you experience sudden-onset chest pain, arm weakness/numbness, slurred speech, or shortness of breath     Symptoms - Fever Management   Order Comments: A low grade fever can be expected after surgery.  Use acetaminophen (TYLENOL) as needed for fever management.  Contact your Surgeon Team if you have a fever greater than 101.5 F, chills, and/or night sweats.     Symptoms - Constipation management   Order Comments: Constipation (hard, dry bowel movements) is expected after surgery due to the combination of being less active, the anesthetic, and the opioid pain medication.  You can do the following to help reduce constipation:  ~  FLUIDS:  Drink clear liquids (water or Gatorade), or juice (apple/prune).  ~  DIET:  Eat a fiber rich diet.    ~  ACTIVITY:  Get up and move around several times a day.  Increase your activity as you are able.  MEDICATIONS:  Reduce the risk of constipation by starting medications before you are constipated.  You can take Miralax   (1 packet as directed) and/or a stool softener (Senokot 1-2 tablets 1-2 times a day).  If you already have constipation and these medications are not working, you can get magnesium citrate and use as directed.  If you continue to have constipation you can try an over the counter suppository or enema.  Call your Surgeon Team if it has been greater than 3 days since your last bowel movement.     Symptoms - Reduced Urine Output   Order Comments: Changes in the amount of fluids you  drank before and after surgery may result in problems urinating.  It is important to stay well-hydrated after surgery and drink plenty of water. If it has been greater than 8 hours since you have urinated despite drinking plenty of water, call your Surgeon Team.     Activity - Exercises to prevent blood clots   Order Comments: Unless otherwise directed by your Surgeon team, perform the following exercises at least three times per day for the first four weeks after surgery to prevent blood clots in your legs: 1) Point and flex your feet (Ankle Pumps), 2) Move your ankle around in big circles, 3) Wiggle your toes, 4) Walk, even for short distances, several times a day, will help decrease the risk of blood clots.     Order Specific Question Answer Comments   Is discharge order? Yes      Comfort and Pain Management - Pain after Surgery   Order Comments: Pain after surgery is normal and expected.  You will have some amount of pain for several weeks after surgery.  Your pain will improve with time.  There are several things you can do to help reduce your pain including: rest, ice, elevation, and using pain medications as needed. Contact your Surgeon Team if you have pain that persists or worsens after surgery despite rest, ice, elevation, and taking your medication(s) as prescribed. Contact your Surgeon Team if you have new numbness, tingling, or weakness in your operative extremity.     Comfort and Pain Management - Swelling after Surgery   Order Comments: Swelling and/or bruising of the surgical extremity is common and may persist for several months after surgery. In addition to frequent icing and elevation, gentle compressive support with an ACE wrap or tubigrip may help with swelling. Apply compression regularly, removing at least twice daily to perform skin checks. Contact your Surgeon Team if your swelling increases and is NOT associated with an increase in your activity level, or if your swelling increases and is  associated with redness and pain.     Comfort and Pain Management - Cold therapy   Order Comments: Ice can be used to control swelling and discomfort after surgery. Place a thin towel over your operative site and apply the ice pack overtop. Leave ice pack in place for 20 minutes, then remove for 20 minutes. Repeat this 20 minutes on/20 minutes off routine as often as tolerated.     Medication Instructions - Acetaminophen (TYLENOL) Instructions   Order Comments: You were discharged with acetaminophen (TYLENOL) for pain management after surgery. Acetaminophen most effectively manages pain symptoms when it is taken on a schedule without missing doses (every four, six, or eight hours). Your Provider will prescribe a safe daily dose between 3000 - 4000 mg.  Do NOT exceed this daily dose. Most patients use acetaminophen for pain control for the first four weeks after surgery.  You can wean from this medication as your pain decreases.     Medication Instructions - NSAID Instructions   Order Comments: You were discharged with an anti-inflammatory medication for pain management to use in combination with acetaminophen (TYLENOL) and the narcotic pain medication.  Take this medication exactly as directed.  You should only take one anti-inflammatory at a time.  Some common anti-inflammatories include: ibuprofen (ADVIL, MOTRIN), naproxen (ALEVE, NAPROSYN), celecoxib (CELEBREX), meloxicam (MOBIC), ketorolac (TORADOL).  Take this medication with food and water.     Medication Instructions - Opioids - Tapering Instructions   Order Comments: In the first three days following surgery, your symptoms may warrant use of the narcotic pain medication every four to six hours as prescribed. This is normal. As your pain symptoms improve, focus your efforts on decreasing (tapering) use of narcotic medications. The most successful tapering strategy is to first, decrease the number of tablets you take every 4-6 hours to the minimum prescribed.  "Then, increase the amount of time between doses.  For example:  First, taper to   or 1 tablet every 4-6 hours.  Then, taper to   or 1 tablet every 6-8 hours.  Then, taper to   or 1 tablet every 8-10 hours.  Then, taper to   or 1 tablet every 10-12 hours.  Then, taper to   or 1 tablet at bedtime.  The bedtime dose can help with comfort during sleep and is typically the last dose to be discontinued after surgery.     Follow Up Care   Order Comments: Follow-up with your Surgeon Team in 4 weeks for wound check.     Assess heels for pressure points     Shower with wound/dressing covered   Order Comments: You must COVER your dressing or incision with saran wrap (or any other non-permeable covering) to allow the incision to remain dry while showering.  You may shower 3 days after surgery as long as the surgical wound stays dry. Continue to cover your dressing or incision for showering until your first office visit.  You are strictly prohibited from soaking   or submerging the surgical wound underwater.     Medication instructions -  Anticoagulation - aspirin   Order Comments: Take the aspirin as prescribed for a total of four weeks after surgery.  This is given to help minimize your risk of blood clot.     Comfort and Pain Management - LOWER Extremity Elevation   Order Comments: Swelling is expected for several months after surgery. This type of swelling is usually associated with gravity and activity, and can be improved with elevation.   The best way to do this is to get your \"toes above your nose\" by laying down and placing several pillows lengthwise under your calf and heel. When elevating your leg keep your knee completely straight. Perform this elevation as often as possible especially for the first two weeks after surgery.     Medication Instructions - Opioid Instructions (Greater than or equal to 65 years)   Order Comments: You were discharged with an opioid medication (hydromorphone, oxycodone, hydrocodone, or " tramadol). This medication should only be taken for breakthrough pain that is not controlled with acetaminophen (TYLENOL). If you rate your pain less than 3 you do not need this medication.  Pain rating 0-3:  You do not need this medication  Pain rating 4-6:  Take 1/2 tablet every 4-6 hours as needed  Pain rating 7-10:  Take 1 tablet every 4-6 hours as needed  Do not exceed 4 tablets per day     Physical Therapy Adult Consult   Order Comments: Evaluate and treat as clinically indicated.    Reason: Status Post Hip Surgery     Occupational Therapy Adult Consult   Order Comments: Evaluate and treat as clinically indicated.    Reason: Status Post Hip Surgery     Fall precautions     Crutches DME   Order Comments: DME Documentation: Describe the reason for need to support medical necessity: Impaired gait status post hip surgery. I, the undersigned, certify that the above prescribed supplies are medically necessary for this patient and is both reasonable and necessary in reference to accepted standards of medical practice in the treatment of this patient's condition and is not prescribed as a convenience.     Order Specific Question Answer Comments   DME Provider: [place]    Crutch Type: Standard    Crutches Add On: NA    Length of Need: Lifetime      Cane DME   Order Comments: DME Documentation: Describe the reason for need to support medical necessity: Impaired gait status post hip surgery. I, the undersigned, certify that the above prescribed supplies are medically necessary for this patient and is both reasonable and necessary in reference to accepted standards of medical practice in the treatment of this patient's condition and is not prescribed as a convenience.     Order Specific Question Answer Comments   DME Provider: [place]    Cane Type: Single Tip    Reminder: Patient can typically get 1 every 5 years      Walker DME   Order Comments: : DME Documentation: Describe the reason for need to support  medical necessity: Impaired gait status post hip surgery. I, the undersigned, certify that the above prescribed supplies are medically necessary for this patient and is both reasonable and necessary in reference to accepted standards of medical practice in the treatment of this patient's condition and is not prescribed as a convenience.     Order Specific Question Answer Comments   DME Provider: [place]    Walker Type: Standard (2 Wheel)    Accessories: N/A      Diet   Order Comments: Follow this diet upon discharge: Orders Placed This Encounter      Advance Diet as Tolerated: Regular Diet Adult     Order Specific Question Answer Comments   Is discharge order? Yes        JAC MURRAY PA-C  11/14/2023 10:55 AM   Orthopaedic Surgery

## 2023-11-14 NOTE — PHARMACY-MEDICATION REGIMEN REVIEW
Pharmacy Medication Regimen Review  Genaro Ortiz is a 78 year old male who is currently in the Acute Rehab Unit.    Assessment: All medications have an appropriate indications, durations and no unnecessary use was found    Plan:    Continue current medication regimen    Attending provider will be sent this note for review.  If there are any emergent issues noted above, pharmacist will contact provider directly by phone.      Pharmacy will periodically review the resident's medication regimen for any PRN medications not administered in > 72 hours and discontinue them. The pharmacist will discuss gradual dose reductions of psychopharmacologic medications with interdisciplinary team on a regular basis.    Please contact pharmacy if the above does not answer specific medication questions/concerns.    Background:  A pharmacist has reviewed all medications and pertinent medical history today.  Medications were reviewed for appropriate use and any irregularities found are listed with recommendations.      Current Facility-Administered Medications:     acetaminophen (TYLENOL) tablet 650 mg, 650 mg, Oral, Q4H PRN, Emma Gutierrez PA    aspirin EC tablet 81 mg, 81 mg, Oral, BID, Emma Gutierrez PA    [START ON 11/15/2023] cyanocobalamin (VITAMIN B-12) tablet 1,000 mcg, 1,000 mcg, Oral, Daily, Emma Gutierrez PA    naloxone (NARCAN) injection 0.2 mg, 0.2 mg, Intravenous, Q2 Min PRN **OR** naloxone (NARCAN) injection 0.4 mg, 0.4 mg, Intravenous, Q2 Min PRN **OR** naloxone (NARCAN) injection 0.2 mg, 0.2 mg, Intramuscular, Q2 Min PRN **OR** naloxone (NARCAN) injection 0.4 mg, 0.4 mg, Intramuscular, Q2 Min PRN, Reilly Angel MD    oxyCODONE (ROXICODONE) tablet 5 mg, 5 mg, Oral, Q4H PRN, Emma Gutierrez PA    [START ON 11/15/2023] polyethylene glycol (MIRALAX) Packet 17 g, 17 g, Oral, Daily, Emma Gutierrez PA    pregabalin (LYRICA) capsule 225 mg, 225 mg, Oral, QPM, Emma Gutierrez PA     senna-docusate (SENOKOT-S/PERICOLACE) 8.6-50 MG per tablet 1 tablet, 1 tablet, Oral, BID PRN, Emma Gutierrez PA, 1 tablet at 11/14/23 1451    simvastatin (ZOCOR) tablet 20 mg, 20 mg, Oral, At Bedtime, Emma Gutierrez PA    [START ON 11/15/2023] tamsulosin (FLOMAX) capsule 0.4 mg, 0.4 mg, Oral, Daily, Emma Gutierrez PA    [START ON 11/15/2023] Vitamin D3 (CHOLECALCIFEROL) tablet 25 mcg, 25 mcg, Oral, Daily, Emma Gutierrez PA  No current outpatient prescriptions on file.   PMH: hypertension, dyslipidemia, MGUS, obesity, GERD, arthritis, prostate cancer

## 2023-11-14 NOTE — PLAN OF CARE
Goal Outcome Evaluation:    Pt admitted this AM shift, arrived to the unit around 1330H. VSS, pain verbalized upon movement, but denied when resting in bed. Admission orientation done. Endorsed accordingly to incoming NOD.

## 2023-11-14 NOTE — PLAN OF CARE
Goal Outcome Evaluation:       Pt is Aox4. A2 with wheelchair and gait belt- still awaiting therapy eval. Continent of bowel, had medium bowel movement this shift. With indwelling granados catheter in place, draining clear, dark yellow urine. With surgical incision on right hip, dressing in place, clean and intact. Denied pain. Pneumatic compression device applied on left calf. With Prafo boot on at right lower extremity. Uses call light appropriately. Continue POC

## 2023-11-14 NOTE — PHARMACY-ADMISSION MEDICATION HISTORY
Pharmacist Admission Medication History    Admission Medication History completed by pharmacist, Rj Billingsley on 11/10/2023. Please see Pharmacy - Admission Medication History note under previous encounter at [facility]. for information regarding prior to admission medications.    Roselia Keller, PharmD   Clinical Pharmacist

## 2023-11-14 NOTE — PROGRESS NOTES
"  VS: BP (!) 169/82   Pulse 92   Temp 98.2  F (36.8  C)   Resp 16   Ht 1.753 m (5' 9\")   Wt 92 kg (202 lb 13.2 oz)   SpO2 96%   BMI 29.95 kg/m       O2: SpO2 > 90% on RA. Denies SOB/chest pain.    Output: Manzo catheter in place with adequate output.    Last BM: 11/13, BS active in all four quadrants.    Activity: PWB on RLE (50%), walker and GB, asst x 2. R boot in place.    Skin: RLE incision    Pain: Pain with activity, controlled with scheduled tylenol and PRN oxycodone    CMS: A&Ox4, denies N/T.    Dressing: RLE surgical dressing CDI    Diet: Regular    LDA: R PIV SL    Equipment: IV pole, walker, GB    Plan: Discharge to TCU pending placement    Additional Info:  Waiting on order clarification from rehab regarding boot on R foot and if it should be removed when the patient is out of bed, as order states to only be removed for skin inspections when safely seated/laying down.       DISCHARGE SUMMARY    Pt discharging to: [facility]  Transportation: [facility]   AVS given and discussed: discharge to TCU.   Stoplight Tool given and discussed: Yes, no further questions.  Medications given: No, going to TCU.   Belongings returned: Yes, ensured all belongings packed and sent with pt. No items in security.   Comments:         "

## 2023-11-14 NOTE — CONSULTS
This writer worked with pt during ARU stay 2023-Oct 2023.     Social Work: Initial Assessment with Discharge Plan    Patient Name: Genaro Ortiz  : 1945  Age: 78 year old  MRN: [id number]  Completed assessment with: Chart review and interview with patient   Admitted to ARU: 2023    Presenting Information   Date of SW assessment: 2023  Health Care Directive: Copy in Chart  Primary Health Care Agent: Patient/self   Secondary Health Care Agent: Ac Ibarra (friends)    Living Situation: Lives alone in an apt. 3rd floor. 0 RAMÓN. Elevator accessible. Elevated toilet seat, tub/shower combination with hand held shower, and extended tub bench.   Previous Functional Status: Mod I at baseline with ADLs. Pt had HHOT/PT/RN PTA and in the past. 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 Pt has Meals of Wheels. B hip replacement 2023. Patient has license, but has not driven for several months due to his his injury. Patient currently utilizes assistance from a friend for rides, or ride share services ([employer], public transportation). Recently applied for [employer] and in place now. Was on ARU 2023-Oct 2023. Was given resources on life alert, apple watch, and senior linkage line during last ARU stay.   DME available: raised toilet seat, tub bench, walker, rolling (4WW), reacher, sock aid, long handle shoe horn, hand held shower head   Patient and family understanding of hospitalization: Appropriate.   Cultural/Language/Spiritual Considerations: 77 y/o male, english-speaking, , and practicing Spiritism.      Physical Health  Reason for admission: Orthopaedic Disorders  Unilateral Hip Fracture - R hip fracture; now s/p ORIF      Justification for Acute Inpatient Rehabilitation  Genaro Ortiz is a 78 year old man w/ PMH of OA s/p bilateral hip arthroplasties 2023, HTN, prostate cancer, HLE, anemia and recent R displaced periprosthetic right greater trochanter fracture in 2023  after a fall which was managed non-operatively.  On 10/30, during orthopedic follow-up, pt was noted to have displaced unstable periprosthetic right greater trochanter fracture. He was admitted for ORIF of R greater trochanter fracture on 11/10/23. His post-op course complicated by intra-op emesis and aspiration requiring intubation, as well as urinary retention.  He has also required medical mgmt of his pain.   He is now medically stable and ready for transfer to acute inpatient rehabilitation.      The patient requires transfer to [facility] for intensive therapies not available in a lesser level of care including PT/OT, ongoing medical management at least 3 days per week, and rehabilitative nursing care. Most recently his baseline is mod IND with a combination of WW and w/c based mobility w/ limited WB thru RLE d/t fracture.  Currently he is needing Ax1-2 for safe mobility and functional transfers. Patient requires an intensive inpatient rehab program to address the following acute impairments:impaired activity tolerance, impaired balance, impaired strength, impaired weight shifting, pain, and RLE partial weightbearing status (50%). Quinton is an excellent ARC candidate given his motivation, participation and accessible home environment.     Provider Information   Primary Care Physician:Wegener, Joel Daniel Irwin   [facility] will schedule PCP apt at discharge.   : Ava Tian RN, BSN, PHN  Primary Care / Care Coordinator   E-mail: [email or web address]   PH: [phone]    Mental Health/Chemical Dependency:   Diagnosis: Pt appears anxious.   Alcohol/Tobacco/Narcotis: No concerns reported.   Support/Services in Place: None reported.   Services Needed/Recommended: [place] and Health Psychology support while on ARU available.   Sexuality/Intimacy: Not discussed     Support System  Marital Status: Single.   Family support: No family support reported.   Other support available: Friends, neighbors in  "building, and [employer] community.     Community Resources  Current in home services: [facility] Information below.   Previous services: [facility] July 2023. ARU Sept-Oct 2023.     Home Health Care:   [facility] PH: [phone], Fax: [phone]   Nurse, physical therapy, occupational therapy, and home health aide     Financial/Employment/Education  Employment Status: Retired.   Income Source: SSI  Education: Not discussed   Financial Concerns:  None reported   Insurance: [facility] Medicare     Discharge Plan   Patient and family discharge goal: TBD, pending progress  Provided Education on discharge plan: Evaluations and discharge recommendations pending.   Patient agreeable to discharge plan:  Pending further discussion. Evaluations and discharge recommendations pending.   Provided education and attained signature for Medicare IM and IRF Patient Rights and Privacy Information provided to patient : YES  Provided patient with [facility] Resources: N/A  Barriers to discharge: Lives alone, multiple falls at home.     Discharge Recommendations   Disposition: See above   Transportation Needs: Patient, family/friends, paid transport, insurance transport (if applicable)     Additional comments   Discharge TBD, ELOS 12 days. SW will remain available and continue to follow as needs arise.     ----------------------------------------------------------------------------------------------------------  ZAHRA Pain Assessment    Pain Effect on Sleep  Over the past 5 days, how much of the time has pain made it hard for you to sleep at night?\"    1. Rarely or not at all    Pain Interference with Therapy Activities  \"Over the past 5 days, how often have you limited your participation in rehabilitation therapy sessions due to pain?\"  1. Rarely or not at all    Pain Interference with Day-to-Day Activities  \"Over the past 5 days, how often have you limited your day-to-day activities (excluding " "rehabilitation therapy sessions) because of pain?\"  1. Rarely or not at all  ----------------------------------------------------------------------------------------------------------    [facility]   [address] [place] [address], [address] [place] [address]  Phone: [phone], Fax: [phone], Pager: [phone]             "

## 2023-11-14 NOTE — PLAN OF CARE
Occupational Therapy Discharge Summary    Reason for therapy discharge:    Discharged to acute rehabilitation facility.    Progress towards therapy goal(s). See goals on Care Plan in [facility] electronic health record for goal details.  Goals partially met.  Barriers to achieving goals:   discharge from facility.    Therapy recommendation(s):    Continued therapy is recommended.  Rationale/Recommendations:  Pt. Still below baseline with functional mobility and activity tolerance, Pt. Would benefit from continued therapy at ARU to progress ind with ADLs and activity tolerance.

## 2023-11-14 NOTE — H&P
[facility]   Acute Rehabilitation Unit  Admission History and Physical    CHIEF COMPLAINT   S/p right hip ORIF     HISTORY OF PRESENT ILLNESS  Genaro Ortiz is a 78 year old man with past medical history of OA s/p bilateral hip  arthroplasties 7/2023, HTN, prostate cancer, MGUS, GERD, HLD, and anemia who was hospitalized with right hip fracture in setting of mechanical fall 9/16/23 seen by ortho managed non-operatively, admitted for rehab 9/20-10/4/23.  Was discharged home, with home care therapies and followed up with ortho 10/30/23.  Imaging revealed displaced fracture with recommendation for ORIF, patient verbalized reluctance to undergo surgery but ultimately underwent Open reduction internal fixation of right femur proximal greater trochanter per DR. Escamilla 11/10/23.     Had intraoperative emesis LMA discontinued and patient was intubated he remained hemodynamically stable. CXR was negative.  Hospitalist consulted for medical management, with mild post op anemia, and urinary retention with granados catheter in place.     Functionally noted to have impaired activity tolerance, impaired balance, impaired strength, and 50% weight bearing.  He is currently min-max assist for sit to stand depending on bed height.  Ambulating up to 12 fee with walker and cga of 2.  Scored 25/30 on MoCA. Was undergoing therapy with home care prior to admission.      On arrival to rehab reports he is doing well.  Denies n/v/d, eating ok, pain overall tolerable at rest hurts at hip with activity.  Denies sob, fevers, dizziness, and chest pain.  He has granados catheter in place and historically has issues with post operative retention.  Motivated to work with therapy and return home with home care.      PAST MEDICAL HISTORY   Reviewed and updated in Epic.  Past Medical History:   Diagnosis Date    Adenocarcinoma of prostate (H) 5/27/2008    Arthritis     Esophageal reflux     Neuropathy     Pure  hypercholesterolemia     Unspecified essential hypertension        SURGICAL HISTORY  Reviewed and updated in Epic.  Past Surgical History:   Procedure Laterality Date    ABDOMEN SURGERY  1952    Appendectomy    APPENDECTOMY  1952    ARTHROPLASTY HIP Right 7/14/2023    Procedure: ARTHROPLASTY, HIP, TOTAL RIGHT;  Surgeon: David Escamilla MD;  Location: [facility] OR    ARTHROPLASTY HIP Left 7/21/2023    Procedure: ARTHROPLASTY, HIP, TOTAL LEFT;  Surgeon: David Escamilla MD;  [facility] OR    CATARACT IOL, RT/LT Bilateral 03/12    COLONOSCOPY  2017    COLONOSCOPY N/A 5/18/2023    Procedure: colonoscopy;  Surgeon: Ayse Sears MD;  Location:  GI    GENITOURINARY SURGERY      HERNIORRHAPHY INGUINAL  5/15/2014    Procedure: HERNIORRHAPHY INGUINAL;  Surgeon: Evens Jefferson MD;  Location: [facility] OR    OPEN REDUCTION INTERNAL FIXATION FEMUR PROXIMAL Right 11/10/2023    Procedure: OPEN REDUCTION INTERNAL FIXATION, FRACTURE, RIGHT  FEMUR, PROXIMAL GREATER TROCHANTER;  Surgeon: David Escamilla MD;  Location: [facility] OR    PROSTATE SURGERY  2007       SOCIAL HISTORY  Reviewed and updated in Epic.  Living situation: lives alone condo- WC accessible- entrance though will need to ambulate short distances to access bathroom  Family support: limited family support  Vocational History: retired  Tobacco use: denies  Alcohol use: denies  Illicit drug use: denies  Social History     Socioeconomic History    Marital status: Single     Spouse name: Not on file    Number of children: Not on file    Years of education: Not on file    Highest education level: Not on file   Occupational History    Not on file   Tobacco Use    Smoking status: Never     Passive exposure: Never    Smokeless tobacco: Never    Tobacco comments:     Never smoked.   Vaping Use    Vaping Use: Never used   Substance and Sexual Activity    Alcohol use: Not Currently    Drug use: No    Sexual activity: Never   Other Topics Concern    Parent/sibling w/ CABG, MI or  angioplasty before 65F 55M? No   Social History Narrative    Social Documentation:        Balanced Diet: YES    Calcium intake: 1-2 per day    Caffeine: 2 per day    Exercise:  type of activity gym and work is physical;  2 times per week    Sunscreen: Yes, when needed    Seatbelts:  Yes    Self Breast Exam:  na    Self Testicular Exam: Yes    Physical/Emotional/Sexual Abuse: No    Do you feel safe in your environment? Yes        Cholesterol screen up to date:  6/5/2009                                                                                                             Latest Ref Rng                              CHOLESTEROL                                                 0 - 200 mg/dL                177                     TRIGLYCERIDES                                               0 - 150 mg/dL                177 (H)                 HDL                                                         40 - 110 mg/dL               39 (L)                  LDL CHOLESTEROL CALCULATED                                  0 - 129 mg/dL                102                     VLDL-CHOLESTEROL                                            0 - 30 mg/dL                 35 (H)                  CHOLESTEROL/HDL RATIO                                       0.0 - 5.0                    4.5                         Eye Exam up to date: Yes    Dental Exam up to date: Yes    Pap smear up to date: Does Not Apply    Mammogram up to date: Does Not Apply    Dexa Scan up to date: Does Not Apply    Colonoscopy up to date: Yes    Immunizations up to date: Yes    Glucose screen if over 40:  Component                        Latest Ref Rng               10/31/2008              6/5/2009                GLUCOSE                          60 - 99 mg/dL                106 (H)                 111 (H)                                                      Social Determinants of Health     Financial Resource Strain: Low Risk  (10/12/2023)    Financial Resource Strain      Within the past 12 months, have you or your family members you live with been unable to get utilities (heat, electricity) when it was really needed?: No   Food Insecurity: Low Risk  (10/12/2023)    Food Insecurity     Within the past 12 months, did you worry that your food would run out before you got money to buy more?: No     Within the past 12 months, did the food you bought just not last and you didn t have money to get more?: No   Transportation Needs: Low Risk  (10/12/2023)    Transportation Needs     Within the past 12 months, has lack of transportation kept you from medical appointments, getting your medicines, non-medical meetings or appointments, work, or from getting things that you need?: No   Physical Activity: Not on file   Stress: Not on file   Social Connections: Not on file   Interpersonal Safety: Low Risk  (10/13/2023)    Interpersonal Safety     Do you feel physically and emotionally safe where you currently live?: Yes     Within the past 12 months, have you been hit, slapped, kicked or otherwise physically hurt by someone?: No     Within the past 12 months, have you been humiliated or emotionally abused in other ways by your partner or ex-partner?: No   Housing Stability: Low Risk  (10/12/2023)    Housing Stability     Do you have housing? : Yes     Are you worried about losing your housing?: No       FAMILY HISTORY  Reviewed and updated in Epic.  Family History   Problem Relation Age of Onset    Diabetes Mother     Gastrointestinal Disease Mother         pancreas removed    Breast Cancer Mother     Osteoporosis Father     Obesity Father     Glaucoma Other     Macular Degeneration No family hx of     Hypertension No family hx of     Anesthesia Reaction No family hx of     Deep Vein Thrombosis (DVT) No family hx of          PRIOR FUNCTIONAL HISTORY   Pt was independent with all ADLs/IADLs, transfers, mobility and gait.  Was using walker and wheel chair in home had ongoing home care and was  "sponge bathing    MEDICATIONS  Scheduled meds  Medications Prior to Admission   Medication Sig Dispense Refill Last Dose    acetaminophen (TYLENOL) 325 MG tablet Take 2 tablets (650 mg) by mouth every 4 hours as needed for other (For optimal non-opioid multimodal pain management to improve pain control.) 100 tablet 0     aspirin 81 MG EC tablet Take 2 tablets (162 mg) by mouth daily Resume 81 mg daily per orthopedics recommendations 60 tablet 0     Cyanocobalamin (B-12) 1000 MCG TBCR Take 1 tablet by mouth daily 30 tablet 0     fish oil-omega-3 fatty acids 1000 MG capsule Take 2,000 mg by mouth every morning 120 capsule 11     oxyCODONE (ROXICODONE) 5 MG tablet Take 1-2 tablets (5-10 mg) by mouth every 4 hours as needed for pain 30 tablet 0     polyethylene glycol (MIRALAX) 17 GM/Dose powder Take 17 g by mouth daily       pregabalin (LYRICA) 225 MG capsule Take 1 capsule (225 mg) by mouth every evening 30 capsule 1     senna-docusate (SENOKOT-S/PERICOLACE) 8.6-50 MG tablet Take 1 tablet by mouth 2 times daily as needed for constipation 20 tablet      simvastatin (ZOCOR) 20 MG tablet TAKE 1 TABLET BY MOUTH EVERYDAY AT BEDTIME 90 tablet 1     tamsulosin (FLOMAX) 0.4 MG capsule Take 1 capsule (0.4 mg) by mouth daily 90 capsule 3     Vitamin D3 (D 1000) 25 mcg (1000 units) tablet Take 1 tablet (25 mcg) by mouth daily 30 tablet 0        ALLERGIES     Allergies   Allergen Reactions    Penicillins Other (See Comments)     blotches         REVIEW OF SYSTEMS  A 10 point ROS was performed and negative unless otherwise noted in HPI.       PHYSICAL EXAM  VITAL SIGNS:  BP (!) 154/85 (BP Location: Right arm)   Pulse 100   Temp 97.9  F (36.6  C) (Oral)   Resp 18   Ht 1.753 m (5' 9\")   Wt 96.8 kg (213 lb 6.5 oz)   SpO2 99%   BMI 31.51 kg/m    BMI:  Estimated body mass index is 29.95 kg/m  as calculated from the following:    Height as of 11/10/23: 1.753 m (5' 9\").    Weight as of 11/10/23: 92 kg (202 lb 13.2 oz). "     General: awake alert nad  HEENT: mmm eomi  Pulmonary: non labored clear  Cardiovascular: rrr  Abdominal: soft non distended non tender  Extremities: warm, well perfused, no significant edema in bilateral lower extremities, no tenderness in calves   MSK/neuro:   Mental Status:  alert and oriented    Cranial Nerves: grossly normal    Sensory: Normal to light touch in bilateral upper and lower extremities    Strength: 5/5 in all muscle groups of the upper extremities    HF  KE  DF  EHL  PF   R  2 3 4- 4- 4-  L  4- 4-- 4 4 4   Abnormal movements: None    Speech:clear coherent   Cognition:linear and logical to admission questions   Gait: not tested  Skin: right hip dressing clean dry and intact. No other significant bruising or rashes noted on exposed skin      LABS  CBC RESULTS:   Recent Labs   Lab Test 11/12/23  1239 11/12/23  0610 11/11/23  0748 10/13/23  1321 10/02/23  0755 09/25/23  0621   WBC  --   --   --  6.3 5.6 5.8   RBC  --   --   --  4.25* 3.51* 3.06*   HGB 9.8* 10.0* 10.7* 11.6* 9.8* 8.5*   HCT 30.2*  --   --  35.9* 30.1* 26.7*   MCV  --   --   --  85 86 87   MCH  --   --   --  27.3 27.9 27.8   MCHC  --   --   --  32.3 32.6 31.8   RDW  --   --   --  15.3* 15.6* 14.9   PLT  --   --   --  317 292 245     Last Basic Metabolic Panel:  Recent Labs   Lab Test 11/12/23  0606 11/10/23  1028 10/13/23  1321 10/02/23  0755 09/25/23  0621   NA  --   --  135 142 139   POTASSIUM  --   --  3.9 3.9 4.1   CHLORIDE  --   --  100 109* 106   CO2  --   --  21* 21* 23   ANIONGAP  --   --  14 12 10   * 111* 111* 103* 103*   BUN  --   --  28.7* 21.6 22.1   CR  --   --  0.99 0.84 0.86   GFRESTIMATED  --   --  79 90 90   MAX  --   --  9.7 8.8 8.9           IMPRESSION/PLAN:  Genaro Ortiz is a 78 year old man with past medical history of MGUS, HTN, HLD, prostate cancer, and OA s/p bilateral hip arthroplasties 7/2023 complicated by periprosthetic fracture 9/2023 ultimately underwent ORIF 11/10/23.  Concern for aspiration  during operation, otherwise course complicated by urinary retention, and acute anemia.  Functionally noted to have impaired strength, impaired balance, impaired activity tolerance, with 50% right lower extremity weight bearing.  Admitted to rehab 11/14/23.       Admission to acute inpatient rehab right hip fracture s/p ORIF.    Impairment group code: 08.11      PT, OT 90 minutes of each on a daily basis, in addition to rehab nursing and close management of physiatrist.      Impairment of ADL's: Noted to have impaired strength, impaired activity tolerance, and impaired balance leading to decreased ability to independently complete ADL's.  Will benefit from ongoing OT with goal for MOD I with basic ADLs.     Impairment of mobility:  Noted to have impaired strength, impaired activity tolerance, and impaired balance leading to decreased mobility.  Will benefit from ongoing PT with goal for THIEN with basic mobility.     Medical Conditions    ORIF right greater trochanter fracture 11/10  Per Dr. Escamilla  -asa 81 mg bid x 4 weeks  -RLE 50% weightbearing  -Right AFO to prevent external rotation/ no active abduction  -continue PT/OT- had home care prior to admission  -follow up Dr. Escamilla in 4 weeks  -dressing in place through 11/17 then open to air- ok to shower- keep incision clean and dry   -continue scheduled tylenol, robaxin prn, oxy prn- taper off, lyrica (prior to admission 225 at bedtime, reduce to 100 mg at bedtime- not consistently taking at home)- monitor    History of prostate cancer  History of urinary retention  -continue flomax  -granados in place on ARU arrival trial of void in upcoming days    Acute on Chronic Anemia  Hgb 9.8 11/12  -trend    HLD  -continue statin    Adjustment to disability:  Clinical psychology to eval and treat as indicated  FEN: reg  Bowel: monitor  Bladder: granados-   DVT Prophylaxis: asa 81 mg bid  GI Prophylaxis: none  Code: full confirmed on admission  Disposition: goal for home  ELOS:  ~ 12  days.  Rehab prognosis:  fair  Follow up Appointments on Discharge: pcp ortho       discussed with Dr. Falk , PM&R staff physician     Emma Gutierrez PA-C  Rehab Service

## 2023-11-14 NOTE — PLAN OF CARE
Physical Therapy Discharge Summary    Reason for therapy discharge:    Discharged to acute rehabilitation facility.    Progress towards therapy goal(s). See goals on Care Plan in [facility] electronic health record for goal details.  Goals partially met.  Barriers to achieving goals:   discharge from facility.    Therapy recommendation(s):    Continued therapy is recommended.  Rationale/Recommendations:  continue to progress return to PLOF, focus on strength, balance, and adherence to post op WBing restrictions.

## 2023-11-15 ENCOUNTER — APPOINTMENT (OUTPATIENT)
Dept: OCCUPATIONAL THERAPY | Facility: CLINIC | Age: 78
DRG: 561 | End: 2023-11-15
Attending: PHYSICAL MEDICINE & REHABILITATION
Payer: COMMERCIAL

## 2023-11-15 ENCOUNTER — APPOINTMENT (OUTPATIENT)
Dept: PHYSICAL THERAPY | Facility: CLINIC | Age: 78
DRG: 561 | End: 2023-11-15
Attending: PHYSICAL MEDICINE & REHABILITATION
Payer: COMMERCIAL

## 2023-11-15 PROCEDURE — 97165 OT EVAL LOW COMPLEX 30 MIN: CPT | Mod: GO | Performed by: OCCUPATIONAL THERAPIST

## 2023-11-15 PROCEDURE — 999N000125 HC STATISTIC PATIENT MED CONFERENCE < 30 MIN: Performed by: OCCUPATIONAL THERAPIST

## 2023-11-15 PROCEDURE — 99231 SBSQ HOSP IP/OBS SF/LOW 25: CPT | Mod: FS | Performed by: PHYSICIAN ASSISTANT

## 2023-11-15 PROCEDURE — 999N000150 HC STATISTIC PT MED CONFERENCE < 30 MIN

## 2023-11-15 PROCEDURE — 97535 SELF CARE MNGMENT TRAINING: CPT | Mod: GO | Performed by: OCCUPATIONAL THERAPIST

## 2023-11-15 PROCEDURE — 128N000003 HC R&B REHAB

## 2023-11-15 PROCEDURE — 250N000013 HC RX MED GY IP 250 OP 250 PS 637: Performed by: PHYSICIAN ASSISTANT

## 2023-11-15 PROCEDURE — 97162 PT EVAL MOD COMPLEX 30 MIN: CPT | Mod: GP

## 2023-11-15 PROCEDURE — 97530 THERAPEUTIC ACTIVITIES: CPT | Mod: GP

## 2023-11-15 RX ORDER — LIDOCAINE HYDROCHLORIDE 20 MG/ML
JELLY TOPICAL EVERY 4 HOURS PRN
Status: DISCONTINUED | OUTPATIENT
Start: 2023-11-15 | End: 2023-11-21 | Stop reason: HOSPADM

## 2023-11-15 RX ADMIN — CYANOCOBALAMIN TAB 1000 MCG 1000 MCG: 1000 TAB at 07:32

## 2023-11-15 RX ADMIN — SIMVASTATIN 20 MG: 20 TABLET, FILM COATED ORAL at 20:05

## 2023-11-15 RX ADMIN — TAMSULOSIN HYDROCHLORIDE 0.4 MG: 0.4 CAPSULE ORAL at 07:32

## 2023-11-15 RX ADMIN — ACETAMINOPHEN 975 MG: 325 TABLET, FILM COATED ORAL at 20:05

## 2023-11-15 RX ADMIN — Medication 25 MCG: at 07:32

## 2023-11-15 RX ADMIN — PREGABALIN 100 MG: 100 CAPSULE ORAL at 20:05

## 2023-11-15 RX ADMIN — POLYETHYLENE GLYCOL 3350 17 G: 17 POWDER, FOR SOLUTION ORAL at 07:31

## 2023-11-15 RX ADMIN — ACETAMINOPHEN 975 MG: 325 TABLET, FILM COATED ORAL at 13:12

## 2023-11-15 RX ADMIN — ASPIRIN 81 MG: 81 TABLET, COATED ORAL at 20:05

## 2023-11-15 RX ADMIN — ACETAMINOPHEN 975 MG: 325 TABLET, FILM COATED ORAL at 07:31

## 2023-11-15 RX ADMIN — ASPIRIN 81 MG: 81 TABLET, COATED ORAL at 07:32

## 2023-11-15 ASSESSMENT — ACTIVITIES OF DAILY LIVING (ADL)
ADLS_ACUITY_SCORE: 34
ADLS_ACUITY_SCORE: 32
ADLS_ACUITY_SCORE: 34
ADLS_ACUITY_SCORE: 34
ADLS_ACUITY_SCORE: 32
ADLS_ACUITY_SCORE: 32
ADLS_ACUITY_SCORE: 34
BADLS,_PREVIOUS_FUNCTIONAL_LEVEL: USES DEVICE OR EQUIPMENT
IADLS,_PREVIOUS_FUNCTIONAL_LEVEL: PARTIAL ASSISTANCE
ADLS_ACUITY_SCORE: 34
BADLS,_PREVIOUS_FUNCTIONAL_LEVEL: USES DEVICE OR EQUIPMENT
ADLS_ACUITY_SCORE: 34
PREVIOUS_RESPONSIBILITIES: MEDICATION MANAGEMENT;FINANCES
ADLS_ACUITY_SCORE: 34
IADLS,_PREVIOUS_FUNCTIONAL_LEVEL: PARTIAL ASSISTANCE

## 2023-11-15 NOTE — PROGRESS NOTES
11/15/23 1100   Appointment Info   Signing Clinician's Name / Credentials (OT) leeann arias, otr/l   Living Environment   People in Home alone   Current Living Arrangements condominium   Transportation Anticipated family or friend will provide   Living Environment Comments Pt lives in a single level condo and does not have to do any stairs. Pt has a HH toilet with rails over it and a gb, tub w/ gb and ETB, and sleeps in a regular bed without bed rails.   Self-Care   Usual Activity Tolerance moderate   Current Activity Tolerance fair   Regular Exercise No   Equipment Currently Used at Home grab bar, toilet;grab bar, tub/shower;raised toilet seat;tub bench;walker, standard;walker, rolling;wheelchair, manual  (bariatric front wheel walker)   Fall history within last six months yes   Number of times patient has fallen within last six months 1   Activity/Exercise/Self-Care Comment Pt is mod IND at baseline in all basic ADLs.   Instrumental Activities of Daily Living (IADL)   Previous Responsibilities medication management;finances   IADL Comments Pt gets HH and therapies 1x/week, gets rides from friends or uses metro mobility, gets meals on wheels or makes micromave meals   Previous Level of Function/Home Environm   Bathing, Previous Functional Level uses device or equipment   Grooming, Previous Functional Level independent  (seated only)   Dressing, Previous Functional Level uses device or equipment   Eating/Feeding, Previous Functional Level independent   Toileting, Previous Functional Level uses device or equipment   BADLs, Previous Functional Level uses device or equipment   IADLs, Previous Functional Level partial assistance   Bed Mobility, Previous Functional Level independent   Transfers, Previous Functional Level uses device or equipment   Household Ambulation, Previous Functional Level uses device or equipment   Stairs, Previous Functional Level not applicable (see comment)  (no stairs needed to do at  condo)   Community Ambulation, Previous Functional Level uses wheelchair   Functional Cognition, Previous Functional Level intact   Previous Level of Function mod IND in ADLs and basic funct mob, cog intact, no vision concerns   General Information   Onset of Illness/Injury or Date of Surgery 11/10/23   Referring Physician Reilly Angel MD   Patient/Family Therapy Goal Statement (OT) To get better and walk again   Additional Occupational Profile Info/Pertinent History of Current Problem Genaro Ortiz is a 78 year old man with past medical history of OA s/p bilateral hip  arthroplasties 7/2023, HTN, prostate cancer, MGUS, GERD, HLD, and anemia who was hospitalized with right hip fracture in setting of mechanical fall 9/16/23 seen by ortho managed non-operatively, admitted for rehab 9/20-10/4/23.  Was discharged home, with home care therapies and followed up with ortho 10/30/23.  Imaging revealed displaced fracture with recommendation for ORIF, patient verbalized reluctance to undergo surgery but ultimately underwent Open reduction internal fixation of right femur proximal greater trochanter per DR. Escamilla 11/10/23.      Had intraoperative emesis LMA discontinued and patient was intubated he remained hemodynamically stable. CXR was negative.  Hospitalist consulted for medical management, with mild post op anemia, and urinary retention with granados catheter in place.      Functionally noted to have impaired activity tolerance, impaired balance, impaired strength, and 50% weight bearing.  He is currently min-max assist for sit to stand depending on bed height.  Ambulating up to 12 fee with walker and cga of 2.  Scored 25/30 on MoCA. Was undergoing therapy with home care prior to admission.   Existing Precautions/Restrictions no active hip ABD;weight bearing;fall;90 degree hip flexion   Right Lower Extremity (Weight-bearing Status) partial weight-bearing (PWB)   General Observations and Info Pt sitting in w/c. He  is alert, oriented, and able to fully direct his own care   Cognitive Status Examination   Orientation Status orientation to person, place and time   Cognitive Status Comments cog appears intact, oriented x4   Visual Perception   Visual Acuity wears glasses 24/7, no vision changes   Sensory   Sensory Comments no reports of tingling/numbness   Pain Assessment   Patient Currently in Pain No   Posture   Posture kyphosis   Range of Motion Comprehensive   Comment, General Range of Motion Bilateral UE are WFL   Strength Comprehensive (MMT)   Comment, General Manual Muscle Testing (MMT) Assessment mild weakness at 4/5 in B bicep/triceps   Coordination   Upper Extremity Coordination No deficits were identified   Fine Motor Coordination WNL   Clinical Impression   Criteria for Skilled Therapeutic Interventions Met (OT) Yes, treatment indicated   OT Diagnosis Impaired ADLs and IADLs, RLE WB restrictions   OT Problem List-Impairments impacting ADL problems related to;activity tolerance impaired;balance;flexibility;mobility;strength;post-surgical precautions   ADL comments/analysis Below baseline for ADLs/IADLs   Assessment of Occupational Performance 1-3 Performance Deficits   Planned Therapy Interventions (OT) ADL retraining;IADL retraining;balance training;bed mobility training;groups;strengthening;transfer training;home program guidelines;progressive activity/exercise   Clinical Decision Making Complexity (OT) problem focused assessment/low complexity   Risk & Benefits of therapy have been explained evaluation/treatment results reviewed   Clinical Impression Comments Pt is known to this facility after having R hip fx 9/20/23. Pt returns to facility after needing additional ORIF on RLE 11/10/23. Pt with new RLE WB resitrctions with partial WB status. Pt shows deficits in funct mob, ADLs, IADLs, act tolerance, and balance. Will benefit from skilled OT services in ARU to increase IND and safety in occupations upon return home.    OT Total Evaluation Time   OT Eval, Low Complexity Minutes (97782) 30   OT Goals   Therapy Frequency (OT) Daily   OT Predicted Duration/Target Date for Goal Attainment 11/28/23   OT Goals Upper Body Dressing;Hygiene/Grooming;Lower Body Dressing;Upper Body Bathing;Lower Body Bathing;Transfers;Bed Mobility;Toilet Transfer/Toileting;Meal Preparation   OT: Hygiene/Grooming Modified independent   OT: Upper Body Dressing Independent   OT: Lower Body Dressing Modified independent   OT: Upper Body Bathing Modified independent   OT: Lower Body Bathing Modified independent   OT: Bed Mobility Independent   OT: Transfer Modified independent   OT: Toilet Transfer/Toileting Modified independent   OT: Meal Preparation Modified independent   Self-Care/Home Management   Self-Care/Home Mgmt/ADL, Compensatory, Meal Prep Minutes (20152) 30   Treatment Detail/Skilled Intervention please see gg code areas   OT Discharge Planning   OT Plan gg shower (w/c to ETB-need to cover large incision on R hip)   OT Discharge Recommendation (DC Rec) home with home care occupational therapy   Total Session Time   Timed Code Treatment Minutes 30   Total Session Time (sum of timed and untimed services) 60   Post Acute Settings Only   What unit is patient on? Acute Rehab   OT - Acute Rehab Center Time   Individual Time (minutes) - enter zero if not applicable - OT 60   Group Time (minutes) - enter zero if not applicable  - OT 0   Concurrent Time (minutes) - enter zero if not applicable  - OT 0   Co-Treatment Time (minutes) - enter zero if not applicable  - OT 0   ARC Total Session Time (minutes) - OT 60   ARC Total Session Time   OT/PT/SLP ARC Total Session Time 60   Eating   Completely independent with self-feeding yes   Oral Hygiene   Describe performance Mod I sitting in w/c   Grooming (except oral cares)   Grooming Comment Mod I sitting in w/c   Lower Body Dressing putting on/taking off footwear   Describe performance Total A seated in w/c without  use of AE   Toilet Hygiene   Describe performance CGA seated on toilet   Toilet Transfer   Describe performance Min A on/off regular toilet with FWW and gb   Chair/bed-to-chair Transfer   Describe performance Min A on/off w/c using FWW

## 2023-11-15 NOTE — PLAN OF CARE
Acute Rehab Care Conference/Team Rounds      Type: Team Rounds    Present: Dr. Reilly Angel PM&R, Emma Gutierrez PA, Dr. Dilcia Thomas Neuropsychologist, Eileen Veronica PT, Michaela Lester OT, Krystyna YIP, Lynn Watts RD, Krystyna Garza RN, and Genaro Ortiz Patient.     Discharge Barriers/Treatment/Education    Rehab Diagnosis: Orthopaedic Disorders 08.11 Unilateral Hip Fracture - R hip fracture; now s/p ORIF     Active Medical Co-morbidities/Prognosis:   Patient Active Problem List   Diagnosis    HYPERLIPIDEMIA LDL GOAL <130    Hypertension goal BP (blood pressure) < 140/90    Advance Care Planning    Anemia due to blood loss, acute    Health Care Home    Obesity due to excess calories, unspecified obesity severity    MGUS (monoclonal gammopathy of unknown significance)    Proximal limb muscle weakness    Vitamin B12 deficiency (non anemic)    Screen for colon cancer    Encounter for Medicare annual wellness exam    Hypovitaminosis D    History of prostate cancer    Encounter for monitoring of chronic aspirin therapy    Screening for prostate cancer    History of prostatectomy    Osteoarthritis of both hips, unspecified osteoarthritis type    Status post total replacement of right hip    Periprosthetic fracture around internal prosthetic right hip joint, initial encounter (H)    S/P right hip fracture        Safety: Pt is alert and oriented. A2 w/ gaitbelt and walker pivot to wheelchair. Pt is  partial weight bearing to RLE or 50% WB to RLE.    Pain: No complain of pain.    Medications, Skin, Tubes/Lines: Can take medications whole. Post ORIF incision is covered w/ dressing that needs to stay on till 11/17. Ok to shower. W/ Manzo catheter.    Swallowing/Nutrition:    Bowel/Bladder:Continent of bowel. LBM 11/14/23. Manzo catheter for voiding.    Psychosocial: SW assessment pending. SW familiar with this pt from previous stay. Lives alone. Support from friends, neighbors, and Hindu. Appears  anxious. Denied substance abuse or financial concerns. Has [employer] and Meals on Wheels in place.     ADLs/IADLs: pending eval 11/15/23    Mobility: pending eval 11/15 - previously known to writer from last ARU stay. Has WW, w/c, and RTS.    Cognition/Language:    Community Re-Entry:    Transportation: Not a  - transportation anticipated from friend.    Decision maker: self    Plan of Care and goals reviewed and updated.    Discharge Plan/Recommendations    Fall Precautions: continue    Patient/Family input to goals: Yes    Anticipated rehab needs following discharge: Home with HC    Anticipated care giver support after discharge: Friends/family    Estimated length of stay: 10 days    Overall plan for the patient: Continue IP Rehabilitation.       Utilization Review and Continued Stay Justification    Medical Necessity Criteria:    For any criteria that is not met, please document reason and plan for discharge, transfer, or modification of plan of care to address.    Requires intensive rehabilitation program to treat functional deficits?: Yes    Requires 3x per week or greater involvement of rehabilitation physician to oversee rehabilitation program?: Yes    Requires rehabilitation nursing interventions?: Yes    Patient is making functional progress?: Yes    There is a potential for additional functional progress? Yes    Patient is participating in therapy 3 hours per day a minimum of 5 days per week or 15 hours per week in 7 day period?:Yes    Has discharge needs that require coordinated discharge planning approach?:Yes        Final Physician Sign off    Statement of Approval: I approve the plan of care.     Patient Goals    Social Work Goals: Confirm discharge recommendations with therapy, coordinate safe discharge plan and remain available to support and assist as needed.      OT Goals: pending eval 11/15/23    PT goals: pending eval 11/15/23                                                             Patient Goal:  Pain Management: Patient will advocate for pain management by not refusing therapy during stay in ARU     Goal: Skin Integrity: Patient will teach back dressing change on right hip incision by discharge from ARU     Goal: Safety Management: Patient will use call light appropriately and will wait for assistance until made THIEN or independent during his stay in ARU

## 2023-11-15 NOTE — PLAN OF CARE
Goal Outcome Evaluation:    Outcome Evaluation: Pt AxO, able to make needs known. VSS, denied pain. On regular texture, thin liquid diet. Appetite good, able to take pills whole with thin liquids. R hip dressing in place, CDI. Had team rounds this AM shift. Participated in therapies. A1 SPT to w/c, 50% WB on RLE. Continent of bowel, had BM this shift. FC in place, patent and draining well to urobag with clear, dark yellow UO. Encouraged to drink more fluids. Call light placed within reach. Care plan in place.

## 2023-11-15 NOTE — PROGRESS NOTES
Attempted to meet with pt today. Pt with other staff and unavailable. SW familiar with pt from previous stay. Will follow up and complete assessment later today vs tomorrow. Team rounds this morning. Targeting discharge home with resumed HC. Pt had [facility]. Per therapy and report, pt did not have bathing assistance from home OT and [facility] was never staffed. SW sent secure email to [facility] Liaison to inquire further and request assistance with follow up. Will make sure pt wants to resume with [facility] and if so, will send the referral to [facility] to be initiated. Will remain available and continue to follow.     PHI Pfeiffer   [facility]   Direct Phone: [phone]  I   Pager: [phone]  I  Fax: [phone]

## 2023-11-15 NOTE — PLAN OF CARE
Discharge Planner Post-Acute Rehab OT:     Discharge Plan: Home with resumed HH therapy services    Precautions: Partial (50%) WB on RLE, Posterior hip precautions, Fall Risk, (Pt will only be walking into his bathroom at home, otherwise will be in his w/c most of the time to allow for hip to heal).    Current Status:  ADLs:  Mobility: CGA from EOB<>Toilet using fww   Grooming: Mod I seated in w/c at sink  Dressing: UBD: continue; LBD: continue; Footwear: Total A d/t hip precautions   Bathing: continue (w/c to ETB using gb)  Toileting: Min A on/off toilet with gb and cga seated pericare, Max A LBD w/o AE  IADLs: Only has to do medications w pill box, get's HH services/meals on wheels/cleaning services for the rest   Vision/Cognition: Wears glasses at baseline, cognition intact     Assessment: Pt is known to this facility after having R hip fx 9/20/23. Pt returns to facility after needing additional ORIF on RLE 11/10/23. Pt with new RLE WB resitrctions with partial WB status. Pt shows deficits in funct mob, ADLs, IADLs, act tolerance, and balance. Will benefit from skilled OT services in ARU to increase IND and safety in occupations upon return home. ELOS ~2 weeks.    Other Barriers to Discharge (DME, Family Training, etc):   Continue-- owns all AE and DME. Will need a bigger w/c per PT

## 2023-11-15 NOTE — PROGRESS NOTES
11/15/23 0900   Appointment Info   Signing Clinician's Name / Credentials (PT) Eileen Veronica DPT      Language English   Living Environment   People in Home alone   Current Living Arrangements condominium   Home Accessibility wheelchair accessible   Living Environment Comments Lives alone but has supportive friends nearby. Accessible apartment, but w/c doesn't fit into bathroom.   Self-Care   Usual Activity Tolerance moderate   Current Activity Tolerance fair   Equipment Currently Used at Home grab bar, toilet;grab bar, tub/shower;raised toilet seat;tub bench;walker, standard;walker, rolling;wheelchair, manual  (bariatric front wheel walker)   Fall history within last six months yes   Number of times patient has fallen within last six months 1   Post-Acute Assessment Only   Post-Acute Functional Assessment See below   Previous Level of Function/Home Environm   Bathing, Previous Functional Level uses device or equipment   Grooming, Previous Functional Level independent  (seated only)   Dressing, Previous Functional Level uses device or equipment   Eating/Feeding, Previous Functional Level independent   Toileting, Previous Functional Level uses device or equipment   BADLs, Previous Functional Level uses device or equipment   IADLs, Previous Functional Level partial assistance   Bed Mobility, Previous Functional Level independent   Transfers, Previous Functional Level uses device or equipment   Household Ambulation, Previous Functional Level uses device or equipment   Stairs, Previous Functional Level not applicable (see comment)  (no stairs needed to do at condo)   Community Ambulation, Previous Functional Level uses wheelchair   Previous Level of Function MOD I at baseline with WW. Home based mobility d/t fracture related WBAT restriction. Was managing successfully at home with IADL assist, RTS, wheelchair and walker.   General Information   Onset of Illness/Injury or Date of Surgery 11/10/23   Referring  Physician Reilly Angel MD   Patient/Family Therapy Goals Statement (PT) see clinical impression   Pertinent History of Current Problem (include personal factors and/or comorbidities that impact the POC) see clinical impression   Existing Precautions/Restrictions no active hip ABD;no hip ER;fall;weight bearing  (PWB (50%) in RLE)   Weight-Bearing Status - RUE partial weight-bearing (% in comments)  (50%)   Cognition   Cognitive Status Comments Pt alert and oriented to hospital course and surgical precautions.   Pain Assessment   Patient Currently in Pain No  (Pain with right knee and hip MMT.)   Integumentary/Edema   Integumentary/Edema Comments Callous around B toes and forefoot. 2+ pitting edema in R foot.   Posture    Posture Not impaired   Range of Motion (ROM)   Range of Motion ROM deficits secondary to surgical procedure  (No active hip abduction surgical precaution)   ROM Comment R hip and knee ROM reduced d/t swelling and pain.   Strength (Manual Muscle Testing)   Strength Comments BLE 5/5 ankle PF/DF. L knee flexion/extension 5/5, 3+/5 hip flexion, and 2-/5 hip adduction. R knee flexion/extension 4/5 with reports of pain in calf, 3/5 hip flexion with reports of right hip pain, and 1/5 hip adduction. Hip abduction strength testing witheld due to surgical precautions.   Balance   Balance Comments Limited by sensory impairment and weakness. Heavy reliance on walker to maintain 50% WBing.   Sensory Examination   Sensory Perception Comments Pt reports more numbness in R foot. Light touch, proprioception, and protective sensation intact and symmetrical bilaterally. Hot/cold impaired due to delayed response time. Absent vibration in B feet.   Coordination   Coordination no deficits were identified   Muscle Tone   Muscle Tone no deficits were identified   Clinical Impression   Criteria for Skilled Therapeutic Intervention Yes, treatment indicated   PT Diagnosis (PT) force production deficit, limited activity  tolerance, sensory impairment   Influenced by the following impairments see clinical impression comments   Functional limitations due to impairments see clinical impression comments   Clinical Presentation (PT Evaluation Complexity) evolving   Clinical Presentation Rationale Moderately complex d/t multisystem acute on chronic deficits   Clinical Decision Making (Complexity) moderate complexity   Planned Therapy Interventions (PT) balance training;gait training;bed mobility training;groups;ROM (range of motion);stair training;strengthening;stretching;transfer training;wheelchair management/propulsion training;progressive activity/exercise;risk factor education;home program guidelines;postural re-education;orthotic fitting/training;patient/family education;home exercise program;neuromuscular re-education   Risk & Benefits of therapy have been explained evaluation/treatment results reviewed;care plan/treatment goals reviewed;risks/benefits reviewed;current/potential barriers reviewed;participants voiced agreement with care plan;participants included;patient   Clinical Impression Comments Pt presents below baseline with R hip fx with ORIF on 11/10/23. Pt presents with sensory impairment, RLE weakness, RLE edema, and balance deficits due to surgical precautions. ELOS is 2 weeks with goals of being MOD I with FWW for household mobility and w/c for community mobility. Potential to progress faster than initial ELOS. Plan is to discharge home with HHPT.   PT Total Evaluation Time   PT Eval, Moderate Complexity Minutes (67167) 30   Physical Therapy Goals   PT Frequency Daily   PT Predicted Duration/Target Date for Goal Attainment 11/29/23   PT Goals Bed Mobility;Transfers;Gait;Edema   PT: Bed Mobility Modified independent;Supine to/from sit;Within precautions  (long sit method)   PT: Transfers Modified independent;Sit to/from stand;Bed to/from chair;Assistive device;Within precautions  (50% WBing RLE)   PT: Gait Modified  independent;Rolling walker  (30 ft max per PA order)   Interventions   Interventions Quick Adds Therapeutic Activity;Therapeutic Procedure   PT Discharge Planning   PT Plan Functional mobility eval   Total Session Time   Total Session Time (sum of timed and untimed services) 30   Post Acute Settings Only   What unit is patient on? Acute Rehab   PT - Acute Rehab Center Time   Individual Time (minutes) - enter zero if not applicable - PT 30   Group Time (minutes) - enter zero if not applicable  - PT 0   Concurrent Time (minutes) - enter zero if not applicable  - PT 0   Co-Treatment Time (minutes) - enter zero if not applicable  - PT 0   ARC Total Session Time (minutes) - PT 30   ARC Total Session Time   OT/PT/SLP ARC Total Session Time 30   Bowel   Functional Performance Continent and uses toilet in bathroom

## 2023-11-15 NOTE — PROGRESS NOTES
"  [facility]   Acute Rehabilitation Unit  Daily progress note    INTERVAL HISTORY  Genaro Ortiz was seen and examined at bedside. Sitting up in chair, during team rounds.  Reportedly slept pretty well last night, pain improved today. Denies other acute concerns.       Functionally, undergoing therapy evaluations today  -see rounds note by Dr. Angel for further details.         MEDICATIONS   acetaminophen  975 mg Oral TID    aspirin  81 mg Oral BID    cyanocobalamin  1,000 mcg Oral Daily    polyethylene glycol  17 g Oral Daily    pregabalin  100 mg Oral QPM    simvastatin  20 mg Oral At Bedtime    tamsulosin  0.4 mg Oral Daily    Vitamin D3  25 mcg Oral Daily        methocarbamol, naloxone **OR** naloxone **OR** naloxone **OR** naloxone, oxyCODONE, senna-docusate     PHYSICAL EXAM  /73 (BP Location: Right arm, Patient Position: Fowlers, Cuff Size: Adult Regular)   Pulse 80   Temp 98.5  F (36.9  C) (Oral)   Resp 18   Ht 1.753 m (5' 9\")   Wt 96.8 kg (213 lb 6.5 oz)   SpO2 96%   BMI 31.51 kg/m    Gen: awake alert nad  HEENT: mmm wearing glasses  Pulm: non labored on room air  Abd: non distended  Ext: mild R le edema, trace lle edema  Neuro/MSK: alert speech clear moves upper extremities against gravity, sensation intact    LABS  CBC RESULTS:   Recent Labs   Lab Test 11/12/23  1239 11/12/23  0610 11/11/23  0748 10/13/23  1321 10/02/23  0755 09/25/23  0621   WBC  --   --   --  6.3 5.6 5.8   RBC  --   --   --  4.25* 3.51* 3.06*   HGB 9.8* 10.0* 10.7* 11.6* 9.8* 8.5*   HCT 30.2*  --   --  35.9* 30.1* 26.7*   MCV  --   --   --  85 86 87   MCH  --   --   --  27.3 27.9 27.8   MCHC  --   --   --  32.3 32.6 31.8   RDW  --   --   --  15.3* 15.6* 14.9   PLT  --   --   --  317 292 245     Last Basic Metabolic Panel:  Recent Labs   Lab Test 11/12/23  0606 11/10/23  1028 10/13/23  1321 10/02/23  0755 09/25/23  0621   NA  --   --  135 142 139   POTASSIUM  --   --  3.9 3.9 4.1 "   CHLORIDE  --   --  100 109* 106   CO2  --   --  21* 21* 23   ANIONGAP  --   --  14 12 10   * 111* 111* 103* 103*   BUN  --   --  28.7* 21.6 22.1   CR  --   --  0.99 0.84 0.86   GFRESTIMATED  --   --  79 90 90   MAX  --   --  9.7 8.8 8.9         Rehabilitation - continue comprehensive acute inpatient rehabilitation program with multidisciplinary approach including therapies, rehab nursing, and physiatry following. See interval history for updates.      ASSESSMENT AND PLAN  Genaro Ortiz is a 78 year old man with past medical history of MGUS, HTN, HLD, prostate cancer, and OA s/p bilateral hip arthroplasties 7/2023 complicated by periprosthetic fracture 9/2023 ultimately underwent ORIF 11/10/23.  Concern for aspiration during operation, otherwise course complicated by urinary retention, and acute anemia.  Functionally noted to have impaired strength, impaired balance, impaired activity tolerance, with 50% right lower extremity weight bearing.  Admitted to rehab 11/14/23.     ORIF right greater trochanter fracture 11/10  Per Dr. Escamilla  -asa 81 mg bid x 4 weeks  -RLE 50% weightbearing  -Right AFO to prevent external rotation/ no active abduction  -continue PT/OT- had home care prior to admission  -follow up Dr. Escamilla in 4 weeks  -dressing in place through 11/17 then open to air- ok to shower- keep incision clean and dry   -continue scheduled tylenol, robaxin prn, oxy prn- taper off, lyrica (prior to admission 225 at bedtime, reduce to 100 mg at bedtime- not consistently taking at home)- monitor     History of prostate cancer  History of urinary retention  -continue flomax  -granados in place on ARU arrival trial of void 11/16.     Acute on Chronic Anemia  Hgb 9.8 11/12  -trend     HLD  -continue statin      Adjustment to disability:  monitor  FEN: reg  Bowel: monitor  Bladder: granados-  DVT Prophylaxis:asa bid per ortho   GI Prophylaxis: none  Code: full   Disposition: goal for home   ELOS:2 weeks  Follow up  Appointments on Discharge:  Pcp, ortho      Patient seen and discussed with Dr. Angel  PM&R Staff Physician    Emma Gutierrez PA-C  Physical Medicine & Rehabilitation

## 2023-11-15 NOTE — PLAN OF CARE
"Discharge Planner Post-Acute Rehab PT:     Discharge Plan: home with HHPT    Precautions:   -\"50% weight bearing on right. Limit ambulation to max 30 feet\"  -no R hip active abduction or external rotation  -indwelling granados catheter    Current Status:  Bed Mobility: Min A for RLE management  Transfer: CGA with FWW  Gait: CGA 20-30 ft with FWW. W/c based for longer distances  Stairs: activity not applicable   Balance: limited by balance, weakness, and surgical precautions    Outcome Measures:   TUG:  10MWT:   6 minute push test:    Assessment: Pt presents below baseline with R hip fx with ORIF on 11/10/23. Pt presents with sensory impairment, RLE weakness, RLE edema, and balance deficits due to surgical precautions. ELOS is 2 weeks with goals of being MOD I with FWW for household mobility and w/c for community mobility. Potential to progress faster than initial ELOS. Plan is to discharge home with HHPT.     Other Barriers to Discharge (DME, Family Training, etc):   Limited physical assistance at home   "

## 2023-11-15 NOTE — PROGRESS NOTES
"SPIRITUAL HEALTH SERVICES - Progress Note  [facility] ([facility]) Acute Rehab  Referral Source/Reason for Visit: routine admission request    Summary and Recommendations -  Pt enjoys visiting, particularly talking about all things [place]  Pt has a good network of supportive friends, and he is quite involved in the [place] language Kate Villalobos    Plan: I will follow pt while he is Acute Rehab, visiting at least weekly.    James Hidalgo M.Div. (Bill), [facility]  Staff   Pager [phone]      [facility] available 24/7 for emergent requests/referrals, either by paging the on-call  or by entering an ASAP/STAT consult in [employer], which will also page the on-call .    Assessment    Saw pt Genaro Ortiz per routine admission request. Pt quite familiar to me from previous admissions.     Patient/Family Understanding of Illness and Goals of Care - \"I needed another surgery, and now I'm on rehab recovering. I feel I'm already making good progress.\"    Distress and Loss - Not Discussed    Strengths, Coping, and Resources - pt is quite social and enjoys doing activities with friends and Jain, but he does not resent being on rehab; he understands that \"I'm here to get stronger, and it will help me to be able to be at home and be independent...\"     Meaning, Beliefs, and Spirituality - pt's [employer] lupe is quite central to his life and the way he finds meaning and support.     "

## 2023-11-16 ENCOUNTER — APPOINTMENT (OUTPATIENT)
Dept: PHYSICAL THERAPY | Facility: CLINIC | Age: 78
DRG: 561 | End: 2023-11-16
Attending: PHYSICAL MEDICINE & REHABILITATION
Payer: COMMERCIAL

## 2023-11-16 ENCOUNTER — APPOINTMENT (OUTPATIENT)
Dept: OCCUPATIONAL THERAPY | Facility: CLINIC | Age: 78
DRG: 561 | End: 2023-11-16
Attending: PHYSICAL MEDICINE & REHABILITATION
Payer: COMMERCIAL

## 2023-11-16 LAB
ANION GAP SERPL CALCULATED.3IONS-SCNC: 7 MMOL/L (ref 7–15)
BUN SERPL-MCNC: 20.9 MG/DL (ref 8–23)
CALCIUM SERPL-MCNC: 8.8 MG/DL (ref 8.8–10.2)
CHLORIDE SERPL-SCNC: 104 MMOL/L (ref 98–107)
CREAT SERPL-MCNC: 0.77 MG/DL (ref 0.67–1.17)
DEPRECATED HCO3 PLAS-SCNC: 26 MMOL/L (ref 22–29)
EGFRCR SERPLBLD CKD-EPI 2021: >90 ML/MIN/1.73M2
ERYTHROCYTE [DISTWIDTH] IN BLOOD BY AUTOMATED COUNT: 16.5 % (ref 10–15)
GLUCOSE SERPL-MCNC: 109 MG/DL (ref 70–99)
HCT VFR BLD AUTO: 27.5 % (ref 40–53)
HGB BLD-MCNC: 8.8 G/DL (ref 13.3–17.7)
MCH RBC QN AUTO: 26.5 PG (ref 26.5–33)
MCHC RBC AUTO-ENTMCNC: 32 G/DL (ref 31.5–36.5)
MCV RBC AUTO: 83 FL (ref 78–100)
PLATELET # BLD AUTO: 254 10E3/UL (ref 150–450)
POTASSIUM SERPL-SCNC: 4 MMOL/L (ref 3.4–5.3)
RBC # BLD AUTO: 3.32 10E6/UL (ref 4.4–5.9)
SODIUM SERPL-SCNC: 137 MMOL/L (ref 135–145)
WBC # BLD AUTO: 6.2 10E3/UL (ref 4–11)

## 2023-11-16 PROCEDURE — 80048 BASIC METABOLIC PNL TOTAL CA: CPT | Performed by: PHYSICIAN ASSISTANT

## 2023-11-16 PROCEDURE — 36415 COLL VENOUS BLD VENIPUNCTURE: CPT | Performed by: PHYSICIAN ASSISTANT

## 2023-11-16 PROCEDURE — 250N000013 HC RX MED GY IP 250 OP 250 PS 637: Performed by: PHYSICIAN ASSISTANT

## 2023-11-16 PROCEDURE — 97530 THERAPEUTIC ACTIVITIES: CPT | Mod: GP

## 2023-11-16 PROCEDURE — 97750 PHYSICAL PERFORMANCE TEST: CPT | Mod: GP

## 2023-11-16 PROCEDURE — 97535 SELF CARE MNGMENT TRAINING: CPT | Mod: GO | Performed by: OCCUPATIONAL THERAPIST

## 2023-11-16 PROCEDURE — 128N000003 HC R&B REHAB

## 2023-11-16 PROCEDURE — 85027 COMPLETE CBC AUTOMATED: CPT | Performed by: PHYSICIAN ASSISTANT

## 2023-11-16 PROCEDURE — 99232 SBSQ HOSP IP/OBS MODERATE 35: CPT | Mod: FS | Performed by: PHYSICIAN ASSISTANT

## 2023-11-16 PROCEDURE — 97535 SELF CARE MNGMENT TRAINING: CPT | Mod: GO

## 2023-11-16 RX ADMIN — ASPIRIN 81 MG: 81 TABLET, COATED ORAL at 08:34

## 2023-11-16 RX ADMIN — ACETAMINOPHEN 975 MG: 325 TABLET, FILM COATED ORAL at 20:19

## 2023-11-16 RX ADMIN — POLYETHYLENE GLYCOL 3350 17 G: 17 POWDER, FOR SOLUTION ORAL at 08:34

## 2023-11-16 RX ADMIN — CYANOCOBALAMIN TAB 1000 MCG 1000 MCG: 1000 TAB at 08:34

## 2023-11-16 RX ADMIN — SIMVASTATIN 20 MG: 20 TABLET, FILM COATED ORAL at 20:19

## 2023-11-16 RX ADMIN — ASPIRIN 81 MG: 81 TABLET, COATED ORAL at 20:19

## 2023-11-16 RX ADMIN — ACETAMINOPHEN 975 MG: 325 TABLET, FILM COATED ORAL at 08:34

## 2023-11-16 RX ADMIN — ACETAMINOPHEN 975 MG: 325 TABLET, FILM COATED ORAL at 15:22

## 2023-11-16 RX ADMIN — Medication 25 MCG: at 08:34

## 2023-11-16 RX ADMIN — PREGABALIN 100 MG: 100 CAPSULE ORAL at 20:19

## 2023-11-16 RX ADMIN — TAMSULOSIN HYDROCHLORIDE 0.4 MG: 0.4 CAPSULE ORAL at 08:34

## 2023-11-16 ASSESSMENT — ACTIVITIES OF DAILY LIVING (ADL)
ADLS_ACUITY_SCORE: 34

## 2023-11-16 NOTE — PLAN OF CARE
Goal Outcome Evaluation:         Patient here S/P Right hip fracture, alert and oriented, able to make needs known  Slept most of the night  No complained of pain, headache, chest pain, N&V, no SOB  Manzo catheter in placed, draining well, no BM  Manzo catheter be taken OFF in the morning as ordered, start trial voiding then  Safety round checked completed, 3 side rails UP, bed alarm ON, call light/bedside table within reach  Plan of Care ongoing

## 2023-11-16 NOTE — PROGRESS NOTES
"  [facility]   Acute Rehabilitation Unit  Daily progress note    INTERVAL HISTORY  Genaro Ortiz was seen sitting up in chair, roberta removed this am.  Worried as typically has some retention after surgery and has required caths, told we would straight cath as indicated.  Had bm denies other concerns.      -Hgb drifted down- since surgery will trend and monitor for signs/symptoms of bleeding.    current Status:  ADLs:  Mobility: CGA from EOB<>Toilet using fww   Grooming: Mod I seated in w/c at sink  Dressing: UBD indep. After setup: continue; LBD: modA with initiating LEs into pants, CGA with pulling over hips standing with FWW/grabar; Footwear: maxA with R comp. Garment and  socks after shower   Bathing: continue (w/c to ETB using gb)  Toileting: Min A on/off toilet with gb and cga seated pericare, Max A LBD w/o AE  IADLs: Only has to do medications w pill box, get's HH services/meals on wheels/cleaning services for the rest   Vision/Cognition: Wears glasses at baseline, cognition intact       MEDICATIONS   acetaminophen  975 mg Oral TID    aspirin  81 mg Oral BID    cyanocobalamin  1,000 mcg Oral Daily    polyethylene glycol  17 g Oral Daily    pregabalin  100 mg Oral QPM    simvastatin  20 mg Oral At Bedtime    tamsulosin  0.4 mg Oral Daily    Vitamin D3  25 mcg Oral Daily        lidocaine, methocarbamol, naloxone **OR** naloxone **OR** naloxone **OR** naloxone, oxyCODONE, senna-docusate     PHYSICAL EXAM  /77 (BP Location: Right arm, Patient Position: Semi-Ferguson's, Cuff Size: Adult Regular)   Pulse 87   Temp 97.6  F (36.4  C) (Oral)   Resp 16   Ht 1.753 m (5' 9\")   Wt 96.8 kg (213 lb 6.5 oz)   SpO2 96%   BMI 31.51 kg/m    Gen: awake alert nad  HEENT: mmm wearing glasses  Pulm: non labored on room air  Abd: non distended  Ext: mild R le edema, trace lle edema  Neuro/MSK: alert speech clear moves upper extremities against gravity, sensation intact    LABS  CBC " RESULTS:   Recent Labs   Lab Test 11/16/23  0538 11/12/23  1239 11/12/23  0610 11/11/23  0748 10/13/23  1321 10/02/23  0755   WBC 6.2  --   --   --  6.3 5.6   RBC 3.32*  --   --   --  4.25* 3.51*   HGB 8.8* 9.8* 10.0*   < > 11.6* 9.8*   HCT 27.5* 30.2*  --   --  35.9* 30.1*   MCV 83  --   --   --  85 86   MCH 26.5  --   --   --  27.3 27.9   MCHC 32.0  --   --   --  32.3 32.6   RDW 16.5*  --   --   --  15.3* 15.6*     --   --   --  317 292    < > = values in this interval not displayed.     Last Basic Metabolic Panel:  Recent Labs   Lab Test 11/16/23  0538 11/12/23  0606 11/10/23  1028 10/13/23  1321 10/02/23  0755     --   --  135 142   POTASSIUM 4.0  --   --  3.9 3.9   CHLORIDE 104  --   --  100 109*   CO2 26  --   --  21* 21*   ANIONGAP 7  --   --  14 12   * 122* 111* 111* 103*   BUN 20.9  --   --  28.7* 21.6   CR 0.77  --   --  0.99 0.84   GFRESTIMATED >90  --   --  79 90   MAX 8.8  --   --  9.7 8.8         Rehabilitation - continue comprehensive acute inpatient rehabilitation program with multidisciplinary approach including therapies, rehab nursing, and physiatry following. See interval history for updates.      ASSESSMENT AND PLAN  Genaro Ortiz is a 78 year old man with past medical history of MGUS, HTN, HLD, prostate cancer, and OA s/p bilateral hip arthroplasties 7/2023 complicated by periprosthetic fracture 9/2023 ultimately underwent ORIF 11/10/23.  Concern for aspiration during operation, otherwise course complicated by urinary retention, and acute anemia.  Functionally noted to have impaired strength, impaired balance, impaired activity tolerance, with 50% right lower extremity weight bearing.  Admitted to rehab 11/14/23.     ORIF right greater trochanter fracture 11/10  Per Dr. Escamilla  -asa 81 mg bid x 4 weeks  -RLE 50% weightbearing  -Right AFO to prevent external rotation/ no active abduction  -continue PT/OT- had home care prior to admission  -follow up Dr. Escamilla in 4  weeks  -dressing in place through 11/17 then open to air- ok to shower- keep incision clean and dry   -continue scheduled tylenol, robaxin prn, oxy prn- taper off, lyrica (prior to admission 225 at bedtime, reduce to 100 mg at bedtime- not consistently taking at home)- monitor     History of prostate cancer  History of urinary retention  -continue flomax  -granados in place on ARU arrival trial of void 11/16. Straight cath as indicated.      Acute on Chronic Anemia  Hgb 9.8 11/12-->8.8 11/16/23  -trend     HLD  -continue statin      Adjustment to disability:  monitor  FEN: reg  Bowel: monitor  Bladder: trial of void- straight cath as indicated.   DVT Prophylaxis:asa bid per ortho   GI Prophylaxis: none  Code: full   Disposition: goal for home   ELOS:2 weeks  Follow up Appointments on Discharge:  Pcp, ortho      Patient seen and discussed with Dr. Angel  PM&R Staff Physician    Emma Gutierrez PA-C  Physical Medicine & Rehabilitation

## 2023-11-16 NOTE — PLAN OF CARE
[id number]    Patient is alert and oriented, able to make needs known. Continent of bowel, last BM today. Manzo intact, adequate urine output. Regular diet, take medications whole with water. Patient denies pain or sob. No nausea or vomiting. Right hip dressing CDI. Assist of 1 SPT to WC. Bed alarm on for safety. Call light is within reach. Continue with POC

## 2023-11-16 NOTE — PROGRESS NOTES
"  VS: /77 (BP Location: Right arm, Patient Position: Semi-Ferguson's, Cuff Size: Adult Regular)   Pulse 87   Temp 97.6  F (36.4  C) (Oral)   Resp 16   Ht 1.753 m (5' 9\")   Wt 96.8 kg (213 lb 6.5 oz)   SpO2 96%   BMI 31.51 kg/m     O2: 96% on RA, no SOB or chest pain reported   Output: Voiding spontaneously    Last BM: 11/15   Activity: Assist x1 stand pivot to wheelchair   Skin: R hip incision   Pain: None reported   CMS: A&O x4   Dressing: R hip- CDI   Diet: Regular   LDA: No lines or drains   Equipment: Wheelchair   Plan: Continue POC   Additional Info: Pt had granados removed and has yet to void. Straight cathed x1 and bladder scanned multiple times. All times except 1 have volume under 300mL. Will continue to monitor. Reports to physician should occur if pt does not void by 0700 on 11/17.       "

## 2023-11-16 NOTE — DISCHARGE INSTRUCTIONS
Follow up Appointments on Discharge:    PCP  You are scheduled to see Dr. Wegener on December 15 2023 at 1:45 pm.     Address  [address]   [address]   [place] [address]  Phone   [phone]    Ortho  You are scheduled to see Merritt Toney PA-C on December 4 2023 at 12:15 pm.       Address  [address]    [address]th [address]    [place] [address]    Phone   [phone]     Primary Care, Nurse Care Coordinator:   Ava Tian   PH: [phone]  E-mail: [email or web address]     Home Health Care:   [facility] ([facility]  PH: [phone], Fax: [phone]   Nurse, physical therapy, occupational therapy, and home health aide

## 2023-11-16 NOTE — PROGRESS NOTES
Assessment in prior SW note. Pt prefers to keep with [facility] and is aware that SW reached out to the [facility] Liaison. Referral sent to [facility] with request for [facility]. Pt denied immediate needs, questions, or concerns. Will remain available and continue to follow.     ADDENDUM: [facility] accepted. Info in pt AVS. Will update liaison if discharge date moved up. Will update pt.       PHI Pfeiffer   [facility]   Direct Phone: [phone]  I   Pager: [phone]  I  Fax: [phone]

## 2023-11-16 NOTE — PLAN OF CARE
Discharge Planner Post-Acute Rehab OT:      Discharge Plan: Home with resumed HH therapy services     Precautions: Partial (50%) WB on RLE, Posterior hip precautions, Fall Risk, (Pt will only be walking into his bathroom at home, otherwise will be in his w/c most of the time to allow for hip to heal).     Current Status:  ADLs:  Mobility: CGA from EOB<>Toilet using fww   Grooming: Mod I seated in w/c at sink  Dressing: UBD indep. After setup: continue; LBD: modA with initiating LEs into pants, CGA with pulling over hips standing with FWW/grabar; Footwear: maxA with R comp. Garment and  socks after shower   Bathing: continue (w/c to ETB using gb)  Toileting: Min A on/off toilet with gb and cga seated pericare, Max A LBD w/o AE  IADLs: Only has to do medications w pill box, get's HH services/meals on wheels/cleaning services for the rest   Vision/Cognition: Wears glasses at baseline, cognition intact      Assessment: completed full shower/dressing. See flowsheet for details.  Other Barriers to Discharge (DME, Family Training, etc):   Continue-- owns all AE and DME. Will need a bigger w/c per PT

## 2023-11-16 NOTE — PLAN OF CARE
"Discharge Planner Post-Acute Rehab PT:     Discharge Plan: home with HHPT    Precautions:   -\"50% weight bearing on right. Limit ambulation to max 30 feet\"  -no R hip active abduction or external rotation  -indwelling granados catheter    Current Status:  Bed Mobility: Min A for RLE management  Transfer: SBA with FWW  Gait: SBA 20-30 ft with FWW. IND w/c based for longer distances  Stairs: activity not applicable   Balance: limited by balance, weakness, and surgical precautions    Outcome Measures:   TU sec with FWW on   10MWT: Comfortable: 0.13 m/sec with FWW (withheld fast walking speed due to PA order to keep ambulation distances to 20-30 ft) on     Assessment: Pt lack of power and ROM restrictions in BLE and surgical precautions continue to impair pt functional mobility. Verbal cueing of forward flexion, reaching BUE forward, and increasing knee flexion prior to STS initiation reduces the pt energy expenditure with transferring.     Other Barriers to Discharge (DME, Family Training, etc):   Limited physical assistance at home     ____________________________________________    Timed Up and Go (TUG): TUG is a test of basic mobility skills. It is used to screen individuals prone to falls.  Gait assistive device used: FWW     Patient score 79 seconds  ?13.5 seconds indicate at risk for falls in older adults according to Jace et al 2000.  ?30 seconds - indicates dependency in most ADL and mobility skills according to Posiadlo & Khan 1991    Normative Data from [employer] [place], Helene D, Charley M, Debbie E, [place]ir[place]. 2017  Age                 Average Time (in seconds)  20-39                     5.9-7.4         40-59                     6.3-7.8   60-69                     7.1-9.0  70-79                     8.2-10.2  80-99                    10.0-12.7            Assessment (rationale for performing, application to patient s function & care plan): Based on TUG score, pt is at risk for falls " and likely to dependent for mobility skills.     ______________________________________________                 10MWT:    Comfortable gait speed was 0.13 meters per second, indicating decreased speed and pt at risk for falls (Reference scale: > 1.2 m/sec: independent in all activities and crossing street, 0.8-1.0 m/sec: community ambulator, household activities.  May need intervention to reduce falls risk, 0.6-0.8 m/sec: performs self care, limited community ambulator. May need intervention to reduce falls risk, <0.6 m/sec: dependent in ADLs, household ambulator.  Needs intervention to reduce falls risk)    Norm 1.2 to 1.4 meters/sec for 20-70 year-old    Assessment (rationale for performing, application to patient s function & care plan): Based on self-paced gait speed pt is likely to be household ambulator with FWW and to be w/c based for community ambulation.

## 2023-11-16 NOTE — PLAN OF CARE
Goal Outcome Evaluation:    Outcome Evaluation: No significant changes to patient during first half of the PM shift. Endorsed accordingly to incoming NOD.

## 2023-11-17 ENCOUNTER — APPOINTMENT (OUTPATIENT)
Dept: OCCUPATIONAL THERAPY | Facility: CLINIC | Age: 78
DRG: 561 | End: 2023-11-17
Attending: PHYSICAL MEDICINE & REHABILITATION
Payer: COMMERCIAL

## 2023-11-17 ENCOUNTER — APPOINTMENT (OUTPATIENT)
Dept: PHYSICAL THERAPY | Facility: CLINIC | Age: 78
DRG: 561 | End: 2023-11-17
Attending: PHYSICAL MEDICINE & REHABILITATION
Payer: COMMERCIAL

## 2023-11-17 LAB — HGB BLD-MCNC: 8.8 G/DL (ref 13.3–17.7)

## 2023-11-17 PROCEDURE — 36415 COLL VENOUS BLD VENIPUNCTURE: CPT | Performed by: PHYSICIAN ASSISTANT

## 2023-11-17 PROCEDURE — 128N000003 HC R&B REHAB

## 2023-11-17 PROCEDURE — 97535 SELF CARE MNGMENT TRAINING: CPT | Mod: GO | Performed by: OCCUPATIONAL THERAPIST

## 2023-11-17 PROCEDURE — 97530 THERAPEUTIC ACTIVITIES: CPT | Mod: GP

## 2023-11-17 PROCEDURE — 250N000013 HC RX MED GY IP 250 OP 250 PS 637: Performed by: PHYSICIAN ASSISTANT

## 2023-11-17 PROCEDURE — 97140 MANUAL THERAPY 1/> REGIONS: CPT | Mod: GP

## 2023-11-17 PROCEDURE — 97110 THERAPEUTIC EXERCISES: CPT | Mod: GP

## 2023-11-17 PROCEDURE — 97110 THERAPEUTIC EXERCISES: CPT | Mod: GO | Performed by: OCCUPATIONAL THERAPIST

## 2023-11-17 PROCEDURE — 99231 SBSQ HOSP IP/OBS SF/LOW 25: CPT | Mod: FS | Performed by: PHYSICIAN ASSISTANT

## 2023-11-17 PROCEDURE — 85018 HEMOGLOBIN: CPT | Performed by: PHYSICIAN ASSISTANT

## 2023-11-17 RX ADMIN — ACETAMINOPHEN 975 MG: 325 TABLET, FILM COATED ORAL at 13:20

## 2023-11-17 RX ADMIN — Medication 25 MCG: at 08:00

## 2023-11-17 RX ADMIN — ASPIRIN 81 MG: 81 TABLET, COATED ORAL at 20:10

## 2023-11-17 RX ADMIN — ACETAMINOPHEN 975 MG: 325 TABLET, FILM COATED ORAL at 20:09

## 2023-11-17 RX ADMIN — CYANOCOBALAMIN TAB 1000 MCG 1000 MCG: 1000 TAB at 08:00

## 2023-11-17 RX ADMIN — SIMVASTATIN 20 MG: 20 TABLET, FILM COATED ORAL at 20:09

## 2023-11-17 RX ADMIN — PREGABALIN 100 MG: 100 CAPSULE ORAL at 20:10

## 2023-11-17 RX ADMIN — ASPIRIN 81 MG: 81 TABLET, COATED ORAL at 08:00

## 2023-11-17 RX ADMIN — ACETAMINOPHEN 975 MG: 325 TABLET, FILM COATED ORAL at 08:00

## 2023-11-17 RX ADMIN — POLYETHYLENE GLYCOL 3350 17 G: 17 POWDER, FOR SOLUTION ORAL at 08:00

## 2023-11-17 RX ADMIN — TAMSULOSIN HYDROCHLORIDE 0.4 MG: 0.4 CAPSULE ORAL at 08:00

## 2023-11-17 ASSESSMENT — ACTIVITIES OF DAILY LIVING (ADL)
ADLS_ACUITY_SCORE: 42
ADLS_ACUITY_SCORE: 34
ADLS_ACUITY_SCORE: 42

## 2023-11-17 NOTE — PLAN OF CARE
Goal Outcome Evaluation:    Overall Patient Progress: no change    Outcome Evaluation: No change in Pt progress this shift.    Pt is alert and oriented. Mixed continence of bladder this shift. Pt is voiding - encouraged to double void. See flowsheets for PVRs. Continent of bowel. LBM 11/17. Ax1 SPT with walker, w/c based. Denied pain, SOB, CP, and n/t. Call light within reach and bed alarms on. Pt is able to make needs known. Will continue with POC.

## 2023-11-17 NOTE — PROGRESS NOTES
SPIRITUAL HEALTH SERVICES Progress Note  [facility] ([facility]) Acute Rehab    Brief follow-up visit with pt, who had just finished his last therapy session for the day. Pt pleased with his progress since his recent surgery. I offered prayer and encouragement. Will continue to follow, visiting again early next week.     James Jang) Archie Hidalgo M.Div., [facility]  Staff   Pager [phone]      * [facility] remains available 24/7 for emergent requests/referrals, either by having the switchboard page the on-call  or by entering an ASAP/STAT consult in Epic (this will also page the on-call ). Routine Epic consults receive an initial response within 24 hours.*

## 2023-11-17 NOTE — PLAN OF CARE
"Rehabilitation Medicine Wheelchair Face to Face     Diagnosis: R displaced femur fracture (S72.111S) now s/p ORIF with 50% weight bearing on R leg.   Currently has limited mobility due to pain, weakness, and weight bearing restriction  Current transfer status: MOD I with WW. (device, assist)  Distance patient currently able to walk: 30 ft with WW (medical orders to not exceed 30ft)  Therapy team has ruled out the following less costly options:   Cane due to unsafe.   Walker due to medical orders to not exceed 30 ft.    Patient will use wheelchair to complete all Mobility Related ADL's in the home including toileting, dressing, self cares, meal prep.    Without a wheelchair patient will be unable to safely move about their home.  This equipment will allow them to be out of bed, participate in home activities with their family, and it will be part of a fall prevention plan for safe mobility.   Patient's home will accommodate use of wheelchair.  Patient has a family member willing and able to assist as necessary with wheelchair.   Patient has not expressed that they are unwilling to use the wheel chair.    Length of need: 6 months    Current height: 5'9\"  Current weight: 213 lbs  :23    Emma Gutierrez PA-C, NPI: [id number]      Signature:  Date:    "

## 2023-11-17 NOTE — PLAN OF CARE
Goal Outcome Evaluation:    Outcome Evaluation: Pt AxO, able to make needs known. Pleasant and cooperative with cares and assessments. VSS, denied pain. On regular texture, thin liquid diet. Able to take his pills whole with thin liquids. A1 SPT to w/c, bed, toilet and vice versa. Able to use the urinal and bathroom. No BM reported this shift. Participated in therapies. Continue with POC.

## 2023-11-17 NOTE — PLAN OF CARE
Discharge Planner Post-Acute Rehab OT:      Discharge Plan: Home with resumed HH therapy services     Precautions: Partial (50%) WB on RLE, Posterior hip precautions, Fall Risk, (Pt will only be walking into his bathroom at home, otherwise will be in his w/c most of the time to allow for hip to heal).     Current Status:  ADLs:  Mobility: CGA from EOB<>Toilet using fww   Grooming: Mod I seated in w/c at sink  Dressing: UBD indep; LBD: SBA using AE including reacher and sock aid-CGA to stand and pull up pants  Bathing: CGA on/off ETB, Min A washing/drying   Toileting: Min A on/off toilet with gb and cga seated pericare, Max A LBD w/o AE  IADLs: Only has to do medications w pill box, get's HH services/meals on wheels/cleaning services for the rest   Vision/Cognition: Wears glasses at baseline, cognition intact      Assessment: Pt wanting to get dressed this AM and do all ADLs. Pt does well with being Mod I from w/c level with ADLs, and is IND seated with UBD. For LBD, he shows good teach back with AE for use of sock aid and reacher, with CGA to stand. Put does well with bed mob today without use of leg .    Other Barriers to Discharge (DME, Family Training, etc):   Continue-- owns all AE and DME. Will need a bigger w/c per PT

## 2023-11-17 NOTE — PLAN OF CARE
Individualized Overall Plan Of Care (IOPOC)      Rehab diagnosis/Impairment Group Code: Orthopaedic disorders 08.11 unilateral hip fracture - r hip fracture; now s/p orif  S/p right hip fracture       Expected functional outcome: Anticipate with intensive therapies, close medical management, and rehabilitative nursing care the patient will improve strength, balance, tolerance to activity, safety to ensure Mod I with basic mobility and ADL performance to allow return home.       Clinical Impression Comments: 78 year old man w/ PMH of OA s/p bilateral hip arthroplasties 7/2023, HTN, prostate cancer, HLE, anemia and recent R displaced periprosthetic right greater trochanter fracture in 9/2023 after a fall which was managed non-operatively.  On 10/30, during orthopedic follow-up, pt was noted to have displaced unstable periprosthetic right greater trochanter fracture. He was admitted for ORIF of R greater trochanter fracture on 11/10/23. His post-op course complicated by intra-op emesis and aspiration requiring intubation, as well as urinary retention.  He has also required medical mgmt of his pain.      Mobility: Pt presents below baseline with R hip fx with ORIF on 11/10/23. Pt presents with sensory impairment, RLE weakness, RLE edema, and balance deficits due to surgical precautions. ELOS is 2 weeks with goals of being MOD I with FWW for household mobility and w/c for community mobility. Potential to progress faster than initial ELOS. Plan is to discharge home with HHPT.    ADL: Pt is known to this facility after having R hip fx 9/20/23. Pt returns to facility after needing additional ORIF on RLE 11/10/23. Pt with new RLE WB resitrctions with partial WB status. Pt shows deficits in funct mob, ADLs, IADLs, act tolerance, and balance. Will benefit from skilled OT services in ARU to increase IND and safety in occupations upon return home.    Communication/Cognition/Swallow:       Intensity of therapy:   PT 90 minutes,  Daily, for 12 days  OT 90 minutes, Daily, for 12 days      Orthotics  None  Education   medication education, positioning, carryover of new rehab techniques, care coordination, skin integrity, pain management, post-surgical incision care to promote healing and prevent infection, and provide safe environment for patient at falls risk.  Neuropsychology Testing: No  Other:  None      Medical Prognosis: Fair      Physician summary statement: In addition to above statements address, Patient requires intensive active and ongoing therapeutic intervention and multiple therapies; Patient requires medical supervision; Expected to actively participate in the intensive rehab program; Sufficiently stable to actively participate; Expectation for measurable improvement in functional capacity or adaption to impairments.     Discharge destination: prior home  Discharge rehabilitation needs: home care      Estimated length of stay: 12 days      Rehabilitation Physician Reilly Angel MD

## 2023-11-17 NOTE — PROGRESS NOTES
"  [facility]   Acute Rehabilitation Unit  Daily progress note    INTERVAL HISTORY  Genaro Ortiz was seen sitting up in chair, sleep interrupted due to voiding granados removed 11/16, happy only had one cath.  Denies n/v/d, sob, headache, and dizziness.      Hgb stable 8.8.  Denies other acute concerns. Pain improved since ARU arrival    OT: Assessment: Pt wanting to get dressed this AM and do all ADLs. Pt does well with being Mod I from w/c level with ADLs, and is IND seated with UBD. For LBD, he shows good teach back with AE for use of sock aid and reacher, with CGA to stand. Put does well with bed mob today without use of leg .     Other Barriers to Discharge (DME, Family Training, etc):   Continue-- owns all AE and DME. Will need a bigger w/c per PT       MEDICATIONS   acetaminophen  975 mg Oral TID    aspirin  81 mg Oral BID    cyanocobalamin  1,000 mcg Oral Daily    polyethylene glycol  17 g Oral Daily    pregabalin  100 mg Oral QPM    simvastatin  20 mg Oral At Bedtime    tamsulosin  0.4 mg Oral Daily    Vitamin D3  25 mcg Oral Daily        lidocaine, methocarbamol, naloxone **OR** naloxone **OR** naloxone **OR** naloxone, oxyCODONE, senna-docusate     PHYSICAL EXAM  /57 (BP Location: Right arm, Patient Position: Semi-Ferguson's, Cuff Size: Adult Regular)   Pulse 78   Temp 97.9  F (36.6  C) (Oral)   Resp 18   Ht 1.753 m (5' 9\")   Wt 96.8 kg (213 lb 6.5 oz)   SpO2 96%   BMI 31.51 kg/m    Gen: awake alert nad  HEENT: mmm wearing glasses  Pulm: non labored on room air  Abd: non distended  Ext: mild R le edema, trace lle edema  Neuro/MSK: alert speech clear moves upper extremities against gravity, sensation intact    LABS  CBC RESULTS:   Recent Labs   Lab Test 11/17/23  0706 11/16/23  0538 11/12/23  1239 11/11/23  0748 10/13/23  1321 10/02/23  0755   WBC  --  6.2  --   --  6.3 5.6   RBC  --  3.32*  --   --  4.25* 3.51*   HGB 8.8* 8.8* 9.8*   < > 11.6* 9.8*   HCT "  --  27.5* 30.2*  --  35.9* 30.1*   MCV  --  83  --   --  85 86   MCH  --  26.5  --   --  27.3 27.9   MCHC  --  32.0  --   --  32.3 32.6   RDW  --  16.5*  --   --  15.3* 15.6*   PLT  --  254  --   --  317 292    < > = values in this interval not displayed.     Last Basic Metabolic Panel:  Recent Labs   Lab Test 11/16/23  0538 11/12/23  0606 11/10/23  1028 10/13/23  1321 10/02/23  0755     --   --  135 142   POTASSIUM 4.0  --   --  3.9 3.9   CHLORIDE 104  --   --  100 109*   CO2 26  --   --  21* 21*   ANIONGAP 7  --   --  14 12   * 122* 111* 111* 103*   BUN 20.9  --   --  28.7* 21.6   CR 0.77  --   --  0.99 0.84   GFRESTIMATED >90  --   --  79 90   MAX 8.8  --   --  9.7 8.8         Rehabilitation - continue comprehensive acute inpatient rehabilitation program with multidisciplinary approach including therapies, rehab nursing, and physiatry following. See interval history for updates.      ASSESSMENT AND PLAN  Genaro Ortiz is a 78 year old man with past medical history of MGUS, HTN, HLD, prostate cancer, and OA s/p bilateral hip arthroplasties 7/2023 complicated by periprosthetic fracture 9/2023 ultimately underwent ORIF 11/10/23.  Concern for aspiration during operation, otherwise course complicated by urinary retention, and acute anemia.  Functionally noted to have impaired strength, impaired balance, impaired activity tolerance, with 50% right lower extremity weight bearing.  Admitted to rehab 11/14/23.     ORIF right greater trochanter fracture 11/10  Per Dr. Escamilla  -asa 81 mg bid x 4 weeks  -RLE 50% weightbearing  -Right AFO to prevent external rotation/ no active abduction  -continue PT/OT- had home care prior to admission  -follow up Dr. Escamilla in 4 weeks  -dressing in place through 11/17 then open to air- ok to shower- keep incision clean and dry   -continue scheduled tylenol, robaxin prn, oxy prn- taper off, lyrica (prior to admission 225 at bedtime, reduce to 100 mg at bedtime- not consistently  taking at home)- monitor     History of prostate cancer  History of urinary retention  -continue flomax  -granados in place on ARU arrival trial of void 11/16. Straight cath as indicated. Now voiding     Acute on Chronic Anemia  Hgb 9.8 11/12-->8.8 11/16/23 stable 11/17  -trend     HLD  -continue statin      Adjustment to disability:  monitor  FEN: reg  Bowel: monitor  Bladder: trial of void- straight cath as indicated.   DVT Prophylaxis:asa bid per ortho   GI Prophylaxis: none  Code: full   Disposition: goal for home   ELOS:2 weeks  Follow up Appointments on Discharge:  Pcp, ortho     discussed with Dr. Angel  PM&R Staff Physician    Emma Gutierrez PA-C  Physical Medicine & Rehabilitation

## 2023-11-17 NOTE — PLAN OF CARE
"Discharge Planner Post-Acute Rehab PT:     Discharge Plan: home with HHPT    Precautions:   -\"50% weight bearing on right. Limit ambulation to max 30 feet\"  -no R hip active abduction or external rotation  -indwelling granados catheter    Current Status:  Bed Mobility: SBA with leg   Transfer: SBA with FWW  Gait: SBA 20-30 ft with FWW. IND w/c based for longer distances  Stairs: activity not applicable   Balance: limited by balance, weakness, and surgical precautions    Outcome Measures:   TU sec with FWW on   10MWT: Comfortable: 0.13 m/sec with FWW (withheld fast walking speed due to PA order to keep ambulation distances to 20-30 ft) on     Assessment: Discussed discharge as early as  d/t unexpected fast progress. Pt will speak with friend Esvin to arrange for discharge ride home. Team to assess for MOD I once room change is complete tonight    Unfortunately, his wheelchair order was declined by insurance with explanation \"[facility] will not pay for wheelchairs when patient can walk.\" He will utilize the 16\" wheelchair he owns, and was educated to avoid prolonged sitting in it for safety around R lateral hip incision.    Other Barriers to Discharge (DME, Family Training, etc):   20\" Wheelchair denied by insurance, pt will need to utilize previous 16\" wheelchair and minimize time sitting in it  "

## 2023-11-18 ENCOUNTER — APPOINTMENT (OUTPATIENT)
Dept: OCCUPATIONAL THERAPY | Facility: CLINIC | Age: 78
DRG: 561 | End: 2023-11-18
Attending: PHYSICAL MEDICINE & REHABILITATION
Payer: COMMERCIAL

## 2023-11-18 ENCOUNTER — APPOINTMENT (OUTPATIENT)
Dept: PHYSICAL THERAPY | Facility: CLINIC | Age: 78
DRG: 561 | End: 2023-11-18
Attending: PHYSICAL MEDICINE & REHABILITATION
Payer: COMMERCIAL

## 2023-11-18 PROCEDURE — 97535 SELF CARE MNGMENT TRAINING: CPT | Mod: GO

## 2023-11-18 PROCEDURE — 97110 THERAPEUTIC EXERCISES: CPT | Mod: GP

## 2023-11-18 PROCEDURE — 97530 THERAPEUTIC ACTIVITIES: CPT | Mod: GP

## 2023-11-18 PROCEDURE — 128N000003 HC R&B REHAB

## 2023-11-18 PROCEDURE — 99232 SBSQ HOSP IP/OBS MODERATE 35: CPT | Performed by: PHYSICAL MEDICINE & REHABILITATION

## 2023-11-18 PROCEDURE — 97530 THERAPEUTIC ACTIVITIES: CPT | Mod: GO

## 2023-11-18 PROCEDURE — 97110 THERAPEUTIC EXERCISES: CPT | Mod: GO

## 2023-11-18 PROCEDURE — 250N000013 HC RX MED GY IP 250 OP 250 PS 637: Performed by: PHYSICIAN ASSISTANT

## 2023-11-18 RX ADMIN — SIMVASTATIN 20 MG: 20 TABLET, FILM COATED ORAL at 20:30

## 2023-11-18 RX ADMIN — ACETAMINOPHEN 975 MG: 325 TABLET, FILM COATED ORAL at 20:30

## 2023-11-18 RX ADMIN — ACETAMINOPHEN 975 MG: 325 TABLET, FILM COATED ORAL at 14:14

## 2023-11-18 RX ADMIN — POLYETHYLENE GLYCOL 3350 17 G: 17 POWDER, FOR SOLUTION ORAL at 08:36

## 2023-11-18 RX ADMIN — ASPIRIN 81 MG: 81 TABLET, COATED ORAL at 20:30

## 2023-11-18 RX ADMIN — ACETAMINOPHEN 975 MG: 325 TABLET, FILM COATED ORAL at 08:36

## 2023-11-18 RX ADMIN — CYANOCOBALAMIN TAB 1000 MCG 1000 MCG: 1000 TAB at 08:37

## 2023-11-18 RX ADMIN — Medication 25 MCG: at 08:37

## 2023-11-18 RX ADMIN — TAMSULOSIN HYDROCHLORIDE 0.4 MG: 0.4 CAPSULE ORAL at 08:37

## 2023-11-18 RX ADMIN — ASPIRIN 81 MG: 81 TABLET, COATED ORAL at 08:36

## 2023-11-18 RX ADMIN — PREGABALIN 100 MG: 100 CAPSULE ORAL at 20:30

## 2023-11-18 ASSESSMENT — ACTIVITIES OF DAILY LIVING (ADL)
ADLS_ACUITY_SCORE: 42
ADLS_ACUITY_SCORE: 42
ADLS_ACUITY_SCORE: 36
ADLS_ACUITY_SCORE: 42
ADLS_ACUITY_SCORE: 36
ADLS_ACUITY_SCORE: 36
ADLS_ACUITY_SCORE: 42
ADLS_ACUITY_SCORE: 36

## 2023-11-18 NOTE — PLAN OF CARE
"Goal Outcome Evaluation:                    Discharge Planner Post-Acute Rehab PT:      Discharge Plan: home with HHPT     Precautions:   -\"50% weight bearing on right. Limit ambulation to max 30 feet\"  -no R hip active abduction or external rotation  -indwelling granados catheter     Current Status:  Bed Mobility: SBA with leg   Transfer: SBA with FWW  Gait: SBA 20-30 ft with FWW. IND w/c based for longer distances  Stairs: activity not applicable   Balance: limited by balance, weakness, and surgical precautions     Outcome Measures:   TU sec with FWW on   10MWT: Comfortable: 0.13 m/sec with FWW (withheld fast walking speed due to PA order to keep ambulation distances to 20-30 ft) on      Assessment: pt motivated but limited by R hip precautions and weight bearing status.  Focus on sit to stand transfers to engage more of L lower extremity for transfer vs using upper body to pull to standing.      Unfortunately, his wheelchair order was declined by insurance with explanation \"[facility] will not pay for wheelchairs when patient can walk.\" He will utilize the 16\" wheelchair he owns, and was educated to avoid prolonged sitting in it for safety around R lateral hip incision.     Other Barriers to Discharge (DME, Family Training, etc):   20\" Wheelchair denied by insurance, pt will need to utilize previous 16\" wheelchair and minimize time sitting in it               "

## 2023-11-18 NOTE — PLAN OF CARE
FOCUS/GOAL  Bladder management, Psychosocial needs, and Safety management    ASSESSMENT, INTERVENTIONS AND CONTINUING PLAN FOR GOAL:  Pt is alert and oriented, slept throughout the evening and up early in the morning during the shift.  Has had continual concerns about retaining urine.  Urinated twice during the shift, bladder scans were 174 & 198.  Continent of bowel and bladder; LBM 11/18.  Transfers assist of 1 stand/pivot with walker/to wheelchair.      Goal Outcome Evaluation:

## 2023-11-18 NOTE — PLAN OF CARE
Discharge Planner Post-Acute Rehab OT:      Discharge Plan: Home with resumed HH therapy services     Precautions: Partial (50%) WB on RLE, Posterior hip precautions, Fall Risk, (Pt will only be walking into his bathroom at home, otherwise will be in his w/c most of the time to allow for hip to heal).     Current Status:  ADLs:  Mobility: CGA from EOB<>Toilet using fww   Grooming: Mod I seated in w/c at sink  Dressing: UBD indep. After setup: continue; LBD: modA with initiating LEs into pants, CGA with pulling over hips standing with FWW/grabar; Footwear: maxA with R comp. Garment and  socks after shower   Bathing: continue (w/c to ETB using gb)  Toileting: Min A on/off toilet with gb and cga seated pericare, Max A LBD w/o AE  IADLs: Only has to do medications w pill box, get's HH services/meals on wheels/cleaning services for the rest   Vision/Cognition: Wears glasses at baseline, cognition intact      Assessment: Patient completed tub transfer with bench. Also focus of session on UB/ strengthening and endurance exercises.  Other Barriers to Discharge (DME, Family Training, etc):   Continue-- owns all AE and DME. Will need a bigger w/c per PT

## 2023-11-18 NOTE — PROGRESS NOTES
[facility], [place]   Physical Medicine and Rehabilitation Daily Note           Assessment and Plan of Care:   Genaro Ortiz is a 78 year old male with past medical history of MGUS, HTN, HLD, prostate cancer, and OA s/p bilateral hip arthroplasties 7/2023 complicated by periprosthetic fracture 9/2023 ultimately underwent ORIF 11/10/23. Concern for aspiration during operation, otherwise course complicated by urinary retention, and acute anemia. Functionally noted to have impaired strength, impaired balance, impaired activity tolerance, with 50% right lower extremity weight bearing.  Admitted to rehab 11/14/23.     --Vitals stable. No labs today.  --Continue with TOV - may require training on SIC for discharge depending on urinary status at time of discharge. Ability to urinate has been variable.   --Continue ongoing medical management.  --Continue therapies and plan of care.             Interval history:   Patient seen and examined at bedside. Per nursing report, no acute events overnight. Per sign out report, patient has been attempting a TOV, and therefore output is being monitored closely. Per chart review, he is voiding with most recent PVRs of 174 and 198.     Today, patient states that he is not able to urinate much today. He states that yesterday was a good day, but today it is much more difficult for him to go. This is causing him some anxiety, because he is worried about what he might do if he gets home and cannot void. Denies fever, chills, CP, SOB, N/V, abdominal pain, new pain or weakness/numbness/tingling. No pain.     The patient is fully participating in therapies and is making progress.           Physical Exam:   VS:   Vitals:    11/17/23 0755 11/17/23 1626 11/17/23 2001 11/18/23 0645   BP: 118/57 132/72 121/61 136/65   BP Location: Right arm Right arm Right arm Left arm   Patient Position: Semi-Ferguson's  Semi-Ferguson's    Cuff Size: Adult Regular  Adult Regular     Pulse: 78 74 76 66   Resp: 18 18 16 16   Temp: 97.9  F (36.6  C) 97.6  F (36.4  C) 97.7  F (36.5  C) 97.5  F (36.4  C)   TempSrc: Oral Oral Oral Oral   SpO2: 96% 98% 96% 98%   Weight:       Height:         Gen: NAD, sitting in manual wc  Lungs: breathing unlabored on room air  Abd: soft and non-tender  Ext: mild edema in BLE, no calf tenderness  MSK/neuro: Alert, speech fluent, moves all four extremities volitionally, ronny hose on right lower extremity.          Data:   Scheduled meds   acetaminophen  975 mg Oral TID    aspirin  81 mg Oral BID    cyanocobalamin  1,000 mcg Oral Daily    polyethylene glycol  17 g Oral Daily    pregabalin  100 mg Oral QPM    simvastatin  20 mg Oral At Bedtime    tamsulosin  0.4 mg Oral Daily    Vitamin D3  25 mcg Oral Daily       PRN meds:  lidocaine, methocarbamol, naloxone **OR** naloxone **OR** naloxone **OR** naloxone, oxyCODONE, senna-docusate      Ava Maravilla MD  Physical Medicine and Rehabilitation     I spent a total of 25 minutes face-to-face and managing the care of Genaro Ortiz. Over 50% of my time on the unit was spent counseling the patient and coordinating care. Please see note for details.

## 2023-11-18 NOTE — PLAN OF CARE
Goal Outcome Evaluation:    Outcome Evaluation: Pt AxO, able to make needs known. Pleasant and cooperative with cares and assessments. VSS, denied pain. Able to take his pills whole with this liquids. A1 SPT to w/c, bed, toilet. Able to use the urinal and toilet. Had BM this shift. Participated in therapies. Continue with POC.

## 2023-11-18 NOTE — PLAN OF CARE
Goal Outcome Evaluation:  Pt is alert and oriented x 4, he denies pain or discomfort. Dressing to the right hip is clean, dry, and intact. It is due to remove and leave open to air. We will continue with current plan of care.

## 2023-11-19 ENCOUNTER — APPOINTMENT (OUTPATIENT)
Dept: OCCUPATIONAL THERAPY | Facility: CLINIC | Age: 78
DRG: 561 | End: 2023-11-19
Attending: PHYSICAL MEDICINE & REHABILITATION
Payer: COMMERCIAL

## 2023-11-19 ENCOUNTER — APPOINTMENT (OUTPATIENT)
Dept: PHYSICAL THERAPY | Facility: CLINIC | Age: 78
DRG: 561 | End: 2023-11-19
Attending: PHYSICAL MEDICINE & REHABILITATION
Payer: COMMERCIAL

## 2023-11-19 PROCEDURE — 97150 GROUP THERAPEUTIC PROCEDURES: CPT | Mod: GP | Performed by: PHYSICAL THERAPIST

## 2023-11-19 PROCEDURE — 250N000013 HC RX MED GY IP 250 OP 250 PS 637: Performed by: PHYSICIAN ASSISTANT

## 2023-11-19 PROCEDURE — 97530 THERAPEUTIC ACTIVITIES: CPT | Mod: GP

## 2023-11-19 PROCEDURE — 97110 THERAPEUTIC EXERCISES: CPT | Mod: GP

## 2023-11-19 PROCEDURE — 128N000003 HC R&B REHAB

## 2023-11-19 PROCEDURE — 97110 THERAPEUTIC EXERCISES: CPT | Mod: GO

## 2023-11-19 RX ADMIN — POLYETHYLENE GLYCOL 3350 17 G: 17 POWDER, FOR SOLUTION ORAL at 09:14

## 2023-11-19 RX ADMIN — PREGABALIN 100 MG: 100 CAPSULE ORAL at 20:45

## 2023-11-19 RX ADMIN — ACETAMINOPHEN 975 MG: 325 TABLET, FILM COATED ORAL at 09:14

## 2023-11-19 RX ADMIN — CYANOCOBALAMIN TAB 1000 MCG 1000 MCG: 1000 TAB at 09:13

## 2023-11-19 RX ADMIN — SIMVASTATIN 20 MG: 20 TABLET, FILM COATED ORAL at 20:45

## 2023-11-19 RX ADMIN — ACETAMINOPHEN 975 MG: 325 TABLET, FILM COATED ORAL at 13:43

## 2023-11-19 RX ADMIN — Medication 25 MCG: at 09:14

## 2023-11-19 RX ADMIN — TAMSULOSIN HYDROCHLORIDE 0.4 MG: 0.4 CAPSULE ORAL at 09:14

## 2023-11-19 RX ADMIN — ASPIRIN 81 MG: 81 TABLET, COATED ORAL at 09:13

## 2023-11-19 RX ADMIN — ACETAMINOPHEN 975 MG: 325 TABLET, FILM COATED ORAL at 20:45

## 2023-11-19 RX ADMIN — ASPIRIN 81 MG: 81 TABLET, COATED ORAL at 20:45

## 2023-11-19 ASSESSMENT — ACTIVITIES OF DAILY LIVING (ADL)
ADLS_ACUITY_SCORE: 40
ADLS_ACUITY_SCORE: 40
ADLS_ACUITY_SCORE: 36
ADLS_ACUITY_SCORE: 40
ADLS_ACUITY_SCORE: 36
ADLS_ACUITY_SCORE: 40
ADLS_ACUITY_SCORE: 36
ADLS_ACUITY_SCORE: 40
ADLS_ACUITY_SCORE: 40
ADLS_ACUITY_SCORE: 36
ADLS_ACUITY_SCORE: 36
ADLS_ACUITY_SCORE: 40

## 2023-11-19 NOTE — PLAN OF CARE
"Discharge Planner Post-Acute Rehab PT:     Discharge Plan: home with HHPT    Precautions:   -\"50% weight bearing on right. Limit ambulation to max 30 feet\"  -no R hip active abduction or external rotation  -indwelling granados catheter    Current Status:  Bed Mobility: SBA with leg   Transfer: SBA with FWW  Gait: SBA 20-30 ft with FWW. IND w/c based for longer distances  Stairs: activity not applicable   Balance: limited by balance, weakness, and surgical precautions    Outcome Measures:   TU sec with FWW on   10MWT: Comfortable: 0.13 m/sec with FWW (withheld fast walking speed due to PA order to keep ambulation distances to 20-30 ft) on     Assessment: Initiated supine TE (completed seated TE earlier) and tolerated well.  Limited range in B legs in both hips and knees.  JOHNNY precautions followed.  Repetitive sit<>stand and stand pivot transfers with SBA.  Can ambulate 12 feet with RW but wheelchair bound for long distances. Would like to revisit  discharge from a safety and comfort aspect tomorrow during rounds.     Other Barriers to Discharge (DME, Family Training, etc):   20\" Wheelchair denied by insurance, pt will need to utilize previous 16\" wheelchair and minimize time sitting in it  "

## 2023-11-19 NOTE — PLAN OF CARE
Discharge Planner Post-Acute Rehab OT:      Discharge Plan: Home with resumed HH therapy services     Precautions: Partial (50%) WB on RLE, Posterior hip precautions, Fall Risk, (Pt will only be walking into his bathroom at home, otherwise will be in his w/c most of the time to allow for hip to heal).     Current Status:  ADLs:  Mobility: CGA from EOB<>Toilet using fww , IND w/c propulsion  Grooming: Mod I seated in w/c at sink  Dressing: UBD indep. After setup: continue; LBD: modA with initiating LEs into pants, CGA with pulling over hips standing with FWW/grabar; Footwear: maxA with R comp. Garment and  socks after shower   Bathing: continue (w/c to ETB using gb)  Toileting: Min A on/off toilet with gb and cga seated pericare, Max A LBD w/o AE  IADLs: Only has to do medications w pill box, get's HH services/meals on wheels/cleaning services for the rest   Vision/Cognition: Wears glasses at baseline, cognition intact      Assessment: Session focused on progressing BUE strength for functional transfers and ADL/IADL routines. Pt continuing to report fatigue with BUE exercises with 5lb weight but pt reporting noticing progression.    Other Barriers to Discharge (DME, Family Training, etc):   Continue-- owns all AE and DME. Will need a bigger w/c per PT

## 2023-11-19 NOTE — PLAN OF CARE
Goal Outcome Evaluation:      Plan of Care Reviewed With: patient    Overall Patient Progress: improvingOverall Patient Progress: improving    Pt is alert and oriented x 4, he denies pain or discomfort. On PVR check, last PVR-171 after voiding. Urinated x3 during the shift per pt report. Incision to the R-hip is open to air.

## 2023-11-19 NOTE — PLAN OF CARE
Goal Outcome Evaluation:       Patient here S/P  right Hip fracture, alert and oriented, able to make needs known  Slept most of the night  No complained of pain, headache, chest pain, N&V, no SOB  Continent of Bladder, still having retention PVR's above 100's, no BM   Safety rounding checked completed, 3 side rails UP, bed alarm ON, call light/bedside table within reach  Plan of Care ongoing

## 2023-11-20 ENCOUNTER — APPOINTMENT (OUTPATIENT)
Dept: OCCUPATIONAL THERAPY | Facility: CLINIC | Age: 78
DRG: 561 | End: 2023-11-20
Attending: PHYSICAL MEDICINE & REHABILITATION
Payer: COMMERCIAL

## 2023-11-20 ENCOUNTER — APPOINTMENT (OUTPATIENT)
Dept: PHYSICAL THERAPY | Facility: CLINIC | Age: 78
DRG: 561 | End: 2023-11-20
Attending: PHYSICAL MEDICINE & REHABILITATION
Payer: COMMERCIAL

## 2023-11-20 LAB
ANION GAP SERPL CALCULATED.3IONS-SCNC: 6 MMOL/L (ref 7–15)
BUN SERPL-MCNC: 16.4 MG/DL (ref 8–23)
CALCIUM SERPL-MCNC: 8.6 MG/DL (ref 8.8–10.2)
CHLORIDE SERPL-SCNC: 105 MMOL/L (ref 98–107)
CREAT SERPL-MCNC: 0.79 MG/DL (ref 0.67–1.17)
DEPRECATED HCO3 PLAS-SCNC: 26 MMOL/L (ref 22–29)
EGFRCR SERPLBLD CKD-EPI 2021: >90 ML/MIN/1.73M2
ERYTHROCYTE [DISTWIDTH] IN BLOOD BY AUTOMATED COUNT: 16.7 % (ref 10–15)
GLUCOSE SERPL-MCNC: 106 MG/DL (ref 70–99)
HCT VFR BLD AUTO: 27.5 % (ref 40–53)
HGB BLD-MCNC: 8.8 G/DL (ref 13.3–17.7)
MCH RBC QN AUTO: 26.7 PG (ref 26.5–33)
MCHC RBC AUTO-ENTMCNC: 32 G/DL (ref 31.5–36.5)
MCV RBC AUTO: 83 FL (ref 78–100)
PLATELET # BLD AUTO: 320 10E3/UL (ref 150–450)
POTASSIUM SERPL-SCNC: 3.9 MMOL/L (ref 3.4–5.3)
RBC # BLD AUTO: 3.3 10E6/UL (ref 4.4–5.9)
SODIUM SERPL-SCNC: 137 MMOL/L (ref 135–145)
WBC # BLD AUTO: 7 10E3/UL (ref 4–11)

## 2023-11-20 PROCEDURE — 99232 SBSQ HOSP IP/OBS MODERATE 35: CPT | Mod: FS | Performed by: PHYSICIAN ASSISTANT

## 2023-11-20 PROCEDURE — 85027 COMPLETE CBC AUTOMATED: CPT | Performed by: PHYSICIAN ASSISTANT

## 2023-11-20 PROCEDURE — 36415 COLL VENOUS BLD VENIPUNCTURE: CPT | Performed by: PHYSICIAN ASSISTANT

## 2023-11-20 PROCEDURE — 97110 THERAPEUTIC EXERCISES: CPT | Mod: GP | Performed by: PHYSICAL THERAPIST

## 2023-11-20 PROCEDURE — 97535 SELF CARE MNGMENT TRAINING: CPT | Mod: GO | Performed by: OCCUPATIONAL THERAPIST

## 2023-11-20 PROCEDURE — 97530 THERAPEUTIC ACTIVITIES: CPT | Mod: GP | Performed by: PHYSICAL THERAPIST

## 2023-11-20 PROCEDURE — 97110 THERAPEUTIC EXERCISES: CPT | Mod: GO | Performed by: OCCUPATIONAL THERAPIST

## 2023-11-20 PROCEDURE — 250N000013 HC RX MED GY IP 250 OP 250 PS 637: Performed by: PHYSICIAN ASSISTANT

## 2023-11-20 PROCEDURE — 80048 BASIC METABOLIC PNL TOTAL CA: CPT | Performed by: PHYSICIAN ASSISTANT

## 2023-11-20 PROCEDURE — 128N000003 HC R&B REHAB

## 2023-11-20 RX ORDER — ACETAMINOPHEN 325 MG/1
975 TABLET ORAL EVERY 8 HOURS PRN
Status: DISCONTINUED | OUTPATIENT
Start: 2023-11-20 | End: 2023-11-21 | Stop reason: HOSPADM

## 2023-11-20 RX ORDER — ASPIRIN 81 MG/1
81 TABLET ORAL 2 TIMES DAILY
COMMUNITY
Start: 2023-11-20

## 2023-11-20 RX ORDER — PREGABALIN 100 MG/1
100 CAPSULE ORAL EVERY EVENING
COMMUNITY
Start: 2023-11-20 | End: 2023-12-08

## 2023-11-20 RX ADMIN — ACETAMINOPHEN 975 MG: 325 TABLET, FILM COATED ORAL at 08:06

## 2023-11-20 RX ADMIN — SIMVASTATIN 20 MG: 20 TABLET, FILM COATED ORAL at 21:00

## 2023-11-20 RX ADMIN — TAMSULOSIN HYDROCHLORIDE 0.4 MG: 0.4 CAPSULE ORAL at 08:06

## 2023-11-20 RX ADMIN — ASPIRIN 81 MG: 81 TABLET, COATED ORAL at 08:06

## 2023-11-20 RX ADMIN — ACETAMINOPHEN 975 MG: 325 TABLET, FILM COATED ORAL at 16:49

## 2023-11-20 RX ADMIN — CYANOCOBALAMIN TAB 1000 MCG 1000 MCG: 1000 TAB at 08:06

## 2023-11-20 RX ADMIN — Medication 25 MCG: at 08:06

## 2023-11-20 RX ADMIN — PREGABALIN 100 MG: 100 CAPSULE ORAL at 21:00

## 2023-11-20 RX ADMIN — ASPIRIN 81 MG: 81 TABLET, COATED ORAL at 21:00

## 2023-11-20 RX ADMIN — POLYETHYLENE GLYCOL 3350 17 G: 17 POWDER, FOR SOLUTION ORAL at 08:06

## 2023-11-20 ASSESSMENT — ACTIVITIES OF DAILY LIVING (ADL)
ADLS_ACUITY_SCORE: 40

## 2023-11-20 NOTE — PLAN OF CARE
FOCUS/GOAL  Medical management    ASSESSMENT, INTERVENTIONS AND CONTINUING PLAN FOR GOAL:  Pt is seen sleeping during shift change and safety round checks. Independently use the urinal. Check for PVR, still above 300cc. Encourage double void. Pt denies pain. Encourage to drink more fluids when up during the day and use the BR to void. Incision to rt hip is EMETERIO. Cont w/ POC.  Goal Outcome Evaluation:

## 2023-11-20 NOTE — PROGRESS NOTES
SPIRITUAL HEALTH SERVICES Progress Note  [facility] ([facility]) Acute Rehab    Follow-up  visit with pt, who is scheduled to discharge tomorrow. Pt said he feels ready for discharge, and feels good about the support he will receive at home. Prayer was welcomed. No further needs before discharge.     James Jang) Archie Hidalgo M.Div., [facility]  Staff   Pager [phone]      * [facility] remains available 24/7 for emergent requests/referrals, either by having the switchboard page the on-call  or by entering an ASAP/STAT consult in Epic (this will also page the on-call ). Routine Epic consults receive an initial response within 24 hours.*

## 2023-11-20 NOTE — PLAN OF CARE
"Physical Therapy Discharge Summary    Reason for therapy discharge:    Discharged to home with home therapy.    Progress towards therapy goal(s). See goals on Care Plan in [facility] electronic health record for goal details.  Goals met    Bed Mobility: IND  Transfer: Mod I with FWW  Gait: Mod I with FWW up to 30 ft. IND w/c based for longer distances  Stairs: activity not applicable   Balance: limited by balance, weakness, and surgical precautions     Outcome Measures:   TU sec with FWW on   10MWT: Comfortable: 0.13 m/sec with FWW (withheld fast walking speed due to PA order to keep ambulation distances to 20-30 ft) on     Precautions:   -\"50% weight bearing on right. Limit ambulation to max 30 feet\"  -no R hip active abduction or external rotation    Therapy recommendation(s):    Continued therapy is recommended.  Rationale/Recommendations:  To address ongoing pain, decreased ROM/strength/balance, for improved safety and IND with functional mobility, ADLs, and IADLs and to reduce risk of future falls. Pt has a FWW and lightweight manual WC available for use at home. Pt has been issued a HEP to address above impairments/functional limitations.      "

## 2023-11-20 NOTE — PROGRESS NOTES
"SW notified that pt's discharge was moved up to tomorrow, Tues 11/21/23. SW sent secure email to [facility] Liaison with update and request that [facility] pulls orders from [facility] and addresses the shower situation (from PTA) with pt. Also confirmed RN, PT, OT, and HA services. [facility] confirmed and acknowledged the email from this writer. Information about pt HC in pt AVS.     Sent secure email with discharge update to PCP CC listed below.     Pt expressed worry about getting to an apt, given his urinary retention, if an apt is same day and without enough time to coordinate a ride with a friend of [employer]. Discussed Uber/Lyft and provided pt with a list of transportation companies (with w/c accessibility). Pt expressed appreciation. Also reminded pt of 24/7 RN hotline with HC if concerns arise. Pt will have his friend pick him up tomorrow, ride time pending recommendation from the team. Completed IRF questions and IMM with pt. Pt denied any SW needs, questions, or concerns. No additional needs but SW available if needs arise.     Home Health Care:   [facility] ([place])  PH: [phone], Fax: [phone]   RN, PT, OT, & VALDERRAMA  [place] Liaison, E: [email or web address]    Primary Care Care Coordinator:   Ava Tian RN, BSN, PHN  PH: [phone]  E-mail: [email or web address]     IRF-ZAHRA Pain Assessment  Pain Effect on Sleep  \"Over the past 5 days, how much of the time has pain made it hard for you to sleep at night?\"    0. Does not apply - I have not had any pain or hurting in the past 5 days    Krystyna Miller, [facility] Acute Rehab   Direct Phone: [phone]  I   Pager: [phone]  I  Fax: [phone]      "

## 2023-11-20 NOTE — PROGRESS NOTES
"  [facility]   Acute Rehabilitation Unit  Daily progress note    INTERVAL HISTORY  Genaro Ortiz was seen sitting up in chair, he was made modified independent in room per therapy today.  He is overall feeling well, denies n/v/d, sob, headache, dizziness and fevers.  He is having nocturia and some ongoing urinary retention though volumes typically 100-200 ml.  We discussed increasing am hydration and limiting as it gets later in day, assuring empty bladder before bed.  Also consider follow up with urology if ongoing night time urinary frequency continues.     We reviewed medications for discharge and recommended follow up.  Plans to resume home care upon discharge.  Genaro has made progress during stay and function improved from prior discharge.     Per OT  assessment: Pt completed IND day shower this day with Supervision. Recommending continued OT and HH to assist with bathing upon return home. Pt now Mod I in room.     MEDICATIONS   aspirin  81 mg Oral BID    cyanocobalamin  1,000 mcg Oral Daily    polyethylene glycol  17 g Oral Daily    pregabalin  100 mg Oral QPM    simvastatin  20 mg Oral At Bedtime    tamsulosin  0.4 mg Oral Daily    Vitamin D3  25 mcg Oral Daily        acetaminophen, lidocaine, naloxone **OR** naloxone **OR** naloxone **OR** naloxone, senna-docusate     PHYSICAL EXAM  /65 (BP Location: Left arm)   Pulse 84   Temp 97.7  F (36.5  C) (Oral)   Resp 16   Ht 1.753 m (5' 9\")   Wt 96.8 kg (213 lb 6.5 oz)   SpO2 97%   BMI 31.51 kg/m    Gen: awake alert nad  HEENT: mmm wearing glasses  Pulm: non labored on room air  Abd: non distended  Ext: mild R le edema, trace lle edema  Neuro/MSK: alert speech clear moves upper extremities against gravity, sensation intact    LABS  CBC RESULTS:   Recent Labs   Lab Test 11/20/23  0544 11/17/23  0706 11/16/23  0538 11/12/23  1239 11/11/23  0748 10/13/23  1321   WBC 7.0  --  6.2  --   --  6.3   RBC 3.30*  --  3.32*  -- "   --  4.25*   HGB 8.8* 8.8* 8.8* 9.8*   < > 11.6*   HCT 27.5*  --  27.5* 30.2*  --  35.9*   MCV 83  --  83  --   --  85   MCH 26.7  --  26.5  --   --  27.3   MCHC 32.0  --  32.0  --   --  32.3   RDW 16.7*  --  16.5*  --   --  15.3*     --  254  --   --  317    < > = values in this interval not displayed.     Last Basic Metabolic Panel:  Recent Labs   Lab Test 11/20/23  0544 11/16/23  0538 11/12/23  0606 11/10/23  1028 10/13/23  1321    137  --   --  135   POTASSIUM 3.9 4.0  --   --  3.9   CHLORIDE 105 104  --   --  100   CO2 26 26  --   --  21*   ANIONGAP 6* 7  --   --  14   * 109* 122*   < > 111*   BUN 16.4 20.9  --   --  28.7*   CR 0.79 0.77  --   --  0.99   GFRESTIMATED >90 >90  --   --  79   MAX 8.6* 8.8  --   --  9.7    < > = values in this interval not displayed.         Rehabilitation - continue comprehensive acute inpatient rehabilitation program with multidisciplinary approach including therapies, rehab nursing, and physiatry following. See interval history for updates.      ASSESSMENT AND PLAN  Genaro Ortiz is a 78 year old man with past medical history of MGUS, HTN, HLD, prostate cancer, and OA s/p bilateral hip arthroplasties 7/2023 complicated by periprosthetic fracture 9/2023 ultimately underwent ORIF 11/10/23.  Concern for aspiration during operation, otherwise course complicated by urinary retention, and acute anemia.  Functionally noted to have impaired strength, impaired balance, impaired activity tolerance, with 50% right lower extremity weight bearing.  Admitted to rehab 11/14/23.     ORIF right greater trochanter fracture 11/10  Per Dr. Escamilla  -asa 81 mg bid x 4 weeks (from 11/10)  -RLE 50% weightbearing  -Right AFO to prevent external rotation/ no active abduction  -continue PT/OT- resume home care.   -follow up Dr. Escamilla in 4 weeks  -leave incision open to air- ok to shower- keep incision clean and dry   -continue tylenol prn. lyrica (prior to admission 225 at bedtime,  reduce to 100 mg at bedtime- not consistently taking at home)- monitor     History of prostate cancer  History of urinary retention  -continue flomax  -granados in place on ARU arrival trial of void 11/16. With small volume voids and post void residuals 100-200 ml typically.       Acute on Chronic Anemia  Hgb 9.8 11/12-->8.8 stable 11/20.   -trend     HLD  -continue statin      Adjustment to disability:  monitor  FEN: reg  Bowel: monitor  Bladder: trial of void- straight cath as indicated.   DVT Prophylaxis:asa bid per ortho   GI Prophylaxis: none  Code: full   Disposition: home  ELOS:11/21  Follow up Appointments on Discharge:  Pcp, ortho     discussed with Dr. Angel  PM&R Staff Physician    Emma Gutierrez PA-C  Physical Medicine & Rehabilitation

## 2023-11-20 NOTE — PLAN OF CARE
"Nursing Plan of Care Update    Plan of Care Reviewed With: patient     Overall Patient Progress: improving    FOCUS/GOAL  Bowel management, Bladder management, Nutrition/Feeding/Swallowing precautions, Medication management, Pain management, Wound care management, Medical management, Discharge planning, Mobility, Skin integrity, Cognition/Memory/Judgment/Problem solving, Equipment/Assistive devices, Reinforcement of self-care/ADL, Carryover of rehab techniques, Psychosocial needs, Safety management, Prevention of secondary complications, Discharge and Relevant Education, Unit Routine Orientation and Adjustment to lifestyle change     ASSESSMENT, INTERVENTIONS AND CONTINUING PLAN FOR GOAL:    No acute events during shift. Patient remains oriented to person, place, time, situation and is call light appropriate/able to make needs known. Pleasant and looking forward to discharging home tomorrow. Patient denied pain but took one dose Tylenol for \"general discomfort.\" Patient advanced to MOD I in room per PA.  50% weight bearing on right.  Limited ambulation to max of 30 feet per order. Good appetite on combination regular diet/thin liquids. No glucose checks ordered. No signs of hypoglycemia noted. Patient expressed ongoing retention continues but post void residual checks show that retained urine is typically less than 300ml. PVR late in evening showed <140mL urine retained in bladder. LBM: 11/20. Pt takes daily aspirin, moves about in his room frequently and showered today. R hip remains EMETERIO, approximated, clean, dry and intact.     Patient Vitals for the past 24 hrs:   BP Temp Temp src Pulse Resp SpO2   11/20/23 1656 136/43 97.5  F (36.4  C) Oral 80 16 97 %   11/20/23 0700 131/65 97.7  F (36.5  C) Oral 84 16 97 %      Handoff given to oncoming RN. VS stable and trended. No signs/symptoms of hypoglycemia. Call light within reach at all times. Can make needs known. Continue with current plan of care.          "

## 2023-11-20 NOTE — PLAN OF CARE
Goal Outcome Evaluation:      Plan of Care Reviewed With: patient    Overall Patient Progress: improvingOverall Patient Progress: improving    Pt is alert and oriented x 4, able to make needs known, he denies pain or discomfort. On PVR check, last PVR-115 after voiding.  Incision to the R-hip is open to air. Pt assist x1 with walker/ gait belt up to the toilet and was continent to bowel/ bladder. Urinal utilized during the shift. Pt discharging tomorrow.

## 2023-11-21 ENCOUNTER — APPOINTMENT (OUTPATIENT)
Dept: OCCUPATIONAL THERAPY | Facility: CLINIC | Age: 78
DRG: 561 | End: 2023-11-21
Attending: PHYSICAL MEDICINE & REHABILITATION
Payer: COMMERCIAL

## 2023-11-21 ENCOUNTER — PATIENT OUTREACH (OUTPATIENT)
Dept: CARE COORDINATION | Facility: CLINIC | Age: 78
End: 2023-11-21

## 2023-11-21 VITALS
RESPIRATION RATE: 18 BRPM | OXYGEN SATURATION: 96 % | BODY MASS INDEX: 31.61 KG/M2 | SYSTOLIC BLOOD PRESSURE: 139 MMHG | HEART RATE: 75 BPM | TEMPERATURE: 98 F | HEIGHT: 69 IN | WEIGHT: 213.41 LBS | DIASTOLIC BLOOD PRESSURE: 64 MMHG

## 2023-11-21 PROCEDURE — 97110 THERAPEUTIC EXERCISES: CPT | Mod: GO | Performed by: OCCUPATIONAL THERAPIST

## 2023-11-21 PROCEDURE — 250N000013 HC RX MED GY IP 250 OP 250 PS 637: Performed by: PHYSICIAN ASSISTANT

## 2023-11-21 PROCEDURE — 97535 SELF CARE MNGMENT TRAINING: CPT | Mod: GO | Performed by: OCCUPATIONAL THERAPIST

## 2023-11-21 PROCEDURE — 99239 HOSP IP/OBS DSCHRG MGMT >30: CPT | Mod: FS | Performed by: PHYSICAL MEDICINE & REHABILITATION

## 2023-11-21 RX ADMIN — POLYETHYLENE GLYCOL 3350 17 G: 17 POWDER, FOR SOLUTION ORAL at 09:17

## 2023-11-21 RX ADMIN — CYANOCOBALAMIN TAB 1000 MCG 1000 MCG: 1000 TAB at 09:18

## 2023-11-21 RX ADMIN — TAMSULOSIN HYDROCHLORIDE 0.4 MG: 0.4 CAPSULE ORAL at 09:17

## 2023-11-21 RX ADMIN — ASPIRIN 81 MG: 81 TABLET, COATED ORAL at 09:18

## 2023-11-21 RX ADMIN — Medication 25 MCG: at 09:17

## 2023-11-21 ASSESSMENT — ACTIVITIES OF DAILY LIVING (ADL)
ADLS_ACUITY_SCORE: 40

## 2023-11-21 NOTE — PLAN OF CARE
"Occupational Therapy Discharge Summary     Reason for therapy discharge:    Discharged to home with home therapy.     Progress towards therapy goal(s). See goals on Care Plan in [facility] electronic health record for goal details.  Goals met     ADLs:  Mobility: Mod I in w/c and SPT  Grooming: Mod I seated in w/c at sink  Dressing: UBD: IND seated; LBD: mod I using dressing AE  Bathing: Supervision   Toileting: Mod I using FWW on/off raised toilet, IND seated pericare   IADLs: Only has to do medications w pill box, get's HH services/meals on wheels/cleaning services for the rest   Vision/Cognition: Wears glasses at baseline, cognition intact       Precautions:   -\"50% weight bearing on right. Limit ambulation to max 30 feet\"  -No R hip active abduction or external rotation  -Posterior Hip precautions     Therapy recommendation(s):    Continued therapy is recommended.  Rationale/Recommendations:  Continued UB strengthening, progress functional mobility as able, work towards Mod ind in bathing        "

## 2023-11-21 NOTE — PLAN OF CARE
FOCUS/GOAL  Medical management    ASSESSMENT, INTERVENTIONS AND CONTINUING PLAN FOR GOAL:  Pt has frequent use of urinal tonight w/ small amount of urine. Pt gets anxious about his voiding.Denies any pain. Educate pt to drink more fluids during daytime and use the BR. Will be discharging today.  Goal Outcome Evaluation:

## 2023-11-21 NOTE — DISCHARGE SUMMARY
"    [facility]   Acute Rehabilitation Unit  Discharge summary     Date of Admission: 2023  Date of Discharge: 23  Disposition: home.   Primary Care Physician: Wegener, Joel Daniel Irwin  Attending physician: Reilly Angel MD      DISCHARGE DIAGNOSIS  ORIF right greater trochanter fracture  Urinary retention-   Acute on Chronic Anemia  HLD    BRIEF SUMMARY  Genaro Ortiz is a 78 year old man with past medical history of MGUS, HTN, HLD, prostate cancer, and OA s/p bilateral hip arthroplasties 2023 complicated by periprosthetic fracture 2023 ultimately underwent ORIF 11/10/23.  Concern for aspiration during operation, otherwise course complicated by urinary retention, and acute anemia.  Functionally noted to have impaired strength, impaired balance, impaired activity tolerance, with 50% right lower extremity weight bearing.  Admitted to rehab 23.       REHABILITATION COURSE  Physical Therapy Discharge Summary     Reason for therapy discharge:    Discharged to home with home therapy.     Progress towards therapy goal(s). See goals on Care Plan in [facility] electronic health record for goal details.  Goals met     Bed Mobility: IND  Transfer: Mod I with FWW  Gait: Mod I with FWW up to 30 ft. IND w/c based for longer distances  Stairs: activity not applicable   Balance: limited by balance, weakness, and surgical precautions     Outcome Measures:   TU sec with FWW on   10MWT: Comfortable: 0.13 m/sec with FWW (withheld fast walking speed due to PA order to keep ambulation distances to 20-30 ft) on      Precautions:   -\"50% weight bearing on right. Limit ambulation to max 30 feet\"  -no R hip active abduction or external rotation      Occupational Therapy Discharge Summary   ADLs:  Mobility: Mod I in w/c and SPT  Grooming: Mod I seated in w/c at sink  Dressing: UBD: IND seated; LBD: mod I using dressing AE  Bathing: Supervision   Toileting: Mod I " using FWW on/off raised toilet, IND seated pericare   IADLs: Only has to do medications w pill box, get's HH services/meals on wheels/cleaning services for the rest   Vision/Cognition: Wears glasses at baseline, cognition intact         MEDICAL COURSE  ORIF right greater trochanter fracture 11/10  -asa 81 mg bid x 4 weeks (from 11/10)  -RLE 50% weightbearing  -continue PT/OT- resume home care upon discharge.   -follow up Dr. Escamilla in 4 weeks  -leave incision open to air- ok to shower- keep incision clean and dry   -continue tylenol prn. lyrica (prior to admission 225 at bedtime, reduce to 100 mg at bedtime- not consistently taking at home)-     History of prostate cancer  History of urinary retention  -granados in place on ARU arrival granados removed with  trial of void 11/16. With ongoing frequent small volume voids and post void residuals 100-200 ml typically  -continue flomax     Acute on Chronic Anemia  Hgb 9.8 11/12-->8.8 stable 11/20.     DISCHARGE MEDICATIONS  Discharge Medication List as of 11/21/2023  9:48 AM        CONTINUE these medications which have CHANGED    Details   aspirin 81 MG EC tablet Take 1 tablet (81 mg) by mouth 2 times daily X 4 weeks post op then resume 81 mg daily., Historical      pregabalin (LYRICA) 100 MG capsule Take 1 capsule (100 mg) by mouth every evening, Historical           CONTINUE these medications which have NOT CHANGED    Details   acetaminophen (TYLENOL) 325 MG tablet Take 2 tablets (650 mg) by mouth every 4 hours as needed for other (For optimal non-opioid multimodal pain management to improve pain control.), Disp-100 tablet, R-0, Transitional      Cyanocobalamin (B-12) 1000 MCG TBCR Take 1 tablet by mouth daily, Disp-30 tablet, R-0, E-Prescribe      fish oil-omega-3 fatty acids 1000 MG capsule Take 2,000 mg by mouth every morning, Disp-120 capsule, R-11, Historical      senna-docusate (SENOKOT-S/PERICOLACE) 8.6-50 MG tablet Take 1 tablet by mouth 2 times daily as needed for  constipation, Disp-20 tablet, Transitional      simvastatin (ZOCOR) 20 MG tablet TAKE 1 TABLET BY MOUTH EVERYDAY AT BEDTIME, Disp-90 tablet, R-1, E-Prescribe      tamsulosin (FLOMAX) 0.4 MG capsule Take 1 capsule (0.4 mg) by mouth daily, Disp-90 capsule, R-3, E-Prescribe      Vitamin D3 (D 1000) 25 mcg (1000 units) tablet Take 1 tablet (25 mcg) by mouth daily, Disp-30 tablet, R-0, E-Prescribe      polyethylene glycol (MIRALAX) 17 GM/Dose powder Take 17 g by mouth daily, Transitional           STOP taking these medications       oxyCODONE (ROXICODONE) 5 MG tablet Comments:   Reason for Stopping:                 DISCHARGE INSTRUCTIONS AND FOLLOW UP  Discharge Procedure Orders   Reason for your hospital stay   Order Comments: Admitted for rehabilitation following Total Hip arthroplasty.     Activity   Order Comments: Your activity upon discharge: activity as tolerated 50% weight bearing on right lower extremity. Keep incision covered for shower until ortho follow up.     Order Specific Question Answer Comments   Is discharge order? Yes      Adult [facility] Follow-up and recommended labs and tests   Order Comments: Follow up as scheduled with ortho  Follow up hospitalization with primary care  Follow up urology- if ongoing urinary  issues.     Appointments on [facility] and/or [facility] (with [facility] or [facility] provider or service). Call [phone] if you haven't heard regarding these appointments within 7 days of discharge.     Wound care and dressings   Order Comments: Instructions to care for your wound at home: keep incision clean and dry ok to be open to air, cover for showers.     Resume Home Care Services   Order Comments: Resume home care PT/OT/RN & home health aide.     Full Code     Order Specific Question Answer Comments   Code status determined by: Discussion with patient/ legal decision maker      Diet   Order Comments: Follow this diet upon discharge:Regular Diet     Order Specific Question Answer  Comments   Is discharge order? Yes         PHYSICAL EXAMINATION    Most recent Vital Signs:   Vitals:    11/19/23 0815 11/19/23 1500 11/20/23 0700 11/20/23 1656   BP: 138/61 121/62 131/65 136/43   BP Location: Left arm Right arm Left arm Left arm   Patient Position:    Chair   Cuff Size:    Adult Regular   Pulse: 70 79 84 80   Resp: 18  16 16   Temp: 97.6  F (36.4  C) 97.6  F (36.4  C) 97.7  F (36.5  C) 97.5  F (36.4  C)   TempSrc: Oral Oral Oral Oral   SpO2: 98% 96% 97% 97%   Weight:       Height:       General: awake alert nad  Resp: non labored   CV: rrr  Ab: soft non distended non tender   Extremities: warm dry with mild edema  MSK/Neuro: alert mild vocal hoarseness, follows commands,  Upper extremities 5/5.   Right HF 4-/5 KE 4-/5 DF/PF 4/5 .  Left HF 4/5, KE 4/5 Df/PF 5/5       40 minutes spent in discharge, including >50% in counseling and coordination of care, medication review and plan of care recommended on follow up.     Patient was evaluated on day of discharge by attending physician, Reilly Angel MD, who agrees with plan of care.    Discharge summary was forwarded to Wegener, Joel Daniel Irwin (PCP) at the time of discharge, so as to bridge from hospital to outpatient care.     It was our pleasure to care for Genaro Ortiz during this hospitalization. Please do not hesitate to contact me should there be questions regarding the hospital course or discharge plan.          Emma Gutierrez PA-C  Physical Medicine and Rehabilitation

## 2023-11-21 NOTE — PLAN OF CARE
Goal Outcome Evaluation:      Plan of Care Reviewed With: patient    Overall Patient Progress: improvingOverall Patient Progress: improving    Pt discharging home in stable condition, AVF reviewed and pt verbalized he had medication at home. Personal item given to the pt. Discharged home with personal wheelchair. Transport home provided by friend

## 2023-11-21 NOTE — PROGRESS NOTES
Clinic Care Coordination Contact    Situation: Patient chart reviewed by care coordinator.    Background:   Patient was admitted to [facility] from 11/10/2023 to 11/14/2023 with a diagnoses of  Periprosthetic fracture around internal prosthetic right hip joint and discharged to [facility] Acute Rehab Unit.    Assessment:   RNCC received an email from [facility] Pfeiffer, [facility] ARU, stating the patient was discharged from the ARU on 11/21/2023 with [facility] RN/Physical Therapy/Occupational Therapy/Home Health Aide services.    Plan/Recommendations:   No further Care Coordination outreaches at this time.     Ava Tian RN, BSN, PHN  Primary Care / Care Coordinator   [facility]  [email or web address]   [phone]

## 2023-11-22 ENCOUNTER — PATIENT OUTREACH (OUTPATIENT)
Dept: CARE COORDINATION | Facility: CLINIC | Age: 78
End: 2023-11-22

## 2023-11-22 NOTE — PROGRESS NOTES
Clinic Care Coordination Contact  [facility]: Post-Discharge Note  SITUATION                                                      Admission:    Admission Date: 11/14/23   Reason for Admission: Admitted for rehabilitation following Total Hip arthroplasty  Discharge:   Discharge Date: 11/21/23  Discharge Diagnosis: Admitted for rehabilitation following Total Hip arthroplasty    BACKGROUND                                                      Per hospital discharge summary and inpatient provider notes:    Genaro Ortiz is a 78 year old man with past medical history of OA s/p bilateral hip  arthroplasties 7/2023, HTN, prostate cancer, MGUS, GERD, HLD, and anemia who was hospitalized with right hip fracture in setting of mechanical fall 9/16/23 seen by ortho managed non-operatively, admitted for rehab 9/20-10/4/23.  Was discharged home, with home care therapies and followed up with ortho 10/30/23.  Imaging revealed displaced fracture with recommendation for ORIF, patient verbalized reluctance to undergo surgery but ultimately underwent Open reduction internal fixation of right femur proximal greater trochanter per DR. Escamilla 11/10/23.      Had intraoperative emesis LMA discontinued and patient was intubated he remained hemodynamically stable. CXR was negative.  Hospitalist consulted for medical management, with mild post op anemia, and urinary retention with granados catheter in place.      Functionally noted to have impaired activity tolerance, impaired balance, impaired strength, and 50% weight bearing.  He is currently min-max assist for sit to stand depending on bed height.  Ambulating up to 12 fee with walker and cga of 2.  Scored 25/30 on MoCA. Was undergoing therapy with home care prior to admission.      ASSESSMENT           Discharge Assessment  How are you doing now that you are home?: I am doing really good. I am able to urinate.  How are your symptoms? (Red Flag symptoms escalate to triage hotline per  guidelines): Improved  Do you feel your condition is stable enough to be safe at home until your provider visit?: Yes  Does the patient have their discharge instructions? : Yes  Does the patient have questions regarding their discharge instructions? : No  Were you started on any new medications or were there changes to any of your previous medications? : Yes (Pt had a few medication chages)  Does the patient have all of their medications?: Yes  Do you have questions regarding any of your medications? : No  Do you have all of your needed medical supplies or equipment (DME)?  (i.e. oxygen tank, CPAP, cane, etc.): Yes  Discharge follow-up appointment scheduled within 14 calendar days? : Yes  Discharge Follow Up Appointment Date: 12/04/23  Discharge Follow Up Appointment Scheduled with?: Specialty Care Provider                  PLAN                                                      Outpatient Plan:  Follow up as scheduled with ortho  Follow up hospitalization with primary care  Follow up urology- if ongoing urinary issues.    Future Appointments   Date Time Provider Department Center   12/4/2023 12:30 PM Merritt Toney PA-C [facility]   12/8/2023  1:15 PM Valdez Quesada DO [facility]   12/15/2023  2:00 PM Wegener, Joel Daniel Irwin, MD [facility]         For any urgent concerns, please contact our 24 hour nurse triage line: [phone] ([phone])         DORA Sandra  [phone]  [facility]

## 2023-11-23 ENCOUNTER — MEDICAL CORRESPONDENCE (OUTPATIENT)
Dept: HEALTH INFORMATION MANAGEMENT | Facility: CLINIC | Age: 78
End: 2023-11-23

## 2023-11-25 ENCOUNTER — HEALTH MAINTENANCE LETTER (OUTPATIENT)
Age: 78
End: 2023-11-25

## 2023-11-28 ENCOUNTER — TELEPHONE (OUTPATIENT)
Dept: FAMILY MEDICINE | Facility: CLINIC | Age: 78
End: 2023-11-28

## 2023-11-28 NOTE — TELEPHONE ENCOUNTER
Forms faxed to [facility] fax # [phone] and copy sent to stat scan.     Glenys SERRANO

## 2023-11-28 NOTE — TELEPHONE ENCOUNTER
Forms/Letter Request    Type of form/letter:  home care.  PRN-SN-Visit 1 visit 1 day. 10/13/23-10/23/23    Have you been seen for this request: N/A    Do we have the form/letter: Yes:     Who is the form from? Home care    Where did/will the form come from? form was faxed in    When is form/letter needed by: asap    How would you like the form/letter returned: Fax : [phone]    Patient Notified form requests are processed in 3-5 business days:No    Could we send this information to you in [employer] or would you prefer to receive a phone call?:   No preference   Okay to leave a detailed message?: No at Other phone number:  [facility]-[phone]

## 2023-11-29 NOTE — OP NOTE
Operative note:    Pre-operative diagnosis:         Displaced fracture of greater trochanter of right femur, subsequent encounter for closed fracture with malunion [S72.111P]  Post-operative diagnosis        Same as pre-operative diagnosis     Procedure:      OPEN REDUCTION INTERNAL FIXATION, FRACTURE, RIGHT  FEMUR, PROXIMAL GREATER TROCHANTER, Right - Hip     Surgeon:         Surgeon(s) and Role:     * David Escamilla MD - Primary     * Kb Joe MD - Resident - Assisting  Anesthesia:     Choice   Estimated Blood Loss: 200 ml     Drains: None  Specimens:     * No specimens in log *  Findings:                     None.  Complications:            Please see detailed operative report  Implants:           Implant Name Type Inv. Item Serial No.  Lot No. LRB No. Used Action   IMP CABLE DALL MILES BEADED CBL W/SLEEVE SET 2MM 6704-0-520 - [id number] [employer] IMP CABLE DALL MILES BEADED CBL W/SLEEVE SET 2MM 6704-0-520   [employer] [id number] Right 1 Implanted   IMP CABLE DALL MILES BEADED CBL W/SLEEVE SET 2MM 6704-0-520 - [id number] [employer] IMP CABLE DALL MILES BEADED CBL W/SLEEVE SET 2MM 6704-0-520   [employer] [id number] Right 1 Implanted   PLATE TROCHANTERIC  160MM - [id number] Metallic [employer]ware/[employer] PLATE TROCHANTERIC  160MM [id number] [employer] [id number] Right 1 Implanted      Procedure details:  Patient placed on the operating table.  Lateral position utilized.  Standard prep and drape performed.    Posterior lateral approach to the hip joint now performed undertaken and the tensor fascia monica was divided.  The greater trochanter was identified.  It was found to be displaced in the anterior direction.  It had to be mobilized from the anterior soft tissues.  Once I perform this, I was able to bring the trochanter back to its native position with the hip held in neutral rotation.  The tissues between the nonunion site and  the native bed of the trochanter were freshened and any clot was removed.  Nonunion was taken down.    After anatomic reduction, I placed cerclage cables beneath the lesser trochanter to capture the greater trochanter by using a claw plate assembly.  2 cables were placed around the trochanter using the large claw and the claws placed over the trochanter to capture it properly.  2 additional cables were then placed around the shaft of the bone distal to the lesser trochanter.  Cables were tightened to compress and this maintained reduction of the trochanter while the hip was placed to range of motion.    Wound closure was then accomplished in standard technique.  Sterile dressings applied.      Activity: Up ad loretta. No active abduction of RLE. Patient should be in AFO w outrigger to prevent external rotation  Weight bearing status: 50% WB to RLE  Antibiotics: Cefazolin x 24 hours. Spoke with infectious disease team. They recommended just monitoring and continuing with post-operative ancef. No need to broad for prophylactic coverage at this time.  Diet: Begin with clear fluids and progress diet as tolerated. Bowel regimen. Anti-emetics PRN.    Wound Care: Tegaderm and alginate x 2 weeks   Drains: none  Pain management: Orals PRN, IV for breakthrough only  X-rays: AP Pelvis and Lateral operative hip in PACU. STAT CXR in PACU. Follow-up CXR 4 hours after initial then repeat CXR in am.   Physical Therapy: Mobilization, ROM, ADL's. No active abduction of RLE.   Occupational Therapy: ADL's  Labs: Trend Hgb on POD #1, 2  Consults: PT, OT. Hospitalist, appreciate assistance in caring for this patient throughout the perioperative period  Follow-up: TBD  Disposition: Pending progress with therapies, pain control on orals, and medical stability, anticipate discharge to Home vs. TCU on POD #2-3      David Escamilla MD  [facility] Family Professor  Oncology and Adult Reconstructive Surgery  Dept Orthopaedic Surgery, [facility]  Physicians  [phone] office, [phone] pager  [email or web address]

## 2023-12-01 ENCOUNTER — TELEPHONE (OUTPATIENT)
Dept: FAMILY MEDICINE | Facility: CLINIC | Age: 78
End: 2023-12-01

## 2023-12-01 DIAGNOSIS — S72.111P: Primary | ICD-10-CM

## 2023-12-01 NOTE — PROGRESS NOTES
[facility]  Orthopaedic Surgery, Joint Replacement Consultation  by David Escamilla M.D.    Genaro Ortiz MRN# [id number]    YOB: 1945     Requesting physician: Valdez Quesada DO, [facility] neurology  Wegener, Joel Daniel Irwin     Background history:  DX:  Bilateral DJD of hips    TREATMENTS:  7/14/2023, right total hip arthroplasty, Dr. Escamilla, [facility]  7/21/2023, left total hip arthroplasty, Dr. Escamilla, [facility]  11/10/23, ORIF right femur proximal greater trochanter, Dr. Escamilla, [facility]         Assessment and Plan:   Assessment:  78-year-old male who presents today approximately 3 weeks status post open reduction and fixation of right femur greater trochanteric fracture.    Plan:  The fracture remains in good position but has not healed therefore we will maintain 50% weightbearing until follow-up  I encouraged him to do knee foot and ankle range of motion exercises  Continue aspirin for 4 weeks postop for DVT prophylaxis  Incisions healing well so he now get it wet but should avoid submersing for 4 weeks  Follow-up in 4 to 5 weeks with repeat x-rays of the right hip      Merritt Toney PA-C  Physician Assistant   Oncology and Adult Reconstructive Surgery  Dept Orthopaedic Surgery, [facility]icians    This note was created using dictation software and may contain errors.  Please contact the creator for any clarifications that are needed.              History of Present Illness:   77 year old male who presents today approximate 3 weeks status post ORIF of right hip greater trochanter fracture.  He is maintain 50% weightbearing with a walker.  He is taking aspirin for DVT prophylaxis.  His pain is well controlled.  He has not no concerns for infection around incision.  He denies any chest pain, shortness breath or calf pain         Physical Exam:     EXAMINATION pertinent findings:   PSYCH: Pleasant, healthy-appearing, alert, oriented x3, cooperative. Normal mood and affect.  VITAL SIGNS:  There were no vitals taken for this visit..  Reviewed nursing intake notes.   There is no height or weight on file to calculate BMI.  RESP: non labored breathing .  Limited thoracic excursion approximately 1 cm.  ABD: benign, soft, non-tender, no acute peritoneal findings  SKIN: grossly normal   LYMPHATIC: grossly normal, no adenopathy, no extremity edema  NEURO: grossly normal , no motor deficits  VASCULAR: satisfactory perfusion of all extremities   MUSCULOSKELETAL:        Right hip: The incision is clean, dry, and intact with no erythema, dehiscence, or discharge.  Minor swelling noted calves are soft nontender with negative Homans' sign    Bilateral LE:              Thigh and leg compartments soft and compressible              +Quad/TA/GSC/FHL/EHL              SILT DP/SP/Esau/Saph/Tib nerve distributions              Palpable dorsalis pedis pulse                  Data:   All laboratory data reviewed  All imaging studies reviewed by me    12/4/2023 x-ray pelvis lateral right hip: Stable postoperative changes of right greater trochanter fracture with no change in position from postoperative films.  Right total hip arthroplasty remains in good position with no obvious signs of mechanical complications.

## 2023-12-01 NOTE — TELEPHONE ENCOUNTER
Forms/Letter Request    Type of form/letter:  PT visit 1 wk for 1 wk 11/30/2023-12/3/2023 PT visit 2 visit wkly  for 1 wk 12/04/2023-12/10/2023  PT visit 1 wkly for 1 week 12/11/2023-12/112023    Have you been seen for this request: N/A    Do we have the form/letter: Yes: order    Who is the form from? Home care    Where did/will the form come from? form was faxed in    When is form/letter needed by:  asap    How would you like the form/letter returned: Fax : [phone]    Patient Notified form requests are processed in 3-5 business days:Yes    Could we send this information to you in [employer] or would you prefer to receive a phone call?:   No preference   Okay to leave a detailed message?: Yes at Other phone number: [phone]

## 2023-12-04 ENCOUNTER — ANCILLARY PROCEDURE (OUTPATIENT)
Dept: GENERAL RADIOLOGY | Facility: CLINIC | Age: 78
End: 2023-12-04
Attending: PHYSICIAN ASSISTANT
Payer: COMMERCIAL

## 2023-12-04 ENCOUNTER — MEDICAL CORRESPONDENCE (OUTPATIENT)
Dept: HEALTH INFORMATION MANAGEMENT | Facility: CLINIC | Age: 78
End: 2023-12-04

## 2023-12-04 ENCOUNTER — OFFICE VISIT (OUTPATIENT)
Dept: ORTHOPEDICS | Facility: CLINIC | Age: 78
End: 2023-12-04
Payer: COMMERCIAL

## 2023-12-04 DIAGNOSIS — S72.111P: ICD-10-CM

## 2023-12-04 DIAGNOSIS — Z96.641 STATUS POST TOTAL REPLACEMENT OF RIGHT HIP: ICD-10-CM

## 2023-12-04 DIAGNOSIS — S72.111P: Primary | ICD-10-CM

## 2023-12-04 PROCEDURE — 73502 X-RAY EXAM HIP UNI 2-3 VIEWS: CPT | Mod: RT | Performed by: RADIOLOGY

## 2023-12-04 PROCEDURE — 99024 POSTOP FOLLOW-UP VISIT: CPT | Performed by: PHYSICIAN ASSISTANT

## 2023-12-04 NOTE — LETTER
12/4/2023         RE: Genaro Ortiz  [address]  [place] [address]        Dear Colleague,    Thank you for referring your patient, Genaro Ortiz, to the [facility] ORTHOPEDIC [facility]INIC [place]. Please see a copy of my visit note below.        [facility]  Orthopaedic Surgery, Joint Replacement Consultation  by David Escamilla M.D.    Genaro Ortiz MRN# [id number]    YOB: 1945     Requesting physician: Valdez Quesada DO, [facility] neurology  Wegener, Joel Daniel Irwin     Background history:  DX:  Bilateral DJD of hips    TREATMENTS:  7/14/2023, right total hip arthroplasty, Dr. Escamilla, [facility]  7/21/2023, left total hip arthroplasty, Dr. Escamilla, [facility]  11/10/23, ORIF right femur proximal greater trochanter, Dr. Escamilla, [facility]         Assessment and Plan:   Assessment:  78-year-old male who presents today approximately 3 weeks status post open reduction and fixation of right femur greater trochanteric fracture.    Plan:  The fracture remains in good position but has not healed therefore we will maintain 50% weightbearing until follow-up  I encouraged him to do knee foot and ankle range of motion exercises  Continue aspirin for 4 weeks postop for DVT prophylaxis  Incisions healing well so he now get it wet but should avoid submersing for 4 weeks  Follow-up in 4 to 5 weeks with repeat x-rays of the right hip      Merritt Toney PA-C  Physician Assistant   Oncology and Adult Reconstructive Surgery  Dept Orthopaedic Surgery, [facility]    This note was created using dictation software and may contain errors.  Please contact the creator for any clarifications that are needed.              History of Present Illness:   77 year old male who presents today approximate 3 weeks status post ORIF of right hip greater trochanter fracture.  He is maintain 50% weightbearing with a walker.  He is taking aspirin for DVT prophylaxis.  His pain is well controlled.  He  has not no concerns for infection around incision.  He denies any chest pain, shortness breath or calf pain         Physical Exam:     EXAMINATION pertinent findings:   PSYCH: Pleasant, healthy-appearing, alert, oriented x3, cooperative. Normal mood and affect.  VITAL SIGNS: There were no vitals taken for this visit..  Reviewed nursing intake notes.   There is no height or weight on file to calculate BMI.  RESP: non labored breathing .  Limited thoracic excursion approximately 1 cm.  ABD: benign, soft, non-tender, no acute peritoneal findings  SKIN: grossly normal   LYMPHATIC: grossly normal, no adenopathy, no extremity edema  NEURO: grossly normal , no motor deficits  VASCULAR: satisfactory perfusion of all extremities   MUSCULOSKELETAL:        Right hip: The incision is clean, dry, and intact with no erythema, dehiscence, or discharge.  Minor swelling noted calves are soft nontender with negative Homans' sign    Bilateral LE:              Thigh and leg compartments soft and compressible              +Quad/TA/GSC/FHL/EHL              SILT DP/SP/Esau/Saph/Tib nerve distributions              Palpable dorsalis pedis pulse                  Data:   All laboratory data reviewed  All imaging studies reviewed by me    12/4/2023 x-ray pelvis lateral right hip: Stable postoperative changes of right greater trochanter fracture with no change in position from postoperative films.  Right total hip arthroplasty remains in good position with no obvious signs of mechanical complications.

## 2023-12-04 NOTE — NURSING NOTE
Reason For Visit:   Chief Complaint   Patient presents with    Surgical Followup     Post-op right hip ORIF DOS 11/10/23 // been doing PT       There were no vitals taken for this visit.    Pain Assessment  Patient Currently in Pain: Nicol Rojas, ATC

## 2023-12-05 RX ORDER — TAMSULOSIN HYDROCHLORIDE 0.4 MG/1
0.4 CAPSULE ORAL DAILY
Qty: 90 CAPSULE | Refills: 3 | Status: SHIPPED | OUTPATIENT
Start: 2023-12-05 | End: 2024-10-02

## 2023-12-05 NOTE — TELEPHONE ENCOUNTER
JW,  Please see below MyChart message and advise.  Patient should have tamsulosin refills.  Do you want to see if he is following up with ortho?  Thanks,  Jessica BYRNE RN

## 2023-12-08 ENCOUNTER — VIRTUAL VISIT (OUTPATIENT)
Dept: NEUROLOGY | Facility: CLINIC | Age: 78
End: 2023-12-08
Payer: COMMERCIAL

## 2023-12-08 VITALS — HEIGHT: 69 IN | BODY MASS INDEX: 31.55 KG/M2 | WEIGHT: 213 LBS

## 2023-12-08 DIAGNOSIS — E53.8 VITAMIN B12 DEFICIENCY (NON ANEMIC): ICD-10-CM

## 2023-12-08 DIAGNOSIS — G62.9 NEUROPATHY: Primary | ICD-10-CM

## 2023-12-08 PROCEDURE — 99213 OFFICE O/P EST LOW 20 MIN: CPT | Mod: 95 | Performed by: PSYCHIATRY & NEUROLOGY

## 2023-12-08 ASSESSMENT — PAIN SCALES - GENERAL: PAINLEVEL: NO PAIN (0)

## 2023-12-08 NOTE — PROGRESS NOTES
Virtual Visit Details    Type of service:  Video Visit   Video Start Time:  110  Video End Time:1:28 PM    Originating Location (pt. Location): Home    Distant Location (provider location):  On-site  Platform used for Video Visit: Daisha

## 2023-12-08 NOTE — TELEPHONE ENCOUNTER
"Receipt confirmed with [facility] per epic, can we contact pharmacy for patient to make sure he has what he needs?    Thanks  Marisol \"Rj\" BETZAIDA Hemphill   "

## 2023-12-08 NOTE — LETTER
12/8/2023       RE: Genaro Ortiz  [address]  [place] [address]       Dear Colleague,    Thank you for referring your patient, Genaro Ortiz, to the [facility] NEUROLOGY CLIN[facility]. Please see a copy of my visit note below.    [facility] Neurology Follow Up Visit    Genaro Ortiz MRN# [id number]   Age: 78 year old YOB: 1945     Brief history of symptoms: The patient was initially seen in neurologic consultation on 11/30/2020 and most recently 6/7/2023 for evaluation of neuropathy (idiopathic, being treated with Lyrica). Please see the comprehensive neurologic consultation notes from those dates in the Epic records for details.     At our last visit, he was doing well with neuropathy management by use of Lyrica 225 mg BID.    Interval history:   - 9/16/2023 admission for periprosthetic right hip fracture  - 11/10/2023 admission for periprosthetic fracture of his right hip    Today, the patient is home and healing. He required multiple surgeries (7/2023 b/l hip replacement) but then had a fall which led to right hip injury and subsequent surgical correction.  He is only needing one pill of Lyrica 75 mg at bedtime for his neuropathy pain at night.  He is awaiting rehab in the future while he heals. He is having to stay in a wheel chair for mobility.      Physical Exam:   General: Seated comfortably in no acute distress.  Neurologic:     Mental Status: Fully alert, attentive and oriented. Speech clear and fluent, no paraphasic errors.     Cranial Nerves: EOMI with normal smooth pursuit. Facial movements symmetric. Hearing not formally tested but intact to conversation.  No dysarthria.     Motor: No tremors or other abnormal movements observed.       Coordination: Finger-nose-finger without dysmetria.         Assessment and Plan:   Assessment:  Peripheral neuropathy    The patient has reduced his lyrica to 75 mg  at bedtime by accident, but also hasn't had a return of his leg tightness and sensory deficits for which he was treating.  Overall, he still has a neuropathy but if it is asymptomatic I don't think he needs to stay on Lyrica.  We discussed stopping the medication and then seeing if any new issues or reoccurring issues arose.  He will contact me should his symptoms return.     Plan:  Stop Lyrica for now    Follow up in Neurology clinic as needed should new concerns arise.      Total time today (28 min) in this patient encounter was spent on pre-charting, counseling and/or coordination of care.         Again, thank you for allowing me to participate in the care of your patient.      Sincerely,    Valdez Quesada, DO

## 2023-12-08 NOTE — PROGRESS NOTES
[facility] Neurology Follow Up Visit    Genaro Ortiz MRN# [id number]   Age: 78 year old YOB: 1945     Brief history of symptoms: The patient was initially seen in neurologic consultation on 11/30/2020 and most recently 6/7/2023 for evaluation of neuropathy (idiopathic, being treated with Lyrica). Please see the comprehensive neurologic consultation notes from those dates in the Epic records for details.     At our last visit, he was doing well with neuropathy management by use of Lyrica 225 mg BID.    Interval history:   - 9/16/2023 admission for periprosthetic right hip fracture  - 11/10/2023 admission for periprosthetic fracture of his right hip    Today, the patient is home and healing. He required multiple surgeries (7/2023 b/l hip replacement) but then had a fall which led to right hip injury and subsequent surgical correction.  He is only needing one pill of Lyrica 75 mg at bedtime for his neuropathy pain at night.  He is awaiting rehab in the future while he heals. He is having to stay in a wheel chair for mobility.      Physical Exam:   General: Seated comfortably in no acute distress.  Neurologic:     Mental Status: Fully alert, attentive and oriented. Speech clear and fluent, no paraphasic errors.     Cranial Nerves: EOMI with normal smooth pursuit. Facial movements symmetric. Hearing not formally tested but intact to conversation.  No dysarthria.     Motor: No tremors or other abnormal movements observed.       Coordination: Finger-nose-finger without dysmetria.         Assessment and Plan:   Assessment:  Peripheral neuropathy    The patient has reduced his lyrica to 75 mg at bedtime by accident, but also hasn't had a return of his leg tightness and sensory deficits for which he was treating.  Overall, he still has a neuropathy but if it is asymptomatic I don't think he needs to stay on Lyrica.  We discussed stopping the medication and then seeing if any new issues or reoccurring issues arose.   He will contact me should his symptoms return.     Plan:  Stop Lyrica for now    Follow up in Neurology clinic as needed should new concerns arise.    CATY Quesada D.O.   of Neurology    Total time today (28 min) in this patient encounter was spent on pre-charting, counseling and/or coordination of care.

## 2023-12-08 NOTE — PATIENT INSTRUCTIONS
You can stop Lyrica at this time. If your symptoms of neuropathy return, please let me know and we can discuss restarting the medication.

## 2023-12-08 NOTE — NURSING NOTE
Is the patient currently in the state of [place]? YES    Visit mode:VIDEO    If the visit is dropped, the patient can be reconnected by: VIDEO VISIT: Send to e-mail at: [email or web address]    Will anyone else be joining the visit? NO  (If patient encounters technical issues they should call [phone]    How would you like to obtain your AVS? MyChart    Are changes needed to the allergy or medication list? No    Reason for visit: Follow Up    Valorie PEREA

## 2023-12-11 NOTE — TELEPHONE ENCOUNTER
RN confirmed receipt with pharmacy.  Will work on getting ready and contact patient.  Jessica DUARTE RN

## 2024-01-01 NOTE — CONSULTS
Urologic Surgery Consultation Note  [facility]    Genaro Ortiz MRN# [id number]   Age: 78 year old YOB: 1945     Date of Admission:  9/16/2023    Reason for consult: Urinary tension       Requesting physician: Dr. Barr       Level of consult: Consult, follow and place orders           Assessment:   78 year old male with previous hip replacements and prostate cancer s/p prostatectomy presented with hip pain after tripping and falling. Patient was found to have a periprosthetic hip fracture. He was admitted to the hospital. Orthopedics was consulted and non-operative treatment was recommended.  9/17.  He was previously seen in the clinic by Delfino Gerber PA-C, for similar situation, at that time his retention was attributed to postop urinary retention, and had normal PVRs in-the clinic.  Given his recent fracture and orthopedic surgery, his symptoms likely are due to the acute illness, continues to void in small amounts, with moderately elevated PVRs.  Should he develop more severe symptoms of urinary retention straight cath or Manzo indwelling Manzo catheter would be reasonable in short interim as he improves from his postoperative problems and increase his mobility.         Recommendations:   -Spontaneously voiding right now, with moderately elevated PVRs.  -Agree with CIC or Manzo catheter placement if PVR continues to trend up  -Treat constipation, titrate for soft daily bowel movement.  -Avoid polypharmacy limit narcotic use as tolerated, avoid anticholinergic medications if possible.  -Urology team to sign off.  Please reach out with any further questions.  -If patient continues to have significant urinary voiding symptoms, he can set up appointment with Delfino Gerber PA-C as directed previously in as-needed basis.            History of Present Illness:   CC: Urinary retention     History is obtained from the patient    78 year old male with previous hip replacements and  prostate cancer s/p prostatectomy presented with hip pain after tripping and falling. Patient was found to have a periprosthetic hip fracture. He was admitted to the hospital. Orthopedics was consulted and non-operative treatment was recommended.  9/17.     I chat with Quinton this morning, he told me that ever since his surgery with the prostate he had no trouble with urinations.  He initially had some stress urinary incontinence but has much improved and does not really leak urine at baseline.  Usually stands up and pee.  He denies any frequency urgency and incomplete emptying prior to his last hospitalization.  However he has had an eventful couple of months, he had a previous hip replacement and after the surgery he has had some urinary retention required Manzo catheter for few days.  After discharge he met with our PA Delfino Gerber in the clinic and had low PVRs and complete emptying of the bladder.  He continues to void okay since his last hospitalization.  He most recently had another fracture of his hip, and is in severe pain with very limited mobility at the moment.  He is not constipated at baseline with 1 bowel movement every day on average.  Right now his last bowel movement was around 2 days ago and he is trying to take bowel medication to help prevent constipation.  He is trying to void in the urinal sitting the bed because he has trouble getting out of bed to go to the bathroom.  He cannot initiate the stream but does feel like he does not empty all the way.  He denies any flank pain, and bladder fullness causing pain.  He denies hematuria.  He is on Flomax.          Past Medical History:     Past Medical History:   Diagnosis Date    Adenocarcinoma of prostate (H) 5/27/2008    Arthritis     Esophageal reflux     Neuropathy     Pure hypercholesterolemia     Unspecified essential hypertension              Past Surgical History:     Past Surgical History:   Procedure Laterality Date    ABDOMEN SURGERY   1952    Appendectomy    APPENDECTOMY  1952    ARTHROPLASTY HIP Right 7/14/2023    Procedure: ARTHROPLASTY, HIP, TOTAL RIGHT;  Surgeon: David Escamilla MD;  Location: [facility]    ARTHROPLASTY HIP Left 7/21/2023    Procedure: ARTHROPLASTY, HIP, TOTAL LEFT;  Surgeon: David Escamilla MD;  Location: [facility] OR    CATARACT IOL, RT/LT Bilateral 03/12    COLONOSCOPY  2017    COLONOSCOPY N/A 5/18/2023    Procedure: colonoscopy;  Surgeon: Ayse Sears MD;  Location:  GI    GENITOURINARY SURGERY      HERNIORRHAPHY INGUINAL  5/15/2014    Procedure: HERNIORRHAPHY INGUINAL;  Surgeon: Evens Jefferson MD;  Location: [facility] OR    PROSTATE SURGERY  2007             Social History:     Social History     Socioeconomic History    Marital status: Single     Spouse name: Not on file    Number of children: Not on file    Years of education: Not on file    Highest education level: Not on file   Occupational History    Not on file   Tobacco Use    Smoking status: Never     Passive exposure: Never    Smokeless tobacco: Never    Tobacco comments:     Never smoked.   Vaping Use    Vaping Use: Never used   Substance and Sexual Activity    Alcohol use: Not Currently     Comment: occasional    Drug use: No    Sexual activity: Never   Other Topics Concern    Parent/sibling w/ CABG, MI or angioplasty before 65F 55M? No   Social History Narrative    Social Documentation:        Balanced Diet: YES    Calcium intake: 1-2 per day    Caffeine: 2 per day    Exercise:  type of activity gym and work is physical;  2 times per week    Sunscreen: Yes, when needed    Seatbelts:  Yes    Self Breast Exam:  na    Self Testicular Exam: Yes    Physical/Emotional/Sexual Abuse: No    Do you feel safe in your environment? Yes        Cholesterol screen up to date:  6/5/2009                                                                                                             Latest Ref Rng                              CHOLESTEROL                                                  0 - 200 mg/dL                177                     TRIGLYCERIDES                                               0 - 150 mg/dL                177 (H)                 HDL                                                         40 - 110 mg/dL               39 (L)                  LDL CHOLESTEROL CALCULATED                                  0 - 129 mg/dL                102                     VLDL-CHOLESTEROL                                            0 - 30 mg/dL                 35 (H)                  CHOLESTEROL/HDL RATIO                                       0.0 - 5.0                    4.5                         Eye Exam up to date: Yes    Dental Exam up to date: Yes    Pap smear up to date: Does Not Apply    Mammogram up to date: Does Not Apply    Dexa Scan up to date: Does Not Apply    Colonoscopy up to date: Yes    Immunizations up to date: Yes    Glucose screen if over 40:  Component                        Latest Ref Rn               10/31/2008              6/5/2009                GLUCOSE                          60 - 99 mg/dL                106 (H)                 111 (H)                                                      Social Determinants of Health     Financial Resource Strain: Not on file   Food Insecurity: Not on file   Transportation Needs: Not on file   Physical Activity: Not on file   Stress: Not on file   Social Connections: Not on file   Intimate Partner Violence: Not on file   Housing Stability: Not on file             Family History:     Family History   Problem Relation Age of Onset    Glaucoma Other     Diabetes Mother     Gastrointestinal Disease Mother         pancreas removed    Breast Cancer Mother     Osteoporosis Father     Obesity Father     Macular Degeneration No family hx of     Hypertension No family hx of              Allergies:      Allergies   Allergen Reactions    Penicillins Other (See Comments)     blotches             Medications:   No current  facility-administered medications on file prior to encounter.  aspirin 81 MG EC tablet, Take 81 mg by mouth daily  cholecalciferol (VITAMIN D) 1000 UNIT tablet, Take 1 tablet by mouth daily.  Cyanocobalamin (B-12) 1000 MCG TBCR, Take 1 tablet by mouth daily  fish oil-omega-3 fatty acids 1000 MG capsule, Take 2,000 mg by mouth daily  oxyCODONE (ROXICODONE) 5 MG tablet, Take 1-2 tablets (5-10 mg) by mouth every 4 hours as needed for moderate to severe pain  polyethylene glycol (MIRALAX) 17 g packet, Take 1 packet by mouth as needed for constipation  pregabalin (LYRICA) 225 MG capsule, Take 225 mg by mouth daily  senna-docusate (SENOKOT-S/PERICOLACE) 8.6-50 MG tablet, Take 1 tablet by mouth daily as needed for constipation  simvastatin (ZOCOR) 20 MG tablet, TAKE 1 TABLET BY MOUTH EVERYDAY AT BEDTIME              Review of Systems:      All other review of systems negative, except for what is mentioned above        Physical Exam:   /58 (BP Location: Left arm)   Pulse 80   Temp 97.8  F (36.6  C) (Oral)   Resp 18   SpO2 98%   GEN: AAO*3, not in acute distress, lying comfortably  HEENT: atraumatic, mucosa moist  CVS: normal heart rate, perfusing extremeties  RESP: no resp distress, satting well on RA  ABD: soft, nontender, nondistended  : Deferred  EXT: Extremities atraumatic, grossly normal range of motion  NEURO/PSYCH: CN grossly normal, no focal weakness, normal mood and thought process              Data:     Recent Labs   Lab Test 09/16/23  1639 07/24/23  0914 07/23/23  1418 07/23/23  0656 07/22/23  1150 07/22/23  0629 07/21/23  1317 07/20/23  0852   WBC 9.4 8.6  --  8.2  --   --   --  5.9   HGB 10.7* 7.5* 7.5* 7.0*   < > 7.2*   < > 9.4*   CR 0.89  --   --   --   --  1.14  --  0.94    < > = values in this interval not displayed.         No results found for this or any previous visit from the past 365 days.           Discussed with staff surgeon Dr. Garcia, who agrees with the assessment and plans    Renan  MD James   PGY-2 Urology  [facility]           Direct patient care, as well as:  [x] I reviewed Flowsheets (vital signs, ins and outs documentation) and medications  [x] I discussed plan of care with patient/parents at the bedside:   [X ] I reviewed laboratory results:    [ ] I reviewed radiology results:  [ ] I reviewed radiology imaging and the following is my interpretation:  [ ] I spoke with and/or reviewed documentation from the following consultant(s):   [x] Discussed patient during the interdisciplinary care coordination rounds in the afternoon  [x] Patient handoff was completed with hospitalist caring for patient during the next shift

## 2024-01-10 NOTE — PROGRESS NOTES
"    [facility]  Orthopaedic Surgery, Joint Replacement Consultation  by David Escamilla M.D.    Genaro Ortiz MRN# [id number]    YOB: 1945     Requesting physician: Valdez Quesada DO, [facility] neurology  Wegener, Joel Daniel Irwin     Background history:  DX:  Bilateral DJD of hips  9/2023, right greater trochanteric periprosthetic fracture secondary to fall    TREATMENTS:  7/14/2023, right total hip arthroplasty, Dr. Escamilla, [facility]  7/21/2023, left total hip arthroplasty, Dr. Escamilla, [facility]  11/10/23, ORIF right femur proximal greater trochanter, Dr. Escamilla, [facility]      Genaro returns today for an assessment of healing of his right greater trochanteric periprosthetic fracture.  He is not having any pain in his right hip joint.  He is using a walker and partial weightbearing.    Examination reveals that he is able to stand and bear weight on the right lower extremity without pain.  No pain with rotation of the hip.      Radiographs reveal stability of the fracture position.  Difficult to assess for fracture healing.           Assessment and Plan:   Assessment:  78-year-old male who presents today approximately 2 months status post open reduction and fixation of right femur greater trochanteric fracture.  Interval healing with stable fracture pattern.    Plan:  Physical therapy orders: Progress to full weightbearing over the next 4 weeks as tolerated.  He should begin applying more weight on the right extremity while using the walker and then transition to using a cane in his left hand.  If he remains pain-free at that point he may discontinue use of the cane once his limp resolves.  Encourage patient to use a cane for ambulation and full weight-bear on the right lower extremity as long as he remains pain-free.    Advised patient that walking exercises in a aquatics \"environment would be advantageous.  Return in 3 months time with repeat AP and frog-leg lateral x-ray of the right hip " joint.    MD Chetan Pelayo Family Professor  Oncology and Adult Reconstructive Surgery  Dept Orthopaedic Surgery, [facility]  [phone] office, [phone] pager  [email or web address]

## 2024-01-11 ENCOUNTER — ANCILLARY PROCEDURE (OUTPATIENT)
Dept: GENERAL RADIOLOGY | Facility: CLINIC | Age: 79
End: 2024-01-11
Attending: ORTHOPAEDIC SURGERY
Payer: COMMERCIAL

## 2024-01-11 ENCOUNTER — OFFICE VISIT (OUTPATIENT)
Dept: ORTHOPEDICS | Facility: CLINIC | Age: 79
End: 2024-01-11
Payer: COMMERCIAL

## 2024-01-11 DIAGNOSIS — S72.111P: ICD-10-CM

## 2024-01-11 DIAGNOSIS — S72.111P: Primary | ICD-10-CM

## 2024-01-11 DIAGNOSIS — Z96.649 PERIPROSTHETIC FRACTURE OF PROXIMAL END OF FEMUR: ICD-10-CM

## 2024-01-11 DIAGNOSIS — M97.8XXA PERIPROSTHETIC FRACTURE OF PROXIMAL END OF FEMUR: ICD-10-CM

## 2024-01-11 PROCEDURE — 73502 X-RAY EXAM HIP UNI 2-3 VIEWS: CPT | Mod: RT | Performed by: RADIOLOGY

## 2024-01-11 PROCEDURE — 99024 POSTOP FOLLOW-UP VISIT: CPT | Performed by: ORTHOPAEDIC SURGERY

## 2024-01-11 ASSESSMENT — ACTIVITIES OF DAILY LIVING (ADL)
ADL_SUBSCALE_SCORE: 38.24
ADL_SUM: 42
ADL_MEAN: 2.47

## 2024-01-11 NOTE — LETTER
1/11/2024         RE: Genaro Ortiz  [address]  [place] [address]        Dear Colleague,    Thank you for referring your patient, Genaro Ortiz, to the [facility] [place]. Please see a copy of my visit note below.        [facility]  Orthopaedic Surgery, Joint Replacement Consultation  by David Escamilla M.D.    Genaro Ortiz MRN# [id number]    YOB: 1945     Requesting physician: Valdez Quesada DO, [facility] neurology  Wegener, Joel Daniel Irwin     Background history:  DX:  Bilateral DJD of hips  9/2023, right greater trochanteric periprosthetic fracture secondary to fall    TREATMENTS:  7/14/2023, right total hip arthroplasty, Dr. Escamilla, [facility]  7/21/2023, left total hip arthroplasty, Dr. Escamilla, [facility]  11/10/23, ORIF right femur proximal greater trochanter, Dr. Escamilla, [facility]      Genaro returns today for an assessment of healing of his right greater trochanteric periprosthetic fracture.  He is not having any pain in his right hip joint.  He is using a walker and partial weightbearing.    Examination reveals that he is able to stand and bear weight on the right lower extremity without pain.  No pain with rotation of the hip.      Radiographs reveal stability of the fracture position.  Difficult to assess for fracture healing.           Assessment and Plan:   Assessment:  78-year-old male who presents today approximately 2 months status post open reduction and fixation of right femur greater trochanteric fracture.  Interval healing with stable fracture pattern.    Plan:  Physical therapy orders: Progress to full weightbearing over the next 4 weeks as tolerated.  He should begin applying more weight on the right extremity while using the walker and then transition to using a cane in his left hand.  If he remains pain-free at that point he may discontinue use of the cane once his limp resolves.  Encourage patient to use a cane for  "ambulation and full weight-bear on the right lower extremity as long as he remains pain-free.    Advised patient that walking exercises in a aquatics \"environment would be advantageous.  Return in 3 months time with repeat AP and frog-leg lateral x-ray of the right hip joint.    MD Chetan Pelayo Family Professor  Oncology and Adult Reconstructive Surgery  Dept Orthopaedic Surgery, [facility]  [phone] office, [phone] pager  [email or web address]    "

## 2024-01-19 ENCOUNTER — TELEPHONE (OUTPATIENT)
Dept: ORTHOPEDICS | Facility: CLINIC | Age: 79
End: 2024-01-19
Payer: COMMERCIAL

## 2024-01-19 NOTE — TELEPHONE ENCOUNTER
[facility] Call Center    Phone Message    May a detailed message be left on voicemail: yes     Reason for Call: Order(s): Home Care Orders: Physical Therapy (PT): Eval and Treat , verboal or fax# [phone]    Action Taken: Message routed to:  [facility] ([facility] Orthopedics    Travel Screening: Not Applicable

## 2024-01-22 NOTE — TELEPHONE ENCOUNTER
[facility] called and gave verbal order via VM. They will call with further questions or concerns.         -Beka [facility]- [facility] Orthopedics

## 2024-01-29 ENCOUNTER — TELEPHONE (OUTPATIENT)
Dept: FAMILY MEDICINE | Facility: CLINIC | Age: 79
End: 2024-01-29
Payer: COMMERCIAL

## 2024-01-29 ENCOUNTER — MEDICAL CORRESPONDENCE (OUTPATIENT)
Dept: FAMILY MEDICINE | Facility: CLINIC | Age: 79
End: 2024-01-29
Payer: COMMERCIAL

## 2024-01-29 DIAGNOSIS — I10 HYPERTENSION GOAL BP (BLOOD PRESSURE) < 140/90: Primary | ICD-10-CM

## 2024-01-29 RX ORDER — LISINOPRIL 40 MG/1
40 TABLET ORAL DAILY
Qty: 90 TABLET | Refills: 1 | Status: SHIPPED | OUTPATIENT
Start: 2024-01-29 | End: 2024-10-02

## 2024-01-29 RX ORDER — HYDROCHLOROTHIAZIDE 25 MG/1
25 TABLET ORAL DAILY
Qty: 90 TABLET | Refills: 1 | Status: SHIPPED | OUTPATIENT
Start: 2024-01-29 | End: 2024-09-17

## 2024-01-29 NOTE — TELEPHONE ENCOUNTER
DE (DOD),      Please see message below that I sent to LS:  Please read.  Needs BP/meds addressed today.  Ph[facility]: [facility]    Thank you,  HUSAM Cannon   Scheduled patient to see you Friday 2/2 (JW) patient.  Daylin from [employer] called.  Seeing patient for first time today.  Will be seeing patient twice a week for 3-4 weeks.    Was discharged from Rehab November 2023: following surgery for Displaced fracture of greater trochanter of right femur, subsequent encounter for closed fracture with malunion +1     BP today 168/108, recheck 168/102  BP medications were discontinued when discharged from Rehab   Patient has not had follow up with PCP, BP meds never restarted.    Will send on to DOD regarding high BP today.    Thank you,  Sara Caro RN

## 2024-01-29 NOTE — TELEPHONE ENCOUNTER
RACHELLE,      DEN:      Scheduled patient to see you Friday 2/2 (GUNNER) patient.  Daylin from [employer] called.  Seeing patient for first time today.  Will be seeing patient twice a week for 3-4 weeks.    Was discharged from Rehab November 2023: following surgery for Displaced fracture of greater trochanter of right femur, subsequent encounter for closed fracture with malunion +1     BP today 168/108, recheck 168/102  BP medications were discontinued when discharged from Rehab   Patient has not had follow up with PCP, BP meds never restarted.    Will send on to DOD regarding high BP today.    Thank you,  Sara Caro RN

## 2024-01-29 NOTE — TELEPHONE ENCOUNTER
Patient informed  Verbalizes understanding.      Daylin from home care informed.    Sara Caro RN

## 2024-01-29 NOTE — TELEPHONE ENCOUNTER
It looks like he used to be on lisinopril 40 mg daily and hydrochlorothiazide 25 mg daily but these were removed from his active medication list when he was in the TCU at the end of the summer.  I sent in refills.  Please let him know.  Thank you, DE

## 2024-01-29 NOTE — TELEPHONE ENCOUNTER
Received form(s) from Home Health Care for Revision to Plan of Care  Cert Period 10/13/23--12/11/23  [facility]- OT-PT-SN-  .  Placed form(s) in/on DE's desk.    Forms need to be signed and faxed to number listed on form..    Call pt to verify form was sent: No  Copy needs to be sent for scanning after completion: Yes

## 2024-01-30 NOTE — TELEPHONE ENCOUNTER
Forms faxed to home health care fax # [phone] and copy sent to stat scan.     Glenys SERRANO

## 2024-02-02 ENCOUNTER — OFFICE VISIT (OUTPATIENT)
Dept: FAMILY MEDICINE | Facility: CLINIC | Age: 79
End: 2024-02-02
Payer: COMMERCIAL

## 2024-02-02 VITALS
RESPIRATION RATE: 20 BRPM | SYSTOLIC BLOOD PRESSURE: 137 MMHG | TEMPERATURE: 97.3 F | OXYGEN SATURATION: 99 % | HEART RATE: 69 BPM | DIASTOLIC BLOOD PRESSURE: 83 MMHG

## 2024-02-02 DIAGNOSIS — I10 HYPERTENSION GOAL BP (BLOOD PRESSURE) < 140/90: Primary | ICD-10-CM

## 2024-02-02 DIAGNOSIS — R73.01 ELEVATED FASTING GLUCOSE: ICD-10-CM

## 2024-02-02 DIAGNOSIS — E78.5 HYPERLIPIDEMIA LDL GOAL <130: ICD-10-CM

## 2024-02-02 LAB — HBA1C MFR BLD: 5.8 % (ref 0–5.6)

## 2024-02-02 PROCEDURE — 36415 COLL VENOUS BLD VENIPUNCTURE: CPT | Performed by: FAMILY MEDICINE

## 2024-02-02 PROCEDURE — 99213 OFFICE O/P EST LOW 20 MIN: CPT | Performed by: FAMILY MEDICINE

## 2024-02-02 PROCEDURE — 83036 HEMOGLOBIN GLYCOSYLATED A1C: CPT | Performed by: FAMILY MEDICINE

## 2024-02-02 RX ORDER — RESPIRATORY SYNCYTIAL VIRUS VACCINE 120MCG/0.5
0.5 KIT INTRAMUSCULAR ONCE
Qty: 1 EACH | Refills: 0 | Status: CANCELLED | OUTPATIENT
Start: 2024-02-02 | End: 2024-02-02

## 2024-02-02 ASSESSMENT — PAIN SCALES - GENERAL: PAINLEVEL: NO PAIN (0)

## 2024-02-02 NOTE — PROGRESS NOTES
Assessment & Plan     Hypertension goal BP (blood pressure) < 140/90  He has restarted hydrochlorothiazide 25 mg and lisinopril 40 mg over five days ago , so BP is much better now     Hyperlipidemia LDL goal <130  Is on simvastatin 20 mg daily , no side effects not fasting today but will come back for fasting lipids check     Elevated fasting glucose  He has had mildly elevated glucose a couple of months ago , so will check Hgb A1 c   It is 5.8 today   - HEMOGLOBIN A1C; Future  - HEMOGLOBIN A1C      Follow up in three months sooner if any concerns or the BP is high   Pt is aware  and comfortable with the current plan.      Elda Alcantara is a 78 year old, presenting for the following health issues:  Hypertension        2/2/2024    12:47 PM   Additional Questions   Roomed by Khadijah WEINER     History of Present Illness       Reason for visit:  Review of medications    He eats 2-3 servings of fruits and vegetables daily.He consumes 1 sweetened beverage(s) daily.He exercises with enough effort to increase his heart rate 30 to 60 minutes per day.  He exercises with enough effort to increase his heart rate 3 or less days per week.   He is taking medications regularly.     130s while in rehab   After a hip surgery , he ws sent to the TCU /rehab center he did not get his BP meds there   Used to be on lisinopril 40 mg and hydrochlorothiazide 25 mg - Rx for those two were sent to his pharmacy on January 29th , 2024 and he has restarted them now   Home Health care nurse visited him on the 29th when she noticed his BP was high and he did not have his usual BP meds   Now that he has restarted them for the past five days seems that BP is better   He denies any chest pain , no SOB, no visual changes , no headaches , doing well   Hypertension Follow-up    Do you check your blood pressure regularly outside of the clinic? Yes   Are you following a low salt diet? Yes  Are your blood pressures ever more than 140 on the top number  (systolic) OR more   than 90 on the bottom number (diastolic), for example 140/90? Yes          Review of Systems  Constitutional, HEENT, cardiovascular, pulmonary, GI, , musculoskeletal, neuro, skin, endocrine and psych systems are negative, except as otherwise noted.      Objective    /83   Pulse 69   Temp 97.3  F (36.3  C) (Temporal)   Resp 20   SpO2 99%   There is no height or weight on file to calculate BMI.  Physical Exam   GENERAL: alert and no distress  NECK: no adenopathy, no asymmetry, masses, or scars  RESP: lungs clear to auscultation - no rales, rhonchi or wheezes  CV: regular rate and rhythm, normal S1 S2, no S3 or S4, no murmur, click or rub, no peripheral edema  ABDOMEN: soft, nontender, no hepatosplenomegaly, no masses and bowel sounds normal  MS: no gross musculoskeletal defects noted, no edema    Results for orders placed or performed in visit on 02/02/24   HEMOGLOBIN A1C     Status: Abnormal   Result Value Ref Range    Hemoglobin A1C 5.8 (H) 0.0 - 5.6 %           Signed Electronically by: Sherron Munoz MD

## 2024-02-08 ENCOUNTER — MEDICAL CORRESPONDENCE (OUTPATIENT)
Dept: HEALTH INFORMATION MANAGEMENT | Facility: CLINIC | Age: 79
End: 2024-02-08
Payer: COMMERCIAL

## 2024-02-09 ENCOUNTER — DOCUMENTATION ONLY (OUTPATIENT)
Dept: ORTHOPEDICS | Facility: CLINIC | Age: 79
End: 2024-02-09
Payer: COMMERCIAL

## 2024-02-09 NOTE — PROGRESS NOTES
Received Completed forms Yes   Faxed Forms Faxed To: home health care  Fax Number: [phone]   Sent to HIM (Date) 2/9/24

## 2024-05-09 ENCOUNTER — MEDICAL CORRESPONDENCE (OUTPATIENT)
Dept: HEALTH INFORMATION MANAGEMENT | Facility: CLINIC | Age: 79
End: 2024-05-09
Payer: COMMERCIAL

## 2024-05-15 DIAGNOSIS — S72.111P: Primary | ICD-10-CM

## 2024-05-15 NOTE — PROGRESS NOTES
[facility] Physicians  Orthopaedic Surgery, Joint Replacement Consultation  by David Escamilla M.D.    Genaro Ortiz MRN# [id number]    YOB: 1945     Requesting physician: Valdez Quesada DO, [facility] neurology  Wegener, Joel Daniel Irwin     Background history:  DX:  Bilateral DJD of hips  9/2023, right greater trochanteric periprosthetic fracture secondary to fall    TREATMENTS:  7/14/2023, right total hip arthroplasty, Dr. Escamilla, [facility]  7/21/2023, left total hip arthroplasty, Dr. Escamilla, [facility]  11/10/23, ORIF right femur proximal greater trochanter, Dr. Escamilla, [facility]      Genaro returns today for an assessment of healing of his right greater trochanteric periprosthetic fracture.  He is not having any pain in his right hip joint.  He mostly feels like his right leg is still weaker than the left.  He has been walking with a walker without issue.  He says he occasionally uses his wheelchair.  This is mainly because he is afraid of falling.    Examination reveals that he is able to stand and bear weight on the right lower extremity without pain.  No pain with rotation of the hip.      Radiographs reveal stability of the fracture position.  Difficult to assess for fracture healing, but it appears to have been in the same position throughout his postoperative course.           Assessment and Plan:   Assessment:  78-year-old male who presents today approximately 6 months status post open reduction and fixation of right femur greater trochanteric fracture.  Interval healing with stable fracture pattern.    Plan:  Physical therapy orders: Outpatient physical therapy order provided.  Patient continue to work on walking more and then transition to using a cane in his left hand.    Encourage patient to use a cane for ambulation and full weight-bear on the right lower extremity as long as he remains pain-free.    Return in 3 months time with repeat AP and frog-leg lateral x-ray of the right hip  joint.    Patient seen and discussed with Dr. Andi Mac MD  Orthopaedic Surgery PGY-4    Attending MD (Dr. David Escamilla) Attestation :  This patient was seen and evaluated by me including a history, exam, and interpretation of all imaging and/or lab data.  I formulated the treatment plan along with either a physician's assistant (BETZAIDA) or training physician (resident/fellow), who also saw the patient. That individual or a scribe has documented the visit in the attached note which I approve.    David Escamilla MD  [facility] Family Professor  Oncology and Adult Reconstructive Surgery  Dept Orthopaedic Surgery, [facility]icians  [phone] office, [phone] pager  [email or web address]      Total combined visit time and work time before and after clinic visit on encounter date = 20 min

## 2024-05-23 ENCOUNTER — OFFICE VISIT (OUTPATIENT)
Dept: ORTHOPEDICS | Facility: CLINIC | Age: 79
End: 2024-05-23
Payer: COMMERCIAL

## 2024-05-23 ENCOUNTER — ANCILLARY PROCEDURE (OUTPATIENT)
Dept: GENERAL RADIOLOGY | Facility: CLINIC | Age: 79
End: 2024-05-23
Attending: ORTHOPAEDIC SURGERY
Payer: COMMERCIAL

## 2024-05-23 DIAGNOSIS — Z96.641 STATUS POST TOTAL REPLACEMENT OF RIGHT HIP: Primary | ICD-10-CM

## 2024-05-23 DIAGNOSIS — S72.111P: ICD-10-CM

## 2024-05-23 PROCEDURE — 73502 X-RAY EXAM HIP UNI 2-3 VIEWS: CPT | Mod: RT | Performed by: RADIOLOGY

## 2024-05-23 PROCEDURE — 99213 OFFICE O/P EST LOW 20 MIN: CPT | Mod: GC | Performed by: ORTHOPAEDIC SURGERY

## 2024-05-23 ASSESSMENT — HOOS JR
BENDING TO THE FLOOR TO PICK UP OBJECT: MILD
HOOS JR TOTAL INTERVAL SCORE: 85.26
WALKING ON UNEVEN SURFACE: MILD

## 2024-05-23 NOTE — LETTER
5/23/2024         RE: Genaro Ortiz  [address]  [place] [place] [facility] [address]        Dear Colleague,    Thank you for referring your patient, Genaro Ortiz, to the [facility] [place]. Please see a copy of my visit note below.        [facility] Physicians  Orthopaedic Surgery, Joint Replacement Consultation  by David Escamilla M.D.    Genaro Ortiz MRN# [id number]    YOB: 1945     Requesting physician: Valdez Quesada DO, [facility] neurology  Wegener, Joel Daniel Irwin     Background history:  DX:  Bilateral DJD of hips  9/2023, right greater trochanteric periprosthetic fracture secondary to fall    TREATMENTS:  7/14/2023, right total hip arthroplasty, Dr. Ecsamilla, [facility]  7/21/2023, left total hip arthroplasty, Dr. Escamilla, [facility]  11/10/23, ORIF right femur proximal greater trochanter, Dr. Escamilla, [facility]      Genaro returns today for an assessment of healing of his right greater trochanteric periprosthetic fracture.  He is not having any pain in his right hip joint.  He mostly feels like his right leg is still weaker than the left.  He has been walking with a walker without issue.  He says he occasionally uses his wheelchair.  This is mainly because he is afraid of falling.    Examination reveals that he is able to stand and bear weight on the right lower extremity without pain.  No pain with rotation of the hip.      Radiographs reveal stability of the fracture position.  Difficult to assess for fracture healing, but it appears to have been in the same position throughout his postoperative course.           Assessment and Plan:   Assessment:  78-year-old male who presents today approximately 6 months status post open reduction and fixation of right femur greater trochanteric fracture.  Interval healing with stable fracture pattern.    Plan:  Physical therapy orders: Outpatient physical therapy order provided.  Patient continue to work on walking more and then  transition to using a cane in his left hand.    Encourage patient to use a cane for ambulation and full weight-bear on the right lower extremity as long as he remains pain-free.    Return in 3 months time with repeat AP and frog-leg lateral x-ray of the right hip joint.    Patient seen and discussed with Dr. Andi Mac MD  Orthopaedic Surgery PGY-4    Attending MD (Dr. David Escamilla) Attestation :  This patient was seen and evaluated by me including a history, exam, and interpretation of all imaging and/or lab data.  I formulated the treatment plan along with either a physician's assistant (PAFELTON) or training physician (resident/fellow), who also saw the patient. That individual or a scribe has documented the visit in the attached note which I approve.    David Escamilla MD  [facility] Family Professor  Oncology and Adult Reconstructive Surgery  Dept Orthopaedic Surgery, [facility] Physicians  [phone] office, [phone] pager  [email or web address]    Total combined visit time and work time before and after clinic visit on encounter date = 20 min

## 2024-05-30 ENCOUNTER — MEDICAL CORRESPONDENCE (OUTPATIENT)
Dept: HEALTH INFORMATION MANAGEMENT | Facility: CLINIC | Age: 79
End: 2024-05-30

## 2024-05-30 ENCOUNTER — OFFICE VISIT (OUTPATIENT)
Dept: FAMILY MEDICINE | Facility: CLINIC | Age: 79
End: 2024-05-30
Payer: COMMERCIAL

## 2024-05-30 VITALS
BODY MASS INDEX: 33.36 KG/M2 | OXYGEN SATURATION: 98 % | HEART RATE: 67 BPM | TEMPERATURE: 97.8 F | DIASTOLIC BLOOD PRESSURE: 75 MMHG | RESPIRATION RATE: 16 BRPM | WEIGHT: 226 LBS | SYSTOLIC BLOOD PRESSURE: 130 MMHG

## 2024-05-30 DIAGNOSIS — R73.03 PREDIABETES: Primary | ICD-10-CM

## 2024-05-30 DIAGNOSIS — E78.5 HYPERLIPIDEMIA LDL GOAL <130: ICD-10-CM

## 2024-05-30 LAB
HBA1C MFR BLD: 5.8 % (ref 0–5.6)
HOLD SPECIMEN: NORMAL

## 2024-05-30 PROCEDURE — 91320 SARSCV2 VAC 30MCG TRS-SUC IM: CPT | Performed by: FAMILY MEDICINE

## 2024-05-30 PROCEDURE — 36415 COLL VENOUS BLD VENIPUNCTURE: CPT | Performed by: FAMILY MEDICINE

## 2024-05-30 PROCEDURE — 90480 ADMN SARSCOV2 VAC 1/ONLY CMP: CPT | Performed by: FAMILY MEDICINE

## 2024-05-30 PROCEDURE — 99213 OFFICE O/P EST LOW 20 MIN: CPT | Performed by: FAMILY MEDICINE

## 2024-05-30 PROCEDURE — 83036 HEMOGLOBIN GLYCOSYLATED A1C: CPT | Performed by: FAMILY MEDICINE

## 2024-05-30 ASSESSMENT — PAIN SCALES - GENERAL: PAINLEVEL: NO PAIN (0)

## 2024-05-30 NOTE — PROGRESS NOTES
"  Assessment & Plan     Prediabetes  Reviewed options including lifestyle management +/- addition of metfomin.  Prefers to start with focusing on activity/walking now that recovering from hip replacement/accidental fall/fracture.     Hemoglobin A1C   Date Value Ref Range Status   05/30/2024 5.8 (H) 0.0 - 5.6 % Final     Comment:     Normal <5.7%   Prediabetes 5.7-6.4%    Diabetes 6.5% or higher     Note: Adopted from ADA consensus guidelines.   02/02/2024 5.8 (H) 0.0 - 5.6 % Final     Comment:     Normal <5.7%   Prediabetes 5.7-6.4%    Diabetes 6.5% or higher     Note: Adopted from ADA consensus guidelines.   10/13/2023 5.6 0.0 - 5.6 % Final     Comment:     Normal <5.7%   Prediabetes 5.7-6.4%    Diabetes 6.5% or higher     Note: Adopted from ADA consensus guidelines.   11/30/2020 5.8 (H) 0 - 5.6 % Final     Comment:     Normal <5.7% Prediabetes 5.7-6.4%  Diabetes 6.5% or higher - adopted from ADA   consensus guidelines.       A1c just into pre-diabtic range and overal stable last several years as above.     Follow up early October for physical and re-checik.   - Hemoglobin A1c; Future  - Hemoglobin A1c    The longitudinal plan of care for the diagnosis(es)/condition(s) as documented were addressed during this visit. Due to the added complexity in care, I will continue to support Quinton in the subsequent management and with ongoing continuity of care.  30 minutes spent on the date of the encounter doing chart review, history and exam, negotiating and explaining the plan with the patient, documentation and further activities as noted above.                 BMI  Estimated body mass index is 33.36 kg/m  as calculated from the following:    Height as of 12/8/23: 1.753 m (5' 9.02\").    Weight as of this encounter: 102.5 kg (226 lb).             Elda Alcantara is a 78 year old, presenting for the following health issues:  RECHECK (A1C), Prediabetes, and Recheck Medication (Pt request 90 days medications )        " 5/30/2024     1:02 PM   Additional Questions   Roomed by Ngozi JUDGE   Accompanied by n/a     History of Present Illness       Reason for visit:  Sugar reading    He eats 2-3 servings of fruits and vegetables daily.He consumes 1 sweetened beverage(s) daily.He exercises with enough effort to increase his heart rate 9 or less minutes per day.  He exercises with enough effort to increase his heart rate 3 or less days per week.   He is taking medications regularly.                     Objective    /75 (BP Location: Left arm, Patient Position: Sitting, Cuff Size: Adult Large)   Pulse 67   Temp 97.8  F (36.6  C) (Temporal)   Resp 16   Wt 102.5 kg (226 lb)   SpO2 98%   BMI 33.36 kg/m    Body mass index is 33.36 kg/m .  Physical Exam   Appears well.  Using wheelchair today but states walking now short distances with walker.             Signed Electronically by: Joel Daniel Wegener, MD

## 2024-05-30 NOTE — NURSING NOTE
Pt received COVID vaccine booster for 0960-9404 season per provider, Dr. Wegener's, orders.   Prior to immunization administration, verified patients identity using patient s name and date of birth. Pt given VIS form prior to immunization administration.     Patient instructed to remain in clinic for 15 minutes afterwards, and to report any adverse reactions.     Performed by Dennys Chang RN on 5/30/2024.

## 2024-05-31 ENCOUNTER — MYC MEDICAL ADVICE (OUTPATIENT)
Dept: FAMILY MEDICINE | Facility: CLINIC | Age: 79
End: 2024-05-31
Payer: COMMERCIAL

## 2024-05-31 DIAGNOSIS — Z13.820 SCREENING FOR OSTEOPOROSIS: ICD-10-CM

## 2024-05-31 DIAGNOSIS — Z87.310 H/O HEALED FRAGILITY FRACTURE: Primary | ICD-10-CM

## 2024-06-06 ENCOUNTER — MYC MEDICAL ADVICE (OUTPATIENT)
Dept: ORTHOPEDICS | Facility: CLINIC | Age: 79
End: 2024-06-06
Payer: COMMERCIAL

## 2024-06-06 DIAGNOSIS — Z96.641 STATUS POST TOTAL REPLACEMENT OF RIGHT HIP: Primary | ICD-10-CM

## 2024-06-06 RX ORDER — CLINDAMYCIN HCL 300 MG
CAPSULE ORAL
Qty: 2 CAPSULE | Refills: 4 | Status: SHIPPED | OUTPATIENT
Start: 2024-06-06 | End: 2024-10-02

## 2024-06-10 ENCOUNTER — MEDICAL CORRESPONDENCE (OUTPATIENT)
Dept: HEALTH INFORMATION MANAGEMENT | Facility: CLINIC | Age: 79
End: 2024-06-10
Payer: COMMERCIAL

## 2024-06-25 ENCOUNTER — THERAPY VISIT (OUTPATIENT)
Dept: PHYSICAL THERAPY | Facility: CLINIC | Age: 79
End: 2024-06-25
Attending: ORTHOPAEDIC SURGERY
Payer: COMMERCIAL

## 2024-06-25 DIAGNOSIS — Z96.641 STATUS POST TOTAL REPLACEMENT OF RIGHT HIP: ICD-10-CM

## 2024-06-25 PROCEDURE — 97161 PT EVAL LOW COMPLEX 20 MIN: CPT | Mod: GP

## 2024-06-25 PROCEDURE — 97110 THERAPEUTIC EXERCISES: CPT | Mod: GP

## 2024-06-25 ASSESSMENT — ACTIVITIES OF DAILY LIVING (ADL)
SITTING_FOR_15_MINUTES: NO DIFFICULTY AT ALL
WALKING_FOR_APPROXIMATELY_10_MINUTES: UNABLE TO DO
STANDING_FOR_15_MINUTES: EXTREME DIFFICULTY
ROLLING_OVER_IN_BED: UNABLE TO DO
WALKING_UP_STEEP_HILLS: UNABLE TO DO
GETTING_INTO_AND_OUT_OF_AN_AVERAGE_CAR: MODERATE DIFFICULTY
WALKING_DOWN_STEEP_HILLS: UNABLE TO DO
GETTING_INTO_AND_OUT_OF_A_BATHTUB: UNABLE TO DO
HEAVY_WORK: UNABLE TO DO
TWISTING/PIVOTING_ON_INVOLVED_LEG: UNABLE TO DO
PLEASE_INDICATE_YOR_PRIMARY_REASON_FOR_REFERRAL_TO_THERAPY:: HIP
STEPPING_UP_AND_DOWN_CURBS: EXTREME DIFFICULTY
DEEP_SQUATTING: UNABLE TO DO
GOING_DOWN_1_FLIGHT_OF_STAIRS: EXTREME DIFFICULTY
PUTTING_ON_SOCKS_AND_SHOES: MODERATE DIFFICULTY
RECREATIONAL_ACTIVITIES: UNABLE TO DO
LIGHT_TO_MODERATE_WORK: UNABLE TO DO
GOING_UP_1_FLIGHT_OF_STAIRS: EXTREME DIFFICULTY
WALKING_15_MINUTES_OR_GREATER: UNABLE TO DO
WALKING_INITIALLY: EXTREME DIFFICULTY

## 2024-06-25 NOTE — PROGRESS NOTES
PHYSICAL THERAPY EVALUATION  Type of Visit: Evaluation       Fall Risk Screen:  Fall screen completed by: PT  Have you fallen 2 or more times in the past year?: No  Have you fallen and had an injury in the past year?: Yes (fell and landed on R hip following surgery resulting in fragmentation of femur)  Is patient a fall risk?: Yes    Subjective   Pt comes in s/p hip operations leading to difficulties with walking without his walker and with navigating stairs. Notes pain is minimal, but feels more weak when on his feet.       Presenting condition or subjective complaint:    Date of onset: (P) 06/25/24 (3 surgeries of hips and R femur in past year- listed under PSH in eval)    Relevant medical history:     Past Medical History:   Diagnosis Date    Adenocarcinoma of prostate (H) 5/27/2008    Arthritis     Esophageal reflux     Neuropathy     Pure hypercholesterolemia     Unspecified essential hypertension      Dates & types of surgery:     Past Surgical History:   Procedure Laterality Date    ABDOMEN SURGERY  1952    Appendectomy    APPENDECTOMY  1952    ARTHROPLASTY HIP Right 7/14/2023    Procedure: ARTHROPLASTY, HIP, TOTAL RIGHT;  Surgeon: David Escamilla MD;  Location: [facility] OR    ARTHROPLASTY HIP Left 7/21/2023    Procedure: ARTHROPLASTY, HIP, TOTAL LEFT;  Surgeon: David Escamilla MD;  Location: [facility] OR    CATARACT IOL, RT/LT Bilateral 03/12    COLONOSCOPY  2017    COLONOSCOPY N/A 5/18/2023    Procedure: colonoscopy;  Surgeon: Ayse Sears MD;  Location:  GI    GENITOURINARY SURGERY      HERNIORRHAPHY INGUINAL  5/15/2014    Procedure: HERNIORRHAPHY INGUINAL;  Surgeon: Evens Jefferson MD;  Location: [facility] OR    OPEN REDUCTION INTERNAL FIXATION FEMUR PROXIMAL Right 11/10/2023    Procedure: OPEN REDUCTION INTERNAL FIXATION, FRACTURE, RIGHT  FEMUR, PROXIMAL GREATER TROCHANTER;  Surgeon: David Escamilla MD;  Location: [facility] OR    PROSTATE SURGERY  2007     Prior diagnostic imaging/testing results:       Prior  therapy history for the same diagnosis, illness or injury:        Living Environment  Social support:   alone  Type of home:    apartment; condo  Stairs to enter the home:       elevator  Ramp:     Stairs inside the home:       none  Help at home:   cleaning lady comes  Equipment owned:        Employment:     retired  Hobbies/Interests:      Patient goals for therapy:   walking a mile with LRAD;        Objective   HIP EVALUATION  PAIN: Pain Level at Rest: 0/10  Pain Level with Use: 2/10  Pain Location: hip  Pain Quality: Aching and weakness  Pain Frequency: intermittent  Pain is Worst: daytime  Pain is Exacerbated By: prolonged standing/walking  Pain is Relieved By: none  POSTURE: Standing Posture: Rounded shoulders, Forward head, Thoracic kyphosis increased, Increased forward lean with slight knee flexion  GAIT: Able to ambulate short distances with 2WW - antalgic, limited trunk rotation, Limited hip extension, B trendelenburg sign  ROM:   (Degrees) Left AROM Left PROM  Right AROM Right PROM   Hip Flexion  Limited with stretch  Limited with stretch   Hip Internal Rotation  Limited with stretch  Limited with stretch   Hip External Rotation  Limited with stretch  Limited with stretch   Lumbar Side glide     Lumbar Flexion Limited with stretch in back and legs   Lumbar Extension Limited without pain   Pain:   End feel:   STRENGTH:  Limited strength without pain to resisted B hip flexion, B hip abduction, B knee flexion  LE FLEXIBILITY:  Muscle length deficits to B iliopsoas, B TFL, B Rectus femoris, B hamstrings  FUNCTIONAL TESTS:  STS: completes with increased time needed and 2HHA from chair; SLS: unable to balance on either LE without HHA from walker or therapist  PALPATION:  no noted tenderness to palpation      Assessment & Plan   CLINICAL IMPRESSIONS  Medical Diagnosis: (P) B hip weakness    Treatment Diagnosis: (P) B hip weakness and mobility deficits   Impression/Assessment: Patient is a 78 year old male with B  hip complaints.  The following significant findings have been identified: Decreased ROM/flexibility, Decreased joint mobility, Decreased strength, Impaired balance, Decreased proprioception, Impaired gait, Impaired muscle performance, and Decreased activity tolerance. These impairments interfere with their ability to perform self care tasks, recreational activities, household chores, household mobility, and community mobility as compared to previous level of function.     Clinical Decision Making (Complexity):  Clinical Presentation: Evolving/Changing  Clinical Presentation Rationale: based on medical and personal factors listed in PT evaluation  Clinical Decision Making (Complexity): Low complexity    PLAN OF CARE  Treatment Interventions:  Interventions: Gait Training, Manual Therapy, Neuromuscular Re-education, Therapeutic Activity, Therapeutic Exercise, Self-Care/Home Management    Long Term Goals     PT Goal 1  Goal Identifier: (P) Walking  Goal Description: (P) Pt will return to tolerance of walking for 1 mile with LRAD without complications from LE and hip weakness  Rationale: (P) to maximize safety and independence with performance of ADLs and functional tasks  Target Date: (P) 09/23/24      Frequency of Treatment: 1x/week  Duration of Treatment: (P) 90 days - may be longer depending on progress of strength development into functional training    Education Assessment:   Learner/Method: Patient;Demonstration;Pictures/Video;No Barriers to Learning    Risks and benefits of evaluation/treatment have been explained.   Patient/Family/caregiver agrees with Plan of Care.     Evaluation Time:     PT Eval, Low Complexity Minutes (73275): (P) 15     Signing Clinician: LENIN WILKINS [facility]                                                                                   OUTPATIENT PHYSICAL THERAPY      PLAN OF TREATMENT FOR OUTPATIENT REHABILITATION   Patient's Last Name, First  Name, DESTINY OrtizGenaro YOB: 1945   Provider's Name   [facility]   Medical Record No.  [id number]     Onset Date: (P) 06/25/24 (3 surgeries of hips and R femur in past year- listed under PS in eval)  Start of Care Date: (P) 06/25/24     Medical Diagnosis:  (P) B hip weakness      PT Treatment Diagnosis:  (P) B hip weakness and mobility deficits Plan of Treatment  Frequency/Duration: 1x/week/ (P) 90 days - may be longer depending on progress of strength development into functional training    Certification date from (P) 06/25/24 to (P) 09/23/24         See note for plan of treatment details and functional goals     LENIN ISAAC                         I CERTIFY THE NEED FOR THESE SERVICES FURNISHED UNDER        THIS PLAN OF TREATMENT AND WHILE UNDER MY CARE     (Physician attestation of this document indicates review and certification of the therapy plan).              Referring Provider:  David Escamilla MD    Initial Assessment  See Epic Evaluation- Start of Care Date: (P) 06/25/24

## 2024-07-09 ENCOUNTER — THERAPY VISIT (OUTPATIENT)
Dept: PHYSICAL THERAPY | Facility: CLINIC | Age: 79
End: 2024-07-09
Payer: COMMERCIAL

## 2024-07-09 DIAGNOSIS — Z96.641 STATUS POST TOTAL REPLACEMENT OF RIGHT HIP: Primary | ICD-10-CM

## 2024-07-09 PROCEDURE — 97110 THERAPEUTIC EXERCISES: CPT | Mod: GP

## 2024-07-22 ENCOUNTER — MEDICAL CORRESPONDENCE (OUTPATIENT)
Dept: HEALTH INFORMATION MANAGEMENT | Facility: CLINIC | Age: 79
End: 2024-07-22
Payer: COMMERCIAL

## 2024-07-23 ENCOUNTER — ANCILLARY PROCEDURE (OUTPATIENT)
Dept: BONE DENSITY | Facility: CLINIC | Age: 79
End: 2024-07-23
Attending: FAMILY MEDICINE
Payer: COMMERCIAL

## 2024-07-23 DIAGNOSIS — Z13.820 SCREENING FOR OSTEOPOROSIS: ICD-10-CM

## 2024-07-23 DIAGNOSIS — Z87.310 H/O HEALED FRAGILITY FRACTURE: ICD-10-CM

## 2024-07-23 PROCEDURE — 77080 DXA BONE DENSITY AXIAL: CPT | Performed by: INTERNAL MEDICINE

## 2024-07-26 ENCOUNTER — THERAPY VISIT (OUTPATIENT)
Dept: PHYSICAL THERAPY | Facility: CLINIC | Age: 79
End: 2024-07-26
Payer: COMMERCIAL

## 2024-07-26 DIAGNOSIS — Z96.641 STATUS POST TOTAL REPLACEMENT OF RIGHT HIP: Primary | ICD-10-CM

## 2024-07-26 PROCEDURE — 97110 THERAPEUTIC EXERCISES: CPT | Mod: GP

## 2024-08-08 DIAGNOSIS — Z96.641 STATUS POST TOTAL REPLACEMENT OF RIGHT HIP: Primary | ICD-10-CM

## 2024-08-08 NOTE — PROGRESS NOTES
[facility] Physicians  Orthopaedic Surgery, Joint Replacement Consultation  by David Escamilla M.D.    Genaro Ortiz MRN# [id number]    YOB: 1945     Requesting physician: Valdez Quesada DO, [facility] neurology  Wegener, Joel Daniel Irwin     Background history:  DX:  Bilateral DJD of hips  9/2023, right greater trochanteric periprosthetic fracture secondary to fall    TREATMENTS:  7/14/2023, right total hip arthroplasty, Dr. Escamilla, [facility]  7/21/2023, left total hip arthroplasty, Dr. Escamilla, [facility]  11/10/23, ORIF right femur proximal greater trochanter, Dr. Escamilla, [facility]        Genaro returns for recheck in regards to his right greater trochanteric periprosthetic fracture.  Is been 9 months since his injury and he is doing quite well with no pain or discomfort.  He is able perform single-leg stance without difficulty as well.  He uses his walker.  This is mainly for balance purposes as he has no pain.    Examination confirms that he has  no pain with rotational motion of either hip joint.    Radiographs demonstrate his implant satisfactory position and the greater trochanteric fragment has remained stable in place without migration or rotational malposition.    Impression: Healed greater trochanteric periprosthetic fracture on the right side.      Plan:  Excellent outcome after above surgery.  Follow-up as needed.    MD Chetan Pelayo Family Professor  Oncology and Adult Reconstructive Surgery  Dept Orthopaedic Surgery, [facility]  [phone] office, [phone] pager  [email or web address]      Total combined visit time and work time before and after clinic visit, independent of trainee, on encounter date = 20 min

## 2024-08-13 ENCOUNTER — THERAPY VISIT (OUTPATIENT)
Dept: PHYSICAL THERAPY | Facility: CLINIC | Age: 79
End: 2024-08-13
Payer: COMMERCIAL

## 2024-08-13 DIAGNOSIS — Z96.641 STATUS POST TOTAL REPLACEMENT OF RIGHT HIP: Primary | ICD-10-CM

## 2024-08-13 PROCEDURE — 97140 MANUAL THERAPY 1/> REGIONS: CPT | Mod: GP

## 2024-08-13 PROCEDURE — 97110 THERAPEUTIC EXERCISES: CPT | Mod: GP

## 2024-08-16 ENCOUNTER — MYC MEDICAL ADVICE (OUTPATIENT)
Dept: FAMILY MEDICINE | Facility: CLINIC | Age: 79
End: 2024-08-16
Payer: COMMERCIAL

## 2024-08-16 DIAGNOSIS — E53.8 VITAMIN B12 DEFICIENCY (NON ANEMIC): ICD-10-CM

## 2024-08-19 ENCOUNTER — OFFICE VISIT (OUTPATIENT)
Dept: ORTHOPEDICS | Facility: CLINIC | Age: 79
End: 2024-08-19
Payer: COMMERCIAL

## 2024-08-19 ENCOUNTER — ANCILLARY PROCEDURE (OUTPATIENT)
Dept: GENERAL RADIOLOGY | Facility: CLINIC | Age: 79
End: 2024-08-19
Attending: ORTHOPAEDIC SURGERY
Payer: COMMERCIAL

## 2024-08-19 DIAGNOSIS — S72.111P: Primary | ICD-10-CM

## 2024-08-19 DIAGNOSIS — Z96.641 STATUS POST TOTAL REPLACEMENT OF RIGHT HIP: ICD-10-CM

## 2024-08-19 PROCEDURE — 73502 X-RAY EXAM HIP UNI 2-3 VIEWS: CPT | Performed by: RADIOLOGY

## 2024-08-19 PROCEDURE — 99213 OFFICE O/P EST LOW 20 MIN: CPT | Performed by: ORTHOPAEDIC SURGERY

## 2024-08-19 ASSESSMENT — HOOS JR
LYING IN BED (TURNING OVER, MAINTAINING HIP POSITION): MILD
BENDING TO THE FLOOR TO PICK UP OBJECT: EXTREME
HOOS JR TOTAL INTERVAL SCORE: 73.47

## 2024-08-19 NOTE — LETTER
8/19/2024      Genaro Ortiz  [address]  [place] [facility] [address]      Dear Colleague,    Thank you for referring your patient, Genaro Ortiz, to the [facility]OP[facility] [place]. Please see a copy of my visit note below.        [facility] Physicians  Orthopaedic Surgery, Joint Replacement Consultation  by David Escamilla M.D.    Genaro Ortiz MRN# [id number]    YOB: 1945     Requesting physician: Valdez Quesada DO, [facility] neurology  Wegener, Joel Daniel Irwin     Background history:  DX:  Bilateral DJD of hips  9/2023, right greater trochanteric periprosthetic fracture secondary to fall    TREATMENTS:  7/14/2023, right total hip arthroplasty, Dr. Escamilla, [facility]  7/21/2023, left total hip arthroplasty, Dr. Escamilla, [facility]  11/10/23, ORIF right femur proximal greater trochanter, Dr. Escamilla, [facility]        Gnearo returns for recheck in regards to his right greater trochanteric periprosthetic fracture.  Is been 9 months since his injury and he is doing quite well with no pain or discomfort.  He is able perform single-leg stance without difficulty as well.  He uses his walker.  This is mainly for balance purposes as he has no pain.    Examination confirms that he has  no pain with rotational motion of either hip joint.    Radiographs demonstrate his implant satisfactory position and the greater trochanteric fragment has remained stable in place without migration or rotational malposition.    Impression: Healed greater trochanteric periprosthetic fracture on the right side.      Plan:  Excellent outcome after above surgery.  Follow-up as needed.    MD Chetan Pelayo Family Professor  Oncology and Adult Reconstructive Surgery  Dept Orthopaedic Surgery, [facility] Phys[facility]  [phone] office, [phone] pager  [email or web address]      Total combined visit time and work time before and after clinic visit, independent of trainee, on encounter date = 20  min        Again, thank you for allowing me to participate in the care of your patient.        Sincerely,        David Escamilla MD

## 2024-08-26 ENCOUNTER — MYC REFILL (OUTPATIENT)
Dept: FAMILY MEDICINE | Facility: CLINIC | Age: 79
End: 2024-08-26
Payer: COMMERCIAL

## 2024-08-26 DIAGNOSIS — E78.5 HYPERLIPIDEMIA LDL GOAL <130: ICD-10-CM

## 2024-08-26 RX ORDER — SIMVASTATIN 20 MG
TABLET ORAL
Qty: 90 TABLET | Refills: 0 | Status: SHIPPED | OUTPATIENT
Start: 2024-08-26 | End: 2024-10-02

## 2024-08-26 RX ORDER — SIMVASTATIN 20 MG
TABLET ORAL
Qty: 90 TABLET | Refills: 1 | OUTPATIENT
Start: 2024-08-26

## 2024-08-31 ENCOUNTER — HEALTH MAINTENANCE LETTER (OUTPATIENT)
Age: 79
End: 2024-08-31

## 2024-09-09 ENCOUNTER — THERAPY VISIT (OUTPATIENT)
Dept: PHYSICAL THERAPY | Facility: CLINIC | Age: 79
End: 2024-09-09
Payer: COMMERCIAL

## 2024-09-09 DIAGNOSIS — Z96.641 STATUS POST TOTAL REPLACEMENT OF RIGHT HIP: Primary | ICD-10-CM

## 2024-09-09 PROCEDURE — 97110 THERAPEUTIC EXERCISES: CPT | Mod: GP

## 2024-09-13 ENCOUNTER — TRANSFERRED RECORDS (OUTPATIENT)
Dept: HEALTH INFORMATION MANAGEMENT | Facility: CLINIC | Age: 79
End: 2024-09-13

## 2024-09-14 DIAGNOSIS — H35.371 EPIRETINAL MEMBRANE (ERM) OF RIGHT EYE: Primary | ICD-10-CM

## 2024-09-17 ENCOUNTER — OFFICE VISIT (OUTPATIENT)
Dept: OPHTHALMOLOGY | Facility: CLINIC | Age: 79
End: 2024-09-17
Attending: OPHTHALMOLOGY
Payer: COMMERCIAL

## 2024-09-17 DIAGNOSIS — H35.371 EPIRETINAL MEMBRANE (ERM) OF RIGHT EYE: ICD-10-CM

## 2024-09-17 DIAGNOSIS — H35.051 CNVM (CHOROIDAL NEOVASCULAR MEMBRANE), RIGHT: Primary | ICD-10-CM

## 2024-09-17 DIAGNOSIS — Z96.1 PSEUDOPHAKIA, BOTH EYES: ICD-10-CM

## 2024-09-17 DIAGNOSIS — I10 HYPERTENSION GOAL BP (BLOOD PRESSURE) < 140/90: ICD-10-CM

## 2024-09-17 PROCEDURE — G0463 HOSPITAL OUTPT CLINIC VISIT: HCPCS | Performed by: OPHTHALMOLOGY

## 2024-09-17 PROCEDURE — 92014 COMPRE OPH EXAM EST PT 1/>: CPT | Performed by: OPHTHALMOLOGY

## 2024-09-17 PROCEDURE — 92134 CPTRZ OPH DX IMG PST SGM RTA: CPT | Performed by: OPHTHALMOLOGY

## 2024-09-17 RX ORDER — HYDROCHLOROTHIAZIDE 25 MG/1
25 TABLET ORAL DAILY
Qty: 90 TABLET | Refills: 0 | Status: SHIPPED | OUTPATIENT
Start: 2024-09-17 | End: 2024-10-02

## 2024-09-17 ASSESSMENT — VISUAL ACUITY
CORRECTION_TYPE: GLASSES
OD_CC+: +3
OS_CC: 20/20
METHOD: SNELLEN - LINEAR
OD_CC: 20/25

## 2024-09-17 ASSESSMENT — REFRACTION_WEARINGRX
OD_ADD: +2.50
OS_SPHERE: -1.00
OD_AXIS: 087
OS_AXIS: 110
OS_ADD: +2.50
OD_SPHERE: PLANO
OS_CYLINDER: +1.00
OD_CYLINDER: +0.75

## 2024-09-17 ASSESSMENT — CONF VISUAL FIELD
METHOD: COUNTING FINGERS
OD_INFERIOR_TEMPORAL_RESTRICTION: 0
OD_INFERIOR_NASAL_RESTRICTION: 0
OD_SUPERIOR_TEMPORAL_RESTRICTION: 0
OD_NORMAL: 1
OD_SUPERIOR_NASAL_RESTRICTION: 0

## 2024-09-17 ASSESSMENT — TONOMETRY
OS_IOP_MMHG: 11
IOP_METHOD: TONOPEN
OD_IOP_MMHG: 14

## 2024-09-17 ASSESSMENT — SLIT LAMP EXAM - LIDS
COMMENTS: PTOSIS, DCL
COMMENTS: PTOSIS, DCL

## 2024-09-17 ASSESSMENT — CUP TO DISC RATIO
OS_RATIO: 0.1
OD_RATIO: 0.1

## 2024-09-17 ASSESSMENT — EXTERNAL EXAM - RIGHT EYE: OD_EXAM: NORMAL

## 2024-09-17 ASSESSMENT — EXTERNAL EXAM - LEFT EYE: OS_EXAM: NORMAL

## 2024-09-17 NOTE — NURSING NOTE
Chief Complaints and History of Present Illnesses   Patient presents with    Annual Eye Exam     Annual visit      Chief Complaint(s) and History of Present Illness(es)       Annual Eye Exam              Laterality: both eyes    Associated symptoms: Negative for dryness, eye pain, flashes, floaters and itching    Treatments tried: no treatments    Pain scale: 0/10    Comments: Annual visit               Comments    Patient reports no change to vision since last year 1+ yrs ago.    No DM.   No gtts per pt.     Katlin TRUONG 9:41 AM September 17, 2024

## 2024-09-17 NOTE — PROGRESS NOTES
HPI       Annual Eye Exam    In both eyes.  Associated symptoms include Negative for dryness, eye pain, flashes, floaters and itching.  Treatments tried include no treatments.  Pain was noted as 0/10. Additional comments: Annual visit              Comments    Patient reports no change to vision since last year 1+ yrs ago.    No DM.   No gtts per pt.     Katlin Sutton OA 9:41 AM September 17, 2024             Last edited by Katlin Sutton on 9/17/2024  9:41 AM.          Review of systems for the eyes was negative other than the pertinent positives/negatives listed in the HPI.      Assessment & Plan      Genaro Ortiz is a 79 year old male with the following diagnoses:   1. CNVM (choroidal neovascular membrane), right    2. Epiretinal membrane (ERM) of right eye    3. Pseudophakia, both eyes       Here for annual visit  Acute hearing loss left eye in the past weeks.  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           Attending Physician Attestation:  Complete documentation of historical and exam elements from today's encounter can be found in the full encounter summary report (not reduplicated in this progress note).  I personally obtained the chief complaint(s) and history of present illness.  I confirmed and edited as necessary the review of systems, past medical/surgical history, family history, social history, and examination findings as documented by others; and I examined the patient myself.  I personally reviewed the relevant tests, images, and reports as documented above.  I formulated and edited as necessary the assessment and plan and discussed the findings and management plan with the patient  and family. . - Eric Abel MD

## 2024-09-26 SDOH — HEALTH STABILITY: PHYSICAL HEALTH
ON AVERAGE, HOW MANY DAYS PER WEEK DO YOU ENGAGE IN MODERATE TO STRENUOUS EXERCISE (LIKE A BRISK WALK)?: PATIENT DECLINED

## 2024-09-26 SDOH — HEALTH STABILITY: PHYSICAL HEALTH: ON AVERAGE, HOW MANY MINUTES DO YOU ENGAGE IN EXERCISE AT THIS LEVEL?: PATIENT DECLINED

## 2024-09-26 ASSESSMENT — SOCIAL DETERMINANTS OF HEALTH (SDOH): HOW OFTEN DO YOU GET TOGETHER WITH FRIENDS OR RELATIVES?: MORE THAN THREE TIMES A WEEK

## 2024-10-01 ENCOUNTER — OFFICE VISIT (OUTPATIENT)
Dept: FAMILY MEDICINE | Facility: CLINIC | Age: 79
End: 2024-10-01
Payer: COMMERCIAL

## 2024-10-01 VITALS
SYSTOLIC BLOOD PRESSURE: 132 MMHG | RESPIRATION RATE: 16 BRPM | DIASTOLIC BLOOD PRESSURE: 78 MMHG | WEIGHT: 228.9 LBS | OXYGEN SATURATION: 99 % | BODY MASS INDEX: 33.79 KG/M2 | TEMPERATURE: 98.4 F | HEART RATE: 72 BPM

## 2024-10-01 DIAGNOSIS — Z13.1 SCREENING FOR DIABETES MELLITUS: ICD-10-CM

## 2024-10-01 DIAGNOSIS — Z29.11 NEED FOR VACCINATION AGAINST RESPIRATORY SYNCYTIAL VIRUS: ICD-10-CM

## 2024-10-01 DIAGNOSIS — R33.9 URINARY RETENTION: ICD-10-CM

## 2024-10-01 DIAGNOSIS — Z00.00 ENCOUNTER FOR MEDICARE ANNUAL WELLNESS EXAM: Primary | ICD-10-CM

## 2024-10-01 DIAGNOSIS — E53.8 VITAMIN B12 DEFICIENCY (NON ANEMIC): ICD-10-CM

## 2024-10-01 DIAGNOSIS — E78.5 HYPERLIPIDEMIA LDL GOAL <130: ICD-10-CM

## 2024-10-01 DIAGNOSIS — I10 HYPERTENSION GOAL BP (BLOOD PRESSURE) < 140/90: ICD-10-CM

## 2024-10-01 DIAGNOSIS — H61.22 IMPACTED CERUMEN OF LEFT EAR: ICD-10-CM

## 2024-10-01 DIAGNOSIS — R73.03 PREDIABETES: ICD-10-CM

## 2024-10-01 DIAGNOSIS — Z79.82 ENCOUNTER FOR MONITORING OF CHRONIC ASPIRIN THERAPY: ICD-10-CM

## 2024-10-01 DIAGNOSIS — Z51.81 ENCOUNTER FOR MONITORING OF CHRONIC ASPIRIN THERAPY: ICD-10-CM

## 2024-10-01 DIAGNOSIS — E66.09 OBESITY DUE TO EXCESS CALORIES, UNSPECIFIED OBESITY SEVERITY: ICD-10-CM

## 2024-10-01 DIAGNOSIS — Z12.5 SCREENING FOR PROSTATE CANCER: ICD-10-CM

## 2024-10-01 LAB
HOLD SPECIMEN: NORMAL

## 2024-10-01 PROCEDURE — 69209 REMOVE IMPACTED EAR WAX UNI: CPT | Mod: LT | Performed by: FAMILY MEDICINE

## 2024-10-01 PROCEDURE — 85027 COMPLETE CBC AUTOMATED: CPT | Performed by: FAMILY MEDICINE

## 2024-10-01 PROCEDURE — 90480 ADMN SARSCOV2 VAC 1/ONLY CMP: CPT | Performed by: FAMILY MEDICINE

## 2024-10-01 PROCEDURE — 99213 OFFICE O/P EST LOW 20 MIN: CPT | Mod: 25 | Performed by: FAMILY MEDICINE

## 2024-10-01 PROCEDURE — 80053 COMPREHEN METABOLIC PANEL: CPT | Performed by: FAMILY MEDICINE

## 2024-10-01 PROCEDURE — 83036 HEMOGLOBIN GLYCOSYLATED A1C: CPT | Performed by: FAMILY MEDICINE

## 2024-10-01 PROCEDURE — G0008 ADMIN INFLUENZA VIRUS VAC: HCPCS | Performed by: FAMILY MEDICINE

## 2024-10-01 PROCEDURE — G0439 PPPS, SUBSEQ VISIT: HCPCS | Performed by: FAMILY MEDICINE

## 2024-10-01 PROCEDURE — 91320 SARSCV2 VAC 30MCG TRS-SUC IM: CPT | Performed by: FAMILY MEDICINE

## 2024-10-01 PROCEDURE — 36415 COLL VENOUS BLD VENIPUNCTURE: CPT | Performed by: FAMILY MEDICINE

## 2024-10-01 PROCEDURE — 90662 IIV NO PRSV INCREASED AG IM: CPT | Performed by: FAMILY MEDICINE

## 2024-10-01 PROCEDURE — G0103 PSA SCREENING: HCPCS | Performed by: FAMILY MEDICINE

## 2024-10-01 PROCEDURE — 80061 LIPID PANEL: CPT | Performed by: FAMILY MEDICINE

## 2024-10-01 ASSESSMENT — PAIN SCALES - GENERAL: PAINLEVEL: NO PAIN (0)

## 2024-10-01 NOTE — NURSING NOTE
Pt received COVID vaccine and flu vaccine per standing orders. Pt given VIS forms prior to immunization administration.   Prior to immunization administration, verified patients identity using patient s name and date of birth.     Prior to immunization administration, this RN reviewed vaccine questionnaires with the patient - SEE BELOW for patient's responses.  COVID-19 vaccine !YES   Have you had myocarditis or pericarditis (inflammation of or around the heart muscle) after getting a COVID-19 vaccine? No   Have you had a serious reaction to a COVID vaccine or something in a COVID vaccine, like polyethylene glycol (PEG) or polysorbate? No   Have you had multisystem inflammatory syndrome from COVID-19 in the past 90 days? No   INFLUENZA vaccine !YES   Would you like to receive the flu shot or the nasal flu vaccine today? Flu Shot   Have you had a serious reaction to a flu vaccine or something in a flu vaccine? No   Have you received a bone marrow transplant within the previous 6 months? No     Patient instructed to remain in clinic for 15 minutes afterwards, and to report any adverse reactions.     Performed by Dennys Chang RN on 10/1/2024.

## 2024-10-01 NOTE — PATIENT INSTRUCTIONS
-Colon Cancer Screening: Last done via colonoscopy on 5/2023. (Impression: diverticulosis, no specimens collected). Previously discontinued.   -PSA: Last done 10/2023 (Result: <0.01 ng/mL). Per chart review, history of prostatectomy. Patient requesting PSA lab work.              Discussed risks/benefits of PSA testing today in detail, including possibility of  false positive results and/or possibility of biopsy recommended as next step.   -Dermatology: Pt verbalized they do meet with dermatology regularly. .  -A1C: Last done 5/2024 . (Result: 5.8%).  -Immunizations: Patient is due/able to receive at clinic today: Flu - Pt requesting to complete this visit and Covid - Pt requesting to complete this visit.   Patient is due for the following vaccines: RSV. Advised patient to receive at a pharmacy due to Medicare insurance coverage. Pt verbalized difficulty receiving vaccine due to transportation but is not interested in receiving at clinic this visit due to potential costs.   -Other: Pt reports hearing concern in left ear that started about 1 week ago.     Flu/covid today.  Rsv from pharmacy.     Irrigate left ear (100% cerumen impaction).  If not successful then can use debrox (wax softening drops) for one week at home then return for nurse visit for repeat irrigation attempt.     Patient Education   Preventive Care Advice   This is general advice given by our system to help you stay healthy. However, your care team may have specific advice just for you. Please talk to your care team about your preventive care needs.  Nutrition  Eat 5 or more servings of fruits and vegetables each day.  Try wheat bread, brown rice and whole grain pasta (instead of white bread, rice, and pasta).  Get enough calcium and vitamin D. Check the label on foods and aim for 100% of the RDA (recommended daily allowance).  Lifestyle  Exercise at least 150 minutes each week  (30 minutes a day, 5 days a week).  Do muscle strengthening activities  2 days a week. These help control your weight and prevent disease.  No smoking.  Wear sunscreen to prevent skin cancer.  Have a dental exam and cleaning every 6 months.  Yearly exams  See your health care team every year to talk about:  Any changes in your health.  Any medicines your care team has prescribed.  Preventive care, family planning, and ways to prevent chronic diseases.  Shots (vaccines)   HPV shots (up to age 26), if you've never had them before.  Hepatitis B shots (up to age 59), if you've never had them before.  COVID-19 shot: Get this shot when it's due.  Flu shot: Get a flu shot every year.  Tetanus shot: Get a tetanus shot every 10 years.  Pneumococcal, hepatitis A, and RSV shots: Ask your care team if you need these based on your risk.  Shingles shot (for age 50 and up)  General health tests  Diabetes screening:  Starting at age 35, Get screened for diabetes at least every 3 years.  If you are younger than age 35, ask your care team if you should be screened for diabetes.  Cholesterol test: At age 39, start having a cholesterol test every 5 years, or more often if advised.  Bone density scan (DEXA): At age 50, ask your care team if you should have this scan for osteoporosis (brittle bones).  Hepatitis C: Get tested at least once in your life.  STIs (sexually transmitted infections)  Before age 24: Ask your care team if you should be screened for STIs.  After age 24: Get screened for STIs if you're at risk. You are at risk for STIs (including HIV) if:  You are sexually active with more than one person.  You don't use condoms every time.  You or a partner was diagnosed with a sexually transmitted infection.  If you are at risk for HIV, ask about PrEP medicine to prevent HIV.  Get tested for HIV at least once in your life, whether you are at risk for HIV or not.  Cancer screening tests  Cervical cancer screening: If you have a cervix, begin getting regular cervical cancer screening tests starting at  age 21.  Breast cancer scan (mammogram): If you've ever had breasts, begin having regular mammograms starting at age 40. This is a scan to check for breast cancer.  Colon cancer screening: It is important to start screening for colon cancer at age 45.  Have a colonoscopy test every 10 years (or more often if you're at risk) Or, ask your provider about stool tests like a FIT test every year or Cologuard test every 3 years.  To learn more about your testing options, visit:   .  For help making a decision, visit:   https://bit.ly/td32585.  Prostate cancer screening test: If you have a prostate, ask your care team if a prostate cancer screening test (PSA) at age 55 is right for you.  Lung cancer screening: If you are a current or former smoker ages 50 to 80, ask your care team if ongoing lung cancer screenings are right for you.  For informational purposes only. Not to replace the advice of your health care provider. Copyright   2023 [facility]. All rights reserved. Clinically reviewed by the [facility] Transitions Program. IID [id number] - REV 01/24.  Learning About Activities of Daily Living  What are activities of daily living?     Activities of daily living (ADLs) are the basic self-care tasks you do every day. These include eating, bathing, dressing, and moving around.  As you age, and if you have health problems, you may find that it's harder to do some of these tasks. If so, your doctor can suggest ideas that may help.  To measure what kind of help you may need, your doctor will ask how well you are able to do ADLs. Let your doctor know if there are any tasks that you are having trouble doing. This is an important first step to getting help. And when you have the help you need, you can stay as independent as possible.  How will a doctor assess your ADLs?  Asking about ADLs is part of a routine health checkup your doctor will likely do as you age. Your health check might be done in a  doctor's office, in your home, or at a hospital. The goal is to find out if you are having any problems that could make it hard to care for yourself or that make it unsafe for you to be on your own.  To measure your ADLs, your doctor will ask how hard it is for you to do routine tasks. Your doctor may also want to know if you have changed the way you do a task because of a health problem. Your doctor may watch how you:  Walk back and forth.  Keep your balance while you stand or walk.  Move from sitting to standing or from a bed to a chair.  Button or unbutton a shirt or sweater.  Remove and put on your shoes.  It's common to feel a little worried or anxious if you find you can't do all the things you used to be able to do. Talking with your doctor about ADLs is a way to make sure you're as safe as possible and able to care for yourself as well as you can. You may want to bring a caregiver, friend, or family member to your checkup. They can help you talk to your doctor.  Follow-up care is a key part of your treatment and safety. Be sure to make and go to all appointments, and call your doctor if you are having problems. It's also a good idea to know your test results and keep a list of the medicines you take.  Current as of: October 24, 2023  Content Version: 14.2 2024 [facility] [employer].   Care instructions adapted under license by your healthcare professional. If you have questions about a medical condition or this instruction, always ask your healthcare professional. [employer] disclaims any warranty or liability for your use of this information.    Preventing Falls: Care Instructions  Injuries and health problems such as trouble walking or poor eyesight can increase your risk of falling. So can some medicines. But there are things you can do to help prevent falls. You can exercise to get stronger. You can also arrange your home to make it safer.    Talk to your doctor about the medicines you  "take. Ask if any of them increase the risk of falls and whether they can be changed or stopped.   Try to exercise regularly. It can help improve your strength and balance. This can help lower your risk of falling.     Practice fall safety and prevention.    Wear low-heeled shoes that fit well and give your feet good support. Talk to your doctor if you have foot problems that make this hard.  Carry a cellphone or wear a medical alert device that you can use to call for help.  Use stepladders instead of chairs to reach high objects. Don't climb if you're at risk for falls. Ask for help, if needed.  Wear the correct eyeglasses, if you need them.    Make your home safer.    Remove rugs, cords, clutter, and furniture from walkways.  Keep your house well lit. Use night-lights in hallways and bathrooms.  Install and use sturdy handrails on stairways.  Wear nonskid footwear, even inside. Don't walk barefoot or in socks without shoes.    Be safe outside.    Use handrails, curb cuts, and ramps whenever possible.  Keep your hands free by using a shoulder bag or backpack.  Try to walk in well-lit areas. Watch out for uneven ground, changes in pavement, and debris.  Be careful in the winter. Walk on the grass or gravel when sidewalks are slippery. Use de-icer on steps and walkways. Add non-slip devices to shoes.    Put grab bars and nonskid mats in your shower or tub and near the toilet. Try to use a shower chair or bath bench when bathing.   Get into a tub or shower by putting in your weaker leg first. Get out with your strong side first. Have a phone or medical alert device in the bathroom with you.   Where can you learn more?  Go to https://www.[employer].net/patiented  Enter G117 in the search box to learn more about \"Preventing Falls: Care Instructions.\"  Current as of: July 17, 2023               Content Version: 14.0    5170-4545 [employer].   Care instructions adapted under license by your healthcare " professional. If you have questions about a medical condition or this instruction, always ask your healthcare professional. Healthwise, [facility] disclaims any warranty or liability for your use of this information.      Hearing Loss: Care Instructions  Overview     Hearing loss is a sudden or slow decrease in how well you hear. It can range from slight to profound. Permanent hearing loss can occur with aging. It also can happen when you are exposed long-term to loud noise. Examples include listening to loud music, riding motorcycles, or being around other loud machines.  Hearing loss can affect your work and home life. It can make you feel lonely or depressed. You may feel that you have lost your independence. But hearing aids and other devices can help you hear better and feel connected to others.  Follow-up care is a key part of your treatment and safety. Be sure to make and go to all appointments, and call your doctor if you are having problems. It's also a good idea to know your test results and keep a list of the medicines you take.  How can you care for yourself at home?  Avoid loud noises whenever possible. This helps keep your hearing from getting worse.  Always wear hearing protection around loud noises.  Wear a hearing aid as directed.  A professional can help you pick a hearing aid that will work best for you.  You can also get hearing aids over the counter for mild to moderate hearing loss.  Have hearing tests as your doctor suggests. They can show whether your hearing has changed. Your hearing aid may need to be adjusted.  Use other devices as needed. These may include:  Telephone amplifiers and hearing aids that can connect to a television, stereo, radio, or microphone.  Devices that use lights or vibrations. These alert you to the doorbell, a ringing telephone, or a baby monitor.  Television closed-captioning. This shows the words at the bottom of the screen. Most new TVs can do this.  TTY (text  "telephone). This lets you type messages back and forth on the telephone instead of talking or listening. These devices are also called TDD. When messages are typed on the keyboard, they are sent over the phone line to a receiving TTY. The message is shown on a monitor.  Use text messaging, social media, and email if it is hard for you to communicate by telephone.  Try to learn a listening technique called speechreading. It is not lipreading. You pay attention to people's gestures, expressions, posture, and tone of voice. These clues can help you understand what a person is saying. Face the person you are talking to, and have them face you. Make sure the lighting is good. You need to see the other person's face clearly.  Think about counseling if you need help to adjust to your hearing loss.  When should you call for help?  Watch closely for changes in your health, and be sure to contact your doctor if:    You think your hearing is getting worse.     You have new symptoms, such as dizziness or nausea.   Where can you learn more?  Go to https://www.[employer].net/patiented  Enter R798 in the search box to learn more about \"Hearing Loss: Care Instructions.\"  Current as of: September 27, 2023               Content Version: 14.0    9157-2997 [employer].   Care instructions adapted under license by your healthcare professional. If you have questions about a medical condition or this instruction, always ask your healthcare professional. [employer] disclaims any warranty or liability for your use of this information.         Patient Education   Preventive Care Advice   This is general advice given by our system to help you stay healthy. However, your care team may have specific advice just for you. Please talk to your care team about your preventive care needs.  Nutrition  Eat 5 or more servings of fruits and vegetables each day.  Try wheat bread, brown rice and whole grain pasta (instead of " white bread, rice, and pasta).  Get enough calcium and vitamin D. Check the label on foods and aim for 100% of the RDA (recommended daily allowance).  Lifestyle  Exercise at least 150 minutes each week  (30 minutes a day, 5 days a week).  Do muscle strengthening activities 2 days a week. These help control your weight and prevent disease.  No smoking.  Wear sunscreen to prevent skin cancer.  Have a dental exam and cleaning every 6 months.  Yearly exams  See your health care team every year to talk about:  Any changes in your health.  Any medicines your care team has prescribed.  Preventive care, family planning, and ways to prevent chronic diseases.  Shots (vaccines)   HPV shots (up to age 26), if you've never had them before.  Hepatitis B shots (up to age 59), if you've never had them before.  COVID-19 shot: Get this shot when it's due.  Flu shot: Get a flu shot every year.  Tetanus shot: Get a tetanus shot every 10 years.  Pneumococcal, hepatitis A, and RSV shots: Ask your care team if you need these based on your risk.  Shingles shot (for age 50 and up)  General health tests  Diabetes screening:  Starting at age 35, Get screened for diabetes at least every 3 years.  If you are younger than age 35, ask your care team if you should be screened for diabetes.  Cholesterol test: At age 39, start having a cholesterol test every 5 years, or more often if advised.  Bone density scan (DEXA): At age 50, ask your care team if you should have this scan for osteoporosis (brittle bones).  Hepatitis C: Get tested at least once in your life.  STIs (sexually transmitted infections)  Before age 24: Ask your care team if you should be screened for STIs.  After age 24: Get screened for STIs if you're at risk. You are at risk for STIs (including HIV) if:  You are sexually active with more than one person.  You don't use condoms every time.  You or a partner was diagnosed with a sexually transmitted infection.  If you are at risk for  HIV, ask about PrEP medicine to prevent HIV.  Get tested for HIV at least once in your life, whether you are at risk for HIV or not.  Cancer screening tests  Cervical cancer screening: If you have a cervix, begin getting regular cervical cancer screening tests starting at age 21.  Breast cancer scan (mammogram): If you've ever had breasts, begin having regular mammograms starting at age 40. This is a scan to check for breast cancer.  Colon cancer screening: It is important to start screening for colon cancer at age 45.  Have a colonoscopy test every 10 years (or more often if you're at risk) Or, ask your provider about stool tests like a FIT test every year or Cologuard test every 3 years.  To learn more about your testing options, visit:   .  For help making a decision, visit:   https://bit.ly/yf96508.  Prostate cancer screening test: If you have a prostate, ask your care team if a prostate cancer screening test (PSA) at age 55 is right for you.  Lung cancer screening: If you are a current or former smoker ages 50 to 80, ask your care team if ongoing lung cancer screenings are right for you.  For informational purposes only. Not to replace the advice of your health care provider. Copyright   2023 [facility]. All rights reserved. Clinically reviewed by the [facility] Transitions Program. [employer] [id number] - REV 01/24.  Learning About Activities of Daily Living  What are activities of daily living?     Activities of daily living (ADLs) are the basic self-care tasks you do every day. These include eating, bathing, dressing, and moving around.  As you age, and if you have health problems, you may find that it's harder to do some of these tasks. If so, your doctor can suggest ideas that may help.  To measure what kind of help you may need, your doctor will ask how well you are able to do ADLs. Let your doctor know if there are any tasks that you are having trouble doing. This is an important first  step to getting help. And when you have the help you need, you can stay as independent as possible.  How will a doctor assess your ADLs?  Asking about ADLs is part of a routine health checkup your doctor will likely do as you age. Your health check might be done in a doctor's office, in your home, or at a hospital. The goal is to find out if you are having any problems that could make it hard to care for yourself or that make it unsafe for you to be on your own.  To measure your ADLs, your doctor will ask how hard it is for you to do routine tasks. Your doctor may also want to know if you have changed the way you do a task because of a health problem. Your doctor may watch how you:  Walk back and forth.  Keep your balance while you stand or walk.  Move from sitting to standing or from a bed to a chair.  Button or unbutton a shirt or sweater.  Remove and put on your shoes.  It's common to feel a little worried or anxious if you find you can't do all the things you used to be able to do. Talking with your doctor about ADLs is a way to make sure you're as safe as possible and able to care for yourself as well as you can. You may want to bring a caregiver, friend, or family member to your checkup. They can help you talk to your doctor.  Follow-up care is a key part of your treatment and safety. Be sure to make and go to all appointments, and call your doctor if you are having problems. It's also a good idea to know your test results and keep a list of the medicines you take.  Current as of: October 24, 2023  Content Version: 14.2    2024 [facility] [employer].   Care instructions adapted under license by your healthcare professional. If you have questions about a medical condition or this instruction, always ask your healthcare professional. Healthwise, [employer] disclaims any warranty or liability for your use of this information.    Preventing Falls: Care Instructions  Injuries and health problems such as trouble  walking or poor eyesight can increase your risk of falling. So can some medicines. But there are things you can do to help prevent falls. You can exercise to get stronger. You can also arrange your home to make it safer.    Talk to your doctor about the medicines you take. Ask if any of them increase the risk of falls and whether they can be changed or stopped.   Try to exercise regularly. It can help improve your strength and balance. This can help lower your risk of falling.     Practice fall safety and prevention.    Wear low-heeled shoes that fit well and give your feet good support. Talk to your doctor if you have foot problems that make this hard.  Carry a cellphone or wear a medical alert device that you can use to call for help.  Use stepladders instead of chairs to reach high objects. Don't climb if you're at risk for falls. Ask for help, if needed.  Wear the correct eyeglasses, if you need them.    Make your home safer.    Remove rugs, cords, clutter, and furniture from walkways.  Keep your house well lit. Use night-lights in hallways and bathrooms.  Install and use sturdy handrails on stairways.  Wear nonskid footwear, even inside. Don't walk barefoot or in socks without shoes.    Be safe outside.    Use handrails, curb cuts, and ramps whenever possible.  Keep your hands free by using a shoulder bag or backpack.  Try to walk in well-lit areas. Watch out for uneven ground, changes in pavement, and debris.  Be careful in the winter. Walk on the grass or gravel when sidewalks are slippery. Use de-icer on steps and walkways. Add non-slip devices to shoes.    Put grab bars and nonskid mats in your shower or tub and near the toilet. Try to use a shower chair or bath bench when bathing.   Get into a tub or shower by putting in your weaker leg first. Get out with your strong side first. Have a phone or medical alert device in the bathroom with you.   Where can you learn more?  Go to  "https://www.[employer].net/patiented  Enter G117 in the search box to learn more about \"Preventing Falls: Care Instructions.\"  Current as of: July 17, 2023               Content Version: 14.0    6039-0405 [employer].   Care instructions adapted under license by your healthcare professional. If you have questions about a medical condition or this instruction, always ask your healthcare professional. [employer] disclaims any warranty or liability for your use of this information.      Hearing Loss: Care Instructions  Overview     Hearing loss is a sudden or slow decrease in how well you hear. It can range from slight to profound. Permanent hearing loss can occur with aging. It also can happen when you are exposed long-term to loud noise. Examples include listening to loud music, riding motorcycles, or being around other loud machines.  Hearing loss can affect your work and home life. It can make you feel lonely or depressed. You may feel that you have lost your independence. But hearing aids and other devices can help you hear better and feel connected to others.  Follow-up care is a key part of your treatment and safety. Be sure to make and go to all appointments, and call your doctor if you are having problems. It's also a good idea to know your test results and keep a list of the medicines you take.  How can you care for yourself at home?  Avoid loud noises whenever possible. This helps keep your hearing from getting worse.  Always wear hearing protection around loud noises.  Wear a hearing aid as directed.  A professional can help you pick a hearing aid that will work best for you.  You can also get hearing aids over the counter for mild to moderate hearing loss.  Have hearing tests as your doctor suggests. They can show whether your hearing has changed. Your hearing aid may need to be adjusted.  Use other devices as needed. These may include:  Telephone amplifiers and hearing aids that " "can connect to a television, stereo, radio, or microphone.  Devices that use lights or vibrations. These alert you to the doorbell, a ringing telephone, or a baby monitor.  Television closed-captioning. This shows the words at the bottom of the screen. Most new TVs can do this.  TTY (text telephone). This lets you type messages back and forth on the telephone instead of talking or listening. These devices are also called TDD. When messages are typed on the keyboard, they are sent over the phone line to a receiving TTY. The message is shown on a monitor.  Use text messaging, social media, and email if it is hard for you to communicate by telephone.  Try to learn a listening technique called speechreading. It is not lipreading. You pay attention to people's gestures, expressions, posture, and tone of voice. These clues can help you understand what a person is saying. Face the person you are talking to, and have them face you. Make sure the lighting is good. You need to see the other person's face clearly.  Think about counseling if you need help to adjust to your hearing loss.  When should you call for help?  Watch closely for changes in your health, and be sure to contact your doctor if:    You think your hearing is getting worse.     You have new symptoms, such as dizziness or nausea.   Where can you learn more?  Go to https://www.[employer].net/patiented  Enter R798 in the search box to learn more about \"Hearing Loss: Care Instructions.\"  Current as of: September 27, 2023               Content Version: 14.0    8509-7283 "[employer]".   Care instructions adapted under license by your healthcare professional. If you have questions about a medical condition or this instruction, always ask your healthcare professional. "[employer]" disclaims any warranty or liability for your use of this information.         "

## 2024-10-01 NOTE — PROGRESS NOTES
Preventive Care Visit  [facility]  Joel Daniel Wegener, MD, Family Medicine  Oct 1, 2024      Assessment & Plan     Encounter for Medicare annual wellness exam  -Colon Cancer Screening: Last done via colonoscopy on 5/2023. (Impression: diverticulosis, no specimens collected). Previously discontinued.   -PSA: Last done 10/2023 (Result: <0.01 ng/mL). Per chart review, history of prostatectomy. Patient requesting PSA lab work.              Discussed risks/benefits of PSA testing today in detail, including possibility of  false positive results and/or possibility of biopsy recommended as next step.   -Dermatology: Pt verbalized they do meet with dermatology regularly. .  -A1C: Last done 5/2024 . (Result: 5.8%).  -Immunizations: Patient is due/able to receive at clinic today: Flu - Pt requesting to complete this visit and Covid - Pt requesting to complete this visit.   Patient is due for the following vaccines: RSV. Advised patient to receive at a pharmacy due to Medicare insurance coverage. Pt verbalized difficulty receiving vaccine due to transportation but is not interested in receiving at clinic this visit due to potential costs.   -Other: Pt reports hearing concern in left ear that started about 1 week ago.     Flu/covid today.  Rsv from pharmacy.     Irrigate left ear (100% cerumen impaction).  If not successful then can use debrox (wax softening drops) for one week at home then return for nurse visit for repeat irrigation attempt.     Need for vaccination against respiratory syncytial virus      Hyperlipidemia LDL goal <130    - Lipid panel reflex to direct LDL Non-fasting; Future  - simvastatin (ZOCOR) 20 MG tablet; TAKE 1 TABLET BY MOUTH EVERY NIGHT AT BEDTIME  - Lipid panel reflex to direct LDL Fasting; Future  - Comprehensive metabolic panel; Future    Obesity due to excess calories, unspecified obesity severity    - HEMOGLOBIN A1C; Future    Hypertension goal BP (blood pressure) <  "140/90  At goal.   - COMPREHENSIVE METABOLIC PANEL; Future  - hydrochlorothiazide (HYDRODIURIL) 25 MG tablet; Take 1 tablet (25 mg) by mouth daily.  - lisinopril (ZESTRIL) 40 MG tablet; Take 1 tablet (40 mg) by mouth daily.  - Comprehensive metabolic panel; Future    Screening for prostate cancer    - PROSTATE SPEC ANTIGEN SCREEN; Future  - Prostate Specific Antigen Screen; Future    Prediabetes    - HEMOGLOBIN A1C; Future  - Hemoglobin A1c; Future    Screening for diabetes mellitus    - HEMOGLOBIN A1C; Future  - Hemoglobin A1c; Future    Encounter for monitoring of chronic aspirin therapy    - CBC with platelets; Future  - CBC with platelets; Future    Impacted cerumen of left ear  Successful irrigation of 100% left ear cerumen impaction.   - [place] REMOVAL IMPACTED CERUMEN IRRIGATION/LVG UNILAT (RN/MA); Standing    Urinary retention  /benign prostatic hypertrophy.  Refill given, states working decently.   - tamsulosin (FLOMAX) 0.4 MG capsule; Take 1 capsule (0.4 mg) by mouth daily.    Vitamin B12 deficiency (non anemic)  Re-check cbc.   - Cyanocobalamin (B-12) 1000 MCG TBCR; Take 1 tablet by mouth daily.  - CBC with platelets; Future    Patient has been advised of split billing requirements and indicates understanding: Yes      BMI  Estimated body mass index is 33.79 kg/m  as calculated from the following:    Height as of 12/8/23: 1.753 m (5' 9.02\").    Weight as of this encounter: 103.8 kg (228 lb 14.4 oz).   Weight management plan: Discussed healthy diet and exercise guidelines    Counseling  Appropriate preventive services were addressed with this patient via screening, questionnaire, or discussion as appropriate for fall prevention, nutrition, physical activity, Tobacco-use cessation, social engagement, weight loss and cognition.  Checklist reviewing preventive services available has been given to the patient.  Reviewed patient's diet, addressing concerns and/or questions.   Updated plan of care.  Patient reported " difficulty with activities of daily living were addressed today.Addressed any concerns about safety while driving.  The patient was provided with written information regarding signs of hearing loss.           Elda Alcantara is a 79 year old, presenting for the following:  Physical (AWV)        10/1/2024    11:24 AM   Additional Questions   Roomed by HUSAM Noyola   Accompanied by N/A         10/1/2024    11:24 AM   Patient Reported Additional Medications   Patient reports taking the following new medications Pt denies         Health Care Directive  Patient has a Health Care Directive on file  Advance care planning document is on file but is outdated.  Patient encouraged to update.    HPI                    9/26/2024   General Health   How would you rate your overall physical health? Good   Feel stress (tense, anxious, or unable to sleep) Not at all            9/26/2024   Nutrition   Diet: Low fat/cholesterol            9/26/2024   Exercise   Days per week of moderate/strenous exercise Patient declined   Average minutes spent exercising at this level Patient declined            9/26/2024   Social Factors   Frequency of gathering with friends or relatives More than three times a week   Worry food won't last until get money to buy more No   Food not last or not have enough money for food? No   Do you have housing? (Housing is defined as stable permanent housing and does not include staying ouside in a car, in a tent, in an abandoned building, in an overnight shelter, or couch-surfing.) Yes   Are you worried about losing your housing? No   Lack of transportation? No   Unable to get utilities (heat,electricity)? Patient declined            10/1/2024   Fall Risk   Reason Gait Speed Test Not Completed Patient does not tolerate an upright or standing position (e.g. wheelchair)             9/26/2024   Activities of Daily Living- Home Safety   Needs help with the following daily activites Housework   Safety concerns in the  home None of the above            9/26/2024   Dental   Dentist two times every year? Yes            9/26/2024   Hearing Screening   Hearing concerns? (!) I FEEL THAT PEOPLE ARE MUMBLING OR NOT SPEAKING CLEARLY.    (!) I NEED TO ASK PEOPLE TO SPEAK UP OR REPEAT THEMSELVES.       Multiple values from one day are sorted in reverse-chronological order         9/26/2024   Driving Risk Screening   Patient/family members have concerns about driving (!) YES             9/26/2024   General Alertness/Fatigue Screening   Have you been more tired than usual lately? No            9/26/2024   Urinary Incontinence Screening   Bothered by leaking urine in past 6 months No            9/26/2024   TB Screening   Were you born outside of the [place]? No              Today's PHQ-2 Score:       1/11/2024     3:03 PM   PHQ-2 ( 1999 [employer])   Q1: Little interest or pleasure in doing things 0   Q2: Feeling down, depressed or hopeless 0   PHQ-2 Score 0         9/26/2024   Substance Use   Alcohol more than 3/day or more than 7/wk No   Do you have a current opioid prescription? No   How severe/bad is pain from 1 to 10? 2/10   Do you use any other substances recreationally? No        Social History     Tobacco Use    Smoking status: Never     Passive exposure: Never    Smokeless tobacco: Never    Tobacco comments:     Never smoked.   Vaping Use    Vaping status: Never Used   Substance Use Topics    Alcohol use: Not Currently    Drug use: No       ASCVD Risk   The 10-year ASCVD risk score (Makenna SALINAS, et al., 2019) is: 34%    Values used to calculate the score:      Age: 79 years      Sex: Male      Is Non- : No      Diabetic: No      Tobacco smoker: No      Systolic Blood Pressure: 132 mmHg      Is BP treated: Yes      HDL Cholesterol: 36 mg/dL      Total Cholesterol: 154 mg/dL            Reviewed and updated as needed this visit by Provider                    Past Medical History:   Diagnosis Date    Adenocarcinoma  of prostate (H) 5/27/2008    Arthritis     Esophageal reflux     Neuropathy     Pure hypercholesterolemia     Unspecified essential hypertension      Past Surgical History:   Procedure Laterality Date    ABDOMEN SURGERY  1952    Appendectomy    APPENDECTOMY  1952    ARTHROPLASTY HIP Right 7/14/2023    Procedure: ARTHROPLASTY, HIP, TOTAL RIGHT;  Surgeon: David Escamilla MD;  Location: [facility]    ARTHROPLASTY HIP Left 7/21/2023    Procedure: ARTHROPLASTY, HIP, TOTAL LEFT;  Surgeon: David Escamilla MD;  Location: [facility] OR    CATARACT IOL, RT/LT Bilateral 03/12    COLONOSCOPY  2017    COLONOSCOPY N/A 5/18/2023    Procedure: colonoscopy;  Surgeon: Ayse Sears MD;  Location:  GI    GENITOURINARY SURGERY      HERNIORRHAPHY INGUINAL  5/15/2014    Procedure: HERNIORRHAPHY INGUINAL;  Surgeon: Evens Jefferson MD;  Location: [facility] OR    OPEN REDUCTION INTERNAL FIXATION FEMUR PROXIMAL Right 11/10/2023    Procedure: OPEN REDUCTION INTERNAL FIXATION, FRACTURE, RIGHT  FEMUR, PROXIMAL GREATER TROCHANTER;  Surgeon: David Escamilla MD;  Location: [facility] OR    PROSTATE SURGERY  2007     Current providers sharing in care for this patient include:  Patient Care Team:  Wegener, Joel Daniel Irwin, MD as PCP - General (Family Medicine)  Eric Abel MD as MD (Ophthalmology)  Wegener, Joel Daniel Irwin, MD as Assigned PCP  Valdez Quesada DO as Referring Physician (Neurology)  Giovanni Shah MD as MD (Neurology)  Eric Gerber PA-C as Physician Assistant (Physician Assistant - Medical)  David Escamilla MD as Assigned Musculoskeletal Provider  Eric Abel MD as Assigned Surgical Provider    The following health maintenance items are reviewed in Epic and correct as of today:  Health Maintenance   Topic Date Due    RSV VACCINE (1 - 1-dose 75+ series) Never done    LIPID  08/30/2023    A1C  08/30/2024    CMP  10/13/2024    PSA  10/13/2024    BMP  11/20/2024    CBC  11/20/2024    ANNUAL REVIEW OF  "HM ORDERS  02/02/2025    MEDICARE ANNUAL WELLNESS VISIT  10/01/2025    FALL RISK ASSESSMENT  10/01/2025    GLUCOSE  11/20/2026    DTAP/TDAP/TD IMMUNIZATION (3 - Td or Tdap) 02/26/2029    ADVANCE CARE PLANNING  10/01/2029    HEPATITIS C SCREENING  Completed    PHQ-2 (once per calendar year)  Completed    INFLUENZA VACCINE  Completed    Pneumococcal Vaccine: 65+ Years  Completed    ZOSTER IMMUNIZATION  Completed    COVID-19 Vaccine  Completed    HPV IMMUNIZATION  Aged Out    MENINGITIS IMMUNIZATION  Aged Out    RSV MONOCLONAL ANTIBODY  Aged Out    COLORECTAL CANCER SCREENING  Discontinued         Review of Systems  Constitutional, neuro, ENT, endocrine, pulmonary, cardiac, gastrointestinal, genitourinary, musculoskeletal, integument and psychiatric systems are negative, except as otherwise noted.     Objective    Exam  /78   Pulse 72   Temp 98.4  F (36.9  C) (Temporal)   Resp 16   Wt 103.8 kg (228 lb 14.4 oz)   SpO2 99%   BMI 33.79 kg/m     Estimated body mass index is 33.79 kg/m  as calculated from the following:    Height as of 12/8/23: 1.753 m (5' 9.02\").    Weight as of this encounter: 103.8 kg (228 lb 14.4 oz).    Physical Exam  GENERAL: alert and no distress  EYES: Eyes grossly normal to inspection, PERRL and conjunctivae and sclerae normal  HENT: left ear with 100% hard cerumen impaction removed with irrigation today.   NECK: no adenopathy, no asymmetry, masses, or scars  RESP: lungs clear to auscultation - no rales, rhonchi or wheezes  CV: regular rate and rhythm, normal S1 S2, no S3 or S4, no murmur, click or rub, no peripheral edema  ABDOMEN: soft, nontender, no hepatosplenomegaly, no masses and bowel sounds normal  MS: no gross musculoskeletal defects noted, no edema  SKIN: no suspicious lesions or rashes  NEURO: Normal strength and tone, mentation intact and speech normal  PSYCH: mentation appears normal, affect normal/bright         10/1/2024   Mini Cog   Clock Draw Score 2 Normal   3 Item " Recall 3 objects recalled   Mini Cog Total Score 5                 Signed Electronically by: Joel Daniel Wegener, MD

## 2024-10-02 RX ORDER — LISINOPRIL 40 MG/1
40 TABLET ORAL DAILY
Qty: 90 TABLET | Refills: 1 | Status: SHIPPED | OUTPATIENT
Start: 2024-10-02

## 2024-10-02 RX ORDER — SIMVASTATIN 20 MG
TABLET ORAL
Qty: 90 TABLET | Refills: 0 | Status: SHIPPED | OUTPATIENT
Start: 2024-10-02

## 2024-10-02 RX ORDER — HYDROCHLOROTHIAZIDE 25 MG/1
25 TABLET ORAL DAILY
Qty: 90 TABLET | Refills: 0 | Status: SHIPPED | OUTPATIENT
Start: 2024-10-02

## 2024-10-02 RX ORDER — TAMSULOSIN HYDROCHLORIDE 0.4 MG/1
0.4 CAPSULE ORAL DAILY
Qty: 90 CAPSULE | Refills: 3 | Status: SHIPPED | OUTPATIENT
Start: 2024-10-02

## 2024-10-03 LAB
ALBUMIN SERPL BCG-MCNC: 4.4 G/DL (ref 3.5–5.2)
ALP SERPL-CCNC: 88 U/L (ref 40–150)
ALT SERPL W P-5'-P-CCNC: 19 U/L (ref 0–70)
ANION GAP SERPL CALCULATED.3IONS-SCNC: 10 MMOL/L (ref 7–15)
AST SERPL W P-5'-P-CCNC: 23 U/L (ref 0–45)
BILIRUB SERPL-MCNC: 0.3 MG/DL
BUN SERPL-MCNC: 19 MG/DL (ref 8–23)
CALCIUM SERPL-MCNC: 9.4 MG/DL (ref 8.8–10.4)
CHLORIDE SERPL-SCNC: 101 MMOL/L (ref 98–107)
CHOLEST SERPL-MCNC: 168 MG/DL
CREAT SERPL-MCNC: 1.06 MG/DL (ref 0.67–1.17)
EGFRCR SERPLBLD CKD-EPI 2021: 72 ML/MIN/1.73M2
ERYTHROCYTE [DISTWIDTH] IN BLOOD BY AUTOMATED COUNT: 12.1 % (ref 10–15)
EST. AVERAGE GLUCOSE BLD GHB EST-MCNC: 123 MG/DL
GLUCOSE SERPL-MCNC: 95 MG/DL (ref 70–99)
HBA1C MFR BLD: 5.9 % (ref 0–5.6)
HCO3 SERPL-SCNC: 25 MMOL/L (ref 22–29)
HCT VFR BLD AUTO: 38.2 % (ref 40–53)
HDLC SERPL-MCNC: 34 MG/DL
HGB BLD-MCNC: 13.2 G/DL (ref 13.3–17.7)
LDLC SERPL CALC-MCNC: 61 MG/DL
MCH RBC QN AUTO: 30.5 PG (ref 26.5–33)
MCHC RBC AUTO-ENTMCNC: 34.6 G/DL (ref 31.5–36.5)
MCV RBC AUTO: 88 FL (ref 78–100)
NONHDLC SERPL-MCNC: 134 MG/DL
PLATELET # BLD AUTO: 227 10E3/UL (ref 150–450)
POTASSIUM SERPL-SCNC: 4.5 MMOL/L (ref 3.4–5.3)
PROT SERPL-MCNC: 7.8 G/DL (ref 6.4–8.3)
PSA SERPL DL<=0.01 NG/ML-MCNC: <0.01 NG/ML (ref 0–6.5)
RBC # BLD AUTO: 4.33 10E6/UL (ref 4.4–5.9)
SODIUM SERPL-SCNC: 136 MMOL/L (ref 135–145)
TRIGL SERPL-MCNC: 367 MG/DL
WBC # BLD AUTO: 6.1 10E3/UL (ref 4–11)

## 2024-10-07 ENCOUNTER — THERAPY VISIT (OUTPATIENT)
Dept: PHYSICAL THERAPY | Facility: CLINIC | Age: 79
End: 2024-10-07
Payer: COMMERCIAL

## 2024-10-07 DIAGNOSIS — Z96.641 STATUS POST TOTAL REPLACEMENT OF RIGHT HIP: Primary | ICD-10-CM

## 2024-10-07 PROCEDURE — 97140 MANUAL THERAPY 1/> REGIONS: CPT | Mod: GP

## 2024-10-07 PROCEDURE — 97110 THERAPEUTIC EXERCISES: CPT | Mod: GP

## 2024-10-15 ENCOUNTER — TRANSFERRED RECORDS (OUTPATIENT)
Dept: HEALTH INFORMATION MANAGEMENT | Facility: CLINIC | Age: 79
End: 2024-10-15
Payer: COMMERCIAL

## 2024-10-28 ENCOUNTER — THERAPY VISIT (OUTPATIENT)
Dept: PHYSICAL THERAPY | Facility: CLINIC | Age: 79
End: 2024-10-28
Payer: COMMERCIAL

## 2024-10-28 ENCOUNTER — TELEPHONE (OUTPATIENT)
Dept: FAMILY MEDICINE | Facility: CLINIC | Age: 79
End: 2024-10-28

## 2024-10-28 DIAGNOSIS — Z96.641 STATUS POST TOTAL REPLACEMENT OF RIGHT HIP: Primary | ICD-10-CM

## 2024-10-28 PROCEDURE — 97110 THERAPEUTIC EXERCISES: CPT | Mod: GP

## 2024-10-28 PROCEDURE — 97140 MANUAL THERAPY 1/> REGIONS: CPT | Mod: GP

## 2024-10-28 NOTE — TELEPHONE ENCOUNTER
Could double book for ear check/serumen removal at 12:00 (11:40 arrival) tomorrow. Joel Wegener,MD w

## 2024-10-28 NOTE — TELEPHONE ENCOUNTER
Reason for Call:  Appointment Request    Patient requesting this type of appt:  Genaro Ortiz     Requested provider: Wegener, Joel Daniel Irwin    Reason patient unable to be scheduled: Not within requested timeframe    When does patient want to be seen/preferred time: 1-2 weeks    Comments: THE EAR DROPS HAVE HELPED A BIT BUT HE'S UNSURE IF THERE'S MORE WAX BUILD UP BECAUSE IT'S HARD TO HEAR AND HE WONDERS IF HE MIGHT NEED HEARING AIDS. HE WOULD ALSO LIKE THE RSV VACCINE AND WILL PAY FOR THAT.    Could we send this information to you in [employer] or would you prefer to receive a phone call?:   Patient would prefer a phone call   Okay to leave a detailed message?: Yes at Home number on file [phone] (home)    Call taken on 10/28/2024 at 3:05 PM by Audrey Garces

## 2024-10-31 ENCOUNTER — OFFICE VISIT (OUTPATIENT)
Dept: FAMILY MEDICINE | Facility: CLINIC | Age: 79
End: 2024-10-31
Payer: COMMERCIAL

## 2024-10-31 VITALS
DIASTOLIC BLOOD PRESSURE: 75 MMHG | OXYGEN SATURATION: 98 % | RESPIRATION RATE: 16 BRPM | SYSTOLIC BLOOD PRESSURE: 139 MMHG | HEART RATE: 66 BPM | TEMPERATURE: 97.1 F

## 2024-10-31 DIAGNOSIS — H61.21 IMPACTED CERUMEN OF RIGHT EAR: Primary | ICD-10-CM

## 2024-10-31 DIAGNOSIS — H91.93 DECREASED HEARING OF BOTH EARS: ICD-10-CM

## 2024-10-31 DIAGNOSIS — Z23 ENCOUNTER FOR IMMUNIZATION: ICD-10-CM

## 2024-10-31 DIAGNOSIS — Z29.11 NEED FOR VACCINATION AGAINST RESPIRATORY SYNCYTIAL VIRUS: ICD-10-CM

## 2024-10-31 PROCEDURE — 90678 RSV VACC PREF BIVALENT IM: CPT | Performed by: FAMILY MEDICINE

## 2024-10-31 PROCEDURE — 90471 IMMUNIZATION ADMIN: CPT | Performed by: FAMILY MEDICINE

## 2024-10-31 PROCEDURE — 69209 REMOVE IMPACTED EAR WAX UNI: CPT | Mod: RT | Performed by: FAMILY MEDICINE

## 2024-10-31 PROCEDURE — 99213 OFFICE O/P EST LOW 20 MIN: CPT | Mod: 25 | Performed by: FAMILY MEDICINE

## 2024-10-31 ASSESSMENT — PAIN SCALES - GENERAL: PAINLEVEL_OUTOF10: NO PAIN (0)

## 2024-10-31 NOTE — PROGRESS NOTES
Assessment & Plan     Impacted cerumen of right ear  75% cerumen impaction right ear.  Removed successfully with irrigation by nurse team.   - [place] REMOVAL IMPACTED CERUMEN IRRIGATION/LVG AIDEE (RN/MA); Standing    Decreased hearing of both ears  Referral given.   - Adult Audiology  Referral; Future    Encounter for immunization  Desires.   - RSV VACCINE (ABRYSVO)    Need for vaccination against respiratory syncytial virus                  Elda Alcantara is a 79 year old, presenting for the following health issues:  Ear Problem      10/31/2024    11:37 AM   Additional Questions   Roomed by Ngozi JUDGE   Accompanied by n/a     Ear Problem    History of Present Illness       Reason for visit:  Left ear wax. RSV vaccination.    He eats 2-3 servings of fruits and vegetables daily.He consumes 1 sweetened beverage(s) daily.He exercises with enough effort to increase his heart rate 10 to 19 minutes per day.  He exercises with enough effort to increase his heart rate 3 or less days per week.   He is taking medications regularly.         Concern - Ear Problem  Onset: 6 weeks   Description: Left ear wax build up   Intensity: mild  Progression of Symptoms:  improving and intermittent  Accompanying Signs & Symptoms: None   Previous history of similar problem: None   Precipitating factors:        Worsened by: Not sure   Alleviating factors: Debrox       Improved by:  Therapies tried and outcome: Debrox            Objective    There were no vitals taken for this visit.  There is no height or weight on file to calculate BMI.  Physical Exam   As in plan.             Signed Electronically by: Joel Daniel Wegener, MD  Prior to immunization administration, verified patients identity using patient s name and date of birth. Please see Immunization Activity for additional information.     Is the patient's temperature normal (100.5 or less)? Yes     Patient MEETS CRITERIA. PROCEED with vaccine administration.      Patient  instructed to remain in clinic for 15 minutes afterwards, and to report any adverse reactions.      Link to Ancillary Visit Immunization Standing Orders SmartSet     Screening performed by Ngozi Stanton on 10/31/2024 at 11:48 AM.

## 2024-12-02 ENCOUNTER — THERAPY VISIT (OUTPATIENT)
Dept: PHYSICAL THERAPY | Facility: CLINIC | Age: 79
End: 2024-12-02
Payer: COMMERCIAL

## 2024-12-02 DIAGNOSIS — Z96.641 STATUS POST TOTAL REPLACEMENT OF RIGHT HIP: Primary | ICD-10-CM

## 2024-12-02 PROCEDURE — 97140 MANUAL THERAPY 1/> REGIONS: CPT | Mod: GP

## 2024-12-18 NOTE — PLAN OF CARE
Goal Outcome Evaluation:      Plan of Care Reviewed With: patient    Overall Patient Progress: improvingOverall Patient Progress: improving       Orientation: alert, oriented x4  Bowel: last BM 7/18/2023, prune juice given  Bladder: Doctor updated of pt having urinary retention/bladder scan done with 489 residual urine. Straight Cath done, output: 500 ml clear,yellowish urine  Pain:denies  Ambulation/Transfers:Assist of 1, walker, gait belt  Diet/ Liquids:Regular diet  Tubes/ Lines/ Drains: PIV on left, clean, dry intact  Oxygen:room air  Skin: Right hip surgery  Bed alarm on for safety, call light within reach. Continue with POC.    Bed in lowest position, wheels locked, appropriate side rails in place/Call bell, personal items and telephone in reach/Instruct patient to call for assistance before getting out of bed or chair/Non-slip footwear when patient is out of bed/[place] to call system/Physically safe environment - no spills, clutter or unnecessary equipment/Purposeful Proactive Rounding/Room/bathroom lighting operational, light cord in reach

## 2025-01-04 ENCOUNTER — HEALTH MAINTENANCE LETTER (OUTPATIENT)
Age: 80
End: 2025-01-04

## 2025-01-15 ENCOUNTER — THERAPY VISIT (OUTPATIENT)
Dept: PHYSICAL THERAPY | Facility: CLINIC | Age: 80
End: 2025-01-15
Payer: COMMERCIAL

## 2025-01-15 DIAGNOSIS — Z96.641 STATUS POST TOTAL REPLACEMENT OF RIGHT HIP: Primary | ICD-10-CM

## 2025-01-15 PROCEDURE — 97530 THERAPEUTIC ACTIVITIES: CPT | Mod: GP

## 2025-01-15 PROCEDURE — 97140 MANUAL THERAPY 1/> REGIONS: CPT | Mod: GP

## 2025-01-15 PROCEDURE — 97110 THERAPEUTIC EXERCISES: CPT | Mod: GP

## 2025-01-16 NOTE — PROGRESS NOTES
[facility]                                                                                   OUTPATIENT PHYSICAL THERAPY    PLAN OF TREATMENT FOR OUTPATIENT REHABILITATION   Patient's Last Name, First Name, Genaro Keene YOB: 1945   Provider's Name   [facility]   Medical Record No.  [id number]     Onset Date: 06/25/24 (3 surgeries of hips and R femur in past year- listed under [facility] in eval)  Start of Care Date: 06/25/24     Medical Diagnosis:  B hip weakness      PT Treatment Diagnosis:  B hip weakness and mobility deficits Plan of Treatment  Frequency/Duration: 1x/week/ 90 days - may be longer depending on progress of strength development into functional training    Certification date from (P) 12/17/25 to (P) 03/16/25         See note for plan of treatment details and functional goals     LENIN ISAAC                         I CERTIFY THE NEED FOR THESE SERVICES FURNISHED UNDER        THIS PLAN OF TREATMENT AND WHILE UNDER MY CARE     (Physician attestation of this document indicates review and certification of the therapy plan).              Referring Provider:  David Escamilla MD    Initial Assessment  See Epic Evaluation- Start of Care Date: 06/25/24

## 2025-01-23 DIAGNOSIS — I10 HYPERTENSION GOAL BP (BLOOD PRESSURE) < 140/90: ICD-10-CM

## 2025-01-23 RX ORDER — HYDROCHLOROTHIAZIDE 25 MG/1
25 TABLET ORAL DAILY
Qty: 90 TABLET | Refills: 1 | Status: SHIPPED | OUTPATIENT
Start: 2025-01-23

## 2025-03-29 ENCOUNTER — MYC MEDICAL ADVICE (OUTPATIENT)
Dept: FAMILY MEDICINE | Facility: CLINIC | Age: 80
End: 2025-03-29
Payer: COMMERCIAL

## 2025-03-29 ENCOUNTER — HEALTH MAINTENANCE LETTER (OUTPATIENT)
Age: 80
End: 2025-03-29

## 2025-03-29 DIAGNOSIS — R73.03 PREDIABETES: Primary | ICD-10-CM

## 2025-04-10 ENCOUNTER — OFFICE VISIT (OUTPATIENT)
Dept: AUDIOLOGY | Facility: CLINIC | Age: 80
End: 2025-04-10
Payer: COMMERCIAL

## 2025-04-10 DIAGNOSIS — H90.3 SENSORINEURAL HEARING LOSS (SNHL), BILATERAL: Primary | ICD-10-CM

## 2025-04-10 NOTE — PROGRESS NOTES
AUDIOLOGY REPORT    SUBJECTIVE: Genaro Ortiz is a 79 year old male who was seen at the [facility] on 4/10/25 for audiologic evaluation, referred by, self. The patient has been seen previously in this clinic on 1/24/2025 for assessment but it could not be completed due to occluding cerumen in the right ear.  The patient was seen immediately prior to today's appointment by ENT for an ear cleaning and he reports he is hearing better in the right ear following the ear cleaning.  Overall, the patient reports decreased hearing in both the ears.  He denies ear pain, drainage, aural fullness, tinnitus, vertigo, ear surgery, and noise exposure.      OBJECTIVE:  Otoscopic exam indicates minimal cerumen bilaterally     Pure Tone Thresholds assessed using conventional audiometry with good reliability from 250-8000 Hz bilaterally using insert earphones and circumaural headphones.     RIGHT:  Normal sloping to moderately severe sensorineural hearing loss with a 15 dB asymmetry at 8000 Hz.     LEFT:    Mild sloping to moderately severe sensorineural hearing loss with a 20 dB asymmetry at 250 Hz.    Tympanogram:    RIGHT: normal eardrum mobility    LEFT:   restricted eardrum mobility     Reflexes (reported by stimulus ear): 1000 Hz  RIGHT: Ipsilateral, CNT due to inability to maintain a seal  RIGHT: Contralateral is present at normal levels  LEFT:   Ipsilateral is present at normal levels  LEFT:   Contralateral, CNT due to inability to maintain a seal    Speech Reception Threshold:    RIGHT: 45 dB HL    LEFT:   45 dB HL    Word Recognition Score:     RIGHT: 68% at 85 dB HL using NU-6 recorded word list.      76% at 90 dB HL using NU-6 recorded word list.    LEFT:   92% at 85 dB HL using NU-6 recorded word list.      ASSESSMENT: Today's results reveal bilateral sensorineural hearing loss. Today s results were discussed with the patient in detail.       PLAN:  The patient was counseled  regarding hearing loss and impact on communication. Genaro is a good hearing aid candidate at this time and he is encouraged to contact his insurance to determine his hearing aid coverage.  It is recommended that the patient return for repeat audiologic evaluation if new concerns arise. The patient expressed agreement and understanding of the plan.  Please call this clinic with questions regarding these results or recommendations.       Britt Gillespie, CCC-A  Clinical Audiologist  [place] #[id number]     Please note that the above dictation was created with voice recognition software and may contain typographic errors.

## 2025-04-16 ENCOUNTER — THERAPY VISIT (OUTPATIENT)
Dept: PHYSICAL THERAPY | Facility: CLINIC | Age: 80
End: 2025-04-16
Payer: COMMERCIAL

## 2025-04-16 DIAGNOSIS — Z96.641 STATUS POST TOTAL REPLACEMENT OF RIGHT HIP: Primary | ICD-10-CM

## 2025-04-16 PROCEDURE — 97140 MANUAL THERAPY 1/> REGIONS: CPT | Mod: GP

## 2025-04-16 PROCEDURE — 97110 THERAPEUTIC EXERCISES: CPT | Mod: GP

## 2025-04-17 NOTE — PROGRESS NOTES
"   04/16/25 0500   Appointment Info   Signing clinician's name / credentials Joesph Timmons DPT   Total/Authorized Visits [employer] (12)   Visits Used 9   Medical Diagnosis B hip weakness   PT Tx Diagnosis B hip weakness and mobility deficits   Progress Note/Certification   Start of Care Date 06/25/24   Onset of illness/injury or Date of Surgery 06/25/24  (3 surgeries of hips and R femur in past year- listed under PS in eval)   Therapy Frequency 1x/month   Predicted Duration 90 days - may be longer depending on progress of strength development into functional training   Certification date from 03/17/25   Certification date to 06/15/25   PT Goal 1   Goal Identifier Walking   Goal Description Pt will return to tolerance of walking for 1 mile with LRAD without complications from LE and hip weakness   Rationale to maximize safety and independence with performance of ADLs and functional tasks   Goal Progress Tolerating walking short distances with walker   Target Date 06/15/25   Subjective Report   Subjective Report Pt is starting to navigate up and down the stairs safely while facing up the stairs - is feeling stronger   Treatment Interventions (PT)   Interventions Manual Therapy;Therapeutic Activity   Therapeutic Procedure/Exercise   Therapeutic Procedures Ther Proc 2   Ther Proc 1 step ups   Ther Proc 1 - Details NT   Ther Proc 2 seated trunk flexion   Ther Proc 2 - Details NT   PTRx Ther Proc 1 Supine Lumbar Hip Roll   PTRx Ther Proc 1 - Details rev   PTRx Ther Proc 2 Single Knee to Chest   PTRx Ther Proc 2 - Details 2x30\" ea with towel - cues to flattern contralateral LE for anterior hip stretch   PTRx Ther Proc 3 Seated Piriformis Stretch   PTRx Ther Proc 3 - Details 2x30\" with towel to assist   PTRx Ther Proc 4 Seated Hamstring Stretch   PTRx Ther Proc 4 - Details 2x30\"   PTRx Ther Proc 5 Knee Bends   PTRx Ther Proc 5 - Details 2x10 with 2HHA   PTRx Ther Proc 6 Sit to Stand   PTRx Ther Proc 6 - Details 2x10 - hands on " knees - blue balance pad placed on w/c seat to increase seat height - pt increased in efficiency of movement with repetition - notable fatigue following exercise   PTRx Ther Proc 7 Standing Hip Flexion   PTRx Ther Proc 7 - Details NT   PTRx Ther Proc 8 Hip AROM Standing Abduction   PTRx Ther Proc 8 - Details NT   Skilled Intervention Observing movements for safety and accuracy - progressing difficulties when appropriate   Patient Response/Progress Continues to show improvements   Therapeutic Procedures: strength, endurance, ROM, flexibility minutes (96696) 30   Therapeutic Activity   Therapeutic Activities Ther Act 2   Ther Act 1 Gait training   Ther Act 1 - Details 5 min - practiced walking with and without AD - noted slower speeds without, but SBA   Ther Act 2 stair navigation   Ther Act 2 - Details SBA - demonstrating improved eccentric control   Manual Therapy   Manual Therapy: Mobilization, MFR, MLD, friction massage minutes (95070) 9   Manual Therapy Manual Therapy 2   Manual Therapy 1 inferior and lateral distraction of R hip; PROM to B hips   Manual Therapy 1 - Details GIV to GIII oscillations   Patient Response/Progress noted improved motion following technique   Education   Learner/Method Patient;Demonstration;Pictures/Video;No Barriers to Learning   Plan   Home program PTrx   Plan for next session LE strength training as tolerated - pt is faithful to completion of HEP - needs guidance and watchful eye with progressions   Total Session Time   Timed Code Treatment Minutes 39   Total Treatment Time (sum of timed and untimed services) 39         [facility]                                                                                   OUTPATIENT PHYSICAL THERAPY    PLAN OF TREATMENT FOR OUTPATIENT REHABILITATION   Patient's Last Name, First Name, Genaro Keene YOB: 1945   Provider's Name   [facility]   Medical Record  No.  [id number]     Onset Date: 06/25/24 (3 surgeries of hips and R femur in past year- listed under PS in eval)  Start of Care Date: 06/25/24     Medical Diagnosis:  B hip weakness      PT Treatment Diagnosis:  B hip weakness and mobility deficits Plan of Treatment  Frequency/Duration: 1x/month/ 90 days - may be longer depending on progress of strength development into functional training    Certification date from 03/17/25 to 06/15/25         See note for plan of treatment details and functional goals     LENIN ISAAC                         I CERTIFY THE NEED FOR THESE SERVICES FURNISHED UNDER        THIS PLAN OF TREATMENT AND WHILE UNDER MY CARE     (Physician attestation of this document indicates review and certification of the therapy plan).              Referring Provider:  David Escamilla    Initial Assessment  See Epic Evaluation- Start of Care Date: 06/25/24

## 2025-04-19 ENCOUNTER — HEALTH MAINTENANCE LETTER (OUTPATIENT)
Age: 80
End: 2025-04-19

## 2025-05-04 ENCOUNTER — TRANSFERRED RECORDS (OUTPATIENT)
Dept: HEALTH INFORMATION MANAGEMENT | Facility: CLINIC | Age: 80
End: 2025-05-04
Payer: COMMERCIAL

## 2025-05-21 ENCOUNTER — THERAPY VISIT (OUTPATIENT)
Dept: PHYSICAL THERAPY | Facility: CLINIC | Age: 80
End: 2025-05-21
Payer: COMMERCIAL

## 2025-05-21 DIAGNOSIS — Z96.641 STATUS POST TOTAL REPLACEMENT OF RIGHT HIP: Primary | ICD-10-CM

## 2025-05-21 PROCEDURE — 97110 THERAPEUTIC EXERCISES: CPT | Mod: GP

## 2025-05-21 PROCEDURE — 97140 MANUAL THERAPY 1/> REGIONS: CPT | Mod: GP

## 2025-05-21 PROCEDURE — 97530 THERAPEUTIC ACTIVITIES: CPT | Mod: GP

## 2025-07-07 ENCOUNTER — THERAPY VISIT (OUTPATIENT)
Dept: PHYSICAL THERAPY | Facility: CLINIC | Age: 80
End: 2025-07-07
Payer: COMMERCIAL

## 2025-07-07 DIAGNOSIS — Z96.641 STATUS POST TOTAL REPLACEMENT OF RIGHT HIP: Primary | ICD-10-CM

## 2025-07-07 PROCEDURE — 97110 THERAPEUTIC EXERCISES: CPT | Mod: GP

## 2025-07-07 PROCEDURE — 97112 NEUROMUSCULAR REEDUCATION: CPT | Mod: GP

## 2025-07-07 NOTE — PROGRESS NOTES
07/07/25 0500   Appointment Info   Signing clinician's name / credentials Ac Montague DPT   Total/Authorized Visits E[employer]T (12)   Visits Used 11   Medical Diagnosis B hip weakness   PT Tx Diagnosis B hip weakness and mobility deficits   Progress Note/Certification   Start of Care Date 06/25/24   Onset of illness/injury or Date of Surgery 06/25/24  (3 surgeries of hips and R femur in past year- listed under PS in eval)   Therapy Frequency 1x/month   Predicted Duration 90 days - may be longer depending on progress of strength development into functional training   Certification date from 06/16/25   Certification date to 09/14/25   PT Goal 1   Goal Identifier Walking   Goal Description Pt will return to tolerance of walking for 1 mile with LRAD without complications from LE and hip weakness   Rationale to maximize safety and independence with performance of ADLs and functional tasks   Goal Progress slow progress, date extended   Target Date 09/14/25   Subjective Report   Subjective Report Pt states that he still notices weakness on the right side.  He reports only limited pain, but wants to walk better and be able to walk without a walker.  He states that he still feels unsteady.   Treatment Interventions (PT)   Interventions Manual Therapy;Therapeutic Activity;Neuromuscular Re-education   Therapeutic Procedure/Exercise   Therapeutic Procedures: strength, endurance, ROM, flexibility minutes (77680) 15   Therapeutic Procedures Ther Proc 2   Ther Proc 2 Walker ambulation   Ther Proc 2 - Details educated pt on appropriate form in walker.  Goal to eliminate trunk flexion.   PTRx Ther Proc 1 Sitting Flexion   PTRx Ther Proc 1 - Details rev   PTRx Ther Proc 2 Seated Hamstring Stretch   PTRx Ther Proc 2 - Details rev   PTRx Ther Proc 3 Supine Lumbar Hip Roll   PTRx Ther Proc 3 - Details rev   PTRx Ther Proc 4 Seated Piriformis Stretch   PTRx Ther Proc 4 - Details rev   PTRx Ther Proc 5 Sit to Stand   PTRx Ther Proc 5 -  Details 1x10 cues for rocking to use momentum   PTRx Ther Proc 6 Standing Hip Flexion   PTRx Ther Proc 6 - Details 2x20 cues for slow pace   Skilled Intervention Observing movements for safety and accuracy - progressing difficulties when appropriate   Patient Response/Progress Continues to show improvements   Neuromuscular Re-education   Neuromuscular re-ed of mvmt, balance, coord, kinesthetic sense, posture, proprioception minutes (20082) 25   PTRx Neuro Re-ed 1 Bridging   PTRx Neuro Re-ed 1 - Details 1x10 cues for glute and TA facilitation   PTRx Neuro Re-ed 2 Supine Abdominal Exercise #3 (Marching)   PTRx Neuro Re-ed 2 - Details 1x20 cues for dynamic core stability   PTRx Neuro Re-ed 3 Hip AROM Standing Abduction   PTRx Neuro Re-ed 3 - Details 2x10 B cues for glute med facilitation   PTRx Neuro Re-ed 4 Tandem Stance   PTRx Neuro Re-ed 4 - Details 2x30 sec B   PTRx Neuro Re-ed 5 Shoulder Theraband Rows   PTRx Neuro Re-ed 5 - Details 1x10 green band cues for postural strengthening   PTRx Neuro Re-ed 6 Shoulder Theraband Low Row/[place]   PTRx Neuro Re-ed 6 - Details 1x10 green band cues for postural strengthening   Skilled Intervention cues, education   Patient Response/Progress tolerated well   Education   Learner/Method Patient;Demonstration;Pictures/Video;No Barriers to Learning   Plan   Home program PTrx   Plan for next session proximal strength, hip abd, stability   Total Session Time   Timed Code Treatment Minutes 40   Total Treatment Time (sum of timed and untimed services) 40         [facility]                                                                                   OUTPATIENT PHYSICAL THERAPY    PLAN OF TREATMENT FOR OUTPATIENT REHABILITATION   Patient's Last Name, First Name, Genaro Keene YOB: 1945   Provider's Name   [facility]   Medical Record No.  [id number]     Onset Date: 06/25/24 (3 surgeries of hips and  R femur in past year- listed under PS in eval)  Start of Care Date: 06/25/24     Medical Diagnosis:  B hip weakness      PT Treatment Diagnosis:  B hip weakness and mobility deficits Plan of Treatment  Frequency/Duration: 1x/month/ 90 days - may be longer depending on progress of strength development into functional training    Certification date from 06/16/25 to 09/14/25         See note for plan of treatment details and functional goals     Ac Montague, PT                         I CERTIFY THE NEED FOR THESE SERVICES FURNISHED UNDER        THIS PLAN OF TREATMENT AND WHILE UNDER MY CARE     (Physician attestation of this document indicates review and certification of the therapy plan).              Referring Provider:  David Escamilla    Initial Assessment  See Epic Evaluation- Start of Care Date: 06/25/24      Attending MD (Dr. David Escamilla) Attestation: I reviewed the therapist's note and approve the plan of care.      MD Chetan Pelayo Family Professor  Oncology and Adult Reconstructive Surgery  Dept Orthopaedic Surgery, [facility]  [phone] office, [phone] pager  [email or web address]

## 2025-07-28 DIAGNOSIS — E78.5 HYPERLIPIDEMIA LDL GOAL <130: ICD-10-CM

## 2025-07-28 DIAGNOSIS — I10 HYPERTENSION GOAL BP (BLOOD PRESSURE) < 140/90: ICD-10-CM

## 2025-07-28 RX ORDER — SIMVASTATIN 20 MG
20 TABLET ORAL AT BEDTIME
Qty: 90 TABLET | Refills: 0 | Status: SHIPPED | OUTPATIENT
Start: 2025-07-28

## 2025-07-28 RX ORDER — HYDROCHLOROTHIAZIDE 25 MG/1
25 TABLET ORAL DAILY
Qty: 90 TABLET | Refills: 0 | Status: SHIPPED | OUTPATIENT
Start: 2025-07-28

## 2025-08-02 ENCOUNTER — HEALTH MAINTENANCE LETTER (OUTPATIENT)
Age: 80
End: 2025-08-02

## 2025-09-01 ENCOUNTER — PATIENT OUTREACH (OUTPATIENT)
Dept: CARE COORDINATION | Facility: CLINIC | Age: 80
End: 2025-09-01
Payer: COMMERCIAL

## 2025-09-02 ENCOUNTER — THERAPY VISIT (OUTPATIENT)
Dept: PHYSICAL THERAPY | Facility: CLINIC | Age: 80
End: 2025-09-02
Payer: COMMERCIAL

## 2025-09-02 DIAGNOSIS — Z96.641 STATUS POST TOTAL REPLACEMENT OF RIGHT HIP: Primary | ICD-10-CM

## 2025-09-02 PROCEDURE — 97110 THERAPEUTIC EXERCISES: CPT | Mod: GP

## 2025-09-02 PROCEDURE — 97112 NEUROMUSCULAR REEDUCATION: CPT | Mod: GP

## (undated) DEVICE — SUCTION MANIFOLD NEPTUNE 2 SYS 4 PORT 0702-020-000

## (undated) DEVICE — STRAP KNEE/BODY 31143004

## (undated) DEVICE — ESU GROUND PAD UNIVERSAL W/O CORD

## (undated) DEVICE — POSITIONER ABDUCTION PILLOW FOAM MED FP-ABDUCTM

## (undated) DEVICE — PREP POVIDONE-IODINE 7.5% SCRUB 4OZ BOTTLE MDS093945

## (undated) DEVICE — SU VICRYL 0 CTX 36" J370H

## (undated) DEVICE — Device

## (undated) DEVICE — DRSG TEGADERM 4X10" 1627

## (undated) DEVICE — LINEN ORTHO PACK 5446

## (undated) DEVICE — ESU ELEC BLADE 6" COATED E1450-6

## (undated) DEVICE — SU VICRYL 0 CT-1 27" J340H

## (undated) DEVICE — PREP DURAPREP 26ML APL 8630

## (undated) DEVICE — SU MONOCRYL 3-0 PS-2 18" UND Y497G

## (undated) DEVICE — SU VICRYL 2-0 CT 36" J957H

## (undated) DEVICE — GLOVE BIOGEL PI ULTRATOUCH SZ 7.0 41170

## (undated) DEVICE — SU ETHIBOND 2-0 CT-1 30" X423H

## (undated) DEVICE — SU ETHIBOND 1 CTX CR 8/18" CX30D

## (undated) DEVICE — SOL WATER IRRIG 1000ML BOTTLE 2F7114

## (undated) DEVICE — LINEN TOWEL PACK X5 5464

## (undated) DEVICE — LINEN MAYO STAND COVER OVERSIZE PACK 5458

## (undated) DEVICE — GLOVE BIOGEL PI MICRO INDICATOR UNDERGLOVE SZ 7.5 48975

## (undated) DEVICE — DRSG STERI STRIP 1/2X4" R1547

## (undated) DEVICE — DRSG TEGADERM 4X4 3/4" 1626W

## (undated) DEVICE — SU ETHIBOND 1 CTX 30" X865H

## (undated) DEVICE — SU VICRYL 2-0 CT-2 27" UND J269H

## (undated) DEVICE — SU DERMABOND ADVANCED .7ML DNX12

## (undated) DEVICE — DRSG TEGADERM ALGINATE AG 4X5" 90303

## (undated) DEVICE — GOWN IMPERVIOUS SPECIALTY XLG/XLONG 32474

## (undated) DEVICE — ESU ELEC BLADE 6" COATED/INSULATED E1455-6

## (undated) DEVICE — SOL NACL 0.9% IRRIG 1000ML BOTTLE 2F7124

## (undated) DEVICE — SU ETHIBOND 1 CT-1 30" X425H

## (undated) DEVICE — LINEN GOWN OVERSIZE 5408

## (undated) DEVICE — PACK TOTAL HIP W/POUCH RIVERSIDE LATEX FREE

## (undated) DEVICE — TRAY PREP DRY SKIN SCRUB 067

## (undated) DEVICE — SU VICRYL 2-0 CTX 36" J369H

## (undated) DEVICE — DRILL BIT BIOM QUICK CONNECTING RING LOCK 3.2X40MM 31-323240

## (undated) DEVICE — DRAPE C-ARM W/STRAPS 42X72" 07-CA104

## (undated) DEVICE — BLADE SAW SAGITTAL STRK 25X90X1.27MM HD SYS 6 6125-127-090

## (undated) DEVICE — DECANTER VIAL 2006S

## (undated) DEVICE — BLADE KNIFE SURG 15 371115

## (undated) DEVICE — SU PDS II 3-0 SH 27" CLR Z416H

## (undated) DEVICE — STRAP STIRRUP W/SLIP 30187-030

## (undated) DEVICE — PACK TOTAL HIP W/U DRAPE RIVERSIDE LATEX FREE

## (undated) DEVICE — LINEN GOWN X4 5410

## (undated) DEVICE — SU PDS II 3-0 PS-1 18" Z683G

## (undated) DEVICE — PREP DURAPREP REMOVER 4OZ 8611

## (undated) DEVICE — SPONGE LAP 18X18" X8435

## (undated) DEVICE — DRAPE STOCKINETTE 8" 8586

## (undated) DEVICE — LINEN BACK PACK 5440

## (undated) DEVICE — SU ETHIBOND 0 CTX 30" X864H

## (undated) RX ORDER — FENTANYL CITRATE 50 UG/ML
INJECTION, SOLUTION INTRAMUSCULAR; INTRAVENOUS
Status: DISPENSED
Start: 2023-07-21

## (undated) RX ORDER — SODIUM CHLORIDE, SODIUM LACTATE, POTASSIUM CHLORIDE, CALCIUM CHLORIDE 600; 310; 30; 20 MG/100ML; MG/100ML; MG/100ML; MG/100ML
INJECTION, SOLUTION INTRAVENOUS
Status: DISPENSED
Start: 2023-07-14

## (undated) RX ORDER — TRANEXAMIC ACID 650 MG/1
TABLET ORAL
Status: DISPENSED
Start: 2023-07-21

## (undated) RX ORDER — CEFAZOLIN SODIUM/WATER 2 G/20 ML
SYRINGE (ML) INTRAVENOUS
Status: DISPENSED
Start: 2023-07-14

## (undated) RX ORDER — PROPOFOL 10 MG/ML
INJECTION, EMULSION INTRAVENOUS
Status: DISPENSED
Start: 2023-11-10

## (undated) RX ORDER — EPHEDRINE SULFATE 50 MG/ML
INJECTION, SOLUTION INTRAMUSCULAR; INTRAVENOUS; SUBCUTANEOUS
Status: DISPENSED
Start: 2023-07-14

## (undated) RX ORDER — TRANEXAMIC ACID 650 MG/1
TABLET ORAL
Status: DISPENSED
Start: 2023-07-14

## (undated) RX ORDER — ACETAMINOPHEN 325 MG/1
TABLET ORAL
Status: DISPENSED
Start: 2023-07-14

## (undated) RX ORDER — ACETAMINOPHEN 325 MG/1
TABLET ORAL
Status: DISPENSED
Start: 2023-11-10

## (undated) RX ORDER — FENTANYL CITRATE 50 UG/ML
INJECTION, SOLUTION INTRAMUSCULAR; INTRAVENOUS
Status: DISPENSED
Start: 2023-07-14

## (undated) RX ORDER — ACETAMINOPHEN 325 MG/1
TABLET ORAL
Status: DISPENSED
Start: 2023-07-21

## (undated) RX ORDER — CELECOXIB 200 MG/1
CAPSULE ORAL
Status: DISPENSED
Start: 2023-07-21

## (undated) RX ORDER — ONDANSETRON 2 MG/ML
INJECTION INTRAMUSCULAR; INTRAVENOUS
Status: DISPENSED
Start: 2023-07-21

## (undated) RX ORDER — HYDROMORPHONE HYDROCHLORIDE 1 MG/ML
INJECTION, SOLUTION INTRAMUSCULAR; INTRAVENOUS; SUBCUTANEOUS
Status: DISPENSED
Start: 2023-07-14

## (undated) RX ORDER — FENTANYL CITRATE 50 UG/ML
INJECTION, SOLUTION INTRAMUSCULAR; INTRAVENOUS
Status: DISPENSED
Start: 2023-05-18

## (undated) RX ORDER — FENTANYL CITRATE 50 UG/ML
INJECTION, SOLUTION INTRAMUSCULAR; INTRAVENOUS
Status: DISPENSED
Start: 2023-11-10

## (undated) RX ORDER — KETOROLAC TROMETHAMINE 30 MG/ML
INJECTION, SOLUTION INTRAMUSCULAR; INTRAVENOUS
Status: DISPENSED
Start: 2023-07-21

## (undated) RX ORDER — FENTANYL CITRATE 50 UG/ML
INJECTION, SOLUTION INTRAMUSCULAR; INTRAVENOUS
Status: DISPENSED
Start: 2017-01-31

## (undated) RX ORDER — EPHEDRINE SULFATE 50 MG/ML
INJECTION, SOLUTION INTRAMUSCULAR; INTRAVENOUS; SUBCUTANEOUS
Status: DISPENSED
Start: 2023-07-21

## (undated) RX ORDER — GABAPENTIN 100 MG/1
CAPSULE ORAL
Status: DISPENSED
Start: 2023-07-14

## (undated) RX ORDER — FENTANYL CITRATE-0.9 % NACL/PF 10 MCG/ML
PLASTIC BAG, INJECTION (ML) INTRAVENOUS
Status: DISPENSED
Start: 2023-11-10

## (undated) RX ORDER — CELECOXIB 200 MG/1
CAPSULE ORAL
Status: DISPENSED
Start: 2023-07-14

## (undated) RX ORDER — HYDROMORPHONE HYDROCHLORIDE 1 MG/ML
INJECTION, SOLUTION INTRAMUSCULAR; INTRAVENOUS; SUBCUTANEOUS
Status: DISPENSED
Start: 2023-07-21

## (undated) RX ORDER — HYDROMORPHONE HYDROCHLORIDE 1 MG/ML
INJECTION, SOLUTION INTRAMUSCULAR; INTRAVENOUS; SUBCUTANEOUS
Status: DISPENSED
Start: 2023-11-10

## (undated) RX ORDER — TRANEXAMIC ACID 650 MG/1
TABLET ORAL
Status: DISPENSED
Start: 2023-11-10

## (undated) RX ORDER — CEFAZOLIN SODIUM 1 G/3ML
INJECTION, POWDER, FOR SOLUTION INTRAMUSCULAR; INTRAVENOUS
Status: DISPENSED
Start: 2023-11-10

## (undated) RX ORDER — CEFAZOLIN SODIUM 1 G/3ML
INJECTION, POWDER, FOR SOLUTION INTRAMUSCULAR; INTRAVENOUS
Status: DISPENSED
Start: 2023-07-21

## (undated) RX ORDER — FENTANYL CITRATE-0.9 % NACL/PF 10 MCG/ML
PLASTIC BAG, INJECTION (ML) INTRAVENOUS
Status: DISPENSED
Start: 2023-07-21

## (undated) RX ORDER — FENTANYL CITRATE-0.9 % NACL/PF 10 MCG/ML
PLASTIC BAG, INJECTION (ML) INTRAVENOUS
Status: DISPENSED
Start: 2023-07-14

## (undated) RX ORDER — CEFAZOLIN SODIUM/WATER 2 G/20 ML
SYRINGE (ML) INTRAVENOUS
Status: DISPENSED
Start: 2023-07-21

## (undated) RX ORDER — OXYCODONE HYDROCHLORIDE 5 MG/1
TABLET ORAL
Status: DISPENSED
Start: 2023-11-10